# Patient Record
Sex: MALE | Race: BLACK OR AFRICAN AMERICAN | NOT HISPANIC OR LATINO | Employment: OTHER | URBAN - METROPOLITAN AREA
[De-identification: names, ages, dates, MRNs, and addresses within clinical notes are randomized per-mention and may not be internally consistent; named-entity substitution may affect disease eponyms.]

---

## 2017-06-07 ENCOUNTER — HOSPITAL ENCOUNTER (EMERGENCY)
Facility: HOSPITAL | Age: 77
Discharge: HOME | End: 2017-06-07
Attending: EMERGENCY MEDICINE
Payer: MEDICARE

## 2017-06-07 VITALS
HEART RATE: 41 BPM | HEIGHT: 72 IN | SYSTOLIC BLOOD PRESSURE: 192 MMHG | OXYGEN SATURATION: 100 % | DIASTOLIC BLOOD PRESSURE: 95 MMHG | WEIGHT: 210 LBS | RESPIRATION RATE: 18 BRPM | BODY MASS INDEX: 28.44 KG/M2 | TEMPERATURE: 98 F

## 2017-06-07 DIAGNOSIS — I95.9 HYPOTENSION: ICD-10-CM

## 2017-06-07 DIAGNOSIS — I10 UNSPECIFIED ESSENTIAL HYPERTENSION: ICD-10-CM

## 2017-06-07 DIAGNOSIS — R00.1 BRADYCARDIA: Primary | ICD-10-CM

## 2017-06-07 LAB
ALBUMIN SERPL BCP-MCNC: 3.4 G/DL
ALP SERPL-CCNC: 67 U/L
ALT SERPL W/O P-5'-P-CCNC: 15 U/L
ANION GAP SERPL CALC-SCNC: 7 MMOL/L
AST SERPL-CCNC: 21 U/L
BASOPHILS # BLD AUTO: 0.02 K/UL
BASOPHILS NFR BLD: 0.5 %
BILIRUB SERPL-MCNC: 0.9 MG/DL
BILIRUB UR QL STRIP: NEGATIVE
BUN SERPL-MCNC: 31 MG/DL
CALCIUM SERPL-MCNC: 10.1 MG/DL
CHLORIDE SERPL-SCNC: 106 MMOL/L
CLARITY UR: CLEAR
CO2 SERPL-SCNC: 26 MMOL/L
COLOR UR: YELLOW
CREAT SERPL-MCNC: 1.7 MG/DL
DIFFERENTIAL METHOD: ABNORMAL
EOSINOPHIL # BLD AUTO: 0.3 K/UL
EOSINOPHIL NFR BLD: 5.9 %
ERYTHROCYTE [DISTWIDTH] IN BLOOD BY AUTOMATED COUNT: 14.7 %
EST. GFR  (AFRICAN AMERICAN): 44 ML/MIN/1.73 M^2
EST. GFR  (NON AFRICAN AMERICAN): 38 ML/MIN/1.73 M^2
GLUCOSE SERPL-MCNC: 135 MG/DL
GLUCOSE UR QL STRIP: NEGATIVE
HCT VFR BLD AUTO: 42.8 %
HGB BLD-MCNC: 14.6 G/DL
HGB UR QL STRIP: NEGATIVE
INR PPP: 1
KETONES UR QL STRIP: NEGATIVE
LACTATE SERPL-SCNC: 1 MMOL/L
LEUKOCYTE ESTERASE UR QL STRIP: NEGATIVE
LYMPHOCYTES # BLD AUTO: 1.2 K/UL
LYMPHOCYTES NFR BLD: 27.1 %
MAGNESIUM SERPL-MCNC: 1.9 MG/DL
MCH RBC QN AUTO: 29.9 PG
MCHC RBC AUTO-ENTMCNC: 34.1 %
MCV RBC AUTO: 88 FL
MONOCYTES # BLD AUTO: 0.4 K/UL
MONOCYTES NFR BLD: 8.7 %
NEUTROPHILS # BLD AUTO: 2.5 K/UL
NEUTROPHILS NFR BLD: 57.8 %
NITRITE UR QL STRIP: NEGATIVE
PH UR STRIP: 6 [PH] (ref 5–8)
PLATELET # BLD AUTO: 211 K/UL
PMV BLD AUTO: 11.1 FL
POCT GLUCOSE: 150 MG/DL (ref 70–110)
POTASSIUM SERPL-SCNC: 4.1 MMOL/L
PROT SERPL-MCNC: 6.9 G/DL
PROT UR QL STRIP: NEGATIVE
PROTHROMBIN TIME: 11 SEC
RBC # BLD AUTO: 4.88 M/UL
SODIUM SERPL-SCNC: 139 MMOL/L
SP GR UR STRIP: 1.01 (ref 1–1.03)
TROPONIN I SERPL DL<=0.01 NG/ML-MCNC: <0.006 NG/ML
URN SPEC COLLECT METH UR: ABNORMAL
UROBILINOGEN UR STRIP-ACNC: ABNORMAL EU/DL
WBC # BLD AUTO: 4.24 K/UL

## 2017-06-07 PROCEDURE — 85025 COMPLETE CBC W/AUTO DIFF WBC: CPT

## 2017-06-07 PROCEDURE — 83735 ASSAY OF MAGNESIUM: CPT

## 2017-06-07 PROCEDURE — 85610 PROTHROMBIN TIME: CPT

## 2017-06-07 PROCEDURE — 93005 ELECTROCARDIOGRAM TRACING: CPT

## 2017-06-07 PROCEDURE — 99284 EMERGENCY DEPT VISIT MOD MDM: CPT | Mod: 25

## 2017-06-07 PROCEDURE — 81003 URINALYSIS AUTO W/O SCOPE: CPT

## 2017-06-07 PROCEDURE — 84484 ASSAY OF TROPONIN QUANT: CPT

## 2017-06-07 PROCEDURE — 99283 EMERGENCY DEPT VISIT LOW MDM: CPT | Mod: ,,, | Performed by: NURSE PRACTITIONER

## 2017-06-07 PROCEDURE — 93010 ELECTROCARDIOGRAM REPORT: CPT | Mod: 76,,, | Performed by: INTERNAL MEDICINE

## 2017-06-07 PROCEDURE — 80053 COMPREHEN METABOLIC PANEL: CPT

## 2017-06-07 PROCEDURE — 96361 HYDRATE IV INFUSION ADD-ON: CPT

## 2017-06-07 PROCEDURE — 93010 ELECTROCARDIOGRAM REPORT: CPT | Mod: ,,, | Performed by: INTERNAL MEDICINE

## 2017-06-07 PROCEDURE — 83605 ASSAY OF LACTIC ACID: CPT

## 2017-06-07 PROCEDURE — 96360 HYDRATION IV INFUSION INIT: CPT

## 2017-06-07 PROCEDURE — 82962 GLUCOSE BLOOD TEST: CPT

## 2017-06-07 PROCEDURE — 25000003 PHARM REV CODE 250: Performed by: EMERGENCY MEDICINE

## 2017-06-07 RX ORDER — METFORMIN HYDROCHLORIDE 500 MG/1
500 TABLET ORAL 2 TIMES DAILY WITH MEALS
COMMUNITY
End: 2018-09-19

## 2017-06-07 RX ORDER — TAMSULOSIN HYDROCHLORIDE 0.4 MG/1
0.4 CAPSULE ORAL DAILY
COMMUNITY
End: 2020-09-10

## 2017-06-07 RX ORDER — CHOLECALCIFEROL (VITAMIN D3) 25 MCG
1000 TABLET ORAL DAILY
COMMUNITY

## 2017-06-07 RX ORDER — LOSARTAN POTASSIUM AND HYDROCHLOROTHIAZIDE 25; 100 MG/1; MG/1
1 TABLET ORAL DAILY
COMMUNITY
End: 2017-07-14

## 2017-06-07 RX ORDER — FLUTICASONE PROPIONATE 50 UG/1
POWDER, METERED RESPIRATORY (INHALATION) 2 TIMES DAILY
COMMUNITY
End: 2017-07-14

## 2017-06-07 RX ORDER — ASPIRIN 81 MG/1
81 TABLET ORAL DAILY
Status: ON HOLD | COMMUNITY
End: 2017-07-19 | Stop reason: HOSPADM

## 2017-06-07 RX ORDER — ATORVASTATIN CALCIUM 80 MG/1
80 TABLET, FILM COATED ORAL DAILY
Status: ON HOLD | COMMUNITY
End: 2017-07-19 | Stop reason: HOSPADM

## 2017-06-07 RX ADMIN — SODIUM CHLORIDE 1000 ML: 0.9 INJECTION, SOLUTION INTRAVENOUS at 11:06

## 2017-06-07 NOTE — ED NOTES
Pt presents to ED from Dr Aldana's office for c/o hypotension and was sent for further evaluation. Pt bp stable at present. Pt sinus bradycardic on monitor. Pt states he took his metoprolol this AM.  Pt AAOx4. Pt has no complaints at this time. Will continue to monitor.

## 2017-06-07 NOTE — ASSESSMENT & PLAN NOTE
SBP 110s-170s in ER; on Toprol XL, Losartan,  HCTZ and Lotrel at home; will discontinue BB due to bradycardia; advised against use of ACEI and ARB

## 2017-06-07 NOTE — HPI
76yo male with history of HTN, CKD and ?HOCM who presented to the ER upon advice from his PCP. He was seen earlier today for routine follow up and physical. His HR was noted to be low in the 40s therefore he was sent to the ER for further evaluation. He denies any chest pain, SOB, dizziness or syncope. All labs in the ER were WNL to include lytes and TSH    EKG per my interpretation: SB with HR 40s with normal axis; no STTWC

## 2017-06-07 NOTE — ASSESSMENT & PLAN NOTE
Asymptomatic; detected on routine physical exam; no AVB or pauses on telemetry in the ER; will discontinue Toprol XL; will follow up in Cardiology clinic for reassessment of HR; if bradycardia resolved will manage BP without BB; if BB persists will consider Holter monitor if symptomatic

## 2017-06-07 NOTE — DISCHARGE INSTRUCTIONS
Call your primary care doctor to make the first available appointment.     Keep all your medical appointments.     Take your regular medication as prescribed. Contact your primary care provider before running out of medication, or for any problems obtaining them.    Do not drive or operate heavy machinery while taking norco, percocet, valium, flexeril or other opioid and sedating medications.     Prolonged or overuse of pain and sedating medication may lead to addiction, dependence, organ failure, family and work problems, legal problems, accidental overdose and death.    If you do not have health insurance, you probably qualify for heavily discounted rates:  Call 1-521.536.1450 ([facility] hotline) or go to www.[employer].[place].gov    Your evaluation in the ED does not suggest any emergent or life threatening medical condition requiring admission or immediate intervention beyond that provided in the ED.   However, the signs of a serious problem sometimes take more time to appear.   RETURN TO THE ER if any of the following occur:    New or worse pain: if it feels different, becomes more severe, lasts longer, or begins to spread into your shoulder, arm, neck, jaw or back   Shortness of breath or increased pain with breathing   Cough with dark colored sputum (phlegm) or blood   Weakness, dizziness, fainting, or loss of consciousness   Fever of 100.4ºF (38ºC) or higher  Any new or concerning symptoms

## 2017-06-07 NOTE — SUBJECTIVE & OBJECTIVE
Past Medical History:   Diagnosis Date    Cancer     prostate    Diabetes mellitus     Hypertension     Hypertrophic cardiomyopathy     Renal disorder        Past Surgical History:   Procedure Laterality Date    BACK SURGERY      THYROIDECTOMY         Review of patient's allergies indicates:  No Known Allergies    No current facility-administered medications on file prior to encounter.      No current outpatient prescriptions on file prior to encounter.     Family History     None        Social History Main Topics    Smoking status: Former Smoker     Types: Cigarettes    Smokeless tobacco: Not on file    Alcohol use Yes    Drug use: Unknown    Sexual activity: Not on file     Review of Systems   Constitution: Negative for chills, decreased appetite and diaphoresis.   Cardiovascular: Negative for chest pain, claudication, cyanosis, dyspnea on exertion, irregular heartbeat, leg swelling, near-syncope, orthopnea, palpitations, paroxysmal nocturnal dyspnea and syncope.   Respiratory: Negative for cough, shortness of breath and wheezing.    Gastrointestinal: Negative for bloating, abdominal pain, constipation, diarrhea, nausea and vomiting.   Neurological: Negative for dizziness.     Objective:     Vital Signs (Most Recent):  Temp: 97.9 °F (36.6 °C) (06/07/17 1035)  Pulse: 85 (06/07/17 1605)  Resp: 18 (06/07/17 1120)  BP: (!) 170/84 (06/07/17 1605)  SpO2: 100 % (06/07/17 1605) Vital Signs (24h Range):  Temp:  [97.9 °F (36.6 °C)] 97.9 °F (36.6 °C)  Pulse:  [38-85] 85  Resp:  [16-18] 18  SpO2:  [99 %-100 %] 100 %  BP: (102-170)/(54-84) 170/84     Weight: 95.3 kg (210 lb)  Body mass index is 28.88 kg/m².    SpO2: 100 %  O2 Device (Oxygen Therapy): nasal cannula    No intake or output data in the 24 hours ending 06/07/17 1712    Lines/Drains/Airways     Peripheral Intravenous Line                 Peripheral IV - Single Lumen 06/07/17 1107 Left Antecubital less than 1 day                Physical Exam    Constitutional: He is oriented to person, place, and time. He appears well-developed and well-nourished. No distress.   Cardiovascular: Regular rhythm.  Bradycardia present.  Exam reveals no gallop.    No murmur heard.  Pulmonary/Chest: Effort normal and breath sounds normal. No respiratory distress. He has no wheezes.   Abdominal: Soft. Bowel sounds are normal. He exhibits no distension. There is no tenderness.   Neurological: He is alert and oriented to person, place, and time.   Skin: Skin is warm and dry.       Significant Labs:       Recent Labs  Lab 06/07/17  1110   CALCIUM 10.1   PROT 6.9      K 4.1   CO2 26      BUN 31*   CREATININE 1.7*   ALKPHOS 67   ALT 15   AST 21   BILITOT 0.9       Recent Labs  Lab 06/07/17  1110   WBC 4.24   RBC 4.88   HGB 14.6   HCT 42.8      MCV 88   MCH 29.9   MCHC 34.1       Recent Labs  Lab 06/07/17  1110   TROPONINI <0.006

## 2017-06-07 NOTE — CONSULTS
[facility]  Cardiology  Consult Note    Patient Name: Shant Magana  MRN: [id number]  Admission Date: 6/7/2017  Hospital Length of Stay: 0 days  Code Status: No Order   Attending Provider: Sascha Crespo MD   Consulting Provider: SELVIN Madrid, ANP  Primary Care Physician: No primary care provider on file.  Principal Problem:<principal problem not specified>    Patient information was obtained from patient and past medical records.     Consults  Subjective:     Chief Complaint:  Low HR-sent to ER per PCP for evaluation; asymptomatic      HPI:   76yo male with history of HTN, CKD and ?HOCM who presented to the ER upon advice from his PCP. He was seen earlier today for routine follow up and physical. His HR was noted to be low in the 40s therefore he was sent to the ER for further evaluation. He denies any chest pain, SOB, dizziness or syncope. All labs in the ER were WNL to include lytes and TSH    EKG per my interpretation: SB with HR 40s with normal axis; no STTWC     Past Medical History:   Diagnosis Date    Cancer     prostate    Diabetes mellitus     Hypertension     Hypertrophic cardiomyopathy     Renal disorder        Past Surgical History:   Procedure Laterality Date    BACK SURGERY      THYROIDECTOMY         Review of patient's allergies indicates:  No Known Allergies    No current facility-administered medications on file prior to encounter.      No current outpatient prescriptions on file prior to encounter.     Family History     None        Social History Main Topics    Smoking status: Former Smoker     Types: Cigarettes    Smokeless tobacco: Not on file    Alcohol use Yes    Drug use: Unknown    Sexual activity: Not on file     Review of Systems   Constitution: Negative for chills, decreased appetite and diaphoresis.   Cardiovascular: Negative for chest pain, claudication, cyanosis, dyspnea on exertion, irregular heartbeat, leg swelling, near-syncope, orthopnea,  palpitations, paroxysmal nocturnal dyspnea and syncope.   Respiratory: Negative for cough, shortness of breath and wheezing.    Gastrointestinal: Negative for bloating, abdominal pain, constipation, diarrhea, nausea and vomiting.   Neurological: Negative for dizziness.     Objective:     Vital Signs (Most Recent):  Temp: 97.9 °F (36.6 °C) (06/07/17 1035)  Pulse: 85 (06/07/17 1605)  Resp: 18 (06/07/17 1120)  BP: (!) 170/84 (06/07/17 1605)  SpO2: 100 % (06/07/17 1605) Vital Signs (24h Range):  Temp:  [97.9 °F (36.6 °C)] 97.9 °F (36.6 °C)  Pulse:  [38-85] 85  Resp:  [16-18] 18  SpO2:  [99 %-100 %] 100 %  BP: (102-170)/(54-84) 170/84     Weight: 95.3 kg (210 lb)  Body mass index is 28.88 kg/m².    SpO2: 100 %  O2 Device (Oxygen Therapy): nasal cannula    No intake or output data in the 24 hours ending 06/07/17 1712    Lines/Drains/Airways     Peripheral Intravenous Line                 Peripheral IV - Single Lumen 06/07/17 1107 Left Antecubital less than 1 day                Physical Exam   Constitutional: He is oriented to person, place, and time. He appears well-developed and well-nourished. No distress.   Cardiovascular: Regular rhythm.  Bradycardia present.  Exam reveals no gallop.    No murmur heard.  Pulmonary/Chest: Effort normal and breath sounds normal. No respiratory distress. He has no wheezes.   Abdominal: Soft. Bowel sounds are normal. He exhibits no distension. There is no tenderness.   Neurological: He is alert and oriented to person, place, and time.   Skin: Skin is warm and dry.       Significant Labs:       Recent Labs  Lab 06/07/17  1110   CALCIUM 10.1   PROT 6.9      K 4.1   CO2 26      BUN 31*   CREATININE 1.7*   ALKPHOS 67   ALT 15   AST 21   BILITOT 0.9       Recent Labs  Lab 06/07/17  1110   WBC 4.24   RBC 4.88   HGB 14.6   HCT 42.8      MCV 88   MCH 29.9   MCHC 34.1       Recent Labs  Lab 06/07/17  1110   TROPONINI <0.006           Assessment and Plan:     Bradycardia     Asymptomatic; detected on routine physical exam; no AVB or pauses on telemetry in the ER; will discontinue Toprol XL; will follow up in Cardiology clinic for reassessment of HR; if bradycardia resolved will manage BP without BB; if BB persists will consider Holter monitor if symptomatic         Unspecified essential hypertension    SBP 110s-170s in ER; on Toprol XL, Losartan,  HCTZ and Lotrel at home; will discontinue BB due to bradycardia; advised against use of ACEI and ARB; verbal report of hypotension in PCP's office with SBP 80s; no hypotension noted while in ER with SBP >100 consistently             VTE Risk Mitigation     None            SELVIN Madrid, ANP  Cardiology   [facility]

## 2017-06-08 ENCOUNTER — TELEPHONE (OUTPATIENT)
Dept: CARDIOLOGY | Facility: CLINIC | Age: 77
End: 2017-06-08

## 2017-06-08 NOTE — ED PROVIDER NOTES
"Encounter Date: 6/7/2017       History     Chief Complaint   Patient presents with    Hypotension     was Dr Aldana's office this morning for a check up and blood pressure was 82/52.  Was sent to ED for further evaluation.  Pt awake, alert and oriented x 3     Review of patient's allergies indicates:  No Known Allergies  HPI   Pt sent to the emergency department for hypotension during PCP visit.  I discussed the patient with his PCP, who states that the patient was somnolent, confused just prior to arrival here.  Heart rate was in the high 50s at that time.  He is on a beta blocker (50 mg metoprolol).  He and PCP deny Hx bradycardia.  Here the patient denies any symptoms, states he has a negative review of systems.     Past Medical History:   Diagnosis Date    Cancer     prostate    Diabetes mellitus     Hypertension     Hypertrophic cardiomyopathy     Renal disorder      Past Surgical History:   Procedure Laterality Date    BACK SURGERY      THYROIDECTOMY       History reviewed. No pertinent family history.  Social History   Substance Use Topics    Smoking status: Former Smoker     Types: Cigarettes    Smokeless tobacco: Not on file    Alcohol use Yes     Review of Systems  ROS: As per HPI and below:   General: No fever.   HENT: No facial pain.   Eyes: No eye pain.   Cardiovascular: No chest pain. Hypotension. Bradycardia.   Respiratory: No dyspnea.   GI: No abdominal pain.   Skin: No rashes.  Neuro: No syncope. +confusion  Musculoskeletal: No extremity pain. No swelling.    All other systems negative.     Physical Exam     Initial Vitals [06/07/17 1035]   BP Pulse Resp Temp SpO2   110/65 (!) 47 16 97.9 °F (36.6 °C) 100 %     Physical Exam  Nursing note and vitals reviewed.  BP (!) 192/95   Pulse (!) 41   Temp 97.9 °F (36.6 °C) (Oral)   Resp 18   Ht 5' 11.5" (1.816 m)   Wt 95.3 kg (210 lb)   SpO2 100%   BMI 28.88 kg/m²   Constitutional: AAOx3. Well-developed and well-nourished. No distress.  HENT: "   Mouth/Throat: Oropharynx is clear and slightly dry  Eyes: EOMI. No discharge. Anicteric.  Neck: Normal range of motion. Neck supple.  Cardiovascular: bradycardic rate. No murmur, no gallop and no friction rub heard.   Pulmonary/Chest: No respiratory distress. Effort normal. No wheezes, no rales, no rhonchi  Abdominal: Bowel sounds normal. Soft. No distension and no mass. There is no tenderness. There is no rebound, no guarding, no tenderness at McBurney's point and negative Anthony's sign.   Musculoskeletal: Normal range of motion.   Neurological: Alert and oriented to person, place, and time. No gross cranial nerve or strength deficit. Coordination normal.  Skin: Skin is warm and dry.   Psychiatric: Behavior is normal. Judgment normal.    ED Course   Procedures  Labs Reviewed   CBC W/ AUTO DIFFERENTIAL - Abnormal; Notable for the following:        Result Value    RDW 14.7 (*)     All other components within normal limits   COMPREHENSIVE METABOLIC PANEL - Abnormal; Notable for the following:     Glucose 135 (*)     BUN, Bld 31 (*)     Creatinine 1.7 (*)     Albumin 3.4 (*)     Anion Gap 7 (*)     eGFR if  44 (*)     eGFR if non  38 (*)     All other components within normal limits   URINALYSIS - Abnormal; Notable for the following:     Urobilinogen, UA 4.0-6.0 (*)     All other components within normal limits   POCT GLUCOSE - Abnormal; Notable for the following:     POCT Glucose 150 (*)     All other components within normal limits   PROTIME-INR   MAGNESIUM   TROPONIN I   LACTIC ACID, PLASMA        ECG Results          EKG 12-lead (Final result)  Result time 06/07/17 14:49:19    Final result by Interface, Lab In [facility] (06/07/17 14:49:19)                 Narrative:    Test Reason : i95.9  Blood Pressure : / mmHG  Vent. Rate : 045 BPM     Atrial Rate : 045 BPM     P-R Int : 220 ms          QRS Dur : 094 ms      QT Int : 446 ms       P-R-T Axes : 025 064 006 degrees     QTc Int : 385  ms    Marked sinus bradycardia with 1st degree A-V block  Septal infarct (cited on or before 07-JUN-2017)  Abnormal ECG  When compared with ECG of 07-JUN-2017 10:47,  No significant change was found  Confirmed by Guanaco Ty MD ([id number]) on 6/7/2017 2:49:07 PM    Referred By: VEGA   SELF           Confirmed By:Guanaco Ty MD                             EKG 12-lead (Final result)  Result time 06/07/17 14:49:04    Final result by Interface, Lab In [facility] (06/07/17 14:49:04)                 Narrative:    Test Reason : I95.9  Blood Pressure : / mmHG  Vent. Rate : 045 BPM     Atrial Rate : 045 BPM     P-R Int : 216 ms          QRS Dur : 092 ms      QT Int : 444 ms       P-R-T Axes : -21 054 001 degrees     QTc Int : 384 ms    Marked sinus bradycardia with 1st degree A-V block  Septal infarct ,age undetermined  Abnormal ECG  No previous ECGs available  Confirmed by Guanaco Ty MD ([id number]) on 6/7/2017 2:48:58 PM    Referred By: VEGA   SELF           Confirmed By:Guanaco Ty MD                                                   ED Course   Comment By Time   I independently reviewed and interpreted EKG which shows sinus bradycardia, no STEMI, no ischemic changes, normal intervals   Sascha Crespo MD 06/07 5061   Patient is a 77-year-old male with hypertension sent to the emergency department for hypotension during PCP visit.  I discussed the patient with his PCP, who states that the patient was somnolent, confused.  Heart rate was in the high 50s at that time.  He is on a beta blocker (50 mg metoprolol).  He and PCP deny Hx bradycardia.  Here the patient denies any symptoms, states he has a negative review of systems, is persistently and consistently bradycardic from high 30s to mid 40s.  I note that the patient had minimal increase in his heart rate upon standing.  The initial differential initially included ACS/MI, dehydration, sepsis, gross metabolic abnormality.  I independently  interpreted and reviewed the patient's labs notable for normal lactic, normal troponin, stable CKD.  Concern for chronotropic insufficiency versus excessive beta blockade.  I discussed these concerns with the patient's PCP Dr. Aldana who states that if patient cleared by cardiology, he would discontinue beta blocker.  Contacted cardiology.  Dr. Dumont is in a case, pt will be evaluated by cards midlevel provider.  I note that there was significant delay in direct MD evaluation and disposition. Contributing factors included my not having electronic status as an attending physician in the EMR delaying my ability to assign myself to pt, pt apparently inadvertently electronically moved to low acuity area of the ER and subsequently assigned to low acuity area MD just prior to my being given attending status electronically, ED saturation with multiple critically and gravely ill pts in ED and multiple rapid responses.  Sascha Crespo MD 06/07 1637   Pt seen by cardiology.  Dr. Dumont does not feel that the patient requires admission or observation.  They do not feel that the patient's bradycardia is due to an intrinsic dysrhythmia.  They recommend discontinuing beta blocker.  They will see the patient in clinic.  I discussed with patient and/or guardian/caretaker that this evaluation in the ED does not suggest any emergent or life threatening medical condition requiring admission or immediate intervention beyond what was provided in the ED. Regardless, an unremarkable evaluation in the ED does not preclude the development or presence of a serious or life threatening condition. As such, patient was instructed to return immediately for any worsening or change in current symptoms.     I had a detailed discussion with patient  and/or guardian/caretaker regarding findings, plan, return precautions, importance of medication adherence, need to follow-up with a PCP and specialist. All questions answered.   Sascha Crespo MD  06/07 0923     Clinical Impression:   The primary encounter diagnosis was Bradycardia. Diagnoses of Hypotension and Unspecified essential hypertension were also pertinent to this visit.          Sascha Crespo MD  06/12/17 1949

## 2017-06-08 NOTE — TELEPHONE ENCOUNTER
Left message, advising patient with his hospital follow up info.      6/12 @ 2:40 with Dr. Dumont.

## 2017-06-08 NOTE — TELEPHONE ENCOUNTER
----- Message from SELVIN Ge ANP sent at 6/7/2017  5:08 PM CDT -----  Contact: Josefina Kinney NP  Patient seen in the ER with asymptomatic bradycardia. Metoprolol stopped. Needs cardiology follow. Ideally would be more suitable for MD clinic

## 2017-07-14 ENCOUNTER — HOSPITAL ENCOUNTER (INPATIENT)
Facility: HOSPITAL | Age: 77
LOS: 7 days | Discharge: NURSING, REHAB OR LONG-TERM CARE | DRG: 065 | End: 2017-07-21
Attending: EMERGENCY MEDICINE | Admitting: INTERNAL MEDICINE
Payer: MEDICARE

## 2017-07-14 DIAGNOSIS — I16.1 HYPERTENSIVE EMERGENCY: ICD-10-CM

## 2017-07-14 DIAGNOSIS — I67.4 HYPERTENSIVE ENCEPHALOPATHY: Primary | ICD-10-CM

## 2017-07-14 DIAGNOSIS — E11.8 TYPE 2 DIABETES MELLITUS WITH COMPLICATION, WITHOUT LONG-TERM CURRENT USE OF INSULIN: ICD-10-CM

## 2017-07-14 DIAGNOSIS — I16.9 HYPERTENSIVE CRISIS, UNSPECIFIED: ICD-10-CM

## 2017-07-14 DIAGNOSIS — I63.9 CVA (CEREBRAL VASCULAR ACCIDENT): ICD-10-CM

## 2017-07-14 DIAGNOSIS — I10 UNSPECIFIED ESSENTIAL HYPERTENSION: ICD-10-CM

## 2017-07-14 DIAGNOSIS — E83.52 HYPERCALCEMIA: ICD-10-CM

## 2017-07-14 LAB
ALBUMIN SERPL BCP-MCNC: 4 G/DL
ALP SERPL-CCNC: 78 U/L
ALT SERPL W/O P-5'-P-CCNC: 19 U/L
ANION GAP SERPL CALC-SCNC: 13 MMOL/L
APTT BLDCRRT: 29.2 SEC
AST SERPL-CCNC: 33 U/L
BACTERIA #/AREA URNS HPF: NORMAL /HPF
BASOPHILS # BLD AUTO: 0.02 K/UL
BASOPHILS NFR BLD: 0.5 %
BILIRUB SERPL-MCNC: 0.5 MG/DL
BILIRUB UR QL STRIP: NEGATIVE
BUN SERPL-MCNC: 32 MG/DL
CALCIUM SERPL-MCNC: 10.8 MG/DL
CHLORIDE SERPL-SCNC: 109 MMOL/L
CHOLEST/HDLC SERPL: 7.3 {RATIO}
CLARITY UR: CLEAR
CO2 SERPL-SCNC: 18 MMOL/L
COLOR UR: YELLOW
CREAT SERPL-MCNC: 1.8 MG/DL
DIFFERENTIAL METHOD: NORMAL
EOSINOPHIL # BLD AUTO: 0.2 K/UL
EOSINOPHIL NFR BLD: 4.2 %
ERYTHROCYTE [DISTWIDTH] IN BLOOD BY AUTOMATED COUNT: 13.9 %
EST. GFR  (AFRICAN AMERICAN): 41 ML/MIN/1.73 M^2
EST. GFR  (NON AFRICAN AMERICAN): 36 ML/MIN/1.73 M^2
GLUCOSE SERPL-MCNC: 90 MG/DL
GLUCOSE UR QL STRIP: NEGATIVE
HCT VFR BLD AUTO: 49.7 %
HDL/CHOLESTEROL RATIO: 13.7 %
HDLC SERPL-MCNC: 285 MG/DL
HDLC SERPL-MCNC: 39 MG/DL
HGB BLD-MCNC: 16.5 G/DL
HGB UR QL STRIP: NEGATIVE
HYALINE CASTS #/AREA URNS LPF: 0 /LPF
INR PPP: 1
KETONES UR QL STRIP: NEGATIVE
LDLC SERPL CALC-MCNC: 191.6 MG/DL
LEUKOCYTE ESTERASE UR QL STRIP: NEGATIVE
LYMPHOCYTES # BLD AUTO: 1.6 K/UL
LYMPHOCYTES NFR BLD: 39.1 %
MCH RBC QN AUTO: 30.3 PG
MCHC RBC AUTO-ENTMCNC: 33.2 %
MCV RBC AUTO: 91 FL
MICROSCOPIC COMMENT: NORMAL
MONOCYTES # BLD AUTO: 0.3 K/UL
MONOCYTES NFR BLD: 6.9 %
NEUTROPHILS # BLD AUTO: 2 K/UL
NEUTROPHILS NFR BLD: 49.1 %
NITRITE UR QL STRIP: NEGATIVE
NONHDLC SERPL-MCNC: 246 MG/DL
PH UR STRIP: 6 [PH] (ref 5–8)
PLATELET # BLD AUTO: 194 K/UL
PMV BLD AUTO: 10.9 FL
POTASSIUM SERPL-SCNC: 4.7 MMOL/L
PROT SERPL-MCNC: 8.2 G/DL
PROT UR QL STRIP: ABNORMAL
PROTHROMBIN TIME: 10.8 SEC
RBC # BLD AUTO: 5.45 M/UL
RBC #/AREA URNS HPF: 3 /HPF (ref 0–4)
SODIUM SERPL-SCNC: 140 MMOL/L
SP GR UR STRIP: 1.02 (ref 1–1.03)
TRIGL SERPL-MCNC: 272 MG/DL
TROPONIN I SERPL DL<=0.01 NG/ML-MCNC: 0.01 NG/ML
TSH SERPL DL<=0.005 MIU/L-ACNC: 3.72 UIU/ML
URN SPEC COLLECT METH UR: ABNORMAL
UROBILINOGEN UR STRIP-ACNC: 1 EU/DL
WBC # BLD AUTO: 4.04 K/UL
WBC #/AREA URNS HPF: 1 /HPF (ref 0–5)

## 2017-07-14 PROCEDURE — 84443 ASSAY THYROID STIM HORMONE: CPT

## 2017-07-14 PROCEDURE — 99285 EMERGENCY DEPT VISIT HI MDM: CPT | Mod: 25

## 2017-07-14 PROCEDURE — 25000003 PHARM REV CODE 250: Performed by: EMERGENCY MEDICINE

## 2017-07-14 PROCEDURE — 96376 TX/PRO/DX INJ SAME DRUG ADON: CPT

## 2017-07-14 PROCEDURE — 12000002 HC ACUTE/MED SURGE SEMI-PRIVATE ROOM

## 2017-07-14 PROCEDURE — 84484 ASSAY OF TROPONIN QUANT: CPT

## 2017-07-14 PROCEDURE — G0425 INPT/ED TELECONSULT30: HCPCS | Mod: GT,,, | Performed by: PSYCHIATRY & NEUROLOGY

## 2017-07-14 PROCEDURE — 63600175 PHARM REV CODE 636 W HCPCS: Performed by: EMERGENCY MEDICINE

## 2017-07-14 PROCEDURE — 85025 COMPLETE CBC W/AUTO DIFF WBC: CPT

## 2017-07-14 PROCEDURE — 80053 COMPREHEN METABOLIC PANEL: CPT

## 2017-07-14 PROCEDURE — 93010 ELECTROCARDIOGRAM REPORT: CPT | Mod: ,,, | Performed by: INTERNAL MEDICINE

## 2017-07-14 PROCEDURE — 80061 LIPID PANEL: CPT

## 2017-07-14 PROCEDURE — 81000 URINALYSIS NONAUTO W/SCOPE: CPT

## 2017-07-14 PROCEDURE — 85610 PROTHROMBIN TIME: CPT

## 2017-07-14 PROCEDURE — 93005 ELECTROCARDIOGRAM TRACING: CPT

## 2017-07-14 PROCEDURE — 85730 THROMBOPLASTIN TIME PARTIAL: CPT

## 2017-07-14 RX ORDER — ASPIRIN 81 MG/1
81 TABLET ORAL DAILY
Status: DISCONTINUED | OUTPATIENT
Start: 2017-07-15 | End: 2017-07-21 | Stop reason: HOSPADM

## 2017-07-14 RX ORDER — NICARDIPINE HYDROCHLORIDE 0.2 MG/ML
2.5 INJECTION INTRAVENOUS CONTINUOUS
Status: DISCONTINUED | OUTPATIENT
Start: 2017-07-14 | End: 2017-07-16

## 2017-07-14 RX ORDER — HYDRALAZINE HYDROCHLORIDE 20 MG/ML
10 INJECTION INTRAMUSCULAR; INTRAVENOUS
Status: DISCONTINUED | OUTPATIENT
Start: 2017-07-14 | End: 2017-07-14

## 2017-07-14 RX ORDER — NICARDIPINE HYDROCHLORIDE 0.2 MG/ML
2.5 INJECTION INTRAVENOUS CONTINUOUS
Status: DISCONTINUED | OUTPATIENT
Start: 2017-07-14 | End: 2017-07-14

## 2017-07-14 RX ORDER — HYDRALAZINE HYDROCHLORIDE 20 MG/ML
10 INJECTION INTRAMUSCULAR; INTRAVENOUS
Status: COMPLETED | OUTPATIENT
Start: 2017-07-14 | End: 2017-07-14

## 2017-07-14 RX ADMIN — HYDRALAZINE HYDROCHLORIDE 10 MG: 20 INJECTION INTRAMUSCULAR; INTRAVENOUS at 08:07

## 2017-07-14 RX ADMIN — SODIUM CHLORIDE 1000 ML: 0.9 INJECTION, SOLUTION INTRAVENOUS at 09:07

## 2017-07-15 PROBLEM — I16.9 HYPERTENSIVE CRISIS, UNSPECIFIED: Status: ACTIVE | Noted: 2017-07-15

## 2017-07-15 LAB
ALBUMIN SERPL BCP-MCNC: 3.4 G/DL
ALP SERPL-CCNC: 66 U/L
ALT SERPL W/O P-5'-P-CCNC: 15 U/L
ANION GAP SERPL CALC-SCNC: 9 MMOL/L
AST SERPL-CCNC: 24 U/L
BASOPHILS # BLD AUTO: 0.05 K/UL
BASOPHILS NFR BLD: 0.6 %
BILIRUB SERPL-MCNC: 0.5 MG/DL
BUN SERPL-MCNC: 24 MG/DL
CALCIUM SERPL-MCNC: 10.3 MG/DL
CHLORIDE SERPL-SCNC: 107 MMOL/L
CO2 SERPL-SCNC: 22 MMOL/L
CREAT SERPL-MCNC: 1.3 MG/DL
DIFFERENTIAL METHOD: NORMAL
EOSINOPHIL # BLD AUTO: 0.1 K/UL
EOSINOPHIL NFR BLD: 0.6 %
ERYTHROCYTE [DISTWIDTH] IN BLOOD BY AUTOMATED COUNT: 13.9 %
EST. GFR  (AFRICAN AMERICAN): >60 ML/MIN/1.73 M^2
EST. GFR  (NON AFRICAN AMERICAN): 53 ML/MIN/1.73 M^2
ESTIMATED AVG GLUCOSE: 114 MG/DL
ESTIMATED AVG GLUCOSE: 114 MG/DL
ESTIMATED PA SYSTOLIC PRESSURE: 24.72
GLUCOSE SERPL-MCNC: 108 MG/DL
HBA1C MFR BLD HPLC: 5.6 %
HBA1C MFR BLD HPLC: 5.6 %
HCT VFR BLD AUTO: 46.7 %
HGB BLD-MCNC: 16 G/DL
LYMPHOCYTES # BLD AUTO: 1.5 K/UL
LYMPHOCYTES NFR BLD: 19.3 %
MCH RBC QN AUTO: 30.6 PG
MCHC RBC AUTO-ENTMCNC: 34.3 %
MCV RBC AUTO: 89 FL
MONOCYTES # BLD AUTO: 0.7 K/UL
MONOCYTES NFR BLD: 8.5 %
NEUTROPHILS # BLD AUTO: 5.6 K/UL
NEUTROPHILS NFR BLD: 71 %
PLATELET # BLD AUTO: 225 K/UL
PMV BLD AUTO: 11.3 FL
POCT GLUCOSE: 105 MG/DL (ref 70–110)
POCT GLUCOSE: 115 MG/DL (ref 70–110)
POCT GLUCOSE: 153 MG/DL (ref 70–110)
POTASSIUM SERPL-SCNC: 3.9 MMOL/L
PROT SERPL-MCNC: 7 G/DL
RBC # BLD AUTO: 5.23 M/UL
RETIRED EF AND QEF - SEE NOTES: 65 (ref 55–65)
SODIUM SERPL-SCNC: 138 MMOL/L
WBC # BLD AUTO: 7.98 K/UL

## 2017-07-15 PROCEDURE — 25000003 PHARM REV CODE 250: Performed by: STUDENT IN AN ORGANIZED HEALTH CARE EDUCATION/TRAINING PROGRAM

## 2017-07-15 PROCEDURE — G8998 SWALLOW D/C STATUS: HCPCS | Mod: CI

## 2017-07-15 PROCEDURE — 94761 N-INVAS EAR/PLS OXIMETRY MLT: CPT

## 2017-07-15 PROCEDURE — 80053 COMPREHEN METABOLIC PANEL: CPT

## 2017-07-15 PROCEDURE — G8997 SWALLOW GOAL STATUS: HCPCS | Mod: CI

## 2017-07-15 PROCEDURE — 27000221 HC OXYGEN, UP TO 24 HOURS

## 2017-07-15 PROCEDURE — 25000003 PHARM REV CODE 250: Performed by: INTERNAL MEDICINE

## 2017-07-15 PROCEDURE — 85025 COMPLETE CBC W/AUTO DIFF WBC: CPT

## 2017-07-15 PROCEDURE — 82088 ASSAY OF ALDOSTERONE: CPT

## 2017-07-15 PROCEDURE — 83036 HEMOGLOBIN GLYCOSYLATED A1C: CPT | Mod: 91

## 2017-07-15 PROCEDURE — 83036 HEMOGLOBIN GLYCOSYLATED A1C: CPT

## 2017-07-15 PROCEDURE — 99223 1ST HOSP IP/OBS HIGH 75: CPT | Mod: ,,, | Performed by: INTERNAL MEDICINE

## 2017-07-15 PROCEDURE — G8996 SWALLOW CURRENT STATUS: HCPCS | Mod: CI

## 2017-07-15 PROCEDURE — 93306 TTE W/DOPPLER COMPLETE: CPT

## 2017-07-15 PROCEDURE — 20000000 HC ICU ROOM

## 2017-07-15 PROCEDURE — 92610 EVALUATE SWALLOWING FUNCTION: CPT

## 2017-07-15 PROCEDURE — 36415 COLL VENOUS BLD VENIPUNCTURE: CPT

## 2017-07-15 RX ORDER — GLUCAGON 1 MG
1 KIT INJECTION
Status: DISCONTINUED | OUTPATIENT
Start: 2017-07-15 | End: 2017-07-21 | Stop reason: HOSPADM

## 2017-07-15 RX ORDER — HYDROCHLOROTHIAZIDE 12.5 MG/1
12.5 TABLET ORAL DAILY
Status: DISCONTINUED | OUTPATIENT
Start: 2017-07-15 | End: 2017-07-17

## 2017-07-15 RX ORDER — LOSARTAN POTASSIUM 50 MG/1
100 TABLET ORAL DAILY
Status: DISCONTINUED | OUTPATIENT
Start: 2017-07-16 | End: 2017-07-21 | Stop reason: HOSPADM

## 2017-07-15 RX ORDER — INSULIN ASPART 100 [IU]/ML
0-5 INJECTION, SOLUTION INTRAVENOUS; SUBCUTANEOUS EVERY 6 HOURS PRN
Status: DISCONTINUED | OUTPATIENT
Start: 2017-07-15 | End: 2017-07-15

## 2017-07-15 RX ORDER — ATORVASTATIN CALCIUM 40 MG/1
80 TABLET, FILM COATED ORAL DAILY
Status: DISCONTINUED | OUTPATIENT
Start: 2017-07-15 | End: 2017-07-21 | Stop reason: HOSPADM

## 2017-07-15 RX ORDER — AMLODIPINE BESYLATE 5 MG/1
10 TABLET ORAL DAILY
Status: DISCONTINUED | OUTPATIENT
Start: 2017-07-15 | End: 2017-07-21 | Stop reason: HOSPADM

## 2017-07-15 RX ORDER — TAMSULOSIN HYDROCHLORIDE 0.4 MG/1
0.4 CAPSULE ORAL DAILY
Status: DISCONTINUED | OUTPATIENT
Start: 2017-07-15 | End: 2017-07-21 | Stop reason: HOSPADM

## 2017-07-15 RX ORDER — LOSARTAN POTASSIUM 50 MG/1
50 TABLET ORAL DAILY
Status: DISCONTINUED | OUTPATIENT
Start: 2017-07-15 | End: 2017-07-15

## 2017-07-15 RX ADMIN — NICARDIPINE HYDROCHLORIDE 2.5 MG/HR: 0.2 INJECTION, SOLUTION INTRAVENOUS at 01:07

## 2017-07-15 RX ADMIN — ATORVASTATIN CALCIUM 80 MG: 40 TABLET, FILM COATED ORAL at 08:07

## 2017-07-15 RX ADMIN — HYDROCHLOROTHIAZIDE 12.5 MG: 12.5 TABLET ORAL at 02:07

## 2017-07-15 RX ADMIN — NICARDIPINE HYDROCHLORIDE 15 MG/HR: 0.2 INJECTION, SOLUTION INTRAVENOUS at 11:07

## 2017-07-15 RX ADMIN — AMLODIPINE BESYLATE 10 MG: 5 TABLET ORAL at 11:07

## 2017-07-15 RX ADMIN — NICARDIPINE HYDROCHLORIDE 15 MG/HR: 0.2 INJECTION, SOLUTION INTRAVENOUS at 08:07

## 2017-07-15 RX ADMIN — ASPIRIN 81 MG: 81 TABLET, COATED ORAL at 08:07

## 2017-07-15 RX ADMIN — LOSARTAN POTASSIUM 50 MG: 50 TABLET, FILM COATED ORAL at 11:07

## 2017-07-15 RX ADMIN — TAMSULOSIN HYDROCHLORIDE 0.4 MG: 0.4 CAPSULE ORAL at 08:07

## 2017-07-15 RX ADMIN — NICARDIPINE HYDROCHLORIDE 8 MG/HR: 0.2 INJECTION, SOLUTION INTRAVENOUS at 08:07

## 2017-07-15 NOTE — H&P
[facility] History and Physical - Resident Note    Admitting Team: Medicine Team B  Attending Physician:   Resident: Shanna  Interns: Jennifer    Date of Admit: 7/14/2017    Chief Complaint   Dizziness, and weakness x 1 day    Subjective:      History of Present Illness:  Pt is a 76 yo male w/ PMH of uncontrolled HTN presenting for dizziness and weakness x 1 day. Pt was in USOH which includes all adl's/iadl's no limittations up until this evening when he walked in to his room from being outside. He reported he instantly became dizzy and collapsed to the ground. Pt said his friend witnessed the event. He also reported his legs gave way because in addition to dizzy, they felt weak. He couldn't get himself up and EMS was called at that time. Per EMS report, they noticed pt had face droop in the ambulence. Stroke activation called here in ED however face droop not appreciated by staff here. Pt BP on arrival was 260/124. CT head showing subacute/chronic ischemic changes. [facility] neurology consulted but felt pt was having hypertensive encephalopathy and not actual stroke. Hydralazine given with brisk drop in BP. Denies chest pain, SOB, palpitations, headache, numbness, weakness on my ROS.    Past Medical History:  Past Medical History:   Diagnosis Date    Cancer     prostate    Diabetes mellitus     Hypertension     Hypertrophic cardiomyopathy     Renal disorder        Past Surgical History:  Past Surgical History:   Procedure Laterality Date    BACK SURGERY      THYROIDECTOMY         Allergies:  Review of patient's allergies indicates:  No Known Allergies    Home Medications:  Prior to Admission medications    Medication Sig Start Date End Date Taking? Authorizing Provider   AMLODIPINE BESYLATE/BENAZEPRIL (AMLODIPINE-BENAZEPRIL ORAL) Take 10 mg by mouth once daily at 6am.   Yes Historical Provider, MD   aspirin (ECOTRIN) 81 MG EC tablet Take 81 mg by mouth once daily.   Yes Historical Provider, MD  "  atorvastatin (LIPITOR) 80 MG tablet Take 80 mg by mouth once daily.   Yes Historical Provider, MD   metformin (GLUCOPHAGE) 500 MG tablet Take 500 mg by mouth 2 (two) times daily with meals.   Yes Historical Provider, MD   tamsulosin (FLOMAX) 0.4 mg Cp24 Take 0.4 mg by mouth once daily.    Historical Provider, MD   vitamin D 1000 units Tab Take 1,000 Units by mouth once daily.    Historical Provider, MD       Family History:  No family history on file.    Social History:  Social History   Substance Use Topics    Smoking status: Former Smoker     Types: Cigarettes    Smokeless tobacco: Not on file    Alcohol use Yes       Review of Systems:  Pertinent positives and negatives are listed in HPI.  All other systems are reviewed and are negative.    Health Maintaince :   Primary Care Physician: None  Immunizations:   Needs vaccines  Cancer Screening:  [facility] UTD     Objective:   Last 24 Hour Vital Signs:  BP  Min: 184/81  Max: 260/124  Temp  Av.5 °F (36.9 °C)  Min: 98.5 °F (36.9 °C)  Max: 98.5 °F (36.9 °C)  Pulse  Av.2  Min: 60  Max: 68  Resp  Avg: 15.3  Min: 14  Max: 18  SpO2  Av.2 %  Min: 96 %  Max: 100 %  Height  Av' 11" (180.3 cm)  Min: 5' 11" (180.3 cm)  Max: 5' 11" (180.3 cm)  Weight  Av.3 kg (210 lb)  Min: 95.3 kg (210 lb)  Max: 95.3 kg (210 lb)  Body mass index is 29.29 kg/m².  No intake/output data recorded.    Physical Examination:  General: Alert and awake in NAD  Head:  Normocephalic and atraumatic  Eyes:  PERRL; EOMi with anicteric sclera and clear conjunctivae  Mouth:  Oropharynx clear and without exudate; moist mucous membranes  Cardio:  Regular rate and rhythm with normal S1 and S2; no murmurs or rubs  Resp:  CTAB and unlabored; no wheezes, crackles or rhonchi  Abdom: Soft, NTND with normoactive bowel sounds  Extrem: WWP with no clubbing, cyanosis or edema  Skin:  No rashes, lesions, or color changes  Pulses: 2+ and symmetric distally  Neuro:  AAOx3; cooperative and pleasant with " no focal deficits, motor 5/5 UE/LE, sensation intact, CN tested, intact    Laboratory:  Most Recent Data:  CBC: Lab Results   Component Value Date    WBC 4.04 07/14/2017    HGB 16.5 07/14/2017    HCT 49.7 07/14/2017     07/14/2017    MCV 91 07/14/2017    RDW 13.9 07/14/2017     BMP: Lab Results   Component Value Date     07/14/2017    K 4.7 07/14/2017     07/14/2017    CO2 18 (L) 07/14/2017    BUN 32 (H) 07/14/2017    CREATININE 1.8 (H) 07/14/2017    GLU 90 07/14/2017    CALCIUM 10.8 (H) 07/14/2017    MG 1.9 06/07/2017     LFTs: Lab Results   Component Value Date    PROT 8.2 07/14/2017    ALBUMIN 4.0 07/14/2017    BILITOT 0.5 07/14/2017    AST 33 07/14/2017    ALKPHOS 78 07/14/2017    ALT 19 07/14/2017     Coags:   Lab Results   Component Value Date    INR 1.0 07/14/2017     Urinalysis: Lab Results   Component Value Date    COLORU Yellow 07/14/2017    SPECGRAV 1.025 07/14/2017    NITRITE Negative 07/14/2017    KETONESU Negative 07/14/2017    UROBILINOGEN 1.0 07/14/2017    WBCUA 1 07/14/2017       Trended Lab Data:    Recent Labs  Lab 07/14/17 2010   WBC 4.04   HGB 16.5   HCT 49.7      MCV 91   RDW 13.9      K 4.7      CO2 18*   BUN 32*   CREATININE 1.8*   GLU 90   PROT 8.2   ALBUMIN 4.0   BILITOT 0.5   AST 33   ALKPHOS 78   ALT 19       Trended Cardiac Data:    Recent Labs  Lab 07/14/17 2010   TROPONINI 0.007         Radiology:  Imaging Results          X-Ray Chest AP Portable (Final result)  Result time 07/14/17 20:19:52    Final result by Niels Horton MD (07/14/17 20:19:52)                 Impression:       No acute process.              Electronically signed by: NIELS HORTON MD  Date:     07/14/17  Time:    20:19              Narrative:    Exam: [id number]  07/14/17  20:06:00 [id number] ([facility]) : XR CHEST AP PORTABLE    Technique:    Single frontal chest x-ray    Comparison:    06/07/2017    Findings:      The trachea is unremarkable.  There is stable enlargement of the  cardiomediastinal silhouette.  There are calcifications of the aortic knob.  The hemidiaphragms are unremarkable.  There is no evidence of free air beneath the hemidiaphragms.  There are no pleural effusions.  There is no evidence of a pneumothorax.  There is no evidence of pneumomediastinum.  No airspace opacities are present.  There is a hiatal hernia present.  There are degenerative changes in the osseous structures.                             CT Cervical Spine Without Contrast (Final result)  Result time 07/14/17 20:17:28    Final result by Niels Horton MD (07/14/17 20:17:28)                 Impression:       1.  No evidence of fracture or listhesis of the cervical spine.    2.  Multilevel degenerative changes of the cervical spine were suggestion of calcification of the posterior longitudinal ligament.    3.  Extensive calcifications of the carotid vessels.  Nonemergent ultrasound of the carotids may be obtained for further evaluation.              Electronically signed by: NIELS HORTON MD  Date:     07/14/17  Time:    20:17              Narrative:    Exam: [id number]  07/14/17  20:03:03 [id number] ([facility]) : CT CERVICAL SPINE WITHOUT CONTRAST    Technique:     2.5 mm transaxial images of the cervical spine without contrast.  Sagittal and coronal reformatted images also reviewed.      Comparison:     None     Findings:      The visualized intracranial contents are within normal limits.  The craniocervical junction is within normal limits with exception of arthropathy involving the tentorial ligaments.  The prevertebral soft tissues are within normal limits.    There is straightening of the normal cervical lordosis.  The vertebral body heights are maintained.  There is hypertrophy of the posterior elements.  There is also anterior osteophytosis.  There is suggestion of calcification of the posterior longitudinal ligament.  There are calcifications of multiple discs.  There is no evidence of fracture or listhesis of the  cervical spine.    There are extensive calcifications of the neck vessels.  There is no evidence of lymphadenopathy in the neck.  There may be a calcification within the left thyroid lobe.  This is incompletely characterized.  The visualized lung apices are within normal limits.                             CT Head Without Contrast (Final result)  Result time 07/14/17 20:17:04    Final result by Kai Walker MD (07/14/17 20:17:04)                 Impression:        1. Right frontal lobe peripheral low attenuation without significant volume loss concerning for acute/subacute infarct. No intracranial hemorrhage. Further evaluation/followup as warranted.    2. Left basal ganglia and right midbrain small low attenuation foci suggestive for age-indeterminate lacunar type infarcts. Correlate clinically.    3. Supratentorial white matter low attenuation changes suggestive for sequela of advanced chronic small vessel ischemic disease.      Electronically signed by: KAI WALKER MD, MD  Date:     07/14/17  Time:    20:17              Narrative:    COMPARISON: None    FINDINGS: There is an area of peripheral low attenuation without significant volume loss at the inferior posterior right frontal lobe concerning for acute/subacute infarct. Small low attenuation foci at the left basal ganglia and right midbrain suggestive or age-indeterminate lacunar type infarcts.    No evidence of hemorrhage, mass or midline shift. There is age appropriate  generalized cerebral atrophy.  Moderate to marked degree patchy and confluent low attenuation changes throughout the subcortical and periventricular white matter, nonspecific but can be seen with advanced chronic small vessel ischemic disease.   The ventricles are midline without distortion by mass effect.  No extra-axial hemorrhage. There is a 5 mm round extra-axial calcification adjacent to the anterior right parasagittal frontal lobe without significant mass effect, suggestive of a  calcified meningioma.     The extracranial structures are within normal limits.  Calvarium is intact.  Visualized paranasal sinuses are clear.  Mastoid air cells are clear.                                 Assessment:     Shant Magana is a 77 y.o. male with:     Plan:     Hypertensive emergency/encephalopathy  -LE weakness, dizziness, BP on admit 260/124  -Hydralazine given with fast drop in /85  -Trops wnl, SHIKHA vs CKD  -CT head w/ subacute/chronic frontal infarct, lacunar infarcts, CXR wnl  -EKG w/ ant Q waves, biphasic nonspec TW abn  -Vasc neurology believes this is HTN encephalopathy  -Will add cardene with goal MAP ~130, hold BP meds until dakota. Will admit to ICU and dec BP over 24, restart home meds dakota evening, MRI/MRA, echo pending, PT/OT/ST, cont statin, ASA    DMII  -On metformin  -SSI, A1C pending    [facility] UTD, needs vaccines      Code Status:     Full    Gary Otero  [facility] HO-I  [facility] Medicine Service    [facility] Medicine Hospitalist Pager numbers:   [facility] Hospitalist Medicine Team A (Hanny/Loco): [phone]  [facility] Hospitalist Medicine Team B (Kacey/Nico):  [phone]

## 2017-07-15 NOTE — PROGRESS NOTES
"[facility] Progress Note - Resident Note    Admitting Team: Medicine Team B  Attending Physician:   Resident: Shanna  Interns: Jennifer    Date of Admit: 2017     Subjective:  Pt with no complaints this AM, no chest pain, palpitations, dizziness, headache, vision changes, n, v.  Objective:   Last 24 Hour Vital Signs:  BP  Min: 170/83  Max: 260/124  Temp  Av.6 °F (37 °C)  Min: 98.5 °F (36.9 °C)  Max: 98.7 °F (37.1 °C)  Pulse  Av  Min: 58  Max: 68  Resp  Av.1  Min: 14  Max: 29  SpO2  Av.7 %  Min: 96 %  Max: 100 %  Height  Av' 11" (180.3 cm)  Min: 5' 11" (180.3 cm)  Max: 5' 11" (180.3 cm)  Weight  Av.1 kg (209 lb 9.7 oz)  Min: 94.9 kg (209 lb 3.5 oz)  Max: 95.3 kg (210 lb)  Body mass index is 29.18 kg/m².  I/O last 3 completed shifts:  In: 95.4 [I.V.:95.4]  Out: 750 [Urine:750]    Physical Examination:  General: Alert and awake in NAD  Head:  Normocephalic and atraumatic  Eyes:  PERRL; EOMi with anicteric sclera and clear conjunctivae  Mouth:  Oropharynx clear and without exudate; moist mucous membranes  Cardio:  Regular rate and rhythm with normal S1 and S2; no murmurs or rubs  Resp:  CTAB and unlabored; no wheezes, crackles or rhonchi  Abdom: Soft, NTND with normoactive bowel sounds  Extrem: WWP with no clubbing, cyanosis or edema  Skin:  No rashes, lesions, or color changes  Pulses: 2+ and symmetric distally  Neuro:  AAOx3; cooperative and pleasant with no focal deficits, motor 5/5 UE/LE, sensation intact, CN tested, intact    Laboratory:  Most Recent Data:  CBC:   Lab Results   Component Value Date    WBC 7.98 07/15/2017    HGB 16.0 07/15/2017    HCT 46.7 07/15/2017     07/15/2017    MCV 89 07/15/2017    RDW 13.9 07/15/2017     BMP:   Lab Results   Component Value Date     07/15/2017    K 3.9 07/15/2017     07/15/2017    CO2 22 (L) 07/15/2017    BUN 24 (H) 07/15/2017    CREATININE 1.3 07/15/2017     07/15/2017    CALCIUM 10.3 07/15/2017    MG " 1.9 06/07/2017     LFTs:   Lab Results   Component Value Date    PROT 7.0 07/15/2017    ALBUMIN 3.4 (L) 07/15/2017    BILITOT 0.5 07/15/2017    AST 24 07/15/2017    ALKPHOS 66 07/15/2017    ALT 15 07/15/2017     Coags:   Lab Results   Component Value Date    INR 1.0 07/14/2017     Urinalysis:   Lab Results   Component Value Date    COLORU Yellow 07/14/2017    SPECGRAV 1.025 07/14/2017    NITRITE Negative 07/14/2017    KETONESU Negative 07/14/2017    UROBILINOGEN 1.0 07/14/2017    WBCUA 1 07/14/2017       Trended Lab Data:    Recent Labs  Lab 07/14/17  2010 07/15/17  0307 07/15/17  0554   WBC 4.04 7.98  --    HGB 16.5 16.0  --    HCT 49.7 46.7  --     225  --    MCV 91 89  --    RDW 13.9 13.9  --      --  138   K 4.7  --  3.9     --  107   CO2 18*  --  22*   BUN 32*  --  24*   CREATININE 1.8*  --  1.3   GLU 90  --  108   PROT 8.2  --  7.0   ALBUMIN 4.0  --  3.4*   BILITOT 0.5  --  0.5   AST 33  --  24   ALKPHOS 78  --  66   ALT 19  --  15       Trended Cardiac Data:    Recent Labs  Lab 07/14/17 2010   TROPONINI 0.007         Radiology:  Imaging Results          X-Ray Chest AP Portable (Final result)  Result time 07/14/17 20:19:52    Final result by Niels Horton MD (07/14/17 20:19:52)                 Impression:       No acute process.              Electronically signed by: NIELS HORTON MD  Date:     07/14/17  Time:    20:19              Narrative:    Exam: [id number]  07/14/17  20:06:00 [id number] ([facility]) : XR CHEST AP PORTABLE    Technique:    Single frontal chest x-ray    Comparison:    06/07/2017    Findings:      The trachea is unremarkable.  There is stable enlargement of the cardiomediastinal silhouette.  There are calcifications of the aortic knob.  The hemidiaphragms are unremarkable.  There is no evidence of free air beneath the hemidiaphragms.  There are no pleural effusions.  There is no evidence of a pneumothorax.  There is no evidence of pneumomediastinum.  No airspace opacities are present.   There is a hiatal hernia present.  There are degenerative changes in the osseous structures.                             CT Cervical Spine Without Contrast (Final result)  Result time 07/14/17 20:17:28    Final result by Niels Horton MD (07/14/17 20:17:28)                 Impression:       1.  No evidence of fracture or listhesis of the cervical spine.    2.  Multilevel degenerative changes of the cervical spine were suggestion of calcification of the posterior longitudinal ligament.    3.  Extensive calcifications of the carotid vessels.  Nonemergent ultrasound of the carotids may be obtained for further evaluation.              Electronically signed by: NIELS HORTON MD  Date:     07/14/17  Time:    20:17              Narrative:    Exam: [id number]  07/14/17  20:03:03 [id number] ([facility]) : CT CERVICAL SPINE WITHOUT CONTRAST    Technique:     2.5 mm transaxial images of the cervical spine without contrast.  Sagittal and coronal reformatted images also reviewed.      Comparison:     None     Findings:      The visualized intracranial contents are within normal limits.  The craniocervical junction is within normal limits with exception of arthropathy involving the tentorial ligaments.  The prevertebral soft tissues are within normal limits.    There is straightening of the normal cervical lordosis.  The vertebral body heights are maintained.  There is hypertrophy of the posterior elements.  There is also anterior osteophytosis.  There is suggestion of calcification of the posterior longitudinal ligament.  There are calcifications of multiple discs.  There is no evidence of fracture or listhesis of the cervical spine.    There are extensive calcifications of the neck vessels.  There is no evidence of lymphadenopathy in the neck.  There may be a calcification within the left thyroid lobe.  This is incompletely characterized.  The visualized lung apices are within normal limits.                             CT Head Without Contrast  (Final result)  Result time 07/14/17 20:17:04    Final result by Kai Walker MD (07/14/17 20:17:04)                 Impression:        1. Right frontal lobe peripheral low attenuation without significant volume loss concerning for acute/subacute infarct. No intracranial hemorrhage. Further evaluation/followup as warranted.    2. Left basal ganglia and right midbrain small low attenuation foci suggestive for age-indeterminate lacunar type infarcts. Correlate clinically.    3. Supratentorial white matter low attenuation changes suggestive for sequela of advanced chronic small vessel ischemic disease.      Electronically signed by: KAI WALKER MD, MD  Date:     07/14/17  Time:    20:17              Narrative:    COMPARISON: None    FINDINGS: There is an area of peripheral low attenuation without significant volume loss at the inferior posterior right frontal lobe concerning for acute/subacute infarct. Small low attenuation foci at the left basal ganglia and right midbrain suggestive or age-indeterminate lacunar type infarcts.    No evidence of hemorrhage, mass or midline shift. There is age appropriate  generalized cerebral atrophy.  Moderate to marked degree patchy and confluent low attenuation changes throughout the subcortical and periventricular white matter, nonspecific but can be seen with advanced chronic small vessel ischemic disease.   The ventricles are midline without distortion by mass effect.  No extra-axial hemorrhage. There is a 5 mm round extra-axial calcification adjacent to the anterior right parasagittal frontal lobe without significant mass effect, suggestive of a calcified meningioma.     The extracranial structures are within normal limits.  Calvarium is intact.  Visualized paranasal sinuses are clear.  Mastoid air cells are clear.                                 Assessment:     Shant Magana is a 77 y.o. male with hypertensive encephalopathy/emergency     Plan:     Hypertensive  emergency/encephalopathy  -LE weakness, dizziness, BP on admit 260/124  -Hydralazine given with fast drop in /85  -Trops wnl, SHIKHA vs CKD  -CT head w/ subacute/chronic frontal infarct, lacunar infarcts, CXR wnl  -EKG w/ ant Q waves, biphasic nonspec TW abn  -Vasc neurology believes this was HTN encephalopathy  -Cont cardene gtt for MAP goal ~130, restart home meds tonight, MRI/MRA, echo pending, PT/OT/ST, cont statin, ASA    DMII  -On metformin  -SSI, A1C pending    HLD  -On lipid panel 7/14, , cont statin    SHIKHA (resolved)  -On admit Cr 1.8, trended down to 1.3      -[facility] UTD, needs vaccines      Code Status:     Full    Gary Otero  [facility] HO-I  [facility] Medicine Service    [facility] Medicine Hospitalist Pager numbers:   [facility] Hospitalist Medicine Team A (Hanny/Loco): [phone]  [facility] Hospitalist Medicine Team B (Kacey/Nico):  [phone]

## 2017-07-15 NOTE — ED TRIAGE NOTES
Dr. Diaz at bedside with [facility]. Informing patient and family with plan of care. Will be admitted to ICU for observation. Explaining to family patient does have some Old infarcts noted on CT scan.

## 2017-07-15 NOTE — PLAN OF CARE
0930 LAC PIV appears to be slightly swollen more than this am, pt states that area always appears like that, denies any pain, no redness noted to area, pt complains of slight pain upon IV saline flush, PIV removed cardene gtt continued at [facility] PIV site, pt reeducated again regarding S/S of PIV infiltration, pt denies any pain to area, or numbness or tingling, LAC PIV removed, tip intact, dsg placed to site, Extremity elevated on pillow, ice placed over area, charge nurse Gerda BOO notified, LAC site closely monitored pt continues to be asymptomatic. Care is ongoing will continue to monitor pt status.

## 2017-07-15 NOTE — CONSULTS
[facility]  Tele-Consult    Consultation started: 7/14/2017 at 8:05 PM   The chief complaint leading to stroke consultation is:  stroke  The referring MD that requested this consult is: aliyah  The patient location is [facility]  The patient arrived at the ED at:  1950  Spoke hospital nurse at bedside with patient assisting consultant.     Subjective:     History of Present Illness:  Shant Magana is a 77 y.o. male who presents with dizziness and fall at 1900. Maybe some L sided weakness at the time now resolved  Known HTN  Severe HTN at 236/124.  CT head shows sub-acute to chronic R frontal stroke      Last known normal:  1900    Patient information was obtained from patient and spouse/SO.    Past Medical History: hypertension, diabetes, hyperlipidemia and kidney problems/dialysis    Past Surgical History: no surgeries within the last 3 months    Family History: none    Social History: no smoking, no drinking, no drugs and former smoker    Medications: No current facility-administered medications for this encounter.     Current Outpatient Prescriptions:     AMLODIPINE BESYLATE/BENAZEPRIL (AMLODIPINE-BENAZEPRIL ORAL), Take 10 mg by mouth once daily at 6am., Disp: , Rfl:     aspirin (ECOTRIN) 81 MG EC tablet, Take 81 mg by mouth once daily., Disp: , Rfl:     atorvastatin (LIPITOR) 80 MG tablet, Take 80 mg by mouth once daily., Disp: , Rfl:     metformin (GLUCOPHAGE) 500 MG tablet, Take 500 mg by mouth 2 (two) times daily with meals., Disp: , Rfl:     tamsulosin (FLOMAX) 0.4 mg Cp24, Take 0.4 mg by mouth once daily., Disp: , Rfl:     vitamin D 1000 units Tab, Take 1,000 Units by mouth once daily., Disp: , Rfl:     Allergies: Review of patient's allergies indicates:  No Known Allergies    Review Of Systems:     Consitutional: no fever or chills  Eyes:no visual changes  ENT:no nasal congestion or sore throat  Respiratory:no cough or shortness of breath  Cardiovascular:no  chest pain or palpitations  Gastrointestinal:no nausea or vomiting, no abdominal pain or change in bowel habits  Genitourinary:no hematuria or dysuria  Integument/Breast: no rash or pruritis  Hematologic/Lymphatic:no easy bruising or lymphadenopathy  Musculoskeletal:no arthralgias or myalgias  Neurological:no seizures or tremors  Behavioral/Psych:no auditory or visual hallucinations  Endocrine:no heat or cold intolerance      Objective:     Vitals:   Vitals:    07/14/17 1957   BP: (!) 260/124   Pulse: 60        Physical Exam:  General: well developed, well nourished, appears older than stated age   HENT: Head:normocephalic and atraumatic   HENT: Ears: right ear normal and left ear normal  Nose: normal nares and no discharge  Eyes:conjunctivae/corneas clear. PERRL.  Neck: normal, no obvious masses and trachea to midline  Lungs: normal respiratory effort  Cardiovascular: Heart: regular rate and rhythm   Cardiovascular: Extremities: no cyanosis, no edema and no clubbing  Abdomen: appears normal and not distended  Skin:  skin color and texture normal, no rash and no bruises  Musculoskeletal: normal range of motion in all extremities  Psych/Behavioral: appropriate affect      Stroke Scales  Current NIH Stroke Score Values    Flowsheet Row Most Recent Value   Interval  baseline (upon arrival/admit)   1a. Level Of Consciousness  0-->Alert: keenly responsive   1b. LOC Questions  0-->Answers both questions correctly   1c. LOC Commands  0-->Performs both tasks correctly   2. Best Gaze  0-->Normal   3. Visual  0-->No visual loss   4. Facial Palsy  0-->Normal symmetrical movements   5a. Motor Arm, Left  0-->No drift: limb holds 90 (or 45) degrees for full 10 secs   5b. Motor Arm, Right  0-->No drift: limb holds 90 (or 45) degrees for full 10 secs   6a. Motor Leg, Left  0-->No drift: leg holds 30 degree position for full 5 secs   6b. Motor Leg, Right  0-->No drift: leg holds 30 degree position for full 5 secs   7. Limb Ataxia   0-->Absent   8. Sensory  0-->Normal: no sensory loss   9. Best Language  0-->No aphasia: normal   10. Dysarthria  0-->Normal   11. Extinction and Inattention (formerly Neglect)  0-->No abnormality   Total (NIH Stroke Scale)  0            Laboratory Data:   No results found for: EXTCAPBLOODG  No results found for: GLUCOSE   No results found for: EXTPT  Prothrombin Time   Date Value Ref Range Status   06/07/2017 11.0 9.0 - 12.5 sec Final     No results found for: EXTPTT   No results found for: APTT  No results found for: EXTINR  INR   Date Value Ref Range Status   06/07/2017 1.0 0.8 - 1.2 Final     Comment:     Coumadin Therapy:  2.0 - 3.0 for INR for all indicators except mechanical heart valves  and antiphospholipid syndromes which should use 2.5 - 3.5.       No results found for: EXTCREATININ  Creatinine   Date Value Ref Range Status   06/07/2017 1.7 (H) 0.5 - 1.4 mg/dL Final     No results found for: EXTPLATELETS   Platelets   Date Value Ref Range Status   06/07/2017 211 150 - 350 K/uL Final        Assessment - Diagnosis - Goals:     Impression: Shant Magana 77 y.o. male with Dizziness, fall, left hemiparesis now resolved, severw HTN, subacute to remote R frontal stroke on CT head    TIA   Probable hypertensive crisis  Subacute stroke    Imaging Notes: Abnormal CT R frontal subacute to remote stroke. Impression performed at: 2015     Diagnosis: Carotid artery TIA (G45.1)  Carotid stenosis or occlusion with stroke (I63.9)  hypertensive crisis  Other Diagnosis: I10 Hypertension  E11.9 Diabetes  E78.5 Hyperlipidemia  N18.9 CKD  M62.89 Acute left side weakness    Altaplase Recommendation: Altaplase not recommended due to CT findings (ICH, SAH, major infact sign), Recent moderate to severe stroke (<3 months), SBP > 185 or DBP >110 mmHg despite treatment and resolution of symptoms    Possible Interventional Revascularization Candidate? No; No significant neurological deficit    Recommendation: NPO until after pass  dysphagia screen Diagnostic studies: Carotid ultrasound to assess vasculature, HgbA1C to assess blood glucose levels, Lipid Profile to assess cholesterol levels, MRA neck/arch to assess vasculature, MRI head without contrast to assess brain parenchyma, Trans-thoracic cardiac echo to assess cardiac function/status    Disposition Recommendation:  admit to inpatient    Risk/Benefit Discussion: Risks and benefit of treatment (if indicated) were discussed with staff physician.     Additional Work up Recommended in the ED: IVF hydration, BP control to reduce MAP by 15-25% with nicardipine gtt or similiar agent, ICU admission for hypertensive crisis with end organ damage    Consulting clinician was informed of the encounter and consult note.    Consultation ended: 7/14/2017 at 2035.

## 2017-07-15 NOTE — ASSESSMENT & PLAN NOTE
HR of 48 on 6/7/17 provoking discontinuation of metoprolol.  For now would avoid beta-blockers or nondihydropyridine CCB's.

## 2017-07-15 NOTE — PROGRESS NOTES
Physical Therapy   Missed Eval Attempt    Shant Magana   MRN: [id number]     Pt on hold from PT evaluation at this time for BP regulation. Pt resting in bed with /98. Will follow up as able for initial PT evaluation pending improvement in BP.     Pamela Hays, RADHAMEST  7/15/2017

## 2017-07-15 NOTE — CONSULTS
[facility]  Cardiology  Consult Note    Patient Name: Shant Magana  MRN: [id number]  Admission Date: 7/14/2017  Hospital Length of Stay: 1 days  Code Status: Full Code   Attending Provider: Todd Erazo MD   Consulting Provider: Guanaco Ty MD  Primary Care Physician: Trent Aldana MD  Principal Problem:Hypertensive crisis, unspecified    Patient information was obtained from patient and ER records.     Consults  Subjective:     Chief Complaint:  Severe hypertension     HPI:   77 year-old male with no known CVD admitted to \[facility]\"" hospital service for hypertensive emergency.  Patient reported to ED yesterday with dizziness and weakness, /124.  Managed with hydralazine.  BP's now 160's/80's today.  Initially stroke activation for possible facial droop, but resolved.  Neurology consulted, diagnosed with hypertensive encephalopathy.  Denies chest pain or dyspnea.  Recently seen in ED on 6/7/17 for HYPOtension, also bradycardic with pulse of 48 bpm, metoprolol discontinued at that time.    Past Medical History:   Diagnosis Date    Cancer     prostate    Diabetes mellitus     Hypertension     Hypertrophic cardiomyopathy     Renal disorder        Past Surgical History:   Procedure Laterality Date    BACK SURGERY      THYROIDECTOMY         Review of patient's allergies indicates:  No Known Allergies    No current facility-administered medications on file prior to encounter.      Current Outpatient Prescriptions on File Prior to Encounter   Medication Sig    AMLODIPINE BESYLATE/BENAZEPRIL (AMLODIPINE-BENAZEPRIL ORAL) Take 10 mg by mouth once daily at 6am.    aspirin (ECOTRIN) 81 MG EC tablet Take 81 mg by mouth once daily.    atorvastatin (LIPITOR) 80 MG tablet Take 80 mg by mouth once daily.    metformin (GLUCOPHAGE) 500 MG tablet Take 500 mg by mouth 2 (two) times daily with meals.    tamsulosin (FLOMAX) 0.4 mg Cp24 Take 0.4 mg by mouth once daily.    vitamin D 1000 units Tab  Take 1,000 Units by mouth once daily.     Family History     None        Social History Main Topics    Smoking status: Former Smoker     Types: Cigarettes    Smokeless tobacco: Not on file    Alcohol use Yes    Drug use: Unknown    Sexual activity: Not on file     Review of Systems   Constitution: Negative for diaphoresis, weakness, malaise/fatigue, weight gain and weight loss.   Cardiovascular: Negative for chest pain, claudication, cyanosis, dyspnea on exertion, irregular heartbeat, leg swelling, near-syncope, orthopnea, palpitations, paroxysmal nocturnal dyspnea and syncope.   Respiratory: Negative for cough, hemoptysis, shortness of breath, sleep disturbances due to breathing, snoring, sputum production and wheezing.    Hematologic/Lymphatic: Negative for bleeding problem. Does not bruise/bleed easily.   Skin: Negative for poor wound healing and rash.   Musculoskeletal: Positive for muscle weakness. Negative for myalgias.   Gastrointestinal: Negative for heartburn, hematemesis, hematochezia and melena.   Neurological: Positive for dizziness. Negative for focal weakness, light-headedness and loss of balance.   Psychiatric/Behavioral: Negative for altered mental status, depression and memory loss.     Objective:     Vital Signs (Most Recent):  Temp: 98.9 °F (37.2 °C) (07/15/17 1129)  Pulse: 62 (07/15/17 1330)  Resp: 20 (07/15/17 1330)  BP: (!) 162/86 (07/15/17 1330)  SpO2: 96 % (07/15/17 1330) Vital Signs (24h Range):  Temp:  [98.4 °F (36.9 °C)-98.9 °F (37.2 °C)] 98.9 °F (37.2 °C)  Pulse:  [58-70] 62  Resp:  [14-29] 20  SpO2:  [94 %-100 %] 96 %  BP: (159-260)/() 162/86     Weight: 94.9 kg (209 lb 3.5 oz)  Body mass index is 29.18 kg/m².    SpO2: 96 %  O2 Device (Oxygen Therapy): room air      Intake/Output Summary (Last 24 hours) at 07/15/17 1433  Last data filed at 07/15/17 1352   Gross per 24 hour   Intake           941.31 ml   Output             1050 ml   Net          -108.69 ml        Lines/Drains/Airways     Peripheral Intravenous Line                 Peripheral IV - Single Lumen 07/15/17 0745 Right Antecubital less than 1 day                Physical Exam   Constitutional: He is oriented to person, place, and time. He appears well-developed and well-nourished. No distress. He is not intubated.   HENT:   Head: Atraumatic.   Neck: No JVD present.   Cardiovascular: Normal rate, regular rhythm, S1 normal, S2 normal, normal heart sounds and intact distal pulses.  PMI is not displaced.  Exam reveals no gallop, no S3, no S4, no distant heart sounds, no friction rub, no midsystolic click and no opening snap.    No murmur heard.  Pulses:       Carotid pulses are 2+ on the right side, and 2+ on the left side.       Radial pulses are 2+ on the right side, and 2+ on the left side.        Dorsalis pedis pulses are 2+ on the right side, and 2+ on the left side.        Posterior tibial pulses are 2+ on the right side, and 2+ on the left side.   Pulmonary/Chest: Effort normal and breath sounds normal. No accessory muscle usage. No apnea, no tachypnea and no bradypnea. He is not intubated. No respiratory distress. He has no decreased breath sounds. He has no wheezes. He has no rhonchi. He has no rales. He exhibits no tenderness.   Abdominal: Soft. Normal aorta and bowel sounds are normal. He exhibits no distension, no abdominal bruit, no pulsatile midline mass and no mass. There is no tenderness.   Musculoskeletal: He exhibits no edema.   Neurological: He is alert and oriented to person, place, and time.   Skin: Skin is warm. No rash noted. No erythema. No pallor.   Psychiatric: He has a normal mood and affect. His behavior is normal. Judgment and thought content normal.   Vitals reviewed.      Significant Labs:   BMP:   Recent Labs  Lab 07/14/17  2010 07/15/17  0554   GLU 90 108    138   K 4.7 3.9    107   CO2 18* 22*   BUN 32* 24*   CREATININE 1.8* 1.3   CALCIUM 10.8* 10.3   , CMP   Recent  Labs  Lab 07/14/17  2010 07/15/17  0554    138   K 4.7 3.9    107   CO2 18* 22*   GLU 90 108   BUN 32* 24*   CREATININE 1.8* 1.3   CALCIUM 10.8* 10.3   PROT 8.2 7.0   ALBUMIN 4.0 3.4*   BILITOT 0.5 0.5   ALKPHOS 78 66   AST 33 24   ALT 19 15   ANIONGAP 13 9   ESTGFRAFRICA 41* >60   EGFRNONAA 36* 53*   , CBC   Recent Labs  Lab 07/14/17  2010 07/15/17  0307   WBC 4.04 7.98   HGB 16.5 16.0   HCT 49.7 46.7    225   , Lipid Panel   Recent Labs  Lab 07/14/17 2010   CHOL 285*   HDL 39*   LDLCALC 191.6*   TRIG 272*   CHOLHDL 13.7*    and Troponin   Recent Labs  Lab 07/14/17 2010   TROPONINI 0.007       Significant Imaging: Echocardiogram:   2D echo with color flow doppler:   Results for orders placed or performed during the hospital encounter of 07/14/17   2D echo with color flow doppler   Result Value Ref Range    EF 65 55 - 65    Est. PA Systolic Pressure 24.72     and X-Ray: CXR: X-Ray Chest 1 View (CXR): No results found for this visit on 07/14/17.    Assessment and Plan:     Hypertensive encephalopathy    Resolved.        Bradycardia    HR of 48 on 6/7/17 provoking discontinuation of metoprolol.  For now would avoid beta-blockers or nondihydropyridine CCB's.        Unspecified essential hypertension    BP improving, still above target of 140/80.        * Hypertensive crisis, unspecified    /124 on admit, now improved with medical therapy.  Patient appears to be compliant with therapy, no dietary noncompliance detected.  On clonidine at home - if missing doses could have a rebound effect of hypertension.  Also will need screening for hyperaldo and renovascular hypertension.  Target organ damage of kidneys with SHIKHA - Cr of 1.8, now improved to 1.3, and hypertensive encephalopathy, now resolved.    Will increase losartan to 100mg and initiate HCTZ 12.5mg (takes Avelide at home, dosage not documented on bottle).  Hold clonidine for now, with goal of weaning off as outpatient - if BP escalates -  would restart as this could be a rebound hypertension effect.  Recommend renal artery U/S to screen for renal artery stenosis and a random aldosterone level.    Agree with TTE to screen for hypertensive heart disease.  Transfer to telemetry.  If BP improved tomorrow reasonable for discharge.            VTE Risk Mitigation         Ordered     High Risk of VTE  Once      07/15/17 0053     Place sequential compression device  Until discontinued      07/15/17 0053          Thank you for your consult. I will follow-up with patient. Please contact us if you have any additional questions.    Guanaco Ty MD  Cardiology   [facility]

## 2017-07-15 NOTE — ED NOTES
Grandsons number (William )    Daughters (Mike [phone])                    (Dorys [phone])

## 2017-07-15 NOTE — ED NOTES
Let admitting residence know patient states right face pain infront of ear. Started throbbing. MD stated they will observe

## 2017-07-15 NOTE — ASSESSMENT & PLAN NOTE
/124 on admit, now improved with medical therapy.  Patient appears to be compliant with therapy, no dietary noncompliance detected.  On clonidine at home - if missing doses could have a rebound effect of hypertension.  Also will need screening for hyperaldo and renovascular hypertension.  Target organ damage of kidneys with SHIKHA - Cr of 1.8, now improved to 1.3, and hypertensive encephalopathy, now resolved.    Will increase losartan to 100mg and initiate HCTZ 12.5mg (takes Avelide at home, dosage not documented on bottle).  Hold clonidine for now, with goal of weaning off as outpatient - if BP escalates - would restart as this could be a rebound hypertension effect.  Recommend renal artery U/S to screen for renal artery stenosis and a random aldosterone level.    Agree with TTE to screen for hypertensive heart disease.  Transfer to telemetry.  If BP improved tomorrow reasonable for discharge.

## 2017-07-15 NOTE — HPI
77 year-old male with no known CVD admitted to \[facility]\"" hospital service for hypertensive emergency.  Patient reported to ED yesterday with dizziness and weakness, /124.  Managed with hydralazine.  BP's now 160's/80's today.  Initially stroke activation for possible facial droop, but resolved.  Neurology consulted, diagnosed with hypertensive encephalopathy.  Denies chest pain or dyspnea.  Recently seen in ED on 6/7/17 for HYPOtension, also bradycardic with pulse of 48 bpm, metoprolol discontinued at that time.

## 2017-07-15 NOTE — HOSPITAL COURSE
BP responded to hydralazine in ED.  7/15/2017:  BP now 160's/80's on HD #2.  Neurologic symptoms resolved.  CT of head showed no hemorrhage, evidence of chronic lacunar infacts.  7/16/2017:  BP improved to 's.  Max dose losartan, max dose Norvasc, HCTZ 12.5 mg and hydralazine 25 mg q 8hours. BP continues to show improvement nearing target of 140/80. Over past 24 hours SBP 130s-170s.

## 2017-07-15 NOTE — PLAN OF CARE
Pt oob to bsc denied any symptoms, none noted, stand by assist only. Pt followed all safety precautions. Pt assisted back to bed nad noted. Bed alarm set, pt denies any needs at this time. All personal items within reach. SR up x2. Care is ongoing will continue to monitor pt status.

## 2017-07-15 NOTE — PLAN OF CARE
Called and spoke with Dr. Christian notified MRI/MRA can be completed today, however, cardene gtt is unable to go into MRI room, therefore pt will be off cardene gtt for approx 1 hour, MD verbalizes understanding, pt bp on cardene gtt @ 6 mg/hr pt resting comfortably in bed sleeping bp 217/102 titrated up per order and notified physicians, MD states to hold off on MRI/MRA until Monday. Care is ongoing will continue to monitor pt status.

## 2017-07-15 NOTE — PT/OT/SLP EVAL
Speech Language Pathology  Evaluation    Shant Magana   MRN: [id number]   Admitting Diagnosis: <principal problem not specified>    Diet recommendations: Solid Diet Level: Regular  Liquid Diet Level: Thin HOB to 90 degrees, Feed when awake and alert    SLP Treatment Date: 07/15/17  Speech Start Time: 1100     Speech Stop Time: 1115     Speech Total (min): 15 min       TREATMENT BILLABLE MINUTES:  Eval Swallow and Oral Function 15    Diagnosis: <principal problem not specified>  Oropharyngeal skills WFL this eval.      Past Medical History:   Diagnosis Date    Cancer     prostate    Diabetes mellitus     Hypertension     Hypertrophic cardiomyopathy     Renal disorder      Past Surgical History:   Procedure Laterality Date    BACK SURGERY      THYROIDECTOMY         Has the patient been evaluated by SLP for swallowing? : Yes  Keep patient NPO?: No   General Precautions: Standard,            Social Hx: Patient lives alone.     Prior diet: regular/thin.    Occupational/hobbies/homemaking: none stated.    Subjective:  Pt is a 76 yo male admitted to [facility] on 7/14/17 with primary complaint of dizziness/weakness x1 day. Pt collapsed at home after feeling dizzy and family member called EMS. EMS noticed facila droop in ambulance. [facility] ED staff did not report facial droop. BP was 260/124 at admit. Vascular neurologist consulted felt that pt was having hypertensive encephalopathy and not actual stroke. Chest xray on 7/14/17 showed no evidence of pneumothorax or pleural effusions.   Patient goals: to eat.    Pain/Comfort  Pain Rating 1: 0/10    Objective:   Patient found with: blood pressure cuff    Oral Musculature Evaluation  Oral Musculature: WFL  Dentition: present and adequate  Mucosal Quality: good  Mandibular Strength and Mobility: WFL  Oral Labial Strength and Mobility: WFL  Lingual Strength and Mobility: WFL  Buccal Strength and Mobility: WFL  Volitional Cough: strong  Volitional Swallow: WFL  Voice Prior to PO Intake:  clear vocal quality     Bedside Swallow Eval:  Consistencies Assessed: Thin liquids 6 oz via self regulated straw sips, Puree 4 oz via self regulated tsp bites, Soft solids 4 oz via self regulated tsp bites and Solids 1 juan miguel cracker  Oral Phase: WFL  Pharyngeal Phase: no overt clinical  signs/symptoms of aspiration    Additional Treatment:      FIM:              Comprehension: 5 Standby Prompting--The patient understands directions and conversation about basic daily needs more than 90% of the time.  The patient requires prompting (slowed speech rate, use of repetition, stressing particular words or phrases, pauses, visual or   Expression: 5 Standby Prompting--The patient expresses basic daily needs and ideas more than 90% of the time.  Requires prompting (e.g., frequent repetition) less than 10% of the time to be understood.          Assessment:  Shant Magana is a 77 y.o. male with a medical diagnosis of <principal problem not specified> and presents with oropharyngeal skills WFL this eval.  Pt seen for bedside swallow evaluation this AM. Pt awake and alert, oriented x4. Pt fed self the following PO consistencies: thin liquid via straw sips, puree and dental soft via tsp bites, juan miguel cracker. Oral phase WFL. No overt s/s of aspiration were noted during or following PO intake. Pt denied odynophagia, denied globus sensation. RN stated pt swallowed whole pills this AM without overt s/s of aspiration. Pt was educated on s/s of dysphgai/aspiration and advised to report them to RN if he experiences these symptoms. Pt verbalized understanding. Further ST services not warranted 2/2 oropharyngeal skills WFL this eval.  Aissatou Tyson CCC-SLP  7/15/2017        Do you have any cultural, spiritual, Mandaen conflicts, given your current situation?: None     Discharge recommendations:       Goals:    SLP Goals     Not on file                 Plan:   Patient to be seen     Plan of Care expires:    Plan of Care reviewed with:  patient  SLP Follow-up?: No              Aissatou Tyson, CCC-SLP  07/15/2017

## 2017-07-15 NOTE — PT/OT/SLP PROGRESS
Occupational Therapy      Shant Magana  MRN: [id number]    Patient not seen today secondary to  medical hold for BP regulation. Will follow-up .    Rodo Aiken OT  7/15/2017

## 2017-07-15 NOTE — ED TRIAGE NOTES
Family reports that pt is at baseline and look normal to them. Slight facial asymmetry note on left side. MD Garner at  for assessment.

## 2017-07-15 NOTE — PLAN OF CARE
(0013) Telephone report received from ER Nurse Mary, questions/concerns addressed.  (0036) Physical assessment completed, patient denies any form of pain or discomfort; (L) great toe described as ingrown toenail by patient, site evaluated, tender to touch, possible old drainage from site; patient education provided to seek care from podiatrist for ingrown toe nails due to history of Diabetes; no neuro deficits identified during assessment, equal strength to extremities times 4; (L) AC 18 G HL flushes easily, no S/S of infection or infiltration noted.  (0050) , no action taken per Insulin sliding scale.  (0120) Dr. Mccoy notified via telephone of increasing BP and  to 160, Cardene infusion presently initiated @ 2.5mg/hour, no new orders received.  (0128) Dr. Diaz updated via telephone of patient status, -205,  - 180s, Cardene infusion presently @ 5mg/hour no new orders received, will continue to monitor.  (0145) NIBP 170/83, , Cardene infusion decreased to 2.5mg/hour.  (0200) NIBP 190/93, , Cardene infusion increased to 3.75mg/hour.  (0245) NIBP 186/87, , Cardene infusion decreased to 3.5mg/hour. (0300) Laboratory tech @ the bedside, obtaining blood samples for AM labs.  (0345) Dr. Diaz @ the bedside assessing the patient, verbal update provided on current Cardene administration rate and patient response, no new orders received.  (0545) Resting quietly, eyes closed easily arouses with verbal stimuli; laboratory tech at the bedside preparing obtain AM blood speciments.  (0615) AM supplies provided for morning ADLs completed by the patient without assistance, no disparity in strength bilaterally, remains with clear speech, appropriate conversation and calm mood; dialed family from private cell phone post AM care.  (0627) , no action taken per Insulin sliding scale.  (0640) No acutely adverse status changes noted during tour of duty, patient remains  alert, focused with bilateral equal strength to extremities times 4; Cardene infusing currently at a rate of 3.5mg/hour; SR, HR lower 50s; no signs of distress noted, continues to deny pain or headache.  (1730) Report given to oncoming nurse, time allowed for questions.

## 2017-07-15 NOTE — ED NOTES
Dr. Roach asked RN to ambulate patient. Walk was unsteady even thought patient denied weakness in legs. Patient denied SOB with ambulation. Family stated that was not patients normal gait. Doctor notified

## 2017-07-15 NOTE — SUBJECTIVE & OBJECTIVE
Past Medical History:   Diagnosis Date    Cancer     prostate    Diabetes mellitus     Hypertension     Hypertrophic cardiomyopathy     Renal disorder        Past Surgical History:   Procedure Laterality Date    BACK SURGERY      THYROIDECTOMY         Review of patient's allergies indicates:  No Known Allergies    No current facility-administered medications on file prior to encounter.      Current Outpatient Prescriptions on File Prior to Encounter   Medication Sig    AMLODIPINE BESYLATE/BENAZEPRIL (AMLODIPINE-BENAZEPRIL ORAL) Take 10 mg by mouth once daily at 6am.    aspirin (ECOTRIN) 81 MG EC tablet Take 81 mg by mouth once daily.    atorvastatin (LIPITOR) 80 MG tablet Take 80 mg by mouth once daily.    metformin (GLUCOPHAGE) 500 MG tablet Take 500 mg by mouth 2 (two) times daily with meals.    tamsulosin (FLOMAX) 0.4 mg Cp24 Take 0.4 mg by mouth once daily.    vitamin D 1000 units Tab Take 1,000 Units by mouth once daily.     Family History     None        Social History Main Topics    Smoking status: Former Smoker     Types: Cigarettes    Smokeless tobacco: Not on file    Alcohol use Yes    Drug use: Unknown    Sexual activity: Not on file     Review of Systems   Constitution: Negative for diaphoresis, weakness, malaise/fatigue, weight gain and weight loss.   Cardiovascular: Negative for chest pain, claudication, cyanosis, dyspnea on exertion, irregular heartbeat, leg swelling, near-syncope, orthopnea, palpitations, paroxysmal nocturnal dyspnea and syncope.   Respiratory: Negative for cough, hemoptysis, shortness of breath, sleep disturbances due to breathing, snoring, sputum production and wheezing.    Hematologic/Lymphatic: Negative for bleeding problem. Does not bruise/bleed easily.   Skin: Negative for poor wound healing and rash.   Musculoskeletal: Positive for muscle weakness. Negative for myalgias.   Gastrointestinal: Negative for heartburn, hematemesis, hematochezia and melena.    Neurological: Positive for dizziness. Negative for focal weakness, light-headedness and loss of balance.   Psychiatric/Behavioral: Negative for altered mental status, depression and memory loss.     Objective:     Vital Signs (Most Recent):  Temp: 98.9 °F (37.2 °C) (07/15/17 1129)  Pulse: 62 (07/15/17 1330)  Resp: 20 (07/15/17 1330)  BP: (!) 162/86 (07/15/17 1330)  SpO2: 96 % (07/15/17 1330) Vital Signs (24h Range):  Temp:  [98.4 °F (36.9 °C)-98.9 °F (37.2 °C)] 98.9 °F (37.2 °C)  Pulse:  [58-70] 62  Resp:  [14-29] 20  SpO2:  [94 %-100 %] 96 %  BP: (159-260)/() 162/86     Weight: 94.9 kg (209 lb 3.5 oz)  Body mass index is 29.18 kg/m².    SpO2: 96 %  O2 Device (Oxygen Therapy): room air      Intake/Output Summary (Last 24 hours) at 07/15/17 1433  Last data filed at 07/15/17 1352   Gross per 24 hour   Intake           941.31 ml   Output             1050 ml   Net          -108.69 ml       Lines/Drains/Airways     Peripheral Intravenous Line                 Peripheral IV - Single Lumen 07/15/17 0745 Right Antecubital less than 1 day                Physical Exam   Constitutional: He is oriented to person, place, and time. He appears well-developed and well-nourished. No distress. He is not intubated.   HENT:   Head: Atraumatic.   Neck: No JVD present.   Cardiovascular: Normal rate, regular rhythm, S1 normal, S2 normal, normal heart sounds and intact distal pulses.  PMI is not displaced.  Exam reveals no gallop, no S3, no S4, no distant heart sounds, no friction rub, no midsystolic click and no opening snap.    No murmur heard.  Pulses:       Carotid pulses are 2+ on the right side, and 2+ on the left side.       Radial pulses are 2+ on the right side, and 2+ on the left side.        Dorsalis pedis pulses are 2+ on the right side, and 2+ on the left side.        Posterior tibial pulses are 2+ on the right side, and 2+ on the left side.   Pulmonary/Chest: Effort normal and breath sounds normal. No accessory muscle  usage. No apnea, no tachypnea and no bradypnea. He is not intubated. No respiratory distress. He has no decreased breath sounds. He has no wheezes. He has no rhonchi. He has no rales. He exhibits no tenderness.   Abdominal: Soft. Normal aorta and bowel sounds are normal. He exhibits no distension, no abdominal bruit, no pulsatile midline mass and no mass. There is no tenderness.   Musculoskeletal: He exhibits no edema.   Neurological: He is alert and oriented to person, place, and time.   Skin: Skin is warm. No rash noted. No erythema. No pallor.   Psychiatric: He has a normal mood and affect. His behavior is normal. Judgment and thought content normal.   Vitals reviewed.      Significant Labs:   BMP:   Recent Labs  Lab 07/14/17  2010 07/15/17  0554   GLU 90 108    138   K 4.7 3.9    107   CO2 18* 22*   BUN 32* 24*   CREATININE 1.8* 1.3   CALCIUM 10.8* 10.3   , CMP   Recent Labs  Lab 07/14/17  2010 07/15/17  0554    138   K 4.7 3.9    107   CO2 18* 22*   GLU 90 108   BUN 32* 24*   CREATININE 1.8* 1.3   CALCIUM 10.8* 10.3   PROT 8.2 7.0   ALBUMIN 4.0 3.4*   BILITOT 0.5 0.5   ALKPHOS 78 66   AST 33 24   ALT 19 15   ANIONGAP 13 9   ESTGFRAFRICA 41* >60   EGFRNONAA 36* 53*   , CBC   Recent Labs  Lab 07/14/17  2010 07/15/17  0307   WBC 4.04 7.98   HGB 16.5 16.0   HCT 49.7 46.7    225   , Lipid Panel   Recent Labs  Lab 07/14/17 2010   CHOL 285*   HDL 39*   LDLCALC 191.6*   TRIG 272*   CHOLHDL 13.7*    and Troponin   Recent Labs  Lab 07/14/17 2010   TROPONINI 0.007       Significant Imaging: Echocardiogram:   2D echo with color flow doppler:   Results for orders placed or performed during the hospital encounter of 07/14/17   2D echo with color flow doppler   Result Value Ref Range    EF 65 55 - 65    Est. PA Systolic Pressure 24.72     and X-Ray: CXR: X-Ray Chest 1 View (CXR): No results found for this visit on 07/14/17.

## 2017-07-15 NOTE — ED PROVIDER NOTES
Encounter Date: 7/14/2017       History     Chief Complaint   Patient presents with    Dizziness     n/v and dizziness x 1 hour. no neuro deficits but pt is hypertensive.     Patient is a 77-year-old man with a history of hypertension, hyperlipidemia and DM. He is brought to the ED for generalized weakness since 7pm today. On arrival pt is without complaints but was noted to have a left facial droop and a code stroke was initiated.           Review of patient's allergies indicates:  No Known Allergies  Past Medical History:   Diagnosis Date    Cancer     prostate    Diabetes mellitus     Hypertension     Hypertrophic cardiomyopathy     Renal disorder      Past Surgical History:   Procedure Laterality Date    BACK SURGERY      THYROIDECTOMY       No family history on file.  Social History   Substance Use Topics    Smoking status: Former Smoker     Types: Cigarettes    Smokeless tobacco: Not on file    Alcohol use Yes     Review of Systems   Constitutional: Negative for fatigue and fever.   HENT: Negative for sore throat.    Respiratory: Negative for chest tightness and shortness of breath.    Cardiovascular: Negative for chest pain and palpitations.   Gastrointestinal: Positive for nausea. Negative for abdominal distention, abdominal pain, diarrhea and vomiting.   Genitourinary: Negative for difficulty urinating and dysuria.   Musculoskeletal: Negative for back pain.   Skin: Negative for rash.   Neurological: Positive for dizziness, facial asymmetry and light-headedness. Negative for weakness, numbness and headaches.   Hematological: Does not bruise/bleed easily.   All other systems reviewed and are negative.      Physical Exam     Initial Vitals [07/14/17 1957]   BP Pulse Resp Temp SpO2   (!) 260/124 60 -- -- 96 %      MAP       169.33         Physical Exam    Nursing note and vitals reviewed.  Constitutional: Vital signs are normal. He appears well-developed and well-nourished. No distress.   HENT:    Head: Normocephalic and atraumatic.   Eyes: EOM are normal. Pupils are equal, round, and reactive to light.   Neck: Normal range of motion. Neck supple.   Cardiovascular: Normal rate and regular rhythm.   Pulmonary/Chest: Breath sounds normal. No respiratory distress. He has no wheezes. He has no rhonchi. He has no rales. He exhibits no tenderness.   Abdominal: Soft. He exhibits no distension. There is no tenderness. There is no rebound and no guarding.   Musculoskeletal: Normal range of motion. He exhibits no tenderness.   Neurological: He is alert and oriented to person, place, and time. No cranial nerve deficit.   Left facial droop   Skin: Skin is warm and dry.   Psychiatric: He has a normal mood and affect.         ED Course   Critical Care  Date/Time: 7/14/2017 9:31 PM  Performed by: YO ABDUL  Authorized by: YO ABDUL.   Direct patient critical care time: 15 minutes  Additional history critical care time: 10 minutes  Ordering / reviewing critical care time: 5 minutes  Documentation critical care time: 10 minutes  Consulting other physicians critical care time: 5 minutes  Consult with family critical care time: 5 minutes  Total critical care time (exclusive of procedural time) : 50 minutes  Critical care time was exclusive of separately billable procedures and treating other patients and teaching time.  Critical care was necessary to treat or prevent imminent or life-threatening deterioration of the following conditions: CNS failure or compromise.  Critical care was time spent personally by me on the following activities: evaluation of patient's response to treatment, ordering and review of laboratory studies, pulse oximetry, review of old charts, examination of patient, development of treatment plan with patient or surrogate, ordering and review of radiographic studies, ordering and performing treatments and interventions and re-evaluation of patient's condition.        Labs Reviewed   CBC W/  AUTO DIFFERENTIAL   COMPREHENSIVE METABOLIC PANEL   TSH   LIPID PANEL   TROPONIN I   PROTIME-INR   APTT   URINALYSIS   POCT GLUCOSE                               ED Course     Clinical Impression:   The encounter diagnosis was Hypertensive encephalopathy.    Disposition:   Disposition: Placed in Observation  Condition: Stable                        Roge Garner MD  07/25/17 9581

## 2017-07-15 NOTE — ED NOTES
Patients current BP is 205/100 with a MAP of 143. Will continue to hold the nicardipine. Patient placed on cardiac monitor. Will continue to monitor

## 2017-07-15 NOTE — ED TRIAGE NOTES
Patient states he fell after coming from inside after working in garden. Patient stated he felt weak in his legs. Reports having fallen multiple times. Last time he was sent to ER with high BP. Patient denies having previous stroke or MIs. Denies LOC during fall. Had to call for help to get up off floor.

## 2017-07-15 NOTE — NURSING TRANSFER
Nursing Transfer Note      7/15/2017     Transfer ER to     Transfer via stretcher    Transfer with continuous cardiac monitoring and 1L NC via portable O2 tank    Transported by ER Nurse Mary    Medicines sent: None    Chart send with patient: Yes    Notified: Dr. Mccoy    Patient reassessed at: 7/15/2017 @ 0036    Upon arrival to floor: Immediately transferred to unit bed and placed on continuous cardiac monitoring, SR, HR 60s; 1L NC via wall O2, O2 Sat 100%; alert and awake with clear speech, orientated to person, place and situation, reoriented to time by this nurse; see initial shift physical assessment.

## 2017-07-15 NOTE — PLAN OF CARE
07/15/17 1430   Discharge Assessment   Assessment Type Discharge Planning Assessment   Confirmed/corrected address and phone number on facesheet? Yes   Assessment information obtained from? Patient   Prior to hospitilization cognitive status: Alert/Oriented   Prior to hospitalization functional status: Independent   Current cognitive status: Alert/Oriented   Current Functional Status: Independent   Arrived From home or self-care   Lives With alone   Able to Return to Prior Arrangements yes   Is patient able to care for self after discharge? Yes   How many people do you have in your home that can help with your care after discharge? 0   Who are your caregiver(s) and their phone number(s)? Hoda Magana(son)[phone]/Candace Hobson(dtr)[phone]   Patient's perception of discharge disposition home or selfcare   Readmission Within The Last 30 Days no previous admission in last 30 days   Patient currently being followed by outpatient case management? No   Patient currently receives home health services? No   Does the patient currently use HME? No   Patient currently receives private duty nursing? No   Patient currently receives any other outside agency services? No   Equipment Currently Used at Home none   Do you have any problems affording any of your prescribed medications? No   Is the patient taking medications as prescribed? yes   Do you have any financial concerns preventing you from receiving the healthcare you need? No   Does the patient have transportation to healthcare appointments? Yes   Transportation Available family or friend will provide;car   On Dialysis? No   Does the patient receive services at the Coumadin Clinic? No   Are there any open cases? No   Discharge Plan A Home with family   Discharge Plan B Home Health   Patient/Family In Agreement With Plan yes

## 2017-07-15 NOTE — PLAN OF CARE
Problem: Patient Care Overview  Goal: Plan of Care Review  Outcome: Ongoing (interventions implemented as appropriate)  Pt seen for bedside swallow evaluation this AM. Pt awake and alert, oriented x4. Pt fed self the following PO consistencies: thin liquid via straw sips, puree and dental soft via tsp bites, juan miguel cracker. Oral phase WFL. No overt s/s of aspiration were noted during or following PO intake. Pt denied odynophagia, denied globus sensation. RN stated pt swallowed whole pills this AM without overt s/s of aspiration. Pt was educated on s/s of dysphgai/aspiration and advised to report them to RN if he experiences these symptoms. Pt verbalized understanding. Further ST services not warranted 2/2 oropharyngeal skills WFL this eval.  Aissatou Tyson CCC-SLP  7/15/2017

## 2017-07-16 LAB
25(OH)D3+25(OH)D2 SERPL-MCNC: 35 NG/ML
ALBUMIN SERPL BCP-MCNC: 3.5 G/DL
ALP SERPL-CCNC: 74 U/L
ALT SERPL W/O P-5'-P-CCNC: 17 U/L
ANION GAP SERPL CALC-SCNC: 10 MMOL/L
AST SERPL-CCNC: 23 U/L
BASOPHILS # BLD AUTO: 0.02 K/UL
BASOPHILS NFR BLD: 0.4 %
BILIRUB SERPL-MCNC: 0.7 MG/DL
BUN SERPL-MCNC: 21 MG/DL
CALCIUM SERPL-MCNC: 10.7 MG/DL
CHLORIDE SERPL-SCNC: 104 MMOL/L
CO2 SERPL-SCNC: 22 MMOL/L
CREAT SERPL-MCNC: 1.4 MG/DL
DIFFERENTIAL METHOD: NORMAL
EOSINOPHIL # BLD AUTO: 0.1 K/UL
EOSINOPHIL NFR BLD: 1.8 %
ERYTHROCYTE [DISTWIDTH] IN BLOOD BY AUTOMATED COUNT: 13.8 %
EST. GFR  (AFRICAN AMERICAN): 56 ML/MIN/1.73 M^2
EST. GFR  (NON AFRICAN AMERICAN): 48 ML/MIN/1.73 M^2
ESTIMATED AVG GLUCOSE: 114 MG/DL
GLUCOSE SERPL-MCNC: 142 MG/DL
HBA1C MFR BLD HPLC: 5.6 %
HCT VFR BLD AUTO: 47.8 %
HGB BLD-MCNC: 16 G/DL
LYMPHOCYTES # BLD AUTO: 1.3 K/UL
LYMPHOCYTES NFR BLD: 23.1 %
MCH RBC QN AUTO: 30.2 PG
MCHC RBC AUTO-ENTMCNC: 33.5 %
MCV RBC AUTO: 90 FL
MONOCYTES # BLD AUTO: 0.4 K/UL
MONOCYTES NFR BLD: 6.9 %
NEUTROPHILS # BLD AUTO: 3.7 K/UL
NEUTROPHILS NFR BLD: 67.6 %
PHOSPHATE SERPL-MCNC: 3.3 MG/DL
PLATELET # BLD AUTO: 221 K/UL
PMV BLD AUTO: 11.7 FL
POTASSIUM SERPL-SCNC: 3.7 MMOL/L
PROT SERPL-MCNC: 7.2 G/DL
PTH-INTACT SERPL-MCNC: 93 PG/ML
RBC # BLD AUTO: 5.3 M/UL
SODIUM SERPL-SCNC: 136 MMOL/L
WBC # BLD AUTO: 5.5 K/UL

## 2017-07-16 PROCEDURE — 94761 N-INVAS EAR/PLS OXIMETRY MLT: CPT

## 2017-07-16 PROCEDURE — 25000003 PHARM REV CODE 250: Performed by: INTERNAL MEDICINE

## 2017-07-16 PROCEDURE — G8979 MOBILITY GOAL STATUS: HCPCS | Mod: CI

## 2017-07-16 PROCEDURE — 99233 SBSQ HOSP IP/OBS HIGH 50: CPT | Mod: ,,, | Performed by: INTERNAL MEDICINE

## 2017-07-16 PROCEDURE — 80053 COMPREHEN METABOLIC PANEL: CPT

## 2017-07-16 PROCEDURE — G8978 MOBILITY CURRENT STATUS: HCPCS | Mod: CJ

## 2017-07-16 PROCEDURE — 83036 HEMOGLOBIN GLYCOSYLATED A1C: CPT

## 2017-07-16 PROCEDURE — 36415 COLL VENOUS BLD VENIPUNCTURE: CPT

## 2017-07-16 PROCEDURE — 85025 COMPLETE CBC W/AUTO DIFF WBC: CPT

## 2017-07-16 PROCEDURE — 97161 PT EVAL LOW COMPLEX 20 MIN: CPT

## 2017-07-16 PROCEDURE — 11000001 HC ACUTE MED/SURG PRIVATE ROOM

## 2017-07-16 PROCEDURE — 83970 ASSAY OF PARATHORMONE: CPT

## 2017-07-16 PROCEDURE — 25000003 PHARM REV CODE 250: Performed by: STUDENT IN AN ORGANIZED HEALTH CARE EDUCATION/TRAINING PROGRAM

## 2017-07-16 PROCEDURE — 97530 THERAPEUTIC ACTIVITIES: CPT

## 2017-07-16 PROCEDURE — 84100 ASSAY OF PHOSPHORUS: CPT

## 2017-07-16 PROCEDURE — 82306 VITAMIN D 25 HYDROXY: CPT

## 2017-07-16 RX ORDER — HYDRALAZINE HYDROCHLORIDE 25 MG/1
25 TABLET, FILM COATED ORAL EVERY 8 HOURS
Status: DISCONTINUED | OUTPATIENT
Start: 2017-07-16 | End: 2017-07-17

## 2017-07-16 RX ADMIN — HYDRALAZINE HYDROCHLORIDE 25 MG: 25 TABLET, FILM COATED ORAL at 02:07

## 2017-07-16 RX ADMIN — HYDRALAZINE HYDROCHLORIDE 25 MG: 25 TABLET, FILM COATED ORAL at 09:07

## 2017-07-16 RX ADMIN — AMLODIPINE BESYLATE 10 MG: 5 TABLET ORAL at 08:07

## 2017-07-16 RX ADMIN — ATORVASTATIN CALCIUM 80 MG: 40 TABLET, FILM COATED ORAL at 08:07

## 2017-07-16 RX ADMIN — ASPIRIN 81 MG: 81 TABLET, COATED ORAL at 08:07

## 2017-07-16 RX ADMIN — HYDROCHLOROTHIAZIDE 12.5 MG: 12.5 TABLET ORAL at 08:07

## 2017-07-16 RX ADMIN — NICARDIPINE HYDROCHLORIDE 15 MG/HR: 0.2 INJECTION, SOLUTION INTRAVENOUS at 02:07

## 2017-07-16 RX ADMIN — LOSARTAN POTASSIUM 100 MG: 50 TABLET, FILM COATED ORAL at 08:07

## 2017-07-16 RX ADMIN — TAMSULOSIN HYDROCHLORIDE 0.4 MG: 0.4 CAPSULE ORAL at 08:07

## 2017-07-16 RX ADMIN — NICARDIPINE HYDROCHLORIDE 15 MG/HR: 0.2 INJECTION, SOLUTION INTRAVENOUS at 05:07

## 2017-07-16 NOTE — PROGRESS NOTES
Notified [facility] team pt b/p is 165/76 HR 71, pt asymptomatic. No new orders given will continue to monitor pt

## 2017-07-16 NOTE — PLAN OF CARE
Problem: Patient Care Overview  Goal: Plan of Care Review  Patient transferred to  via wheelchair by RN. Patient verbalized understanding to call for any needs and assistance. Bed low, locked and call bell with in reach. Patient in no apparent distress. Will continue to monitor.

## 2017-07-16 NOTE — SUBJECTIVE & OBJECTIVE
Interval History: no new events.  BP improving.  Remains in ICU.    Review of Systems   Constitution: Negative for diaphoresis, weakness, malaise/fatigue, weight gain and weight loss.   HENT: Negative for nosebleeds.    Cardiovascular: Negative for chest pain, claudication, cyanosis, dyspnea on exertion, irregular heartbeat, leg swelling, near-syncope, orthopnea, palpitations, paroxysmal nocturnal dyspnea and syncope.   Respiratory: Negative for cough, hemoptysis, shortness of breath, sleep disturbances due to breathing, snoring, sputum production and wheezing.    Hematologic/Lymphatic: Negative for bleeding problem. Does not bruise/bleed easily.   Skin: Negative for poor wound healing and rash.   Musculoskeletal: Negative for myalgias.   Gastrointestinal: Negative for heartburn, hematemesis, hematochezia and melena.   Neurological: Negative for dizziness, focal weakness, light-headedness and loss of balance.   Psychiatric/Behavioral: Negative for altered mental status, depression and memory loss.     Objective:     Vital Signs (Most Recent):  Temp: 98.3 °F (36.8 °C) (07/16/17 1105)  Pulse: 70 (07/16/17 1300)  Resp: (!) 23 (07/16/17 1300)  BP: (!) 148/67 (07/16/17 1200)  SpO2: 95 % (07/16/17 1300) Vital Signs (24h Range):  Temp:  [98.3 °F (36.8 °C)-98.8 °F (37.1 °C)] 98.3 °F (36.8 °C)  Pulse:  [60-76] 70  Resp:  [9-35] 23  SpO2:  [94 %-99 %] 95 %  BP: (117-202)/() 148/67     Weight: 97.9 kg (215 lb 13.3 oz)  Body mass index is 30.1 kg/m².     SpO2: 95 %  O2 Device (Oxygen Therapy): room air      Intake/Output Summary (Last 24 hours) at 07/16/17 1408  Last data filed at 07/16/17 0600   Gross per 24 hour   Intake          1137.51 ml   Output              625 ml   Net           512.51 ml       Lines/Drains/Airways     Peripheral Intravenous Line                 Peripheral IV - Single Lumen 07/16/17 0145 Left Upper Arm less than 1 day                Physical Exam   Constitutional: He is oriented to person, place,  and time. He appears well-developed and well-nourished. No distress. He is not intubated.   HENT:   Head: Atraumatic.   Neck: No JVD present.   Cardiovascular: Normal rate, regular rhythm, S1 normal, S2 normal and intact distal pulses.  PMI is not displaced.  Exam reveals gallop and S4. Exam reveals no S3, no distant heart sounds, no friction rub, no midsystolic click and no opening snap.    No murmur heard.  Pulses:       Carotid pulses are 2+ on the right side, and 2+ on the left side.       Radial pulses are 2+ on the right side, and 2+ on the left side.        Femoral pulses are 2+ on the right side, and 2+ on the left side.       Popliteal pulses are 2+ on the right side, and 2+ on the left side.        Dorsalis pedis pulses are 2+ on the right side, and 2+ on the left side.        Posterior tibial pulses are 2+ on the right side, and 2+ on the left side.   Pulmonary/Chest: Effort normal and breath sounds normal. No accessory muscle usage. No apnea, no tachypnea and no bradypnea. He is not intubated. No respiratory distress. He has no decreased breath sounds. He has no wheezes. He has no rhonchi. He has no rales. He exhibits no tenderness.   Abdominal: Soft. Normal aorta and bowel sounds are normal. He exhibits no distension, no abdominal bruit, no pulsatile midline mass and no mass. There is no tenderness.   Musculoskeletal: He exhibits no edema.   Neurological: He is alert and oriented to person, place, and time.   Skin: Skin is warm. No rash noted. No erythema. No pallor.   Psychiatric: He has a normal mood and affect. His behavior is normal. Judgment and thought content normal.   Vitals reviewed.      Significant Labs:   BMP:   Recent Labs  Lab 07/14/17  2010 07/15/17  0554 07/16/17  0301   GLU 90 108 142*    138 136   K 4.7 3.9 3.7    107 104   CO2 18* 22* 22*   BUN 32* 24* 21   CREATININE 1.8* 1.3 1.4   CALCIUM 10.8* 10.3 10.7*   , CMP   Recent Labs  Lab 07/14/17  2010 07/15/17  0554  07/16/17  0301    138 136   K 4.7 3.9 3.7    107 104   CO2 18* 22* 22*   GLU 90 108 142*   BUN 32* 24* 21   CREATININE 1.8* 1.3 1.4   CALCIUM 10.8* 10.3 10.7*   PROT 8.2 7.0 7.2   ALBUMIN 4.0 3.4* 3.5   BILITOT 0.5 0.5 0.7   ALKPHOS 78 66 74   AST 33 24 23   ALT 19 15 17   ANIONGAP 13 9 10   ESTGFRAFRICA 41* >60 56*   EGFRNONAA 36* 53* 48*   , CBC   Recent Labs  Lab 07/14/17  2010 07/15/17  0307 07/16/17  0301   WBC 4.04 7.98 5.50   HGB 16.5 16.0 16.0   HCT 49.7 46.7 47.8    225 221    and Troponin   Recent Labs  Lab 07/14/17 2010   TROPONINI 0.007       Significant Imaging: Echocardiogram:   2D echo with color flow doppler:   Results for orders placed or performed during the hospital encounter of 07/14/17   2D echo with color flow doppler   Result Value Ref Range    EF 65 55 - 65    Est. PA Systolic Pressure 24.72

## 2017-07-16 NOTE — PROGRESS NOTES
Notified MD Sahu  pt b/p 133/89, HR 74,  MD sahu Stated to titrate cardene down to lowest dose and stop

## 2017-07-16 NOTE — PROGRESS NOTES
U Internal Medicine Resident HO-III Progress Note    Subjective:      Denies chest pain, headache, nausea, vomiting, abdominal pain or shortness of breath this morning.     Objective:   Last 24 Hour Vital Signs:  BP  Min: 117/90  Max: 202/98  Temp  Av.7 °F (37.1 °C)  Min: 98.5 °F (36.9 °C)  Max: 98.9 °F (37.2 °C)  Pulse  Av.5  Min: 59  Max: 76  Resp  Av  Min: 9  Max: 35  SpO2  Av.4 %  Min: 94 %  Max: 99 %  Weight  Av.9 kg (215 lb 13.3 oz)  Min: 97.9 kg (215 lb 13.3 oz)  Max: 97.9 kg (215 lb 13.3 oz)  I/O last 3 completed shifts:  In: 2078.8 [P.O.:1000; I.V.:1078.8]  Out: 1675 [Urine:1675]    Physical Examination:  General: awake, alert, disheveled male, resting comfortably in NAD  HENT: moist mucous membranes, EOMI  CV: RRR  Resp: CTAB, no wheezes, rhonchi or crackles  Abd: soft, NTND, normoactive bowel sounds  Ext: no peripheral edema, 2+ DP pulses bilaterally  Neuro: AA&O to person, place, time and situation    Laboratory:  Laboratory Data Reviewed: yes    Microbiology Data Reviewed: yes    Other Results:  EKG (my interpretation): None new    Radiology Data Reviewed: yes  Pertinent Findings:  None new    Current Medications:     Infusions:   niCARdipine Stopped (17 0858)        Scheduled:   amlodipine  10 mg Oral Daily    aspirin  81 mg Oral Daily    atorvastatin  80 mg Oral Daily    hydrochlorothiazide  12.5 mg Oral Daily    losartan  100 mg Oral Daily    tamsulosin  0.4 mg Oral Daily        PRN:  dextrose 50%, glucagon (human recombinant), pneumoc 13-uyen conj-dip cr(PF)    Antibiotics and Day Number of Therapy:  none    Assessment/Plan:     Shant Magana is a 77 y.o.male with    Hypertensive emergency/encephalopathy  -LE weakness, dizziness, BP on admit 260/124  -Hydralazine given with fast drop in /85  -Trops wnl, SHIKHA vs CKD  -CT head w/ subacute/chronic frontal infarct, lacunar infarcts, CXR wnl  -EKG w/ ant Q waves, biphasic nonspec TW abn  -Vasc neurology believes  this was HTN encephalopathy  -Restarted home meds, losartan, amlodipine, HCTZ per cardiology recs, cardene gtt held this morning for goal /80  -TTE with concentric hypertrophy, mild LA enlargement, normal EF 60-65%, indeterminate diastolic function  -MRI/MRA on Monday, PT/OT/ST, continue statin & ASA     Controlled, DM Type II  -On metformin, holding while inpatient  -Cover with SSI/accuchecks  -A1C 5.6     HLD  -On lipid panel 7/14, , continue statin     SHIKHA (resolved)  -On admit Cr 1.8, trended down, now 1.4       -[facility] UTD, needs vaccines    Libby Stevens  hospitals Internal Medicine HO-III  U Internal Medicine Service Team B    hospitals Medicine Hospitalist Pager numbers:   U Hospitalist Medicine Team A (Hanny/Loco): [phone]  hospitals Hospitalist Medicine Team B (Kacey/Nico):  [phone]

## 2017-07-16 NOTE — PLAN OF CARE
[facility] hospital medicine was called and asked about adding more medication for pt's blood pressure as they are back on the cardene and almost maxed. Physician states to keep SBP between 140-170 and if it stays above 170, to call back. Will continue to monitor.

## 2017-07-16 NOTE — PT/OT/SLP EVAL
Physical Therapy  Evaluation    Shant Magana   MRN: [id number]   Admitting Diagnosis: Hypertensive crisis, unspecified    PT Received On: 07/16/17  PT Start Time: 1351     PT Stop Time: 1415    PT Total Time (min): 24 min       Billable Minutes:  Evaluation 10 and Therapeutic Activity 14    Diagnosis: Hypertensive crisis, unspecified      Past Medical History:   Diagnosis Date    Cancer     prostate    Diabetes mellitus     Hypertension     Hypertrophic cardiomyopathy     Renal disorder       Past Surgical History:   Procedure Laterality Date    BACK SURGERY      THYROIDECTOMY           General Precautions: Standard, fall  Orthopedic Precautions: N/A   Braces: N/A       Do you have any cultural, spiritual, Mosque conflicts, given your current situation?: none    Patient History:  Lives With: alone  Living Arrangements: house  Home Accessibility: stairs to enter home  Home Layout: Able to live on 1st floor  Number of Stairs to Enter Home: 6  Stair Railings at Home: outside, present at both sides  Transportation Available: car  Living Environment Comment: pt lives alone in [facility] with 6 RALF with B rails present, pt + , ind ADL and ind amb,   Equipment Currently Used at Home: none  DME owned (not currently used): single point cane and transfer tub bench    Previous Level of Function:  Ambulation Skills: independent  Transfer Skills: independent  ADL Skills: independent  Work/Leisure Activity: independent (+ , reports active lifestyle mowing yard and fixing things around the house)    Subjective:  Communicated with nsg prior to session.    Chief Complaint: no c/o pain or dizziness;  fatigue  Patient goals: PLOF    Pain/Comfort  Pain Rating 1: 0/10  Pain Rating 2: 0/10      Objective:   Patient found with: blood pressure cuff, pulse ox (continuous), telemetry     Cognitive Exam:  Oriented to: Person, Time and pt unable to name hospital-guessing incorrectly but did know he was in a hospital    Follows  Commands/attention: Follows one-step commands  Communication: clear/fluent  Safety awareness/insight to disability: intact    Physical Exam:  Postural examination/scapula alignment: Rounded shoulder and Head forward    Skin integrity: Visible skin intact    Upper Extremity Range of Motion:  Right Upper Extremity: WFL  Left Upper Extremity: WFL    Upper Extremity Strength:  Right Upper Extremity: WFL  Left Upper Extremity: WFL    Lower Extremity Range of Motion:  Right Lower Extremity: WFL  Left Lower Extremity: WFL    Lower Extremity Strength:  Right Lower Extremity: WFL  Left Lower Extremity: WFL       Functional Mobility:  Bed Mobility:  Rolling/Turning to Left: Supervision  Rolling/Turning Right: Supervision  Supine to Sit: Supervision  Sit to Supine: Supervision    Transfers:  Sit <> Stand Assistance: Contact Guard Assistance  Sit <> Stand Assistive Device: No Assistive Device  Bed <> Chair Technique: Stand Pivot  Bed <> Chair Assistance: Contact Guard Assistance, Minimum Assistance  Bed <> Chair Assistive Device: No Assistive Device    Gait:   Gait Distance: pt amb HHA with CGA/min assist ~ 6-7 steps and perf static stance without Assist ~20 seconds without LOB  Assistance 1: Contact Guard Assistance, Minimum assistance  Gait Assistive Device: No device, Hand held assist  Gait Pattern: reciprocal  Gait Deviation(s): decreased robert, increased time in double stance, decreased toe-to-floor clearance    Stairs:NT    Balance:   Static Sit: GOOD: Takes MODERATE challenges from all directions  Dynamic Sit: GOOD: Maintains balance through MODERATE excursions of active trunk movement  Static Stand: FAIR+: Takes MINIMAL challenges from all directions  Dynamic stand: FAIR: Needs CONTACT GUARD during gait    Therapeutic Activities and Exercises:  Safety ed with mobility and initiated therex, bed mobility training, txf training, gait training, balance challenges activities, seated OOB    AM-PAC 6 CLICK MOBILITY  How much  help from another person does this patient currently need?   1 = Unable, Total/Dependent Assistance  2 = A lot, Maximum/Moderate Assistance  3 = A little, Minimum/Contact Guard/Supervision  4 = None, Modified [place]/Independent    Turning over in bed (including adjusting bedclothes, sheets and blankets)?: 4  Sitting down on and standing up from a chair with arms (e.g., wheelchair, bedside commode, etc.): 3  Moving from lying on back to sitting on the side of the bed?: 4  Moving to and from a bed to a chair (including a wheelchair)?: 3  Need to walk in hospital room?: 3  Climbing 3-5 steps with a railing?: 3  Total Score: 20     AM-PAC Raw Score CMS G-Code Modifier Level of Impairment Assistance   6 % Total / Unable   7 - 9 CM 80 - 100% Maximal Assist   10 - 14 CL 60 - 80% Moderate Assist   15 - 19 CK 40 - 60% Moderate Assist   20 - 22 CJ 20 - 40% Minimal Assist   23 CI 1-20% SBA / CGA   24 CH 0% Independent/ Mod I     Patient left up in chair with all lines intact, call button in reach, nsg notified and daughters present.    Assessment:   Shant Magana is a 77 y.o. male with a medical diagnosis of Hypertensive crisis, unspecified and presents with decreased independence levels with functional mobility.   Pt /80 during session.    Rehab identified problem list/impairments: Rehab identified problem list/impairments: weakness, impaired endurance, impaired balance, gait instability, impaired functional mobilty, impaired cardiopulmonary response to activity    Rehab potential is excellent.    Activity tolerance: Good    Discharge recommendations: Discharge Facility/Level Of Care Needs: home (home w family support PRN --   ongoing assessment of needs)     Barriers to discharge: Barriers to Discharge: None, Decreased caregiver support (pt family reports daughter will move in w pt for first several days when pt discharges to home)    Equipment recommendations: Equipment Needed After Discharge: none at this  time ---  Ongoing assessment of needs for discharge    GOALS:    Physical Therapy Goals        Problem: Physical Therapy Goal    Goal Priority Disciplines Outcome Goal Variances Interventions   Physical Therapy Goal     PT/OT, PT Ongoing (interventions implemented as appropriate)     Description:  Goals to be met by: 17    Patient will increase functional independence with mobility by performin. Supine to sit with [place]  2. Sit to stand transfer with [place]  3. Bed to chair transfer with Modified [place] with or without AD  4. Gait  x 150 feet with Stand-by Assistance with or without using AD  5. Lower extremity exercise program x15 reps                       PLAN:    Patient to be seen 5 x/week to address the above listed problems via gait training, therapeutic activities, therapeutic exercises  Plan of Care expires: 17  Plan of Care reviewed with: patient, daughter, caregiver    Functional Assessment Tool Used: AMPAC  Score: 20  Functional Limitation: Mobility: Walking and moving around  Mobility: Walking and Moving Around Current Status (): CJ  Mobility: Walking and Moving Around Goal Status (): DAVION Montano, PT  2017

## 2017-07-16 NOTE — PLAN OF CARE
Problem: Physical Therapy Goal  Goal: Physical Therapy Goal  Goals to be met by: 17    Patient will increase functional independence with mobility by performin. Supine to sit with [place]  2. Sit to stand transfer with [place]  3. Bed to chair transfer with Modified [place] with or without AD  4. Gait  x 150 feet with Stand-by Assistance with or without using AD  5. Lower extremity exercise program x15 reps     Outcome: Ongoing (interventions implemented as appropriate)  Recommend continued therapy services to improve pt's independence levels with functional mobility and endurance.  Ongoing assessment of pt's discharge needs and recommendations.

## 2017-07-16 NOTE — ASSESSMENT & PLAN NOTE
/124 on admit, now improved with medical therapy.  Patient appears to be compliant with therapy, no dietary noncompliance detected.  On clonidine at home - if missing doses could have a rebound effect of hypertension.  Also will need screening for hyperaldo and renovascular hypertension.  Target organ damage of kidneys with SHIKHA - Cr of 1.8, now improved to 1.3, and hypertensive encephalopathy, now resolved.    Will increase losartan to 100mg and initiate HCTZ 12.5mg (takes Avelide at home, dosage not documented on bottle).    Start Hydralazine 25mg tid.    Renal artery U/S to screen for renal artery stenosis pending and a random aldosterone level.    TTE shows severe concentric LVH which demonstrates the effect of chronic uncontrolled hypertension.    Transfer to telemetry.  If BP improved tomorrow reasonable for discharge.

## 2017-07-16 NOTE — PROGRESS NOTES
[facility]  Cardiology  Progress Note    Patient Name: Shant Magana  MRN: [id number]  Admission Date: 7/14/2017  Hospital Length of Stay: 2 days  Code Status: Full Code   Attending Physician: Todd Erazo MD   Primary Care Physician: Trent Aldana MD  Expected Discharge Date:   Principal Problem:Hypertensive crisis, unspecified    Subjective:     Hospital Course:   BP responded to hydralazine in ED.  7/15/2017:  BP now 160's/80's on HD #2.  Neurologic symptoms resolved.  CT of head showed no hemorrhage, evidence of chronic lacunar infacts.  7/16/2017:  BP improved to 's.  Max dose losartan, HCTZ initiated.  Adding hydralazine.  On amlodopine.      Interval History: no new events.  BP improving.  Remains in ICU.    Review of Systems   Constitution: Negative for diaphoresis, weakness, malaise/fatigue, weight gain and weight loss.   HENT: Negative for nosebleeds.    Cardiovascular: Negative for chest pain, claudication, cyanosis, dyspnea on exertion, irregular heartbeat, leg swelling, near-syncope, orthopnea, palpitations, paroxysmal nocturnal dyspnea and syncope.   Respiratory: Negative for cough, hemoptysis, shortness of breath, sleep disturbances due to breathing, snoring, sputum production and wheezing.    Hematologic/Lymphatic: Negative for bleeding problem. Does not bruise/bleed easily.   Skin: Negative for poor wound healing and rash.   Musculoskeletal: Negative for myalgias.   Gastrointestinal: Negative for heartburn, hematemesis, hematochezia and melena.   Neurological: Negative for dizziness, focal weakness, light-headedness and loss of balance.   Psychiatric/Behavioral: Negative for altered mental status, depression and memory loss.     Objective:     Vital Signs (Most Recent):  Temp: 98.3 °F (36.8 °C) (07/16/17 1105)  Pulse: 70 (07/16/17 1300)  Resp: (!) 23 (07/16/17 1300)  BP: (!) 148/67 (07/16/17 1200)  SpO2: 95 % (07/16/17 1300) Vital Signs (24h Range):  Temp:  [98.3 °F  (36.8 °C)-98.8 °F (37.1 °C)] 98.3 °F (36.8 °C)  Pulse:  [60-76] 70  Resp:  [9-35] 23  SpO2:  [94 %-99 %] 95 %  BP: (117-202)/() 148/67     Weight: 97.9 kg (215 lb 13.3 oz)  Body mass index is 30.1 kg/m².     SpO2: 95 %  O2 Device (Oxygen Therapy): room air      Intake/Output Summary (Last 24 hours) at 07/16/17 1408  Last data filed at 07/16/17 0600   Gross per 24 hour   Intake          1137.51 ml   Output              625 ml   Net           512.51 ml       Lines/Drains/Airways     Peripheral Intravenous Line                 Peripheral IV - Single Lumen 07/16/17 0145 Left Upper Arm less than 1 day                Physical Exam   Constitutional: He is oriented to person, place, and time. He appears well-developed and well-nourished. No distress. He is not intubated.   HENT:   Head: Atraumatic.   Neck: No JVD present.   Cardiovascular: Normal rate, regular rhythm, S1 normal, S2 normal and intact distal pulses.  PMI is not displaced.  Exam reveals gallop and S4. Exam reveals no S3, no distant heart sounds, no friction rub, no midsystolic click and no opening snap.    No murmur heard.  Pulses:       Carotid pulses are 2+ on the right side, and 2+ on the left side.       Radial pulses are 2+ on the right side, and 2+ on the left side.        Femoral pulses are 2+ on the right side, and 2+ on the left side.       Popliteal pulses are 2+ on the right side, and 2+ on the left side.        Dorsalis pedis pulses are 2+ on the right side, and 2+ on the left side.        Posterior tibial pulses are 2+ on the right side, and 2+ on the left side.   Pulmonary/Chest: Effort normal and breath sounds normal. No accessory muscle usage. No apnea, no tachypnea and no bradypnea. He is not intubated. No respiratory distress. He has no decreased breath sounds. He has no wheezes. He has no rhonchi. He has no rales. He exhibits no tenderness.   Abdominal: Soft. Normal aorta and bowel sounds are normal. He exhibits no distension, no  abdominal bruit, no pulsatile midline mass and no mass. There is no tenderness.   Musculoskeletal: He exhibits no edema.   Neurological: He is alert and oriented to person, place, and time.   Skin: Skin is warm. No rash noted. No erythema. No pallor.   Psychiatric: He has a normal mood and affect. His behavior is normal. Judgment and thought content normal.   Vitals reviewed.      Significant Labs:   BMP:   Recent Labs  Lab 07/14/17  2010 07/15/17  0554 07/16/17  0301   GLU 90 108 142*    138 136   K 4.7 3.9 3.7    107 104   CO2 18* 22* 22*   BUN 32* 24* 21   CREATININE 1.8* 1.3 1.4   CALCIUM 10.8* 10.3 10.7*   , CMP   Recent Labs  Lab 07/14/17  2010 07/15/17  0554 07/16/17  0301    138 136   K 4.7 3.9 3.7    107 104   CO2 18* 22* 22*   GLU 90 108 142*   BUN 32* 24* 21   CREATININE 1.8* 1.3 1.4   CALCIUM 10.8* 10.3 10.7*   PROT 8.2 7.0 7.2   ALBUMIN 4.0 3.4* 3.5   BILITOT 0.5 0.5 0.7   ALKPHOS 78 66 74   AST 33 24 23   ALT 19 15 17   ANIONGAP 13 9 10   ESTGFRAFRICA 41* >60 56*   EGFRNONAA 36* 53* 48*   , CBC   Recent Labs  Lab 07/14/17  2010 07/15/17  0307 07/16/17  0301   WBC 4.04 7.98 5.50   HGB 16.5 16.0 16.0   HCT 49.7 46.7 47.8    225 221    and Troponin   Recent Labs  Lab 07/14/17 2010   TROPONINI 0.007       Significant Imaging: Echocardiogram:   2D echo with color flow doppler:   Results for orders placed or performed during the hospital encounter of 07/14/17   2D echo with color flow doppler   Result Value Ref Range    EF 65 55 - 65    Est. PA Systolic Pressure 24.72      Assessment and Plan:     Brief HPI:    Hypertensive encephalopathy    Resolved.        Bradycardia    HR of 48 on 6/7/17 provoking discontinuation of metoprolol.  For now would avoid beta-blockers or nondihydropyridine CCB's.        Unspecified essential hypertension    BP improving, still above target of 140/80.        * Hypertensive crisis, unspecified    /124 on admit, now improved with medical  therapy.  Patient appears to be compliant with therapy, no dietary noncompliance detected.  On clonidine at home - if missing doses could have a rebound effect of hypertension.  Also will need screening for hyperaldo and renovascular hypertension.  Target organ damage of kidneys with SHIKHA - Cr of 1.8, now improved to 1.3, and hypertensive encephalopathy, now resolved.    Will increase losartan to 100mg and initiate HCTZ 12.5mg (takes Avelide at home, dosage not documented on bottle).    Start Hydralazine 25mg tid.    Renal artery U/S to screen for renal artery stenosis pending and a random aldosterone level.    TTE shows severe concentric LVH which demonstrates the effect of chronic uncontrolled hypertension.    Transfer to telemetry.  If BP improved tomorrow reasonable for discharge.            VTE Risk Mitigation         Ordered     High Risk of VTE  Once      07/15/17 0053     Place sequential compression device  Until discontinued      07/15/17 0053          Guanaco Ty MD  Cardiology  [facility]

## 2017-07-17 LAB
ALBUMIN SERPL BCP-MCNC: 3.2 G/DL
ALP SERPL-CCNC: 68 U/L
ALT SERPL W/O P-5'-P-CCNC: 12 U/L
ANION GAP SERPL CALC-SCNC: 7 MMOL/L
AST SERPL-CCNC: 19 U/L
BASOPHILS # BLD AUTO: 0.02 K/UL
BASOPHILS NFR BLD: 0.3 %
BILIRUB SERPL-MCNC: 0.8 MG/DL
BUN SERPL-MCNC: 23 MG/DL
CALCIUM SERPL-MCNC: 10.7 MG/DL
CHLORIDE SERPL-SCNC: 105 MMOL/L
CO2 SERPL-SCNC: 24 MMOL/L
CREAT SERPL-MCNC: 1.4 MG/DL
DIFFERENTIAL METHOD: NORMAL
EOSINOPHIL # BLD AUTO: 0.1 K/UL
EOSINOPHIL NFR BLD: 1.3 %
ERYTHROCYTE [DISTWIDTH] IN BLOOD BY AUTOMATED COUNT: 13.8 %
EST. GFR  (AFRICAN AMERICAN): 56 ML/MIN/1.73 M^2
EST. GFR  (NON AFRICAN AMERICAN): 48 ML/MIN/1.73 M^2
ESTIMATED AVG GLUCOSE: 117 MG/DL
GLUCOSE SERPL-MCNC: 111 MG/DL
HBA1C MFR BLD HPLC: 5.7 %
HCT VFR BLD AUTO: 44.8 %
HGB BLD-MCNC: 15.1 G/DL
LYMPHOCYTES # BLD AUTO: 1.2 K/UL
LYMPHOCYTES NFR BLD: 18.2 %
MCH RBC QN AUTO: 29.9 PG
MCHC RBC AUTO-ENTMCNC: 33.7 %
MCV RBC AUTO: 89 FL
MONOCYTES # BLD AUTO: 0.6 K/UL
MONOCYTES NFR BLD: 9.7 %
NEUTROPHILS # BLD AUTO: 4.5 K/UL
NEUTROPHILS NFR BLD: 70.5 %
PLATELET # BLD AUTO: 231 K/UL
PMV BLD AUTO: 11.4 FL
POCT GLUCOSE: 115 MG/DL (ref 70–110)
POCT GLUCOSE: 117 MG/DL (ref 70–110)
POCT GLUCOSE: 131 MG/DL (ref 70–110)
POCT GLUCOSE: 131 MG/DL (ref 70–110)
POTASSIUM SERPL-SCNC: 3.9 MMOL/L
PROT SERPL-MCNC: 6.8 G/DL
RBC # BLD AUTO: 5.05 M/UL
SODIUM SERPL-SCNC: 136 MMOL/L
WBC # BLD AUTO: 6.31 K/UL

## 2017-07-17 PROCEDURE — 99233 SBSQ HOSP IP/OBS HIGH 50: CPT | Mod: ,,, | Performed by: INTERNAL MEDICINE

## 2017-07-17 PROCEDURE — 25000003 PHARM REV CODE 250: Performed by: INTERNAL MEDICINE

## 2017-07-17 PROCEDURE — 85025 COMPLETE CBC W/AUTO DIFF WBC: CPT

## 2017-07-17 PROCEDURE — 25000003 PHARM REV CODE 250: Performed by: STUDENT IN AN ORGANIZED HEALTH CARE EDUCATION/TRAINING PROGRAM

## 2017-07-17 PROCEDURE — 97116 GAIT TRAINING THERAPY: CPT

## 2017-07-17 PROCEDURE — 83036 HEMOGLOBIN GLYCOSYLATED A1C: CPT

## 2017-07-17 PROCEDURE — 97165 OT EVAL LOW COMPLEX 30 MIN: CPT

## 2017-07-17 PROCEDURE — 94761 N-INVAS EAR/PLS OXIMETRY MLT: CPT

## 2017-07-17 PROCEDURE — 11000001 HC ACUTE MED/SURG PRIVATE ROOM

## 2017-07-17 PROCEDURE — 36415 COLL VENOUS BLD VENIPUNCTURE: CPT

## 2017-07-17 PROCEDURE — 80053 COMPREHEN METABOLIC PANEL: CPT

## 2017-07-17 PROCEDURE — 97530 THERAPEUTIC ACTIVITIES: CPT

## 2017-07-17 PROCEDURE — 97535 SELF CARE MNGMENT TRAINING: CPT

## 2017-07-17 RX ORDER — HYDROCHLOROTHIAZIDE 25 MG/1
25 TABLET ORAL DAILY
Status: DISCONTINUED | OUTPATIENT
Start: 2017-07-18 | End: 2017-07-21 | Stop reason: HOSPADM

## 2017-07-17 RX ORDER — HYDRALAZINE HYDROCHLORIDE 25 MG/1
50 TABLET, FILM COATED ORAL EVERY 8 HOURS
Status: DISCONTINUED | OUTPATIENT
Start: 2017-07-17 | End: 2017-07-21 | Stop reason: HOSPADM

## 2017-07-17 RX ADMIN — HYDRALAZINE HYDROCHLORIDE 25 MG: 25 TABLET, FILM COATED ORAL at 05:07

## 2017-07-17 RX ADMIN — ASPIRIN 81 MG: 81 TABLET, COATED ORAL at 10:07

## 2017-07-17 RX ADMIN — TAMSULOSIN HYDROCHLORIDE 0.4 MG: 0.4 CAPSULE ORAL at 10:07

## 2017-07-17 RX ADMIN — AMLODIPINE BESYLATE 10 MG: 5 TABLET ORAL at 10:07

## 2017-07-17 RX ADMIN — HYDRALAZINE HYDROCHLORIDE 50 MG: 25 TABLET, FILM COATED ORAL at 03:07

## 2017-07-17 RX ADMIN — LOSARTAN POTASSIUM 100 MG: 50 TABLET, FILM COATED ORAL at 10:07

## 2017-07-17 RX ADMIN — ATORVASTATIN CALCIUM 80 MG: 40 TABLET, FILM COATED ORAL at 10:07

## 2017-07-17 RX ADMIN — HYDROCHLOROTHIAZIDE 12.5 MG: 12.5 TABLET ORAL at 10:07

## 2017-07-17 RX ADMIN — HYDRALAZINE HYDROCHLORIDE 50 MG: 25 TABLET, FILM COATED ORAL at 09:07

## 2017-07-17 NOTE — PT/OT/SLP EVAL
Occupational Therapy  Evaluation/Treatment     Shant Magana   MRN: [id number]   Admitting Diagnosis: Hypertensive crisis, unspecified    OT Date of Treatment: 07/17/17   OT Start Time: 1042  OT Stop Time: 1110  OT Total Time (min): 28 min    Billable Minutes:  Evaluation 10  Self Care/Home Management 18    Diagnosis: Hypertensive crisis, unspecified   The primary encounter diagnosis was Hypertensive encephalopathy. Diagnoses of Type 2 diabetes mellitus with complication, without long-term current use of insulin, Hypertensive emergency, Hypertensive crisis, unspecified, and Hypercalcemia were also pertinent to this visit.      Past Medical History:   Diagnosis Date    Cancer     prostate    Diabetes mellitus     Hypertension     Hypertrophic cardiomyopathy     Renal disorder       Past Surgical History:   Procedure Laterality Date    BACK SURGERY      THYROIDECTOMY             General Precautions: Standard, fall  Orthopedic Precautions: N/A  Braces:            Patient History:  Living Environment  Lives With: alone  Living Arrangements: house  Home Accessibility: stairs to enter home  Number of Stairs to Enter Home:  (5-6 steps )  Stair Railings at Home: outside, present at both sides  Living Environment Comment: pt reports he lives alone in [facility], 5-6 steps to enter, B rails present. Reports I- mod I as PLOF; uses TTB. Drives   Equipment Currently Used at Home: bath bench    Prior level of function:   Bed Mobility/Transfers: independent  Grooming: independent  Bathing: independent  Upper Body Dressing: independent  Lower Body Dressing: independent  Toileting: independent  Home Management Skills: independent  Driving License: Yes  Mode of Transportation: Car     Dominant hand: right    Subjective:  Communicated with nsg prior to session.  Pt disoriented to place and time; states he is at [facility] and it is January- guessed month another 3 times before getting correct answer; pt also reports he feels he is performing  normally during session   Chief Complaint: none stated   Patient/Family stated goals: none stated     Pain/Comfort  Pain Rating 1: 0/10    Objective:       Cognitive Exam:  Oriented to: Person, Situation and that he was at a hospital (stated incorrect hospital)   Follows Commands/attention: Follows multistep  commands  Communication: clear/fluent  Memory:  Poor immediate recall  Safety awareness/insight to disability: impaired  Coping skills/emotional control: Appropriate to situation    Visual/perceptual:  Intact; wears glasses      Physical Exam:  Postural examination/scapula alignment: Rounded shoulder and Head forward  Skin integrity: Visible skin intact  Edema: None noted     Sensation:   Intact    Upper Extremity Range of Motion:  Right Upper Extremity: WFL  Left Upper Extremity: WFL except limited shoulder ROM compared to R     Upper Extremity Strength:  Right Upper Extremity: WFL  Left Upper Extremity: decreased from R; however WFL    Strength: good      Fine motor coordination:   Impaired; difficulty performing fluid finger opposition and diadochokinesis; however, pt able to open containers for G&H axs     Gross motor coordination: NT    Functional Mobility:  Bed Mobility:  Scooting/Bridging: Stand by Assistance  Supine to Sit: Stand by Assistance  Sit to Supine: Stand by Assistance    Transfers:  Sit <> Stand Assistance: Contact Guard Assistance  Sit <> Stand Assistive Device: Rolling Walker  Bed <> Chair Technique: Stand Pivot  Bed <> Chair Transfer Assistance: Minimum Assistance, Contact Guard Assistance  Bed <> Chair Assistive Device: Rolling Walker  Toilet Transfer Technique: Stand Pivot  Toilet Transfer Assistance: Contact Guard Assistance, Minimum Assistance  Toilet Transfer Assistive Device: No Assistive Device    Functional Ambulation: CGA- Min A; with and without RW; Pt with poor safety awareness/use of RW; pt attempting to ambulate around RW rather than using item- holding it up in the air-  pushing it away; R lateral leaning during mobility in room; 3 LOB during session with Min A to correct     Activities of Daily Living:    UE Dressing Level of Assistance: Minimum assistance  LE Dressing Level of Assistance: Minimum assistance  Grooming Position: Standing  Grooming Level of Assistance: Contact guard assistance  Toileting Where Assessed:  (urinary incontinence while standing at sink )  Toileting Level of Assistance: Maximum assistance      Balance:   Static Sit: GOOD+: Takes MAXIMAL challenges from all directions.    Dynamic Sit: GOOD: Maintains balance through MODERATE excursions of active trunk movement  Static Stand: FAIR+: Takes MINIMAL challenges from all directions  Dynamic stand: FAIR: Needs CONTACT GUARD during gait/POOR     Therapeutic Activities and Exercises:  Pt performing bed mobility as listed above; G&H axs performed at sink with noted pt R lateral leaning on wall for support; difficulty maintaining balance while holding pedal down at sink with LLE; While standing at sink during brushing teeth, pt asked to use the restroom, however, had already begun to urinate on self/floor; impulsive/hasty movement to bathroom reaching for items in room to walk after pushing RW out of the way; once standing at toilet, pt demonstrating decreased awareness to move gown out of the way and stand close to toilet. 3 LOB noted during session 2/2 impulsivity with ambulation and poor safety awareness- noted when changing directions     AM-PAC 6 CLICK ADL  How much help from another person does this patient currently need?  1 = Unable, Total/Dependent Assistance  2 = A lot, Maximum/Moderate Assistance  3 = A little, Minimum/Contact Guard/Supervision  4 = None, Modified [place]/Independent    Putting on and taking off regular lower body clothing? : 3  Bathing (including washing, rinsing, drying)?: 3  Toileting, which includes using toilet, bedpan, or urinal? : 3  Putting on and taking off regular upper body  "clothing?: 3  Taking care of personal grooming such as brushing teeth?: 3  Eating meals?: 4  Total Score: 19    AM-PAC Raw Score CMS "G-Code Modifier Level of Impairment Assistance   6 % Total / Unable   7 - 9 CM 80 - 100% Maximal Assist   10-14 CL 60 - 80% Moderate Assist   15 - 19 CK 40 - 60% Moderate Assist   20 - 22 CJ 20 - 40% Minimal Assist   23 CI 1-20% SBA / CGA   24 CH 0% Independent/ Mod I       Patient left supine with all lines intact, call button in reach, bed alarm on, nsg and MDs notified and dghtr and granddghr  present    Assessment:  Shant Magana is a 77 y.o. male with a medical diagnosis of Hypertensive crisis, unspecified and presents with poor insight, decreased safety awareness, R lateral leaning. Pt participating in initial OT evaluation this AM. Pt disoriented to place and time. Appears to lack insight to deficits as well as poor safety awareness. Pt with 3 LOB while participating in ADLs/functional mobility during session. Pt also noted to lean to the R during standing axs at the sink. Pt with decreased caregiver support. 2/2 performance during session, will recommend IP rehab     Rehab identified problem list/impairments: Rehab identified problem list/impairments: weakness, gait instability, decreased upper extremity function, decreased ROM, impaired balance, impaired endurance, impaired coordination, decreased safety awareness, impaired self care skills, impaired cognition, impaired functional mobilty, decreased coordination    Rehab potential is good.    Activity tolerance: Good    Discharge recommendations: Discharge Facility/Level Of Care Needs: rehabilitation facility     Barriers to discharge: Barriers to Discharge: Decreased caregiver support, Inaccessible home environment    Equipment recommendations: walker, rolling     GOALS:    Occupational Therapy Goals        Problem: Occupational Therapy Goal    Goal Priority Disciplines Outcome Interventions   Occupational Therapy Goal "     OT, PT/OT Ongoing (interventions implemented as appropriate)    Description:  Goals to be met by: 8/17/17     Patient will increase functional independence with ADLs by performing:    LE Dressing with Modified [place].  Grooming while standing with Modified [place].  Toileting from toilet with Modified [place] for hygiene and clothing management.   Stand pivot transfers with Modified [place].  Toilet transfer to toilet with Modified [place].  Increased functional strength to WFL for self care skills and functional mobility.  Upper extremity exercise program x10 reps per handout, with independence.                      PLAN:  Patient to be seen 5 x/week to address the above listed problems via self-care/home management, therapeutic activities, therapeutic exercises  Plan of Care expires: 08/17/17  Plan of Care reviewed with: patient         Jaclyn Mcallister, OT  07/17/2017

## 2017-07-17 NOTE — PROGRESS NOTES
TN spoke with pt and friend at bedside. Patient will need inpatient rehab.  Patient would like TN to  Speak with daughter regarding preferences. Daughter would like [facility]  inpatient rehab as first choice , [facility] as second choice , and  [facility] re[facility] as 3rd choice. Referral sent via [facility]. TN to follow up.

## 2017-07-17 NOTE — PROGRESS NOTES
Pharmacy New Medication Education     Patient accepted medication education.     Pharmacy educated patient on the following medications, using the teach-back method.   Norvasc  Asa  Lipitor  D50%  Glucagon  Hydralazine  Hctz  Losartan  flomax      Learners of pharmacy medication education included:  patient     Patient +/- learner response:  verbalize understanding

## 2017-07-17 NOTE — PROGRESS NOTES
[facility]  Cardiology  Progress Note    Patient Name: Shant Magana  MRN: [id number]  Admission Date: 7/14/2017  Hospital Length of Stay: 3 days  Code Status: Full Code   Attending Physician: Todd Erazo MD   Primary Care Physician: Trent Aldana MD  Expected Discharge Date:   Principal Problem:Hypertensive crisis, unspecified    Subjective:     Hospital Course:   BP responded to hydralazine in ED.  7/15/2017:  BP now 160's/80's on HD #2.  Neurologic symptoms resolved.  CT of head showed no hemorrhage, evidence of chronic lacunar infacts.  7/16/2017:  BP improved to 's.  Max dose losartan, max dose Norvasc, HCTZ 12.5 mg and hydralazine 25 mg q 8hours. BP continues to show improvement nearing target of 140/80. Over past 24 hours SBP 130s-170s.         Review of Systems   Cardiovascular: Negative for chest pain, dyspnea on exertion, irregular heartbeat, leg swelling, near-syncope, orthopnea, palpitations, paroxysmal nocturnal dyspnea and syncope.   Respiratory: Negative.  Negative for cough, shortness of breath and sputum production.    Neurological: Negative for dizziness.     Objective:     Vital Signs (Most Recent):  Temp: 98 °F (36.7 °C) (07/17/17 0800)  Pulse: 68 (07/17/17 0800)  Resp: 18 (07/17/17 0800)  BP: (!) 152/83 (07/17/17 0800)  SpO2: 97 % (07/17/17 0419) Vital Signs (24h Range):  Temp:  [97.8 °F (36.6 °C)-99.4 °F (37.4 °C)] 98 °F (36.7 °C)  Pulse:  [66-80] 68  Resp:  [11-36] 18  SpO2:  [95 %-97 %] 97 %  BP: (138-176)/(67-93) 152/83     Weight: 97.9 kg (215 lb 13.3 oz)  Body mass index is 30.1 kg/m².     SpO2: 97 %  O2 Device (Oxygen Therapy): room air      Intake/Output Summary (Last 24 hours) at 07/17/17 1025  Last data filed at 07/16/17 1800   Gross per 24 hour   Intake              300 ml   Output              100 ml   Net              200 ml       Lines/Drains/Airways     Peripheral Intravenous Line                 Peripheral IV - Single Lumen 07/16/17 0145 Left Upper  Arm 1 day                Physical Exam   Constitutional: He is oriented to person, place, and time. He appears well-developed and well-nourished. No distress.   HENT:   Head: Normocephalic and atraumatic.   Neck: No JVD present.   Cardiovascular: Normal rate and regular rhythm.    Pulmonary/Chest: Effort normal and breath sounds normal.   Abdominal: Soft. Bowel sounds are normal.   Musculoskeletal: He exhibits no edema.   Neurological: He is alert and oriented to person, place, and time.   Skin: Skin is warm and dry. He is not diaphoretic.   Psychiatric: He has a normal mood and affect. His behavior is normal. Judgment and thought content normal.       Significant Labs:   BMP:   Recent Labs  Lab 07/16/17  0301 07/17/17  0513   * 111*    136   K 3.7 3.9    105   CO2 22* 24   BUN 21 23   CREATININE 1.4 1.4   CALCIUM 10.7* 10.7*   , CMP   Recent Labs  Lab 07/16/17  0301 07/17/17  0513    136   K 3.7 3.9    105   CO2 22* 24   * 111*   BUN 21 23   CREATININE 1.4 1.4   CALCIUM 10.7* 10.7*   PROT 7.2 6.8   ALBUMIN 3.5 3.2*   BILITOT 0.7 0.8   ALKPHOS 74 68   AST 23 19   ALT 17 12   ANIONGAP 10 7*   ESTGFRAFRICA 56* 56*   EGFRNONAA 48* 48*   , CBC   Recent Labs  Lab 07/16/17  0301 07/17/17  0513   WBC 5.50 6.31   HGB 16.0 15.1   HCT 47.8 44.8    231    All pertinent lab results from the last 24 hours have been reviewed.    Significant Imaging: Echocardiogram:   2D echo with color flow doppler:   Results for orders placed or performed during the hospital encounter of 07/14/17   2D echo with color flow doppler   Result Value Ref Range    EF 65 55 - 65    Est. PA Systolic Pressure 24.72      Assessment and Plan:     Brief HPI: Patient seen this morning on rounds without chest pain, SOB, or other complaints. Feels at his baseline.     Hypertensive encephalopathy    Resolved.        Bradycardia    HR of 48 on 6/7/17 provoking discontinuation of metoprolol.  For now would avoid  beta-blockers or nondihydropyridine CCB's.        Unspecified essential hypertension    BP improving 130s-170s over past 24 hours, still above target of 140/80.        * Hypertensive crisis, unspecified    /124 on admit, now improved with medical therapy.  Patient appears to be compliant with therapy, no dietary noncompliance detected.  On clonidine at home - if missing doses could have a rebound effect of hypertension.  Also will need screening for hyperaldo and renovascular hypertension.  Target organ damage of kidneys with SHIKHA - Cr of 1.8, now improved to 1.4, and hypertensive encephalopathy, now resolved.    Renal artery US w/ likely renal artery stenosis, aldosterone pending    TTE shows severe concentric LVH which demonstrates the effect of chronic uncontrolled hypertension.    Current meds:  Norvasc 10 mg  Hydralazine 25 mg TID  HCTZ 12.5 mg  Losartan 100 mg              VTE Risk Mitigation         Ordered     High Risk of VTE  Once      07/15/17 0053     Place sequential compression device  Until discontinued      07/15/17 0053          Ezra Sanchez NP  Cardiology  [facility]

## 2017-07-17 NOTE — PT/OT/SLP PROGRESS
"Physical Therapy  Treatment    Shant Magana   MRN: [id number]   Admitting Diagnosis: Hypertensive crisis, unspecified    PT Received On: 07/17/17  PT Start Time: 0947     PT Stop Time: 1012    PT Total Time (min): 25 min       Billable Minutes:  Gait Training 16 and Therapeutic Activity 9    Treatment Type: Treatment  PT/PTA: PT             General Precautions: Standard, fall  Orthopedic Precautions: N/A   Braces: N/A    Do you have any cultural, spiritual, Advent conflicts, given your current situation?: none    Subjective:  Communicated with RAJEEV Rocha prior to session.  "I'm feeling much better today."    Pain/Comfort  Pain Rating 1: 0/10  Pain Rating 2: 0/10    Objective:   Patient found with: telemetry, bed alarm    Functional Mobility:  Bed Mobility:   Rolling/Turning to Left: Supervision  Scooting/Bridging: Supervision, With side rail  Supine to Sit: Supervision  Sit to Supine: Supervision    Transfers:  Sit <> Stand Assistance: Contact Guard Assistance  Sit <> Stand Assistive Device: No Assistive Device    Gait:   Gait Distance: Pt ambulated 2 bouts of ~160 ft, first bout without AD and min A and second bout with RW and CGA/close S. Pt demo right leaning during gait and reaches for objects on the right side.  Assistance 1: Contact Guard Assistance, Minimum assistance  Gait Assistive Device: No device, Rolling walker  Gait Pattern: reciprocal  Gait Deviation(s): decreased robert, increased time in double stance, foot flat    Stairs:  Not assessed    Balance:   Static Sit: NORMAL: No deviations seen in posture held statically  Dynamic Sit: GOOD-: Maintains balance through MODERATE excursions of active trunk movement,     Static Stand: FAIR: Maintains without assist but unable to take challenges  Dynamic stand: FAIR: Needs CONTACT GUARD during gait     Therapeutic Activities and Exercises:  Gait training completed as reported above. Pt demonstrated right lateral leaning during standing, primarily during gait " training and often reaches for wall on the right to maintain balance. 1st bout of gait completed without AD and pt required min A to prevent LOB 2/2 right leaning and completed 2nd bout of gait with RW and CGA/close S. Pt required cueing for use of RW and to prevent pt from picking up 1 side of the RW during turning. Pt with improved stability during gait with RW, recommend RW use for safety at this time.     Pt then instructed in static standing balance activities without UE support including 30 seconds of following conditions: normal DANIEL EO, normal DANIEL EC, narrow DANIEL EO, and tandem stance with each LE leading. Pt required min A to prevent R lateral LOB during narrow DANIEL and tandem stance conditions.     AM-PAC 6 CLICK MOBILITY  How much help from another person does this patient currently need?   1 = Unable, Total/Dependent Assistance  2 = A lot, Maximum/Moderate Assistance  3 = A little, Minimum/Contact Guard/Supervision  4 = None, Modified [place]/Independent    Turning over in bed (including adjusting bedclothes, sheets and blankets)?: 4  Sitting down on and standing up from a chair with arms (e.g., wheelchair, bedside commode, etc.): 3  Moving from lying on back to sitting on the side of the bed?: 4  Moving to and from a bed to a chair (including a wheelchair)?: 3  Need to walk in hospital room?: 3  Climbing 3-5 steps with a railing?: 3  Total Score: 20    AM-PAC Raw Score CMS G-Code Modifier Level of Impairment Assistance   6 % Total / Unable   7 - 9 CM 80 - 100% Maximal Assist   10 - 14 CL 60 - 80% Moderate Assist   15 - 19 CK 40 - 60% Moderate Assist   20 - 22 CJ 20 - 40% Minimal Assist   23 CI 1-20% SBA / CGA   24 CH 0% Independent/ Mod I     Patient left HOB elevated with call button in reach, bed alarm on and RN notified.    Assessment:  Shant Magana is a 77 y.o. male with a medical diagnosis of Hypertensive crisis, unspecified and presents with impaired static and dynamic standing balance,  improved safety with gait with RW, and lives alone. Pt reports his daughter can stay with him for a little while upon d/c. Recommend continued skilled PT during IP stay and recommend IP rehab stay upon d/c for continued strengthening, balance training, and to ensure pt's safety and independence with functional mobility upon d/c.    Rehab identified problem list/impairments: Rehab identified problem list/impairments: weakness, impaired endurance, impaired balance, impaired functional mobilty, decreased lower extremity function, decreased safety awareness    Rehab potential is good.    Activity tolerance: Good    Discharge recommendations: Discharge Facility/Level Of Care Needs: rehabilitation facility     Barriers to discharge: Barriers to Discharge: Decreased caregiver support    Equipment recommendations: Equipment Needed After Discharge: walker, rolling     GOALS:    Physical Therapy Goals        Problem: Physical Therapy Goal    Goal Priority Disciplines Outcome Goal Variances Interventions   Physical Therapy Goal     PT/OT, PT Ongoing (interventions implemented as appropriate)     Description:  Goals to be met by: 17    Patient will increase functional independence with mobility by performin. Supine to sit with [place]  2. Sit to stand transfer with [place]  3. Bed to chair transfer with Modified [place] with or without AD  4. Gait  x 150 feet with Stand-by Assistance with or without using AD  5. Lower extremity exercise program x15 reps                       PLAN:    Patient to be seen 5 x/week  to address the above listed problems via gait training, therapeutic activities, therapeutic exercises, neuromuscular re-education  Plan of Care expires: 17  Plan of Care reviewed with: patient         Ebony Srivastava, PT  2017

## 2017-07-17 NOTE — ASSESSMENT & PLAN NOTE
/124 on admit, now improved with medical therapy.  Patient appears to be compliant with therapy, no dietary noncompliance detected.  On clonidine at home - if missing doses could have a rebound effect of hypertension.  Also will need screening for hyperaldo and renovascular hypertension.  Target organ damage of kidneys with SHIKHA - Cr of 1.8, now improved to 1.4, and hypertensive encephalopathy, now resolved.    Renal artery US w/ likely renal artery stenosis, aldosterone pending    TTE shows severe concentric LVH which demonstrates the effect of chronic uncontrolled hypertension.    Current meds:  Norvasc 10 mg  Hydralazine 25 mg TID  HCTZ 12.5 mg  Losartan 100 mg

## 2017-07-17 NOTE — CONSULTS
CONSULT Note  [facility]  Admit Date: 7/14/2017   LOS: 3 days   SUBJECTIVE:   Consult For: Vascular Dementia    Shant Magana is a 78 yo male with pmhx of HTN presented to the hospital for dizziness and weakness x 1 day. He reported feeling dizzy and weak as if legs giving away. In the ED stroke tele was activated, but they did not notice any changes and said that he had hypertensive encephalopathy. Initially he was being managed for BP control. CT was done which showed some acute changes. Pt was also noticed to have some gait instability and problems with coordination. Pt denies HA/N/V/F/C/CP/SOB. Currently pt is denying any weakness and is oriented x 3. He has not had any problems with speech and memory.     ROS  As per HPI.   OBJECTIVE:   Vital Signs (Most Recent)  Temp: 98 °F (36.7 °C) (07/17/17 0800)  Pulse: 68 (07/17/17 0800)  Resp: 18 (07/17/17 0800)  BP: (!) 152/83 (07/17/17 0800)  SpO2:  (sujatha) (07/17/17 1200)    I & O (Last 24H):  Intake/Output Summary (Last 24 hours) at 07/17/17 1256  Last data filed at 07/16/17 1800   Gross per 24 hour   Intake              300 ml   Output              100 ml   Net              200 ml     Wt Readings from Last 3 Encounters:   07/16/17 97.9 kg (215 lb 13.3 oz)   06/07/17 95.3 kg (210 lb)       Current Diet Order   Procedures    Diet Diabetic 2000 Calories Cardiac (Low Na/Chol)     Order Specific Question:   Additional restrictions:     Answer:   Cardiac (Low Na/Chol)        Physical Exam   Constitutional: He is oriented to person, place, and time and well-developed, well-nourished, and in no distress. No distress.   Eyes: EOM are normal. Pupils are equal, round, and reactive to light.   Neck: Normal range of motion. Neck supple.   Cardiovascular: Normal rate and regular rhythm.    Pulmonary/Chest: Effort normal and breath sounds normal.   Abdominal: Soft. Bowel sounds are normal.   Neurological: He is alert and oriented to person, place, and time. He has intact cranial  nerves. He is not disoriented. He displays weakness. He displays no tremor, facial symmetry, normal speech and normal reflexes. No cranial nerve deficit or sensory deficit. He has an abnormal Romberg Test. He shows pronator drift (left upper extremity). Coordination and gait abnormal.   Reflex Scores:       Tricep reflexes are 2+ on the right side and 2+ on the left side.       Bicep reflexes are 2+ on the right side and 2+ on the left side.       Brachioradialis reflexes are 2+ on the right side and 2+ on the left side.       Patellar reflexes are 2+ on the right side and 2+ on the left side.       Achilles reflexes are 2+ on the right side and 2+ on the left side.  Skin: Skin is warm and dry. He is not diaphoretic.   Nursing note and vitals reviewed.        Laboratory Data:  CBC    Recent Labs  Lab 07/15/17  0307 07/16/17  0301 07/17/17  0513   WBC 7.98 5.50 6.31   RBC 5.23 5.30 5.05   HGB 16.0 16.0 15.1   HCT 46.7 47.8 44.8    221 231   MCV 89 90 89   MCH 30.6 30.2 29.9   MCHC 34.3 33.5 33.7     CMP    Recent Labs  Lab 07/15/17  0554 07/16/17  0301 07/17/17  0513   CALCIUM 10.3 10.7* 10.7*   PROT 7.0 7.2 6.8    136 136   K 3.9 3.7 3.9   CO2 22* 22* 24    104 105   BUN 24* 21 23   CREATININE 1.3 1.4 1.4   ALKPHOS 66 74 68   ALT 15 17 12   AST 24 23 19   BILITOT 0.5 0.7 0.8     POCT-Glucose  POCT Glucose   Date Value Ref Range Status   07/17/2017 131 (H) 70 - 110 mg/dL Final   07/17/2017 115 (H) 70 - 110 mg/dL Final   07/15/2017 153 (H) 70 - 110 mg/dL Final   07/15/2017 105 70 - 110 mg/dL Final   07/15/2017 115 (H) 70 - 110 mg/dL Final     COAGS    Recent Labs  Lab 07/14/17 2010   INR 1.0   APTT 29.2     UA  No results for input(s): COLORU, CLARITYU, SPECGRAV, PHUR, PROTEINUA, GLUCOSEU, BLOODU, WBCU, RBCU, BACTERIA, MUCUS in the last 24 hours.    Invalid input(s):  BILIRUBINCON  MICRO  Microbiology Results (last 7 days)     ** No results found for the last 168 hours. **        LIPID PANEL  Lab  Results   Component Value Date    CHOL 285 (H) 07/14/2017     Lab Results   Component Value Date    HDL 39 (L) 07/14/2017     Lab Results   Component Value Date    LDLCALC 191.6 (H) 07/14/2017     Lab Results   Component Value Date    TRIG 272 (H) 07/14/2017     Lab Results   Component Value Date    CHOLHDL 13.7 (L) 07/14/2017       ASSESSMENT/PLAN:   Shant Magana is a 77 y.o. male with pmhx of HTN presented with dizziness and weakness.      Dizziness and Weakness:   - LE weakness and dizziness upon admission.   - Stroke tele activated but no diagnosis of stroke and it was believed to be hypertensive encephalopathy.   - Continue ASA and statin and anticoagulations.   - BP upon admission 260/124 went down with Hydralazine. BP today 152/83, continue managing the BP to keep it under control.  - CT of the head subacute/chronic frontal infarct, lacunar infarcts   - MRI of the brain pending  - Pt was unable to get MRA of the neck because the machine was broken.  - Would recommend CTA of the neck to look for posterior vasculature.   - Recommend PT/OT.         Case to be discussed with Dr. Green.       7/17/2017 Angy Rene MD  [facility]  12:56 PM

## 2017-07-17 NOTE — PROGRESS NOTES
U Internal Medicine Resident HO-II Progress Note    Subjective:      No complaints this AM, awaiting MRI/MRA. No cp, sob, palpitations, n,v, dizziness    Objective:   Last 24 Hour Vital Signs:  BP  Min: 117/90  Max: 176/79  Temp  Av.4 °F (36.9 °C)  Min: 97.8 °F (36.6 °C)  Max: 99.4 °F (37.4 °C)  Pulse  Av.7  Min: 66  Max: 80  Resp  Av.7  Min: 11  Max: 36  SpO2  Av.3 %  Min: 94 %  Max: 98 %  I/O last 3 completed shifts:  In: 1633.6 [P.O.:600; I.V.:1033.6]  Out: 825 [Urine:825]    Physical Examination:  General: awake, alert, disheveled male, resting comfortably in NAD  HENT: moist mucous membranes, EOMI  CV: RRR  Resp: CTAB, no wheezes, rhonchi or crackles  Abd: soft, NTND, normoactive bowel sounds  Ext: no peripheral edema, 2+ DP pulses bilaterally  Neuro: AA&O to person, place, time and situation    Laboratory:  Laboratory Data Reviewed: yes    Microbiology Data Reviewed: yes    Other Results:  EKG (my interpretation): None new    Radiology Data Reviewed: yes  Pertinent Findings:  None new    Current Medications:     Infusions:        Scheduled:   amlodipine  10 mg Oral Daily    aspirin  81 mg Oral Daily    atorvastatin  80 mg Oral Daily    hydrALAZINE  25 mg Oral Q8H    hydrochlorothiazide  12.5 mg Oral Daily    losartan  100 mg Oral Daily    tamsulosin  0.4 mg Oral Daily        PRN:  dextrose 50%, glucagon (human recombinant), pneumoc 13-uyen conj-dip cr(PF)    Antibiotics and Day Number of Therapy:  none    Assessment/Plan:     Shant Magana is a 77 y.o.male with    Hypertensive emergency/encephalopathy  -LE weakness, dizziness, BP on admit 260/124  -Hydralazine given with fast drop in /85  -Trops wnl, SHIKHA vs CKD  -CT head w/ subacute/chronic frontal infarct, lacunar infarcts, CXR wnl  -EKG w/ ant Q waves, biphasic nonspec TW abn  -Vasc neurology believes this was HTN encephalopathy  -Restarted home meds, losartan, amlodipine, HCTZ per cardiology recs, cardene gtt held this morning  for goal /80  -TTE with concentric hypertrophy, mild LA enlargement, normal EF 60-65%, indeterminate diastolic function  -Renal artery US w/ likely renal artery stenosis, aldosterone pending  -MRI/MRA today, continue statin & ASA, appreciate cards recs     Controlled, DM Type II  -On metformin, holding while inpatient  -Cover with SSI/accuchecks  -A1C 5.6     HLD  -On lipid panel 7/14, , continue statin     SHIKHA (resolved)  -On admit Cr 1.8, trended down, now 1.4       -[facility] [place] [facility] UTD, needs vaccines    Dispo: likely discharge after MRI/MRA if nml      Gary Otero  [facility] HO-II  U Internal Medicine Service Team B    [facility] Medicine Hospitalist Pager numbers:   U Hospitalist Medicine Team A (Hanny/Loco): [phone]  U Hospitalist Medicine Team B (Kacey/Nico):  [phone]

## 2017-07-17 NOTE — PLAN OF CARE
Problem: Occupational Therapy Goal  Goal: Occupational Therapy Goal  Goals to be met by: 8/17/17     Patient will increase functional independence with ADLs by performing:    LE Dressing with Modified [place].  Grooming while standing with Modified [place].  Toileting from toilet with Modified [place] for hygiene and clothing management.   Stand pivot transfers with Modified [place].  Toilet transfer to toilet with Modified [place].  Increased functional strength to WFL for self care skills and functional mobility.  Upper extremity exercise program x10 reps per handout, with independence.    Outcome: Ongoing (interventions implemented as appropriate)  Pt participating in initial OT evaluation this AM. Pt disoriented to place and time. Appears to lack insight to deficits as well as poor safety awareness. Pt with 3 LOB while participating in ADLs/functional mobility during session. Pt also noted to lean to the R during standing axs at the sink. Pt with decreased caregiver support. 2/2 performance during session, will recommend IP rehab

## 2017-07-17 NOTE — PLAN OF CARE
Problem: Physical Therapy Goal  Goal: Physical Therapy Goal  Goals to be met by: 17    Patient will increase functional independence with mobility by performin. Supine to sit with [place]  2. Sit to stand transfer with [place]  3. Bed to chair transfer with Modified [place] with or without AD  4. Gait  x 150 feet with Stand-by Assistance with or without using AD  5. Lower extremity exercise program x15 reps      Outcome: Ongoing (interventions implemented as appropriate)  Pt tolerated increased gait distance of ~160 ft with and without RW, requiring min A for gait without AD and CGA/close S for gait with RW.

## 2017-07-17 NOTE — CONSULTS
[facility]  Cardiology  Consult Note    Patient Name: Shant Magana  MRN: [id number]  Admission Date: 7/14/2017  Hospital Length of Stay: 3 days  Code Status: Full Code   Attending Provider: Todd Erazo MD   Consulting Provider: SELVIN Madrid ANP  Primary Care Physician: Trent Aldana MD  Principal Problem:Hypertensive crisis, unspecified      Inpatient consult to [facility]  Consult performed by: DAVID MARTINEZ  Consult ordered by: YAMINI BAUTISTA        Subjective:     See full consult noted under consult section dated 7/15/2017 at 1455 by Dr. Guanaco Ty

## 2017-07-17 NOTE — SUBJECTIVE & OBJECTIVE
Review of Systems   Cardiovascular: Negative for chest pain, dyspnea on exertion, irregular heartbeat, leg swelling, near-syncope, orthopnea, palpitations, paroxysmal nocturnal dyspnea and syncope.   Respiratory: Negative.  Negative for cough, shortness of breath and sputum production.    Neurological: Negative for dizziness.     Objective:     Vital Signs (Most Recent):  Temp: 98 °F (36.7 °C) (07/17/17 0800)  Pulse: 68 (07/17/17 0800)  Resp: 18 (07/17/17 0800)  BP: (!) 152/83 (07/17/17 0800)  SpO2: 97 % (07/17/17 0419) Vital Signs (24h Range):  Temp:  [97.8 °F (36.6 °C)-99.4 °F (37.4 °C)] 98 °F (36.7 °C)  Pulse:  [66-80] 68  Resp:  [11-36] 18  SpO2:  [95 %-97 %] 97 %  BP: (138-176)/(67-93) 152/83     Weight: 97.9 kg (215 lb 13.3 oz)  Body mass index is 30.1 kg/m².     SpO2: 97 %  O2 Device (Oxygen Therapy): room air      Intake/Output Summary (Last 24 hours) at 07/17/17 1025  Last data filed at 07/16/17 1800   Gross per 24 hour   Intake              300 ml   Output              100 ml   Net              200 ml       Lines/Drains/Airways     Peripheral Intravenous Line                 Peripheral IV - Single Lumen 07/16/17 0145 Left Upper Arm 1 day                Physical Exam   Constitutional: He is oriented to person, place, and time. He appears well-developed and well-nourished. No distress.   HENT:   Head: Normocephalic and atraumatic.   Neck: No JVD present.   Cardiovascular: Normal rate and regular rhythm.    Pulmonary/Chest: Effort normal and breath sounds normal.   Abdominal: Soft. Bowel sounds are normal.   Musculoskeletal: He exhibits no edema.   Neurological: He is alert and oriented to person, place, and time.   Skin: Skin is warm and dry. He is not diaphoretic.   Psychiatric: He has a normal mood and affect. His behavior is normal. Judgment and thought content normal.       Significant Labs:   BMP:   Recent Labs  Lab 07/16/17  0301 07/17/17  0513   * 111*    136   K 3.7 3.9    105    CO2 22* 24   BUN 21 23   CREATININE 1.4 1.4   CALCIUM 10.7* 10.7*   , CMP   Recent Labs  Lab 07/16/17  0301 07/17/17  0513    136   K 3.7 3.9    105   CO2 22* 24   * 111*   BUN 21 23   CREATININE 1.4 1.4   CALCIUM 10.7* 10.7*   PROT 7.2 6.8   ALBUMIN 3.5 3.2*   BILITOT 0.7 0.8   ALKPHOS 74 68   AST 23 19   ALT 17 12   ANIONGAP 10 7*   ESTGFRAFRICA 56* 56*   EGFRNONAA 48* 48*   , CBC   Recent Labs  Lab 07/16/17  0301 07/17/17  0513   WBC 5.50 6.31   HGB 16.0 15.1   HCT 47.8 44.8    231    All pertinent lab results from the last 24 hours have been reviewed.    Significant Imaging: Echocardiogram:   2D echo with color flow doppler:   Results for orders placed or performed during the hospital encounter of 07/14/17   2D echo with color flow doppler   Result Value Ref Range    EF 65 55 - 65    Est. PA Systolic Pressure 24.72

## 2017-07-17 NOTE — PLAN OF CARE
Problem: Patient Care Overview  Goal: Plan of Care Review  Outcome: Ongoing (interventions implemented as appropriate)  AAOx3.  Respirations even and unlabored; breath sounds clear.  RA with saturation 96%.  Denies pain, n/v, and sob.  Ambulating with minimal assistance to bathroom and back to bed.  B/p 176/79; given hydralazine 25mg po with b/p decreasing to 138/69.  Telemetry #8623: NSR 80s.  Bed alarm on; instructed to call for assistance.  Will cont to monitor.

## 2017-07-18 ENCOUNTER — ANESTHESIA (OUTPATIENT)
Dept: SURGERY | Facility: HOSPITAL | Age: 77
DRG: 065 | End: 2017-07-18
Payer: MEDICARE

## 2017-07-18 ENCOUNTER — ANESTHESIA EVENT (OUTPATIENT)
Dept: SURGERY | Facility: HOSPITAL | Age: 77
DRG: 065 | End: 2017-07-18
Payer: MEDICARE

## 2017-07-18 ENCOUNTER — TELEPHONE (OUTPATIENT)
Dept: PODIATRY | Facility: CLINIC | Age: 77
End: 2017-07-18

## 2017-07-18 VITALS — RESPIRATION RATE: 19 BRPM

## 2017-07-18 LAB
ALBUMIN SERPL BCP-MCNC: 3.2 G/DL
ALP SERPL-CCNC: 66 U/L
ALT SERPL W/O P-5'-P-CCNC: 12 U/L
AMMONIA PLAS-SCNC: 51 UMOL/L
ANION GAP SERPL CALC-SCNC: 6 MMOL/L
AST SERPL-CCNC: 18 U/L
BASOPHILS # BLD AUTO: 0.02 K/UL
BASOPHILS NFR BLD: 0.4 %
BILIRUB SERPL-MCNC: 0.7 MG/DL
BUN SERPL-MCNC: 28 MG/DL
CALCIUM SERPL-MCNC: 10.9 MG/DL
CHLORIDE SERPL-SCNC: 103 MMOL/L
CO2 SERPL-SCNC: 25 MMOL/L
CREAT SERPL-MCNC: 1.5 MG/DL
DIFFERENTIAL METHOD: NORMAL
EOSINOPHIL # BLD AUTO: 0.1 K/UL
EOSINOPHIL NFR BLD: 2.5 %
ERYTHROCYTE [DISTWIDTH] IN BLOOD BY AUTOMATED COUNT: 13.8 %
EST. GFR  (AFRICAN AMERICAN): 51 ML/MIN/1.73 M^2
EST. GFR  (NON AFRICAN AMERICAN): 44 ML/MIN/1.73 M^2
ESTIMATED AVG GLUCOSE: 117 MG/DL
GLUCOSE SERPL-MCNC: 110 MG/DL
HBA1C MFR BLD HPLC: 5.7 %
HCT VFR BLD AUTO: 43.4 %
HGB BLD-MCNC: 14.3 G/DL
LYMPHOCYTES # BLD AUTO: 1.5 K/UL
LYMPHOCYTES NFR BLD: 28.5 %
MCH RBC QN AUTO: 29.5 PG
MCHC RBC AUTO-ENTMCNC: 32.9 %
MCV RBC AUTO: 90 FL
MONOCYTES # BLD AUTO: 0.6 K/UL
MONOCYTES NFR BLD: 10.6 %
NEUTROPHILS # BLD AUTO: 3 K/UL
NEUTROPHILS NFR BLD: 57.8 %
PLATELET # BLD AUTO: 233 K/UL
PMV BLD AUTO: 11.1 FL
POCT GLUCOSE: 112 MG/DL (ref 70–110)
POCT GLUCOSE: 113 MG/DL (ref 70–110)
POCT GLUCOSE: 159 MG/DL (ref 70–110)
POTASSIUM SERPL-SCNC: 3.9 MMOL/L
PROT SERPL-MCNC: 6.8 G/DL
RBC # BLD AUTO: 4.84 M/UL
SODIUM SERPL-SCNC: 134 MMOL/L
WBC # BLD AUTO: 5.2 K/UL

## 2017-07-18 PROCEDURE — 97530 THERAPEUTIC ACTIVITIES: CPT

## 2017-07-18 PROCEDURE — 25000003 PHARM REV CODE 250: Performed by: STUDENT IN AN ORGANIZED HEALTH CARE EDUCATION/TRAINING PROGRAM

## 2017-07-18 PROCEDURE — 63600175 PHARM REV CODE 636 W HCPCS: Performed by: NURSE ANESTHETIST, CERTIFIED REGISTERED

## 2017-07-18 PROCEDURE — 25000003 PHARM REV CODE 250: Performed by: NURSE ANESTHETIST, CERTIFIED REGISTERED

## 2017-07-18 PROCEDURE — 82140 ASSAY OF AMMONIA: CPT

## 2017-07-18 PROCEDURE — 93312 ECHO TRANSESOPHAGEAL: CPT | Mod: 26,,, | Performed by: INTERNAL MEDICINE

## 2017-07-18 PROCEDURE — 36415 COLL VENOUS BLD VENIPUNCTURE: CPT

## 2017-07-18 PROCEDURE — 93325 DOPPLER ECHO COLOR FLOW MAPG: CPT

## 2017-07-18 PROCEDURE — 71000033 HC RECOVERY, INTIAL HOUR: Performed by: INTERNAL MEDICINE

## 2017-07-18 PROCEDURE — 94761 N-INVAS EAR/PLS OXIMETRY MLT: CPT

## 2017-07-18 PROCEDURE — 85025 COMPLETE CBC W/AUTO DIFF WBC: CPT

## 2017-07-18 PROCEDURE — 25000003 PHARM REV CODE 250: Performed by: INTERNAL MEDICINE

## 2017-07-18 PROCEDURE — 25500020 PHARM REV CODE 255: Performed by: INTERNAL MEDICINE

## 2017-07-18 PROCEDURE — 37000008 HC ANESTHESIA 1ST 15 MINUTES: Performed by: INTERNAL MEDICINE

## 2017-07-18 PROCEDURE — 11000001 HC ACUTE MED/SURG PRIVATE ROOM

## 2017-07-18 PROCEDURE — 83036 HEMOGLOBIN GLYCOSYLATED A1C: CPT

## 2017-07-18 PROCEDURE — 97112 NEUROMUSCULAR REEDUCATION: CPT

## 2017-07-18 PROCEDURE — 93325 DOPPLER ECHO COLOR FLOW MAPG: CPT | Mod: 26,,, | Performed by: INTERNAL MEDICINE

## 2017-07-18 PROCEDURE — 80053 COMPREHEN METABOLIC PANEL: CPT

## 2017-07-18 PROCEDURE — 37000009 HC ANESTHESIA EA ADD 15 MINS: Performed by: INTERNAL MEDICINE

## 2017-07-18 PROCEDURE — 93312 ECHO TRANSESOPHAGEAL: CPT

## 2017-07-18 PROCEDURE — 93320 DOPPLER ECHO COMPLETE: CPT | Mod: 26,,, | Performed by: INTERNAL MEDICINE

## 2017-07-18 RX ORDER — LIDOCAINE HCL/PF 100 MG/5ML
SYRINGE (ML) INTRAVENOUS
Status: DISCONTINUED | OUTPATIENT
Start: 2017-07-18 | End: 2017-07-18

## 2017-07-18 RX ORDER — PROPOFOL 10 MG/ML
VIAL (ML) INTRAVENOUS
Status: DISCONTINUED | OUTPATIENT
Start: 2017-07-18 | End: 2017-07-18

## 2017-07-18 RX ORDER — PHENYLEPHRINE HYDROCHLORIDE 10 MG/ML
INJECTION INTRAVENOUS
Status: DISCONTINUED | OUTPATIENT
Start: 2017-07-18 | End: 2017-07-18

## 2017-07-18 RX ORDER — SODIUM CHLORIDE 9 MG/ML
INJECTION, SOLUTION INTRAVENOUS CONTINUOUS PRN
Status: DISCONTINUED | OUTPATIENT
Start: 2017-07-18 | End: 2017-07-18

## 2017-07-18 RX ADMIN — HYDRALAZINE HYDROCHLORIDE 50 MG: 25 TABLET, FILM COATED ORAL at 10:07

## 2017-07-18 RX ADMIN — LIDOCAINE HYDROCHLORIDE 50 MG: 20 INJECTION, SOLUTION INTRAVENOUS at 11:07

## 2017-07-18 RX ADMIN — TAMSULOSIN HYDROCHLORIDE 0.4 MG: 0.4 CAPSULE ORAL at 10:07

## 2017-07-18 RX ADMIN — LOSARTAN POTASSIUM 100 MG: 50 TABLET, FILM COATED ORAL at 10:07

## 2017-07-18 RX ADMIN — HYDROCHLOROTHIAZIDE 25 MG: 25 TABLET ORAL at 10:07

## 2017-07-18 RX ADMIN — ASPIRIN 81 MG: 81 TABLET, COATED ORAL at 10:07

## 2017-07-18 RX ADMIN — HYDRALAZINE HYDROCHLORIDE 50 MG: 25 TABLET, FILM COATED ORAL at 02:07

## 2017-07-18 RX ADMIN — SODIUM CHLORIDE: 0.9 INJECTION, SOLUTION INTRAVENOUS at 11:07

## 2017-07-18 RX ADMIN — AMLODIPINE BESYLATE 10 MG: 5 TABLET ORAL at 10:07

## 2017-07-18 RX ADMIN — IOHEXOL 100 ML: 350 INJECTION, SOLUTION INTRAVENOUS at 05:07

## 2017-07-18 RX ADMIN — ATORVASTATIN CALCIUM 80 MG: 40 TABLET, FILM COATED ORAL at 10:07

## 2017-07-18 RX ADMIN — PHENYLEPHRINE HYDROCHLORIDE 100 MCG: 10 INJECTION INTRAVENOUS at 11:07

## 2017-07-18 RX ADMIN — PROPOFOL 220 MG: 10 INJECTION, EMULSION INTRAVENOUS at 11:07

## 2017-07-18 RX ADMIN — HYDRALAZINE HYDROCHLORIDE 50 MG: 25 TABLET, FILM COATED ORAL at 06:07

## 2017-07-18 NOTE — PLAN OF CARE
Problem: Patient Care Overview  Goal: Plan of Care Review  Outcome: Ongoing (interventions implemented as appropriate)  Initially answered most questions today fluently without any evidence of confusion   However more directed orientation questions this shift as posed by occupational therapy found him to be confused to place and time.  Actions at times impulsive   Pt had no aware ness of need to void and was incontinent while standing at basin with assistance  Poor balance noted  Reoriented easily  Pt admits to forgetful ness.    Blood pressure essentially controlled with scheduled antihypertensives    Mri to brain completed   Orders for neck revised per md   bandaid to left great toe remains in place   No complaints of pain  Telemetry sinus rhythm  Pt self removed telemetry at shift end   Found after prolonged search at beneath foot of bed

## 2017-07-18 NOTE — PT/OT/SLP PROGRESS
Occupational Therapy  Treatment    Shant Magana   MRN: [id number]   Admitting Diagnosis: Hypertensive crisis, unspecified    OT Date of Treatment: 07/18/17   OT Start Time: 1355  OT Stop Time: 1435  OT Total Time (min): 40 min    Billable Minutes:  Therapeutic Activity 30 and Neuromuscular Re-education 10    General Precautions: Standard, fall  Orthopedic Precautions: N/A  Braces: N/A    Subjective:  Communicated with nurseJosefina prior to session.    Pain/Comfort  Pain Rating 1: 0/10  Pain Rating Post-Intervention 1: 0/10    Objective:  Patient found with: telemetry, peripheral IV     Functional Mobility:  Bed Mobility:  Scooting/Bridging: Stand by Assistance  Supine to Sit: Stand by Assistance  Sit to Supine: Stand by Assistance    Transfers:   Sit <> Stand Assistance: Stand By Assistance, Contact Guard Assistance  Sit <> Stand Assistive Device: Rolling Walker    Functional Ambulation: CGA using RW with VCs for RW mgmt and R side awareness    Activities of Daily Living:  LE Dressing Level of Assistance: Maximum assistance    Balance:   Static Sit: FAIR+: Able to take MINIMAL challenges from all directions  Dynamic Sit: FAIR+: Maintains balance through MINIMAL excursions of active trunk motion  Static Stand: FAIR+: Takes MINIMAL challenges from all directions  Dynamic stand: FAIR: Needs CONTACT GUARD during gait    Therapeutic Activities and Exercises:  Bed mob as noted above. Patient attempted to don R sock, but then handed sock to daughter to put on. With encouragement, patient able to doff L sock, but unable to doroteo clean sock. Patient engaged in various dynamic reaching ax while seated EOB - patient with continuous VCs to keep B feet on ground. Minimal R lateral lean noted occasionally. Patient completed functional transitions using RW, x 5 trials, with CGA and VCs for technique. Patient with increased difficulty side-steps both L and R -- very uncoordinated and difficulty with motor planning. Patient still impulsive  "and standing when therapist not ready; poor safety awareness and virtually no insight to deficits.    AM-PAC 6 CLICK ADL   How much help from another person does this patient currently need?   1 = Unable, Total/Dependent Assistance  2 = A lot, Maximum/Moderate Assistance  3 = A little, Minimum/Contact Guard/Supervision  4 = None, Modified [place]/Independent    Putting on and taking off regular lower body clothing? : 2  Bathing (including washing, rinsing, drying)?: 3  Toileting, which includes using toilet, bedpan, or urinal? : 3  Putting on and taking off regular upper body clothing?: 3  Taking care of personal grooming such as brushing teeth?: 3  Eating meals?: 4  Total Score: 18     AM-PAC Raw Score CMS "G-Code Modifier Level of Impairment Assistance   6 % Total / Unable   7 - 8 CM 80 - 100% Maximal Assist   9-13 CL 60 - 80% Moderate Assist   14 - 19 CK 40 - 60% Moderate Assist   20 - 22 CJ 20 - 40% Minimal Assist   23 CI 1-20% SBA / CGA   24 CH 0% Independent/ Mod I       Patient left HOB elevated with all lines intact, call button in reach, bed alarm on and RN present    ASSESSMENT: Patient improving with functional mobility and transitions, but limited by decreased strength and coordination. Patient still exhibits R lateral lean during transitions. Remains with poor insight and safety awareness. Will benefit from inpatient rehab to address functional deficits, improve independence in self-care tasks, and provide neuromuscular re-education.  Shant Magana is a 77 y.o. male with a medical diagnosis of Hypertensive crisis, unspecified     Rehab identified problem list/impairments: Rehab identified problem list/impairments: weakness, impaired endurance, impaired self care skills, impaired functional mobilty, gait instability, impaired balance, decreased upper extremity function, decreased lower extremity function, decreased ROM, decreased safety awareness, impaired cognition, impaired fine motor, " impaired coordination    Rehab potential is excellent.    Activity tolerance: Good    Discharge recommendations: Discharge Facility/Level Of Care Needs: rehabilitation facility     Barriers to discharge: Barriers to Discharge: Inaccessible home environment, Decreased caregiver support    Equipment recommendations: walker, rolling     GOALS:    Occupational Therapy Goals        Problem: Occupational Therapy Goal    Goal Priority Disciplines Outcome Interventions   Occupational Therapy Goal     OT, PT/OT Ongoing (interventions implemented as appropriate)    Description:  Goals to be met by: 8/17/17     Patient will increase functional independence with ADLs by performing:    LE Dressing with Modified [place].  Grooming while standing with Modified [place].  Toileting from toilet with Modified [place] for hygiene and clothing management.   Stand pivot transfers with Modified [place].  Toilet transfer to toilet with Modified [place].  Increased functional strength to WFL for self care skills and functional mobility.  Upper extremity exercise program x10 reps per handout, with independence.                      Plan:  Patient to be seen 5 x/week to address the above listed problems via self-care/home management, therapeutic activities, therapeutic exercises  Plan of Care expires: 08/17/17  Plan of Care reviewed with: patient, family         RATNA Mares  07/18/2017

## 2017-07-18 NOTE — PLAN OF CARE
TN discussed case GONZALEZ Meza CM. Aissatou will discuss with medical director and update TN.     TN placed call to [facility] coordinator Ara for follow up on referral Ara will review clinicals and update TN later today.        Plan for pt to have ESDRAS today and likely ready to transfer in am.

## 2017-07-18 NOTE — TRANSFER OF CARE
"Anesthesia Transfer of Care Note    Patient: Shant Magana    Procedure(s) Performed: Procedure(s) (LRB):  TRANSESOPHAGEAL ECHOCARDIOGRAM (ESDRAS) (N/A)    Patient location: PACU    Anesthesia Type: MAC    Transport from OR: Transported from OR on room air with adequate spontaneous ventilation    Post pain: adequate analgesia    Post assessment: no apparent anesthetic complications    Post vital signs: stable    Level of consciousness: awake, alert and oriented    Nausea/Vomiting: no nausea/vomiting    Complications: none    Transfer of care protocol was followed      Last vitals:   Visit Vitals  /71   Pulse 69   Temp 37.3 °C (99.2 °F) (Oral)   Resp 18   Ht 5' 11" (1.803 m)   Wt 97.9 kg (215 lb 13.3 oz)   SpO2 (!) 94%   BMI 30.10 kg/m²     "

## 2017-07-18 NOTE — PLAN OF CARE
Problem: Patient Care Overview  Goal: Plan of Care Review  Room air SpO2   94%. Pt with no apparent distess noted. Will continue to monitor.

## 2017-07-18 NOTE — CONSULTS
Consult received, patient chart was reviewed. Podiatry will see patient first thing in the morning with full consult note. Appreciate the consult.    Lauri Mijares DPM PGY-3

## 2017-07-18 NOTE — TELEPHONE ENCOUNTER
----- Message from Noreen Nevarez sent at 2017  1:24 PM CDT -----  Contact: Anette, [facility], [phone]  CONSULT    Patient: BREA WHITE  : 1940  Clinic#:  [id number]  Room /bed number: 515  Referring MD:   Diagnosis: possible ingrown toe nail, right foot, in pain

## 2017-07-18 NOTE — PLAN OF CARE
Problem: Patient Care Overview  Goal: Plan of Care Review  Outcome: Ongoing (interventions implemented as appropriate)  Pt is awake and alert, but not completely oriented to place and time. Pt partially answered questions correctly. No complaints of nausea, vomiting, or diarrhea. No complaints of pain. Diabetic 2000 low na/cholesterol diet. Left great big toe dressing intact. Tele 8529 and running sinus rhythm. ACHS and last bs was 117. Ambulated with assist. Safety precautions maintained. Tolerated all medications well.

## 2017-07-18 NOTE — PROGRESS NOTES
[facility] Neurology Consult Note    Subjective:  Shant Magana is a 76 yo male with pmhx of HTN presented to the hospital for dizziness and weakness x 1 day. He reported feeling dizzy and weak as if legs giving away. In the ED stroke tele was activated, but they did not notice any changes and said that he had hypertensive encephalopathy. Initially he was being managed for BP control. CT was done which showed some acute changes. Pt was also noticed to have some gait instability and problems with coordination. Pt denies HA/N/V/F/C/CP/SOB. Currently pt is denying any weakness and is oriented x 3. He has not had any problems with speech and memory.      Medications -   No current facility-administered medications on file prior to encounter.      Current Outpatient Prescriptions on File Prior to Encounter   Medication Sig Dispense Refill    AMLODIPINE BESYLATE/BENAZEPRIL (AMLODIPINE-BENAZEPRIL ORAL) Take 10 mg by mouth once daily at 6am.      aspirin (ECOTRIN) 81 MG EC tablet Take 81 mg by mouth once daily.      atorvastatin (LIPITOR) 80 MG tablet Take 80 mg by mouth once daily.      metformin (GLUCOPHAGE) 500 MG tablet Take 500 mg by mouth 2 (two) times daily with meals.      tamsulosin (FLOMAX) 0.4 mg Cp24 Take 0.4 mg by mouth once daily.      vitamin D 1000 units Tab Take 1,000 Units by mouth once daily.           Vitals -     Vitals:    07/18/17 1200 07/18/17 1215 07/18/17 1228 07/18/17 1517   BP: 103/61 119/64 125/83    BP Location:   Left arm    Patient Position:   Lying    BP Method:   Automatic    Pulse: 66 66 67    Resp: 19 17 20    Temp:   97.4 °F (36.3 °C)    TempSrc:       SpO2: 100% 100% 95% 97%   Weight:       Height:           Neurological Exam -     General appearance: Well nourished, well developed, no acute distress.  Facial Expression: normal       Affect: full       Orientation to time & place:  Oriented to time, place, person and situation       Attention & concentration:  Normal attention span and  concentration       Memory:  Recent and remote memory intact  Language: Spontaneous, fluent; able to repeat and name objects        Fund of knowledge:  Aware of current events        Speech:  normal (not dysarthric)     Cranial nerves: normal visual acuity, visual fields full, pupils equal round and reactive, extraocular movements intact, facial sensation intact, left facial droop, hearing intact to whisper, palate raises midline, shoulder shrug strength normal, tongue protrudes midline.    Musculoskeletal:  Muscle tone: all 4 extremities normal        Muscle Bulk: all 4 extremities normal        Muscle strength:  5/5 in bilateral upper and lower extremities in proximal and distal flexion and extension       No pronator dr  Sensation: Intact to light touch, pin prick, vibration in all extremities         Deep tendon Reflexes: 2+ bilateral triceps, biceps, and brachioradialis; 2+ in bilateral patellae. Positive ankle jerks.  Plantar flexor responses bilaterally    Cerebellar and Coordination:  Gait:  deferred        Finger-nose: no dysmetria       Rapid Alternating Movements (pronation/supination):  R normal; L normal    Labs -      Recent Labs  Lab 07/14/17  2010 07/15/17  0307 07/15/17  0554 07/16/17  0301 07/17/17  0513 07/18/17  0400   WBC 4.04 7.98  --  5.50 6.31 5.20   HGB 16.5 16.0  --  16.0 15.1 14.3   HCT 49.7 46.7  --  47.8 44.8 43.4    225  --  221 231 233     --  138 136 136 134*   K 4.7  --  3.9 3.7 3.9 3.9     --  107 104 105 103   CO2 18*  --  22* 22* 24 25   BUN 32*  --  24* 21 23 28*   GLU 90  --  108 142* 111* 110   PROT 8.2  --  7.0 7.2 6.8 6.8   ALBUMIN 4.0  --  3.4* 3.5 3.2* 3.2*   BILITOT 0.5  --  0.5 0.7 0.8 0.7   AST 33  --  24 23 19 18   ALKPHOS 78  --  66 74 68 66   ALT 19  --  15 17 12 12   INR 1.0  --   --   --   --   --    TROPONINI 0.007  --   --   --   --   --    TSH 3.718  --   --   --   --   --        Imaging -     MRI Brain  Time of Procedure: 07/17/17  12:06:48  Accession # [id number]    Technique: Multiplanar, multisequence images were obtained through the brain without the use of IV contrast. Additionally, noncontrast 3-D time of flight MRA head was performed.    Comparison: CT head 7/14/17.     Findings:    There are multifocal areas of diffusion restriction consistent with acute infarction with corresponding FLAIR hyperintensity and low ADC signal.  Largest region seen within the medial aspect of the cerebellar hemisphere.  Smaller area seen within the right corona radiata.  Additionally two punctate foci seen within the left frontal white matter.  Involvement of multiple vascular distributions is suggestive for embolic etiology.      There is extensive chronic microvascular ischemic disease throughout the periventricular/supratentorial white matter.  There is small area of remote infarction involving the posterior right frontal lobe.  Ventricles are normal in size and configuration.  No intracranial hemorrhage or extraaxial fluid collection.  No mass or mass effect.      Visualized paranasal sinuses and mastoid air cells are clear. Orbits appear normal.    The bilateral distal ICA's are patent without evidence for significant focal stenosis or aneurysm.  There are multifocal areas of stenosis visualized involving the intracerebral vasculature.  This involves the right anterior communicating artery A2 segment, left anterior communicating artery A1 segment, right posterior artery P2 segment.  Left P1 segment is small with small left-sided P-comm visualized.  Left posterior cerebral artery is small in caliber throughout.  There is additional high grade stenosis versus focal occlusion involving the distal right vertebral artery V4 segment.  There is filling seen distally, possibly retrograde from the basilar artery.  Distal left vertebral artery and the basilar artery are patent.    Assessment and Plan -     Shant Magana is a 76 yo male with pmhx of HTN presented to  the hospital for dizziness and weakness x 1 day. He reported feeling dizzy and weak as if legs giving away. In the ED stroke tele was activated, but they did not notice any changes and said that he had hypertensive encephalopathy. Initially he was being managed for BP control. CT was done which showed some acute changes. Pt was also noticed to have some gait instability and problems with coordination. Pt denies HA/N/V/F/C/CP/SOB. Currently pt is denying any weakness and is oriented x 3. He has not had any problems with speech and memory.   - LE weakness and dizziness upon admission.   - Stroke tele activated but no diagnosis of stroke and it was believed to be hypertensive encephalopathy.   - Continue ASA and statin and anticoagulations.   - BP upon admission 260/124 went down with Hydralazine. BP today 152/83, continue managing the BP to keep it under control.  - CT of the head subacute/chronic frontal infarct, lacunar infarcts   - CTA Neck pending  - ESDRAS done but pending read    Discussed with Dr. Peter Hobson MD  \[facility]\"" Neurology

## 2017-07-18 NOTE — PLAN OF CARE
TN spoke with [facility] CM. Physical therapy notes are needed to decided if patient meets Inpatient rehab criteria.  PT to follow up with patient.  TN will encourage patient to participate in therapy, Josefina bedside RN updated of above. Pt currently off floor to CT.  ESDRAS completed today aswell.

## 2017-07-18 NOTE — PLAN OF CARE
Patient present in PACU for ESDRAS. Patient aaox3. Vss. Denies any pain at this time. @1127 TIMEOUT complete, all agree. Hurricane spray administered @ 1127. Scope in @ 1128. Scope out @ 1138. Patient stable. No distress noted.

## 2017-07-18 NOTE — ANESTHESIA RELEASE NOTE
"Anesthesia Release from PACU Note    Patient: Shant Magana    Procedure(s) Performed: Procedure(s) (LRB):  TRANSESOPHAGEAL ECHOCARDIOGRAM (ESDRAS) (N/A)    Anesthesia type: MAC    Post pain: Adequate analgesia    Post assessment: no apparent anesthetic complications    Last Vitals:   Visit Vitals  /83 (BP Location: Left arm, Patient Position: Lying, BP Method: Automatic)   Pulse 67   Temp 36.3 °C (97.4 °F)   Resp 20   Ht 5' 11" (1.803 m)   Wt 97.9 kg (215 lb 13.3 oz)   SpO2 95%   BMI 30.10 kg/m²       Post vital signs: stable    Level of consciousness: awake    Nausea/Vomiting: no nausea/no vomiting    Complications: none    Airway Patency: patent    Respiratory: unassisted    Cardiovascular: stable    Hydration: euvolemic  "

## 2017-07-18 NOTE — PROGRESS NOTES
[facility] Internal Medicine Resident HO-II Progress Note    Subjective:      No complaints this Am. No complaints overnight. Remains NPO for ESDRAS today.  No cp, sob, palpitations, n,v, dizziness.    Objective:   Last 24 Hour Vital Signs:  BP  Min: 149/91  Max: 179/97  Temp  Av.8 °F (37.1 °C)  Min: 98.3 °F (36.8 °C)  Max: 99.3 °F (37.4 °C)  Pulse  Av.1  Min: 65  Max: 99  Resp  Av.8  Min: 16  Max: 18  SpO2  Av.2 %  Min: 94 %  Max: 97 %  I/O last 3 completed shifts:  In: 200 [P.O.:200]  Out: 100 [Urine:100]    Physical Examination:  General: awake, alert, disheveled male, resting comfortably in NAD  HENT: moist mucous membranes, EOMI  CV: RRR  Resp: CTAB, no wheezes, rhonchi or crackles  Abd: soft, NTND, normoactive bowel sounds  Ext: no peripheral edema, 2+ DP pulses bilaterally  Neuro: AA&O to person, place, time and situation    Laboratory:  Laboratory Data Reviewed: yes    Microbiology Data Reviewed: yes    Other Results:  EKG (my interpretation): None new    Radiology Data Reviewed: yes  Pertinent Findings:  MRA neck ():  Pending    Head MRI w/out contrast ():  Impression        1.  Multifocal areas of acute infarction involving the right cerebellar hemisphere, right corona radiata, and two small punctate foci within the left frontal lobe.  Involvement of multiple vascular distributions is suggested for embolic etiology.     2.  Right frontal lobe remote infarction with additional extensive chronic microvascular ischemic disease.    3.  Multifocal areas of intracranial stenosis as above including area of high-grade stenosis versus occlusion involving the right distal vertebral artery V4 segment.     Head MRA w/out contrast ():  Impression        1.  Multifocal areas of acute infarction involving the right cerebellar hemisphere, right corona radiata, and two small punctate foci within the left frontal lobe.  Involvement of multiple vascular distributions is suggested for embolic etiology.      2.  Right frontal lobe remote infarction with additional extensive chronic microvascular ischemic disease.    3.  Multifocal areas of intracranial stenosis as above including area of high-grade stenosis versus occlusion involving the right distal vertebral artery V4 segment.           Current Medications:     Infusions:        Scheduled:   amlodipine  10 mg Oral Daily    aspirin  81 mg Oral Daily    atorvastatin  80 mg Oral Daily    hydrALAZINE  50 mg Oral Q8H    hydrochlorothiazide  25 mg Oral Daily    losartan  100 mg Oral Daily    tamsulosin  0.4 mg Oral Daily        PRN:  dextrose 50%, glucagon (human recombinant), pneumoc 13-uyen conj-dip cr(PF)    Antibiotics and Day Number of Therapy:  none    Assessment/Plan:     Shant Magana is a 77 y.o.male with    Hypertensive emergency/encephalopathy  -LE weakness, dizziness, BP on admit 260/124  -Hydralazine given with fast drop in /85  -Trops wnl, SHIKHA vs CKD  -CT head w/ subacute/chronic frontal infarct, lacunar infarcts, CXR wnl  -EKG w/ ant Q waves, biphasic nonspec TW abn  -Vasc neurology believes this was HTN encephalopathy  -Restarted home meds, losartan, amlodipine, HCTZ per cardiology recs, cardene gtt held this morning for goal /80  -TTE with concentric hypertrophy, mild LA enlargement, normal EF 60-65%, indeterminate diastolic function  -Renal artery US w/ likely renal artery stenosis, aldosterone pending  -MRI/MRA (7-17) showed 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, vertebral art stenosis  - continue statin & ASA, appreciate cards recs  - ESDRAS scheduled for today     Controlled, DM Type II  -On metformin, holding while inpatient  -Cover with SSI/accuchecks  -A1C 5.6     HLD  -On lipid panel 7/14, , continue statin     SHIKHA (resolved)  -On admit Cr 1.8, trended down, now 1.5     HM  -[facility] UTD, needs vaccines    Dispo: MRI/MRA showed hx of cva, will await PT/OT rehab recs, placement recs, awaiting ESDRAS results      Slava Rodriguez  [facility]  Internal Medicine HO-I  [facility] Internal Medicine Service Team B    [facility] Medicine Hospitalist Pager numbers:   [facility] Hospitalist Medicine Team A (Hanny/Loco): [phone]  [facility] Hospitalist Medicine Team B (Kacey/Nico):  [phone]

## 2017-07-18 NOTE — NURSING
Return to unit  Care resumed   Vitals as documented .  In no distress  On room air  No complaints of sore throat  Telemetry continues  md contacted for diet orders

## 2017-07-18 NOTE — PLAN OF CARE
Report given to Cyndi BOO with time for questioning, verbalized understanding. Patient aaox3. Vss. Denies any pain. Dr. Zion cuevas to release patient from PACU.

## 2017-07-18 NOTE — PT/OT/SLP PROGRESS
Occupational Therapy  Visit Attempt    Shant Magana  MRN: [id number]    Patient not seen today secondary to patient leaving the floor for ESDRAS. Will follow-up later, as time permits.    RATNA Mares  7/18/2017

## 2017-07-18 NOTE — PT/OT/SLP PROGRESS
Physical Therapy      Shant Magana  MRN: [id number]    Patient not seen today secondary to  (pt refusal,testing most of day and just had lunch). Will follow-up tomorrow.    David Walker, PTA

## 2017-07-18 NOTE — ANESTHESIA POSTPROCEDURE EVALUATION
"Anesthesia Post Evaluation    Patient: Shant Magana    Procedure(s) Performed: Procedure(s) (LRB):  TRANSESOPHAGEAL ECHOCARDIOGRAM (ESDRAS) (N/A)    Final Anesthesia Type: MAC  Patient location during evaluation: PACU  Patient participation: Yes- Able to Participate  Level of consciousness: awake  Post-procedure vital signs: reviewed and stable  Pain management: adequate  Airway patency: patent  PONV status at discharge: No PONV  Anesthetic complications: no      Cardiovascular status: stable  Respiratory status: room air  Hydration status: euvolemic  Follow-up not needed.        Visit Vitals  /83 (BP Location: Left arm, Patient Position: Lying, BP Method: Automatic)   Pulse 67   Temp 36.3 °C (97.4 °F)   Resp 20   Ht 5' 11" (1.803 m)   Wt 97.9 kg (215 lb 13.3 oz)   SpO2 95%   BMI 30.10 kg/m²       Pain/Rosa Score: Pain Assessment Performed: Yes (7/18/2017 12:28 PM)  Presence of Pain: denies (7/18/2017 12:28 PM)  Pain Rating Prior to Med Admin: 0 (7/17/2017  5:06 AM)  Rosa Score: 10 (7/18/2017 12:28 PM)      "

## 2017-07-18 NOTE — ANESTHESIA PREPROCEDURE EVALUATION
07/18/2017  Shant Magana is a 77 y.o., male with DMII, HTN,  bilateral strokes on MRI, and old right frontal stroke. Concerning for embolic activity. Scheduled for ESDRAS.    Past Medical History:   Diagnosis Date    Cancer     prostate    Diabetes mellitus     Hypertension     Hypertrophic cardiomyopathy     Renal disorder      Past Surgical History:   Procedure Laterality Date    BACK SURGERY      THYROIDECTOMY       Review of patient's allergies indicates:  No Known Allergies    Anesthesia Evaluation      I have reviewed the Medications.     Review of Systems  Anesthesia Hx:  No problems with previous Anesthesia Denies Hx of Anesthetic complications History of prior surgery of interest to airway management or planning: Previous anesthesia: General  Denies Personal Hx of Anesthesia complications.   Social:  Non-Smoker, No Alcohol Use    Hematology/Oncology:        Oncology Comments: Prostate cancer   Cardiovascular:   Exercise tolerance: good Hypertension hyperlipidemia ECG has been reviewed.    Pulmonary:   Denies Asthma.    Renal/:   Chronic Renal Disease, ARF    Hepatic/GI:   Denies GERD.    Neurological:   CVA multiple CVAs per MRI (patient denies any strokes)   Endocrine:   Diabetes, well controlled, type 2    Temp:  [36.8 °C (98.3 °F)-37.4 °C (99.3 °F)] 37.3 °C (99.2 °F)  Pulse:  [65-99] 69  Resp:  [16-18] 18  SpO2:  [94 %-97 %] 94 %  BP: (123-179)/(71-97) 123/71      Physical Exam  General:  Well nourished    Airway/Jaw/Neck:  Airway Findings: Mouth Opening: Normal Tongue: Normal  General Airway Assessment: Adult  Mallampati: III  Improves to II with phonation.  TM Distance: 4 - 6 cm  Jaw/Neck Findings:  Neck ROM: Normal ROM      Dental:  Dental Findings:    Chest/Lungs:  Chest/Lungs Findings: Clear to auscultation, Normal Respiratory Rate     Heart/Vascular:  Heart Findings: Rate: Normal   Rhythm: Regular Rhythm        Mental Status:  Mental Status Findings:  Cooperative, Alert and Oriented       Lab Results   Component Value Date    WBC 5.20 07/18/2017    HGB 14.3 07/18/2017    HCT 43.4 07/18/2017     07/18/2017    CHOL 285 (H) 07/14/2017    TRIG 272 (H) 07/14/2017    HDL 39 (L) 07/14/2017    ALT 12 07/18/2017    AST 18 07/18/2017     (L) 07/18/2017    K 3.9 07/18/2017     07/18/2017    CREATININE 1.5 (H) 07/18/2017    BUN 28 (H) 07/18/2017    CO2 25 07/18/2017    TSH 3.718 07/14/2017    INR 1.0 07/14/2017    HGBA1C 5.7 (H) 07/18/2017     2D echo 7/15/17:  CONCLUSIONS     1 - Mild left atrial enlargement.     2 - Concentric hypertrophy.     3 - No wall motion abnormalities.     4 - Normal left ventricular systolic function (EF 60-65%).     5 - Indeterminate LV diastolic function.     6 - Normal right ventricular systolic function .     7 - The estimated PA systolic pressure is 25 mmHg.    EKG 7/14/17:  Normal sinus rhythm with sinus arrhythmia  Left axis deviation  Anteroseptal infarct (cited on or before 07-JUN-2017)  Abnormal ECG  When compared with ECG of 07-JUN-2017 12:48,  GA interval has decreased  Vent. rate has increased BY  28 BPM  QRS duration has decreased  Nonspecific T wave abnormality no longer evident in Inferior leads  Nonspecific T wave abnormality has replaced inverted T waves in Lateral  leads  Confirmed by Maicol LEE, [facility] ([id number]) on 7/17/2017 1:12:21 PM    MRI/MRA brain 7/15/17:  1.  Multifocal areas of acute infarction involving the right cerebellar hemisphere, right corona radiata, and two small punctate foci within the left frontal lobe.  Involvement of multiple vascular distributions is suggested for embolic etiology.     2.  Right frontal lobe remote infarction with additional extensive chronic microvascular ischemic disease.    3.  Multifocal areas of intracranial stenosis as above including area of high-grade stenosis versus occlusion involving the right  distal vertebral artery V4 segment.    Anesthesia Plan  Type of Anesthesia, risks & benefits discussed:  Anesthesia Type:  MAC, general  Patient's Preference:   Intra-op Monitoring Plan: standard ASA monitors  Intra-op Monitoring Plan Comments:   Post Op Pain Control Plan:   Post Op Pain Control Plan Comments:   Induction:   IV  Beta Blocker:         Informed Consent: Patient understands risks and agrees with Anesthesia plan.  Questions answered. Anesthesia consent signed with patient.  ASA Score: 4     Day of Surgery Review of History & Physical:            Ready For Surgery From Anesthesia Perspective.

## 2017-07-18 NOTE — PLAN OF CARE
Problem: Occupational Therapy Goal  Goal: Occupational Therapy Goal  Goals to be met by: 8/17/17     Patient will increase functional independence with ADLs by performing:    LE Dressing with Modified [place].  Grooming while standing with Modified [place].  Toileting from toilet with Modified [place] for hygiene and clothing management.   Stand pivot transfers with Modified [place].  Toilet transfer to toilet with Modified [place].  Increased functional strength to WFL for self care skills and functional mobility.  Upper extremity exercise program x10 reps per handout, with independence.     Outcome: Ongoing (interventions implemented as appropriate)  Patient improving with functional mobility and transitions, but still limited by decreased strength and coordination. Patient still exhibits R lateral lean during transitions. Remains with poor insight and safety awareness. Will benefit from inpatient rehab to address functional deficits, improve independence in self-care tasks, and provide neuromuscular re-education.

## 2017-07-19 LAB
ALBUMIN SERPL BCP-MCNC: 3.3 G/DL
ALDOST SERPL-MCNC: 7.4 NG/DL
ALP SERPL-CCNC: 72 U/L
ALT SERPL W/O P-5'-P-CCNC: 16 U/L
ANION GAP SERPL CALC-SCNC: 7 MMOL/L
AST SERPL-CCNC: 21 U/L
BASOPHILS # BLD AUTO: 0.02 K/UL
BASOPHILS NFR BLD: 0.4 %
BILIRUB SERPL-MCNC: 0.7 MG/DL
BUN SERPL-MCNC: 28 MG/DL
CALCIUM SERPL-MCNC: 11.1 MG/DL
CHLORIDE SERPL-SCNC: 102 MMOL/L
CO2 SERPL-SCNC: 28 MMOL/L
CREAT SERPL-MCNC: 1.7 MG/DL
DIFFERENTIAL METHOD: NORMAL
EOSINOPHIL # BLD AUTO: 0.1 K/UL
EOSINOPHIL NFR BLD: 1.9 %
ERYTHROCYTE [DISTWIDTH] IN BLOOD BY AUTOMATED COUNT: 13.8 %
EST. GFR  (AFRICAN AMERICAN): 44 ML/MIN/1.73 M^2
EST. GFR  (NON AFRICAN AMERICAN): 38 ML/MIN/1.73 M^2
GLUCOSE SERPL-MCNC: 113 MG/DL
HCT VFR BLD AUTO: 45.3 %
HGB BLD-MCNC: 15.4 G/DL
LYMPHOCYTES # BLD AUTO: 1.2 K/UL
LYMPHOCYTES NFR BLD: 23 %
MCH RBC QN AUTO: 30.6 PG
MCHC RBC AUTO-ENTMCNC: 34 %
MCV RBC AUTO: 90 FL
MONOCYTES # BLD AUTO: 0.5 K/UL
MONOCYTES NFR BLD: 9.1 %
NEUTROPHILS # BLD AUTO: 3.5 K/UL
NEUTROPHILS NFR BLD: 65.4 %
PLATELET # BLD AUTO: 241 K/UL
PMV BLD AUTO: 11.5 FL
POCT GLUCOSE: 138 MG/DL (ref 70–110)
POCT GLUCOSE: 144 MG/DL (ref 70–110)
POCT GLUCOSE: 149 MG/DL (ref 70–110)
POTASSIUM SERPL-SCNC: 4.4 MMOL/L
PROT SERPL-MCNC: 7.2 G/DL
RBC # BLD AUTO: 5.04 M/UL
SODIUM SERPL-SCNC: 137 MMOL/L
WBC # BLD AUTO: 5.27 K/UL

## 2017-07-19 PROCEDURE — 97110 THERAPEUTIC EXERCISES: CPT

## 2017-07-19 PROCEDURE — 25000003 PHARM REV CODE 250: Performed by: STUDENT IN AN ORGANIZED HEALTH CARE EDUCATION/TRAINING PROGRAM

## 2017-07-19 PROCEDURE — 11000001 HC ACUTE MED/SURG PRIVATE ROOM

## 2017-07-19 PROCEDURE — 99221 1ST HOSP IP/OBS SF/LOW 40: CPT | Mod: ,,, | Performed by: PODIATRIST

## 2017-07-19 PROCEDURE — 94761 N-INVAS EAR/PLS OXIMETRY MLT: CPT

## 2017-07-19 PROCEDURE — 85025 COMPLETE CBC W/AUTO DIFF WBC: CPT

## 2017-07-19 PROCEDURE — 97116 GAIT TRAINING THERAPY: CPT

## 2017-07-19 PROCEDURE — 36415 COLL VENOUS BLD VENIPUNCTURE: CPT

## 2017-07-19 PROCEDURE — 97535 SELF CARE MNGMENT TRAINING: CPT

## 2017-07-19 PROCEDURE — 25000003 PHARM REV CODE 250: Performed by: INTERNAL MEDICINE

## 2017-07-19 PROCEDURE — 97530 THERAPEUTIC ACTIVITIES: CPT

## 2017-07-19 PROCEDURE — 80053 COMPREHEN METABOLIC PANEL: CPT

## 2017-07-19 RX ORDER — HYDRALAZINE HYDROCHLORIDE 50 MG/1
50 TABLET, FILM COATED ORAL EVERY 8 HOURS
Qty: 90 TABLET | Refills: 3 | Status: SHIPPED | OUTPATIENT
Start: 2017-07-19 | End: 2017-08-14 | Stop reason: SINTOL

## 2017-07-19 RX ORDER — LOSARTAN POTASSIUM 100 MG/1
100 TABLET ORAL DAILY
Qty: 30 TABLET | Refills: 3 | Status: SHIPPED | OUTPATIENT
Start: 2017-07-19 | End: 2017-08-28 | Stop reason: SDUPTHER

## 2017-07-19 RX ORDER — AMLODIPINE BESYLATE 10 MG/1
10 TABLET ORAL DAILY
Qty: 30 TABLET | Refills: 3 | Status: SHIPPED | OUTPATIENT
Start: 2017-07-19 | End: 2017-08-28 | Stop reason: SDUPTHER

## 2017-07-19 RX ORDER — HYDROCHLOROTHIAZIDE 25 MG/1
25 TABLET ORAL DAILY
Qty: 30 TABLET | Refills: 3 | Status: SHIPPED | OUTPATIENT
Start: 2017-07-19 | End: 2017-08-28 | Stop reason: SDUPTHER

## 2017-07-19 RX ORDER — ASPIRIN 81 MG/1
81 TABLET ORAL DAILY
Qty: 30 TABLET | Refills: 3 | Status: ON HOLD | OUTPATIENT
Start: 2017-07-19 | End: 2018-09-05 | Stop reason: HOSPADM

## 2017-07-19 RX ORDER — CLOPIDOGREL BISULFATE 75 MG/1
75 TABLET ORAL DAILY
Qty: 30 TABLET | Refills: 3 | Status: SHIPPED | OUTPATIENT
Start: 2017-07-19 | End: 2017-08-28 | Stop reason: SDUPTHER

## 2017-07-19 RX ORDER — ATORVASTATIN CALCIUM 80 MG/1
80 TABLET, FILM COATED ORAL DAILY
Qty: 30 TABLET | Refills: 3 | Status: SHIPPED | OUTPATIENT
Start: 2017-07-19 | End: 2017-08-28 | Stop reason: SDUPTHER

## 2017-07-19 RX ADMIN — HYDROCHLOROTHIAZIDE 25 MG: 25 TABLET ORAL at 09:07

## 2017-07-19 RX ADMIN — AMLODIPINE BESYLATE 10 MG: 5 TABLET ORAL at 09:07

## 2017-07-19 RX ADMIN — TAMSULOSIN HYDROCHLORIDE 0.4 MG: 0.4 CAPSULE ORAL at 09:07

## 2017-07-19 RX ADMIN — HYDRALAZINE HYDROCHLORIDE 50 MG: 25 TABLET, FILM COATED ORAL at 02:07

## 2017-07-19 RX ADMIN — HYDRALAZINE HYDROCHLORIDE 50 MG: 25 TABLET, FILM COATED ORAL at 05:07

## 2017-07-19 RX ADMIN — ATORVASTATIN CALCIUM 80 MG: 40 TABLET, FILM COATED ORAL at 09:07

## 2017-07-19 RX ADMIN — HYDRALAZINE HYDROCHLORIDE 50 MG: 25 TABLET, FILM COATED ORAL at 09:07

## 2017-07-19 RX ADMIN — LOSARTAN POTASSIUM 100 MG: 50 TABLET, FILM COATED ORAL at 09:07

## 2017-07-19 RX ADMIN — ASPIRIN 81 MG: 81 TABLET, COATED ORAL at 09:07

## 2017-07-19 NOTE — MEDICAL/APP STUDENT
S/   - 6 beat run of Vtach at 0511 - remitted spontaneously. None since.  - No chest pain or shortness of breath. Voiding and stooling well.  - PO this AM.  - ESDRAS performed yesterday without complication.   - No other complaints.    O/  Tmax  99.2  HR  66-74  BP  103-180/61-89  RR  13-23  PO2      GEN  relaxing in bed, conversant  HEENT  Mucous membranes moist, no LAD  CV  +s1, +s2, no murmurs, regular rate  RESP  CTAB  ABD  +BS, no TTP, no organomegaly  EXT  No edema, +DP pulses b/l  SKIN  Warm, dry, without rash throughout  NEURO  AAO x 3    Labs:   CBC wnl  CMP: BUN/Cr 28/1.7,  Glucose 113,  Calcium 11.1    Imaging:  ESDRAS: read pending  CTA neck:   US Cartoid:  <50% stenosis in R,  50-69% stenosis of L    A/P   77M with     Hypertensive emergency/encephalopathy  - LE weakness, dizziness, BP on admit 260/124.  Hydralazine given with fast drop in /85  - Trops wnl, SHIKHA vs CKD  - CT head w/ subacute/chronic frontal infarct, lacunar infarcts, CXR wnl  - EKG w/ ant Q waves, biphasic nonspec TW abn  - Restarted home meds, losartan, amlodipine, HCTZ per cardiology recs for goal /80  - TTE with concentric hypertrophy, mild LA enlargement, normal EF 60-65%, indeterminate diastolic function.  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 UTD, needs vaccines     Dispo: Awaiting ESDRAS results, PT/OT rehab recs, placement recs.        Gary Langley, L4  [facility] Internal medicine team B

## 2017-07-19 NOTE — PLAN OF CARE
Problem: Patient Care Overview  Goal: Plan of Care Review  Outcome: Ongoing (interventions implemented as appropriate)  Pt on RA sats  98%.

## 2017-07-19 NOTE — PLAN OF CARE
Problem: Occupational Therapy Goal  Goal: Occupational Therapy Goal  Goals to be met by: 8/17/17     Patient will increase functional independence with ADLs by performing:    LE Dressing with Modified [place].  Grooming while standing with Modified [place].  Toileting from toilet with Modified [place] for hygiene and clothing management.   Stand pivot transfers with Modified [place].  Toilet transfer to toilet with Modified [place].  Increased functional strength to WFL for self care skills and functional mobility.  Upper extremity exercise program x10 reps per handout, with independence.     Outcome: Ongoing (interventions implemented as appropriate)  Patient seemed slightly more confused this AM, unable to tell me how his room was in such a disarray or how he had become disrobed. Patient improving with mobility and functional tasks. Will benefit from inpatient rehab to address functional deficits and improve independence in self-care tasks.

## 2017-07-19 NOTE — PROGRESS NOTES
Call received from [facility] rehab coordinator. Pt is accepted pending insurance approval. TN placed Call and left message with [facility] Case Management.       patient updated and agree with plan, TN placed call to Candace pt;s daughter, No answer will reattempt.      update: 4:30pm   Call received from Aissatou with [facility].  Inpatient rehab authorized. Aissatou will contact Ara with [facility]. Plan to admit tomorrow.       Dr Rodriguez updated.

## 2017-07-19 NOTE — PLAN OF CARE
[facility]    FACILITY TRANSFER ORDERS      Patient Name: Shant Magana  YOB: 1940    PCP: Trent Aldana MD   PCP Address: [address] / [place]  PCP Phone Number: [phone]  PCP Fax: [phone]    Encounter Date: 07/20/2017    Admit to: [facility]    Vital Signs:  Routine    Diagnoses:   Active Hospital Problems    Diagnosis  POA    *Hypertensive crisis, unspecified [I16.9]  Yes    Hypertensive emergency [I16.1]  Yes    Type 2 diabetes mellitus with complication, without long-term current use of insulin [E11.8]  Yes    Hypertensive encephalopathy [I67.4]  Yes    Bradycardia [R00.1]  Yes    Unspecified essential hypertension [I10]  Yes      Resolved Hospital Problems    Diagnosis Date Resolved POA   No resolved problems to display.       Allergies:Review of patient's allergies indicates:  No Known Allergies    Diet: diabetic diet: 2000 calorie    Activities: Activity as tolerated per PT, OT recs    Nursing:    CONSULTS:    Physical Therapy to evaluate and treat and Occupational Therapy to evaluate and treat 5 times per week.     MISCELLANEOUS CARE:  Diabetes Care:   Fingerstick blood sugar every 6 hours if patient is unable to eat    WOUND CARE ORDERS  None     Medications: Review discharge medications with patient and family and provide education.      Current Discharge Medication List      START taking these medications    Details   amlodipine (NORVASC) 10 MG tablet Take 1 tablet (10 mg total) by mouth once daily.  Qty: 30 tablet, Refills: 3      clopidogrel (PLAVIX) 75 mg tablet Take 1 tablet (75 mg total) by mouth once daily.  Qty: 30 tablet, Refills: 3      hydrALAZINE (APRESOLINE) 50 MG tablet Take 1 tablet (50 mg total) by mouth every 8 (eight) hours.  Qty: 90 tablet, Refills: 3      hydrochlorothiazide (HYDRODIURIL) 25 MG tablet Take 1 tablet (25 mg total) by mouth once daily.  Qty: 30 tablet, Refills: 3      losartan  (COZAAR) 100 MG tablet Take 1 tablet (100 mg total) by mouth once daily.  Qty: 30 tablet, Refills: 3         CONTINUE these medications which have CHANGED    Details   aspirin (ECOTRIN) 81 MG EC tablet Take 1 tablet (81 mg total) by mouth once daily.  Qty: 30 tablet, Refills: 3      atorvastatin (LIPITOR) 80 MG tablet Take 1 tablet (80 mg total) by mouth once daily.  Qty: 30 tablet, Refills: 3         CONTINUE these medications which have NOT CHANGED    Details   metformin (GLUCOPHAGE) 500 MG tablet Take 500 mg by mouth 2 (two) times daily with meals.      tamsulosin (FLOMAX) 0.4 mg Cp24 Take 0.4 mg by mouth once daily.      vitamin D 1000 units Tab Take 1,000 Units by mouth once daily.         STOP taking these medications       AMLODIPINE BESYLATE/BENAZEPRIL (AMLODIPINE-BENAZEPRIL ORAL) Comments:   Reason for Stopping:         losartan-hydrochlorothiazide 100-25 mg (HYZAAR) 100-25 mg per tablet Comments:   Reason for Stopping:         fluticasone 50 mcg/actuation DsDv Comments:   Reason for Stopping:         ranitidine (ZANTAC) 150 MG tablet Comments:   Reason for Stopping:                    _________________________________  Slava Rodriguez MD  07/20/2017

## 2017-07-19 NOTE — PLAN OF CARE
Problem: Patient Care Overview  Goal: Plan of Care Review  Outcome: Ongoing (interventions implemented as appropriate)  Npo this morning for test   Mild confusion to exact date only this morning  Following directions well   Blood pressure within normal values  Telemetry maintained except  Sinus rhythm  bandaid removed from left great toe  No redness or oozing noted to toe  Primary team has consulted podiatry to evaluate.   Safety maintained

## 2017-07-19 NOTE — PT/OT/SLP PROGRESS
Physical Therapy  Treatment    Shant Magana   MRN: [id number]   Admitting Diagnosis: Hypertensive crisis, unspecified    PT Received On: 07/19/17  PT Start Time: 1344     PT Stop Time: 1508    PT Total Time (min): 84 min       Billable Minutes:  Gait [id number] and Therapeutic Exercise 10    Treatment Type: Treatment  PT/PTA: PTA             General Precautions: Standard, fall  Orthopedic Precautions: N/A   Braces: N/A    Do you have any cultural, spiritual, Religion conflicts, given your current situation?: none    Subjective:  Communicated with nsg prior to session.      Pain/Comfort  Pain Rating 1: 0/10    Objective:   Patient found with: telemetry    Functional Mobility:  Bed Mobility:   Supine to Sit: Supervision    Transfers:  Sit <> Stand Assistance: Stand By Assistance  Sit <> Stand Assistive Device: Rolling Walker  Bed <> Chair Technique:  (ambulation)  Bed <> Chair Assistance: Stand By Assistance  Bed <> Chair Assistive Device: Rolling Walker    Gait:   Gait Distance: ~125' X 2  Assistance 1: Stand by Assistance, Contact Guard Assistance  Gait Assistive Device: Rolling walker  Gait Pattern: reciprocal  Gait Deviation(s): decreased robert, decreased velocity of limb motion, decreased step length, decreased stride length    Stairs:      Balance:   Static Sit: GOOD: Takes MODERATE challenges from all directions  Dynamic Sit: GOOD: Maintains balance through MODERATE excursions of active trunk movement  Static Stand: FAIR+: Takes MINIMAL challenges from all directions  Dynamic stand: FAIR: Needs CONTACT GUARD during gait     Therapeutic Activities and Exercises: le seated ex's X 10-12 reps inc: ap,laq,hip flex,abd/add       AM-PAC 6 CLICK MOBILITY  How much help from another person does this patient currently need?   1 = Unable, Total/Dependent Assistance  2 = A lot, Maximum/Moderate Assistance  3 = A little, Minimum/Contact Guard/Supervision  4 = None, Modified [place]/Independent    Turning over in bed  (including adjusting bedclothes, sheets and blankets)?: 4  Sitting down on and standing up from a chair with arms (e.g., wheelchair, bedside commode, etc.): 3  Moving from lying on back to sitting on the side of the bed?: 3  Moving to and from a bed to a chair (including a wheelchair)?: 3  Need to walk in hospital room?: 3  Climbing 3-5 steps with a railing?: 2  Total Score: 18    AM-PAC Raw Score CMS G-Code Modifier Level of Impairment Assistance   6 % Total / Unable   7 - 9 CM 80 - 100% Maximal Assist   10 - 14 CL 60 - 80% Moderate Assist   15 - 19 CK 40 - 60% Moderate Assist   20 - 22 CJ 20 - 40% Minimal Assist   23 CI 1-20% SBA / CGA   24 CH 0% Independent/ Mod I     Patient left up in chair with all lines intact, call button in reach and nsg notified.    Assessment: good participation and improving mobility,pt will benefit from in-pt rehab to progress functional independence,strength,endurance and balance,pt is capable of tolerating 3 hrs of therapy daily.  Shant Magana is a 77 y.o. male with a medical diagnosis of Hypertensive crisis, unspecified and presents with .    Rehab identified problem list/impairments: Rehab identified problem list/impairments: weakness, impaired endurance, impaired functional mobilty, gait instability, impaired balance, impaired cognition, decreased safety awareness, impaired coordination    Rehab potential is excellent.    Activity tolerance: Good    Discharge recommendations: Discharge Facility/Level Of Care Needs: rehabilitation facility     Barriers to discharge: Barriers to Discharge: Inaccessible home environment, Decreased caregiver support    Equipment recommendations: Equipment Needed After Discharge:  (defer to in-pt rehab)     GOALS: see general POC   Physical Therapy Goals        Problem: Physical Therapy Goal    Goal Priority Disciplines Outcome Goal Variances Interventions   Physical Therapy Goal     PT/OT, PT Ongoing (interventions implemented as appropriate)      Description:  Goals to be met by: 17    Patient will increase functional independence with mobility by performin. Supine to sit with [place]  2. Sit to stand transfer with [place]  3. Bed to chair transfer with Modified [place] with or without AD  4. Gait  x 150 feet with Stand-by Assistance with or without using AD  5. Lower extremity exercise program x15 reps                       PLAN:    Patient to be seen 5 x/week  to address the above listed problems via gait training, therapeutic activities, therapeutic exercises, neuromuscular re-education  Plan of Care expires: 17  Plan of Care reviewed with: patient         David G Aaron, PTA  2017

## 2017-07-19 NOTE — NURSING
Wound Care rounds performed.  Patient noted to have no issues with heels, toes, sacrum or buttocks at this time.  Explained importance of turning and shifting positions.  Patient verbalized understanding with teachback method.  Will continue to monitor.

## 2017-07-19 NOTE — PT/OT/SLP PROGRESS
Occupational Therapy  Treatment    Shant Magana   MRN: [id number]   Admitting Diagnosis: Hypertensive crisis, unspecified    OT Date of Treatment: 07/19/17   OT Start Time: 1021  OT Stop Time: 1100  OT Total Time (min): 39 min    Billable Minutes:  Self Care/Home Management 10 and Therapeutic Activity 29    General Precautions: Standard, fall  Orthopedic Precautions: N/A  Braces: N/A    Subjective:  Communicated with nurseCoty prior to session.    Pain/Comfort  Pain Rating 1: 0/10  Pain Rating Post-Intervention 1: 0/10    Objective:  Patient found with: telemetry, peripheral IV     Functional Mobility:  Bed Mobility:  Scooting/Bridging: Supervision  Supine to Sit: Stand by Assistance, WIth side rail  Sit to Supine: Stand by Assistance    Transfers:   Sit <> Stand Assistance: Stand By Assistance  Sit <> Stand Assistive Device: Rolling Walker    Functional Ambulation: Around room using RW with CGA    Activities of Daily Living:  UE Dressing Level of Assistance: Minimum assistance  LE Dressing Level of Assistance: Stand by assistance  Grooming Position: Standing at sink  Grooming Level of Assistance: Contact guard assistance (CGA for balance)    Balance:   Static Sit: GOOD-: Takes MODERATE challenges from all directions but inconsistently  Dynamic Sit: GOOD-: Maintains balance through MODERATE excursions of active trunk movement,     Static Stand: FAIR+: Takes MINIMAL challenges from all directions  Dynamic stand: FAIR: Needs CONTACT GUARD during gait    Therapeutic Activities and Exercises:  Patient with bed mob and ADL tasks as noted above -- patient able to don B socks with SBA and Fig 4 position  Sat EOB and completed dynamic reaching and weightshifting ax  BUE theraband ax, 1 x 10 reps, all avail joints and planes -- decreased coordination and motor planning noted when following exercises    AM-PAC 6 CLICK ADL   How much help from another person does this patient currently need?   1 = Unable, Total/Dependent  "Assistance  2 = A lot, Maximum/Moderate Assistance  3 = A little, Minimum/Contact Guard/Supervision  4 = None, Modified [place]/Independent    Putting on and taking off regular lower body clothing? : 3  Bathing (including washing, rinsing, drying)?: 3  Toileting, which includes using toilet, bedpan, or urinal? : 3  Putting on and taking off regular upper body clothing?: 4  Taking care of personal grooming such as brushing teeth?: 4  Eating meals?: 4  Total Score: 21     AM-PAC Raw Score CMS "G-Code Modifier Level of Impairment Assistance   6 % Total / Unable   7 - 8 CM 80 - 100% Maximal Assist   9-13 CL 60 - 80% Moderate Assist   14 - 19 CK 40 - 60% Moderate Assist   20 - 22 CJ 20 - 40% Minimal Assist   23 CI 1-20% SBA / CGA   24 CH 0% Independent/ Mod I       Patient left HOB elevated with all lines intact, call button in reach, bed alarm on and RN notified    ASSESSMENT: Patient seemed slightly more confused this AM, unable to tell me how his room was in such disarray or how he had become disrobed. Patient improving with mobility and functional tasks. Will benefit from inpatient rehab to address functional deficits and improve independence in self-care tasks.  Shant Magana is a 77 y.o. male with a medical diagnosis of Hypertensive crisis, unspecified     Rehab identified problem list/impairments: Rehab identified problem list/impairments: weakness, impaired endurance, impaired self care skills, impaired functional mobilty, gait instability, impaired balance, decreased upper extremity function, decreased lower extremity function, impaired coordination, decreased safety awareness, impaired cognition, decreased coordination    Rehab potential is excellent.    Activity tolerance: Good    Discharge recommendations: Discharge Facility/Level Of Care Needs: rehabilitation facility     Barriers to discharge: Barriers to Discharge: Inaccessible home environment, Decreased caregiver support    Equipment " recommendations: walker, rolling     GOALS:    Occupational Therapy Goals        Problem: Occupational Therapy Goal    Goal Priority Disciplines Outcome Interventions   Occupational Therapy Goal     OT, PT/OT Ongoing (interventions implemented as appropriate)    Description:  Goals to be met by: 8/17/17     Patient will increase functional independence with ADLs by performing:    LE Dressing with Modified [place].  Grooming while standing with Modified [place].  Toileting from toilet with Modified [place] for hygiene and clothing management.   Stand pivot transfers with Modified [place].  Toilet transfer to toilet with Modified [place].  Increased functional strength to [facility] for self care skills and functional mobility.  Upper extremity exercise program x10 reps per handout, with independence.                      Plan:  Patient to be seen 5 x/week to address the above listed problems via self-care/home management, therapeutic activities, therapeutic exercises  Plan of Care expires: 08/17/17  Plan of Care reviewed with: patient         RATNA Mares  07/19/2017

## 2017-07-19 NOTE — CONSULTS
[facility]  Podiatry  Consult Note    Patient Name: Shant Magana  MRN: [id number]  Admission Date: 7/14/2017  Hospital Length of Stay: 5 days  Attending Physician: Todd Erazo MD  Primary Care Provider: Trent Aldana MD     Consults  Subjective:     History of Present Illness: Shant Magana is a 76 yo male with pmhx of HTN presented to the hospital for dizziness and weakness x 1 day. He reported feeling dizzy and weak as if legs giving away. In the ED stroke tele was activated, but they did not notice any changes and said that he had hypertensive encephalopathy. Initially he was being managed for BP control.  Pt was also noticed to have some gait instability and problems with coordination. Complains of ingrown toenail, left great toe for the last month. Denies injury or infection. No pain at rest, only with closed to shoes. Denies history of diabetic foot ulcer or infection. No other foot concerns.      Scheduled Meds:   amlodipine  10 mg Oral Daily    aspirin  81 mg Oral Daily    atorvastatin  80 mg Oral Daily    hydrALAZINE  50 mg Oral Q8H    hydrochlorothiazide  25 mg Oral Daily    losartan  100 mg Oral Daily    tamsulosin  0.4 mg Oral Daily     Continuous Infusions:   PRN Meds:dextrose 50%, glucagon (human recombinant), pneumoc 13-uyen conj-dip cr(PF)    Review of patient's allergies indicates:  No Known Allergies     Past Medical History:   Diagnosis Date    Cancer     prostate    Diabetes mellitus     Hypertension     Hypertrophic cardiomyopathy     Renal disorder      Past Surgical History:   Procedure Laterality Date    BACK SURGERY      THYROIDECTOMY         Family History     None        Social History Main Topics    Smoking status: Former Smoker     Types: Cigarettes    Smokeless tobacco: Not on file    Alcohol use Yes    Drug use: Unknown    Sexual activity: Not on file     Review of Systems   Constitutional: Negative for chills and fever.   Respiratory: Negative  for shortness of breath.    Musculoskeletal: Positive for gait problem. Negative for joint swelling and myalgias.   Skin: Negative for rash and wound.   Neurological: Negative for speech difficulty and numbness.     Objective:     Vital Signs (Most Recent):  Temp: 98.2 °F (36.8 °C) (07/19/17 0503)  Pulse: 72 (07/19/17 0503)  Resp: 16 (07/19/17 0503)  BP: (!) 169/81 (07/19/17 0503)  SpO2: 98 % (07/19/17 0509) Vital Signs (24h Range):  Temp:  [97.4 °F (36.3 °C)-99.2 °F (37.3 °C)] 98.2 °F (36.8 °C)  Pulse:  [66-74] 72  Resp:  [13-23] 16  SpO2:  [95 %-100 %] 98 %  BP: (103-180)/(61-89) 169/81     Weight: 97.9 kg (215 lb 13.3 oz)  Body mass index is 30.1 kg/m².    Foot Exam    General  Orientation: alert and oriented to person, place, and time       Left Foot/Ankle      Inspection and Palpation  Ecchymosis: none  Tenderness: (Lateral nail border, left hallux)  Swelling: none   Skin Exam: no blister, no drainage, skin not intact, no cellulitis and no erythema (mycotic nails with incurvation at great toe. Mild pain with compression at lateral border. No paronychia or signs of infection)    Neurovascular  Dorsalis pedis: 1+  Posterior tibial: 1+  Saphenous nerve sensation: diminished  Tibial nerve sensation: diminished  Superficial peroneal nerve sensation: normal  Sural nerve sensation: diminished    Muscle Strength  Ankle dorsiflexion: 5  Ankle plantar flexion: 5  Ankle inversion: 5  Ankle eversion: 5  Great toe extension: 5  Great toe flexion: 5            Laboratory:  A1C:   Recent Labs  Lab 07/16/17  0301 07/17/17  0513 07/18/17  0400   HGBA1C 5.6 5.7* 5.7*     CBC:   Recent Labs  Lab 07/19/17  0625   WBC 5.27   RBC 5.04   HGB 15.4   HCT 45.3      MCV 90   MCH 30.6   MCHC 34.0       Diagnostic Results:  None        Assessment/Plan:     Onychomycosis with ingrown toenail, left    Active Diagnoses:    Diagnosis Date Noted POA    PRINCIPAL PROBLEM:  Hypertensive crisis, unspecified [I16.9] 07/15/2017 Yes     Hypertensive emergency [I16.1]  Yes    Type 2 diabetes mellitus with complication, without long-term current use of insulin [E11.8]  Yes    Hypertensive encephalopathy [I67.4] 07/14/2017 Yes    Bradycardia [R00.1] 06/07/2017 Yes    Unspecified essential hypertension [I10] 06/07/2017 Yes      Problems Resolved During this Admission:    Diagnosis Date Noted Date Resolved POA     Shoe inspection. Diabetic Foot Education. Patient reminded of the importance of good nutrition and blood sugar control to help prevent podiatric complications of diabetes. Patient instructed on proper foot hygeine. We discussed wearing proper shoe gear, daily foot inspections, never walking without protective shoe gear    Discussed conservative vs surgical treatment of recurrent painful ingrown toenails with associated risks and benefits.  He would like to f/u in office next week for ingrown nail removal.    Please call if patient complains of increased toe pain or for signs of infection at toe.    - Return to clinic in 1 week or sooner if problems arise       Thank you for your consult. I will sign off. Please contact us if you have any additional questions.    Sunny Heart DPM  Podiatry  [facility]

## 2017-07-19 NOTE — PROGRESS NOTES
[facility] Consult Note    Subjective:  Shant Magana is a 76 yo male with pmhx of HTN presented to the hospital for dizziness and weakness x 1 day. He reported feeling dizzy and weak as if legs giving away. In the ED stroke tele was activated, but they did not notice any changes and said that he had hypertensive encephalopathy. Initially he was being managed for BP control. CT was done which showed some acute changes. Pt was also noticed to have some gait instability and problems with coordination. Pt denies HA/N/V/F/C/CP/SOB. Currently pt is denying any weakness and is oriented x 3. He has not had any problems with speech and memory.      Medications -   No current facility-administered medications on file prior to encounter.      Current Outpatient Prescriptions on File Prior to Encounter   Medication Sig Dispense Refill    metformin (GLUCOPHAGE) 500 MG tablet Take 500 mg by mouth 2 (two) times daily with meals.      tamsulosin (FLOMAX) 0.4 mg Cp24 Take 0.4 mg by mouth once daily.      vitamin D 1000 units Tab Take 1,000 Units by mouth once daily.      [DISCONTINUED] AMLODIPINE BESYLATE/BENAZEPRIL (AMLODIPINE-BENAZEPRIL ORAL) Take 10 mg by mouth once daily at 6am.      [DISCONTINUED] aspirin (ECOTRIN) 81 MG EC tablet Take 81 mg by mouth once daily.      [DISCONTINUED] atorvastatin (LIPITOR) 80 MG tablet Take 80 mg by mouth once daily.           Vitals -     Vitals:    07/19/17 0800 07/19/17 0906 07/19/17 1200 07/19/17 1223   BP: (!) 155/88  (!) 146/81    BP Location: Left arm  Left arm    Patient Position: Lying  Lying    BP Method: Automatic  Automatic    Pulse: 70  71    Resp: 16  18    Temp: 98 °F (36.7 °C)  98.1 °F (36.7 °C)    TempSrc: Oral  Oral    SpO2:  98%  95%   Weight:       Height:           Neurological Exam -     General appearance: Well nourished, well developed, no acute distress.  Facial Expression: normal       Affect: full       Orientation to time & place:  Oriented to time, place,  person and situation       Attention & concentration:  Normal attention span and concentration       Memory:  Recent and remote memory intact  Language: Spontaneous, fluent; able to repeat and name objects        Fund of knowledge:  Aware of current events        Speech:  normal (not dysarthric)     Cranial nerves: normal visual acuity, visual fields full, pupils equal round and reactive, extraocular movements intact, facial sensation intact, left facial droop, hearing intact to whisper, palate raises midline, shoulder shrug strength normal, tongue protrudes midline.    Musculoskeletal:  Muscle tone: all 4 extremities normal        Muscle Bulk: all 4 extremities normal        Muscle strength:  5/5 in bilateral upper and lower extremities in proximal and distal flexion and extension       No pronator dr  Sensation: Intact to light touch, pin prick, vibration in all extremities         Deep tendon Reflexes: 2+ bilateral triceps, biceps, and brachioradialis; 2+ in bilateral patellae. Positive ankle jerks.  Plantar flexor responses bilaterally    Cerebellar and Coordination:  Gait:  deferred        Finger-nose: no dysmetria       Rapid Alternating Movements (pronation/supination):  R normal; L normal    Labs -      Recent Labs  Lab 07/14/17 2010 07/16/17  0301 07/17/17  0513 07/18/17  0400 07/19/17  0625   WBC 4.04  < > 5.50 6.31 5.20 5.27   HGB 16.5  < > 16.0 15.1 14.3 15.4   HCT 49.7  < > 47.8 44.8 43.4 45.3     < > 221 231 233 241     < > 136 136 134* 137   K 4.7  < > 3.7 3.9 3.9 4.4     < > 104 105 103 102   CO2 18*  < > 22* 24 25 28   BUN 32*  < > 21 23 28* 28*   GLU 90  < > 142* 111* 110 113*   PROT 8.2  < > 7.2 6.8 6.8 7.2   ALBUMIN 4.0  < > 3.5 3.2* 3.2* 3.3*   BILITOT 0.5  < > 0.7 0.8 0.7 0.7   AST 33  < > 23 19 18 21   ALKPHOS 78  < > 74 68 66 72   ALT 19  < > 17 12 12 16   INR 1.0  --   --   --   --   --    TROPONINI 0.007  --   --   --   --   --    TSH 3.718  --   --   --   --   --    <  > = values in this interval not displayed.    Imaging -     MRI Brain  Time of Procedure: 07/17/17 12:06:48  Accession # [id number]    Technique: Multiplanar, multisequence images were obtained through the brain without the use of IV contrast. Additionally, noncontrast 3-D time of flight MRA head was performed.    Comparison: CT head 7/14/17.     Findings:    There are multifocal areas of diffusion restriction consistent with acute infarction with corresponding FLAIR hyperintensity and low ADC signal.  Largest region seen within the medial aspect of the cerebellar hemisphere.  Smaller area seen within the right corona radiata.  Additionally two punctate foci seen within the left frontal white matter.  Involvement of multiple vascular distributions is suggestive for embolic etiology.      There is extensive chronic microvascular ischemic disease throughout the periventricular/supratentorial white matter.  There is small area of remote infarction involving the posterior right frontal lobe.  Ventricles are normal in size and configuration.  No intracranial hemorrhage or extraaxial fluid collection.  No mass or mass effect.      Visualized paranasal sinuses and mastoid air cells are clear. Orbits appear normal.    The bilateral distal ICA's are patent without evidence for significant focal stenosis or aneurysm.  There are multifocal areas of stenosis visualized involving the intracerebral vasculature.  This involves the right anterior communicating artery A2 segment, left anterior communicating artery A1 segment, right posterior artery P2 segment.  Left P1 segment is small with small left-sided P-comm visualized.  Left posterior cerebral artery is small in caliber throughout.  There is additional high grade stenosis versus focal occlusion involving the distal right vertebral artery V4 segment.  There is filling seen distally, possibly retrograde from the basilar artery.  Distal left vertebral artery and the basilar artery  are patent.    Assessment and Plan -     Shant Magana is a 78 yo male with pmhx of HTN presented to the hospital for dizziness and weakness x 1 day. He reported feeling dizzy and weak as if legs giving away. In the ED stroke tele was activated, but they did not notice any changes and said that he had hypertensive encephalopathy. Initially he was being managed for BP control. CT was done which showed some acute changes. Pt was also noticed to have some gait instability and problems with coordination. Pt denies HA/N/V/F/C/CP/SOB. Currently pt is denying any weakness and is oriented x 3. He has not had any problems with speech and memory.   - LE weakness and dizziness upon admission.   - Stroke tele activated but no diagnosis of stroke and it was believed to be hypertensive encephalopathy.   - Continue ASA and statin and anticoagulations.   - BP upon admission 260/124 went down with Hydralazine. BP today 152/83, continue managing the BP to keep it under control.  - CT of the head subacute/chronic frontal infarct, lacunar infarcts   - CTA Neck pending  - ESDRAS done but pending read; awaiting results of ESDRAS  - Recommend patient have a holter monitor or loop recorder upon discharge to look for paroxismal AFIB  -As stroke appears to be embolic, consider starting anti-coagulation; if anti-coagulation is not started, recommend aggressive anti-platelet with ASA and Plavix.    Discussed with Dr. Peter Hobson MD  [facility]

## 2017-07-19 NOTE — PLAN OF CARE
Problem: Patient Care Overview  Goal: Plan of Care Review  Outcome: Ongoing (interventions implemented as appropriate)  Pt is awake an alert, but not oriented to situation and place. No complaints of nausea, vomiting, or diarrhea. No complaints of pain. Cardiac diet. ACHS and last bs was 144. Tele 8529 and running sinus rhythm. Ambulated to the restroom and in the room. Safety precautions maintained. Tolerated all medications well.

## 2017-07-19 NOTE — PROGRESS NOTES
[facility] Resident HO-II Progress Note    Subjective:      No compliants this AM, denies chest pain, palpitations, headaches, n, v,abd pain.    Objective:   Last 24 Hour Vital Signs:  BP  Min: 103/61  Max: 180/84  Temp  Av.6 °F (37 °C)  Min: 97.4 °F (36.3 °C)  Max: 99.2 °F (37.3 °C)  Pulse  Av.7  Min: 66  Max: 74  Resp  Av.8  Min: 13  Max: 23  SpO2  Av.1 %  Min: 94 %  Max: 100 %  I/O last 3 completed shifts:  In: 700 [P.O.:200; I.V.:500]  Out: 830 [Urine:830]    Physical Examination:  General: AAOx3, NAD  HENT: moist mucous membranes, EOMI  CV: RRR  Resp: CTAB, no wheezes, rhonchi or crackles  Abd: soft, NTND, normoactive bowel sounds  Ext: no peripheral edema, 2+ DP pulses bilaterally  Neuro: AA&O to person, place, time and situation    Laboratory:  Laboratory Data Reviewed: yes    Microbiology Data Reviewed: yes    Other Results:  EKG (my interpretation): None new    Radiology Data Reviewed: yes      Head MRI w/out contrast ():  Impression        1.  Multifocal areas of acute infarction involving the right cerebellar hemisphere, right corona radiata, and two small punctate foci within the left frontal lobe.  Involvement of multiple vascular distributions is suggested for embolic etiology.     2.  Right frontal lobe remote infarction with additional extensive chronic microvascular ischemic disease.    3.  Multifocal areas of intracranial stenosis as above including area of high-grade stenosis versus occlusion involving the right distal vertebral artery V4 segment.     Head MRA w/out contrast ():  Impression        1.  Multifocal areas of acute infarction involving the right cerebellar hemisphere, right corona radiata, and two small punctate foci within the left frontal lobe.  Involvement of multiple vascular distributions is suggested for embolic etiology.     2.  Right frontal lobe remote infarction with additional extensive chronic microvascular ischemic disease.    3.   Multifocal areas of intracranial stenosis as above including area of high-grade stenosis versus occlusion involving the right distal vertebral artery V4 segment.           Current Medications:     Infusions:        Scheduled:   amlodipine  10 mg Oral Daily    aspirin  81 mg Oral Daily    atorvastatin  80 mg Oral Daily    hydrALAZINE  50 mg Oral Q8H    hydrochlorothiazide  25 mg Oral Daily    losartan  100 mg Oral Daily    tamsulosin  0.4 mg Oral Daily        PRN:  dextrose 50%, glucagon (human recombinant), pneumoc 13-uyen conj-dip cr(PF)    Antibiotics and Day Number of Therapy:  none    Assessment/Plan:     Shant Magana is a 77 y.o.male with    Hypertensive emergency/encephalopathy  -LE weakness, dizziness, BP on admit 260/124  -Hydralazine given with fast drop in /85  -Trops wnl, SHIKHA vs CKD  -CT head w/ subacute/chronic frontal infarct, lacunar infarcts, CXR wnl  -EKG w/ ant Q waves, biphasic nonspec TW abn  -Vasc neurology believes this was HTN encephalopathy  -Restarted home meds, losartan, amlodipine, HCTZ per cardiology recs, cardene gtt held this morning for goal /80  -TTE with concentric hypertrophy, mild LA enlargement, normal EF 60-65%, indeterminate diastolic function  -Renal artery US w/ likely renal artery stenosis, aldosterone pending  -MRI/MRA (7-17) showed 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, vertebral art stenosis   -CTA neck, ESDRAS 7/18, pending   - continue statin & ASA, will discuss plavix as pt has more risks than benefits for antiocoag    Controlled, DM Type II  -On metformin, holding while inpatient  -Cover with SSI/accuchecks  -A1C 5.6     HLD  -On lipid panel 7/14, , continue statin     CKD III  -Cr appears near baseline ~1.4-1.7     [facility] UTD, needs vaccines    Dispo:pending ESDRAS, further neuro/cards recs, rehab placement      Gary Otero  [facility] HO-II  [facility] Service Team B    [facility] Hospitalist Pager numbers:   [facility]  Medicine Team A (Hanny/Loco): [phone]  [facility] Hospitalist Medicine Team B (Kacey/Nico):  [phone]

## 2017-07-19 NOTE — PLAN OF CARE
Problem: Physical Therapy Goal  Goal: Physical Therapy Goal  Goals to be met by: 17    Patient will increase functional independence with mobility by performin. Supine to sit with [place]  2. Sit to stand transfer with [place]  3. Bed to chair transfer with Modified [place] with or without AD  4. Gait  x 150 feet with Stand-by Assistance with or without using AD  5. Lower extremity exercise program x15 reps      Outcome: Ongoing (interventions implemented as appropriate)  Goals ongoing

## 2017-07-20 LAB
ALBUMIN SERPL BCP-MCNC: 3.3 G/DL
ALP SERPL-CCNC: 68 U/L
ALT SERPL W/O P-5'-P-CCNC: 17 U/L
ANION GAP SERPL CALC-SCNC: 9 MMOL/L
AST SERPL-CCNC: 25 U/L
BASOPHILS # BLD AUTO: 0.03 K/UL
BASOPHILS NFR BLD: 0.6 %
BILIRUB SERPL-MCNC: 0.6 MG/DL
BUN SERPL-MCNC: 30 MG/DL
CALCIUM SERPL-MCNC: 10.5 MG/DL
CHLORIDE SERPL-SCNC: 103 MMOL/L
CO2 SERPL-SCNC: 23 MMOL/L
CREAT SERPL-MCNC: 1.5 MG/DL
DIFFERENTIAL METHOD: NORMAL
EOSINOPHIL # BLD AUTO: 0.1 K/UL
EOSINOPHIL NFR BLD: 2 %
ERYTHROCYTE [DISTWIDTH] IN BLOOD BY AUTOMATED COUNT: 13.6 %
EST. GFR  (AFRICAN AMERICAN): 51 ML/MIN/1.73 M^2
EST. GFR  (NON AFRICAN AMERICAN): 44 ML/MIN/1.73 M^2
GLUCOSE SERPL-MCNC: 114 MG/DL
HCT VFR BLD AUTO: 43.9 %
HGB BLD-MCNC: 14.7 G/DL
LYMPHOCYTES # BLD AUTO: 1.1 K/UL
LYMPHOCYTES NFR BLD: 21.7 %
MCH RBC QN AUTO: 29.9 PG
MCHC RBC AUTO-ENTMCNC: 33.5 G/DL
MCV RBC AUTO: 89 FL
MONOCYTES # BLD AUTO: 0.6 K/UL
MONOCYTES NFR BLD: 11.7 %
NEUTROPHILS # BLD AUTO: 3.3 K/UL
NEUTROPHILS NFR BLD: 64 %
PLATELET # BLD AUTO: 250 K/UL
PMV BLD AUTO: 11.3 FL
POCT GLUCOSE: 109 MG/DL (ref 70–110)
POCT GLUCOSE: 110 MG/DL (ref 70–110)
POCT GLUCOSE: 121 MG/DL (ref 70–110)
POCT GLUCOSE: 152 MG/DL (ref 70–110)
POTASSIUM SERPL-SCNC: 3.9 MMOL/L
PROT SERPL-MCNC: 6.9 G/DL
RBC # BLD AUTO: 4.92 M/UL
SODIUM SERPL-SCNC: 135 MMOL/L
WBC # BLD AUTO: 5.12 K/UL

## 2017-07-20 PROCEDURE — 97116 GAIT TRAINING THERAPY: CPT

## 2017-07-20 PROCEDURE — 85025 COMPLETE CBC W/AUTO DIFF WBC: CPT

## 2017-07-20 PROCEDURE — 25000003 PHARM REV CODE 250: Performed by: STUDENT IN AN ORGANIZED HEALTH CARE EDUCATION/TRAINING PROGRAM

## 2017-07-20 PROCEDURE — 97110 THERAPEUTIC EXERCISES: CPT

## 2017-07-20 PROCEDURE — 97535 SELF CARE MNGMENT TRAINING: CPT

## 2017-07-20 PROCEDURE — 94761 N-INVAS EAR/PLS OXIMETRY MLT: CPT

## 2017-07-20 PROCEDURE — 80053 COMPREHEN METABOLIC PANEL: CPT

## 2017-07-20 PROCEDURE — 11000001 HC ACUTE MED/SURG PRIVATE ROOM

## 2017-07-20 PROCEDURE — 97530 THERAPEUTIC ACTIVITIES: CPT

## 2017-07-20 PROCEDURE — 25000003 PHARM REV CODE 250: Performed by: INTERNAL MEDICINE

## 2017-07-20 PROCEDURE — 36415 COLL VENOUS BLD VENIPUNCTURE: CPT

## 2017-07-20 RX ADMIN — ATORVASTATIN CALCIUM 80 MG: 40 TABLET, FILM COATED ORAL at 09:07

## 2017-07-20 RX ADMIN — HYDRALAZINE HYDROCHLORIDE 50 MG: 25 TABLET, FILM COATED ORAL at 05:07

## 2017-07-20 RX ADMIN — ASPIRIN 81 MG: 81 TABLET, COATED ORAL at 09:07

## 2017-07-20 RX ADMIN — HYDRALAZINE HYDROCHLORIDE 50 MG: 25 TABLET, FILM COATED ORAL at 01:07

## 2017-07-20 RX ADMIN — TAMSULOSIN HYDROCHLORIDE 0.4 MG: 0.4 CAPSULE ORAL at 09:07

## 2017-07-20 RX ADMIN — HYDROCHLOROTHIAZIDE 25 MG: 25 TABLET ORAL at 09:07

## 2017-07-20 RX ADMIN — HYDRALAZINE HYDROCHLORIDE 50 MG: 25 TABLET, FILM COATED ORAL at 10:07

## 2017-07-20 RX ADMIN — AMLODIPINE BESYLATE 10 MG: 5 TABLET ORAL at 09:07

## 2017-07-20 RX ADMIN — LOSARTAN POTASSIUM 100 MG: 50 TABLET, FILM COATED ORAL at 09:07

## 2017-07-20 NOTE — PLAN OF CARE
Bed side RN to call report to  inpatient rehab [phone] @.  9am on 7/21/17. Patient will be going to room 928.    Admissions at  , [facility], states okay to set up transport for 9am .  Transfer packet at nurses station.     Geni, Charge RN and Katya Bedside RN updated and will update night bedside RN.       van transport arranged with [facility] [phone]      Follow-up With  Details  Why  Contact Info   Claudia Green MD  On 8/3/2017  message sent to Nurse, she will call family to [facility] follow up appt  [address]  [address]  [place] [address]  [phone]   [facility]  On 7/21/2017  Rehab     Guanaco Ty MD  On 8/9/2017  @3:40pm  [address]  [place] [address]  [phone]             Future Appointments  Date Time Provider Department Center   8/9/2017 3:40 PM Guanaco Ty MD [facility] CARDIO [place] [facility]         07/20/17 1629   Final Note   Assessment Type Final Discharge Note  (plan for pt to leave tommorrow @ 9am.)   Discharge Disposition Rehab   Discharge planning education complete? Yes   What phone number can be called within the next 1-3 days to see how you are doing after discharge? [phone]   Hospital Follow Up  Appt(s) scheduled? Yes   Discharge plans and expectations educations in teach back method with documentation complete? Yes   Offered [facility] -- Bedside Delivery? n/a   Discharge/Hospital Encounter Summary to (non-[facility]) PCP Yes   Referral to Outpatient Case Management complete? n/a   Referral to / orders for Home Health Complete? n/a   30 day supply of medicines given at discharge, if documented non-compliance / non-adherence? n/a   Any social issues identified prior to discharge? n/a   Did you assess the readiness or willingness of the family or caregiver to support self management of care? Yes   Right Care Referral Info   Post Acute Recommendation IRF   Referral Type inpatient rehab   Facility Name [facility]  [place] inpatient rehab

## 2017-07-20 NOTE — PLAN OF CARE
Problem: Patient Care Overview  Goal: Plan of Care Review  Outcome: Ongoing (interventions implemented as appropriate)  Pt is awake and alert, but not oriented to place or time. No complaints of nausea, vomiting, or diarrhea. No complaints of pain. Cardiac diet. Pt has ambulated in the room and to the restroom. Tele 8529 and running sinus rhythm. ACHS and last bs was 121. Neuro checks wnl. Safety precautions maintained. Tolerated all medications well.

## 2017-07-20 NOTE — PHYSICIAN QUERY
PT Name: Shant Magana  MR #: [id number]     Physician Query Form - Documentation Clarification      CDS/: Mel Alcocer               Contact information:[email or web address]    This form is a permanent document in the medical record.     Query Date: July 20, 2017    By submitting this query, we are merely seeking further clarification of documentation. Please utilize your independent clinical judgment when addressing the question(s) below.    The Medical record reflects the following:    Supporting Clinical Findings Location in Medical Record     As stroke appears to be embolic, consider starting anti-coagulation; if anti-coagulation is not started, recommend aggressive anti-platelet with ASA and Plavix.     Stroke tele activated but no diagnosis of stroke and it was believed to be hypertensive encephalopathy.     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    Provider Use Only      [ x  ]  Acute CVA    [   ]  Personal history of CVA    [   ]  Other explanations with details___________________________________        If Acute CVA is chosen, the reason for CVA is:    [   ]  Embolism    [   ]  Stenosis    [   ]  Other explanations with details____________________________________                                                                                                             [x  ] Clinically undetermined

## 2017-07-20 NOTE — PT/OT/SLP PROGRESS
Physical Therapy  Treatment    Shant Magana   MRN: [id number]   Admitting Diagnosis: Hypertensive crisis, unspecified    PT Received On: 07/20/17  PT Start Time: 1150     PT Stop Time: 1214    PT Total Time (min): 24 min       Billable Minutes:  Gait [id number] and Therapeutic Exercise 10    Treatment Type: Treatment  PT/PTA: PTA     PTA Visit Number: 2       General Precautions: Standard, fall  Orthopedic Precautions: N/A   Braces: N/A    Do you have any cultural, spiritual, Advent conflicts, given your current situation?: none    Subjective:  Communicated with nsg prior to session.      Pain/Comfort  Pain Rating 1: 0/10    Objective:   Patient found with: telemetry    Functional Mobility:  Bed Mobility:   Supine to Sit: Supervision    Transfers:  Sit <> Stand Assistance: Contact Guard Assistance, Minimum Assistance (lateral lean to R)  Sit <> Stand Assistive Device: Rolling Walker    Gait:   Gait Distance: ~100' X 2 with lateral lean to R improving with distance  Assistance 1: Contact Guard Assistance  Gait Assistive Device: Rolling walker  Gait Pattern: reciprocal  Gait Deviation(s): decreased robert, decreased velocity of limb motion, decreased step length, decreased stride length    Stairs:      Balance:   Static Sit: FAIR+: Able to take MINIMAL challenges from all directions  Dynamic Sit: FAIR+: Maintains balance through MINIMAL excursions of active trunk motion  Static Stand: FAIR+: Takes MINIMAL challenges from all directions  Dynamic stand: FAIR: Needs CONTACT GUARD during gait     Therapeutic Activities and Exercises: le supine ex's X 15 reps with CGA inc: ap,qs.hs,abd/add,slr       AM-PAC 6 CLICK MOBILITY  How much help from another person does this patient currently need?   1 = Unable, Total/Dependent Assistance  2 = A lot, Maximum/Moderate Assistance  3 = A little, Minimum/Contact Guard/Supervision  4 = None, Modified [place]/Independent    Turning over in bed (including adjusting bedclothes, sheets  and blankets)?: 4  Sitting down on and standing up from a chair with arms (e.g., wheelchair, bedside commode, etc.): 3  Moving from lying on back to sitting on the side of the bed?: 3  Moving to and from a bed to a chair (including a wheelchair)?: 3  Need to walk in hospital room?: 3  Climbing 3-5 steps with a railing?: 2  Total Score: 18    AM-PAC Raw Score CMS G-Code Modifier Level of Impairment Assistance   6 % Total / Unable   7 - 9 CM 80 - 100% Maximal Assist   10 - 14 CL 60 - 80% Moderate Assist   15 - 19 CK 40 - 60% Moderate Assist   20 - 22 CJ 20 - 40% Minimal Assist   23 CI 1-20% SBA / CGA   24 CH 0% Independent/ Mod I     Patient left supine with all lines intact, call button in reach and bed alarm on.    Assessment: pt with noticeable lateral lean to R side upon sit-stand t/f and beginning of ambulation,pt to t/f to rehab today per [place]  Shant Magana is a 77 y.o. male with a medical diagnosis of Hypertensive crisis, unspecified and presents with .    Rehab identified problem list/impairments: Rehab identified problem list/impairments: weakness, impaired endurance, impaired functional mobilty, gait instability, impaired balance, impaired cognition, decreased safety awareness    Rehab potential is good.    Activity tolerance: Fair    Discharge recommendations: Discharge Facility/Level Of Care Needs: rehabilitation facility     Barriers to discharge: Barriers to Discharge: Inaccessible home environment, Decreased caregiver support    Equipment recommendations: Equipment Needed After Discharge:  (defer to in-pt rehab)     GOALS: see general POC   Physical Therapy Goals        Problem: Physical Therapy Goal    Goal Priority Disciplines Outcome Goal Variances Interventions   Physical Therapy Goal     PT/OT, PT Ongoing (interventions implemented as appropriate)     Description:  Goals to be met by: 17    Patient will increase functional independence with mobility by performin. Supine to sit with  [place]  2. Sit to stand transfer with [place]  3. Bed to chair transfer with Modified [place] with or without AD  4. Gait  x 150 feet with Stand-by Assistance with or without using AD  5. Lower extremity exercise program x15 reps                       PLAN:    Patient to be seen 5 x/week  to address the above listed problems via gait training, therapeutic activities, therapeutic exercises, neuromuscular re-education  Plan of Care expires: 08/16/17  Plan of Care reviewed with: patient         David MAE Aaron, PTA  07/20/2017

## 2017-07-20 NOTE — PROGRESS NOTES
[facility] Consult Note    Subjective:  Shant Magana is a 78 yo male with pmhx of HTN presented to the hospital for dizziness and weakness x 1 day. He reported feeling dizzy and weak as if legs giving away. In the ED stroke tele was activated, but they did not notice any changes and said that he had hypertensive encephalopathy. Initially he was being managed for BP control. CT was done which showed some acute changes. Pt was also noticed to have some gait instability and problems with coordination. Pt denies HA/N/V/F/C/CP/SOB. Currently pt is denying any weakness and is oriented x 3. He has not had any problems with speech and memory.      Medications -   No current facility-administered medications on file prior to encounter.      Current Outpatient Prescriptions on File Prior to Encounter   Medication Sig Dispense Refill    metformin (GLUCOPHAGE) 500 MG tablet Take 500 mg by mouth 2 (two) times daily with meals.      tamsulosin (FLOMAX) 0.4 mg Cp24 Take 0.4 mg by mouth once daily.      vitamin D 1000 units Tab Take 1,000 Units by mouth once daily.           Vitals -     Vitals:    07/20/17 0712 07/20/17 0800 07/20/17 0836 07/20/17 0924   BP:  136/81     BP Location:       Patient Position:       BP Method:       Pulse: 71 70  74   Resp:  17     Temp:  98.8 °F (37.1 °C)     TempSrc:  Oral     SpO2:   97%    Weight:       Height:           Neurological Exam -     General appearance: Well nourished, well developed, no acute distress.  Facial Expression: normal       Affect: full       Orientation to time & place:  Oriented to time, place, person and situation       Attention & concentration:  Normal attention span and concentration       Memory:  Recent and remote memory intact  Language: Spontaneous, fluent; able to repeat and name objects        Fund of knowledge:  Aware of current events        Speech:  normal (not dysarthric)     Cranial nerves: normal visual acuity, visual fields full, pupils equal round and  reactive, extraocular movements intact, facial sensation intact, left facial droop, hearing intact to whisper, palate raises midline, shoulder shrug strength normal, tongue protrudes midline.    Musculoskeletal:  Muscle tone: all 4 extremities normal        Muscle Bulk: all 4 extremities normal        Muscle strength:  5/5 in bilateral upper and lower extremities in proximal and distal flexion and extension       No pronator dr  Sensation: Intact to light touch, pin prick, vibration in all extremities         Deep tendon Reflexes: 2+ bilateral triceps, biceps, and brachioradialis; 2+ in bilateral patellae. Positive ankle jerks.  Plantar flexor responses bilaterally    Cerebellar and Coordination:  Gait:  deferred        Finger-nose: no dysmetria       Rapid Alternating Movements (pronation/supination):  R normal; L normal    Labs -      Recent Labs  Lab 07/14/17 2010 07/17/17  0513 07/18/17  0400 07/19/17  0625 07/20/17  0500   WBC 4.04  < > 6.31 5.20 5.27 5.12   HGB 16.5  < > 15.1 14.3 15.4 14.7   HCT 49.7  < > 44.8 43.4 45.3 43.9     < > 231 233 241 250     < > 136 134* 137 135*   K 4.7  < > 3.9 3.9 4.4 3.9     < > 105 103 102 103   CO2 18*  < > 24 25 28 23   BUN 32*  < > 23 28* 28* 30*   GLU 90  < > 111* 110 113* 114*   PROT 8.2  < > 6.8 6.8 7.2 6.9   ALBUMIN 4.0  < > 3.2* 3.2* 3.3* 3.3*   BILITOT 0.5  < > 0.8 0.7 0.7 0.6   AST 33  < > 19 18 21 25   ALKPHOS 78  < > 68 66 72 68   ALT 19  < > 12 12 16 17   INR 1.0  --   --   --   --   --    TROPONINI 0.007  --   --   --   --   --    TSH 3.718  --   --   --   --   --    < > = values in this interval not displayed.    Imaging -     MRI Brain  Time of Procedure: 07/17/17 12:06:48  Accession # [id number]    Technique: Multiplanar, multisequence images were obtained through the brain without the use of IV contrast. Additionally, noncontrast 3-D time of flight MRA head was performed.    Comparison: CT head 7/14/17.     Findings:    There are multifocal  areas of diffusion restriction consistent with acute infarction with corresponding FLAIR hyperintensity and low ADC signal.  Largest region seen within the medial aspect of the cerebellar hemisphere.  Smaller area seen within the right corona radiata.  Additionally two punctate foci seen within the left frontal white matter.  Involvement of multiple vascular distributions is suggestive for embolic etiology.      There is extensive chronic microvascular ischemic disease throughout the periventricular/supratentorial white matter.  There is small area of remote infarction involving the posterior right frontal lobe.  Ventricles are normal in size and configuration.  No intracranial hemorrhage or extraaxial fluid collection.  No mass or mass effect.      Visualized paranasal sinuses and mastoid air cells are clear. Orbits appear normal.    The bilateral distal ICA's are patent without evidence for significant focal stenosis or aneurysm.  There are multifocal areas of stenosis visualized involving the intracerebral vasculature.  This involves the right anterior communicating artery A2 segment, left anterior communicating artery A1 segment, right posterior artery P2 segment.  Left P1 segment is small with small left-sided P-comm visualized.  Left posterior cerebral artery is small in caliber throughout.  There is additional high grade stenosis versus focal occlusion involving the distal right vertebral artery V4 segment.  There is filling seen distally, possibly retrograde from the basilar artery.  Distal left vertebral artery and the basilar artery are patent.    Assessment and Plan -     Shant Magana is a 76 yo male with pmhx of HTN presented to the hospital for dizziness and weakness x 1 day. He reported feeling dizzy and weak as if legs giving away. In the ED stroke tele was activated, but they did not notice any changes and said that he had hypertensive encephalopathy. Initially he was being managed for BP control. CT  was done which showed some acute changes. Pt was also noticed to have some gait instability and problems with coordination. Pt denies HA/N/V/F/C/CP/SOB. Currently pt is denying any weakness and is oriented x 3. He has not had any problems with speech and memory.   - LE weakness and dizziness upon admission.   - Stroke tele activated but no diagnosis of stroke and it was believed to be hypertensive encephalopathy.   - Continue ASA and statin and anticoagulations.   - BP upon admission 260/124 went down with Hydralazine. BP today 152/83, continue managing the BP to keep it under control.  - CT of the head subacute/chronic frontal infarct, lacunar infarcts   - CTA Neck pending  - Recommend patient have a holter monitor or loop recorder upon discharge to look for paroxismal AFIB  -As stroke appears to be embolic, consider starting anti-coagulation; if anti-coagulation is not started, recommend aggressive anti-platelet with ASA and Plavix.    Discussed with Dr. Peter Hobson MD  [facility]

## 2017-07-20 NOTE — PROGRESS NOTES
made phone contact with [employer] dispatcher, Carmen to arrange wheelchair van transport for  within the hour.  Carmen reported the earliest [employer] can transport is 3:00pm.

## 2017-07-20 NOTE — PROGRESS NOTES
Pt accepted and auth received plan for patient to admit today to room 928.  Facility transfer orders and clinicals faxed to [phone].  Transfer packet at nurses station. Will update BEdside RN when okay to call report.

## 2017-07-20 NOTE — PLAN OF CARE
Problem: Occupational Therapy Goal  Goal: Occupational Therapy Goal  Goals to be met by: 8/17/17     Patient will increase functional independence with ADLs by performing:    LE Dressing with Modified [place].  Grooming while standing with Modified [place].  Toileting from toilet with Modified [place] for hygiene and clothing management.   Stand pivot transfers with Modified [place].  Toilet transfer to toilet with Modified [place].  Increased functional strength to WFL for self care skills and functional mobility.  Upper extremity exercise program x10 reps per handout, with independence.     Outcome: Ongoing (interventions implemented as appropriate)  Shant Magana is a 77 y.o. male with a medical diagnosis of Hypertensive crisis, unspecified and presents with confusion, impulsivity, and with decreased overall strength, endurance, balance and [place] and safety with ADL's and fx mobility. Pt is progressing towards goals.  He is a fall risk secondary to impulsive and unsteady on feet requiring CGA-Min A to regain balance. Continue OT services to address functional goals  PINKY Kumari/MADELEINE

## 2017-07-20 NOTE — PROGRESS NOTES
[facility] Resident HO-II Progress Note    Subjective:      Pt has no complaints this AM. Tolerating meds and diet. Awaiting acceptance  of an inpt rehab  Objective:   Last 24 Hour Vital Signs:  BP  Min: 123/56  Max: 147/88  Temp  Av.5 °F (36.9 °C)  Min: 98 °F (36.7 °C)  Max: 99.3 °F (37.4 °C)  Pulse  Av.9  Min: 70  Max: 75  Resp  Av.8  Min: 17  Max: 18  SpO2  Av.1 %  Min: 95 %  Max: 98 %  I/O last 3 completed shifts:  In: 540 [P.O.:540]  Out: 790 [Urine:790]    Physical Examination:  General: AAOx3, NAD  HENT: moist mucous membranes, EOMI  CV: RRR  Resp: CTAB, no wheezes, rhonchi or crackles  Abd: soft, NTND, normoactive bowel sounds  Ext: no peripheral edema, 2+ DP pulses bilaterally  Neuro: AA&O to person, place, time and situation    Laboratory:  Laboratory Data Reviewed: yes    Microbiology Data Reviewed: yes    Other Results:  EKG (my interpretation): None new    Radiology Data Reviewed: yes      Head MRI w/out contrast ():  Impression        1.  Multifocal areas of acute infarction involving the right cerebellar hemisphere, right corona radiata, and two small punctate foci within the left frontal lobe.  Involvement of multiple vascular distributions is suggested for embolic etiology.     2.  Right frontal lobe remote infarction with additional extensive chronic microvascular ischemic disease.    3.  Multifocal areas of intracranial stenosis as above including area of high-grade stenosis versus occlusion involving the right distal vertebral artery V4 segment.     Head MRA w/out contrast ():  Impression        1.  Multifocal areas of acute infarction involving the right cerebellar hemisphere, right corona radiata, and two small punctate foci within the left frontal lobe.  Involvement of multiple vascular distributions is suggested for embolic etiology.     2.  Right frontal lobe remote infarction with additional extensive chronic microvascular ischemic disease.    3.   Multifocal areas of intracranial stenosis as above including area of high-grade stenosis versus occlusion involving the right distal vertebral artery V4 segment.           Current Medications:     Infusions:        Scheduled:   amlodipine  10 mg Oral Daily    aspirin  81 mg Oral Daily    atorvastatin  80 mg Oral Daily    hydrALAZINE  50 mg Oral Q8H    hydrochlorothiazide  25 mg Oral Daily    losartan  100 mg Oral Daily    tamsulosin  0.4 mg Oral Daily        PRN:  dextrose 50%, glucagon (human recombinant), pneumoc 13-uyen conj-dip cr(PF)    Antibiotics and Day Number of Therapy:  none    Assessment/Plan:     Shant Magana is a 77 y.o.male with    Hypertensive emergency/encephalopathy  -LE weakness, dizziness, BP on admit 260/124  -Hydralazine given with fast drop in /85  -Trops wnl, SHIKHA vs CKD  -CT head w/ subacute/chronic frontal infarct, lacunar infarcts, CXR wnl  -EKG w/ ant Q waves, biphasic nonspec TW abn  -Vasc neurology believes this was HTN encephalopathy  -Restarted home meds, losartan, amlodipine, HCTZ per cardiology recs, cardene gtt held this morning for goal /80  -TTE with concentric hypertrophy, mild LA enlargement, normal EF 60-65%, indeterminate diastolic function  -Renal artery US w/ likely renal artery stenosis, aldosterone pending  -MRI/MRA (7-17) showed 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, vertebral art stenosis   -CTA neck with multi post vessel disease, ESDRAS neg for thrombus   - continue statin & ASA, plavix, awaiting inpt rehab acceptance    Controlled, DM Type II  -On metformin, holding while inpatient  -Cover with SSI/accuchecks  -A1C 5.6     HLD  -On lipid panel 7/14, , continue statin     CKD III  -Cr appears near baseline ~1.4-1.7     [facility] UTD, needs vaccines     Dispo: Awaiting acceptance of inpt rehab      Gary Otero  [facility] HO-II  [facility] Service Team B    [facility] Hospitalist Pager numbers:   [facility] Hospital[facility] Medicine Team A  (Hanny/Loco): [phone]  [facility] Hospitalist Medicine Team B (Kacey/Nico):  [phone]

## 2017-07-20 NOTE — PLAN OF CARE
TN spoke with [facility] admissions cordinator, report off put on hold due to staffing issues at facility. Admissions coordinator will call back  TN when staffing arrangements are made. TN to follow up.    Katya bedside RN updated.         UPdate: 2:50PM   Rafal Aman unable to safely admit patient today due to staffing restraints. Patient able to admit tomorrow am. TN will arrange transportation today with [facility] van for tomorrow.         Bed side RN to call report to [phone] @ 9am. Patient will be going to room 928.    Admissions, Ara states okay to set up transport for 9am .     Dina Arechiga RN and Katya Bedside RN updated and will updated night bedside RN.      Future Appointments  Date Time Provider Department Center   8/9/2017 3:40 PM Guanaco Ty MD [facility] CARDIO Estephania Maldonado

## 2017-07-20 NOTE — PLAN OF CARE
Problem: Physical Therapy Goal  Goal: Physical Therapy Goal  Goals to be met by: 17    Patient will increase functional independence with mobility by performin. Supine to sit with [place]  2. Sit to stand transfer with [place]  3. Bed to chair transfer with Modified [place] with or without AD  4. Gait  x 150 feet with Stand-by Assistance with or without using AD  5. Lower extremity exercise program x15 reps      Outcome: Ongoing (interventions implemented as appropriate)  Goals ongoing

## 2017-07-20 NOTE — PROGRESS NOTES
made phone contact with [employer] and cancelled transportation for today.  Wheelchair van transportation was rescheduled for tomorrow, 07/21/17 at 9:00 am.

## 2017-07-20 NOTE — PT/OT/SLP PROGRESS
"Occupational Therapy  Treatment    Shant Magana   MRN: [id number]   Admitting Diagnosis: Hypertensive crisis, unspecified    OT Date of Treatment: 07/20/17   OT Start Time: 1115  OT Stop Time: 1140  OT Total Time (min): 25 min    Billable Minutes:  Self Care/Home Management 10 and Therapeutic Activity 15    General Precautions: Standard, fall  Orthopedic Precautions: N/A  Braces:           Subjective:  Communicated with RN prior to session.  "I know I'm off balance."    Pain/Comfort  Pain Rating 1: 0/10  Pain Rating Post-Intervention 1: 0/10    Objective:  Patient found with: telemetry     Functional Mobility:  Bed Mobility:  Rolling/Turning Right: Modified independent  Scooting/Bridging: Modified Independent  Supine to Sit: Modified Independent  Sit to Supine: Modified Independent    Transfers:   Sit <> Stand Assistance: Stand By Assistance  Sit <> Stand Assistive Device: No Assistive Device  Toilet Transfer Technique: Stand Pivot  Toilet Transfer Assistance: Contact Guard Assistance  Toilet Transfer Assistive Device: No Assistive Device    Functional Ambulation: Pt ambulated around room with CGA without AE. Pt with LOB at times and able to right self with CGA. Pt reports he is aware of his being unsteady on feet.    Activities of Daily Living:     LE Dressing Level of Assistance: Stand by assistance (min difficulty donning socks) at EOB  Grooming Position: Standing at sink  Grooming Level of Assistance: Contact guard assistance, Stand by assistance  Toileting Where Assessed: Toilet  Toileting Level of Assistance: Stand by assistance, Contact guard (standing to urinate)          Balance:   Static Sit: GOOD: Takes MODERATE challenges from all directions  Dynamic Sit: GOOD: Maintains balance through MODERATE excursions of active trunk movement  Static Stand: FAIR+: Takes MINIMAL challenges from all directions  Dynamic stand: FAIR: Needs CONTACT GUARD during gait    Therapeutic Activities and Exercises:  Pt moved sit<>stand " "2 x 5 reps with SBA.  Pt reached in various planes in standing with CGA-SBA requiring min trunk excursions.   Pt performed BUE yellow theraband exercises x 10 reps for shld flexion, hori abd/add, bicep curls and elbow extensions.     AM-PAC 6 CLICK ADL   How much help from another person does this patient currently need?   1 = Unable, Total/Dependent Assistance  2 = A lot, Maximum/Moderate Assistance  3 = A little, Minimum/Contact Guard/Supervision  4 = None, Modified [place]/Independent    Putting on and taking off regular lower body clothing? : 3  Bathing (including washing, rinsing, drying)?: 3  Toileting, which includes using toilet, bedpan, or urinal? : 3  Putting on and taking off regular upper body clothing?: 4  Taking care of personal grooming such as brushing teeth?: 4  Eating meals?: 4  Total Score: 21     AM-PAC Raw Score CMS "G-Code Modifier Level of Impairment Assistance   6 % Total / Unable   7 - 8 CM 80 - 100% Maximal Assist   9-13 CL 60 - 80% Moderate Assist   14 - 19 CK 40 - 60% Moderate Assist   20 - 22 CJ 20 - 40% Minimal Assist   23 CI 1-20% SBA / CGA   24 CH 0% Independent/ Mod I       Patient left supine with all lines intact, call button in reach and bed alarm on    ASSESSMENT:  Shant Magana is a 77 y.o. male with a medical diagnosis of Hypertensive crisis, unspecified and presents with confusion, impulsivity, and with decreased overall strength, endurance, balance and [place] and safety with ADL's and fx mobility. Pt is progressing towards goals.  He is a fall risk secondary to impulsive and unsteady on feet requiring CGA-Min A to regain balance. Continue OT services to address functional goals        Rehab identified problem list/impairments: Rehab identified problem list/impairments: weakness, impaired endurance, impaired self care skills, impaired functional mobilty, gait instability, impaired balance, impaired cognition, decreased safety awareness    Rehab potential is " good.    Activity tolerance: Good    Discharge recommendations:       Barriers to discharge:      Equipment recommendations:       GOALS:    Occupational Therapy Goals        Problem: Occupational Therapy Goal    Goal Priority Disciplines Outcome Interventions   Occupational Therapy Goal     OT, PT/OT Ongoing (interventions implemented as appropriate)    Description:  Goals to be met by: 8/17/17     Patient will increase functional independence with ADLs by performing:    LE Dressing with Modified [place].  Grooming while standing with Modified [place].  Toileting from toilet with Modified [place] for hygiene and clothing management.   Stand pivot transfers with Modified [place].  Toilet transfer to toilet with Modified [place].  Increased functional strength to [facility] for self care skills and functional mobility.  Upper extremity exercise program x10 reps per handout, with independence.                      Plan:  Patient to be seen 5 x/week to address the above listed problems via self-care/home management, therapeutic activities, therapeutic exercises  Plan of Care expires: 08/17/17  Plan of Care reviewed with: patient         RATNA Kumari  07/20/2017

## 2017-07-21 VITALS
DIASTOLIC BLOOD PRESSURE: 75 MMHG | HEIGHT: 71 IN | RESPIRATION RATE: 18 BRPM | WEIGHT: 215.81 LBS | OXYGEN SATURATION: 97 % | BODY MASS INDEX: 30.21 KG/M2 | TEMPERATURE: 99 F | SYSTOLIC BLOOD PRESSURE: 124 MMHG | HEART RATE: 72 BPM

## 2017-07-21 LAB
ALBUMIN SERPL BCP-MCNC: 3.2 G/DL
ALP SERPL-CCNC: 71 U/L
ALT SERPL W/O P-5'-P-CCNC: 18 U/L
ANION GAP SERPL CALC-SCNC: 6 MMOL/L
AST SERPL-CCNC: 22 U/L
BASOPHILS # BLD AUTO: 0.02 K/UL
BASOPHILS NFR BLD: 0.5 %
BILIRUB SERPL-MCNC: 0.7 MG/DL
BUN SERPL-MCNC: 30 MG/DL
CALCIUM SERPL-MCNC: 10.3 MG/DL
CHLORIDE SERPL-SCNC: 101 MMOL/L
CO2 SERPL-SCNC: 27 MMOL/L
CREAT SERPL-MCNC: 1.5 MG/DL
DIFFERENTIAL METHOD: NORMAL
EOSINOPHIL # BLD AUTO: 0.2 K/UL
EOSINOPHIL NFR BLD: 3.7 %
ERYTHROCYTE [DISTWIDTH] IN BLOOD BY AUTOMATED COUNT: 13.4 %
EST. GFR  (AFRICAN AMERICAN): 51 ML/MIN/1.73 M^2
EST. GFR  (NON AFRICAN AMERICAN): 44 ML/MIN/1.73 M^2
GLUCOSE SERPL-MCNC: 99 MG/DL
HCT VFR BLD AUTO: 44.6 %
HGB BLD-MCNC: 14.7 G/DL
LYMPHOCYTES # BLD AUTO: 1.3 K/UL
LYMPHOCYTES NFR BLD: 31.2 %
MCH RBC QN AUTO: 29.6 PG
MCHC RBC AUTO-ENTMCNC: 33 G/DL
MCV RBC AUTO: 90 FL
MONOCYTES # BLD AUTO: 0.5 K/UL
MONOCYTES NFR BLD: 11.7 %
NEUTROPHILS # BLD AUTO: 2.2 K/UL
NEUTROPHILS NFR BLD: 52.9 %
PLATELET # BLD AUTO: 252 K/UL
PLATELET BLD QL SMEAR: NORMAL
PMV BLD AUTO: 11.3 FL
POCT GLUCOSE: 86 MG/DL (ref 70–110)
POCT GLUCOSE: 89 MG/DL (ref 70–110)
POTASSIUM SERPL-SCNC: 3.9 MMOL/L
PROT SERPL-MCNC: 6.9 G/DL
RBC # BLD AUTO: 4.97 M/UL
SODIUM SERPL-SCNC: 134 MMOL/L
WBC # BLD AUTO: 4.1 K/UL

## 2017-07-21 PROCEDURE — 80053 COMPREHEN METABOLIC PANEL: CPT

## 2017-07-21 PROCEDURE — 25000003 PHARM REV CODE 250: Performed by: STUDENT IN AN ORGANIZED HEALTH CARE EDUCATION/TRAINING PROGRAM

## 2017-07-21 PROCEDURE — 94761 N-INVAS EAR/PLS OXIMETRY MLT: CPT

## 2017-07-21 PROCEDURE — 85025 COMPLETE CBC W/AUTO DIFF WBC: CPT

## 2017-07-21 PROCEDURE — 25000003 PHARM REV CODE 250: Performed by: INTERNAL MEDICINE

## 2017-07-21 PROCEDURE — 36415 COLL VENOUS BLD VENIPUNCTURE: CPT

## 2017-07-21 RX ADMIN — AMLODIPINE BESYLATE 10 MG: 5 TABLET ORAL at 08:07

## 2017-07-21 RX ADMIN — ASPIRIN 81 MG: 81 TABLET, COATED ORAL at 08:07

## 2017-07-21 RX ADMIN — LOSARTAN POTASSIUM 100 MG: 50 TABLET, FILM COATED ORAL at 08:07

## 2017-07-21 RX ADMIN — HYDROCHLOROTHIAZIDE 25 MG: 25 TABLET ORAL at 08:07

## 2017-07-21 RX ADMIN — ATORVASTATIN CALCIUM 80 MG: 40 TABLET, FILM COATED ORAL at 08:07

## 2017-07-21 RX ADMIN — TAMSULOSIN HYDROCHLORIDE 0.4 MG: 0.4 CAPSULE ORAL at 08:07

## 2017-07-21 RX ADMIN — HYDRALAZINE HYDROCHLORIDE 50 MG: 25 TABLET, FILM COATED ORAL at 05:07

## 2017-07-21 NOTE — PLAN OF CARE
Problem: Patient Care Overview  Goal: Plan of Care Review  Outcome: Ongoing (interventions implemented as appropriate)  Pt on RA with sats of 97%. Will continue to monitor.

## 2017-07-21 NOTE — PT/OT/SLP DISCHARGE
Occupational Therapy Discharge Summary    Shant Magana  MRN: [id number]   Hypertensive crisis, unspecified   Patient Discharged from acute Occupational Therapy on 7/21/17.  Please refer to prior OT note dated on 7/20/17 for functional status.     Assessment:   Patient appropriate for care in another setting.  GOALS:    Occupational Therapy Goals        Problem: Occupational Therapy Goal    Goal Priority Disciplines Outcome Interventions   Occupational Therapy Goal     OT, PT/OT Ongoing (interventions implemented as appropriate)    Description:  Goals to be met by: 8/17/17     Patient will increase functional independence with ADLs by performing:    LE Dressing with Modified [place].  Grooming while standing with Modified [place].  Toileting from toilet with Modified [place] for hygiene and clothing management.   Stand pivot transfers with Modified [place].  Toilet transfer to toilet with Modified [place].  Increased functional strength to WFL for self care skills and functional mobility.  Upper extremity exercise program x10 reps per handout, with independence.                    Reasons for Discontinuation of Therapy Services  Transfer to alternate level of care.      Plan:  Patient Discharged to: Inpatient Rehab.    Jaclyn Mcallister, OT  7/21/2017

## 2017-07-21 NOTE — PLAN OF CARE
Pt discharged this am to [facility] rehab via [facility] van transport.

## 2017-07-21 NOTE — PROGRESS NOTES
Report given to nurse at  rehab. Nurse with no further question and number left for questions. Pt with family at bedside with assistance getting dresses. Pt  Discharged per wheelchair van.

## 2017-07-21 NOTE — PLAN OF CARE
Problem: Patient Care Overview  Goal: Plan of Care Review  Pt is in NAD and denies any pain or nausea. VSS and glucose monitored. Pt IV access was lost, cathter intact. Pt requested not to have IV replaced. Some disorientation noted to time and place, pt reoriented. Safety measures observed and maintained. Bed alarm on and bed rails up x3. Pt denies any needs or desires. Will continue to monitor.

## 2017-07-26 DIAGNOSIS — I63.9 CVA (CEREBROVASCULAR ACCIDENT): Primary | ICD-10-CM

## 2017-07-26 NOTE — DISCHARGE SUMMARY
[facility] Med Team B Discharge Summary    Primary Team: [facility] Med Team B  Attending Physician: Kacey  Resident: Shanna  Intern: Jennifer    Date of Admit: 7/14/2017  Date of Discharge: 7/21/2017    Discharge to: [facility]  Condition: Stable    Discharge Diagnoses     Patient Active Problem List   Diagnosis    Unspecified essential hypertension    Bradycardia    Hypertensive encephalopathy    Hypertensive crisis, unspecified    Hypertensive emergency    Type 2 diabetes mellitus with complication, without long-term current use of insulin       Consultants and Procedures     Consultants:  Neuro, cardiology    Procedures:   none    Brief History of Present Illness    Pt is a 78 yo male w/ PMH of uncontrolled HTN presenting for dizziness and weakness x 1 day. Pt was in USOH which includes all adl's/iadl's no limittations up until this evening when he walked in to his room from being outside. He reported he instantly became dizzy and collapsed to the ground. Pt said his friend witnessed the event. He also reported his legs gave way because in addition to dizzy, they felt weak. He couldn't get himself up and EMS was called at that time. Per EMS report, they noticed pt had face droop in the ambulence. Stroke activation called here in ED however face droop not appreciated by staff here. Pt BP on arrival was 260/124. CT head showing subacute/chronic ischemic changes. [facility] neurology consulted but felt pt was having hypertensive encephalopathy and not actual stroke. Hydralazine given with brisk drop in BP. Denies chest pain, SOB, palpitations, headache, numbness, weakness on my ROS    Pt admitted to med service. Initially placed in ICU where cardene gtt was titrated for BP. Home meds restarted. Pt BP trended down and pt was stepped down to floor. He underwent an extensive stroke workup, MRI/MRA revealing acute strokes. Echo was unrevealing for embolic source. Pt had PT/OT/ST following. Cards consulted for additional  recs. He has extensive vascular plaque burdon and will need close follow up outpt. Pt will transition to  Inpt rehab. Dcd on additional antihypertensives and ASA/plavix. It was decided not to start anticoagulation if there is an thrombus because with his extremely elevated initial BP, he is at future risks for bleeds. Stable for dc. Appropriate follow ups in place.    For the full HPI please refer to the History & Physical from this admission.    Hospital Course By Problem with Pertinent Findings   Hypertensive emergency/acute stroke  -LE weakness, dizziness, BP on admit 260/124  -Hydralazine given with fast drop in /85  -Trops wnl, SHIKHA vs CKD  -CT head w/ subacute/chronic frontal infarct, lacunar infarcts, CXR wnl  -EKG w/ ant Q waves, biphasic nonspec TW abn  -Vasc neurology believes this was HTN encephalopathy  -Restarted home meds, losartan, amlodipine, HCTZ per cardiology recs  -TTE with concentric hypertrophy, mild LA enlargement, normal EF 60-65%, indeterminate diastolic function  -Renal artery US w/ likely renal artery stenosis, aldosterone wnl  -MRI/MRA (7-17) showed 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, vertebral art stenosis   -CTA neck with multi post vessel disease, ESDRAS neg for thrombus   - continue statin & ASA, plavix on dc, no anticoag (if pt has thrombus not visualized on TTE) 2/2 multiple risk factors for future bleed, discharged to  inpt rehab     Controlled, DM Type II  -On metformin, held while inpatient  -Covered with SSI/accuchecks  -A1C 5.6  -Metformin on dc     HLD  -On lipid panel 7/14, , continue statin     CKD III  -Cr near baseline on dc ~1.4-1.7    Discharge Medications        Medication List      START taking these medications    amlodipine 10 MG tablet  Commonly known as:  NORVASC  Take 1 tablet (10 mg total) by mouth once daily.     clopidogrel 75 mg tablet  Commonly known as:  PLAVIX  Take 1 tablet (75 mg total) by mouth once daily.     hydrALAZINE 50 MG tablet  Commonly  known as:  APRESOLINE  Take 1 tablet (50 mg total) by mouth every 8 (eight) hours.     hydrochlorothiazide 25 MG tablet  Commonly known as:  HYDRODIURIL  Take 1 tablet (25 mg total) by mouth once daily.     losartan 100 MG tablet  Commonly known as:  COZAAR  Take 1 tablet (100 mg total) by mouth once daily.        CONTINUE taking these medications    aspirin 81 MG EC tablet  Commonly known as:  ECOTRIN  Take 1 tablet (81 mg total) by mouth once daily.     atorvastatin 80 MG tablet  Commonly known as:  LIPITOR  Take 1 tablet (80 mg total) by mouth once daily.     GLUCOPHAGE 500 MG tablet  Generic drug:  metformin     tamsulosin 0.4 mg Cp24  Commonly known as:  FLOMAX     vitamin D 1000 units Tab        STOP taking these medications    AMLODIPINE-BENAZEPRIL ORAL     fluticasone 50 mcg/actuation Dsdv     losartan-hydrochlorothiazide 100-25 mg 100-25 mg per tablet  Commonly known as:  HYZAAR     ranitidine 150 MG tablet  Commonly known as:  ZANTAC           Where to Get Your Medications      You can get these medications from any pharmacy    Bring a paper prescription for each of these medications  · amlodipine 10 MG tablet  · aspirin 81 MG EC tablet  · atorvastatin 80 MG tablet  · clopidogrel 75 mg tablet  · hydrALAZINE 50 MG tablet  · hydrochlorothiazide 25 MG tablet  · losartan 100 MG tablet         Discharge Information:   Diet:  Cardiac/diabetic    Physical Activity:  Per PT/OT recs    Instructions:  1. Take all medications as prescribed  2. Keep all follow-up appointments  3. Return to the hospital or call your primary care physicians if any worsening symptoms such as chest pain, shortness of breath, palpitations, fevers, nausea, vomiting, diarrhea, dizziness, loss of consciousness, urinary/bowel abnormalities from baseline, weakness, numbness, vision changes, headaches, bleeding occur.      Follow-Up Appointments:  Cards, PCP, neuro    Gary Otero  [facility], -

## 2017-08-04 LAB
AORTIC VALVE REGURGITATION: NORMAL
DIASTOLIC DYSFUNCTION: NO
RETIRED EF AND QEF - SEE NOTES: 70 (ref 55–65)

## 2017-08-09 ENCOUNTER — CLINICAL SUPPORT (OUTPATIENT)
Dept: REHABILITATION | Facility: HOSPITAL | Age: 77
End: 2017-08-09
Attending: PSYCHIATRY & NEUROLOGY
Payer: MEDICARE

## 2017-08-09 DIAGNOSIS — I16.9 HYPERTENSIVE CRISIS, UNSPECIFIED: Primary | ICD-10-CM

## 2017-08-09 PROCEDURE — G8980 MOBILITY D/C STATUS: HCPCS | Mod: CI,PN

## 2017-08-09 PROCEDURE — G8978 MOBILITY CURRENT STATUS: HCPCS | Mod: CI,PN

## 2017-08-09 PROCEDURE — 97161 PT EVAL LOW COMPLEX 20 MIN: CPT | Mod: PN

## 2017-08-09 PROCEDURE — G8979 MOBILITY GOAL STATUS: HCPCS | Mod: CI,PN

## 2017-08-09 NOTE — PLAN OF CARE
TIME RECORD    Date: 2017    Start Time:  0830  Stop Time:  0900    PROCEDURES:    TIMED  Procedure Min.                         UNTIMED  Procedure Min.   1 low comp eval 30         Total Timed Minutes:  0  Total Timed Units:  0  Total Untimed Units:  1   Charges Billed/# of units:  1 low comp eval      OUTPATIENT NEUROLOGICAL REHABILITATION  PHYSICAL THERAPY EVALUATION    Onset Date: early 2016  Primary Diagnosis:   1. Hypertensive crisis, unspecified       Treatment Diagnosis: CVA  Past Medical History:   Diagnosis Date    Cancer     prostate    Diabetes mellitus     Hypertension     Hypertrophic cardiomyopathy     Renal disorder      Precautions: HR monitor in place  Prior Therapy: in acute care onlye  Medications: Shant Magana has a current medication list which includes the following prescription(s): amlodipine, aspirin, atorvastatin, clopidogrel, hydralazine, hydrochlorothiazide, losartan, metformin, tamsulosin, and vitamin d.  Nutrition:  Normal  History of Present Illness: dizziness and HTN  Prior Level of Function: Independent  Social History: lives alone  Place of Residence (Steps/Adaptations): [facility] with 5-6 steps to enter  Functional Deficits Leading to Referral/Nature of Injury: CVA and HTN  Previous functional status includes: INdep mobility and driving  Current functional status:  Supervision upon d/c for hosptial and now Indep  DME owned: SPC and tub bench  Work/Job description:  retired  Fall Incidence: fall with initial presentation of CVA  Patient Therapy Goals: to drive myself places I want to go      Subjective:      Pt stated: he only had HTN and no other problems outside of dizziness   Family present/states: he has been doing well at home with no assistance from them but he has started using his tub bench to bath since the stroke for safety  Pain: 0/10    Objective:      - Command followin%   - Speech: no deficits     Mental status: alert, oriented to person, place, and time,  normal mood, behavior, speech, dress, motor activity, and thought processes  Behavior:  calm  Attention Span and Concentration:  Normal    Dominant hand:  right     Posture Alignment :forward head    Sensation:  Light Touch: Intact           Proprioception:   Intact    Tone: 0 - No increase in muscle tone    Visual/Auditory: denies changes     Coordination:   - fine motor: intact  - UE coordination: intact    - LE coordination:  intact    ROM:   UPPER EXTREMITY--AROM/PROM  (R) UE: WNLs  (L) UE: WNLs           RANGE OF MOTION--LOWER EXTREMITIES  (R) LE Hip: normal   Knee: normal   Ankle: normal    (L) LE: Hip: normal   Knee: normal   Ankle: normal    Upper Extremity Strength   RUE LUE   Shoulder Flexion:    Shoulder Abduction:    Elbow Flexion:        Elbow Extension:    Wrist Flexion:    Wrist Extension:    :      Lower Extremity Strength   RLE LLE   Hip Flexion:    Hip Extension:     Hip Abduction:    Knee Extension:    Knee Flexion:    Ankle Dorsiflexion:    Ankle Plantarflexion:        Gait Assessment:   - AD used: none  - Assistance: none  - Distance: >200 ft  - Stairs: Indep    Gait Analysis:  Deviations noted: none significant    Impairments contributing to deviations:  none    Endurance Deficit: WNL     Balance Assessment:    Sitting balance (static,dynamic):WNL  Standing balance (static, dynamic):See garcia and DGI  Dynamic Gait Index - assessed without AD  1. Gait level surface -  3  2. Change in gait speed - 3  3. Gait with horizontal head turns - 3  4. Gait with vertical head turns - 3  5. Gait and pivot turn - 3  6. Step over obstacle - 2  7. Step around obstacle - 3  8. Steps - 2  Total  9% disabled CI current mobility 8978     Garcia Balance Test    1. Sit to stand: 4  2. Standin  3. Sittin  4. Stand to sit: 4  5. Transfers: 4  6. Stand with EC: 4  7. Stand feet together: 4  8. Reaching: 3  9.   objects: 4  10.Turning to look: 3  11. 360 Turn: 4  12. Alternating step up: 3  13. Tandem: 3  14. SLS: 2    Total Score 50/56 11% disabled  CI current mobility 8978     Functional Mobility (Bed mobility, transfers)  Bed mobility: I  Supine to sit: I  Sit to supine: I  Rolling: I  Transfers to bed: I  Sit to stand:  I  Stand pivot:  I  Stairs: I  Wheelchair mobility: I    Patient Education/Response:     Written Home Exercises Provided: see scanned doc in media  Pt demo good understanding of the education provided. Shant demonstrated good return demonstration of activities.     Education provided re:role of PT- pt verbalized good understanding.     Assessment:   Initial Assessment (Pertinent finding, problem list and factors affecting outcome):This is a 77 y.o. male referred to outpatient physical therapy and presents with a medical diagnosis of recent CVA that manifested with dizziness and severe hypertension.  All other symptoms have resolved and pt is completely indep with mobility and at low falls risk.  He has strength that is equal B LE and UE and WFL.  His coordination and sensation are intact as well.  Pt has had no memory, speech or vision problems.  His condition has improved since CVA and symptoms are resolved.  Pt has no skilled needs for rehab at this time.    History  Co-morbidities and personal factors that may impact the plan of care Examination  Body Structures and Functions, activity limitations and participation restrictions that may impact the plan of care Clinical Presentation   Decision Making/ Complexity Score   Co-morbidities:   Cancer   prostate   Diabetes mellitus   Hypertension   Hypertrophic cardiomyopathy   Renal disorder       Personal Factors:     high Body Regions: , UE, trunk,  LE    Body Systems: musculoskeletal - posture, ROM, strength; neuromuscular - sensation, balance, gait      Activity limitations and Participation Restrictions: pt cant drive    low     FOTO: 24%  limitation    low   low       Plans and Goals:   D/c pt upon initial eval with HEP due to pt having no skilled needs for rehab.    Other Recommendations: none, Pt has no current need for skilled OT or SLP      Therapist: Evy Aguila PT    I CERTIFY THE NEED FOR THESE SERVICES FURNISHED UNDER THIS PLAN OF TREATMENT AND WHILE UNDER MY CARE    Physician's comments: ____________________________________________________________________________________________________________________________________________    Physician's Name: ___________________________________

## 2017-08-14 ENCOUNTER — OFFICE VISIT (OUTPATIENT)
Dept: CARDIOLOGY | Facility: CLINIC | Age: 77
End: 2017-08-14
Payer: MEDICARE

## 2017-08-14 VITALS
BODY MASS INDEX: 28.47 KG/M2 | DIASTOLIC BLOOD PRESSURE: 81 MMHG | OXYGEN SATURATION: 98 % | SYSTOLIC BLOOD PRESSURE: 132 MMHG | HEIGHT: 71 IN | WEIGHT: 203.38 LBS

## 2017-08-14 DIAGNOSIS — I63.422 CEREBROVASCULAR ACCIDENT (CVA) DUE TO EMBOLISM OF LEFT ANTERIOR CEREBRAL ARTERY: Chronic | ICD-10-CM

## 2017-08-14 DIAGNOSIS — I16.1 HYPERTENSIVE EMERGENCY: ICD-10-CM

## 2017-08-14 DIAGNOSIS — I10 UNSPECIFIED ESSENTIAL HYPERTENSION: Primary | ICD-10-CM

## 2017-08-14 PROCEDURE — 3079F DIAST BP 80-89 MM HG: CPT | Mod: S$GLB,,, | Performed by: INTERNAL MEDICINE

## 2017-08-14 PROCEDURE — 3008F BODY MASS INDEX DOCD: CPT | Mod: S$GLB,,, | Performed by: INTERNAL MEDICINE

## 2017-08-14 PROCEDURE — 1126F AMNT PAIN NOTED NONE PRSNT: CPT | Mod: S$GLB,,, | Performed by: INTERNAL MEDICINE

## 2017-08-14 PROCEDURE — 99999 PR PBB SHADOW E&M-EST. PATIENT-LVL II: CPT | Mod: PBBFAC,,, | Performed by: INTERNAL MEDICINE

## 2017-08-14 PROCEDURE — 99214 OFFICE O/P EST MOD 30 MIN: CPT | Mod: S$GLB,,, | Performed by: INTERNAL MEDICINE

## 2017-08-14 PROCEDURE — 3075F SYST BP GE 130 - 139MM HG: CPT | Mod: S$GLB,,, | Performed by: INTERNAL MEDICINE

## 2017-08-14 PROCEDURE — 1159F MED LIST DOCD IN RCRD: CPT | Mod: S$GLB,,, | Performed by: INTERNAL MEDICINE

## 2017-08-14 NOTE — PROGRESS NOTES
Chief Complaint:  Shant Magana is a 77 y.o. male who presents for evaluation of Hospital Follow Up and Hypertension      HPI:   Mr. Magana was admitted to [facility] in 2017 for hypertensive crisis/emergency and was diagnosed with a stroke.  MRI showed multiple acute infarcts suggestive of embolic etiology - right cerebellar, right corona radiata and left frontal lobe.  CTA showed 80% LICA stenosis and bilateral vertebral disease, multiple intracranial stenoses.  Carotid U/S showed < 50% BROOK and 50-69% LICA stenosis.  Significant LVH and LAE on TTE.  No Afib diagnosed.  His stroke symptoms have resolved.      He discontinued taking hydralazine tid due to diarrhea.      He has a 4 week event monitor in place to screen for Afib.  He has not experienced any symptoms.      Patient Active Problem List    Diagnosis Date Noted    Cerebrovascular accident (CVA) due to embolism of anterior cerebral artery 08/15/2017    Hypertensive crisis, unspecified 07/15/2017    Hypertensive emergency     Type 2 diabetes mellitus with complication, without long-term current use of insulin     Hypertensive encephalopathy 2017    Unspecified essential hypertension 2017    Bradycardia 2017       Right Arm BP - Sittin/81  Left Arm BP - Sittin/86    Current Outpatient Prescriptions   Medication Sig    amlodipine (NORVASC) 10 MG tablet Take 1 tablet (10 mg total) by mouth once daily.    aspirin (ECOTRIN) 81 MG EC tablet Take 1 tablet (81 mg total) by mouth once daily.    atorvastatin (LIPITOR) 80 MG tablet Take 1 tablet (80 mg total) by mouth once daily.    clopidogrel (PLAVIX) 75 mg tablet Take 1 tablet (75 mg total) by mouth once daily.    hydrochlorothiazide (HYDRODIURIL) 25 MG tablet Take 1 tablet (25 mg total) by mouth once daily.    losartan (COZAAR) 100 MG tablet Take 1 tablet (100 mg total) by mouth once daily.    metformin (GLUCOPHAGE) 500 MG tablet Take 500 mg by mouth 2 (two) times daily with  meals.    tamsulosin (FLOMAX) 0.4 mg Cp24 Take 0.4 mg by mouth once daily.    vitamin D 1000 units Tab Take 1,000 Units by mouth once daily.     No current facility-administered medications for this visit.        Review of Systems   Constitution: Negative for diaphoresis, weakness, malaise/fatigue, weight gain and weight loss.   HENT: Negative for nosebleeds.    Cardiovascular: Negative for chest pain, claudication, cyanosis, dyspnea on exertion, irregular heartbeat, leg swelling, near-syncope, orthopnea, palpitations, paroxysmal nocturnal dyspnea and syncope.   Respiratory: Negative for cough, hemoptysis, shortness of breath, sleep disturbances due to breathing, snoring, sputum production and wheezing.    Hematologic/Lymphatic: Negative for bleeding problem. Does not bruise/bleed easily.   Skin: Negative for poor wound healing and rash.   Musculoskeletal: Negative for myalgias.   Gastrointestinal: Negative for abdominal pain, heartburn, hematemesis, hematochezia, hemorrhoids and melena.   Neurological: Negative for dizziness, focal weakness, light-headedness and loss of balance.   Psychiatric/Behavioral: Negative for altered mental status, depression and memory loss.         Objective:     Physical Exam   Constitutional: He is oriented to person, place, and time. He appears well-developed and well-nourished. No distress. He is not intubated.   HENT:   Head: Atraumatic.   Neck: No JVD present.   Cardiovascular: Normal rate, regular rhythm, S1 normal, S2 normal, normal heart sounds and intact distal pulses.  PMI is not displaced.  Exam reveals no gallop, no S3, no S4, no distant heart sounds, no friction rub, no midsystolic click and no opening snap.    No murmur heard.  Pulses:       Carotid pulses are 2+ on the right side, and 2+ on the left side.       Radial pulses are 2+ on the right side, and 2+ on the left side.   Pulmonary/Chest: Effort normal and breath sounds normal. No accessory muscle usage. No apnea,  no tachypnea and no bradypnea. He is not intubated. No respiratory distress. He has no decreased breath sounds. He has no wheezes. He has no rhonchi. He has no rales. He exhibits no tenderness.   Abdominal: Soft. Normal aorta and bowel sounds are normal. He exhibits no distension, no abdominal bruit, no pulsatile midline mass and no mass. There is no tenderness.   Musculoskeletal: He exhibits no edema.   Neurological: He is alert and oriented to person, place, and time.   Skin: Skin is warm. No rash noted. No erythema. No pallor.   Psychiatric: He has a normal mood and affect. His behavior is normal. Judgment and thought content normal.   Vitals reviewed.      LABS    CMP  Sodium   Date Value Ref Range Status   07/21/2017 134 (L) 136 - 145 mmol/L Final     Potassium   Date Value Ref Range Status   07/21/2017 3.9 3.5 - 5.1 mmol/L Final     Chloride   Date Value Ref Range Status   07/21/2017 101 95 - 110 mmol/L Final     CO2   Date Value Ref Range Status   07/21/2017 27 23 - 29 mmol/L Final     Glucose   Date Value Ref Range Status   07/21/2017 99 70 - 110 mg/dL Final     BUN, Bld   Date Value Ref Range Status   07/21/2017 30 (H) 8 - 23 mg/dL Final     Creatinine   Date Value Ref Range Status   07/21/2017 1.5 (H) 0.5 - 1.4 mg/dL Final     Calcium   Date Value Ref Range Status   07/21/2017 10.3 8.7 - 10.5 mg/dL Final     Total Protein   Date Value Ref Range Status   07/21/2017 6.9 6.0 - 8.4 g/dL Final     Albumin   Date Value Ref Range Status   07/21/2017 3.2 (L) 3.5 - 5.2 g/dL Final     Total Bilirubin   Date Value Ref Range Status   07/21/2017 0.7 0.1 - 1.0 mg/dL Final     Comment:     For infants and newborns, interpretation of results should be based  on gestational age, weight and in agreement with clinical  observations.  Premature Infant recommended reference ranges:  Up to 24 hours.............<8.0 mg/dL  Up to 48 hours............<12.0 mg/dL  3-5 days..................<15.0 mg/dL  6-29  days.................<15.0 mg/dL       Alkaline Phosphatase   Date Value Ref Range Status   07/21/2017 71 55 - 135 U/L Final     AST   Date Value Ref Range Status   07/21/2017 22 10 - 40 U/L Final     ALT   Date Value Ref Range Status   07/21/2017 18 10 - 44 U/L Final     Anion Gap   Date Value Ref Range Status   07/21/2017 6 (L) 8 - 16 mmol/L Final     eGFR if    Date Value Ref Range Status   07/21/2017 51 (A) >60 mL/min/1.73 m^2 Final     eGFR if non    Date Value Ref Range Status   07/21/2017 44 (A) >60 mL/min/1.73 m^2 Final     Comment:     Calculation used to obtain the estimated glomerular filtration  rate (eGFR) is the CKD-EPI equation. Since race is unknown   in our information system, the eGFR values for   -American and Non--American patients are given   for each creatinine result.         Lab Results   Component Value Date    WBC 4.10 07/21/2017    HGB 14.7 07/21/2017    HCT 44.6 07/21/2017    MCV 90 07/21/2017     07/21/2017         ECHOCARDIOGRAM: July 2017  EF 60-65%  Severe LVH  LAE    ECG: NSR    STRESS TESTING:    CARDIAC CATHETERIZATION:    Assessment:     1. Unspecified essential hypertension    2. Cerebrovascular accident (CVA) due to embolism of left anterior cerebral artery    3. Hypertensive emergency      BP controlled at present.  Will continue to screen for Afib for cryptogenic stroke.  May require referral for left carotid symptomatic disease  Plan:     -Complete 4 week event monitor.  -If negative, he will need an implantable loop recorder for ongoing Afib monitoring.    Continue with current medical plan and lifestyle changes.    I have reviewed the patient's medical history in detail and updated the computerized patient record.    No orders of the defined types were placed in this encounter.      Follow up as scheduled. Return sooner for concerns or questions      He expressed verbal understanding and agreed with the  plan          Guanaco Ty MD, FACC, CCDS  Interventional Cardiology  [facility]

## 2017-08-15 PROBLEM — I63.429: Chronic | Status: ACTIVE | Noted: 2017-08-15

## 2017-08-28 ENCOUNTER — TELEPHONE (OUTPATIENT)
Dept: CARDIOLOGY | Facility: CLINIC | Age: 77
End: 2017-08-28

## 2017-08-28 RX ORDER — ATORVASTATIN CALCIUM 80 MG/1
80 TABLET, FILM COATED ORAL DAILY
Qty: 90 TABLET | Refills: 2 | Status: SHIPPED | OUTPATIENT
Start: 2017-08-28 | End: 2018-06-06 | Stop reason: SDUPTHER

## 2017-08-28 RX ORDER — AMLODIPINE BESYLATE 10 MG/1
10 TABLET ORAL DAILY
Qty: 90 TABLET | Refills: 2 | Status: ON HOLD | OUTPATIENT
Start: 2017-08-28 | End: 2018-09-05 | Stop reason: HOSPADM

## 2017-08-28 RX ORDER — LOSARTAN POTASSIUM 100 MG/1
100 TABLET ORAL DAILY
Qty: 90 TABLET | Refills: 2 | Status: ON HOLD | OUTPATIENT
Start: 2017-08-28 | End: 2017-10-21

## 2017-08-28 RX ORDER — CLOPIDOGREL BISULFATE 75 MG/1
75 TABLET ORAL DAILY
Qty: 90 TABLET | Refills: 2 | Status: ON HOLD | OUTPATIENT
Start: 2017-08-28 | End: 2017-10-21 | Stop reason: HOSPADM

## 2017-08-28 RX ORDER — HYDROCHLOROTHIAZIDE 25 MG/1
25 TABLET ORAL DAILY
Qty: 90 TABLET | Refills: 2 | Status: ON HOLD | OUTPATIENT
Start: 2017-08-28 | End: 2017-10-21

## 2017-08-28 NOTE — TELEPHONE ENCOUNTER
----- Message from Tahira Roebrto sent at 8/28/2017  9:53 AM CDT -----  Contact: [phone]/Pt's daughter Linda  Patient's daughter Linda requesting to speak with you regarding medication refill. Please advise.

## 2017-08-28 NOTE — TELEPHONE ENCOUNTER
Requesting refills on medications prescribed in the hospital during July admission.  Daughter did not have the list, but stated he was given 5 prescriptions and has used them up.  [facility] .  According to records, patient needs refills on norvasc 10 mg, lipitor 80 mg, plavix 75 mg, hctz 25 mg, and losartan 100 mg.  Daughter will call once event monitor completed (was placed by [facility]) to make follow up appointment with Dr Ty.

## 2017-10-11 ENCOUNTER — HOSPITAL ENCOUNTER (INPATIENT)
Facility: HOSPITAL | Age: 77
LOS: 5 days | Discharge: HOME HEALTH | DRG: 065 | End: 2017-10-16
Attending: EMERGENCY MEDICINE | Admitting: INTERNAL MEDICINE
Payer: MEDICARE

## 2017-10-11 DIAGNOSIS — R55 SYNCOPE, UNSPECIFIED SYNCOPE TYPE: ICD-10-CM

## 2017-10-11 DIAGNOSIS — R41.0 CONFUSION: ICD-10-CM

## 2017-10-11 DIAGNOSIS — I63.422 CEREBROVASCULAR ACCIDENT (CVA) DUE TO EMBOLISM OF LEFT ANTERIOR CEREBRAL ARTERY: Primary | Chronic | ICD-10-CM

## 2017-10-11 DIAGNOSIS — I10 ESSENTIAL HYPERTENSION: ICD-10-CM

## 2017-10-11 DIAGNOSIS — I63.9 EMBOLIC STROKE: ICD-10-CM

## 2017-10-11 DIAGNOSIS — R55 SYNCOPE AND COLLAPSE: ICD-10-CM

## 2017-10-11 DIAGNOSIS — I51.7 CARDIOMEGALY: ICD-10-CM

## 2017-10-11 DIAGNOSIS — R41.82 ALTERED MENTAL STATUS, UNSPECIFIED ALTERED MENTAL STATUS TYPE: ICD-10-CM

## 2017-10-11 DIAGNOSIS — N17.9 AKI (ACUTE KIDNEY INJURY): ICD-10-CM

## 2017-10-11 DIAGNOSIS — E11.8 TYPE 2 DIABETES MELLITUS WITH COMPLICATION, WITHOUT LONG-TERM CURRENT USE OF INSULIN: ICD-10-CM

## 2017-10-11 LAB
ALBUMIN SERPL BCP-MCNC: 3.5 G/DL
ALP SERPL-CCNC: 90 U/L
ALT SERPL W/O P-5'-P-CCNC: 16 U/L
AMMONIA PLAS-SCNC: 41 UMOL/L
AMORPH CRY URNS QL MICRO: ABNORMAL
AMPHET+METHAMPHET UR QL: NEGATIVE
ANION GAP SERPL CALC-SCNC: 13 MMOL/L
APAP SERPL-MCNC: <3 UG/ML
AST SERPL-CCNC: 23 U/L
BACTERIA #/AREA URNS HPF: ABNORMAL /HPF
BARBITURATES UR QL SCN>200 NG/ML: NEGATIVE
BASOPHILS # BLD AUTO: 0.01 K/UL
BASOPHILS NFR BLD: 0.1 %
BENZODIAZ UR QL SCN>200 NG/ML: NEGATIVE
BILIRUB SERPL-MCNC: 0.7 MG/DL
BILIRUB UR QL STRIP: NEGATIVE
BNP SERPL-MCNC: 109 PG/ML
BUN SERPL-MCNC: 37 MG/DL
BZE UR QL SCN: NEGATIVE
CALCIUM SERPL-MCNC: 10.8 MG/DL
CANNABINOIDS UR QL SCN: NEGATIVE
CHLORIDE SERPL-SCNC: 103 MMOL/L
CK SERPL-CCNC: 310 U/L
CLARITY UR: ABNORMAL
CO2 SERPL-SCNC: 24 MMOL/L
COLOR UR: YELLOW
CREAT SERPL-MCNC: 2.8 MG/DL
CREAT UR-MCNC: 252.2 MG/DL
DIFFERENTIAL METHOD: ABNORMAL
EOSINOPHIL # BLD AUTO: 0 K/UL
EOSINOPHIL NFR BLD: 0.1 %
ERYTHROCYTE [DISTWIDTH] IN BLOOD BY AUTOMATED COUNT: 14.6 %
EST. GFR  (AFRICAN AMERICAN): 24 ML/MIN/1.73 M^2
EST. GFR  (NON AFRICAN AMERICAN): 21 ML/MIN/1.73 M^2
ETHANOL SERPL-MCNC: <10 MG/DL
GLUCOSE SERPL-MCNC: 122 MG/DL
GLUCOSE SERPL-MCNC: 134 MG/DL (ref 70–110)
GLUCOSE UR QL STRIP: NEGATIVE
HCT VFR BLD AUTO: 43.8 %
HGB BLD-MCNC: 14.9 G/DL
HGB UR QL STRIP: ABNORMAL
HYALINE CASTS #/AREA URNS LPF: 12 /LPF
INR PPP: 1.1
KETONES UR QL STRIP: NEGATIVE
LACTATE SERPL-SCNC: 3.6 MMOL/L
LEUKOCYTE ESTERASE UR QL STRIP: NEGATIVE
LIPASE SERPL-CCNC: 151 U/L
LYMPHOCYTES # BLD AUTO: 0.9 K/UL
LYMPHOCYTES NFR BLD: 8.6 %
MCH RBC QN AUTO: 29.7 PG
MCHC RBC AUTO-ENTMCNC: 34 G/DL
MCV RBC AUTO: 87 FL
METHADONE UR QL SCN>300 NG/ML: NEGATIVE
MICROSCOPIC COMMENT: ABNORMAL
MONOCYTES # BLD AUTO: 0.4 K/UL
MONOCYTES NFR BLD: 4.3 %
NEUTROPHILS # BLD AUTO: 8.6 K/UL
NEUTROPHILS NFR BLD: 86.5 %
NITRITE UR QL STRIP: NEGATIVE
OPIATES UR QL SCN: NEGATIVE
PCP UR QL SCN>25 NG/ML: NEGATIVE
PH UR STRIP: 6 [PH] (ref 5–8)
PLATELET # BLD AUTO: 225 K/UL
PMV BLD AUTO: 11.1 FL
POC PTINR: 1 (ref 0.9–1.2)
POC PTWBT: 11.7 SEC (ref 9.7–14.3)
POCT GLUCOSE: 132 MG/DL (ref 70–110)
POTASSIUM SERPL-SCNC: 3.9 MMOL/L
PROT SERPL-MCNC: 7.4 G/DL
PROT UR QL STRIP: ABNORMAL
PROTHROMBIN TIME: 11.2 SEC
RBC # BLD AUTO: 5.02 M/UL
RBC #/AREA URNS HPF: 3 /HPF (ref 0–4)
SALICYLATES SERPL-MCNC: <5 MG/DL
SAMPLE: NORMAL
SODIUM SERPL-SCNC: 140 MMOL/L
SP GR UR STRIP: 1.02 (ref 1–1.03)
SQUAMOUS #/AREA URNS HPF: 0 /HPF
TOXICOLOGY INFORMATION: NORMAL
TROPONIN I SERPL DL<=0.01 NG/ML-MCNC: 0.01 NG/ML
URN SPEC COLLECT METH UR: ABNORMAL
UROBILINOGEN UR STRIP-ACNC: 1 EU/DL
WBC # BLD AUTO: 9.92 K/UL
WBC #/AREA URNS HPF: 1 /HPF (ref 0–5)

## 2017-10-11 PROCEDURE — 80307 DRUG TEST PRSMV CHEM ANLYZR: CPT

## 2017-10-11 PROCEDURE — 81000 URINALYSIS NONAUTO W/SCOPE: CPT

## 2017-10-11 PROCEDURE — 83036 HEMOGLOBIN GLYCOSYLATED A1C: CPT

## 2017-10-11 PROCEDURE — 85025 COMPLETE CBC W/AUTO DIFF WBC: CPT

## 2017-10-11 PROCEDURE — 25000003 PHARM REV CODE 250: Performed by: STUDENT IN AN ORGANIZED HEALTH CARE EDUCATION/TRAINING PROGRAM

## 2017-10-11 PROCEDURE — 83605 ASSAY OF LACTIC ACID: CPT

## 2017-10-11 PROCEDURE — 84484 ASSAY OF TROPONIN QUANT: CPT

## 2017-10-11 PROCEDURE — 11000001 HC ACUTE MED/SURG PRIVATE ROOM

## 2017-10-11 PROCEDURE — 96360 HYDRATION IV INFUSION INIT: CPT

## 2017-10-11 PROCEDURE — 85610 PROTHROMBIN TIME: CPT

## 2017-10-11 PROCEDURE — 83605 ASSAY OF LACTIC ACID: CPT | Mod: 91

## 2017-10-11 PROCEDURE — 82140 ASSAY OF AMMONIA: CPT

## 2017-10-11 PROCEDURE — 96361 HYDRATE IV INFUSION ADD-ON: CPT

## 2017-10-11 PROCEDURE — 93005 ELECTROCARDIOGRAM TRACING: CPT

## 2017-10-11 PROCEDURE — 83880 ASSAY OF NATRIURETIC PEPTIDE: CPT

## 2017-10-11 PROCEDURE — P9612 CATHETERIZE FOR URINE SPEC: HCPCS

## 2017-10-11 PROCEDURE — 80053 COMPREHEN METABOLIC PANEL: CPT

## 2017-10-11 PROCEDURE — 80329 ANALGESICS NON-OPIOID 1 OR 2: CPT

## 2017-10-11 PROCEDURE — 82962 GLUCOSE BLOOD TEST: CPT

## 2017-10-11 PROCEDURE — 25000003 PHARM REV CODE 250: Performed by: EMERGENCY MEDICINE

## 2017-10-11 PROCEDURE — G0378 HOSPITAL OBSERVATION PER HR: HCPCS

## 2017-10-11 PROCEDURE — 83690 ASSAY OF LIPASE: CPT

## 2017-10-11 PROCEDURE — 36415 COLL VENOUS BLD VENIPUNCTURE: CPT

## 2017-10-11 PROCEDURE — 80320 DRUG SCREEN QUANTALCOHOLS: CPT

## 2017-10-11 PROCEDURE — 82550 ASSAY OF CK (CPK): CPT

## 2017-10-11 PROCEDURE — 99285 EMERGENCY DEPT VISIT HI MDM: CPT | Mod: 25

## 2017-10-11 PROCEDURE — 63600175 PHARM REV CODE 636 W HCPCS: Performed by: STUDENT IN AN ORGANIZED HEALTH CARE EDUCATION/TRAINING PROGRAM

## 2017-10-11 RX ORDER — IBUPROFEN 200 MG
24 TABLET ORAL
Status: DISCONTINUED | OUTPATIENT
Start: 2017-10-11 | End: 2017-10-16 | Stop reason: HOSPADM

## 2017-10-11 RX ORDER — CLOPIDOGREL BISULFATE 75 MG/1
75 TABLET ORAL DAILY
Status: DISCONTINUED | OUTPATIENT
Start: 2017-10-12 | End: 2017-10-16 | Stop reason: HOSPADM

## 2017-10-11 RX ORDER — INSULIN ASPART 100 [IU]/ML
0-5 INJECTION, SOLUTION INTRAVENOUS; SUBCUTANEOUS
Status: DISCONTINUED | OUTPATIENT
Start: 2017-10-11 | End: 2017-10-16 | Stop reason: HOSPADM

## 2017-10-11 RX ORDER — ASPIRIN 81 MG/1
81 TABLET ORAL DAILY
Status: DISCONTINUED | OUTPATIENT
Start: 2017-10-12 | End: 2017-10-16 | Stop reason: HOSPADM

## 2017-10-11 RX ORDER — HEPARIN SODIUM 5000 [USP'U]/ML
5000 INJECTION, SOLUTION INTRAVENOUS; SUBCUTANEOUS EVERY 8 HOURS
Status: DISCONTINUED | OUTPATIENT
Start: 2017-10-11 | End: 2017-10-16 | Stop reason: HOSPADM

## 2017-10-11 RX ORDER — IBUPROFEN 200 MG
16 TABLET ORAL
Status: DISCONTINUED | OUTPATIENT
Start: 2017-10-11 | End: 2017-10-16 | Stop reason: HOSPADM

## 2017-10-11 RX ORDER — SODIUM CHLORIDE 9 MG/ML
1000 INJECTION, SOLUTION INTRAVENOUS
Status: COMPLETED | OUTPATIENT
Start: 2017-10-11 | End: 2017-10-11

## 2017-10-11 RX ORDER — SODIUM CHLORIDE 9 MG/ML
INJECTION, SOLUTION INTRAVENOUS CONTINUOUS
Status: ACTIVE | OUTPATIENT
Start: 2017-10-11 | End: 2017-10-12

## 2017-10-11 RX ORDER — CHOLECALCIFEROL (VITAMIN D3) 25 MCG
1000 TABLET ORAL DAILY
Status: DISCONTINUED | OUTPATIENT
Start: 2017-10-12 | End: 2017-10-16 | Stop reason: HOSPADM

## 2017-10-11 RX ORDER — GLUCAGON 1 MG
1 KIT INJECTION
Status: DISCONTINUED | OUTPATIENT
Start: 2017-10-11 | End: 2017-10-16 | Stop reason: HOSPADM

## 2017-10-11 RX ORDER — ATORVASTATIN CALCIUM 40 MG/1
80 TABLET, FILM COATED ORAL DAILY
Status: DISCONTINUED | OUTPATIENT
Start: 2017-10-12 | End: 2017-10-16 | Stop reason: HOSPADM

## 2017-10-11 RX ADMIN — SODIUM CHLORIDE: 0.9 INJECTION, SOLUTION INTRAVENOUS at 11:10

## 2017-10-11 RX ADMIN — HEPARIN SODIUM 5000 UNITS: 5000 INJECTION, SOLUTION INTRAVENOUS; SUBCUTANEOUS at 10:10

## 2017-10-11 RX ADMIN — SODIUM CHLORIDE 1000 ML: 0.9 INJECTION, SOLUTION INTRAVENOUS at 07:10

## 2017-10-11 NOTE — ED TRIAGE NOTES
Patient's family states patient has not been acting like self since yesterday and had multiple falls.  Patient did not hit head and had no LOC.  Patient has no facial drooping.  Has left arm weakness from previous stroke.  Patient is oriented to self and place.

## 2017-10-11 NOTE — ED PROVIDER NOTES
"Encounter Date: 10/11/2017       History     Chief Complaint   Patient presents with    Fall     pt fell twice today + LOC both falls no head injury family reports AMS pt oriented to self and time. unaware of fall. Denies pain. left side facial dropping. slight drift to left arm      Shant Magana is a 77 y.o. M who  has a past medical history of Cancer; Diabetes mellitus; Hypertension; Hypertrophic cardiomyopathy; and Renal disorder.    The patient presents to the ED due to confusion. Patient confused and disoriented, unable to provide full history. History obtained by multiple family members.    Family reports patient with history of stroke, most recently a few months ago, requiring inpatient rehab. He was ultimately discharged home and is usually independent and lives alone. Family reports they tried to call him at home and he never answered or called back. Family went to check on him at home and found him outside on the ground by his front steps. They report he was confused and stated he was "taking a nap." They brought him inside and while walking to his room he fell forward again. They then brought him to the ER for evaluation. They report he has baseline L-sided weakness from his prior stroke. The patient denies falling and has no acute complaints. He is awake, alert but disoriented to time. He does have some intermittent baseline confusion per family, but it is worse currently.          Review of patient's allergies indicates:   Allergen Reactions    Hydralazine analogues Diarrhea     Past Medical History:   Diagnosis Date    Cancer     prostate    Diabetes mellitus     Hypertension     Hypertrophic cardiomyopathy     Renal disorder      Past Surgical History:   Procedure Laterality Date    BACK SURGERY      THYROIDECTOMY       History reviewed. No pertinent family history.  Social History   Substance Use Topics    Smoking status: Former Smoker     Types: Cigarettes    Smokeless tobacco: Never Used "    Alcohol use Yes     Review of Systems   Unable to perform ROS: Mental status change       Physical Exam     Initial Vitals [10/11/17 1647]   BP Pulse Resp Temp SpO2   135/71 73 16 97.8 °F (36.6 °C) 99 %      MAP       92.33         Physical Exam    Nursing note and vitals reviewed.  Constitutional: He appears well-developed and well-nourished. He is not diaphoretic. No distress.   HENT:   Head: Normocephalic and atraumatic.   Mouth/Throat: Oropharynx is clear and moist.   Eyes: EOM are normal. Pupils are equal, round, and reactive to light. Right eye exhibits no discharge. Left eye exhibits no discharge.   Neck: No tracheal deviation present.   Cardiovascular: Normal rate, regular rhythm, normal heart sounds and intact distal pulses.   Pulmonary/Chest: Breath sounds normal. No stridor. No respiratory distress.   Abdominal: Soft. He exhibits no distension and no mass. There is no tenderness.   Musculoskeletal: Normal range of motion. He exhibits no edema.   Neurological: He is alert. He is disoriented. No cranial nerve deficit or sensory deficit. He exhibits abnormal muscle tone (Lue/LLE weakness, 4/5). GCS eye subscore is 4. GCS verbal subscore is 4. GCS motor subscore is 6.   Gait deferred due to multiple falls   Skin: Skin is warm and dry. Capillary refill takes less than 2 seconds. No rash noted.   Psychiatric: Thought content normal. His affect is blunt. His speech is delayed. He is slowed. He is inattentive.         ED Course   Procedures  Labs Reviewed   CBC W/ AUTO DIFFERENTIAL - Abnormal; Notable for the following:        Result Value    RDW 14.6 (*)     Gran # 8.6 (*)     Lymph # 0.9 (*)     Gran% 86.5 (*)     Lymph% 8.6 (*)     All other components within normal limits   COMPREHENSIVE METABOLIC PANEL - Abnormal; Notable for the following:     Glucose 122 (*)     BUN, Bld 37 (*)     Creatinine 2.8 (*)     Calcium 10.8 (*)     eGFR if  24 (*)     eGFR if non  21 (*)      All other components within normal limits   URINALYSIS - Abnormal; Notable for the following:     Appearance, UA Hazy (*)     Protein, UA 1+ (*)     Occult Blood UA 2+ (*)     All other components within normal limits   ACETAMINOPHEN LEVEL - Abnormal; Notable for the following:     Acetaminophen (Tylenol), Serum <3.0 (*)     All other components within normal limits   SALICYLATE LEVEL - Abnormal; Notable for the following:     Salicylate Lvl <5.0 (*)     All other components within normal limits   LACTIC ACID, PLASMA - Abnormal; Notable for the following:     Lactate (Lactic Acid) 3.6 (*)     All other components within normal limits    Narrative:       Lactic Acid  critical result(s) called and verbal readback obtained   from Valerie Reinoso RN. at 18:36 on 10/11/2017, 10/11/2017 18:36   B-TYPE NATRIURETIC PEPTIDE - Abnormal; Notable for the following:      (*)     All other components within normal limits   LIPASE - Abnormal; Notable for the following:     Lipase 151 (*)     All other components within normal limits   CK - Abnormal; Notable for the following:      (*)     All other components within normal limits   URINALYSIS MICROSCOPIC - Abnormal; Notable for the following:     Bacteria, UA Moderate (*)     Hyaline Casts, UA 12 (*)     All other components within normal limits   POCT GLUCOSE - Abnormal; Notable for the following:     POCT Glucose 132 (*)     All other components within normal limits   ALCOHOL,MEDICAL (ETHANOL)   DRUG SCREEN PANEL, URINE EMERGENCY   PROTIME-INR   TROPONIN I   AMMONIA   CK   ISTAT PROCEDURE     EKG Readings: (Independently Interpreted)   Initial Reading: No STEMI. Previous EKG: Compared with most recent EKG   NSR rate 70, T-wave inversion infero-lateral leads, new from prior     Imaging Results                   X-Ray Chest PA And Lateral (Final result)  Result time 10/11/17 17:47:27    Final result by Lady Manzanares MD (10/11/17 17:47:27)                  Impression:      No acute cardiopulmonary process identified.      Electronically signed by: AIMEE CARTER MD  Date:     10/11/17  Time:    17:47              Narrative:    Chest PA and lateral.  Comparison: 7/14/17.    Mediastinal structures are midline.  Cardiac silhouette is normal in size.  Prominent aortic plaque seen with stable tortuosity of the aorta.  Lungs are symmetrically expanded.  No evidence of focal consolidative process, pneumothorax, or significant effusion.  Bones appear intact.  No free air visualized beneath the diaphragm.                             CT Head Without Contrast (Final result)  Result time 10/11/17 17:09:09    Final result by Aimee Carter MD (10/11/17 17:09:09)                 Impression:       No acute intracranial abnormalities identified.  Extensive chronic microvascular ischemic disease with multifocal areas of remote infarction as detailed above.  If clinical concern for acute infarction, recommend further evaluation with MRI with diffusion weighted imaging.      Electronically signed by: AIMEE CARTER MD  Date:     10/11/17  Time:    17:09              Narrative:    Exam: CT of the head without contrast -- 77-year-old male with stroke.    Comparison: CT head 7/14/17, MRI brain 7/17/17.    Technique: 5 mm noncontrast axial images through the brain were obtained.    Findings: There is extensive chronic microvascular ischemic disease with small remote areas of infarction seen involving the posterior right frontal lobe, parasagittal right cerebellar hemisphere, right corona radiata, and bilateral basal ganglia.  No convincing evidence of acute/recent major vascular distribution cerebral infarction, intraparenchymal hemorrhage, or intra-axial space occupying lesion. The ventricular system is normal in appearance for age. No hydrocephalus. No effacement of the skull-base cisterns. No abnormal extra-axial fluid collections or blood products. The paranasal sinuses and mastoid air  cells are unremarkable. The calvarium shows no significant abnormality.                                 Medical Decision Making:   Initial Assessment:   78 yo M PMH CVA 07/2017 with residual L-sided weakness, HTN, DM presents with AMS, confusion. Last seen at baseline yesterday evening.    Code Stroke activated on arrival due to confusion and L-sided weakness with unknown baseline. Patient taken to CT scan immediately. No acute process seen on CT, chronic ischemic disease and remote infarcts present.    Due to LKWT last night, did not activate tele-stroke with Neurology in ED with plan to evaluate for metabolic causes of AMS.   Differential Diagnosis:   Differential Diagnosis includes, but is not limited to:  CVA/TIA, intracranial mass/hemorrhage, head trauma, seizure, status epilepticus, post-ictal state, meningitis/encephalitis, sepsis, MI/ACS, arrhythmia, syncope,   anaphylaxis, thyroid disease, neuroleptic malignant syndrome, serotonin syndrome, CO poisoning, hypoxia/hypercapnea, uremic/hepatic encephalopathy, medication reaction, intentional overdose, metabolic derangement, psychiatric disturbance, substance abuse, alcohol intoxication/withdrawal, hypoglycemia/hyperglycemia, complicated migraine.  Clinical Tests:   Lab Tests: Ordered and Reviewed  Radiological Study: Ordered and Reviewed  Medical Tests: Ordered and Reviewed  ED Management:  Labs with SHIKHA, Cr 2.8 from 1.5, .  IVF given in ED for likely pre-renal cause/dehydration.  Due to persistent confusion/AMS, admission requested for further evaluation/management.  Upon re-evaluation, the patient's status has remained stable.    At this time, I believe the patient should be admitted to the hospital for further evaluation and management of AMS, dehydration.    Dr. Lee ([facility] Medicine team) was contacted and the case was discussed. The consulting physician agrees with plan and will admit under their service.    The patient and family were  updated with test results, overall impression, and further plan of care. All questions were answered. The patient expressed understanding and agreed with the current plan.                       ED Course      Clinical Impression:   The primary encounter diagnosis was Syncope, unspecified syncope type. Diagnoses of Altered mental status, unspecified altered mental status type, Confusion, SHIKHA (acute kidney injury), Type 2 diabetes mellitus with complication, without long-term current use of insulin, Syncope and collapse, Essential hypertension, Cardiomegaly, and Embolic stroke were also pertinent to this visit.                           Guanakito Bang MD  10/12/17 [id number]

## 2017-10-11 NOTE — ED NOTES
Pt sitting upright on end of bed.  Pt a/a/o x person, place, and time, disoriented to situation.  Pt states he needs to urinate.  Pt already incontinent of urine.  Pt fidgeting with his belt that is unbuckled, saying that it needs to be unbuckled.  Pt has to be told the same thing multiple times to be able to follow directions.  Dr. Bang notified.  Pt respirations even, unlabored.  Will continue to monitor.

## 2017-10-12 PROBLEM — G93.40 ENCEPHALOPATHY: Status: ACTIVE | Noted: 2017-10-11

## 2017-10-12 LAB
ALBUMIN SERPL BCP-MCNC: 3 G/DL
ALBUMIN SERPL BCP-MCNC: 3.2 G/DL
ALP SERPL-CCNC: 74 U/L
ALP SERPL-CCNC: 83 U/L
ALT SERPL W/O P-5'-P-CCNC: 16 U/L
ALT SERPL W/O P-5'-P-CCNC: 19 U/L
ANION GAP SERPL CALC-SCNC: 11 MMOL/L
ANION GAP SERPL CALC-SCNC: 8 MMOL/L
AORTIC VALVE REGURGITATION: ABNORMAL
AST SERPL-CCNC: 30 U/L
AST SERPL-CCNC: 30 U/L
BASOPHILS # BLD AUTO: 0.02 K/UL
BASOPHILS NFR BLD: 0.3 %
BILIRUB SERPL-MCNC: 0.7 MG/DL
BILIRUB SERPL-MCNC: 0.8 MG/DL
BUN SERPL-MCNC: 30 MG/DL
BUN SERPL-MCNC: 34 MG/DL
CALCIUM SERPL-MCNC: 10 MG/DL
CALCIUM SERPL-MCNC: 9.8 MG/DL
CHLORIDE SERPL-SCNC: 106 MMOL/L
CHLORIDE SERPL-SCNC: 109 MMOL/L
CK SERPL-CCNC: 550 U/L
CO2 SERPL-SCNC: 22 MMOL/L
CO2 SERPL-SCNC: 24 MMOL/L
CREAT SERPL-MCNC: 1.6 MG/DL
CREAT SERPL-MCNC: 1.8 MG/DL
CREAT UR-MCNC: 125 MG/DL
DIASTOLIC DYSFUNCTION: YES
DIFFERENTIAL METHOD: ABNORMAL
EOSINOPHIL # BLD AUTO: 0.1 K/UL
EOSINOPHIL NFR BLD: 2.3 %
ERYTHROCYTE [DISTWIDTH] IN BLOOD BY AUTOMATED COUNT: 14.8 %
EST. GFR  (AFRICAN AMERICAN): 41 ML/MIN/1.73 M^2
EST. GFR  (AFRICAN AMERICAN): 47 ML/MIN/1.73 M^2
EST. GFR  (NON AFRICAN AMERICAN): 36 ML/MIN/1.73 M^2
EST. GFR  (NON AFRICAN AMERICAN): 41 ML/MIN/1.73 M^2
ESTIMATED AVG GLUCOSE: 131 MG/DL
ESTIMATED PA SYSTOLIC PRESSURE: 11.88
GLUCOSE SERPL-MCNC: 135 MG/DL
GLUCOSE SERPL-MCNC: 95 MG/DL
HBA1C MFR BLD HPLC: 6.2 %
HCT VFR BLD AUTO: 41.1 %
HGB BLD-MCNC: 13.8 G/DL
LACTATE SERPL-SCNC: 1.5 MMOL/L
LYMPHOCYTES # BLD AUTO: 1.1 K/UL
LYMPHOCYTES NFR BLD: 17.8 %
MCH RBC QN AUTO: 29.4 PG
MCHC RBC AUTO-ENTMCNC: 33.6 G/DL
MCV RBC AUTO: 87 FL
MONOCYTES # BLD AUTO: 0.5 K/UL
MONOCYTES NFR BLD: 8.2 %
NEUTROPHILS # BLD AUTO: 4.4 K/UL
NEUTROPHILS NFR BLD: 71.2 %
PLATELET # BLD AUTO: 199 K/UL
PMV BLD AUTO: 11.3 FL
POCT GLUCOSE: 103 MG/DL (ref 70–110)
POCT GLUCOSE: 159 MG/DL (ref 70–110)
POCT GLUCOSE: 94 MG/DL (ref 70–110)
POCT GLUCOSE: 98 MG/DL (ref 70–110)
POTASSIUM SERPL-SCNC: 3.5 MMOL/L
POTASSIUM SERPL-SCNC: 3.7 MMOL/L
PROT SERPL-MCNC: 6.7 G/DL
PROT SERPL-MCNC: 7.2 G/DL
RBC # BLD AUTO: 4.7 M/UL
RETIRED EF AND QEF - SEE NOTES: 60 (ref 55–65)
SODIUM SERPL-SCNC: 138 MMOL/L
SODIUM SERPL-SCNC: 142 MMOL/L
SODIUM UR-SCNC: 136 MMOL/L
TROPONIN I SERPL DL<=0.01 NG/ML-MCNC: 0.02 NG/ML
UUN UR-MCNC: 658 MG/DL
WBC # BLD AUTO: 6.13 K/UL

## 2017-10-12 PROCEDURE — 92610 EVALUATE SWALLOWING FUNCTION: CPT

## 2017-10-12 PROCEDURE — 63600175 PHARM REV CODE 636 W HCPCS: Performed by: STUDENT IN AN ORGANIZED HEALTH CARE EDUCATION/TRAINING PROGRAM

## 2017-10-12 PROCEDURE — 25000003 PHARM REV CODE 250: Performed by: STUDENT IN AN ORGANIZED HEALTH CARE EDUCATION/TRAINING PROGRAM

## 2017-10-12 PROCEDURE — G8997 SWALLOW GOAL STATUS: HCPCS | Mod: CH

## 2017-10-12 PROCEDURE — 93306 TTE W/DOPPLER COMPLETE: CPT

## 2017-10-12 PROCEDURE — 85025 COMPLETE CBC W/AUTO DIFF WBC: CPT

## 2017-10-12 PROCEDURE — 11000001 HC ACUTE MED/SURG PRIVATE ROOM

## 2017-10-12 PROCEDURE — 84484 ASSAY OF TROPONIN QUANT: CPT

## 2017-10-12 PROCEDURE — 84540 ASSAY OF URINE/UREA-N: CPT

## 2017-10-12 PROCEDURE — 25000003 PHARM REV CODE 250: Performed by: INTERNAL MEDICINE

## 2017-10-12 PROCEDURE — 36415 COLL VENOUS BLD VENIPUNCTURE: CPT

## 2017-10-12 PROCEDURE — 93312 ECHO TRANSESOPHAGEAL: CPT

## 2017-10-12 PROCEDURE — 94761 N-INVAS EAR/PLS OXIMETRY MLT: CPT

## 2017-10-12 PROCEDURE — G8996 SWALLOW CURRENT STATUS: HCPCS | Mod: CI

## 2017-10-12 PROCEDURE — 84300 ASSAY OF URINE SODIUM: CPT

## 2017-10-12 PROCEDURE — 82570 ASSAY OF URINE CREATININE: CPT

## 2017-10-12 PROCEDURE — 97161 PT EVAL LOW COMPLEX 20 MIN: CPT

## 2017-10-12 PROCEDURE — 80053 COMPREHEN METABOLIC PANEL: CPT

## 2017-10-12 PROCEDURE — 82550 ASSAY OF CK (CPK): CPT

## 2017-10-12 PROCEDURE — 93306 TTE W/DOPPLER COMPLETE: CPT | Mod: 26,,, | Performed by: INTERNAL MEDICINE

## 2017-10-12 PROCEDURE — 93325 DOPPLER ECHO COLOR FLOW MAPG: CPT

## 2017-10-12 PROCEDURE — 80053 COMPREHEN METABOLIC PANEL: CPT | Mod: 91

## 2017-10-12 RX ORDER — SODIUM CHLORIDE 9 MG/ML
INJECTION, SOLUTION INTRAVENOUS CONTINUOUS
Status: ACTIVE | OUTPATIENT
Start: 2017-10-12 | End: 2017-10-12

## 2017-10-12 RX ADMIN — SODIUM CHLORIDE: 0.9 INJECTION, SOLUTION INTRAVENOUS at 11:10

## 2017-10-12 RX ADMIN — HEPARIN SODIUM 5000 UNITS: 5000 INJECTION, SOLUTION INTRAVENOUS; SUBCUTANEOUS at 10:10

## 2017-10-12 RX ADMIN — HEPARIN SODIUM 5000 UNITS: 5000 INJECTION, SOLUTION INTRAVENOUS; SUBCUTANEOUS at 05:10

## 2017-10-12 RX ADMIN — ASPIRIN 81 MG: 81 TABLET, COATED ORAL at 11:10

## 2017-10-12 RX ADMIN — CLOPIDOGREL BISULFATE 75 MG: 75 TABLET ORAL at 11:10

## 2017-10-12 RX ADMIN — ATORVASTATIN CALCIUM 80 MG: 40 TABLET, FILM COATED ORAL at 11:10

## 2017-10-12 RX ADMIN — HEPARIN SODIUM 5000 UNITS: 5000 INJECTION, SOLUTION INTRAVENOUS; SUBCUTANEOUS at 02:10

## 2017-10-12 RX ADMIN — VITAMIN D, TAB 1000IU (100/BT) 1000 UNITS: 25 TAB at 11:10

## 2017-10-12 NOTE — ED NOTES
Pt resting in bed, with eyes closed.  NAD noted.  Respirations even, unlabored. Family remains at bedside.  Will continue to monitor.

## 2017-10-12 NOTE — PLAN OF CARE
Problem: Patient Care Overview  Goal: Plan of Care Review  Outcome: Ongoing (interventions implemented as appropriate)  Pt has had no complaints during the night. Neuro checks are good, except his pupils are sluggish and he has a slight drift in his left arm.  Pt remains AAAOx4, but gets confused about using the urinal and misses it most of the time.

## 2017-10-12 NOTE — PROGRESS NOTES
"[facility] IM Resident HO-II Progress Note    Subjective:      No acute events overnight. Patient with no lightheadedness, chest pain, SOB or focal neurological deficits this morning.      Objective:   Last 24 Hour Vital Signs:  BP  Min: 114/59  Max: 186/75  Temp  Av.9 °F (36.6 °C)  Min: 97.8 °F (36.6 °C)  Max: 98 °F (36.7 °C)  Pulse  Av.3  Min: 61  Max: 74  Resp  Av.5  Min: 16  Max: 18  SpO2  Av %  Min: 97 %  Max: 99 %  Height  Av' 11" (180.3 cm)  Min: 5' 11" (180.3 cm)  Max: 5' 11" (180.3 cm)  Weight  Av kg (200 lb 11.3 oz)  Min: 90 kg (198 lb 6.6 oz)  Max: 92.1 kg (203 lb)  I/O last 3 completed shifts:  In: -   Out: 150 [Urine:150]    Physical Examination:  General:          Alert and awake in NAD, able to follow commands  Head:               Normocephalic and atraumatic  Eyes:               PERRL; EOMi with anicteric sclera and clear conjunctivae  Mouth:             Oropharynx clear and without exudate; moist mucous membranes  Cardio:            RRR no murmurs or rubs, No JVD  Resp:               CTAB and unlabored; no wheezes, crackles or rhonchi  Abdom:            Soft, NTND with normoactive bowel sounds  Extrem:            WWP with no clubbing, cyanosis, 2+ edema of lower extremites  Skin:                No rashes, lesions, or color changes  Pulses:            2+ and symmetric distally  Neuro:             AAOx3; cooperative and pleasant with no focal deficits, CN2-12 intact, strength 5/5 bilaterally, sensation intact bilaterally    Laboratory:  Laboratory Data Reviewed: yes  Pertinent Findings:  AM labs pending    Current Medications:     Infusions:   sodium chloride 0.9% 75 mL/hr at 10/11/17 2331        Scheduled:   aspirin  81 mg Oral Daily    atorvastatin  80 mg Oral Daily    clopidogrel  75 mg Oral Daily    heparin (porcine)  5,000 Units Subcutaneous Q8H    vitamin D  1,000 Units Oral Daily        PRN:  dextrose 50%, dextrose 50%, glucagon (human recombinant), glucose, glucose, " insulin aspart    Assessment:     Shant Magana is a 77 y.o.male with  Patient Active Problem List    Diagnosis Date Noted    Altered mental status 10/11/2017    Syncope and collapse 10/11/2017    Cerebrovascular accident (CVA) due to embolism of anterior cerebral artery 08/15/2017    Hypertensive crisis, unspecified 07/15/2017    Hypertensive emergency     Type 2 diabetes mellitus with complication, without long-term current use of insulin     Hypertensive encephalopathy 07/14/2017    Essential hypertension 06/07/2017    Bradycardia 06/07/2017        Plan:     Syncope  -Unknown etiology at this time, can't rule out recurrent stroke vs carotid artery insufficiency  -Pt. Reports no recollection of fall, fell face forward per daughter report  -Exam CN2-12 intact, no focal neuro deficits  -Imaging 7/18/2017: MRI with multiple acute infarcts suggestive of embolic etiology in the R. Cerebellar, R. Sahni radiata, and L. Frontal lobe.  CTA showed 80% LICA stenosis and bilateral vertebral disease with multiple intracranial stenoses.  Carotid U/S showed <50% BROOK and 50-70% LICA stenosis.  TTE with LVH and LAE, but no afib at that time.    -CT head negative for acute process, but showing chronic microvascular ischemic disease with multifocal areas of remote infarction.  -orthostatic negative  -Troponin 0.011, repeat pending  -, lactate 3.6 with repeat 1.5  -PPx with Clopidogrel/heparin  -F/u TTE, MRI brain, MRA head/neck  -Continue IVF, holding anti-HTN meds at this time     Asymptomatic bacteruria  -UA with bacteria, no WBCs, afebrile  -no urinary symptoms present     H/o Hypertensive emergency/encephalopathy  -LE weakness, dizziness, BP on admit 260/124 July 2017  -MRI at that time showed multiple acute infarcts suggestive of embolic etiology in the R. Cerebellar, R. Sahni radiata, and L. Frontal lobe.  CTA showed 80% LICA stenosis and bilateral vertebral disease with multiple intracranial stenoses.  Carotid  U/S showed <50% BROOK and 50-70% LICA stenosis.  TTE with LVH and LAE, but no afib at that time.       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  -Cr 2.8 this admission, above baseline ~1.4-1.7  -GFR baseline 50s, now 24 this admission  -ordered FeNA and FeUrea, hydrated with IVF  -holding losartan  -f/u repeat creatining     HTN  -/74 on admission, now 139/88, stable  -Will hold Amlodipine 10, ASA 81, HCTZ 25, Losartan 100 at this time  -No longer taking hydralazine due to diarrhea     PPx:Heparin, Clopidogrel  Diet: NPO  Code: Full Code    Disposition: Pending further workup, lactate/TTE     Code Status:      Full Code      Suleiman Lee  [facility] Internal Medicine HO-II  [facility] IM Service Team A    [facility] Medicine Hospitalist Pager numbers:   [facility] Hospitalist Medicine Team A (Hanny/Loco): [phone]  [facility] Hospitalist Medicine Team B (Kacey/Nico):  [phone]

## 2017-10-12 NOTE — PROGRESS NOTES
.Pharmacy New Medication Education    Patient accepted medication education.    Pharmacy educated patient on name and purpose of medications and possible side effects, using the teach-back method.     Dextrose  Glucagon  Glucose  Heparin  Insulin aspart  Vitamin d    Learners of pharmacy medication education included:  patient    Patient +/- learner response:  teachback

## 2017-10-12 NOTE — PLAN OF CARE
"TN met with patient, no family at bedside.  Patient is oriented to person and time, states he is at "[facility]."  Patient reports being independent at home, no HH or DME at home.  Patient states he lives alone and still drives.  TN will continue to follow and assess discharge needs.    --Will call family to confirm baseline at home.    Update: TN spoke with daughter Mike at bedside. She stated patient is independent at home and usually does everything for himself. His family also does provide support. TN informed her will have PT/OT work with patient and see their recommendations. Will continue to follow.     10/12/17 0909   Discharge Assessment   Assessment Type Discharge Planning Assessment   Confirmed/corrected address and phone number on facesheet? Yes   Assessment information obtained from? Patient;Medical Record   Communicated expected length of stay with patient/caregiver yes   Prior to hospitilization cognitive status: Alert/Oriented  (states he is in [facility])   Prior to hospitalization functional status: Independent   Current cognitive status: Not Oriented to Place   Current Functional Status: Needs Assistance   Facility Arrived From: Home   Lives With alone   Able to Return to Prior Arrangements unable to determine at this time (comments)   Is patient able to care for self after discharge? Unable to determine at this time (comments)   Who are your caregiver(s) and their phone number(s)? Candace [email or web address]-[phone]   Patient's perception of discharge disposition other (comments)  (unable to determine at this time)   Readmission Within The Last 30 Days no previous admission in last 30 days   Equipment Currently Used at Home none   Do you have any problems affording any of your prescribed medications? No   Is the patient taking medications as prescribed? yes   Does the patient have transportation home? Yes   Transportation Available family or friend will provide   Dialysis Name and Scheduled days N/A "   Discharge Plan A Other  (Unable to determine at this time)   Discharge Plan B Home with family;Home Health   Patient/Family In Agreement With Plan yes     Kelly Hazel RN  Transition Navigator  [phone]

## 2017-10-12 NOTE — PLAN OF CARE
Problem: Patient Care Overview  Goal: Plan of Care Review  Outcome: Ongoing (interventions implemented as appropriate)  Plan of care reviewed with patient. Patient verbalized complete understanding.Patient had MRI/MRA of brain, SLP eval and 2d Echo today.  Patient passed his speech evaluation. Patient has an EF of 60. Patient likes to cover with his own blanket.  Fall precautions maintained. Bed in lowest position, locked, call light within reach, and bed alarm on. Side rails up x's 2 with slip resistant socks on. Nurse instructed patient to notify staff for any assistance and pt verbalized complete understanding. Patient on telemetry throughout shift with no ectopy noted. Will continue to monitor.

## 2017-10-12 NOTE — PLAN OF CARE
Problem: SLP Goal  Goal: SLP Goal  Short Term Goals:  1. Pt will participate in ongoing swallow assessment to determine least restrictive diet.   2. Patient will successfully participate in [id number] evaluation to further assess for any communication impairments s/p stroke    Outcome: Ongoing (interventions implemented as appropriate)  Swallow eval completed, pt safe for initiation of oral diet. Daughter at bedside and educated on diet recs.

## 2017-10-12 NOTE — PLAN OF CARE
Problem: Physical Therapy Goal  Goal: Physical Therapy Goal  Goals to be met by: 2017     Patient will increase functional independence with mobility by performin. Gait  x >200 feet with [place] using no AD.   2. Lower extremity exercise program x 25 with independence    Outcome: Ongoing (interventions implemented as appropriate)  Recommendation Home with 24/7 care vs possible SNF

## 2017-10-12 NOTE — PLAN OF CARE
Notified by outstanding nurse that pt. Had 8 beat run of Vtach per Telemetry.  Pt. Is asymptomatic at this time, but will continue to monitor overnight.     Shant Aggarwal MD  [facility] Internal Medicine HO-1  [phone]

## 2017-10-12 NOTE — PT/OT/SLP EVAL
Physical Therapy  Evaluation    Shant Magana   MRN: [id number]   Admitting Diagnosis: Syncope and collapse    PT Received On: 10/12/17  PT Start Time: 1115     PT Stop Time: 1130    PT Total Time (min): 15 min       Billable Minutes:  Evaluation 15    Diagnosis: Syncope and collapse  The primary encounter diagnosis was Syncope, unspecified syncope type. Diagnoses of Altered mental status, unspecified altered mental status type, Confusion, SHIKHA (acute kidney injury), Type 2 diabetes mellitus with complication, without long-term current use of insulin, Syncope and collapse, Essential hypertension, Cardiomegaly, and Embolic stroke were also pertinent to this visit.      Past Medical History:   Diagnosis Date    Cancer     prostate    Diabetes mellitus     Hypertension     Hypertrophic cardiomyopathy     Renal disorder       Past Surgical History:   Procedure Laterality Date    BACK SURGERY      THYROIDECTOMY         Referring physician: Hanny  Date referred to PT: 10/12/2017    General Precautions: Standard, fall  Orthopedic Precautions: N/A   Braces: N/A            Patient History:  Lives With: alone  Living Arrangements: house  Home Accessibility: stairs to enter home  Stair Railings at Home: outside, present at both sides  Transportation Available: family or friend will provide  DME owned (not currently used): none    Previous Level of Function:  Ambulation Skills: independent  Transfer Skills: independent  ADL Skills: needs assist    Subjective:  Communicated with primary nurse prior to session.    Chief Complaint: none voiced  Patient goals:  Go home    Pain/Comfort  Pain Rating 1: 0/10  Pain Rating Post-Intervention 1: 0/10      Objective:   Patient found with: telemetry     Cognitive Exam:  Oriented to: Person    Follows Commands/attention: Follows one-step commands and Follows two-step commands/slow processing   Communication: clear/fluent  Safety awareness/insight to disability: impaired    Physical  Exam:  Postural examination/scapula alignment: Rounded shoulder and Head forward    Skin integrity: Visible skin intact  Edema: None noted either LE    Sensation:   Intact  Grossly    Upper Extremity Range of Motion: See OT note  Right Upper Extremity:   Left Upper Extremity:     Upper Extremity Strength: See OT note  Right Upper Extremity:   Left Upper Extremity:     Lower Extremity Range of Motion:  Right Lower Extremity: WFL  Left Lower Extremity: WFL    Lower Extremity Strength:  Right Lower Extremity: WFL  Left Lower Extremity: WFL     Fine motor coordination:  Intact    Gross motor coordination: WFL    Functional Mobility:  Bed Mobility:  Supine to Sit: Supervision  Sit to Supine: Supervision    Transfers:  Sit <> Stand Assistance: Supervision  Sit <> Stand Assistive Device: No Assistive Device    Gait:   Gait Distance: 200 ft  Assistance 1: Supervision  Gait Assistive Device: No device  Gait Pattern: reciprocal  Gait Deviation(s): decreased robert    Stairs:  na    Balance:   Static Sit: GOOD: Takes MODERATE challenges from all directions  Dynamic Sit: GOOD-: Maintains balance through MODERATE excursions of active trunk movement,     Static Stand: GOOD-: Takes MODERATE challenges from all directions inconsistently  Dynamic stand: GOOD-: Needs SUPERVISION only during gait and able to self right with moderate     Therapeutic Activities and Exercises:  na    AM-PAC 6 CLICK MOBILITY  How much help from another person does this patient currently need?   1 = Unable, Total/Dependent Assistance  2 = A lot, Maximum/Moderate Assistance  3 = A little, Minimum/Contact Guard/Supervision  4 = None, Modified [place]/Independent    Turning over in bed (including adjusting bedclothes, sheets and blankets)?: 4  Sitting down on and standing up from a chair with arms (e.g., wheelchair, bedside commode, etc.): 4  Moving from lying on back to sitting on the side of the bed?: 4  Moving to and from a bed to a chair (including  a wheelchair)?: 4  Need to walk in hospital room?: 3  Climbing 3-5 steps with a railing?: 3  Total Score: 22     AM-PAC Raw Score CMS G-Code Modifier Level of Impairment Assistance   6 % Total / Unable   7 - 9 CM 80 - 100% Maximal Assist   10 - 14 CL 60 - 80% Moderate Assist   15 - 19 CK 40 - 60% Moderate Assist   20 - 22 CJ 20 - 40% Minimal Assist   23 CI 1-20% SBA / CGA   24 CH 0% Independent/ Mod I     Patient left HOB elevated with call button in reach and bed alarm on.    Assessment:   Shant Magana is a 77 y.o. male with a medical diagnosis of Syncope and collapse and presents with weakness and gait disturbance. Patient should benefit from gait and strengthening    Rehab identified problem list/impairments: Rehab identified problem list/impairments: impaired endurance, gait instability, impaired self care skills, impaired functional mobilty, impaired cognition, weakness    Rehab potential is good.    Activity tolerance: Good    Discharge recommendations: Discharge Facility/Level Of Care Needs: other (see comments) (TBD)     Barriers to discharge: Barriers to Discharge: Decreased caregiver support    Equipment recommendations: Equipment Needed After Discharge: none     GOALS:    Physical Therapy Goals        Problem: Physical Therapy Goal    Goal Priority Disciplines Outcome Goal Variances Interventions   Physical Therapy Goal     PT/OT, PT Ongoing (interventions implemented as appropriate)     Description:  Goals to be met by: 2017     Patient will increase functional independence with mobility by performin. Gait  x >200 feet with [place] using no AD.   2. Lower extremity exercise program x 25 with independence                      PLAN:    Patient to be seen 6 x/week to address the above listed problems via gait training, therapeutic activities, therapeutic exercises  Plan of Care expires: 17  Plan of Care reviewed with: patient          Alan Nogueira, PT  10/12/2017

## 2017-10-12 NOTE — PT/OT/SLP EVAL
Speech Language Pathology  Clinical Swallow Evaluation    Shant Magana   MRN: [id number]   Admitting Diagnosis: Syncope and collapse    Diet recommendations: Solid Diet Level: Regular  Liquid Diet Level: Thin   Upright for meals, small bites/sips, whole meds, straws with liquids is safe    SLP Treatment Date: 10/12/17  Speech Start Time: 1150     Speech Stop Time: 1206     Speech Total (min): 16 min       TREATMENT BILLABLE MINUTES:  Eval Swallow and Oral Function 16    Diagnosis: Syncope and collapse  hSant Magana is a 76 yo M with a PMH of Prostate cancer, T2DM, HTN, Hypertrophic cardiomyopathy, and CKD who was in his usual state of health (Able to perform ADLs with light assistance from family members, administer his own medications with assistance) until today when the daughter noticed him crawling up the house steps after working on his car.  When he was questioned about why he was crawling, the pt. Did not remember.  Later that afternoon the patient was walking to his room after getting assistance with a bath and fell face forward onto the floor hitting his mouth and nose per daughter's report.  The patient does not remember the incident, nor remembers any events occurring before his fall.  The patient is unable to give a history so the daughter is questioned about review of symptoms.  Per daughter he had no recent illness, fever, chills, nausea, vomiting, diarrhea, change in urinary/bowel habits, weakness, or change in mental status.         At baseline the patient is able to do daily activities with assistance such as bathing, dressing, administering his medications, and work around the house.  His blood pressure has been well controlled with the help of his wife and daughter.  At baseline since his stroke in July, 2017, he has had chronic hiccups, intermittent amnesia of events, asks questions repetitively, and occasionally drags his legs when walking.       Of note he was recently admitted to [facility] in July 2017  "for hypertensive crisis/emergency and was diagnosed with a stroke.  MRI at that time showed multiple acute infarcts suggestive of embolic etiology in the R. Cerebellar, R. Sahni radiata, and L. Frontal lobe.  CTA showed 80% LICA stenosis and bilateral vertebral disease with multiple intracranial stenoses.  Carotid U/S showed <50% BROOK and 50-70% LICA stenosis.  TTE with LVH and LAE, but no afib at that time.  His stroke symptoms had resolved since then up until today.      Past Medical History:   Diagnosis Date    Cancer     prostate    Diabetes mellitus     Hypertension     Hypertrophic cardiomyopathy     Renal disorder      Past Surgical History:   Procedure Laterality Date    BACK SURGERY      THYROIDECTOMY         Has the patient been evaluated by SLP for swallowing? : Yes  Keep patient NPO?: No   General Precautions: Standard, fall    Current Respiratory Status:  (room air )     Social Hx: Patient lives at home alone, son lives nearby. Daughters call and check in on patient daily. He is semi-independent with ADLs, limited driving is reported per daughter.     Prior diet: no issues with swallowing.     Subjective:  Consult received for swallow study this date, pt found in room with daughter. Pt remains NPO for now.   Patient goals: "If you give me something to eat, I will be sure to eat it."       Pain/Comfort  Pain Rating 1: 0/10    Objective:   Patient found with: telemetry  Consult for swallow study received, SLP did communicate with RN re: eval.   Pt initially found supine, covered from head to toe with heavy comforter.   Pt easily agreed to sit up in bed and participate in swallow eval.   Pt alert, oriented x4, named daughter and asking for food.     Oral Musculature Evaluation  Oral Musculature: general weakness, left weakness  Dentition: present and adequate  Mucosal Quality: adequate  Mandibular Strength and Mobility: WFL  Oral Labial Strength and Mobility:  (functional movement)  Lingual " "Strength and Mobility: functional strength  Buccal Strength and Mobility: decreased tone  Volitional Cough: elicited  Volitional Swallow: timely swallow per palpation  Voice Prior to PO Intake: clear voice      Bedside Swallow Eval:  Consistencies Assessed: Thin liquids water by cup x2 oz , Puree self fed pudding cup 4oz and Solids self fed cracker (1/2" square"  Oral Phase: WNL  Pharyngeal Phase: no overt clinical  signs/symptoms of aspiration, no overt clinical signs/symptoms of pharyngeal dysphagia and multiple spontaneous swallows    Additional Treatment:  Educated pt and daughter on ST role and diet recs.   RN notified of diet recs.   FIM:                                 Assessment:  Shant Magana is a 77 y.o. male with a medical diagnosis of Syncope and collapse and presents with timely oral and pharyngeal phase of the swallow. Daughter reports pt with occasional delay when answering questions. Will evaluate cognitive and language next session.     Do you have any cultural, spiritual, Rastafari conflicts, given your current situation?: none     Discharge recommendations: Discharge Facility/Level Of Care Needs:  (pending PT/OT recs)     Goals:    SLP Goals        Problem: SLP Goal    Goal Priority Disciplines Outcome   SLP Goal     SLP Ongoing (interventions implemented as appropriate)   Description:  Short Term Goals:  1. Pt will participate in ongoing swallow assessment to determine least restrictive diet.   2. Patient will successfully participate in [id number] evaluation to further assess for any communication impairments s/p stroke                       Plan:   Patient to be seen Therapy Frequency: 3 x/week   Plan of Care expires: 11/01/17  Plan of Care reviewed with: patient, daughter  SLP Follow-up?: Yes  SLP - Next Visit Date: 10/13/17      SLP G-Codes  Functional Assessment Tool Used: NOMS  Score: 6  Functional Limitations: Swallowing  Swallow Current Status (): CI  Swallow Goal Status " ():     NEVIN Hassan, CCC-SLP  10/12/2017

## 2017-10-12 NOTE — H&P
[facility] Internal Medicine History and Physical - Resident Note    Admitting Team: [facility] internal Medicine team A  Attending Physician: Dr. Gamino  Resident: Suleiman Lee  Interns: Lambert Miramontes and Shelly Reeder    Date of Admit: 10/11/2017    Chief Complaint     Fall (pt fell twice today + LOC both falls no head injury family reports AMS pt oriented to self and time. unaware of fall. Denies pain. left side facial dropping. slight drift to left arm )      Subjective:      History of Present Illness:    Shant Magana is a 76 yo M with a PMH of Prostate cancer, T2DM, HTN, Hypertrophic cardiomyopathy, and CKD who was in his usual state of health (Able to perform ADLs with light assistance from family members, administer his own medications with assistance) until today when the daughter noticed him crawling up the house steps after working on his car.  When he was questioned about why he was crawling, the pt. Did not remember.  Later that afternoon the patient was walking to his room after getting assistance with a bath and fell face forward onto the floor hitting his mouth and nose per daughter's report.  The patient does not remember the incident, nor remembers any events occurring before his fall.  The patient is unable to give a history so the daughter is questioned about review of symptoms.  Per daughter he had no recent illness, fever, chills, nausea, vomiting, diarrhea, change in urinary/bowel habits, weakness, or change in mental status.        At baseline the patient is able to do daily activities with assistance such as bathing, dressing, administering his medications, and work around the house.  His blood pressure has been well controlled with the help of his wife and daughter.  At baseline since his stroke in July, 2017, he has had chronic hiccups, intermittent amnesia of events, asks questions repetitively, and occasionally drags his legs when walking.      Of note he was recently admitted to [facility] in July 2017  for hypertensive crisis/emergency and was diagnosed with a stroke.  MRI at that time showed multiple acute infarcts suggestive of embolic etiology in the R. Cerebellar, R. Sahni radiata, and L. Frontal lobe.  CTA showed 80% LICA stenosis and bilateral vertebral disease with multiple intracranial stenoses.  Carotid U/S showed <50% BROOK and 50-70% LICA stenosis.  TTE with LVH and LAE, but no afib at that time.  His stroke symptoms had resolved since then up until today.      Past Medical History:  Past Medical History:   Diagnosis Date    Cancer     prostate    Diabetes mellitus     Hypertension     Hypertrophic cardiomyopathy     Renal disorder        Past Surgical History:  Past Surgical History:   Procedure Laterality Date    BACK SURGERY      THYROIDECTOMY         Allergies:  Review of patient's allergies indicates:   Allergen Reactions    Hydralazine analogues Diarrhea       Home Medications:  Prior to Admission medications    Medication Sig Start Date End Date Taking? Authorizing Provider   amlodipine (NORVASC) 10 MG tablet Take 1 tablet (10 mg total) by mouth once daily. 8/28/17 8/28/18  Guanaco Ty MD   aspirin (ECOTRIN) 81 MG EC tablet Take 1 tablet (81 mg total) by mouth once daily. 7/19/17 7/19/18  Gary Otero MD   atorvastatin (LIPITOR) 80 MG tablet Take 1 tablet (80 mg total) by mouth once daily. 8/28/17 8/28/18  [place] Ty MD   clopidogrel (PLAVIX) 75 mg tablet Take 1 tablet (75 mg total) by mouth once daily. 8/28/17 8/28/18  [place] Ty MD   hydrochlorothiazide (HYDRODIURIL) 25 MG tablet Take 1 tablet (25 mg total) by mouth once daily. 8/28/17 8/28/18  Gu[place] Ty MD   losartan (COZAAR) 100 MG tablet Take 1 tablet (100 mg total) by mouth once daily. 8/28/17 8/28/18  Gu[place] Ty MD   metformin (GLUCOPHAGE) 500 MG tablet Take 500 mg by mouth 2 (two) times daily with meals.    Historical Provider, MD   tamsulosin (FLOMAX) 0.4 mg Cp24 Take 0.4 mg  "by mouth once daily.    Historical Provider, MD   vitamin D 1000 units Tab Take 1,000 Units by mouth once daily.    Historical Provider, MD       Family History:  History reviewed. No pertinent family history.    Social History:  Social History   Substance Use Topics    Smoking status: Former Smoker     Types: Cigarettes    Smokeless tobacco: Never Used    Alcohol use Yes       Review of Systems:  All other systems are reviewed and are negative.    Health Maintaince :   Primary Care Physician: Dr. Aldana  Immunizations:   TDap is not up to date, .  Influenza is not up to date, .  Pneumovax is not up to date, .  Cancer Screening:  Colonoscopy: is up to date.      Objective:   Last 24 Hour Vital Signs:  BP  Min: 125/62  Max: 143/74  Temp  Av.8 °F (36.6 °C)  Min: 97.8 °F (36.6 °C)  Max: 97.8 °F (36.6 °C)  Pulse  Av.3  Min: 68  Max: 74  Resp  Av  Min: 16  Max: 16  SpO2  Av %  Min: 99 %  Max: 99 %  Height  Av' 11" (180.3 cm)  Min: 5' 11" (180.3 cm)  Max: 5' 11" (180.3 cm)  Weight  Av.1 kg (203 lb)  Min: 92.1 kg (203 lb)  Max: 92.1 kg (203 lb)  Body mass index is 28.31 kg/m².  No intake/output data recorded.    Physical Examination:  General:          Alert and awake in NAD, able to follow commands  Head:               Normocephalic and atraumatic  Eyes:               PERRL; EOMi with anicteric sclera and clear conjunctivae  Mouth:             Oropharynx clear and without exudate; moist mucous membranes  Cardio:            RRR no murmurs or rubs, No JVD  Resp:               CTAB and unlabored; no wheezes, crackles or rhonchi  Abdom:            Soft, NTND with normoactive bowel sounds  Extrem:            WWP with no clubbing, cyanosis, 2+ edema of lower extremites  Skin:                No rashes, lesions, or color changes  Pulses:            2+ and symmetric distally  Neuro:             AAOx3; cooperative and pleasant with no focal deficits, CN2-12 intact, strength 5/5 bilaterally, sensation " intact bilaterally      Laboratory:  Most Recent Data:  CBC: Lab Results   Component Value Date    WBC 9.92 10/11/2017    HGB 14.9 10/11/2017    HCT 43.8 10/11/2017     10/11/2017    MCV 87 10/11/2017    RDW 14.6 (H) 10/11/2017     WBC Differential:   BMP: Lab Results   Component Value Date     10/11/2017    K 3.9 10/11/2017     10/11/2017    CO2 24 10/11/2017    BUN 37 (H) 10/11/2017    CREATININE 2.8 (H) 10/11/2017     (H) 10/11/2017    CALCIUM 10.8 (H) 10/11/2017    MG 1.9 06/07/2017    PHOS 3.3 07/16/2017     LFTs: Lab Results   Component Value Date    PROT 7.4 10/11/2017    ALBUMIN 3.5 10/11/2017    BILITOT 0.7 10/11/2017    AST 23 10/11/2017    ALKPHOS 90 10/11/2017    ALT 16 10/11/2017     Coags:   Lab Results   Component Value Date    INR 1.1 10/11/2017     FLP: Lab Results   Component Value Date    CHOL 285 (H) 07/14/2017    HDL 39 (L) 07/14/2017    LDLCALC 191.6 (H) 07/14/2017    TRIG 272 (H) 07/14/2017    CHOLHDL 13.7 (L) 07/14/2017     DM: Lab Results   Component Value Date    HGBA1C 5.7 (H) 07/18/2017    HGBA1C 5.7 (H) 07/17/2017    HGBA1C 5.6 07/16/2017    LDLCALC 191.6 (H) 07/14/2017    CREATININE 2.8 (H) 10/11/2017     Thyroid: Lab Results   Component Value Date    TSH 3.718 07/14/2017     Anemia: No results found for: IRON, TIBC, FERRITIN, [id number], FOLATE  Cardiac: Lab Results   Component Value Date    TROPONINI 0.011 10/11/2017     (H) 10/11/2017     Urinalysis: Lab Results   Component Value Date    COLORU Yellow 10/11/2017    SPECGRAV 1.025 10/11/2017    NITRITE Negative 10/11/2017    KETONESU Negative 10/11/2017    UROBILINOGEN 1.0 10/11/2017    WBCUA 1 10/11/2017       Trended Lab Data:    Recent Labs  Lab 10/11/17  1727   WBC 9.92   HGB 14.9   HCT 43.8      MCV 87   RDW 14.6*      K 3.9      CO2 24   BUN 37*   CREATININE 2.8*   *   PROT 7.4   ALBUMIN 3.5   BILITOT 0.7   AST 23   ALKPHOS 90   ALT 16       Trended Cardiac  Data:    Recent Labs  Lab 10/11/17  1727   TROPONINI 0.011   *           Other Results:  EKG (my interpretation):   No new data    Radiology:  Imaging Results          X-Ray Chest PA And Lateral (Final result)  Result time 10/11/17 17:47:27    Final result by Aimee Carter MD (10/11/17 17:47:27)                 Impression:      No acute cardiopulmonary process identified.      Electronically signed by: AIMEE CARTER MD  Date:     10/11/17  Time:    17:47              Narrative:    Chest PA and lateral.  Comparison: 7/14/17.    Mediastinal structures are midline.  Cardiac silhouette is normal in size.  Prominent aortic plaque seen with stable tortuosity of the aorta.  Lungs are symmetrically expanded.  No evidence of focal consolidative process, pneumothorax, or significant effusion.  Bones appear intact.  No free air visualized beneath the diaphragm.                             CT Head Without Contrast (Final result)  Result time 10/11/17 17:09:09    Final result by Aimee Carter MD (10/11/17 17:09:09)                 Impression:       No acute intracranial abnormalities identified.  Extensive chronic microvascular ischemic disease with multifocal areas of remote infarction as detailed above.  If clinical concern for acute infarction, recommend further evaluation with MRI with diffusion weighted imaging.      Electronically signed by: AIMEE CARTER MD  Date:     10/11/17  Time:    17:09              Narrative:    Exam: CT of the head without contrast -- 77-year-old male with stroke.    Comparison: CT head 7/14/17, MRI brain 7/17/17.    Technique: 5 mm noncontrast axial images through the brain were obtained.    Findings: There is extensive chronic microvascular ischemic disease with small remote areas of infarction seen involving the posterior right frontal lobe, parasagittal right cerebellar hemisphere, right corona radiata, and bilateral basal ganglia.  No convincing evidence of acute/recent major  vascular distribution cerebral infarction, intraparenchymal hemorrhage, or intra-axial space occupying lesion. The ventricular system is normal in appearance for age. No hydrocephalus. No effacement of the skull-base cisterns. No abnormal extra-axial fluid collections or blood products. The paranasal sinuses and mastoid air cells are unremarkable. The calvarium shows no significant abnormality.                               Assessment:     Shant Magana is a 77 y.o. male with:  Patient Active Problem List    Diagnosis Date Noted    Altered mental status 10/11/2017    Cerebrovascular accident (CVA) due to embolism of anterior cerebral artery 08/15/2017    Hypertensive crisis, unspecified 07/15/2017    Hypertensive emergency     Type 2 diabetes mellitus with complication, without long-term current use of insulin     Hypertensive encephalopathy 07/14/2017    Unspecified essential hypertension 06/07/2017    Bradycardia 06/07/2017        Plan:     LOC  -Unknown etiology at this time, can't rule out recurrent stroke vs carotid artery insufficiency  -Pt. Reports no recollection of fall, fell face forward per daughter report  -Exam CN2-12 intact, no focal neuro deficits  -Imaging 7/18/2017: MRI with multiple acute infarcts suggestive of embolic etiology in the R. Cerebellar, R. Sahni radiata, and L. Frontal lobe.  CTA showed 80% LICA stenosis and bilateral vertebral disease with multiple intracranial stenoses.  Carotid U/S showed <50% BROOK and 50-70% LICA stenosis.  TTE with LVH and LAE, but no afib at that time.    -CT head negative for acute process, but showing chronic microvascular ischemic disease with multifocal areas of remote infarction.  -Troponin 0.011, repeat pending,   -UA negative  -, lactate 3.6, repeat pending, on telemetry  -PPx with Clopidogrel/heparin  -TTE pending  -Ortho statics negative in ED  -Continue IVF, holding anti-HTN meds at this time      Asymptomatic bacteruria  -UA with  bacteria, no WBCs, afebrile  -no intervention needed at this time, will continue to monitor    H/o Hypertensive emergency/encephalopathy  -LE weakness, dizziness, BP on admit 260/124 July 2017  -MRI at that time showed multiple acute infarcts suggestive of embolic etiology in the R. Cerebellar, R. Sahni radiata, and L. Frontal lobe.  CTA showed 80% LICA stenosis and bilateral vertebral disease with multiple intracranial stenoses.  Carotid U/S showed <50% BROOK and 50-70% LICA stenosis.  TTE with LVH and LAE, but no afib at that time.         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  -Cr 2.8 this admission, above baseline ~1.4-1.7  -GFR baseline 50s, now 24 this admission  -On Losartan 100      HTN  -/74 on admission, now 139/88, stable  -Will hold Amlodipine 10, ASA 81, HCTZ 25, Losartan 100 at this time  -No longer taking hydralazine due to diarrhea          PPx:Heparin, Clopidogrel  Diet: NPO  Code: Full Code    Disposition: Pending further workup, lactate/TTE    Code Status:     Full Code    Shant Aggarwal  [facility] Internal [facility] HO-1  [facility] Service    [facility] [place] Medicine Hospitalist Pager numbers:   [facility] Hospitalist Medicine Team A (Hanny/Loco): [phone]  [facility] [place] Hospitalist Medicine Team B (Kacey/Nico):  [phone]

## 2017-10-13 LAB
ALBUMIN SERPL BCP-MCNC: 3.1 G/DL
ALP SERPL-CCNC: 81 U/L
ALT SERPL W/O P-5'-P-CCNC: 20 U/L
ANION GAP SERPL CALC-SCNC: 7 MMOL/L
AST SERPL-CCNC: 30 U/L
BASOPHILS # BLD AUTO: 0.02 K/UL
BASOPHILS NFR BLD: 0.4 %
BILIRUB SERPL-MCNC: 0.7 MG/DL
BUN SERPL-MCNC: 24 MG/DL
CALCIUM SERPL-MCNC: 9.8 MG/DL
CHLORIDE SERPL-SCNC: 109 MMOL/L
CO2 SERPL-SCNC: 23 MMOL/L
CREAT SERPL-MCNC: 1.3 MG/DL
DIFFERENTIAL METHOD: NORMAL
EOSINOPHIL # BLD AUTO: 0.3 K/UL
EOSINOPHIL NFR BLD: 5.5 %
ERYTHROCYTE [DISTWIDTH] IN BLOOD BY AUTOMATED COUNT: 14.4 %
EST. GFR  (AFRICAN AMERICAN): >60 ML/MIN/1.73 M^2
EST. GFR  (NON AFRICAN AMERICAN): 53 ML/MIN/1.73 M^2
GLUCOSE SERPL-MCNC: 101 MG/DL
HCT VFR BLD AUTO: 41.5 %
HGB BLD-MCNC: 14.1 G/DL
LYMPHOCYTES # BLD AUTO: 1.1 K/UL
LYMPHOCYTES NFR BLD: 22.8 %
MCH RBC QN AUTO: 29.4 PG
MCHC RBC AUTO-ENTMCNC: 34 G/DL
MCV RBC AUTO: 87 FL
MONOCYTES # BLD AUTO: 0.4 K/UL
MONOCYTES NFR BLD: 9 %
NEUTROPHILS # BLD AUTO: 3.1 K/UL
NEUTROPHILS NFR BLD: 62.1 %
PLATELET # BLD AUTO: 210 K/UL
PMV BLD AUTO: 11.1 FL
POCT GLUCOSE: 102 MG/DL (ref 70–110)
POCT GLUCOSE: 164 MG/DL (ref 70–110)
POCT GLUCOSE: 87 MG/DL (ref 70–110)
POTASSIUM SERPL-SCNC: 3.6 MMOL/L
PROT SERPL-MCNC: 6.9 G/DL
RBC # BLD AUTO: 4.8 M/UL
SODIUM SERPL-SCNC: 139 MMOL/L
WBC # BLD AUTO: 4.91 K/UL

## 2017-10-13 PROCEDURE — B246ZZ4 ULTRASONOGRAPHY OF RIGHT AND LEFT HEART, TRANSESOPHAGEAL: ICD-10-PCS | Performed by: INTERNAL MEDICINE

## 2017-10-13 PROCEDURE — 36415 COLL VENOUS BLD VENIPUNCTURE: CPT

## 2017-10-13 PROCEDURE — 92507 TX SP LANG VOICE COMM INDIV: CPT

## 2017-10-13 PROCEDURE — G8987 SELF CARE CURRENT STATUS: HCPCS | Mod: CJ

## 2017-10-13 PROCEDURE — 92526 ORAL FUNCTION THERAPY: CPT

## 2017-10-13 PROCEDURE — 11000001 HC ACUTE MED/SURG PRIVATE ROOM

## 2017-10-13 PROCEDURE — 94761 N-INVAS EAR/PLS OXIMETRY MLT: CPT

## 2017-10-13 PROCEDURE — G8988 SELF CARE GOAL STATUS: HCPCS | Mod: CI

## 2017-10-13 PROCEDURE — 25000003 PHARM REV CODE 250: Performed by: INTERNAL MEDICINE

## 2017-10-13 PROCEDURE — 85025 COMPLETE CBC W/AUTO DIFF WBC: CPT

## 2017-10-13 PROCEDURE — 97535 SELF CARE MNGMENT TRAINING: CPT

## 2017-10-13 PROCEDURE — 97165 OT EVAL LOW COMPLEX 30 MIN: CPT

## 2017-10-13 PROCEDURE — 63600175 PHARM REV CODE 636 W HCPCS

## 2017-10-13 PROCEDURE — 63600175 PHARM REV CODE 636 W HCPCS: Performed by: INTERNAL MEDICINE

## 2017-10-13 PROCEDURE — 80053 COMPREHEN METABOLIC PANEL: CPT

## 2017-10-13 PROCEDURE — 71000033 HC RECOVERY, INTIAL HOUR: Performed by: INTERNAL MEDICINE

## 2017-10-13 PROCEDURE — 63600175 PHARM REV CODE 636 W HCPCS: Performed by: STUDENT IN AN ORGANIZED HEALTH CARE EDUCATION/TRAINING PROGRAM

## 2017-10-13 PROCEDURE — 71000039 HC RECOVERY, EACH ADD'L HOUR: Performed by: INTERNAL MEDICINE

## 2017-10-13 PROCEDURE — 25000003 PHARM REV CODE 250: Performed by: STUDENT IN AN ORGANIZED HEALTH CARE EDUCATION/TRAINING PROGRAM

## 2017-10-13 RX ORDER — MIDAZOLAM HYDROCHLORIDE 1 MG/ML
INJECTION INTRAMUSCULAR; INTRAVENOUS
Status: COMPLETED
Start: 2017-10-13 | End: 2017-10-13

## 2017-10-13 RX ORDER — MIDAZOLAM HYDROCHLORIDE 5 MG/ML
5 INJECTION INTRAMUSCULAR; INTRAVENOUS ONCE
Status: DISCONTINUED | OUTPATIENT
Start: 2017-10-13 | End: 2017-10-16 | Stop reason: HOSPADM

## 2017-10-13 RX ORDER — LOSARTAN POTASSIUM 50 MG/1
100 TABLET ORAL DAILY
Status: DISCONTINUED | OUTPATIENT
Start: 2017-10-13 | End: 2017-10-14

## 2017-10-13 RX ORDER — AMLODIPINE BESYLATE 5 MG/1
10 TABLET ORAL DAILY
Status: DISCONTINUED | OUTPATIENT
Start: 2017-10-13 | End: 2017-10-16 | Stop reason: HOSPADM

## 2017-10-13 RX ORDER — HYDROCHLOROTHIAZIDE 25 MG/1
25 TABLET ORAL DAILY
Status: DISCONTINUED | OUTPATIENT
Start: 2017-10-13 | End: 2017-10-16 | Stop reason: HOSPADM

## 2017-10-13 RX ORDER — FENTANYL CITRATE 50 UG/ML
100 INJECTION, SOLUTION INTRAMUSCULAR; INTRAVENOUS
Status: DISCONTINUED | OUTPATIENT
Start: 2017-10-13 | End: 2017-10-16 | Stop reason: HOSPADM

## 2017-10-13 RX ORDER — SODIUM CHLORIDE 9 MG/ML
INJECTION, SOLUTION INTRAVENOUS CONTINUOUS
Status: DISCONTINUED | OUTPATIENT
Start: 2017-10-13 | End: 2017-10-14

## 2017-10-13 RX ORDER — FLUMAZENIL 0.1 MG/ML
0.2 INJECTION INTRAVENOUS ONCE
Status: DISCONTINUED | OUTPATIENT
Start: 2017-10-13 | End: 2017-10-16 | Stop reason: HOSPADM

## 2017-10-13 RX ORDER — NALOXONE HCL 0.4 MG/ML
0.4 VIAL (ML) INJECTION
Status: DISCONTINUED | OUTPATIENT
Start: 2017-10-13 | End: 2017-10-16 | Stop reason: HOSPADM

## 2017-10-13 RX ADMIN — LOSARTAN POTASSIUM 100 MG: 50 TABLET, FILM COATED ORAL at 01:10

## 2017-10-13 RX ADMIN — HEPARIN SODIUM 5000 UNITS: 5000 INJECTION, SOLUTION INTRAVENOUS; SUBCUTANEOUS at 09:10

## 2017-10-13 RX ADMIN — HEPARIN SODIUM 5000 UNITS: 5000 INJECTION, SOLUTION INTRAVENOUS; SUBCUTANEOUS at 05:10

## 2017-10-13 RX ADMIN — AMLODIPINE BESYLATE 10 MG: 5 TABLET ORAL at 01:10

## 2017-10-13 RX ADMIN — SODIUM CHLORIDE: 0.9 INJECTION, SOLUTION INTRAVENOUS at 10:10

## 2017-10-13 RX ADMIN — MIDAZOLAM HYDROCHLORIDE 3 MG: 1 INJECTION, SOLUTION INTRAMUSCULAR; INTRAVENOUS at 11:10

## 2017-10-13 RX ADMIN — HEPARIN SODIUM 5000 UNITS: 5000 INJECTION, SOLUTION INTRAVENOUS; SUBCUTANEOUS at 01:10

## 2017-10-13 RX ADMIN — SODIUM CHLORIDE: 0.9 INJECTION, SOLUTION INTRAVENOUS at 11:10

## 2017-10-13 RX ADMIN — HYDROCHLOROTHIAZIDE 25 MG: 25 TABLET ORAL at 01:10

## 2017-10-13 RX ADMIN — FENTANYL CITRATE 100 MCG: 50 INJECTION INTRAMUSCULAR; INTRAVENOUS at 10:10

## 2017-10-13 RX ADMIN — SODIUM CHLORIDE: 0.9 INJECTION, SOLUTION INTRAVENOUS at 01:10

## 2017-10-13 NOTE — PLAN OF CARE
Problem: Patient Care Overview  Goal: Plan of Care Review  Outcome: Ongoing (interventions implemented as appropriate)  The pt has had no complaints this shift.  He did have one episode of loose stools.  Pt is unsteady on his feet when walking to the bathroom.  I reminded him to call for assistance when getting up.  Pt verbalized understanding.  Pt is on telemetry running normal sinus rhythm with a left BBB and occasional PVCs.  Neuro checks remain good.  Pt is AAOx3, but is confused when it comes to physical needs like using the urinal or pulling down his pants to go to the bathroom. A safe environment has been maintained.  Blood glucose levels have been checked and did not need coverage this evening.  The pt slept well between care.

## 2017-10-13 NOTE — PT/OT/SLP EVAL
"Occupational Therapy  Evaluation/Treatment     Shant Magana   MRN: [id number]   Admitting Diagnosis: Syncope and collapse    OT Date of Treatment: 10/13/17   OT Start Time: 1005  OT Stop Time: 1028  OT Total Time (min): 23 min    Billable Minutes:  Evaluation 10  Self Care/Home Management 13    Diagnosis: Syncope and collapse   The primary encounter diagnosis was Cerebrovascular accident (CVA) due to embolism of left anterior cerebral artery. Diagnoses of Altered mental status, unspecified altered mental status type, Confusion, SHIKHA (acute kidney injury), Type 2 diabetes mellitus with complication, without long-term current use of insulin, Syncope, unspecified syncope type, Syncope and collapse, Essential hypertension, Cardiomegaly, and Embolic stroke were also pertinent to this visit.      Past Medical History:   Diagnosis Date    Cancer     prostate    Diabetes mellitus     Hypertension     Hypertrophic cardiomyopathy     Renal disorder       Past Surgical History:   Procedure Laterality Date    BACK SURGERY      THYROIDECTOMY           General Precautions: Standard, fall  Orthopedic Precautions: N/A  Braces:            Patient History:  Living Environment  Lives With: alone  Living Arrangements: house  Home Accessibility: stairs to enter home  Number of Stairs to Enter Home:  (7 steps )  Living Environment Comment: Pt reports living alone, SSH.,7 steps enter, B rails, tub/shower combo. Pt reports independence as PLOF; drives, TTB  Equipment Currently Used at Home: bath bench    Prior level of function:   Bed Mobility/Transfers: independent  Grooming: independent  Bathing: needs device  Upper Body Dressing: independent  Lower Body Dressing: independent  Toileting: independent  Home Management Skills: independent  Driving License: Yes  Mode of Transportation: Car     Dominant hand: right    Subjective:  Communicated with nsg prior to session.  Pt states, " I feel fine"   Chief Complaint: none stated "   Patient/Family stated goals: return home     Pain/Comfort  Pain Rating 1: 0/10    Objective:       Cognitive Exam:  Oriented to: Person, pt states he is at [place], and 2007; with cues able to correctly state place and year   Follows Commands/attention: Follows multistep  commands  Communication: clear/fluent  Memory:  Impaired LTM  Safety awareness/insight to disability: impaired  Coping skills/emotional control: Appropriate to situation    Visual/perceptual:  Wears glasses     Physical Exam:  Postural examination/scapula alignment: Rounded shoulder  Skin integrity: Visible skin intact  Edema: None noted     Sensation:   Intact    Upper Extremity Range of Motion:  Right Upper Extremity: WFL  Left Upper Extremity: WFL    Upper Extremity Strength:  Right Upper Extremity: WFL  Left Upper Extremity: 4-/5   Strength: good; L<R    Fine motor coordination:   Intact        Functional Mobility:  Bed Mobility:  Scooting/Bridging: Supervision  Supine to Sit: Supervision  Sit to Supine: Supervision    Transfers:  Sit <> Stand Assistance: Stand By Assistance  Sit <> Stand Assistive Device: No Assistive Device  Bed <> Chair Technique: Stand Pivot  Bed <> Chair Transfer Assistance: Stand By Assistance  Bed <> Chair Assistive Device: No Assistive Device  Toilet Transfer Technique: Stand Pivot  Toilet Transfer Assistance: Stand By Assistance  Toilet Transfer Assistive Device: No Assistive Device    Functional Ambulation: SBA without AD     Activities of Daily Living:       LE Dressing Level of Assistance: Stand by assistance  Grooming Position: Standing  Grooming Level of Assistance: Stand by assistance  Toileting Where Assessed: Toilet  Toileting Level of Assistance: Stand by assistance        Balance:   Static Sit: NORMAL: No deviations seen in posture held statically  Dynamic Sit: GOOD+: Maintains balance through MAXIMAL excursions of active trunk motion  Static Stand: GOOD: Takes MODERATE challenges from all  "directions  Dynamic stand: GOOD-: Needs SUPERVISION only during gait and able to self right with moderate     Therapeutic Activities and Exercises:  Functional mobility in room and hallway without AD; maximal balance challenges reaching to varying heights     AM-PAC 6 CLICK ADL  How much help from another person does this patient currently need?  1 = Unable, Total/Dependent Assistance  2 = A lot, Maximum/Moderate Assistance  3 = A little, Minimum/Contact Guard/Supervision  4 = None, Modified [place]/Independent    Putting on and taking off regular lower body clothing? : 4  Bathing (including washing, rinsing, drying)?: 3  Toileting, which includes using toilet, bedpan, or urinal? : 3  Putting on and taking off regular upper body clothing?: 4  Taking care of personal grooming such as brushing teeth?: 3  Eating meals?: 4  Total Score: 21    AM-PAC Raw Score CMS "G-Code Modifier Level of Impairment Assistance   6 % Total / Unable   7 - 9 CM 80 - 100% Maximal Assist   10-14 CL 60 - 80% Moderate Assist   15 - 19 CK 40 - 60% Moderate Assist   20 - 22 CJ 20 - 40% Minimal Assist   23 CI 1-20% SBA / CGA   24 CH 0% Independent/ Mod I       Patient left supine with all lines intact, call button in reach, bed alarm on and nsg notified    Assessment:  Shant Magana is a 77 y.o. male with a medical diagnosis of Syncope and collapse and presents with impaired insight . Pt would benefit from cont OT services in order to maximize functional independence. Recommending 24/7 supervision at home as pt demonstrates poor safety awareness and insight to deficits.     Rehab identified problem list/impairments: Rehab identified problem list/impairments: impaired cognition, impaired functional mobilty, decreased coordination, impaired balance, decreased safety awareness    Rehab potential is good.    Activity tolerance: Good    Discharge recommendations: Discharge Facility/Level Of Care Needs:  (needs increased supervision 2/2 " cognitive deficits )     Barriers to discharge: Barriers to Discharge: Decreased caregiver support    Equipment recommendations: none     GOALS:    Occupational Therapy Goals        Problem: Occupational Therapy Goal    Goal Priority Disciplines Outcome Interventions   Occupational Therapy Goal     OT, PT/OT Ongoing (interventions implemented as appropriate)    Description:  Goals to be met by: 11/14/17     Patient will increase functional independence with ADLs by performing:    LE Dressing with Modified [place].  Grooming while standing with Modified [place].  Toileting from toilet with Modified [place] for hygiene and clothing management.   Supine to sit with Modified [place].  Stand pivot transfers with Modified [place].  Toilet transfer to toilet with Modified [place].  Increased functional strength to WFL for self care skills and functional mobility.  Upper extremity exercise program x10 reps per handout, with independence.                      PLAN:  Patient to be seen 5 x/week to address the above listed problems via self-care/home management, therapeutic activities, therapeutic exercises  Plan of Care expires: 11/13/17  Plan of Care reviewed with: patient    OT G-codes  Functional Assessment Tool Used: am pac  Score: 21  Functional Limitation: Self care  Self Care Current Status (): RITA  Self Care Goal Status (): DAVION Mcallister OT  10/13/2017

## 2017-10-13 NOTE — PT/OT/SLP PROGRESS
Speech Language Pathology  Treatment    Shant Magana   MRN: [id number]   Admitting Diagnosis: Syncope and collapse    Diet recommendations: Solid Diet Level: Regular  Liquid Diet Level: Thin   Universal swallow precautions: upright for meals, small bites/sips, can feed self, straws ok, whole meds    SLP Treatment Date: 10/13/17  Speech Start Time: 1233     Speech Stop Time: 1253     Speech Total (min): 20 min       TREATMENT BILLABLE MINUTES:  Speech Therapy Individual 10 and Treatment Swallowing Dysfunction 10    Has the patient been evaluated by SLP for swallowing? : Yes  Keep patient NPO?: No   General Precautions: Standard, fall (wears glasses)  Current Respiratory Status:  (room air )       Subjective:  Three attempts made for therapy. Pt gone in am for testing.   Daughter at bedside. SLP did communicate with Rn who reported another test later for this pm.     Pain/Comfort  Pain Rating 1: 0/10    Objective:   Patient found with: bed alarm, telemetry  Pt found in room, returned from ESDRAS.  Daughter in room inquired about results but SLP deferred her to RN.     COGNITION: Pt alert, quiet. Oriented to month and year. Pt stated he was at [facility].   Pt had difficulty with recall of 3 unrelated words, 1/3 approx. 33% acc.  Pt with delayed recall of word substrings despite rep.     COMMUNICATION: Pt with delayed responses like stating address and naming all his children.   Pt answered personal y/n questions with 70% acc, reps were needed.   Pt followed 1 step commands with 60% acc, some demo was needed. Pt with delay in execution of command.   Daughter reported he is slower to follow commands compared to his baseline.   Pt had difficulty naming items in categories, named up to 4 out of 10. Norm is 10-15.     SWALLOWING: Pt observed with regular solid tray and thin liquids. Pt able to feed self. Educated daughter and pt on eating slowly and taking small bites. Pt demonstrated no issues with mastication, fair AP transport and  timely swallow noted when neck palpated. No coughing or change in voice present.   Cont current diet level.   Session interrupted when transport arrived to take pt to additional test.   Eval is ongoing.   FIM:                                 Assessment:  Shant Magana is a 77 y.o. male with a medical diagnosis of Syncope and collapse and presents with swallowing mechanism is WFL, pt with impaired cognitive-communication deficits which impact safety in home environment.     Discharge recommendations: Discharge Facility/Level Of Care Needs:  (24 hour care, [facility] [place])     Goals:    SLP Goals        Problem: SLP Goal    Goal Priority Disciplines Outcome   SLP Goal     SLP Ongoing (interventions implemented as appropriate)   Description:  Short Term Goals:  1. Pt will participate in ongoing swallow assessment to determine least restrictive diet.   2. Patient will successfully participate in irkaby-pgrtttsl-unwpzroic evaluation to further assess for any communication impairments s/p stroke                       Plan:   Patient to be seen Therapy Frequency: 3 x/week   Plan of Care expires: 11/01/17  Plan of Care reviewed with: patient, daughter, caregiver  SLP Follow-up?: Yes  SLP - Next Visit Date: 10/14/17           NEVIN Hassan, CCC-SLP  10/13/2017

## 2017-10-13 NOTE — PLAN OF CARE
Pt awake and denies all complaints, VSS, on room air 96%/ Report called to RAJEEV Elizabeth and transport called for.

## 2017-10-13 NOTE — PLAN OF CARE
Problem: SLP Goal  Goal: SLP Goal  Short Term Goals:  1. Pt will participate in ongoing swallow assessment to determine least restrictive diet.   2. Patient will successfully participate in clmyqo-roncjktf-ojzzuffzq evaluation to further assess for any communication impairments s/p stroke     Outcome: Ongoing (interventions implemented as appropriate)  Pt seen at bedside, gone in AM to testing then RAFAEL for another test. Pt with impaired memory as evidenced by delay recall of new information. Pt is tolerating diet with no audible s/s of aspiration per subjective assessment.

## 2017-10-13 NOTE — PLAN OF CARE
Problem: Patient Care Overview  Goal: Plan of Care Review  Outcome: Ongoing (interventions implemented as appropriate)  Plan of care reviewed with patient. Patient verbalized complete understanding. Patient went for his ESDRAS today. Fall precautions maintained. Bed in lowest position, locked, call light within reach, and bed alarm on. Side rails up x's 2 with slip resistant socks on. Nurse instructed patient to notify staff for any assistance and pt verbalized complete understanding. Patient on telemetry throughout shift with no ectopy noted. Will continue to monitor.

## 2017-10-13 NOTE — PT/OT/SLP PROGRESS
Physical Therapy      Shant Magana  MRN: [id number]    Patient not seen this am RAFAEL cardio procedure. Will follow-up as able.    Alan Nogueira, PT

## 2017-10-13 NOTE — PLAN OF CARE
TN met with patient, daughter Mike at bedside.  She stated she stays with the patient. TN informed her therapy recommending home with home health (care at home) or possible SNF. TN also informed her patient looks to be doing very well with therapy, which he may not qualify for skilled. TN also spoke with Aissatou at [facility], and she stated patient was working well with therapy and would qualify more for home health. Mike agreed to home with home health. She stated last time patient went skilled, he was discharged early because he was doing too much.     Daughter refused follow-up with priority care clinic, will schedule with PCP.    Follow-up With  Details  Why  Contact Info   Trent Aldana MD  On 10/25/2017  at 11:15 am  [address]  [address]  [place] [address]  [phone]         10/13/17 1536   Discharge Reassessment   Assessment Type Discharge Planning Reassessment   Provided patient/caregiver education on the expected discharge date and the discharge plan Yes   Do you have any problems affording any of your prescribed medications? TBD   Discharge Plan A Home with family;Home Health   Discharge Plan B Skilled Nursing Facility   Patient choice form signed by patient/caregiver N/A   Can the patient answer the patient profile reliably? Yes, cognitively intact   How does the patient rate their overall health at the present time? Fair   Describe the patient's ability to walk at the present time. Walks with the help of equipment   How often would a person be available to care for the patient? Often     Kelly Hazel RN  Transition Navigator  [phone]

## 2017-10-13 NOTE — PLAN OF CARE
Problem: Patient Care Overview  Goal: Plan of Care Review  Room air SpO2   98%. Pt with no apparent distress noted. Will continue to monitor.

## 2017-10-13 NOTE — PLAN OF CARE
TN went to meet patient, daughter Sarahi in room. TN provided them with senior resource guide and number to [facility]ging. TN informed then in resource guide also has sitter services if they are interested in that as well. However, edwin Mckeon does live with patient. Agreeable to set up home health on discharge.    Kelly Hazel RN  Transition Navigator  [phone]

## 2017-10-13 NOTE — PLAN OF CARE
Problem: Occupational Therapy Goal  Goal: Occupational Therapy Goal  Goals to be met by: 11/14/17     Patient will increase functional independence with ADLs by performing:    LE Dressing with Modified Lamb.  Grooming while standing with Modified Lamb.  Toileting from toilet with Modified Lamb for hygiene and clothing management.   Supine to sit with Modified Lamb.  Stand pivot transfers with Modified Lamb.  Toilet transfer to toilet with Modified Lamb.  Increased functional strength to WFL for self care skills and functional mobility.  Upper extremity exercise program x10 reps per handout, with independence.    Outcome: Ongoing (interventions implemented as appropriate)  Pt would benefit from cont OT services in order to maximize functional independence. Recommending 24/7 supervision at home as pt demonstrates poor safety awareness and insight to deficits.

## 2017-10-13 NOTE — BRIEF OP NOTE
Procedure: ESDRAS w/ bubble study (please refer to official report for complete findings)    Surgeons:  Dr. VANESSA Craig MD        Brief findings:   1. No evidence of PFO/ASD w/ bubble study and Valsalva.  2. Normal GUERLINE without evidence of thrombi.  3. Mild plaquing of the ascending aorta but no evidence of large thrombus or large atheroma.   4. Trace MR, AI  5. LVEF >55%  6. Normal RVSF    Complications: none    Sedation: IV RN administered: Versed 3 mg IV, Fentanyl 100 mcg IV

## 2017-10-13 NOTE — PROGRESS NOTES
[facility] IM Resident HO-II Progress Note    Subjective:      No acute events overnight. Patient doing well this morning with no complaints. No chest pain, SOB, lightheadedness  or new neurological deficits. ESDRAS scheduled for this morning.     Objective:   Last 24 Hour Vital Signs:  BP  Min: 162/95  Max: 218/107  Temp  Av.1 °F (36.7 °C)  Min: 97.2 °F (36.2 °C)  Max: 98.9 °F (37.2 °C)  Pulse  Av.8  Min: 56  Max: 62  Resp  Av.3  Min: 18  Max: 20  SpO2  Av.2 %  Min: 96 %  Max: 98 %  I/O last 3 completed shifts:  In: -   Out: 1000 [Urine:1000]    Physical Examination:  General:          Alert and awake in NAD, able to follow commands  Head:               Normocephalic and atraumatic  Eyes:               PERRL; EOMi with anicteric sclera and clear conjunctivae  Mouth:             Oropharynx clear and without exudate; moist mucous membranes  Cardio:            RRR no murmurs or rubs, No JVD  Resp:               CTAB and unlabored; no wheezes, crackles or rhonchi  Abdom:            Soft, NTND with normoactive bowel sounds  Extrem:            WWP with no clubbing, cyanosis, 2+ edema of lower extremites  Skin:                No rashes, lesions, or color changes  Pulses:            2+ and symmetric distally  Neuro:             AAOx3; cooperative and pleasant with no focal deficits, CN2-12 intact, strength 5/5 bilaterally, sensation intact bilaterally    Laboratory:  Laboratory Data Reviewed: yes  Pertinent Findings:  WBC 4.9  H/H 14/ 41.5  Platelets 210  Na 139  K+ 3.6  Cl 109  CO2 23  BUN 24   Cr 1.3  Glucose 101      Radiology Data Reviewed: yes  Pertinent Findings:  MRI Brain/ MRA Head/Neck (10/12):  There are small, acute ischemic infarct and scattered vascular territory suggesting embolic phenomenon.  MRA images of the neck just moderate stenosis at the origin of the left internal carotid artery consistent with was seen on prior ultrasound.     Current Medications:     Infusions:        Scheduled:    aspirin  81 mg Oral Daily    atorvastatin  80 mg Oral Daily    clopidogrel  75 mg Oral Daily    heparin (porcine)  5,000 Units Subcutaneous Q8H    vitamin D  1,000 Units Oral Daily        PRN:  dextrose 50%, dextrose 50%, glucagon (human recombinant), glucose, glucose, insulin aspart      Assessment:     Shant Magana is a 77 y.o.male with  Patient Active Problem List    Diagnosis Date Noted    Encephalopathy 10/11/2017    Syncope and collapse 10/11/2017    Cerebrovascular accident (CVA) due to embolism of anterior cerebral artery 08/15/2017    Hypertensive crisis, unspecified 07/15/2017    Hypertensive emergency     Type 2 diabetes mellitus with complication, without long-term current use of insulin     Hypertensive encephalopathy 07/14/2017    Essential hypertension 06/07/2017    Bradycardia 06/07/2017        Plan:       Acute Embolic CVA  -Pt. Reports no recollection of fall, fell face forward per daughter report  -Exam CN2-12 intact, no focal neuro deficits  -Imaging 7/18/2017: MRI with multiple acute infarcts suggestive of embolic etiology in the R. Cerebellar, R. Sahni radiata, and L. Frontal lobe.  CTA showed 80% LICA stenosis and bilateral vertebral disease with multiple intracranial stenoses.  Carotid U/S showed <50% BROOK and 50-70% LICA stenosis.  TTE with LVH and LAE, but no afib at that time.    -CT head negative for acute process, but showing chronic microvascular ischemic disease with multifocal areas of remote infarction.  -MRI brain, MRA Head/Neck showed scattered acute infarcts, suggesting embolic phenomenon  -Troponin 0.011, repeat mildly increased to 0.02 but wnl  -, lactate 3.6 with repeat 1.5  -continue ASA and plavix  -TTE to be done today, will f/u results     Asymptomatic bacteruria  -UA with bacteria, no WBCs, afebrile  -no urinary symptoms present     H/o Hypertensive emergency/encephalopathy  -LE weakness, dizziness, BP on admit 260/124 July 2017  -MRI at that time showed  multiple acute infarcts suggestive of embolic etiology in the R. Cerebellar, R. Sahni radiata, and L. Frontal lobe.  CTA showed 80% LICA stenosis and bilateral vertebral disease with multiple intracranial stenoses.  Carotid U/S showed <50% BROOK and 50-70% LICA stenosis.  TTE with LVH and LAE, but no afib at that time.       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, resolved  -Cr 2.8 this admission, above baseline ~1.4-1.7  -GFR baseline 50s, now 24 this admission  -ordered FeNA and FeUrea, hydrated with IVF  -holding losartan  -Cr 1.3 this morning     HTN  -/74 on admission, now 139/88, stable  -Held Amlodipine 10, ASA 81, HCTZ 25, Losartan 100 in the setting of syncope and CVA  -Will restart home antihypertensives     PPx:Heparin  Diet: NPO  Code: Full Code  Dispo: ESDRAS this am    Suleiman Lee  U Internal Medicine HO-II  U IM Service Team A    [facility] Medicine Hospitalist Pager numbers:   [facility] Hospitalist Medicine Team A (Hanny/Loco): [phone]  [facility] Hospitalist Medicine Team B (Kacey/Nico):  [phone]

## 2017-10-14 PROBLEM — I63.9 CRYPTOGENIC STROKE: Status: ACTIVE | Noted: 2017-10-14

## 2017-10-14 PROBLEM — R55 SYNCOPE: Status: ACTIVE | Noted: 2017-10-14

## 2017-10-14 PROBLEM — N17.9 AKI (ACUTE KIDNEY INJURY): Status: ACTIVE | Noted: 2017-10-14

## 2017-10-14 LAB
ALBUMIN SERPL BCP-MCNC: 3.2 G/DL
ALP SERPL-CCNC: 80 U/L
ALT SERPL W/O P-5'-P-CCNC: 20 U/L
ANION GAP SERPL CALC-SCNC: 7 MMOL/L
AST SERPL-CCNC: 28 U/L
BASOPHILS # BLD AUTO: 0.04 K/UL
BASOPHILS NFR BLD: 0.9 %
BILIRUB SERPL-MCNC: 0.8 MG/DL
BUN SERPL-MCNC: 18 MG/DL
CALCIUM SERPL-MCNC: 9.4 MG/DL
CHLORIDE SERPL-SCNC: 104 MMOL/L
CO2 SERPL-SCNC: 27 MMOL/L
CREAT SERPL-MCNC: 1.2 MG/DL
D DIMER PPP IA.FEU-MCNC: 1.74 MG/L FEU
DIFFERENTIAL METHOD: ABNORMAL
EOSINOPHIL # BLD AUTO: 0.3 K/UL
EOSINOPHIL NFR BLD: 7.3 %
ERYTHROCYTE [DISTWIDTH] IN BLOOD BY AUTOMATED COUNT: 14.2 %
EST. GFR  (AFRICAN AMERICAN): >60 ML/MIN/1.73 M^2
EST. GFR  (NON AFRICAN AMERICAN): 58 ML/MIN/1.73 M^2
GLUCOSE SERPL-MCNC: 101 MG/DL
HCT VFR BLD AUTO: 41.3 %
HGB BLD-MCNC: 13.9 G/DL
LYMPHOCYTES # BLD AUTO: 1.2 K/UL
LYMPHOCYTES NFR BLD: 28.2 %
MCH RBC QN AUTO: 29.2 PG
MCHC RBC AUTO-ENTMCNC: 33.7 G/DL
MCV RBC AUTO: 87 FL
MONOCYTES # BLD AUTO: 0.4 K/UL
MONOCYTES NFR BLD: 8.7 %
NEUTROPHILS # BLD AUTO: 2.3 K/UL
NEUTROPHILS NFR BLD: 54.7 %
PLATELET # BLD AUTO: 206 K/UL
PMV BLD AUTO: 11.2 FL
POCT GLUCOSE: 117 MG/DL (ref 70–110)
POCT GLUCOSE: 123 MG/DL (ref 70–110)
POCT GLUCOSE: 134 MG/DL (ref 70–110)
POTASSIUM SERPL-SCNC: 3.3 MMOL/L
PROT SERPL-MCNC: 7 G/DL
RBC # BLD AUTO: 4.76 M/UL
SODIUM SERPL-SCNC: 138 MMOL/L
WBC # BLD AUTO: 4.25 K/UL

## 2017-10-14 PROCEDURE — G0425 INPT/ED TELECONSULT30: HCPCS | Mod: GT,,, | Performed by: PSYCHIATRY & NEUROLOGY

## 2017-10-14 PROCEDURE — 36415 COLL VENOUS BLD VENIPUNCTURE: CPT

## 2017-10-14 PROCEDURE — 25000003 PHARM REV CODE 250: Performed by: STUDENT IN AN ORGANIZED HEALTH CARE EDUCATION/TRAINING PROGRAM

## 2017-10-14 PROCEDURE — 94761 N-INVAS EAR/PLS OXIMETRY MLT: CPT

## 2017-10-14 PROCEDURE — 85379 FIBRIN DEGRADATION QUANT: CPT

## 2017-10-14 PROCEDURE — 80053 COMPREHEN METABOLIC PANEL: CPT

## 2017-10-14 PROCEDURE — 11000001 HC ACUTE MED/SURG PRIVATE ROOM

## 2017-10-14 PROCEDURE — 25000003 PHARM REV CODE 250: Performed by: INTERNAL MEDICINE

## 2017-10-14 PROCEDURE — 85025 COMPLETE CBC W/AUTO DIFF WBC: CPT

## 2017-10-14 PROCEDURE — 63600175 PHARM REV CODE 636 W HCPCS: Performed by: STUDENT IN AN ORGANIZED HEALTH CARE EDUCATION/TRAINING PROGRAM

## 2017-10-14 RX ORDER — POTASSIUM CHLORIDE 20 MEQ/15ML
20 SOLUTION ORAL DAILY
Status: DISCONTINUED | OUTPATIENT
Start: 2017-10-14 | End: 2017-10-16 | Stop reason: HOSPADM

## 2017-10-14 RX ORDER — LOSARTAN POTASSIUM 50 MG/1
100 TABLET ORAL DAILY
Status: DISCONTINUED | OUTPATIENT
Start: 2017-10-14 | End: 2017-10-16 | Stop reason: HOSPADM

## 2017-10-14 RX ORDER — CARVEDILOL 6.25 MG/1
6.25 TABLET ORAL 2 TIMES DAILY
Status: DISCONTINUED | OUTPATIENT
Start: 2017-10-14 | End: 2017-10-14

## 2017-10-14 RX ORDER — SPIRONOLACTONE 25 MG/1
25 TABLET ORAL DAILY
Status: DISCONTINUED | OUTPATIENT
Start: 2017-10-14 | End: 2017-10-16

## 2017-10-14 RX ADMIN — VITAMIN D, TAB 1000IU (100/BT) 1000 UNITS: 25 TAB at 09:10

## 2017-10-14 RX ADMIN — HEPARIN SODIUM 5000 UNITS: 5000 INJECTION, SOLUTION INTRAVENOUS; SUBCUTANEOUS at 09:10

## 2017-10-14 RX ADMIN — HYDROCHLOROTHIAZIDE 25 MG: 25 TABLET ORAL at 09:10

## 2017-10-14 RX ADMIN — HEPARIN SODIUM 5000 UNITS: 5000 INJECTION, SOLUTION INTRAVENOUS; SUBCUTANEOUS at 05:10

## 2017-10-14 RX ADMIN — ATORVASTATIN CALCIUM 80 MG: 40 TABLET, FILM COATED ORAL at 09:10

## 2017-10-14 RX ADMIN — AMLODIPINE BESYLATE 10 MG: 5 TABLET ORAL at 09:10

## 2017-10-14 RX ADMIN — CLOPIDOGREL BISULFATE 75 MG: 75 TABLET ORAL at 09:10

## 2017-10-14 RX ADMIN — POTASSIUM CHLORIDE 20 MEQ: 20 SOLUTION ORAL at 09:10

## 2017-10-14 RX ADMIN — LOSARTAN POTASSIUM 100 MG: 50 TABLET, FILM COATED ORAL at 06:10

## 2017-10-14 RX ADMIN — HEPARIN SODIUM 5000 UNITS: 5000 INJECTION, SOLUTION INTRAVENOUS; SUBCUTANEOUS at 03:10

## 2017-10-14 RX ADMIN — SPIRONOLACTONE 25 MG: 25 TABLET, FILM COATED ORAL at 09:10

## 2017-10-14 RX ADMIN — ASPIRIN 81 MG: 81 TABLET, COATED ORAL at 09:10

## 2017-10-14 NOTE — CONSULTS
[facility]  Vascular Neurology  Comprehensive Stroke Center  Tele-Consultation Note    Consult Start Time: 10/14/2017 11:20  Consult End Time: 10/14/2017 11:50  CT READ TIME: 01:12       Inpatient consult to Vascular (Stroke) Neurology  Consult performed by: TREMAINE BENÍTEZ  Consult ordered by: TORREY ESQUEDA          Consulting Provider: Spoke Physician:: DR. OLIVER    Patient Location: [facility]  Spoke hospital nurse at bedside with patient assisting consultant.         Patient information was obtained from patient, spouse/SO and relative(s).       Subjective:     History of Present Illness:  No notes on file      Woke up with symptoms?: no  Last known normal:  2 ays ago  Recent bleeding noted: no  Does the patient take any Blood Thinners? yes  CT READ: Yes  No hemmorhage. No mass effect. No early infarct signs.     Past Medical History: hypertension, stroke and kidney problems/dialysis    Past Surgical History: no major surgeries within the last 2 weeks    Family History: hypertension    Social History: no smoking, no drinking, no drugs    Medications:   Current Facility-Administered Medications:     0.9%  NaCl infusion, , Intravenous, Continuous, Donna Oliver MD, Last Rate: 125 mL/hr at 10/13/17 2340    amlodipine tablet 10 mg, 10 mg, Oral, Daily, Suleiman Lee MD, 10 mg at 10/14/17 0916    aspirin EC tablet 81 mg, 81 mg, Oral, Daily, Suleiman Lee MD, 81 mg at 10/14/17 0916    atorvastatin tablet 80 mg, 80 mg, Oral, Daily, Suleiman Lee MD, 80 mg at 10/14/17 0915    benzocaine 20 % oral spray, , Mouth/Throat, QID PRN, Sukhdev Craig MD    clopidogrel tablet 75 mg, 75 mg, Oral, Daily, Suleiman Lee MD, 75 mg at 10/14/17 0915    dextrose 50% injection 12.5 g, 12.5 g, Intravenous, PRN, Suleiman Lee MD    dextrose 50% injection 25 g, 25 g, Intravenous, PRN, Suleiman Lee MD     fentaNYL injection 100 mcg, 100 mcg, Intravenous, Q1H PRN, Sukhdev Craig MD, 100 mcg at 10/13/17 1043    flumazenil injection 0.2 mg, 0.2 mg, Intravenous, Once, Sukhdev Craig MD    glucagon (human recombinant) injection 1 mg, 1 mg, Intramuscular, PRN, Suleiman Lee MD    glucose chewable tablet 16 g, 16 g, Oral, PRN, Suleiman Lee MD    glucose chewable tablet 24 g, 24 g, Oral, PRN, Suleiman Lee MD    heparin (porcine) injection 5,000 Units, 5,000 Units, Subcutaneous, Q8H, Suleiman Lee MD, 5,000 Units at 10/14/17 0537    hydrochlorothiazide tablet 25 mg, 25 mg, Oral, Daily, Suleiman Lee MD, 25 mg at 10/14/17 0915    insulin aspart pen 0-5 Units, 0-5 Units, Subcutaneous, QID (AC + HS) PRN, Suleiman Lee MD    losartan tablet 100 mg, 100 mg, Oral, Daily, Susan Rendon MD, 100 mg at 10/14/17 0639    midazolam (VERSED) 5 mg/mL injection 5 mg, 5 mg, Intravenous, Once, Sukhdev Craig MD    naloxone 0.4 mg/mL injection 0.4 mg, 0.4 mg, Intravenous, PRN, Sukhdev Craig MD    potassium chloride 10% solution 20 mEq, 20 mEq, Oral, Daily, Shelly Reeder MD, 20 mEq at 10/14/17 0916    vitamin D 1000 units tablet 1,000 Units, 1,000 Units, Oral, Daily, Suleiman Lee MD, 1,000 Units at 10/14/17 0916      Allergies:  Review of patient's allergies indicates:   Allergen Reactions    Hydralazine analogues Diarrhea         Hydralazine Analogues    Review of Systems   HENT: Negative.    Eyes: Negative.    Respiratory: Negative.    Cardiovascular: Negative.    Endocrine: Negative.    Genitourinary: Negative.    Musculoskeletal: Negative.    Neurological: Positive for facial asymmetry, speech difficulty and weakness.   Hematological: Negative.    Psychiatric/Behavioral: Negative.      Objective:   Vitals:   Vitals:    10/14/17 0744   BP: (!) 165/91   Pulse: 60   Resp: 17   Temp: 97.8 °F (36.6 °C)         Physical Exam  "  Constitutional: He appears well-developed.   HENT:   Head: Normocephalic.   Eyes: Pupils are equal, round, and reactive to light.   Neck: Normal range of motion.   Cardiovascular: Normal rate.    Pulmonary/Chest: Effort normal.   Musculoskeletal: Normal range of motion.   Neurological: A cranial nerve deficit is present.   Skin: Skin is warm.       NIH Stroke Scale:  Interval: baseline (upon arrival/admit)  Level of Consciousness: 0 - alert  LOC Questions: 0 - answers both correctly  LOC Commands: 0 - performs both correctly  Best Gaze: 0 - normal  Visual: 0 - no visual loss  Facial Palsy: 2 - partial  Motor Left Arm: 0 - no drift  Motor Right Arm: 0 - no drift  Motor Left Le - no drift  Motor Right Le - no drift  Limb Ataxia: 0 - absent  Sensory: 0 - normal  Best Language: 0 - no aphasia  Dysarthria: 1 - mild to moderate dysarthria  Extinction and Inattention: 0 - no neglect  NIH Stroke Scale Total: 3            Assessment/Plan:     Diagnoses:   Cryptogenic stroke      77 year old.  Presents with fall and Left sided weakness  MRI shows embolic shower  Most likely cardioembolism. Thrombophilia less likely. + LAE on ESDRAS. NO PFO  REC. Implantable Cardiac Monitor and consider adding NOAC. Obtain neurology consult  Send D-dimer and thrombophilia labs                                          Blood pressure (!) 165/91, pulse 60, temperature 97.8 °F (36.6 °C), temperature source Oral, resp. rate 17, height 5' 11" (1.803 m), weight 90 kg (198 lb 6.6 oz), SpO2 98 %.  Alteplase Eligible?: No  Alteplase Recommendation: Alteplase not recommended due to Outside of treament window  Possible Interventional Revascularization Candidate? No; No ischemic penumbra    Recommendation: NPO until after pass dysphagia screen. Diagnostic studies: Other: dimer, le doppler, thrombophilia evaluation, implantable loop recorder, consider empiric NOAC, consult local neurology    Disposition Recommendation: do not transfer    Recommended " the emergency room physician to have a brief discussion with the patient and/or family if available regarding the risks and benefits of treatment, and to briefly document the occurrence of that discussion in his clinical encounter note.     The attending portion of this evaluation, treatment, and documentation was performed per Tahir Amezquita MD via audiovisual.    Billing code:  (non-intervention mild to moderate stroke, TIA, some mimics)    · This patient has a critical neurological condition/illness, with some potential for high morbidity and mortality.  · There is a moderate probability for acute neurological change leading to clinical and possibly life-threatening deterioration requiring highest level of physician preparedness for urgent intervention.  · Care was coordinated with other physicians involved in the patient's care.  · Radiologic studies and laboratory data were reviewed and interpreted, and plan of care was re-assessed based on the results.  · Diagnosis, treatment options and prognosis may have been discussed with the patient and/or family members or caregiver.        Tahir Amezquita MD  [facility]  Vascular Neurology   [facility]

## 2017-10-14 NOTE — PLAN OF CARE
Continued on telemetry and fall risk  monitoring No true red alarm ectopy. Bed alarm on. Stroke tele comference done.Bilaterl LE US completed as well as D-Dimer drawn Alert and oriented . No obvious neuro deficits .Seen by Dr Gamino. Family at bedside.

## 2017-10-14 NOTE — PROGRESS NOTES
[facility] IM Resident HO-II Progress Note    Subjective:      Pt doing well this AM. He denies in chest pain, SOB, visual changes, or headaches. He has no complaints or concerns this AM. BPs overnight were elevated mostly in the 160-170s systolic.     Objective:   Last 24 Hour Vital Signs:  BP  Min: 141/83  Max: 228/110  Temp  Av.1 °F (36.7 °C)  Min: 97.6 °F (36.4 °C)  Max: 98.7 °F (37.1 °C)  Pulse  Av.7  Min: 54  Max: 64  Resp  Avg: 15.9  Min: 9  Max: 19  SpO2  Av.9 %  Min: 96 %  Max: 98 %  I/O last 3 completed shifts:  In: 240 [P.O.:240]  Out: 1400 [Urine:1400]    Physical Examination:  General:          Alert and awake in NAD, able to follow commands  Head:               Normocephalic and atraumatic  Eyes:               PERRL; EOMi with anicteric sclera and clear conjunctivae  Mouth:             Oropharynx clear and without exudate; moist mucous membranes  Cardio:            RRR no murmurs or rubs, No JVD  Resp:               CTAB and unlabored; no wheezes, crackles or rhonchi  Abdom:            Soft, NTND with normoactive bowel sounds  Extrem:            WWP with no clubbing, cyanosis, 2+ edema of lower extremites  Skin:                No rashes, lesions, or color changes  Pulses:            2+ and symmetric distally  Neuro:             AAOx3; cooperative and pleasant with no focal deficits, CN2-12 intact, strength 5/5 bilaterally, sensation intact bilaterally    Laboratory:  Laboratory Data Reviewed: yes  Pertinent Findings:  H/H 13.9/41.3  K+ 3.3  Alb: 3.2  Cr 1.2    Radiology Data Reviewed: yes  Pertinent Findings:  MRI Brain/ MRA Head/Neck (10/12):  There are small, acute ischemic infarct and scattered vascular territory suggesting embolic phenomenon.  MRA images of the neck just moderate stenosis at the origin of the left internal carotid artery consistent with was seen on prior ultrasound.     Current Medications:     Infusions:   sodium chloride 0.9% 125 mL/hr at 10/13/17 2340         Scheduled:   amlodipine  10 mg Oral Daily    aspirin  81 mg Oral Daily    atorvastatin  80 mg Oral Daily    clopidogrel  75 mg Oral Daily    flumazenil  0.2 mg Intravenous Once    heparin (porcine)  5,000 Units Subcutaneous Q8H    hydrochlorothiazide  25 mg Oral Daily    losartan  100 mg Oral Daily    midazolam  5 mg Intravenous Once    vitamin D  1,000 Units Oral Daily        PRN:  benzocaine, dextrose 50%, dextrose 50%, fentaNYL, glucagon (human recombinant), glucose, glucose, insulin aspart, naloxone      Assessment:     Shant Magana is a 77 y.o.male with  Patient Active Problem List    Diagnosis Date Noted    Encephalopathy 10/11/2017    Syncope and collapse 10/11/2017    Cerebrovascular accident (CVA) due to embolism of anterior cerebral artery 08/15/2017    Hypertensive crisis, unspecified 07/15/2017    Hypertensive emergency     Type 2 diabetes mellitus with complication, without long-term current use of insulin     Hypertensive encephalopathy 07/14/2017    Essential hypertension 06/07/2017    Bradycardia 06/07/2017        Plan:       Acute Embolic CVA  -Pt. Reports no recollection of fall, fell face forward per daughter report  -Exam CN2-12 intact, no focal neuro deficits  -Imaging 7/18/2017: MRI with multiple acute infarcts suggestive of embolic etiology in the R. Cerebellar, R. Sahni radiata, and L. Frontal lobe.  CTA showed 80% LICA stenosis and bilateral vertebral disease with multiple intracranial stenoses.  Carotid U/S showed <50% BROOK and 50-70% LICA stenosis.  TTE with LVH and LAE, but no afib at that time.    -CT head negative for acute process, but showing chronic microvascular ischemic disease with multifocal areas of remote infarction.  -MRI brain, MRA Head/Neck showed scattered acute infarcts, suggesting embolic phenomenon  -Troponin 0.011, repeat mildly increased to 0.02 but wnl  -, lactate 3.6 with repeat 1.5  -continue ASA and plavix  -ESDRAS without evidence of  thrombus    Asymptomatic bacteruria  -UA with bacteria, no WBCs, afebrile  -no urinary symptoms present     H/o Hypertensive emergency/encephalopathy  -LE weakness, dizziness, BP on admit 260/124 July 2017  -MRI at that time showed multiple acute infarcts suggestive of embolic etiology in the R. Cerebellar, R. Sahni radiata, and L. Frontal lobe.  CTA showed 80% LICA stenosis and bilateral vertebral disease with multiple intracranial stenoses.  Carotid U/S showed <50% BROOK and 50-70% LICA stenosis.  TTE with LVH and LAE, but no afib at that time.       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, resolved  -Cr 2.8 on admission, above baseline ~1.4-1.7  -GFR baseline 50s, now 24 this admission  -ordered FeNA and FeUrea, hydrated with IVF  -holding losartan  -Cr 1.2 this morning     HTN  -/74 on admission, now 190/100 prior to AM meds  -Restarted home antihypertensives Amlodipine 10, ASA 81, HCTZ 25, Losartan 100 in the setting of syncope and CVA  -Will consider adding K+ sparing diuretic to patient     PPx:Heparin  Diet: Cardiac  Code: Full Code  Dispo: likely home today    Shelly Reeder MD  [facility] Internal Medicine -II  [facility] IM Service Team A    \[facility] [place] [facility]\"" Medicine Hospitalist Pager numbers:   [facility] Hospitalist Medicine Team A (Hanny/Loco): [phone]  [facility] Hospitalist Medicine Team B (Kacey/Nico):  [phone]

## 2017-10-14 NOTE — PLAN OF CARE
Problem: Diabetes, Type 2 (Adult)  Goal: Signs and Symptoms of Listed Potential Problems Will be Absent, Minimized or Managed (Diabetes, Type 2)  Signs and symptoms of listed potential problems will be absent, minimized or managed by discharge/transition of care (reference Diabetes, Type 2 (Adult) CPG).   Outcome: Ongoing (interventions implemented as appropriate)  Monitor blood glucose as ordered.

## 2017-10-14 NOTE — PLAN OF CARE
Problem: Fall Risk (Adult)  Goal: Absence of Falls  Patient will demonstrate the desired outcomes by discharge/transition of care.   Outcome: Ongoing (interventions implemented as appropriate)  Bed alarm on and bed in lowest position. Call bell in reach. Q 2 hour rounding maintained for 5Ps.

## 2017-10-14 NOTE — SUBJECTIVE & OBJECTIVE
Woke up with symptoms?: no  Last known normal:  2 ays ago  Recent bleeding noted: no  Does the patient take any Blood Thinners? yes  CT READ: Yes  No hemmorhage. No mass effect. No early infarct signs.     Past Medical History: hypertension, stroke and kidney problems/dialysis    Past Surgical History: no major surgeries within the last 2 weeks    Family History: hypertension    Social History: no smoking, no drinking, no drugs    Medications:   Current Facility-Administered Medications:     0.9%  NaCl infusion, , Intravenous, Continuous, Donna Oliver MD, Last Rate: 125 mL/hr at 10/13/17 2340    amlodipine tablet 10 mg, 10 mg, Oral, Daily, Suleiman Lee MD, 10 mg at 10/14/17 0916    aspirin EC tablet 81 mg, 81 mg, Oral, Daily, Suleiman Lee MD, 81 mg at 10/14/17 0916    atorvastatin tablet 80 mg, 80 mg, Oral, Daily, Suleiman Lee MD, 80 mg at 10/14/17 0915    benzocaine 20 % oral spray, , Mouth/Throat, QID PRN, Sukhdev Craig MD    clopidogrel tablet 75 mg, 75 mg, Oral, Daily, Suleiman Lee MD, 75 mg at 10/14/17 0915    dextrose 50% injection 12.5 g, 12.5 g, Intravenous, PRN, Suleiman Lee MD    dextrose 50% injection 25 g, 25 g, Intravenous, PRN, Suleiman Lee MD    fentaNYL injection 100 mcg, 100 mcg, Intravenous, Q1H PRN, Sukhdev Craig MD, 100 mcg at 10/13/17 1043    flumazenil injection 0.2 mg, 0.2 mg, Intravenous, Once, Sukhdev Craig MD    glucagon (human recombinant) injection 1 mg, 1 mg, Intramuscular, PRN, Suleiman Lee MD    glucose chewable tablet 16 g, 16 g, Oral, PRN, Suleiman Lee MD    glucose chewable tablet 24 g, 24 g, Oral, PRN, Suleiman Lee MD    heparin (porcine) injection 5,000 Units, 5,000 Units, Subcutaneous, Q8H, Suleiman Lee MD, 5,000 Units at 10/14/17 0537    hydrochlorothiazide tablet 25 mg, 25 mg, Oral, Daily, Suleiman Lee MD, 25 mg at 10/14/17 0994     insulin aspart pen 0-5 Units, 0-5 Units, Subcutaneous, QID (AC + HS) PRN, Suleiman Lee MD    losartan tablet 100 mg, 100 mg, Oral, Daily, Susan Rendon MD, 100 mg at 10/14/17 0639    midazolam (VERSED) 5 mg/mL injection 5 mg, 5 mg, Intravenous, Once, Sukhdev Craig MD    naloxone 0.4 mg/mL injection 0.4 mg, 0.4 mg, Intravenous, PRN, Sukhdev Craig MD    potassium chloride 10% solution 20 mEq, 20 mEq, Oral, Daily, Shelly Reeder MD, 20 mEq at 10/14/17 0916    vitamin D 1000 units tablet 1,000 Units, 1,000 Units, Oral, Daily, Suleiman Lee MD, 1,000 Units at 10/14/17 0916      Allergies:  Review of patient's allergies indicates:   Allergen Reactions    Hydralazine analogues Diarrhea         Hydralazine Analogues    Review of Systems   HENT: Negative.    Eyes: Negative.    Respiratory: Negative.    Cardiovascular: Negative.    Endocrine: Negative.    Genitourinary: Negative.    Musculoskeletal: Negative.    Neurological: Positive for facial asymmetry, speech difficulty and weakness.   Hematological: Negative.    Psychiatric/Behavioral: Negative.      Objective:   Vitals:   Vitals:    10/14/17 0744   BP: (!) 165/91   Pulse: 60   Resp: 17   Temp: 97.8 °F (36.6 °C)         Physical Exam   Constitutional: He appears well-developed.   HENT:   Head: Normocephalic.   Eyes: Pupils are equal, round, and reactive to light.   Neck: Normal range of motion.   Cardiovascular: Normal rate.    Pulmonary/Chest: Effort normal.   Musculoskeletal: Normal range of motion.   Neurological: A cranial nerve deficit is present.   Skin: Skin is warm.       NIH Stroke Scale:  Interval: baseline (upon arrival/admit)  Level of Consciousness: 0 - alert  LOC Questions: 0 - answers both correctly  LOC Commands: 0 - performs both correctly  Best Gaze: 0 - normal  Visual: 0 - no visual loss  Facial Palsy: 2 - partial  Motor Left Arm: 0 - no drift  Motor Right Arm: 0 - no drift  Motor Left Le -  no drift  Motor Right Le - no drift  Limb Ataxia: 0 - absent  Sensory: 0 - normal  Best Language: 0 - no aphasia  Dysarthria: 1 - mild to moderate dysarthria  Extinction and Inattention: 0 - no neglect  NIH Stroke Scale Total: 3

## 2017-10-14 NOTE — ASSESSMENT & PLAN NOTE
77 year old.  Presents with fall and Left sided weakness  MRI shows embolic shower  Most likely cardioembolism. Thrombophilia less likely. + LAE on ESDRAS. NO PFO  REC. Implantable Cardiac Monitor and consider adding NOAC. Obtain neurology consult  Send D-dimer and thrombophilia labs

## 2017-10-14 NOTE — PLAN OF CARE
Problem: Confusion, Acute (Adult)  Goal: Cognitive/Functional Impairments Minimized  Patient will demonstrate the desired outcomes by discharge/transition of care.   Outcome: Ongoing (interventions implemented as appropriate)  Will monitor for any cognitive impairments.

## 2017-10-15 LAB
ALBUMIN SERPL BCP-MCNC: 3.1 G/DL
ALP SERPL-CCNC: 84 U/L
ALT SERPL W/O P-5'-P-CCNC: 36 U/L
ANION GAP SERPL CALC-SCNC: 8 MMOL/L
AST SERPL-CCNC: 42 U/L
BASOPHILS # BLD AUTO: 0.02 K/UL
BASOPHILS NFR BLD: 0.4 %
BILIRUB SERPL-MCNC: 0.7 MG/DL
BUN SERPL-MCNC: 25 MG/DL
CALCIUM SERPL-MCNC: 10 MG/DL
CHLORIDE SERPL-SCNC: 106 MMOL/L
CO2 SERPL-SCNC: 24 MMOL/L
CREAT SERPL-MCNC: 1.8 MG/DL
DIFFERENTIAL METHOD: NORMAL
EOSINOPHIL # BLD AUTO: 0.2 K/UL
EOSINOPHIL NFR BLD: 4.2 %
ERYTHROCYTE [DISTWIDTH] IN BLOOD BY AUTOMATED COUNT: 14.4 %
EST. GFR  (AFRICAN AMERICAN): 41 ML/MIN/1.73 M^2
EST. GFR  (NON AFRICAN AMERICAN): 36 ML/MIN/1.73 M^2
GLUCOSE SERPL-MCNC: 110 MG/DL
HCT VFR BLD AUTO: 45.2 %
HGB BLD-MCNC: 15.4 G/DL
LYMPHOCYTES # BLD AUTO: 1.2 K/UL
LYMPHOCYTES NFR BLD: 24.6 %
MCH RBC QN AUTO: 29.5 PG
MCHC RBC AUTO-ENTMCNC: 34.1 G/DL
MCV RBC AUTO: 87 FL
MONOCYTES # BLD AUTO: 0.5 K/UL
MONOCYTES NFR BLD: 9.3 %
NEUTROPHILS # BLD AUTO: 3.1 K/UL
NEUTROPHILS NFR BLD: 60.9 %
PLATELET # BLD AUTO: 200 K/UL
PMV BLD AUTO: 11 FL
POCT GLUCOSE: 100 MG/DL (ref 70–110)
POCT GLUCOSE: 113 MG/DL (ref 70–110)
POCT GLUCOSE: 122 MG/DL (ref 70–110)
POCT GLUCOSE: 122 MG/DL (ref 70–110)
POTASSIUM SERPL-SCNC: 4 MMOL/L
PROT SERPL-MCNC: 7.1 G/DL
RBC # BLD AUTO: 5.22 M/UL
SODIUM SERPL-SCNC: 138 MMOL/L
WBC # BLD AUTO: 5.04 K/UL

## 2017-10-15 PROCEDURE — 11000001 HC ACUTE MED/SURG PRIVATE ROOM

## 2017-10-15 PROCEDURE — 25000003 PHARM REV CODE 250: Performed by: INTERNAL MEDICINE

## 2017-10-15 PROCEDURE — 80053 COMPREHEN METABOLIC PANEL: CPT

## 2017-10-15 PROCEDURE — 25000003 PHARM REV CODE 250: Performed by: STUDENT IN AN ORGANIZED HEALTH CARE EDUCATION/TRAINING PROGRAM

## 2017-10-15 PROCEDURE — 97116 GAIT TRAINING THERAPY: CPT

## 2017-10-15 PROCEDURE — 36415 COLL VENOUS BLD VENIPUNCTURE: CPT

## 2017-10-15 PROCEDURE — 97110 THERAPEUTIC EXERCISES: CPT

## 2017-10-15 PROCEDURE — 63600175 PHARM REV CODE 636 W HCPCS: Performed by: STUDENT IN AN ORGANIZED HEALTH CARE EDUCATION/TRAINING PROGRAM

## 2017-10-15 PROCEDURE — 85025 COMPLETE CBC W/AUTO DIFF WBC: CPT

## 2017-10-15 PROCEDURE — 94761 N-INVAS EAR/PLS OXIMETRY MLT: CPT

## 2017-10-15 RX ORDER — HYDRALAZINE HYDROCHLORIDE 25 MG/1
25 TABLET, FILM COATED ORAL EVERY 8 HOURS
Status: DISCONTINUED | OUTPATIENT
Start: 2017-10-15 | End: 2017-10-16

## 2017-10-15 RX ORDER — POTASSIUM CHLORIDE 20 MEQ/1
20 TABLET, EXTENDED RELEASE ORAL ONCE
Status: COMPLETED | OUTPATIENT
Start: 2017-10-15 | End: 2017-10-15

## 2017-10-15 RX ADMIN — LOSARTAN POTASSIUM 100 MG: 50 TABLET, FILM COATED ORAL at 09:10

## 2017-10-15 RX ADMIN — HEPARIN SODIUM 5000 UNITS: 5000 INJECTION, SOLUTION INTRAVENOUS; SUBCUTANEOUS at 02:10

## 2017-10-15 RX ADMIN — ASPIRIN 81 MG: 81 TABLET, COATED ORAL at 09:10

## 2017-10-15 RX ADMIN — AMLODIPINE BESYLATE 10 MG: 5 TABLET ORAL at 09:10

## 2017-10-15 RX ADMIN — HYDROCHLOROTHIAZIDE 25 MG: 25 TABLET ORAL at 09:10

## 2017-10-15 RX ADMIN — HYDRALAZINE HYDROCHLORIDE 25 MG: 25 TABLET, FILM COATED ORAL at 09:10

## 2017-10-15 RX ADMIN — POTASSIUM CHLORIDE 20 MEQ: 20 SOLUTION ORAL at 09:10

## 2017-10-15 RX ADMIN — HYDRALAZINE HYDROCHLORIDE 25 MG: 25 TABLET, FILM COATED ORAL at 02:10

## 2017-10-15 RX ADMIN — HYDRALAZINE HYDROCHLORIDE 25 MG: 25 TABLET, FILM COATED ORAL at 10:10

## 2017-10-15 RX ADMIN — HEPARIN SODIUM 5000 UNITS: 5000 INJECTION, SOLUTION INTRAVENOUS; SUBCUTANEOUS at 09:10

## 2017-10-15 RX ADMIN — HEPARIN SODIUM 5000 UNITS: 5000 INJECTION, SOLUTION INTRAVENOUS; SUBCUTANEOUS at 06:10

## 2017-10-15 RX ADMIN — VITAMIN D, TAB 1000IU (100/BT) 1000 UNITS: 25 TAB at 09:10

## 2017-10-15 RX ADMIN — ATORVASTATIN CALCIUM 80 MG: 40 TABLET, FILM COATED ORAL at 09:10

## 2017-10-15 RX ADMIN — CLOPIDOGREL BISULFATE 75 MG: 75 TABLET ORAL at 09:10

## 2017-10-15 RX ADMIN — SPIRONOLACTONE 25 MG: 25 TABLET, FILM COATED ORAL at 09:10

## 2017-10-15 RX ADMIN — POTASSIUM CHLORIDE 20 MEQ: 20 TABLET, EXTENDED RELEASE ORAL at 09:10

## 2017-10-15 NOTE — PLAN OF CARE
Problem: Fall Risk (Adult)  Goal: Absence of Falls  Patient will demonstrate the desired outcomes by discharge/transition of care.   Outcome: Ongoing (interventions implemented as appropriate)  Bed alarm on and bed in lowest position. Call bell in reach. Instructed to call for assistance before attempting to get out of bed. Verbalized understanding.

## 2017-10-15 NOTE — PLAN OF CARE
Problem: Patient Care Overview  Goal: Plan of Care Review  Outcome: Ongoing (interventions implemented as appropriate)  Pt on room air with SpO2 96 %. No respiratory distress noted. Will continue to monitor.

## 2017-10-15 NOTE — PLAN OF CARE
Continued on telemetry and fall risk monitoring. No true red alarm ectopy.Bed alarm on. Denies any discomfort.

## 2017-10-15 NOTE — PLAN OF CARE
Notified by nursing that patients BP elevated,  reproducible in both arms. Pt resting in bed without complaints.  Chart review shows pt has been persistently hypertensive despite being started on multiple medications this morning. Pt noted to be borderline bradycardic, will avoid beta-blocker, and has listed intolerance to hydralazine. Will start spironolactone 25mg daily.    Eliseo Solorzano MD  Internal and Emergency Medicine HO-4  10/14/2017  8:45 PM

## 2017-10-15 NOTE — PLAN OF CARE
Dr Solorzano notified of pt's elevated /100 and 190/100. Spironolactone 25 mg ordered and administered.

## 2017-10-15 NOTE — PLAN OF CARE
Remains hypertensive, bp 190/96. Refused am lab draw. Dr Gamino here and made aware of same..Ambulated  In hallway by PT with  Fairly steady gait. Family at bedside

## 2017-10-15 NOTE — PLAN OF CARE
Problem: Physical Therapy Goal  Goal: Physical Therapy Goal  Goals to be met by: 2017     Patient will increase functional independence with mobility by performin. Gait  x >200 feet with [place] using no AD.   2. Lower extremity exercise program x 25 with independence     Outcome: Ongoing (interventions implemented as appropriate)  Pt tolerated treatment session well, progressing towards goals.

## 2017-10-15 NOTE — PT/OT/SLP PROGRESS
Physical Therapy  Treatment    Shant Magana   MRN: [id number]   Admitting Diagnosis: Syncope and collapse    PT Received On: 10/15/17  PT Start Time: 1012     PT Stop Time: 1037    PT Total Time (min): 25 min       Billable Minutes:  Gait [id number] and Therapeutic Exercise 12    Treatment Type: Treatment  PT/PTA: PT     PTA Visit Number: 0       General Precautions: Standard, fall  Orthopedic Precautions: N/A   Braces: N/A         Subjective:  Communicated with RAJEEV Crain prior to session.  Pt agreeable to treatment session.     Pain/Comfort  Pain Rating 1: 0/10  Pain Rating Post-Intervention 1: 0/10    Objective:   Patient found with: bed alarm, telemetry    Functional Mobility:  Bed Mobility:   Supine to Sit: Supervision  Sit to Supine: Supervision    Transfers:  Sit <> Stand Assistance: Supervision  Sit <> Stand Assistive Device: No Assistive Device  Bed <> Chair Technique:  (ambulated)  Bed <> Chair Assistance: Stand By Assistance  Bed <> Chair Assistive Device: No Assistive Device  Toilet Transfer Assistance: Activity Did not occur  Car Transfer Assistance: Activity did not occur    Gait:   Gait Distance: ~300 feet without AD, supervision with no episodes of LOB.  Assistance 1: Supervision  Gait Assistive Device: No device  Gait Pattern: reciprocal  Gait Deviation(s): decreased robert    Stairs:  Activity did not occur.     Balance:   Static Sit: GOOD: Takes MODERATE challenges from all directions  Dynamic Sit: GOOD-: Maintains balance through MODERATE excursions of active trunk movement,     Static Stand: GOOD-: Takes MODERATE challenges from all directions inconsistently  Dynamic stand: GOOD-: Needs SUPERVISION only during gait and able to self right with moderate      Therapeutic Activities and Exercises:  Bed mobility as listed above. Pt sat EOB, LE dressing and donned shoes with Min A, no LOB while sitting EOB, sit <> stand X4 times throughout treatment session with supervision and no Ad. Pt ambulated ~300 feet  without Ad, supervision and no episodes of LOB, pt demonstrates decreased gait speed, and increased DANIEL.     AM-PAC 6 CLICK MOBILITY  How much help from another person does this patient currently need?   1 = Unable, Total/Dependent Assistance  2 = A lot, Maximum/Moderate Assistance  3 = A little, Minimum/Contact Guard/Supervision  4 = None, Modified [place]/Independent    Turning over in bed (including adjusting bedclothes, sheets and blankets)?: 4  Sitting down on and standing up from a chair with arms (e.g., wheelchair, bedside commode, etc.): 4  Moving from lying on back to sitting on the side of the bed?: 4  Moving to and from a bed to a chair (including a wheelchair)?: 4  Need to walk in hospital room?: 3  Climbing 3-5 steps with a railing?: 3  Total Score: 22    AM-PAC Raw Score CMS G-Code Modifier Level of Impairment Assistance   6 % Total / Unable   7 - 9 CM 80 - 100% Maximal Assist   10 - 14 CL 60 - 80% Moderate Assist   15 - 19 CK 40 - 60% Moderate Assist   20 - 22 CJ 20 - 40% Minimal Assist   23 CI 1-20% SBA / CGA   24 CH 0% Independent/ Mod I     Patient left up in chair with all lines intact, call button in reach, RAJEEV Crain notified and fa,o;y  present.    Assessment:  Shant Magana is a 77 y.o. male with a medical diagnosis of Syncope and collapse and presents with impairments listed below, pt would benefit from continued PT services to improve on current impairments and return to a more independent functional mobility level.    Rehab identified problem list/impairments: Rehab identified problem list/impairments: weakness, gait instability, impaired functional mobilty, decreased coordination, impaired balance, decreased safety awareness    Rehab potential is good.    Activity tolerance: Good    Discharge recommendations: Discharge Facility/Level Of Care Needs:  (home with 24 hr care vs. SNF)     Barriers to discharge: Barriers to Discharge: Decreased caregiver support    Equipment  recommendations: Equipment Needed After Discharge: none     GOALS:    Physical Therapy Goals        Problem: Physical Therapy Goal    Goal Priority Disciplines Outcome Goal Variances Interventions   Physical Therapy Goal     PT/OT, PT Ongoing (interventions implemented as appropriate)     Description:  Goals to be met by: 2017     Patient will increase functional independence with mobility by performin. Gait  x >200 feet with [place] using no AD.   2. Lower extremity exercise program x 25 with independence                      PLAN:    Patient to be seen 6 x/week  to address the above listed problems via gait training, therapeutic activities, therapeutic exercises  Plan of Care expires: 17  Plan of Care reviewed with: patient, family         Nirav Jimenez, PT  10/15/2017

## 2017-10-15 NOTE — PROGRESS NOTES
[facility] IM Resident HO-II Progress Note    Subjective:      Pt doing well this AM. He denies in chest pain, SOB, visual changes, or headaches. He has no complaints or concerns this AM. BP elevated in 170-190's. Given aldactone 25 by nightfloat. No BB because HR was ramón/borderline ramón.    Objective:   Last 24 Hour Vital Signs:  BP  Min: 167/79  Max: 200/100  Temp  Av.5 °F (36.9 °C)  Min: 97.9 °F (36.6 °C)  Max: 98.8 °F (37.1 °C)  Pulse  Av.4  Min: 56  Max: 63  Resp  Av.5  Min: 18  Max: 20  SpO2  Av.8 %  Min: 96 %  Max: 98 %  I/O last 3 completed shifts:  In: 1940 [P.O.:740; I.V.:1200]  Out: 1425 [Urine:1425]    Physical Examination:  General:          Alert and awake in NAD, able to follow commands  Head:               Normocephalic and atraumatic  Eyes:               PERRL; EOMi with anicteric sclera and clear conjunctivae  Mouth:             Oropharynx clear and without exudate; moist mucous membranes  Cardio:            RRR no murmurs or rubs, No JVD  Resp:               CTAB and unlabored; no wheezes, crackles or rhonchi  Abdom:            Soft, NTND with normoactive bowel sounds  Extrem:            WWP with no clubbing, cyanosis, 2+ edema of lower extremites  Skin:                No rashes, lesions, or color changes  Pulses:            2+ and symmetric distally  Neuro:             AAOx3; cooperative and pleasant with no focal deficits, CN2-12 intact, strength 5/5 bilaterally, sensation intact bilaterally    Laboratory:  Laboratory Data Reviewed: yes  Pertinent Findings:      Radiology Data Reviewed: yes  Pertinent Findings:  MRI Brain/ MRA Head/Neck (10/12):  There are small, acute ischemic infarct and scattered vascular territory suggesting embolic phenomenon.  MRA images of the neck just moderate stenosis at the origin of the left internal carotid artery consistent with was seen on prior ultrasound.     Current Medications:     Infusions:        Scheduled:   amlodipine  10 mg Oral Daily     aspirin  81 mg Oral Daily    atorvastatin  80 mg Oral Daily    clopidogrel  75 mg Oral Daily    flumazenil  0.2 mg Intravenous Once    heparin (porcine)  5,000 Units Subcutaneous Q8H    hydrochlorothiazide  25 mg Oral Daily    losartan  100 mg Oral Daily    midazolam  5 mg Intravenous Once    potassium chloride 10%  20 mEq Oral Daily    spironolactone  25 mg Oral Daily    vitamin D  1,000 Units Oral Daily        PRN:  benzocaine, dextrose 50%, dextrose 50%, fentaNYL, glucagon (human recombinant), glucose, glucose, insulin aspart, naloxone      Assessment:     Shant Magana is a 77 y.o.male with  Patient Active Problem List    Diagnosis Date Noted    SHIKHA (acute kidney injury) 10/14/2017    Syncope 10/14/2017    Cryptogenic stroke 10/14/2017    Encephalopathy 10/11/2017    Syncope and collapse 10/11/2017    Cerebrovascular accident (CVA) due to embolism of anterior cerebral artery 08/15/2017    Hypertensive crisis, unspecified 07/15/2017    Hypertensive emergency     Type 2 diabetes mellitus with complication, without long-term current use of insulin     Hypertensive encephalopathy 07/14/2017    Essential hypertension 06/07/2017    Bradycardia 06/07/2017        Plan:       Acute Embolic CVA  -Pt. Reports no recollection of fall, fell face forward per daughter report  -Exam CN2-12 intact, no focal neuro deficits  -Imaging 7/18/2017: MRI with multiple acute infarcts suggestive of embolic etiology in the R. Cerebellar, R. Sahni radiata, and L. Frontal lobe.  CTA showed 80% LICA stenosis and bilateral vertebral disease with multiple intracranial stenoses.  Carotid U/S showed <50% BROOK and 50-70% LICA stenosis.  TTE with LVH and LAE, but no afib at that time.    -CT head negative for acute process, but showing chronic microvascular ischemic disease with multifocal areas of remote infarction.  -MRI brain, MRA Head/Neck showed scattered acute infarcts, suggesting embolic phenomenon  -Troponin 0.011,  repeat mildly increased to 0.02 but wnl  -, lactate 3.6 with repeat 1.5  -continue ASA and plavix  -ESDRAS without evidence of thrombus, neuro recs loop recorder, consider NOAC, and d-dimer/thrombophilia w/u  -d-dimer was 1.74 and LE US was neg for dVT    Asymptomatic bacteruria  -UA with bacteria, no WBCs, afebrile  -no urinary symptoms present     H/o Hypertensive emergency/encephalopathy  -LE weakness, dizziness, BP on admit 260/124 July 2017  -MRI at that time showed multiple acute infarcts suggestive of embolic etiology in the R. Cerebellar, R. Sahni radiata, and L. Frontal lobe.  CTA showed 80% LICA stenosis and bilateral vertebral disease with multiple intracranial stenoses.  Carotid U/S showed <50% BROOK and 50-70% LICA stenosis.  TTE with LVH and LAE, but no afib at that time.       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, resolved  -Cr 2.8 on admission, above baseline ~1.4-1.7  -GFR baseline 50s, now 24 this admission  -ordered FeNA and FeUrea, hydrated with IVF  -holding losartan  -Cr 1.2 this morning     HTN  -/74 on admission, now 190/100 prior to AM meds  -Restarted home antihypertensives Amlodipine 10, ASA 81, HCTZ 25, Losartan 100 in the setting of syncope and CVA  -Will consider adding K+ sparing diuretic to patient  -giving aldactone 25 for HTN refractory to home meds, hydralazine not given due to pt intolerance, continue to monitor     PPx:Heparin  Diet: Cardiac  Code: Full Code  Dispo: likely home today with follow up for loop recorder implantation    Trent Castillo MD  [facility] HO-II  [facility] IM Service Team A    [facility] Medicine Hospitalist Pager numbers:   [facility] Hospitalist Medicine Team A (Hanny/Loco): [phone]  [facility] Hospitalist Medicine Team B (Kacey/Nico):  [phone]

## 2017-10-15 NOTE — PLAN OF CARE
"Pt still hypertensive with BP of 186/ 106. Dr Jeffrey notified. No new order. "will continue to monitor". Pt asymptomatic at present time.  "

## 2017-10-15 NOTE — PLAN OF CARE
Problem: Patient Care Overview  Goal: Plan of Care Review  No true red alarms noted on telemetry. Sinus Murray noted of shift. BP elevated this shift. Steph Jeffrey and Blanca aware. Pt asymptomatic. Denies any H/A or blurred vision.

## 2017-10-15 NOTE — MEDICAL/APP STUDENT
\[facility]\"" IM Medical Student Progress Note    Shant Magana is a 77 y.o. male who is being followed by the \[facility]\"" internal medicine service at [facility] for stroke    Subjective:      Yesterday, Mr. Magana had elevated blood pressures up to 200 systolic. Given spironolactone 25mg 2/2 intolerance to hydralazine listed in allergies. Patient's beta-blocker was avoiding 2/2 borderline bradycardia. Most recent pressure 194/107, HR 63 on amlodipine, HCTZ, losartan, and spironolactone. Vascular neurology recommends general neurology consult, loop recorder, d-dimer, and hypercoagulation panel. Refused CBC and CMP this morning per lab.    Mr. Magana states that there were not problems overnight. No chest pain, visual blurriness, headache.      Objective:   Last 24 Hour Vital Signs:  BP  Min: 165/91  Max: 200/100  Temp  Av.3 °F (36.8 °C)  Min: 97.8 °F (36.6 °C)  Max: 98.7 °F (37.1 °C)  Pulse  Av  Min: 56  Max: 63  Resp  Av.3  Min: 17  Max: 20  SpO2  Av %  Min: 96 %  Max: 98 %  I/O last 3 completed shifts:  In: 1940 [P.O.:740; I.V.:1200]  Out: 1275 [Urine:1275]    Physical Examination:  General: Awake and conversant upon entering room.   Eyes: No conjunctival injection or exudates  Ear, Nose, Throat: No erythema or exudates  Cardiac: RRR, no S3/S4  Resp: CTAB no wheezes or rales  Abdominal: BS+ NTND  Extremity: DP 2+, no edema or tenderness in calves  Neuro:              Mental Status: Awake and oriented to time, place, person.              CN: PERRLA, EOMI, Facial sensation intact in V1-3, very mild facial asymmetry on the left. Uvula difficult to visualize, shoulder shrug intact, tongue protrudes midline              Motor: 5/5 UE and  strength. 5/5 LE strength.  L ankle flexion 4/5. No pronator drift              Reflexes: 2+ biceps, brachioradialis, knee, ankle. Babinski downgoing              Sensory: Grossly intact to light touch              Coordination: No dysmetria or  dysdiadochokinesia              Gait: Not tested    Laboratory:  Pertinent Findings:  Component      Latest Ref Rng & Units 10/14/2017 10/13/2017 10/12/2017 10/11/2017   WBC      3.90 - 12.70 K/uL 4.25 4.91 6.13 9.92   RBC      4.60 - 6.20 M/uL 4.76 4.80 4.70 5.02   Hemoglobin      14.0 - 18.0 g/dL 13.9 (L) 14.1 13.8 (L) 14.9   Hematocrit      40.0 - 54.0 % 41.3 41.5 41.1 43.8   MCV      82 - 98 fL 87 87 87 87   MCH      27.0 - 31.0 pg 29.2 29.4 29.4 29.7   MCHC      32.0 - 36.0 g/dL 33.7 34.0 33.6 34.0   RDW      11.5 - 14.5 % 14.2 14.4 14.8 (H) 14.6 (H)   Platelets      150 - 350 K/uL 206 210 199 225   MPV      9.2 - 12.9 fL 11.2 11.1 11.3 11.1   Gran #      1.8 - 7.7 K/uL 2.3 3.1 4.4 8.6 (H)   Lymph #      1.0 - 4.8 K/uL 1.2 1.1 1.1 0.9 (L)   Mono #      0.3 - 1.0 K/uL 0.4 0.4 0.5 0.4   Eos #      0.0 - 0.5 K/uL 0.3 0.3 0.1 0.0   Baso #      0.00 - 0.20 K/uL 0.04 0.02 0.02 0.01   Gran%      38.0 - 73.0 % 54.7 62.1 71.2 86.5 (H)   Lymph%      18.0 - 48.0 % 28.2 22.8 17.8 (L) 8.6 (L)   Mono%      4.0 - 15.0 % 8.7 9.0 8.2 4.3   Eosinophil%      0.0 - 8.0 % 7.3 5.5 2.3 0.1   Basophil%      0.0 - 1.9 % 0.9 0.4 0.3 0.1   Differential Method       Automated Automated Automated Automated     Component      Latest Ref Rng & Units 10/14/2017 10/13/2017 10/12/2017 10/12/2017             1:11 PM  5:12 AM   Sodium      136 - 145 mmol/L 138 139 138 142   Potassium      3.5 - 5.1 mmol/L 3.3 (L) 3.6 3.7 3.5   Chloride      95 - 110 mmol/L 104 109 106 109   CO2      23 - 29 mmol/L 27 23 24 22 (L)   Glucose      70 - 110 mg/dL 101 101 135 (H) 95   BUN, Bld      8 - 23 mg/dL 18 24 (H) 30 (H) 34 (H)   Creatinine      0.5 - 1.4 mg/dL 1.2 1.3 1.6 (H) 1.8 (H)   Calcium      8.7 - 10.5 mg/dL 9.4 9.8 10.0 9.8   Total Protein      6.0 - 8.4 g/dL 7.0 6.9 7.2 6.7   Albumin      3.5 - 5.2 g/dL 3.2 (L) 3.1 (L) 3.2 (L) 3.0 (L)   Total Bilirubin      0.1 - 1.0 mg/dL 0.8 0.7 0.8 0.7   Alkaline Phosphatase      55 - 135 U/L 80 81 83 74   AST       10 - 40 U/L 28 30 30 30   ALT      10 - 44 U/L 20 20 19 16   Anion Gap      8 - 16 mmol/L 7 (L) 7 (L) 8 11   eGFR if African American      >60 mL/min/1.73 m:2 >60 >60 47 (A) 41 (A)   eGFR if non African American      >60 mL/min/1.73 m:2 58 (A) 53 (A) 41 (A) 36 (A)     Component      Latest Ref Rng & Units 10/11/2017             Sodium      136 - 145 mmol/L 140   Potassium      3.5 - 5.1 mmol/L 3.9   Chloride      95 - 110 mmol/L 103   CO2      23 - 29 mmol/L 24   Glucose      70 - 110 mg/dL 122 (H)   BUN, Bld      8 - 23 mg/dL 37 (H)   Creatinine      0.5 - 1.4 mg/dL 2.8 (H)   Calcium      8.7 - 10.5 mg/dL 10.8 (H)   Total Protein      6.0 - 8.4 g/dL 7.4   Albumin      3.5 - 5.2 g/dL 3.5   Total Bilirubin      0.1 - 1.0 mg/dL 0.7   Alkaline Phosphatase      55 - 135 U/L 90   AST      10 - 40 U/L 23   ALT      10 - 44 U/L 16   Anion Gap      8 - 16 mmol/L 13   eGFR if African American      >60 mL/min/1.73 m:2 24 (A)   eGFR if non African American      >60 mL/min/1.73 m:2 21 (A)     Component      Latest Ref Rng & Units 10/15/2017 10/14/2017 10/14/2017 10/14/2017            8:52 PM  4:20 PM  6:47 AM   POCT Glucose      70 - 110 mg/dL 100 134 (H) 117 (H) 123 (H)     Component      Latest Ref Rng & Units 10/13/2017 10/13/2017 10/13/2017 10/12/2017           9:28 PM  3:35 PM  5:32 AM  9:22 PM   POCT Glucose      70 - 110 mg/dL 87 164 (H) 102 103     Component      Latest Ref Rng & Units 10/12/2017 10/12/2017 10/12/2017 10/11/2017           3:30 PM 11:03 AM  7:08 AM    POCT Glucose      70 - 110 mg/dL 159 (H) 94 98 132 (H)     D-dimer 1.74 (increased)    Microbiology  Pertinent Findings:  None    Other Results:  EKG (my interpretation): No tele events overnight    Radiology Data Reviewed  Pertinent Findings:  MRI (10/12) - small acute ischemic infarct and scattered vascular territory suggesting embolic phenomenon  MRA (10/12) images of the neck just moderate stenosis at the origin of the left internal carotid artery  consistent with what was seen on prior ultrasound   XR PA/Lateral - no acute intrathoracic process  U/S legs - no evidence of DVT    Current Medications:     Infusions:        Scheduled:   amlodipine  10 mg Oral Daily    aspirin  81 mg Oral Daily    atorvastatin  80 mg Oral Daily    clopidogrel  75 mg Oral Daily    flumazenil  0.2 mg Intravenous Once    heparin (porcine)  5,000 Units Subcutaneous Q8H    hydrochlorothiazide  25 mg Oral Daily    losartan  100 mg Oral Daily    midazolam  5 mg Intravenous Once    potassium chloride 10%  20 mEq Oral Daily    spironolactone  25 mg Oral Daily    vitamin D  1,000 Units Oral Daily        PRN:  benzocaine, dextrose 50%, dextrose 50%, fentaNYL, glucagon (human recombinant), glucose, glucose, insulin aspart, naloxone    Antibiotics and Day Number of Therapy:  None    Lines and Day Number of Therapy:  PIV x2d    Assessment:     Shant Magana is a 77 y.o.male with    1) Syncope   2) H/O CVA   3) T2DM   4) HTN   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     Plan:     1) Syncope   -Patient presented to the ED after having a syncopal episode  Fell forward, witnessed by daughter, no memory of event  No focal findings on neurological exam in ED   -CT head negative for acute intracranial process  Microvascular changes and evidence of remote infarction present   -Orthostasis negative   -Cardiac: Troponin 0.011 > 0.024    EKG shows normal sinus rhythm with questionable T wave changes and an incomplete right bundle branch block  Transthoracic Echo 10/12 shows EF 60% with diastolic dysfunction  ESDRAS negative for thrombus  -PPx: Clopidogrel and heparin   -MRI (10/12) - small acute ischemic infarct and scattered vascular territory suggesting embolic phenomenon. MRA (10/12) images of the neck just moderate stenosis at the origin of the left internal carotid artery consistent with what was seen on prior ultrasound   -Vascular neuro: loop recorder, d-dimer, hypercoag. panel, consider DOAC,  general neuro consult      2) H/O CVA   -Patient presented in 7/17 complaining of LE weakness, dizziness   -MRI in 7/18/17 showed multiple acute infarcts suggestive of embolic etiology in the right cerebellum, right corona radiata, and left frontal lobes. CTA 80% stenosis in left internal carotid artery stenosis and bilateral vertebral artery disease as well as multiple stenotic intracranial vessels. Carotid U/W showed <50% R ICA and 50-70% L ICA stenosis. TTE showed LVH and L atrial enlargement, but no evidence of afib.      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   -Resolved SHIKHA  -Cr 2.8 > 1.8 > 1.6 > 1.3 > 1.2, baseline 1.4-1.7   -GFR 24 > 41 > 47 > 60, baseline 50s   -FeNa: 1.4%  FeUrea: 27.9%  -Restarted losartan    F: None  E: K @ 3.3 on 10/14; KCl 20 PO QD ordered; received at 9 am yesterday  N: Cardiac    DVT Ppx:    Dispo: Pending stroke workup    Code: Full    Trent Moralez  [facility] Internal Medicine Medical Student  [facility] IM Service Team A    [facility] Medicine Hospitalist Pager numbers:   [facility] Hospitalist Medicine Team A (Hanny/Loco): [phone]  [facility] Hospitalist Medicine Team B (Kacey/Nico):  [phone]

## 2017-10-16 VITALS
OXYGEN SATURATION: 99 % | HEART RATE: 66 BPM | BODY MASS INDEX: 27.78 KG/M2 | HEIGHT: 71 IN | WEIGHT: 198.44 LBS | SYSTOLIC BLOOD PRESSURE: 153 MMHG | DIASTOLIC BLOOD PRESSURE: 78 MMHG | TEMPERATURE: 97 F | RESPIRATION RATE: 16 BRPM

## 2017-10-16 PROBLEM — I63.9 CRYPTOGENIC STROKE: Status: ACTIVE | Noted: 2017-10-16

## 2017-10-16 LAB
ALBUMIN SERPL BCP-MCNC: 3.2 G/DL
ALP SERPL-CCNC: 82 U/L
ALT SERPL W/O P-5'-P-CCNC: 51 U/L
ANION GAP SERPL CALC-SCNC: 10 MMOL/L
ANION GAP SERPL CALC-SCNC: 4 MMOL/L
AORTIC ATHEROMA: YES
AST SERPL-CCNC: 49 U/L
BASOPHILS # BLD AUTO: 0.03 K/UL
BASOPHILS NFR BLD: 0.7 %
BILIRUB SERPL-MCNC: 0.8 MG/DL
BUN SERPL-MCNC: 20 MG/DL
BUN SERPL-MCNC: 26 MG/DL
CALCIUM SERPL-MCNC: 10 MG/DL
CALCIUM SERPL-MCNC: 10.1 MG/DL
CHLORIDE SERPL-SCNC: 103 MMOL/L
CHLORIDE SERPL-SCNC: 107 MMOL/L
CO2 SERPL-SCNC: 19 MMOL/L
CO2 SERPL-SCNC: 31 MMOL/L
CREAT SERPL-MCNC: 1.8 MG/DL
CREAT SERPL-MCNC: 1.8 MG/DL
DIFFERENTIAL METHOD: NORMAL
EOSINOPHIL # BLD AUTO: 0.3 K/UL
EOSINOPHIL NFR BLD: 6.1 %
ERYTHROCYTE [DISTWIDTH] IN BLOOD BY AUTOMATED COUNT: 14.5 %
EST. GFR  (AFRICAN AMERICAN): 41 ML/MIN/1.73 M^2
EST. GFR  (AFRICAN AMERICAN): 41 ML/MIN/1.73 M^2
EST. GFR  (NON AFRICAN AMERICAN): 36 ML/MIN/1.73 M^2
EST. GFR  (NON AFRICAN AMERICAN): 36 ML/MIN/1.73 M^2
GLUCOSE SERPL-MCNC: 100 MG/DL
GLUCOSE SERPL-MCNC: 148 MG/DL
HCT VFR BLD AUTO: 43.5 %
HGB BLD-MCNC: 14.5 G/DL
LYMPHOCYTES # BLD AUTO: 1.3 K/UL
LYMPHOCYTES NFR BLD: 29.3 %
MCH RBC QN AUTO: 29.1 PG
MCHC RBC AUTO-ENTMCNC: 33.3 G/DL
MCV RBC AUTO: 87 FL
MONOCYTES # BLD AUTO: 0.5 K/UL
MONOCYTES NFR BLD: 9.8 %
NEUTROPHILS # BLD AUTO: 2.5 K/UL
NEUTROPHILS NFR BLD: 53.9 %
PLATELET # BLD AUTO: 219 K/UL
PMV BLD AUTO: 11.1 FL
POCT GLUCOSE: 125 MG/DL (ref 70–110)
POCT GLUCOSE: 132 MG/DL (ref 70–110)
POCT GLUCOSE: 153 MG/DL (ref 70–110)
POTASSIUM SERPL-SCNC: 3.7 MMOL/L
POTASSIUM SERPL-SCNC: 4.6 MMOL/L
PROT SERPL-MCNC: 7.2 G/DL
RBC # BLD AUTO: 4.99 M/UL
SODIUM SERPL-SCNC: 136 MMOL/L
SODIUM SERPL-SCNC: 138 MMOL/L
WBC # BLD AUTO: 4.58 K/UL

## 2017-10-16 PROCEDURE — G8989 SELF CARE D/C STATUS: HCPCS | Mod: CJ

## 2017-10-16 PROCEDURE — 97535 SELF CARE MNGMENT TRAINING: CPT

## 2017-10-16 PROCEDURE — 85025 COMPLETE CBC W/AUTO DIFF WBC: CPT

## 2017-10-16 PROCEDURE — 80048 BASIC METABOLIC PNL TOTAL CA: CPT

## 2017-10-16 PROCEDURE — 25000003 PHARM REV CODE 250: Performed by: STUDENT IN AN ORGANIZED HEALTH CARE EDUCATION/TRAINING PROGRAM

## 2017-10-16 PROCEDURE — G8988 SELF CARE GOAL STATUS: HCPCS | Mod: CI

## 2017-10-16 PROCEDURE — 25000003 PHARM REV CODE 250: Performed by: INTERNAL MEDICINE

## 2017-10-16 PROCEDURE — 97530 THERAPEUTIC ACTIVITIES: CPT

## 2017-10-16 PROCEDURE — 36415 COLL VENOUS BLD VENIPUNCTURE: CPT

## 2017-10-16 PROCEDURE — 63600175 PHARM REV CODE 636 W HCPCS: Performed by: STUDENT IN AN ORGANIZED HEALTH CARE EDUCATION/TRAINING PROGRAM

## 2017-10-16 PROCEDURE — 80053 COMPREHEN METABOLIC PANEL: CPT

## 2017-10-16 PROCEDURE — 94761 N-INVAS EAR/PLS OXIMETRY MLT: CPT

## 2017-10-16 RX ORDER — HYDRALAZINE HYDROCHLORIDE 50 MG/1
50 TABLET, FILM COATED ORAL EVERY 8 HOURS
Qty: 90 TABLET | Refills: 11 | Status: ON HOLD | OUTPATIENT
Start: 2017-10-16 | End: 2017-10-21

## 2017-10-16 RX ORDER — SODIUM CHLORIDE 9 MG/ML
INJECTION, SOLUTION INTRAVENOUS ONCE
Status: COMPLETED | OUTPATIENT
Start: 2017-10-16 | End: 2017-10-16

## 2017-10-16 RX ORDER — HYDRALAZINE HYDROCHLORIDE 25 MG/1
50 TABLET, FILM COATED ORAL EVERY 8 HOURS
Status: DISCONTINUED | OUTPATIENT
Start: 2017-10-16 | End: 2017-10-16 | Stop reason: HOSPADM

## 2017-10-16 RX ADMIN — CLOPIDOGREL BISULFATE 75 MG: 75 TABLET ORAL at 08:10

## 2017-10-16 RX ADMIN — HEPARIN SODIUM 5000 UNITS: 5000 INJECTION, SOLUTION INTRAVENOUS; SUBCUTANEOUS at 05:10

## 2017-10-16 RX ADMIN — AMLODIPINE BESYLATE 10 MG: 5 TABLET ORAL at 08:10

## 2017-10-16 RX ADMIN — ASPIRIN 81 MG: 81 TABLET, COATED ORAL at 08:10

## 2017-10-16 RX ADMIN — POTASSIUM CHLORIDE 20 MEQ: 20 SOLUTION ORAL at 08:10

## 2017-10-16 RX ADMIN — SPIRONOLACTONE 25 MG: 25 TABLET, FILM COATED ORAL at 08:10

## 2017-10-16 RX ADMIN — HYDROCHLOROTHIAZIDE 25 MG: 25 TABLET ORAL at 08:10

## 2017-10-16 RX ADMIN — ATORVASTATIN CALCIUM 80 MG: 40 TABLET, FILM COATED ORAL at 08:10

## 2017-10-16 RX ADMIN — VITAMIN D, TAB 1000IU (100/BT) 1000 UNITS: 25 TAB at 08:10

## 2017-10-16 RX ADMIN — HYDRALAZINE HYDROCHLORIDE 25 MG: 25 TABLET, FILM COATED ORAL at 05:10

## 2017-10-16 RX ADMIN — LOSARTAN POTASSIUM 100 MG: 50 TABLET, FILM COATED ORAL at 08:10

## 2017-10-16 RX ADMIN — SODIUM CHLORIDE: 0.9 INJECTION, SOLUTION INTRAVENOUS at 08:10

## 2017-10-16 NOTE — PROGRESS NOTES
[employer] IM Resident GABRIELA Progress Note    Subjective:      Pt doing well this AM. He denies in chest pain, SOB, visual changes, or headaches. He has no complaints or concerns this AM. BP improved but still elevated in 160-170's on average.    Objective:   Last 24 Hour Vital Signs:  BP  Min: 157/85  Max: 190/96  Temp  Av.3 °F (36.8 °C)  Min: 97.6 °F (36.4 °C)  Max: 98.8 °F (37.1 °C)  Pulse  Av  Min: 56  Max: 65  Resp  Av  Min: 14  Max: 18  SpO2  Av.8 %  Min: 96 %  Max: 98 %  I/O last 3 completed shifts:  In: 1090 [P.O.:1090]  Out: 1625 [Urine:1625]    Physical Examination:  General:          Alert and awake in NAD, able to follow commands  Head:               Normocephalic and atraumatic  Eyes:               PERRL; EOMi with anicteric sclera and clear conjunctivae  Mouth:             Oropharynx clear and without exudate; moist mucous membranes  Cardio:            RRR no murmurs or rubs, No JVD  Resp:               CTAB and unlabored; no wheezes, crackles or rhonchi  Abdom:            Soft, NTND with normoactive bowel sounds  Extrem:            WWP with no clubbing, cyanosis, 2+ edema of lower extremites  Skin:                No rashes, lesions, or color changes  Pulses:            2+ and symmetric distally  Neuro:             AAOx3; cooperative and pleasant with no focal deficits, CN2-12 intact, strength 5/5 bilaterally, sensation intact bilaterally    Laboratory:  Laboratory Data Reviewed: yes  Pertinent Findings:  Cr 1.8 (close to baseline)  GFR 41 (known CKD3)  Radiology Data Reviewed: yes  Pertinent Findings:  MRI Brain/ MRA Head/Neck (10/12):  There are small, acute ischemic infarct and scattered vascular territory suggesting embolic phenomenon.  MRA images of the neck just moderate stenosis at the origin of the left internal carotid artery consistent with was seen on prior ultrasound.     Current Medications:     Infusions:        Scheduled:   amlodipine  10 mg Oral Daily    aspirin  81 mg  Oral Daily    atorvastatin  80 mg Oral Daily    clopidogrel  75 mg Oral Daily    flumazenil  0.2 mg Intravenous Once    heparin (porcine)  5,000 Units Subcutaneous Q8H    hydrALAZINE  25 mg Oral Q8H    hydrochlorothiazide  25 mg Oral Daily    losartan  100 mg Oral Daily    midazolam  5 mg Intravenous Once    potassium chloride 10%  20 mEq Oral Daily    spironolactone  25 mg Oral Daily    vitamin D  1,000 Units Oral Daily        PRN:  benzocaine, dextrose 50%, dextrose 50%, fentaNYL, glucagon (human recombinant), glucose, glucose, insulin aspart, naloxone      Assessment:     Shant Magana is a 77 y.o.male with  Patient Active Problem List    Diagnosis Date Noted    SHIKHA (acute kidney injury) 10/14/2017    Syncope 10/14/2017    Cryptogenic stroke 10/14/2017    Encephalopathy 10/11/2017    Syncope and collapse 10/11/2017    Cerebrovascular accident (CVA) due to embolism of anterior cerebral artery 08/15/2017    Hypertensive crisis, unspecified 07/15/2017    Hypertensive emergency     Type 2 diabetes mellitus with complication, without long-term current use of insulin     Hypertensive encephalopathy 07/14/2017    Essential hypertension 06/07/2017    Bradycardia 06/07/2017        Plan:       Acute Embolic CVA  -Pt. Reports no recollection of fall, fell face forward per daughter report  -Exam CN2-12 intact, no focal neuro deficits  -Imaging 7/18/2017: MRI with multiple acute infarcts suggestive of embolic etiology in the R. Cerebellar, R. Sahni radiata, and L. Frontal lobe.  CTA showed 80% LICA stenosis and bilateral vertebral disease with multiple intracranial stenoses.  Carotid U/S showed <50% BROOK and 50-70% LICA stenosis.  TTE with LVH and LAE, but no afib at that time.    -CT head negative for acute process, but showing chronic microvascular ischemic disease with multifocal areas of remote infarction.  -MRI brain, MRA Head/Neck showed scattered acute infarcts, suggesting embolic  phenomenon  -Troponin 0.011, repeat mildly increased to 0.02 but wnl  -, lactate 3.6 with repeat 1.5  -continue ASA and plavix  -ESDRAS without evidence of thrombus, neuro recs loop recorder, consider NOAC, and d-dimer/thrombophilia w/u  -d-dimer was 1.74 and LE US was neg for dVT    Asymptomatic bacteruria  -UA with bacteria, no WBCs, afebrile  -no urinary symptoms present     H/o Hypertensive emergency/encephalopathy  -LE weakness, dizziness, BP on admit 260/124 July 2017  -MRI at that time showed multiple acute infarcts suggestive of embolic etiology in the R. Cerebellar, R. Sahni radiata, and L. Frontal lobe.  CTA showed 80% LICA stenosis and bilateral vertebral disease with multiple intracranial stenoses.  Carotid U/S showed <50% BROOK and 50-70% LICA stenosis.  TTE with LVH and LAE, but no afib at that time.       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, resolved  -Cr 2.8 on admission, above baseline ~1.4-1.7  -GFR baseline 50s, now 24 this admission  -ordered FeNA and FeUrea, hydrated with IVF  -holding losartan  -Cr 1.8 GFR 41 this morning, giving IVF, repeat afternoon BMP  -labs still coincide with baseline but will monitor for improvement     HTN  -/74 on admission, now 190/100 prior to AM meds  -Restarted home antihypertensives Amlodipine 10, ASA 81, HCTZ 25, Losartan 100 in the setting of syncope and CVA  -Will consider adding K+ sparing diuretic to patient  -giving aldactone 25 for HTN refractory to home meds  - held aldactone, started hydralazine 25 tid, BP came down from 190's to 160-170s. Increase hydralazine dose to 50 tid     PPx:Heparin  Diet: Cardiac  Code: Full Code  Dispo: likely home today with follow up for loop recorder implantation    Trent Castillo MD  [facility] Internal Medicine HO-1  [facility] IM Service Team A    [facility]  Hospitalist Pager numbers:   [facility] Hospitalist Medicine Team A (Hanny/Loco): [phone]  [facility] Hospitalist Medicine Team B (Kacey/Nico):  [phone]

## 2017-10-16 NOTE — NURSING
Discharge instructions and education provided. Patient voices understanding. Telemetry discontinued without adverse reaction. Patient waiting for wheelchair.

## 2017-10-16 NOTE — PLAN OF CARE
Problem: Diabetes, Type 2 (Adult)  Goal: Signs and Symptoms of Listed Potential Problems Will be Absent, Minimized or Managed (Diabetes, Type 2)  Signs and symptoms of listed potential problems will be absent, minimized or managed by discharge/transition of care (reference Diabetes, Type 2 (Adult) CPG).   Monitor blood glucose as ordered.

## 2017-10-16 NOTE — DISCHARGE INSTRUCTIONS
If you are over 60 and live in [facility] and are in need of extra services dealing with your health issues or those of your loved one, please call the [facility] [place] on Aging at [phone].  They may be able to assist you with nutrition, socialization, transportation, regular exercise, medication management and more.    Stroke, Discharge Instructions for (English) View Edit Remove   Stroke, Risk Factors for (English) View Edit Remove   Stroke, Symptoms (English) View Edit Remove   Heart Failure, Discharge Instructions for (English) View Edit Remove   Heart Failure: Warning Signs of a Flare-Up (English) View Edit Remove   Heart Failure: Tracking Your Weight (English) View Edit Remove

## 2017-10-16 NOTE — PT/OT/SLP PROGRESS
Occupational Therapy  Treatment    Shant Magana   MRN: [id number]   Admitting Diagnosis: Syncope and collapse    OT Date of Treatment: 10/16/17   OT Start Time: 1130  OT Stop Time: 1208  OT Total Time (min): 38 min    Billable Minutes:  Self Care/Home Management 25, Therapeutic Activity 13 and Total Time 38    General Precautions: Standard, fall  Orthopedic Precautions: N/A  Braces: N/A         Subjective:  Communicated with nurse prior to session.    Pain/Comfort  Pain Rating 1: 0/10  Pain Rating Post-Intervention 1: 0/10    Objective:  Patient found with: telemetry, bed alarm, peripheral IV     Functional Mobility:  Bed Mobility:  Scooting/Bridging: Supervision  Supine to Sit: Supervision    Transfers:   Sit <> Stand Assistance: Stand By Assistance  Sit <> Stand Assistive Device: No Assistive Device  Bed <> Chair Technique: Stand Pivot  Bed <> Chair Transfer Assistance: Contact Guard Assistance (gait belt)  Bed <> Chair Assistive Device: No Assistive Device  Toilet Transfer Technique: Stand Pivot  Toilet Transfer Assistance: Contact Guard Assistance  Toilet Transfer Assistive Device: No Assistive Device    Functional Ambulation: CGA with gait belt; scissoring legs; staggered at times.    Activities of Daily Living:  Feeding Level of Assistance: Set-up Assistance  LE Dressing Level of Assistance: Stand by assistance  Grooming Position: Standing at sink  Grooming Level of Assistance: Stand by assistance (min vc problem solving and attention)  Toileting Where Assessed: Toilet  Toileting Level of Assistance: Stand by assistance          Balance:   Static Sit: NORMAL: No deviations seen in posture held statically  Dynamic Sit: GOOD+: Maintains balance through MAXIMAL excursions of active trunk motion  Static Stand: GOOD-: Takes MODERATE challenges from all directions inconsistently  Dynamic stand: FAIR: Needs CONTACT GUARD during gait    Therapeutic Activities and Exercises:   LB dressing with SBA  min vc for problem solving  "and safety/judgement threading legs into pants and donning shoes seated EOB.  Grooming standing at sink SBA with min VC for same. Fx transfers to toilet and chair with SBA-CGA; fx ambultion in room and hallway with CGA 2/2 scissoring legs /staggering disposition. Daughter instructed in gait belt use and fx mobility with pt using gait belt with CGA.     AM-PAC 6 CLICK ADL   How much help from another person does this patient currently need?   1 = Unable, Total/Dependent Assistance  2 = A lot, Maximum/Moderate Assistance  3 = A little, Minimum/Contact Guard/Supervision  4 = None, Modified [place]/Independent    Putting on and taking off regular lower body clothing? : 4  Bathing (including washing, rinsing, drying)?: 3  Toileting, which includes using toilet, bedpan, or urinal? : 3  Putting on and taking off regular upper body clothing?: 4  Taking care of personal grooming such as brushing teeth?: 3  Eating meals?: 4  Total Score: 21     AM-PAC Raw Score CMS "G-Code Modifier Level of Impairment Assistance   6 % Total / Unable   7 - 8 CM 80 - 100% Maximal Assist   9-13 CL 60 - 80% Moderate Assist   14 - 19 CK 40 - 60% Moderate Assist   20 - 22 CJ 20 - 40% Minimal Assist   23 CI 1-20% SBA / CGA   24 CH 0% Independent/ Mod I       Patient left up in chair with all lines intact, call button in reach, bed alarm on, nurse notified and family present    ASSESSMENT:  Shant Magana is a 77 y.o. male with a medical diagnosis of Syncope and collapse and presents with progress toward goals but needs 24/7 supervision for safety. Fall risk.  HH recommended. Continue with OT POC.    Rehab identified problem list/impairments: Rehab identified problem list/impairments: weakness, impaired self care skills, impaired balance, decreased safety awareness, impaired cognition, impaired functional mobilty, gait instability, decreased lower extremity function    Rehab potential is good.    Activity tolerance: Good    Discharge " recommendations: Discharge Facility/Level Of Care Needs: home health PT, home health OT, home with home health     Barriers to discharge: Barriers to Discharge: None    Equipment recommendations: none (OT issued gait belt to family)     GOALS:    Occupational Therapy Goals        Problem: Occupational Therapy Goal    Goal Priority Disciplines Outcome Interventions   Occupational Therapy Goal     OT, PT/OT Ongoing (interventions implemented as appropriate)    Description:  Goals to be met by: 11/14/17     Patient will increase functional independence with ADLs by performing:    LE Dressing with Modified [place].  Grooming while standing with Modified [place].  Toileting from toilet with Modified [place] for hygiene and clothing management.   Supine to sit with Modified [place].  Stand pivot transfers with Modified [place].  Toilet transfer to toilet with Modified [place].  Increased functional strength to WFL for self care skills and functional mobility.  Upper extremity exercise program x10 reps per handout, with independence.                      Plan:  Patient to be seen 5 x/week to address the above listed problems via self-care/home management, therapeutic activities, therapeutic exercises  Plan of Care expires: 11/13/17  Plan of Care reviewed with: patient, daughter, family         Ilda Arce OT  10/16/2017

## 2017-10-16 NOTE — PLAN OF CARE
Problem: Patient Care Overview  Goal: Plan of Care Review  Outcome: Ongoing (interventions implemented as appropriate)  No true red alarms noted on telemetry. Sinus Rhythm to Sinus Murray noted. HR 54-60. Neuro checks wnl. No c/o pain this shift.

## 2017-10-16 NOTE — PLAN OF CARE
Problem: Patient Care Overview  Goal: Plan of Care Review  Outcome: Ongoing (interventions implemented as appropriate)  Pt on RA with sats of  97%, Will continue to monitor.

## 2017-10-16 NOTE — PT/OT/SLP PROGRESS
Physical Therapy      Shant Magana  MRN: [id number]    Patient not seen today secondary to  (pt just ambulated a few mins ago and refused). Will follow-up tomorrow.    David Walker, PTA

## 2017-10-16 NOTE — PLAN OF CARE
Problem: Occupational Therapy Goal  Goal: Occupational Therapy Goal  Goals to be met by: 11/14/17     Patient will increase functional independence with ADLs by performing:    LE Dressing with Modified [place].  Grooming while standing with Modified [place].  Toileting from toilet with Modified [place] for hygiene and clothing management.   Supine to sit with Modified [place].  Stand pivot transfers with Modified [place].  Toilet transfer to toilet with Modified [place].  Increased functional strength to WFL for self care skills and functional mobility.  Upper extremity exercise program x10 reps per handout, with independence.     Outcome: Ongoing (interventions implemented as appropriate)  Pt. Making progress.  LB dressing with SBA  min vc for problem solving and safety/judgement threading legs into pants and donning shoes seated EOB.  Grooming standing at sink SBA with min VC for same. Fx transfers to toilet and chair with SBA-CGA; fx ambultion in room and hallway with CGA 2/2 scissoring legs /staggering disposition. Daughter instructed in gait belt use and fx mobility with pt using gait belt with CGA. Recommend HH. Continue with OT POC.

## 2017-10-16 NOTE — PLAN OF CARE
Future Appointments  Date Time Provider Department Center   11/1/2017 2:00 PM Guanaco Ty MD [facility] CARDIO [place] [facility]   11/13/2017 9:00 AM Kaiser Stephens MD [facility] NEURO [place] [facility]     Follow-up With  Details  Why  Contact Info   Trent Aldana MD  On 10/25/2017  at 11:15 am  [address]  [address]  [place] [address]  [phone]   Kaiser Stephens MD  On 11/13/2017  at 09:00 am---Neurology Follow-Up.  [address]  [address]  [place] [address]  [phone]       [facility]   will send orders to home health orders to [facility].   Guanaco Ty MD  On 11/1/2017  at 2:00 pm  [address]  [place] [address]  [phone]

## 2017-10-16 NOTE — NURSING
Patient IV no longer working. Dr. Mcmillan notified. Will repeat cmp before deciding if patient should get another IV site to finish fluids.

## 2017-10-16 NOTE — PROGRESS NOTES
.Pharmacy New Medication Education    Patient accepted medication education.    Pharmacy educated patient on name and purpose of medications and possible side effects, using the teach-back method.     Hydralazine  Losartan  spirinolactone    Learners of pharmacy medication education included:  patient and family    Patient +/- learner response:  teachback

## 2017-10-17 ENCOUNTER — PATIENT OUTREACH (OUTPATIENT)
Dept: ADMINISTRATIVE | Facility: CLINIC | Age: 77
End: 2017-10-17

## 2017-10-17 NOTE — PT/OT/SLP DISCHARGE
Physical Therapy Discharge Summary    Shant Magana  MRN: [id number]   Syncope and collapse   Patient Discharged from acute Physical Therapy on 10/16/17.  Please refer to prior PT noted date on 10/15/17 for functional status.     Assessment:   Patient appropriate for care in another setting.  GOALS:    Physical Therapy Goals        Problem: Physical Therapy Goal    Goal Priority Disciplines Outcome Goal Variances Interventions   Physical Therapy Goal     PT/OT, PT Ongoing (interventions implemented as appropriate)     Description:  Goals to be met by: 2017     Patient will increase functional independence with mobility by performin. Gait  x >200 feet with [place] using no AD.   2. Lower extremity exercise program x 25 with independence                    Reasons for Discontinuation of Therapy Services  Transfer to alternate level of care.      Plan:  Patient Discharged to: Home with Home Health Service.

## 2017-10-17 NOTE — PATIENT INSTRUCTIONS
Fainting: Uncertain Cause  Fainting (syncope) is a temporary loss of consciousness, which is often associated with a loss of postural tone. There are other causes of fainting, too. Its also called passing out. It occurs when blood flow to the brain is less than normal. Near-fainting (near-syncope) is very similar to fainting, but you dont fully pass out.  Common minor causes of fainting include:  · Sudden fear  · Pain  · Nausea  · Emotional stress  · Overexertion  Suddenly standing up after sitting or lying for a long time can also cause fainting.  More serious causes of fainting include:  · Very slow or very fast heartbeat (arrhythmia)  · Other types of heart disease, such as heart valve disease or coronary artery disease  · Dehydration  · Loss of blood  · Seizure  · Stroke  · Ruptured blood vessel in the brain  Taking too much high blood pressure medicine can also cause low blood pressure and fainting.  Your healthcare provider does not know the exact cause of your fainting. But the tests today did not show any of the serious causes of fainting. Sometimes you may need more tests to find out if you have a serious problem. Thats why its important to follow up with your provider as advised.  Home care  Follow these guidelines when caring for yourself at home:  · Rest today. You may go back to your normal activities when you are feeling back to normal. It is best to stay with someone who can check on you for the next 24 hours to watch for another episode of fainting.  · If you become lightheaded or dizzy, lie down right away and try to prop your feet above the level of your head. Or sit with your head between your knees.  · Because the provider doesnt know the exact cause of your fainting or near-fainting spell, its possible for you to have another spell without warning. Because of this, dont drive a car or operate dangerous equipment. Dont take a bath alone. Use a shower instead. Dont swim alone until your  healthcare provider says that you are no longer in danger of having another fainting spell.  Follow-up care  Follow up with your healthcare provider, or as advised.  When to seek medical advice  Call your healthcare provider right away if any of these occur:  · Another fainting spell thats not explained by the common causes listed above  · Pain in your chest, arm, neck, jaw, back, or abdomen  · Shortness of breath  · Severe headache or seizure  · Blood in vomit or stools (black or red color)  · Unexpected vaginal bleeding  · Your heart beats very rapidly, very slowly, or irregularly (palpitations)  Also call your provider if you have signs of stroke:  · Weakness in an arm or leg or on one side of the face  · Difficulty speaking or seeing  · Extreme drowsiness, confusion, dizziness, or fainting  Date Last Reviewed: 12/1/2017  © 2758-6235 [employer]. [address] [place] [address], [place] [address]. All rights reserved. This information is not intended as a substitute for professional medical care. Always follow your healthcare professional's instructions.

## 2017-10-17 NOTE — DISCHARGE SUMMARY
[facility] Discharge Summary    Primary Team: [facility] IM team A  Attending Physician: Hanny  Resident: Jesus  Intern: Anna    Date of Admit: 10/11/2017  Date of Discharge: 10/17/2017    Discharge to: home  Condition: stable    Discharge Diagnoses     Patient Active Problem List   Diagnosis    Essential hypertension    Bradycardia    Hypertensive encephalopathy    Hypertensive crisis, unspecified    Hypertensive emergency    Type 2 diabetes mellitus with complication, without long-term current use of insulin    Cerebrovascular accident (CVA) due to embolism of anterior cerebral artery    Encephalopathy    Syncope and collapse    SHIKHA (acute kidney injury)    Syncope    Cryptogenic stroke       Consultants and Procedures     Consultants:  Cardio  Vascular neuro    Procedures:   ESDRAS-no thrombus or vegetations    Brief History of Present Illness      Shant Magana is a 77 y.o. AA male who  has a past medical history of Cancer; Diabetes mellitus; Hypertension; Hypertrophic cardiomyopathy; and Renal disorder.  The patient presented to [facility] on 10/11/2017 with a primary complaint of Fall (pt fell twice today + LOC both falls no head injury family reports AMS pt oriented to self and time. unaware of fall. Denies pain. left side facial dropping. slight drift to left arm )  .     Shant Magana is a 78 yo M with a PMH of Prostate cancer, T2DM, HTN, Hypertrophic cardiomyopathy, and CKD who was in his usual state of health (Able to perform ADLs with light assistance from family members, administer his own medications with assistance) until today when the daughter noticed him crawling up the house steps after working on his car.  When he was questioned about why he was crawling, the pt. Did not remember.  Later that afternoon the patient was walking to his room after getting assistance with a bath and fell face forward onto the floor hitting his mouth and nose per daughter's report.  The patient does  not remember the incident, nor remembers any events occurring before his fall.  The patient is unable to give a history so the daughter is questioned about review of symptoms.  Per daughter he had no recent illness, fever, chills, nausea, vomiting, diarrhea, change in urinary/bowel habits, weakness, or change in mental status.         At baseline the patient is able to do daily activities with assistance such as bathing, dressing, administering his medications, and work around the house.  His blood pressure has been well controlled with the help of his wife and daughter.  At baseline since his stroke in July, 2017, he has had chronic hiccups, intermittent amnesia of events, asks questions repetitively, and occasionally drags his legs when walking.       Of note he was recently admitted to [facility] in July 2017 for hypertensive crisis/emergency and was diagnosed with a stroke.  MRI at that time showed multiple acute infarcts suggestive of embolic etiology in the R. Cerebellar, R. Sahni radiata, and L. Frontal lobe.  CTA showed 80% LICA stenosis and bilateral vertebral disease with multiple intracranial stenoses.  Carotid U/S showed <50% BROOK and 50-70% LICA stenosis.  TTE with LVH and LAE, but no afib at that time.  His stroke symptoms had resolved since then up until today.      For the full HPI please refer to the History & Physical from this admission.    Hospital Course By Problem with Pertinent Findings     Acute Embolic CVA  -Pt. Reports no recollection of fall, fell face forward per daughter report  -Exam CN2-12 intact, no focal neuro deficits  -Imaging 7/18/2017: MRI with multiple acute infarcts suggestive of embolic etiology in the R. Cerebellar, R. Sahni radiata, and L. Frontal lobe.  CTA showed 80% LICA stenosis and bilateral vertebral disease with multiple intracranial stenoses.  Carotid U/S showed <50% BROOK and 50-70% LICA stenosis.  TTE with LVH and LAE, but no afib at that time.    -CT head negative for  acute process, but showing chronic microvascular ischemic disease with multifocal areas of remote infarction.  -MRI brain, MRA Head/Neck showed scattered acute infarcts, suggesting embolic phenomenon  -Troponin 0.011, repeat mildly increased to 0.02 but wnl  -, lactate 3.6 with repeat 1.5  -continue ASA and plavix  -ESDRAS without evidence of thrombus,   -previously had holter (unremarkable), scheduled for outpt placement of loop recorder, f/u with cardio and neuro     Asymptomatic bacteruria  -UA with bacteria, no WBCs, afebrile  -no urinary symptoms present     H/o Hypertensive emergency/encephalopathy  -LE weakness, dizziness, BP on admit 260/124 July 2017  -MRI at that time showed multiple acute infarcts suggestive of embolic etiology in the R. Cerebellar, R. Sahni radiata, and L. Frontal lobe.  CTA showed 80% LICA stenosis and bilateral vertebral disease with multiple intracranial stenoses.  Carotid U/S showed <50% BROOK and 50-70% LICA stenosis.  TTE with LVH and LAE, but no afib at that time.       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 Increase hydralazine dose to 50 tid in addition to resuming home meds      Discharge Medications        Medication List      START taking these medications    hydrALAZINE 50 MG tablet  Commonly known as:  APRESOLINE  Take 1 tablet (50 mg total) by  mouth every 8 (eight) hours.        CONTINUE taking these medications    amlodipine 10 MG tablet  Commonly known as:  NORVASC  Take 1 tablet (10 mg total) by mouth once daily.     aspirin 81 MG EC tablet  Commonly known as:  ECOTRIN  Take 1 tablet (81 mg total) by mouth once daily.     atorvastatin 80 MG tablet  Commonly known as:  LIPITOR  Take 1 tablet (80 mg total) by mouth once daily.     clopidogrel 75 mg tablet  Commonly known as:  PLAVIX  Take 1 tablet (75 mg total) by mouth once daily.     GLUCOPHAGE 500 MG tablet  Generic drug:  metformin     hydrochlorothiazide 25 MG tablet  Commonly known as:  HYDRODIURIL  Take 1 tablet (25 mg total) by mouth once daily.     losartan 100 MG tablet  Commonly known as:  COZAAR  Take 1 tablet (100 mg total) by mouth once daily.     tamsulosin 0.4 mg Cp24  Commonly known as:  FLOMAX     vitamin D 1000 units Tab           Where to Get Your Medications      You can get these medications from any pharmacy    Bring a paper prescription for each of these medications  · hydrALAZINE 50 MG tablet         Discharge Information:   Diet:  cardiac    Physical Activity:  As tolerated    Instructions:  1. Take all medications as prescribed  2. Keep all follow-up appointments  3. Return to the hospital or call your primary care physicians if any new or worsening symptoms occur    Follow-Up Appointments:  Future Appointments  Date Time Provider Department Center   11/1/2017 2:00 PM Guanaco Ty MD [facility] CARDIO [place] [facility]   11/13/2017 9:00 AM Kaiser Stephens MD [facility] NEURO [place] [facility]         Trent Castillo  [facility] Internal Medicine, HO-1

## 2017-10-17 NOTE — PLAN OF CARE
TN spoke with Lucia at [facility]. She stated patient would be set up with [facility].    Kelly Hazel RN  Transition Navigator  [phone]

## 2017-10-18 ENCOUNTER — HOSPITAL ENCOUNTER (INPATIENT)
Facility: HOSPITAL | Age: 77
LOS: 3 days | Discharge: HOME HEALTH | DRG: 064 | End: 2017-10-21
Attending: EMERGENCY MEDICINE | Admitting: PSYCHIATRY & NEUROLOGY
Payer: MEDICARE

## 2017-10-18 DIAGNOSIS — G93.6 CYTOTOXIC CEREBRAL EDEMA: ICD-10-CM

## 2017-10-18 DIAGNOSIS — N18.30 CKD (CHRONIC KIDNEY DISEASE) STAGE 3, GFR 30-59 ML/MIN: ICD-10-CM

## 2017-10-18 DIAGNOSIS — I63.89 ACUTE ISCHEMIC MULTIFOCAL MULTIPLE VASCULAR TERRITORIES STROKE: ICD-10-CM

## 2017-10-18 DIAGNOSIS — I50.32 CHRONIC DIASTOLIC CONGESTIVE HEART FAILURE: ICD-10-CM

## 2017-10-18 DIAGNOSIS — I10 ESSENTIAL HYPERTENSION: ICD-10-CM

## 2017-10-18 DIAGNOSIS — I63.9 STROKE: ICD-10-CM

## 2017-10-18 DIAGNOSIS — I65.22 INTERNAL CAROTID ARTERY STENOSIS, LEFT: ICD-10-CM

## 2017-10-18 DIAGNOSIS — E11.8 TYPE 2 DIABETES MELLITUS WITH COMPLICATION, WITHOUT LONG-TERM CURRENT USE OF INSULIN: ICD-10-CM

## 2017-10-18 DIAGNOSIS — I51.7 ENLARGED LA (LEFT ATRIUM): ICD-10-CM

## 2017-10-18 DIAGNOSIS — I63.422 CEREBROVASCULAR ACCIDENT (CVA) DUE TO EMBOLISM OF LEFT ANTERIOR CEREBRAL ARTERY: Primary | Chronic | ICD-10-CM

## 2017-10-18 PROBLEM — E78.2 MIXED HYPERLIPIDEMIA: Status: ACTIVE | Noted: 2017-10-18

## 2017-10-18 LAB
ALBUMIN SERPL BCP-MCNC: 3.2 G/DL
ALP SERPL-CCNC: 83 U/L
ALT SERPL W/O P-5'-P-CCNC: 100 U/L
ANION GAP SERPL CALC-SCNC: 12 MMOL/L
AST SERPL-CCNC: 69 U/L
BASOPHILS # BLD AUTO: 0.03 K/UL
BASOPHILS NFR BLD: 0.4 %
BILIRUB SERPL-MCNC: 1.1 MG/DL
BILIRUB UR QL STRIP: NEGATIVE
BUN SERPL-MCNC: 32 MG/DL
CALCIUM SERPL-MCNC: 10.6 MG/DL
CHLORIDE SERPL-SCNC: 107 MMOL/L
CHOLEST SERPL-MCNC: 149 MG/DL
CHOLEST/HDLC SERPL: 3.4 {RATIO}
CLARITY UR REFRACT.AUTO: CLEAR
CO2 SERPL-SCNC: 18 MMOL/L
COLOR UR AUTO: YELLOW
CREAT SERPL-MCNC: 2.1 MG/DL
CREAT SERPL-MCNC: 2.2 MG/DL (ref 0.5–1.4)
CRP SERPL-MCNC: 22.55 MG/L
DIFFERENTIAL METHOD: ABNORMAL
EOSINOPHIL # BLD AUTO: 0.1 K/UL
EOSINOPHIL NFR BLD: 0.8 %
ERYTHROCYTE [DISTWIDTH] IN BLOOD BY AUTOMATED COUNT: 14.8 %
ERYTHROCYTE [SEDIMENTATION RATE] IN BLOOD BY WESTERGREN METHOD: 19 MM/HR
EST. GFR  (AFRICAN AMERICAN): 34.1 ML/MIN/1.73 M^2
EST. GFR  (NON AFRICAN AMERICAN): 29.5 ML/MIN/1.73 M^2
GLUCOSE SERPL-MCNC: 141 MG/DL
GLUCOSE UR QL STRIP: NEGATIVE
HCT VFR BLD AUTO: 42.5 %
HDLC SERPL-MCNC: 44 MG/DL
HDLC SERPL: 29.5 %
HGB BLD-MCNC: 14.1 G/DL
HGB UR QL STRIP: NEGATIVE
IMM GRANULOCYTES # BLD AUTO: 0.02 K/UL
IMM GRANULOCYTES NFR BLD AUTO: 0.3 %
INR PPP: 1
KETONES UR QL STRIP: NEGATIVE
LDLC SERPL CALC-MCNC: 82 MG/DL
LEUKOCYTE ESTERASE UR QL STRIP: NEGATIVE
LYMPHOCYTES # BLD AUTO: 1.2 K/UL
LYMPHOCYTES NFR BLD: 16.3 %
MCH RBC QN AUTO: 29.4 PG
MCHC RBC AUTO-ENTMCNC: 33.2 G/DL
MCV RBC AUTO: 89 FL
MONOCYTES # BLD AUTO: 0.5 K/UL
MONOCYTES NFR BLD: 6.3 %
NEUTROPHILS # BLD AUTO: 5.4 K/UL
NEUTROPHILS NFR BLD: 75.9 %
NITRITE UR QL STRIP: NEGATIVE
NONHDLC SERPL-MCNC: 105 MG/DL
NRBC BLD-RTO: 0 /100 WBC
PH UR STRIP: 5 [PH] (ref 5–8)
PLATELET # BLD AUTO: 222 K/UL
PMV BLD AUTO: 10.9 FL
POC PTINR: 1 (ref 0.9–1.2)
POC PTWBT: 12.3 SEC (ref 9.7–14.3)
POTASSIUM SERPL-SCNC: 4.2 MMOL/L
PROT SERPL-MCNC: 7.4 G/DL
PROT UR QL STRIP: NEGATIVE
PROTHROMBIN TIME: 11 SEC
RBC # BLD AUTO: 4.8 M/UL
SAMPLE: ABNORMAL
SAMPLE: NORMAL
SODIUM SERPL-SCNC: 137 MMOL/L
SP GR UR STRIP: >=1.03 (ref 1–1.03)
TRIGL SERPL-MCNC: 115 MG/DL
TSH SERPL DL<=0.005 MIU/L-ACNC: 3.67 UIU/ML
URN SPEC COLLECT METH UR: ABNORMAL
UROBILINOGEN UR STRIP-ACNC: NEGATIVE EU/DL
WBC # BLD AUTO: 7.1 K/UL

## 2017-10-18 PROCEDURE — 63600175 PHARM REV CODE 636 W HCPCS: Performed by: PHYSICIAN ASSISTANT

## 2017-10-18 PROCEDURE — 93010 ELECTROCARDIOGRAM REPORT: CPT | Mod: ,,, | Performed by: INTERNAL MEDICINE

## 2017-10-18 PROCEDURE — 99285 EMERGENCY DEPT VISIT HI MDM: CPT | Mod: ,,, | Performed by: EMERGENCY MEDICINE

## 2017-10-18 PROCEDURE — G8998 SWALLOW D/C STATUS: HCPCS | Mod: CH

## 2017-10-18 PROCEDURE — 96372 THER/PROPH/DIAG INJ SC/IM: CPT

## 2017-10-18 PROCEDURE — 25500020 PHARM REV CODE 255: Performed by: EMERGENCY MEDICINE

## 2017-10-18 PROCEDURE — 81003 URINALYSIS AUTO W/O SCOPE: CPT

## 2017-10-18 PROCEDURE — 25000003 PHARM REV CODE 250: Performed by: PHYSICIAN ASSISTANT

## 2017-10-18 PROCEDURE — A4216 STERILE WATER/SALINE, 10 ML: HCPCS | Performed by: PHYSICIAN ASSISTANT

## 2017-10-18 PROCEDURE — 97165 OT EVAL LOW COMPLEX 30 MIN: CPT

## 2017-10-18 PROCEDURE — G8996 SWALLOW CURRENT STATUS: HCPCS | Mod: CH

## 2017-10-18 PROCEDURE — 96361 HYDRATE IV INFUSION ADD-ON: CPT

## 2017-10-18 PROCEDURE — 99900035 HC TECH TIME PER 15 MIN (STAT)

## 2017-10-18 PROCEDURE — 99285 EMERGENCY DEPT VISIT HI MDM: CPT | Mod: 25

## 2017-10-18 PROCEDURE — 86141 C-REACTIVE PROTEIN HS: CPT

## 2017-10-18 PROCEDURE — 92610 EVALUATE SWALLOWING FUNCTION: CPT

## 2017-10-18 PROCEDURE — G8997 SWALLOW GOAL STATUS: HCPCS | Mod: CH

## 2017-10-18 PROCEDURE — 93005 ELECTROCARDIOGRAM TRACING: CPT

## 2017-10-18 PROCEDURE — 84443 ASSAY THYROID STIM HORMONE: CPT

## 2017-10-18 PROCEDURE — 80061 LIPID PANEL: CPT

## 2017-10-18 PROCEDURE — 85610 PROTHROMBIN TIME: CPT

## 2017-10-18 PROCEDURE — 96360 HYDRATION IV INFUSION INIT: CPT

## 2017-10-18 PROCEDURE — 36415 COLL VENOUS BLD VENIPUNCTURE: CPT

## 2017-10-18 PROCEDURE — 85651 RBC SED RATE NONAUTOMATED: CPT

## 2017-10-18 PROCEDURE — 82565 ASSAY OF CREATININE: CPT

## 2017-10-18 PROCEDURE — 85025 COMPLETE CBC W/AUTO DIFF WBC: CPT

## 2017-10-18 PROCEDURE — 99223 1ST HOSP IP/OBS HIGH 75: CPT | Mod: GC,,, | Performed by: PSYCHIATRY & NEUROLOGY

## 2017-10-18 PROCEDURE — 80053 COMPREHEN METABOLIC PANEL: CPT

## 2017-10-18 PROCEDURE — 20600001 HC STEP DOWN PRIVATE ROOM

## 2017-10-18 RX ORDER — TAMSULOSIN HYDROCHLORIDE 0.4 MG/1
0.4 CAPSULE ORAL DAILY
Status: DISCONTINUED | OUTPATIENT
Start: 2017-10-18 | End: 2017-10-21 | Stop reason: HOSPADM

## 2017-10-18 RX ORDER — ASPIRIN 81 MG/1
81 TABLET ORAL DAILY
Status: DISCONTINUED | OUTPATIENT
Start: 2017-10-18 | End: 2017-10-20

## 2017-10-18 RX ORDER — LABETALOL HYDROCHLORIDE 5 MG/ML
10 INJECTION, SOLUTION INTRAVENOUS
Status: DISCONTINUED | OUTPATIENT
Start: 2017-10-18 | End: 2017-10-21 | Stop reason: HOSPADM

## 2017-10-18 RX ORDER — INSULIN ASPART 100 [IU]/ML
0-5 INJECTION, SOLUTION INTRAVENOUS; SUBCUTANEOUS
Status: DISCONTINUED | OUTPATIENT
Start: 2017-10-18 | End: 2017-10-21 | Stop reason: HOSPADM

## 2017-10-18 RX ORDER — IBUPROFEN 200 MG
24 TABLET ORAL
Status: DISCONTINUED | OUTPATIENT
Start: 2017-10-18 | End: 2017-10-21 | Stop reason: HOSPADM

## 2017-10-18 RX ORDER — ATORVASTATIN CALCIUM 20 MG/1
80 TABLET, FILM COATED ORAL DAILY
Status: DISCONTINUED | OUTPATIENT
Start: 2017-10-18 | End: 2017-10-21 | Stop reason: HOSPADM

## 2017-10-18 RX ORDER — SODIUM CHLORIDE 9 MG/ML
INJECTION, SOLUTION INTRAVENOUS CONTINUOUS
Status: DISCONTINUED | OUTPATIENT
Start: 2017-10-18 | End: 2017-10-19

## 2017-10-18 RX ORDER — SODIUM CHLORIDE 0.9 % (FLUSH) 0.9 %
3 SYRINGE (ML) INJECTION EVERY 8 HOURS
Status: DISCONTINUED | OUTPATIENT
Start: 2017-10-18 | End: 2017-10-21 | Stop reason: HOSPADM

## 2017-10-18 RX ORDER — CLOPIDOGREL BISULFATE 75 MG/1
75 TABLET ORAL DAILY
Status: DISCONTINUED | OUTPATIENT
Start: 2017-10-18 | End: 2017-10-19

## 2017-10-18 RX ORDER — IBUPROFEN 200 MG
16 TABLET ORAL
Status: DISCONTINUED | OUTPATIENT
Start: 2017-10-18 | End: 2017-10-21 | Stop reason: HOSPADM

## 2017-10-18 RX ORDER — HEPARIN SODIUM 5000 [USP'U]/ML
5000 INJECTION, SOLUTION INTRAVENOUS; SUBCUTANEOUS EVERY 8 HOURS
Status: DISCONTINUED | OUTPATIENT
Start: 2017-10-18 | End: 2017-10-19

## 2017-10-18 RX ORDER — GLUCAGON 1 MG
1 KIT INJECTION
Status: DISCONTINUED | OUTPATIENT
Start: 2017-10-18 | End: 2017-10-21 | Stop reason: HOSPADM

## 2017-10-18 RX ADMIN — ATORVASTATIN CALCIUM 80 MG: 20 TABLET, FILM COATED ORAL at 12:10

## 2017-10-18 RX ADMIN — IOHEXOL 100 ML: 350 INJECTION, SOLUTION INTRAVENOUS at 10:10

## 2017-10-18 RX ADMIN — CLOPIDOGREL 75 MG: 75 TABLET, FILM COATED ORAL at 12:10

## 2017-10-18 RX ADMIN — TAMSULOSIN HYDROCHLORIDE 0.4 MG: 0.4 CAPSULE ORAL at 12:10

## 2017-10-18 RX ADMIN — ASPIRIN 81 MG: 81 TABLET, COATED ORAL at 12:10

## 2017-10-18 RX ADMIN — HEPARIN SODIUM 5000 UNITS: 5000 INJECTION, SOLUTION INTRAVENOUS; SUBCUTANEOUS at 09:10

## 2017-10-18 RX ADMIN — Medication 3 ML: at 02:10

## 2017-10-18 RX ADMIN — HEPARIN SODIUM 5000 UNITS: 5000 INJECTION, SOLUTION INTRAVENOUS; SUBCUTANEOUS at 02:10

## 2017-10-18 RX ADMIN — SODIUM CHLORIDE: 0.9 INJECTION, SOLUTION INTRAVENOUS at 02:10

## 2017-10-18 RX ADMIN — Medication 3 ML: at 10:10

## 2017-10-18 NOTE — HPI
Mr. Magana is a 76 yo man with PMHx HTN, hypertrophic cardiomyopathy, CKD, DM, Prostate CA, prior stroke in July, with recent admission to [facility] (D/c date 10/16/17) for acute stroke. Last known normal was a 0630. Pt's daughter states this morning she came into his room to go to therapy and pt had dressed himself improperly, was not acting like himself so EMS was called. Pt was admitted 10/11/17 to [facility] after presenting with 2 non-traumatic falls with +LOC. At that time, he was found to have scattered small acute ischemic infarcts and moderate L ICA stenosis. Also previously admitted to [facility] in July 2017 for hypertensive crisis/emergency and was diagnosed with a stroke. MRI at that time showed multiple acute infarcts suggestive of embolic etiology in R Cerebellum, R Sahni radiata, and L Frontal lobe. CTA showed 80% LICA stenosis and bilateral vertebral disease with multiple intracranial stenoses. TTE with LVH and LAE, but no AFib at that time. Pt was discharged on maximal medical therapy- ASA 81, Plavix 75, and Atorvastatin 80. Pt previously had holter monitor which was unremarkable and was schedule for outpatient loop recorder, however did not recieve prior to presenting today. Family denies use of blood thinners.

## 2017-10-18 NOTE — PT/OT/SLP EVAL
Occupational Therapy  Evaluation    Shant Magana   MRN: [id number]   Admitting Diagnosis: <principal problem not specified>    OT Date of Treatment: 10/18/17   OT Start Time: 1459  OT Stop Time: 1522  OT Total Time (min): 23 min    Billable Minutes:  Evaluation 23 minutes    Diagnosis: <principal problem not specified>     Past Medical History:   Diagnosis Date    Cancer     prostate    Diabetes mellitus     Hypertension     Hypertrophic cardiomyopathy     Renal disorder       Past Surgical History:   Procedure Laterality Date    BACK SURGERY      THYROIDECTOMY         General Precautions: Standard, fall  Orthopedic Precautions: N/A  Braces: N/A    Patient History:  Living Environment  Lives With: alone  Living Arrangements: house  Home Accessibility: stairs to enter home  Number of Stairs to Enter Home: 5  Stair Railings at Home: outside, present at both sides  Transportation Available: family or friend will provide  Living Environment Comment: Pt lives alone in a ssh w/ 5 steps to enter and B/L HR. PLOF indep and bathes in a tub w/ bath bench. Pt's daughter states family checks in on pt during day and sleep there at night.   Equipment Currently Used at Home: bath bench    Prior level of function:   Bed Mobility/Transfers: independent  Grooming: independent  Bathing: independent  Upper Body Dressing: independent  Lower Body Dressing: independent  Toileting: independent  Home Management Skills: independent    Dominant hand: right    Subjective:  Communicated with nurse prior to session.  Pt agreeable to skilled OT services.  Chief Complaint: no complaints  Patient/Family stated goals: improver overall functioning    Pain/Comfort  Pain Rating 1: 0/10  Pain Rating Post-Intervention 1: 0/10    Objective:  Patient found with: telemetry, pulse ox (continuous), blood pressure cuff  Pt received w/ HOB elevated.     Cognitive Exam:  Oriented to: Person, Place and Time  Follows Commands/attention: Initially followed  commands but at end of session pt followed <50% of commands, appearing to become more confused as session went on.  Communication: clear/fluent  Memory:  No Deficits noted  Safety awareness/insight to disability: impaired  Coping skills/emotional control: Appropriate to situation    Visual/perceptual:  Impaired  visual field (Pt not able to keep eyes fixsted when testing visual field) and motor planning praxis    Physical Exam:  Postural examination/scapula alignment: No postural abnormalities identified  Skin integrity: Visible skin intact  Edema: None noted     Sensation:   Intact    Upper Extremity Range of Motion:  Right Upper Extremity: WFL  Left Upper Extremity: WFL    Upper Extremity Strength:  Right Upper Extremity: WFL  Left Upper Extremity: 3/5   Strength: WFL    Fine motor coordination:   Impaired  Left hand, manipulation of objects    Gross motor coordination: WFL    Functional Mobility:  Bed Mobility:  Supine to Sit: Minimum Assistance  Sit to Supine: Minimum Assistance    Transfers:  Sit <> Stand Assistance: Contact Guard Assistance  Sit <> Stand Assistive Device: No Assistive Device    Functional Ambulation: Pt ambulated 3 ft at min a for directions to avoid objects. P thent ambulated 3 ft at mod a for balance and safety w/o AD 2* to pt not following writing therapists commands.    Activities of Daily Living:    LE Dressing Level of Assistance: Contact guard (donned/doffed socks)  Grooming Position: Standing at sink  Grooming Level of Assistance: Contact guard assistance    Balance:   Static Sit: GOOD: Takes MODERATE challenges from all directions  Dynamic Sit: GOOD: Maintains balance through MODERATE excursions of active trunk movement  Static Stand: FAIR+: Takes MINIMAL challenges from all directions  Dynamic stand: FAIR: Needs CONTACT GUARD during gait    Therapeutic Activities and Exercises:  Pt educated on POC.    AM-PAC 6 CLICK ADL  How much help from another person does this patient  "currently need?  1 = Unable, Total/Dependent Assistance  2 = A lot, Maximum/Moderate Assistance  3 = A little, Minimum/Contact Guard/Supervision  4 = None, Modified [place]/Independent    Putting on and taking off regular lower body clothing? : 3  Bathing (including washing, rinsing, drying)?: 3  Toileting, which includes using toilet, bedpan, or urinal? : 3  Putting on and taking off regular upper body clothing?: 3  Taking care of personal grooming such as brushing teeth?: 3  Eating meals?: 3  Total Score: 18    AM-PAC Raw Score CMS "G-Code Modifier Level of Impairment Assistance   6 % Total / Unable   7 - 9 CM 80 - 100% Maximal Assist   10-14 CL 60 - 80% Moderate Assist   15 - 19 CK 40 - 60% Moderate Assist   20 - 22 CJ 20 - 40% Minimal Assist   23 CI 1-20% SBA / CGA   24 CH 0% Independent/ Mod I       Patient left HOB elevated with all lines intact, call button in reach, nurse notified and daughter present    Assessment:  Shant Magana is a 77 y.o. male with a medical diagnosis of <principal problem not specified> and presents with impairments listed below. Pt would benefit from skilled OT services to improve independence and overall occupational functioning.    Rehab identified problem list/impairments: Rehab identified problem list/impairments: weakness, impaired endurance, impaired self care skills, impaired functional mobilty, gait instability, impaired balance, impaired cognition, decreased upper extremity function, decreased lower extremity function, impaired fine motor    Rehab potential is good.    Activity tolerance: Good    Discharge recommendations: Discharge Facility/Level Of Care Needs: rehabilitation facility (may progress to )     Barriers to discharge: Barriers to Discharge: Inaccessible home environment, Decreased caregiver support    Equipment recommendations:  (tbd)     GOALS:    Occupational Therapy Goals        Problem: Occupational Therapy Goal    Goal Priority Disciplines Outcome " Interventions   Occupational Therapy Goal     OT, PT/OT     Description:  Goals to be met by: 10/23/2017     Patient will increase functional independence with ADLs by performing:    UE Dressing with [place].  LE Dressing with Supervision.  Grooming while standing with Supervision.  Toileting from toilet with Minimal Assistance for hygiene and clothing management.   Toilet transfer to toilet with Stand-by Assistance.  Pt will indep follow commands w/ 100% accuracy for 2/3 trials.  Pt will indep imitate motor patterns for 2/3 trials.                         PLAN:  Patient to be seen 4 x/week to address the above listed problems via self-care/home management, therapeutic activities, therapeutic exercises  Plan of Care expires: 11/18/17  Plan of Care reviewed with: patient, daughter    Sean GALEANO Castillo, OT  10/18/2017

## 2017-10-18 NOTE — PT/OT/SLP EVAL
Speech Language Pathology  Bedside Swallow Study  Evaluation    Shant Magana   MRN: [id number]   ED [birth year]    Admitting Diagnosis: Acute ischemic multifocal multiple vascular territories stroke    Diet recommendations: Solid Diet Level: Regular  Liquid Diet Level: Thin universal aspiration precautions    SLP Treatment Date: 10/18/17  Speech Start Time: 1540     Speech Stop Time: 1555     Speech Total (min): 15 min       TREATMENT BILLABLE MINUTES:  Eval Swallow and Oral Function 15    Diagnosis: Acute ischemic multifocal multiple vascular territories stroke    CXR: No acute process seen.    MRI: Evolving subcentimeter recent infarcts within the cerebral hemispheres with superimposed new areas of similar size infarction within the cerebral hemispheres bilaterally with predominance of foci in the periphery of the cerebral hemispheres concerning for embolic phenomenon.  There is 2 new right cerebellar foci which are small in size and an additional new subcentimeter right posterior temporal focus  Scattered areas of subcentimeter susceptibility within the cerebral hemispheres concerning for remote microhemorrhages more apparent on today's study likely related to differences in scanner sensitivity.  Superimposed generalized emboli loss with T2 flair signal abnormality concerning for chronic ischemic change.  Please see above for additional details.    Past Medical History:   Diagnosis Date    Cancer     prostate    Diabetes mellitus     Hypertension     Hypertrophic cardiomyopathy     Renal disorder      Past Surgical History:   Procedure Laterality Date    BACK SURGERY      THYROIDECTOMY         Has the patient been evaluated by SLP for swallowing? : Yes  Keep patient NPO?: No   General Precautions: Standard, fall            Subjective:  Pt awake and cooperative. Communicated with RN prior to entry. MD present in room and assisted in repositioning pt for po trials. Daughter entered room at end of ST session.   Patient  goals: to go home.     Objective:      Oral Musculature Evaluation  Oral Musculature: facial asymmetry present  Dentition: present and adequate  Mucosal Quality: adequate  Oral Labial Strength and Mobility: impaired coordination  Lingual Strength and Mobility:  (slight deviation to left side upon protrusion)  Buccal Strength and Mobility: WFL  Volitional Cough: elicited  Volitional Swallow: timely  Voice Prior to PO Intake: clear     Bedside Swallow Eval:  Consistencies Assessed:   THIN:-ice chip x1, self regulated cup sip of water x2, self regulated straw sip of water x4  PUREE:- tsp bites of pudding x4  SOLID: -bite of juan miguel cracker x3  Oral Phase: WFL  Pharyngeal Phase: no overt clinical  signs/symptoms of aspiration and no overt clinical signs/symptoms of pharyngeal dysphagia    BSS completed. Pt presented with no overt s/s of aspiration or dysphagia. Pt and family (daughter) education provided on SLP role, s/s and risks of aspiration, and universal aspiration precautions. Pt and pt's daughter verbalized understanding of all discussed.     Recommend: Regular diet, thin liquids, universal aspiration precautions. ST will continue to follow to complete a Zxqjzk-Zqzfvlwa-Fxwhhlezy Evaluation.    Assessment:  Shant Magana is a 77 y.o. male with a medical diagnosis of Acute ischemic multifocal multiple vascular territories stroke and presents with no overt s/s of aspiration per subjective bedside assessment. ST will continue to follow to complete a Ijbviz-Yyvezrej-Nwafdlnqq Evaluation.           Discharge recommendations: Discharge Facility/Level Of Care Needs: rehabilitation facility     Goals:    SLP Goals        Problem: SLP Goal    Goal Priority Disciplines Outcome   SLP Goal     SLP Ongoing (interventions implemented as appropriate)   Description:  Short Term Goals:   Goals expected to be met by 10/25  1. Pt will participate in a speech, language, and cognitive evaluation with possible updated goals to follow  pending results.                       Plan:   Patient to be seen Therapy Frequency: 5 x/week   Plan of Care expires: 11/16/17  Plan of Care reviewed with: patient, daughter  SLP Follow-up?: Yes         SLP G-Codes  Functional Assessment Tool Used: noms  Score: 7  Functional Limitations: Swallowing  Swallow Current Status ():   Swallow Goal Status ():   Swallow Discharge Status ():     NEVIN Rondon, CCC-SLP   Pager: [phone]  10/18/2017

## 2017-10-18 NOTE — ASSESSMENT & PLAN NOTE
Stroke risk factor  140/64 on arrival  Currently holding home meds to allow for adequate cerebral perfusion

## 2017-10-18 NOTE — PLAN OF CARE
Problem: Occupational Therapy Goal  Goal: Occupational Therapy Goal  Goals to be met by: 10/23/2017     Patient will increase functional independence with ADLs by performing:    UE Dressing with [place].  LE Dressing with Supervision.  Grooming while standing with Supervision.  Toileting from toilet with Minimal Assistance for hygiene and clothing management.   Toilet transfer to toilet with Stand-by Assistance.    Initiate OT POC    Comments: Sean Castillo OTR/L  10/18/2017

## 2017-10-18 NOTE — ED PROVIDER NOTES
Encounter Date: 10/18/2017    SCRIBE #1 NOTE: I, Fabienne Vasquez, am scribing for, and in the presence of,  Dr. Guan. I have scribed the entire note.       History     Chief Complaint   Patient presents with    Aphasia     last seen normal at 0630, now has aphasia and left sided weakness and left sided gaze      Time patient was seen by the provider: 10:14 AM      The patient is a 77 y.o. male with hx of: HTN, hypertrophic cardiomyopathy CKD, Dm, and prostate cancer, was recently admitted to [facility] October 11,2017 with signs of an embolic stroke. He presents here with stoke-like symptoms reported at 630AM consisting of left arm and leg weakness, left sided gaze, slurred speech, not following commands. EMS noted nml blood glucose, nml BP, and slow resolution of these symptoms. Pt denies any discomfort.         The history is provided by the patient, medical records and the EMS personnel.     Review of patient's allergies indicates:   Allergen Reactions    Hydralazine analogues Diarrhea     Past Medical History:   Diagnosis Date    Cancer     prostate    Diabetes mellitus     Hypertension     Hypertrophic cardiomyopathy     Renal disorder      Past Surgical History:   Procedure Laterality Date    BACK SURGERY      THYROIDECTOMY       No family history on file.  Social History   Substance Use Topics    Smoking status: Former Smoker     Types: Cigarettes    Smokeless tobacco: Never Used    Alcohol use Yes     Review of Systems   Constitutional: Negative for fever.   HENT: Negative for rhinorrhea.    Eyes: Negative for discharge.   Respiratory: Negative for wheezing.    Cardiovascular: Negative for chest pain.   Gastrointestinal: Negative for vomiting.   Genitourinary: Negative for hematuria.   Musculoskeletal: Negative for neck pain and neck stiffness.   Skin: Negative for rash.   Neurological:        + Left arm and leg weakness, left-sided gaze, slurred speech, not following commands.       Physical  Exam     Initial Vitals [10/18/17 0923]   BP Pulse Resp Temp SpO2   (!) 140/64 88 16 97.6 °F (36.4 °C) 97 %      MAP       89.33         Physical Exam    Vitals reviewed.  Constitutional: He appears well-developed and well-nourished. No distress.   HENT:   Head: Normocephalic and atraumatic.   Right Ear: Tympanic membrane normal.   Left Ear: Tympanic membrane normal.   Mouth/Throat: Uvula is midline and oropharynx is clear and moist.   No tonsillar abnormalities or intraoral lesions.   Eyes: EOM are normal. Pupils are equal, round, and reactive to light. No scleral icterus.   Neck: Normal range of motion. Neck supple.   No meningismus, no tenderness to palpation, no step-off or crepitus. Anterior neck is with midline trachea, nontender and without erythema or thyromegaly.   Cardiovascular:   Heart with regular rate and rhythm, no murmurs, rubs or gallops. No JVD, brisk capillary refill   Pulmonary/Chest:   Clear to auscultation bilaterally with no rales, rhonchi, wheezing or increased work of breathing   Abdominal:   Soft, nontender, nondistended, normal bowel sounds, no palpable masses   Musculoskeletal:   Extremities are warm, well perfused, no clubbing, cyanosis or edema.   Neurological:   Awake and alert, equal facial movements, No slurred speech, no motor drift, normal sensation.   Skin:   Warm and dry, no rashes or lesions noted.         ED Course   Procedures  Labs Reviewed   ISTAT CREATININE - Abnormal; Notable for the following:        Result Value    POC Creatinine 2.2 (*)     All other components within normal limits   CBC W/ AUTO DIFFERENTIAL   COMPREHENSIVE METABOLIC PANEL   PROTIME-INR   TSH   LIPID PANEL   POCT GLUCOSE   ISTAT PROCEDURE     EKG Readings: (Independently Interpreted)   Sinus Rhythm 76 bpm,  No ST changes, no T-wave inversions. normal intervals, no other significant change from previous.          Medical Decision Making:   History:   Old Medical Records: I decided to obtain old medical  records.  Initial Assessment:   Initial CT scan negative for acute changes.  Neurology stroke team at bedside on arrival, this patient's symptoms continued to resolve, will not treat with TPA currently.  CTA and MRI are ordered, which showed new foci of embolic stroke.  Blood pressure stable while in the emergency department.  Labs also significant for elevated creatinine at baseline and elevated liver enzymes increased from previous.  Will admit patient to search in for further evaluation.  Independently Interpreted Test(s):   I have ordered and independently interpreted EKG Reading(s) - see prior notes  Clinical Tests:   Lab Tests: Ordered and Reviewed  Radiological Study: Ordered and Reviewed  Medical Tests: Ordered and Reviewed            Scribe Attestation:   Scribe #1: I performed the above scribed service and the documentation accurately describes the services I performed. I attest to the accuracy of the note.            ED Course      Clinical Impression:   There were no encounter diagnoses.                           Alan Guan MD  10/18/17 [id number]

## 2017-10-18 NOTE — ASSESSMENT & PLAN NOTE
Mr. Magana is a 76 yo man with PMHx HTN, hypertrophic cardiomyopathy, CKD, DM, Prostate CA, prior stroke in July, with recent admission to [facility] (D/c date 10/16/17) for acute stroke who presented after daughter finding pt had dressed himself improperly and was not acting like himself. Last known normal around 0630. (See HPI for details of recent admission) Family denies use of blood thinners. Aphasia largely resolved upon arrival to our facility. CTA showing moderate high-grade stenosis of R M2, L ICA stenosis, and bilateral vertebral artery stenosis. MRI Brain with acute scattered bilateral infarcts. Etiology likely cardioembolic. In addition, pt has diffuse atherosclerotic disease vs vasculitis.  Though latter is less likely, will order ESR and CRP.    Antiplatelets: ASA 81  Statins: Atorvastatin 80  SBP <220, Hold BP meds to allow for adequate cerebral perfusion  VTE ppx: SQ Heparin, SCDs  PT/OT and speech to evaluate and treat  Neuro checks q4h

## 2017-10-18 NOTE — ASSESSMENT & PLAN NOTE
60-70% Left proximal ICA stenosis seen on CTA  Due to bilateral nature of acute strokes, do not believe carotid to be the cause  Continue home regimen of DAPT and high-dose statin for medical management

## 2017-10-18 NOTE — PLAN OF CARE
Problem: Patient Care Overview  Goal: Plan of Care Review  Outcome: Ongoing (interventions implemented as appropriate)  Plan of care reviewed with patient.  Verbalized understanding.  Patient is alert and oriented time 4. No complain of pain. VSS. Resting comfortably in bed.  Will continue to monitor.

## 2017-10-18 NOTE — CONSULTS
Inpatient consult to Physical Medicine Rehab  Consult performed by: ARIANNA OSEGUERA  Consult ordered by: STUART BRYSON  Reason for consult: assess rehab needs      Reviewed patient history and current admission.  Rehab team following.  Full consult to follow.    SELVIN Mobley, FNP-C  Physical Medicine & Rehabilitation   10/18/2017  Spectralink: [id number]

## 2017-10-18 NOTE — PT/OT/SLP DISCHARGE
Occupational Therapy Discharge Summary    Shant Magana  MRN: [id number]   Syncope and collapse   Patient Discharged from acute Occupational Therapy on 10/16/17.  Please refer to prior OT note dated on 10/16/17 for functional status.     Assessment:   Patient appropriate for care in another setting.  GOALS:    Occupational Therapy Goals        Problem: Occupational Therapy Goal    Goal Priority Disciplines Outcome Interventions   Occupational Therapy Goal     OT, PT/OT Ongoing (interventions implemented as appropriate)    Description:  Goals to be met by: 11/14/17     Patient will increase functional independence with ADLs by performing:    LE Dressing with Modified [place].  Grooming while standing with Modified [place].  Toileting from toilet with Modified [place] for hygiene and clothing management.   Supine to sit with Modified [place].  Stand pivot transfers with Modified [place].  Toilet transfer to toilet with Modified [place].  Increased functional strength to WFL for self care skills and functional mobility.  Upper extremity exercise program x10 reps per handout, with independence.                    Reasons for Discontinuation of Therapy Services  Transfer to alternate level of care.      Plan:  Patient Discharged to: Home with Home Health Service.    Jaclyn Mcallister, OT  10/18/2017

## 2017-10-18 NOTE — CONSULTS
[facility]  Vascular Neurology  Comprehensive Stroke Center  Consult Note    Inpatient consult to Vascular (Stroke) Neurology  Consult performed by: STUART BRYSON  Consult ordered by: STUART BRYSON        Assessment/Plan:     Patient is a 77 y.o. 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 Magana is a 78 yo man with PMHx HTN, hypertrophic cardiomyopathy, CKD, DM, Prostate CA, prior stroke in July, with recent admission to [facility] (D/c date 10/16/17) for acute stroke who presented after daughter finding pt had dressed himself improperly and was not acting like himself. Last known normal around 0630. (See HPI for details of recent admission) Family denies use of blood thinners. Aphasia largely resolved upon arrival to our facility. CTA showing moderate high-grade stenosis of R M2, L ICA stenosis, and bilateral vertebral artery stenosis. MRI Brain with acute scattered bilateral infarcts. Etiology likely cardioembolic. In addition, pt has diffuse atherosclerotic disease vs vasculitis.  Though latter is less likely, will order ESR and CRP.    Antiplatelets: ASA 81  Statins: Atorvastatin 80  SBP <220, Hold BP meds to allow for adequate cerebral perfusion  VTE ppx: SQ Heparin, SCDs  PT/OT and speech to evaluate and treat  Neuro checks q4h        Cytotoxic cerebral edema    2/2 stroke  Evident on imaging        CKD (chronic kidney disease) stage 3, GFR 30-59 ml/min    BUN 32, Cr 2.1 - Cr 1.8 when d/c'd from [facility]  IVFs         Internal carotid artery stenosis, left    60-70% Left proximal ICA stenosis seen on CTA  Due to bilateral nature of acute strokes, do not believe carotid to be the cause  Continue home regimen of DAPT and high-dose statin for medical management        Mixed hyperlipidemia    Stroke risk factor  LDL 82  Continue home Atorvastatin 80        Type 2 diabetes mellitus with complication, without long-term current use of insulin     Stroke risk factor  Glu 141, A1c 6.2%  On Metformin at home, Currently held  SSI        Essential hypertension    Stroke risk factor  140/64 on arrival  Currently holding home meds to allow for adequate cerebral perfusion            Thrombolysis Candidate? No  1. Contraindications: Pt back to baseline    Interventional Revascularization Candidate?  No; No large vessel occlusion      Subjective:     History of Present Illness:  Mr. Magana is a 78 yo man with PMHx HTN, hypertrophic cardiomyopathy, CKD, DM, Prostate CA, prior stroke in July, with recent admission to [facility] (D/c date 10/16/17) for acute stroke. Last known normal was a 0630. Pt's daughter states this morning she came into his room to go to therapy and pt had dressed himself improperly, was not acting like himself so EMS was called. Pt was admitted 10/11/17 to [facility] after presenting with 2 non-traumatic falls with +LOC. At that time, he was found to have scattered small acute ischemic infarcts and moderate L ICA stenosis. Also previously admitted to [facility] in July 2017 for hypertensive crisis/emergency and was diagnosed with a stroke. MRI at that time showed multiple acute infarcts suggestive of embolic etiology in R Cerebellum, R Sahni radiata, and L Frontal lobe. CTA showed 80% LICA stenosis and bilateral vertebral disease with multiple intracranial stenoses. TTE with LVH and LAE, but no AFib at that time. Pt was discharged on maximal medical therapy- ASA 81, Plavix 75, and Atorvastatin 80. Pt previously had holter monitor which was unremarkable and was schedule for outpatient loop recorder, however did not recieve prior to presenting today. Family denies use of blood thinners.         Past Medical History:   Diagnosis Date    Cancer     prostate    Diabetes mellitus     Hypertension     Hypertrophic cardiomyopathy     Renal disorder      Past Surgical History:   Procedure Laterality Date    BACK SURGERY      THYROIDECTOMY        No family history on file.  Social History   Substance Use Topics    Smoking status: Former Smoker     Types: Cigarettes    Smokeless tobacco: Never Used    Alcohol use Yes     Review of patient's allergies indicates:   Allergen Reactions    Hydralazine analogues Diarrhea     Medications: I have reviewed the current medication administration record.      (Not in a hospital admission)    Review of Systems   Constitutional: Negative for chills and fever.   HENT: Negative for facial swelling and sneezing.    Eyes: Negative for discharge and redness.   Respiratory: Negative for cough and stridor.    Cardiovascular: Negative for leg swelling.   Gastrointestinal: Negative for diarrhea, nausea and vomiting.   Genitourinary: Negative for dysuria and frequency.   Musculoskeletal: Negative for back pain and myalgias.   Skin: Negative for pallor and rash.   Neurological: Positive for facial asymmetry. Negative for dizziness, weakness and headaches.   Psychiatric/Behavioral: Negative for agitation, behavioral problems and confusion.     Objective:     Vital Signs (Most Recent):  Temp: 97.6 °F (36.4 °C) (10/18/17 0923)  Pulse: 79 (10/18/17 1100)  Resp: 16 (10/18/17 1100)  BP: (!) 143/76 (10/18/17 1100)  SpO2: 100 % (10/18/17 1100)    Vital Signs Range (Last 24H):  Temp:  [97.6 °F (36.4 °C)]   Pulse:  [74-88]   Resp:  [16]   BP: (119-143)/(57-76)   SpO2:  [97 %-100 %]     Physical Exam   Constitutional: He is oriented to person, place, and time. He appears well-developed and well-nourished. No distress.   HENT:   Head: Normocephalic and atraumatic.   Eyes: Conjunctivae and EOM are normal.   Neck: No tracheal deviation present.   Pulmonary/Chest: Effort normal. No stridor. No respiratory distress.   Musculoskeletal: Normal range of motion. He exhibits no edema or tenderness.   Neurological: He is alert and oriented to person, place, and time. No sensory deficit.   Skin: Skin is warm and dry. No rash noted.   Psychiatric:  He has a normal mood and affect. His behavior is normal. Judgment and thought content normal.       Neurological Exam:   LOC: alert and intermittently follows requests  Language: No aphasia, Poor reading at baseline  Speech: No dysarthria  Orientation: Person, Place, Time  Memory: Recent memory intact, Remote memory intact, Age correct, Month correct  Visual Fields (CN II): Full  EOM (CN III, IV, VI): Full/intact  Facial Sensation (CN V): Symmetric  Facial Movement (CN VII): L facial droop  Tongue (CN XII): to midline  Motor*: Arm Left:  Normal (5/5), Leg Left:   Normal (5/5), Arm Right:   Normal (5/5), Leg Right:   Normal (5/5)  Cerebellar*: Finger/Nose Abnormal 2/2 inattention  Sensation: intact to light touch  Tone: Arm-Left: normal; Leg-Left: normal; Arm-Right: normal; Leg-Right: normal    NIH Stroke Scale:    Level of Consciousness: 0 - alert  LOC Questions: 0 - answers both correctly  LOC Commands: 0 - performs both correctly  Best Gaze: 0 - normal  Visual: 0 - no visual loss  Facial Palsy: 2 - partial (Baseline)  Motor Left Arm: 0 - no drift  Motor Right Arm: 0 - no drift  Motor Left Le - no drift  Motor Right Le - no drift  Limb Ataxia: 0 - absent  Sensory: 0 - normal  Best Language: 0 - no aphasia (Poor reading at baseline)  Dysarthria: 0 - normal articulation  Extinction and Inattention: 0 - no neglect  NIH Stroke Scale Total: 2  Modified [place] Scale:   Timeline: Prior to symptoms onset  Modified [place] Score: 3 - moderate disability      Laboratory:  CMP:   Recent Labs  Lab 10/18/17  0956   CALCIUM 10.6*   ALBUMIN 3.2*   PROT 7.4      K 4.2   CO2 18*      BUN 32*   CREATININE 2.1*   ALKPHOS 83   *   AST 69*   BILITOT 1.1*     CBC:   Recent Labs  Lab 10/18/17  0956   WBC 7.10   RBC 4.80   HGB 14.1   HCT 42.5      MCV 89   MCH 29.4   MCHC 33.2     Lipid Panel:   Recent Labs  Lab 10/18/17  0956   CHOL 149   LDLCALC 82.0   HDL 44   TRIG 115     Coagulation:   Recent  Labs  Lab 10/18/17  0956   INR 1.0     Platelet Aggregation Study: No results for input(s): PLTAGG, PLTAGINTERP, PLTAGREGLACO, ADPPLTAGGREG in the last 168 hours.     Hgb A1C:   Recent Labs  Lab 10/11/17  2344   HGBA1C 6.2*     TSH:   Recent Labs  Lab 10/18/17  0956   TSH 3.674       Diagnostic Results:    MRI Brain 10/18/17  Evolving subcentimeter recent infarcts within the cerebral hemispheres with superimposed new areas of similar size infarction within the cerebral hemispheres bilaterally with predominance of foci in the periphery of the cerebral hemispheres concerning for embolic phenomenon.  There is 2 new right cerebellar foci which are small in size and an additional new subcentimeter right posterior temporal focus  Scattered areas of subcentimeter susceptibility within the cerebral hemispheres concerning for remote microhemorrhages more apparent on today's study likely related to differences in scanner sensitivity.  Superimposed generalized emboli loss with T2 flair signal abnormality concerning for chronic ischemic change.    CT Head 10/18/17  No significant change from prior.  No new abnormal parenchymal attenuation to correspond with small areas of recent infarction seen on the recent MRI.  Continued Age-appropriate generalized cerebral volume loss with moderate degree of patchy decreased attenuation supratentorial white matter suggestive for chronic microvascular ischemic change and prior infarcts unchanged.  No evidence for acute intracranial hemorrhage . Clinical correlation and further evaluation as warranted.    CTA MP 10/18/17  CTA head: Stable focal moderate high-grade stenosis right posterior M2 segmental branch of the MCA with good collateral flow.  CTA neck: Atherosclerotic disease carotid bifurcations and proximal ICAs with approximately 60-70% left proximal ICA stenosis by NASCET criteria. Please note there's probable small component of ulcerated plaque also unchanged.  Atherosclerotic  disease with high grade stenosis in the right vertebral artery origin with high-grade stenosis or short segment occlusion in the V1 and V2 segments on the right.  In addition there is moderate high-grade stenosis left vertebral artery origin with additional probable high-grade stenosis in the proximal V2 segment.  Please note incidentally there is opacification and distal dependent trachea which may represent aspiration debris. Clinical correlation advised      MRI Brain 10/12/17, [facility]  There are small, acute ischemic infarct and scattered vascular territory suggesting embolic phenomenon. MRA images of the neck just moderate stenosis at the origin of the left internal carotid artery consistent with was seen on prior ultrasound.    MRI Brain 7/17/17, [facility]  1.  Multifocal areas of acute infarction involving the right cerebellar hemisphere, right corona radiata, and two small punctate foci within the left frontal lobe.  Involvement of multiple vascular distributions is suggested for embolic etiology.   2.  Right frontal lobe remote infarction with additional extensive chronic microvascular ischemic disease.  3.  Multifocal areas of intracranial stenosis as above including area of high-grade stenosis versus occlusion involving the right distal vertebral artery V4 segment.      Joanna Silveira PA-C  Comprehensive Stroke Center  Department of Vascular Neurology   [facility]

## 2017-10-18 NOTE — PLAN OF CARE
Problem: SLP Goal  Goal: SLP Goal  Short Term Goals:   Goals expected to be met by 10/25  1. Pt will participate in a speech, language, and cognitive evaluation with possible updated goals to follow pending results.    Outcome: Ongoing (interventions implemented as appropriate)  BSS completed. Pt presented with no overt s/s of aspiration or dysphagia. Recommend: Regular diet, thin liquids, universal aspiration precautions. ST will continue to follow to complete a Nwjelu-Bvvgovsi-Qqhlzhwop Evaluation.  CAMMIE Richardson., CCC-SLP  Pager: [phone]  10/18/2017

## 2017-10-18 NOTE — PROGRESS NOTES
MRI completed / pt moved back to ED bed and placed to Tele monitor / AND and no acute change noted / wife in WR and returned back to ED room #5 with nurse / ED nurse informed

## 2017-10-18 NOTE — HPI
The patient is a 77 y.o. male with hx of: HTN, hypertrophic cardiomyopathy CKD, Dm, and prostate cancer, was recently admitted to [facility] October 11,2017 with signs of an embolic stroke. He presents here with stoke-like symptoms reported at 630AM consisting of left arm and leg weakness, left sided gaze, slurred speech, not following commands. EMS noted nml blood glucose, nml BP, and slow resolution of these symptoms. On my interview of the patient, he denies all of the above. Specifically denies lateralizing neurological symptoms, slurred speech, or sx consistent with amaurosis fugax.     Pt states he is compliant with meds, however according to record, he was recently seen in OSH with hypertensive emergency and stroke like symptoms.     He also specifically denies drop attacks, leg weakness, gait disturbances however accordnig to record, he was recently seen for fall. He is unable to tell me whether that episode was a mechanical fall or drop attack.     CTA was obtained, shows severe narrowing of left ICA. MRI does show multiple areas of acute Vascular surgery was consulted to evaluate and treat.

## 2017-10-18 NOTE — PLAN OF CARE
Problem: Occupational Therapy Goal  Goal: Occupational Therapy Goal  Goals to be met by: 10/23/2017     Patient will increase functional independence with ADLs by performing:    UE Dressing with [place].  LE Dressing with Supervision.  Grooming while standing with Supervision.  Toileting from toilet with Minimal Assistance for hygiene and clothing management.   Toilet transfer to toilet with Stand-by Assistance.  Pt will indep follow commands w/ 100% accuracy for 2/3 trials.  Pt will indep imitate motor patterns for 2/3 trials.       Initiate OT POC    Comments: Sean Castillo OTR/L  10/18/2017

## 2017-10-18 NOTE — ED NOTES
Pt escort here to take patient to MRI. Pt Ok to be transported with portable telemetry per Stroke service. MRI called and monitoring RN aware.

## 2017-10-18 NOTE — SUBJECTIVE & OBJECTIVE
Past Medical History:   Diagnosis Date    Cancer     prostate    Diabetes mellitus     Hypertension     Hypertrophic cardiomyopathy     Renal disorder      Past Surgical History:   Procedure Laterality Date    BACK SURGERY      THYROIDECTOMY       No family history on file.  Social History   Substance Use Topics    Smoking status: Former Smoker     Types: Cigarettes    Smokeless tobacco: Never Used    Alcohol use Yes     Review of patient's allergies indicates:   Allergen Reactions    Hydralazine analogues Diarrhea     Medications: I have reviewed the current medication administration record.      (Not in a hospital admission)    Review of Systems   Constitutional: Negative for chills and fever.   HENT: Negative for facial swelling and sneezing.    Eyes: Negative for discharge and redness.   Respiratory: Negative for cough and stridor.    Cardiovascular: Negative for leg swelling.   Gastrointestinal: Negative for diarrhea, nausea and vomiting.   Genitourinary: Negative for dysuria and frequency.   Musculoskeletal: Negative for back pain and myalgias.   Skin: Negative for pallor and rash.   Neurological: Positive for facial asymmetry. Negative for dizziness, weakness and headaches.   Psychiatric/Behavioral: Negative for agitation, behavioral problems and confusion.     Objective:     Vital Signs (Most Recent):  Temp: 97.6 °F (36.4 °C) (10/18/17 0923)  Pulse: 79 (10/18/17 1100)  Resp: 16 (10/18/17 1100)  BP: (!) 143/76 (10/18/17 1100)  SpO2: 100 % (10/18/17 1100)    Vital Signs Range (Last 24H):  Temp:  [97.6 °F (36.4 °C)]   Pulse:  [74-88]   Resp:  [16]   BP: (119-143)/(57-76)   SpO2:  [97 %-100 %]     Physical Exam   Constitutional: He is oriented to person, place, and time. He appears well-developed and well-nourished. No distress.   HENT:   Head: Normocephalic and atraumatic.   Eyes: Conjunctivae and EOM are normal.   Neck: No tracheal deviation present.   Pulmonary/Chest: Effort normal. No  stridor. No respiratory distress.   Musculoskeletal: Normal range of motion. He exhibits no edema or tenderness.   Neurological: He is alert and oriented to person, place, and time. No sensory deficit.   Skin: Skin is warm and dry. No rash noted.   Psychiatric: He has a normal mood and affect. His behavior is normal. Judgment and thought content normal.       Neurological Exam:   LOC: alert and intermittently follows requests  Language: No aphasia, Poor reading at baseline  Speech: No dysarthria  Orientation: Person, Place, Time  Memory: Recent memory intact, Remote memory intact, Age correct, Month correct  Visual Fields (CN II): Full  EOM (CN III, IV, VI): Full/intact  Facial Sensation (CN V): Symmetric  Facial Movement (CN VII): L facial droop  Tongue (CN XII): to midline  Motor*: Arm Left:  Normal (5/5), Leg Left:   Normal (5/5), Arm Right:   Normal (5/5), Leg Right:   Normal (5/5)  Cerebellar*: Finger/Nose Abnormal 2/2 inattention  Sensation: intact to light touch  Tone: Arm-Left: normal; Leg-Left: normal; Arm-Right: normal; Leg-Right: normal    NIH Stroke Scale:    Level of Consciousness: 0 - alert  LOC Questions: 0 - answers both correctly  LOC Commands: 0 - performs both correctly  Best Gaze: 0 - normal  Visual: 0 - no visual loss  Facial Palsy: 2 - partial (Baseline)  Motor Left Arm: 0 - no drift  Motor Right Arm: 0 - no drift  Motor Left Le - no drift  Motor Right Le - no drift  Limb Ataxia: 0 - absent  Sensory: 0 - normal  Best Language: 0 - no aphasia (Poor reading at baseline)  Dysarthria: 0 - normal articulation  Extinction and Inattention: 0 - no neglect  NIH Stroke Scale Total: 2  Modified [place]ch Scale:   Timeline: Prior to symptoms onset  Modified [place]ch Score: 3 - moderate disability      Laboratory:  CMP:   Recent Labs  Lab 10/18/17  0956   CALCIUM 10.6*   ALBUMIN 3.2*   PROT 7.4      K 4.2   CO2 18*      BUN 32*   CREATININE 2.1*   ALKPHOS 83   *   AST 69*   BILITOT  1.1*     CBC:   Recent Labs  Lab 10/18/17  0956   WBC 7.10   RBC 4.80   HGB 14.1   HCT 42.5      MCV 89   MCH 29.4   MCHC 33.2     Lipid Panel:   Recent Labs  Lab 10/18/17  0956   CHOL 149   LDLCALC 82.0   HDL 44   TRIG 115     Coagulation:   Recent Labs  Lab 10/18/17  0956   INR 1.0     Platelet Aggregation Study: No results for input(s): PLTAGG, PLTAGINTERP, PLTAGREGLACO, ADPPLTAGGREG in the last 168 hours.     Hgb A1C:   Recent Labs  Lab 10/11/17  2344   HGBA1C 6.2*     TSH:   Recent Labs  Lab 10/18/17  0956   TSH 3.674       Diagnostic Results:    MRI Brain 10/18/17  Evolving subcentimeter recent infarcts within the cerebral hemispheres with superimposed new areas of similar size infarction within the cerebral hemispheres bilaterally with predominance of foci in the periphery of the cerebral hemispheres concerning for embolic phenomenon.  There is 2 new right cerebellar foci which are small in size and an additional new subcentimeter right posterior temporal focus  Scattered areas of subcentimeter susceptibility within the cerebral hemispheres concerning for remote microhemorrhages more apparent on today's study likely related to differences in scanner sensitivity.  Superimposed generalized emboli loss with T2 flair signal abnormality concerning for chronic ischemic change.    CT Head 10/18/17  No significant change from prior.  No new abnormal parenchymal attenuation to correspond with small areas of recent infarction seen on the recent MRI.  Continued Age-appropriate generalized cerebral volume loss with moderate degree of patchy decreased attenuation supratentorial white matter suggestive for chronic microvascular ischemic change and prior infarcts unchanged.  No evidence for acute intracranial hemorrhage . Clinical correlation and further evaluation as warranted.    CTA MP 10/18/17  CTA head: Stable focal moderate high-grade stenosis right posterior M2 segmental branch of the MCA with good collateral  flow.  CTA neck: Atherosclerotic disease carotid bifurcations and proximal ICAs with approximately 60-70% left proximal ICA stenosis by NASCET criteria. Please note there's probable small component of ulcerated plaque also unchanged.  Atherosclerotic disease with high grade stenosis in the right vertebral artery origin with high-grade stenosis or short segment occlusion in the V1 and V2 segments on the right.  In addition there is moderate high-grade stenosis left vertebral artery origin with additional probable high-grade stenosis in the proximal V2 segment.  Please note incidentally there is opacification and distal dependent trachea which may represent aspiration debris. Clinical correlation advised      MRI Brain 10/12/17, [facility]  There are small, acute ischemic infarct and scattered vascular territory suggesting embolic phenomenon. MRA images of the neck just moderate stenosis at the origin of the left internal carotid artery consistent with was seen on prior ultrasound.    MRI Brain 7/17/17, [facility]  1.  Multifocal areas of acute infarction involving the right cerebellar hemisphere, right corona radiata, and two small punctate foci within the left frontal lobe.  Involvement of multiple vascular distributions is suggested for embolic etiology.   2.  Right frontal lobe remote infarction with additional extensive chronic microvascular ischemic disease.  3.  Multifocal areas of intracranial stenosis as above including area of high-grade stenosis versus occlusion involving the right distal vertebral artery V4 segment.

## 2017-10-19 PROBLEM — I51.7 ENLARGED LA (LEFT ATRIUM): Status: ACTIVE | Noted: 2017-10-19

## 2017-10-19 PROBLEM — I50.32 CHRONIC DIASTOLIC CONGESTIVE HEART FAILURE: Status: ACTIVE | Noted: 2017-10-19

## 2017-10-19 LAB
ALBUMIN SERPL BCP-MCNC: 2.9 G/DL
ALP SERPL-CCNC: 80 U/L
ALT SERPL W/O P-5'-P-CCNC: 91 U/L
ANION GAP SERPL CALC-SCNC: 8 MMOL/L
APTT BLDCRRT: 25.7 SEC
AST SERPL-CCNC: 57 U/L
BASOPHILS # BLD AUTO: 0.03 K/UL
BASOPHILS NFR BLD: 0.6 %
BILIRUB SERPL-MCNC: 0.8 MG/DL
BUN SERPL-MCNC: 33 MG/DL
CALCIUM SERPL-MCNC: 9.9 MG/DL
CHLORIDE SERPL-SCNC: 106 MMOL/L
CK MB SERPL-MCNC: 1 NG/ML
CK MB SERPL-RTO: 1.3 %
CK SERPL-CCNC: 76 U/L
CO2 SERPL-SCNC: 25 MMOL/L
CREAT SERPL-MCNC: 1.8 MG/DL
DIFFERENTIAL METHOD: ABNORMAL
EOSINOPHIL # BLD AUTO: 0.2 K/UL
EOSINOPHIL NFR BLD: 3.5 %
ERYTHROCYTE [DISTWIDTH] IN BLOOD BY AUTOMATED COUNT: 15 %
EST. GFR  (AFRICAN AMERICAN): 41.1 ML/MIN/1.73 M^2
EST. GFR  (NON AFRICAN AMERICAN): 35.5 ML/MIN/1.73 M^2
GLUCOSE SERPL-MCNC: 110 MG/DL
HCT VFR BLD AUTO: 38.3 %
HGB BLD-MCNC: 13 G/DL
IMM GRANULOCYTES # BLD AUTO: 0.01 K/UL
IMM GRANULOCYTES NFR BLD AUTO: 0.2 %
INR PPP: 1
LYMPHOCYTES # BLD AUTO: 1.2 K/UL
LYMPHOCYTES NFR BLD: 23.4 %
MAGNESIUM SERPL-MCNC: 1.4 MG/DL
MCH RBC QN AUTO: 30 PG
MCHC RBC AUTO-ENTMCNC: 33.9 G/DL
MCV RBC AUTO: 88 FL
MONOCYTES # BLD AUTO: 0.6 K/UL
MONOCYTES NFR BLD: 11.4 %
NEUTROPHILS # BLD AUTO: 3.1 K/UL
NEUTROPHILS NFR BLD: 60.9 %
NRBC BLD-RTO: 0 /100 WBC
PHOSPHATE SERPL-MCNC: 3 MG/DL
PLATELET # BLD AUTO: 210 K/UL
PMV BLD AUTO: 11.5 FL
POCT GLUCOSE: 121 MG/DL (ref 70–110)
POTASSIUM SERPL-SCNC: 4.5 MMOL/L
PROT SERPL-MCNC: 6.5 G/DL
PROTHROMBIN TIME: 10.7 SEC
RBC # BLD AUTO: 4.34 M/UL
SODIUM SERPL-SCNC: 139 MMOL/L
TROPONIN I SERPL DL<=0.01 NG/ML-MCNC: <0.006 NG/ML
WBC # BLD AUTO: 5.09 K/UL

## 2017-10-19 PROCEDURE — 63600175 PHARM REV CODE 636 W HCPCS: Performed by: PHYSICIAN ASSISTANT

## 2017-10-19 PROCEDURE — 97161 PT EVAL LOW COMPLEX 20 MIN: CPT

## 2017-10-19 PROCEDURE — 36415 COLL VENOUS BLD VENIPUNCTURE: CPT

## 2017-10-19 PROCEDURE — 25000003 PHARM REV CODE 250: Performed by: PSYCHIATRY & NEUROLOGY

## 2017-10-19 PROCEDURE — 97535 SELF CARE MNGMENT TRAINING: CPT

## 2017-10-19 PROCEDURE — 80053 COMPREHEN METABOLIC PANEL: CPT

## 2017-10-19 PROCEDURE — 99222 1ST HOSP IP/OBS MODERATE 55: CPT | Mod: GC,,, | Performed by: SURGERY

## 2017-10-19 PROCEDURE — 92523 SPEECH SOUND LANG COMPREHEN: CPT

## 2017-10-19 PROCEDURE — 85025 COMPLETE CBC W/AUTO DIFF WBC: CPT

## 2017-10-19 PROCEDURE — 85610 PROTHROMBIN TIME: CPT

## 2017-10-19 PROCEDURE — 97530 THERAPEUTIC ACTIVITIES: CPT

## 2017-10-19 PROCEDURE — A4216 STERILE WATER/SALINE, 10 ML: HCPCS | Performed by: PHYSICIAN ASSISTANT

## 2017-10-19 PROCEDURE — 25000003 PHARM REV CODE 250: Performed by: PHYSICIAN ASSISTANT

## 2017-10-19 PROCEDURE — 99222 1ST HOSP IP/OBS MODERATE 55: CPT | Mod: ,,, | Performed by: NURSE PRACTITIONER

## 2017-10-19 PROCEDURE — 63600175 PHARM REV CODE 636 W HCPCS: Performed by: SURGERY

## 2017-10-19 PROCEDURE — 84484 ASSAY OF TROPONIN QUANT: CPT

## 2017-10-19 PROCEDURE — 84100 ASSAY OF PHOSPHORUS: CPT

## 2017-10-19 PROCEDURE — 82553 CREATINE MB FRACTION: CPT

## 2017-10-19 PROCEDURE — 83735 ASSAY OF MAGNESIUM: CPT

## 2017-10-19 PROCEDURE — 25000003 PHARM REV CODE 250: Performed by: NURSE PRACTITIONER

## 2017-10-19 PROCEDURE — 25000242 PHARM REV CODE 250 ALT 637 W/ HCPCS: Performed by: PSYCHIATRY & NEUROLOGY

## 2017-10-19 PROCEDURE — 20600001 HC STEP DOWN PRIVATE ROOM

## 2017-10-19 PROCEDURE — 85730 THROMBOPLASTIN TIME PARTIAL: CPT

## 2017-10-19 PROCEDURE — 97532 *HC OT COG SKL DEV EA 15: CPT

## 2017-10-19 RX ORDER — ENOXAPARIN SODIUM 100 MG/ML
1 INJECTION SUBCUTANEOUS EVERY 12 HOURS
Status: DISCONTINUED | OUTPATIENT
Start: 2017-10-19 | End: 2017-10-20

## 2017-10-19 RX ORDER — AMOXICILLIN 250 MG
1 CAPSULE ORAL DAILY PRN
Status: DISCONTINUED | OUTPATIENT
Start: 2017-10-19 | End: 2017-10-21 | Stop reason: HOSPADM

## 2017-10-19 RX ORDER — POLYETHYLENE GLYCOL 3350 17 G/17G
17 POWDER, FOR SOLUTION ORAL DAILY
Status: DISCONTINUED | OUTPATIENT
Start: 2017-10-19 | End: 2017-10-21 | Stop reason: HOSPADM

## 2017-10-19 RX ORDER — METOPROLOL TARTRATE 25 MG/1
12.5 TABLET ORAL EVERY 6 HOURS
Status: DISCONTINUED | OUTPATIENT
Start: 2017-10-19 | End: 2017-10-21 | Stop reason: HOSPADM

## 2017-10-19 RX ORDER — QUETIAPINE FUMARATE 25 MG/1
25 TABLET, FILM COATED ORAL NIGHTLY
Status: DISCONTINUED | OUTPATIENT
Start: 2017-10-19 | End: 2017-10-21 | Stop reason: HOSPADM

## 2017-10-19 RX ORDER — ACETYLCYSTEINE 200 MG/ML
600 SOLUTION ORAL; RESPIRATORY (INHALATION) EVERY 12 HOURS
Status: DISPENSED | OUTPATIENT
Start: 2017-10-19 | End: 2017-10-20

## 2017-10-19 RX ORDER — ACETYLCYSTEINE 200 MG/ML
600 SOLUTION ORAL; RESPIRATORY (INHALATION) EVERY 12 HOURS
Status: DISCONTINUED | OUTPATIENT
Start: 2017-10-19 | End: 2017-10-19

## 2017-10-19 RX ADMIN — ATORVASTATIN CALCIUM 80 MG: 20 TABLET, FILM COATED ORAL at 08:10

## 2017-10-19 RX ADMIN — ASPIRIN 81 MG: 81 TABLET, COATED ORAL at 08:10

## 2017-10-19 RX ADMIN — APIXABAN 2.5 MG: 2.5 TABLET, FILM COATED ORAL at 01:10

## 2017-10-19 RX ADMIN — Medication 3 ML: at 08:10

## 2017-10-19 RX ADMIN — ACETYLCYSTEINE 600 MG: 200 INHALANT RESPIRATORY (INHALATION) at 08:10

## 2017-10-19 RX ADMIN — METOPROLOL TARTRATE 12.5 MG: 25 TABLET, FILM COATED ORAL at 05:10

## 2017-10-19 RX ADMIN — ENOXAPARIN SODIUM 100 MG: 100 INJECTION SUBCUTANEOUS at 11:10

## 2017-10-19 RX ADMIN — APIXABAN 2.5 MG: 2.5 TABLET, FILM COATED ORAL at 08:10

## 2017-10-19 RX ADMIN — QUETIAPINE FUMARATE 25 MG: 25 TABLET, FILM COATED ORAL at 10:10

## 2017-10-19 RX ADMIN — HEPARIN SODIUM 5000 UNITS: 5000 INJECTION, SOLUTION INTRAVENOUS; SUBCUTANEOUS at 06:10

## 2017-10-19 RX ADMIN — Medication 3 ML: at 01:10

## 2017-10-19 RX ADMIN — METOPROLOL TARTRATE 12.5 MG: 25 TABLET, FILM COATED ORAL at 12:10

## 2017-10-19 RX ADMIN — CLOPIDOGREL 75 MG: 75 TABLET, FILM COATED ORAL at 08:10

## 2017-10-19 RX ADMIN — TAMSULOSIN HYDROCHLORIDE 0.4 MG: 0.4 CAPSULE ORAL at 08:10

## 2017-10-19 NOTE — ASSESSMENT & PLAN NOTE
Left ICA stenosis - >90% stenosis based on CTA findings and this may be contributing to his TIA/CVA. However, MRI shows multiple small embolic infarcts on bilateral hemispheres? This warrants investigation of possible cardiac source.     - Recommend Echo with bubble study  - ASA/Plavix/Statin  - Better BP control and DM control   - Patient will need L CEA for severe L ICA stenosis. Lesion does extend upto level of C2. Will tentatively schedule him for CEA 10/25/'17.    Thank you for this consult, please call with any questions or concerns

## 2017-10-19 NOTE — PT/OT/SLP EVAL
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 Multiple strokes  2. Stroke in July   3. Stroke 10/16/2017  4. Stroke 10/18/2017   5. Hypertrophic cardiomyopathy     Clinical Presentation: stable and/or uncomplicated  Level of Complexity:   Low Complexity  · No personal factors or comorbidities that impact the plan of care  · Examination addressing 1-2 body structures and functions, activity limitations, and/or participation restrictions  · Clinical presentation with stable or uncomplicated characteristics    Past Medical History:   Diagnosis Date    Cancer     prostate    Diabetes mellitus     Hypertension     Hypertrophic cardiomyopathy     Renal disorder       Past Surgical History:   Procedure Laterality Date    BACK SURGERY      THYROIDECTOMY         Referring physician: Joanna Silveira PA-C  Date referred to PT: 10/18/2017    General Precautions: Standard, aspiration, fall    Patient History:  Living Environment Comment: Patient lives with his dtr in a 1 story home with 5 steps and bilateral rails to enter.  Dtr states family will provide 24 hr supervision at d/c.  She was no longer letting him cook since stroke in July.    Equipment Currently Used at Home:  (bath bench)    Subjective:  Communicated with RN prior to session.  "I thought we left the hospital yesterday."  Chief Complaint: "not understanding what is going on."  Patient goals: return home    Pain/Comfort  Pain Rating 1: 0/10  Pain Rating " "Post-Intervention 1: 0/10      Objective:   Patient found with: telemetry, peripheral IV   Patient found supine in bed with dtr at bedside.  He agreed to therapy.      EXAMINATION    Cognitive Function:  Oriented to: Person, Place and Time, stated age as "37" instead of 77  Level of Alertness: awake and alert  Follows Commands/attention: Follows one-step commands  Communication: delayed  Safety awareness/insight to disability: impaired    Musculoskeletal System  Lower Extremities:  Range of Motion:  Right Lower Extremity: WFL  Left Lower Extremity: WFL    Strength:  Right Lower Extremity: grossly 5/5 in all major muscle groups  Left Lower Extremity: grossly 5/5 in all major muscle groups    Muscular Tone: grossly normal      Integumentary System:  Skin integrity: Visible skin intact    Cardiopulmonary System:  Edema: None noted     Neuromuscular System:  Sensation:   Light Touch (LT): intact   Deep Pressure (DP): intact   Proprioception: NT    Coordination: grossly intact, cognitive deficits during testing     Balance:   Static Sit: GOOD: Takes MODERATE challenges from all directions; good midline orientation   Dynamic Sit: GOOD: Maintains balance through MODERATE excursions of active trunk movement;   Static Stand: GOOD: Takes MODERATE challenges from all directions; withstands moderate perturbations  Dynamic stand: GOOD: Needs SUPERVISION only during gait and able to self right with moderate ;     Posture and gross symmetry: No postural abnormalities identified     FUNCTIONAL MOBILITY ASSESSMENT:  Bed Mobility:  Rolling/Turning R: independent   Rolling/Turning L: independent   Supine to sit: independent   Sit to supine: independent    Scooting EOB: independent      Transfers:  Sit to stand transfer: independent    Bed to chair transfer: supervision     Gait:   Patient ambulated 150 feet with supervision, no LOB, no significant safety concerns, no significant balance deficits.      THERAPEUTIC ACTIVITIES AND " EXERCISES:  Therapist educated patient on the following:  · Role of PT, POC  · Stroke education  · Need for supervision 2* cognitive deficits      AM-PAC 6 CLICK MOBILITY  How much help from another person does this patient currently need?   1 = Unable, Total/Dependent Assistance  2 = A lot, Maximum/Moderate Assistance  3 = A little, Minimum/Contact Guard/Supervision  4 = None, Modified [place]/Independent    Turning over in bed (including adjusting bedclothes, sheets and blankets)?: 4  Sitting down on and standing up from a chair with arms (e.g., wheelchair, bedside commode, etc.): 4  Moving from lying on back to sitting on the side of the bed?: 4  Moving to and from a bed to a chair (including a wheelchair)?: 4  Need to walk in hospital room?: 4  Climbing 3-5 steps with a railing?: 4  Total Score: 24     AM-PAC Raw Score CMS G-Code Modifier Level of Impairment Assistance   6 % Total / Unable   7 - 9 CM 80 - 100% Maximal Assist   10 - 14 CL 60 - 80% Moderate Assist   15 - 19 CK 40 - 60% Moderate Assist   20 - 22 CJ 20 - 40% Minimal Assist   23 CI 1-20% SBA / CGA   24 CH 0% Independent/ Mod I     Patient left supine with all lines intact, call button in reach, bed alarm on and daughter present.    Assessment:   Shant Magana is a 77 y.o. male with a medical diagnosis of Acute ischemic multifocal multiple vascular territories stroke and presents with no significant functional mobility deficits at this time.  He demonstrated good balance during sitting, standing and gait.  Patient will require 24 hour superversion at d/c which dtr stated she understood and family able to provide.  Dtr verbalized s/s stroke via teachback method.  Patient is safe to d/c home with supervision and no additional PT needs.     Rehab identified problem list/impairments: Rehab identified problem list/impairments: impaired cognition    Rehab potential is excellent.    Activity tolerance: Excellent    Discharge recommendations:  Discharge Facility/Level Of Care Needs:  (home with 24 hr supervision )     Barriers to discharge: Barriers to Discharge: None    Equipment recommendations: Equipment Needed After Discharge: none     PLAN:    D/c from skilled PT services.   Plan of Care reviewed with: patient, daughter        Gina ZAYAS Amarilis, PT  10/19/2017

## 2017-10-19 NOTE — PLAN OF CARE
Problem: SLP Goal  Goal: SLP Goal  Short Term Goals:   Goals expected to be met by 10/25  1. Pt will participate in a speech, language, and cognitive evaluation with possible updated goals to follow pending results. -met     Goals expected to be met by 10/26:   1. Pt will orient to time and place given mod cues.   2. Pt will solve simple problem solving questions with 80% acc given mod cues.   3. Pt will complete simple categorization tasks with 70% acc given mod cues.   4. Pt will name pictured objects with 70% acc given mod cues.   5. Pt will describe pictured actions with 50% acc given max cues.   6. Pt will participate in assessment of reading, writing, and visual spatial skills.     Outcome: Ongoing (interventions implemented as appropriate)  Speech/language/cognitive evaluation completed. Cognitive-linguistic impairment detected. Goals added to address concerns.  ARNOL Iverson  10/19/2017

## 2017-10-19 NOTE — ASSESSMENT & PLAN NOTE
Baseline Cr 1.3-1.8  Continue IVF @100cc/hr X24 hours post contrast; mucomyst 600mg BID X2 doses also ordered for renal protection.

## 2017-10-19 NOTE — PLAN OF CARE
Trent Aldana MD     Extended Emergency Contact Information  Primary Emergency Contact: Candace Hobson   [facility]  Home Phone: [phone]  Relation: Daughter  Secondary Emergency Contact: Hoda Magana   [place]  Mobile Phone: [phone]  Relation: Son       [facility] - [place] - [address]  [address]  [place] [address]  Phone: [phone] Fax: [phone]    [id number] - [place] - [address] AT [address] OF [facility]  [address]  [place] [address]  Phone: [phone] Fax: [phone]    Payor: [employer] MEDICARE / Plan: [employer] 65 / Product Type: Medicare Advantage /         10/19/17 1111   Discharge Assessment   Assessment Type Discharge Planning Assessment   Confirmed/corrected address and phone number on facesheet? Yes   Assessment information obtained from? Patient   Expected Length of Stay (days) 3   Communicated expected length of stay with patient/caregiver yes   Prior to hospitilization cognitive status: Alert/Oriented   Prior to hospitalization functional status: Assistive Equipment   Current cognitive status: Alert/Oriented   Current Functional Status: Needs Assistance;Assistive Equipment   Lives With alone   Able to Return to Prior Arrangements yes   Is patient able to care for self after discharge? Yes   Patient's perception of discharge disposition home or selfcare   Readmission Within The Last 30 Days current reason for admission unrelated to previous admission   If yes, most recent facility name: [facility] [place]   Patient currently being followed by outpatient case management? No   Patient currently receives any other outside agency services? No   Equipment Currently Used at Home bath bench   Do you have any problems affording any of your prescribed medications? No   Is the patient taking medications as prescribed? yes   Does the  patient have transportation home? Yes   Transportation Available family or friend will provide   Does the patient receive services at the [facility]mad[facility] Clinic? No   Discharge Plan A Home with family   Discharge Plan B Home with family;Home Health   Patient/Family In Agreement With Plan yes   Readmission Questionnaire   At the time of your discharge, did someone talk to you about what your health problems were? Yes   At the time of discharge, did someone talk to you about what to watch out for regarding worsening of your health problem? Yes   At the time of discharge, did someone talk to you about what to do if you experienced worsening of your health problem? Yes   At the time of discharge, did someone talk to you about which medication to take when you left the hospital and which ones to stop taking? Yes   At the time of discharge, did someone talk to you about when and where to follow up with a doctor after you left the hospital? Yes   What do you believe caused you to be sick enough to be re-admitted? stoke   How often do you need to have someone help you when you read instructions, pamphlets, or other written material from your doctor or pharmacy? Sometimes   Do you have problems taking your medications as prescribed? No   Do you have any problems affording any of  your prescribed medications? No   Do you have problems obtaining/receiving your medications? No   Does the patient have transportation to healthcare appointments? Yes   Living Arrangements house   Does the patient have family/friends to help with healtcare needs after discharge? yes   Does your caregiver provide all the help you need? Yes   Are you currently feeling confused? No   Are you currently having problems thinking? No   Are you currently having memory problems? No   Have you felt down, depressed, or hopeless? 0   Have you felt little interest or pleasure in doing things? 0   In the last 7 days, my sleep quality was: fair

## 2017-10-19 NOTE — SUBJECTIVE & OBJECTIVE
Past Medical History:   Diagnosis Date    Cancer     prostate    Diabetes mellitus     Hypertension     Hypertrophic cardiomyopathy     Renal disorder      Past Surgical History:   Procedure Laterality Date    BACK SURGERY      THYROIDECTOMY       Review of patient's allergies indicates:   Allergen Reactions    Hydralazine analogues Diarrhea       Scheduled Medications:    aspirin  81 mg Oral Daily    atorvastatin  80 mg Oral Daily    clopidogrel  75 mg Oral Daily    heparin (porcine)  5,000 Units Subcutaneous Q8H    sodium chloride 0.9%  3 mL Intravenous Q8H    tamsulosin  0.4 mg Oral Daily       PRN Medications: dextrose 50%, dextrose 50%, glucagon (human recombinant), glucose, glucose, insulin aspart, labetalol, sodium chloride 0.9%    Family History     None        Social History Main Topics    Smoking status: Former Smoker     Types: Cigarettes    Smokeless tobacco: Never Used    Alcohol use Yes    Drug use: Unknown    Sexual activity: Not on file     Review of Systems   Constitutional: Negative for chills, fatigue and fever.   HENT: Negative for trouble swallowing and voice change.    Eyes: Negative for photophobia and visual disturbance.   Respiratory: Negative for cough, shortness of breath and wheezing.    Cardiovascular: Negative for chest pain and palpitations.   Gastrointestinal: Negative for abdominal distention and nausea.   Genitourinary: Negative for difficulty urinating and flank pain.   Musculoskeletal: Positive for gait problem. Negative for arthralgias and myalgias.   Skin: Negative for color change and rash.   Neurological: Positive for facial asymmetry. Negative for speech difficulty, weakness, numbness and headaches.   Psychiatric/Behavioral: Positive for confusion. Negative for agitation.     Objective:     Vital Signs (Most Recent):  Temp: 98.6 °F (37 °C) (10/19/17 0723)  Pulse: 69 (10/19/17 0723)  Resp: 18 (10/19/17 0723)  BP: (!) 146/86 (10/19/17 0723)  SpO2: (!) 93 %  (10/19/17 0723)    Vital Signs (24h Range):  Temp:  [97.6 °F (36.4 °C)-99.4 °F (37.4 °C)] 98.6 °F (37 °C)  Pulse:  [66-88] 69  Resp:  [16-18] 18  SpO2:  [92 %-100 %] 93 %  BP: (116-164)/(57-86) 146/86     Body mass index is 29.57 kg/m².    Physical Exam   Constitutional: He appears well-developed and well-nourished.   HENT:   Head: Normocephalic and atraumatic.   Eyes: EOM are normal. Pupils are equal, round, and reactive to light.   Neck: Neck supple.   Cardiovascular: Normal rate and regular rhythm.    Pulmonary/Chest: Effort normal. No respiratory distress.   Abdominal: Soft. There is no tenderness.   Musculoskeletal: Normal range of motion. He exhibits no deformity.   Neurological: No sensory deficit.     -  Mental Status:  AAOx2 (person and place, but incorrect city).  Follows commands.  Answers correct age and .  Recent and remote memory intact.  Recalls 3/3 objects.    -  Speech and language: + mild dysarthria.    -  Facial movement (CN VII): asymmetric   -  Coordination:  Finger to nose exam:  RUE normal, LUE mild dysmetria.   -  Motor:   RUE: 5/5, 5/5 .  LUE: 5/5, 5/5 .  RLE: 5/5, DF 5/5, PF 5/5.  LLE: 4+/5, DF 5/5, PF 5/5  -  pronator drift. negative  -  Tone:  normal  -  Sensory:  Intact to light touch and pin prick.         Skin: Skin is warm and dry.   Psychiatric: He has a normal mood and affect. His behavior is normal. Cognition and memory are impaired.     NEUROLOGICAL EXAMINATION:     CRANIAL NERVES     CN III, IV, VI   Pupils are equal, round, and reactive to light.  Extraocular motions are normal.       Diagnostic Results:   Labs: Reviewed  ECG: Reviewed  X-Ray: Reviewed  CT: Reviewed  MRI: Reviewed

## 2017-10-19 NOTE — CONSULTS
[facility]  Vascular Surgery  Consult Note    Inpatient consult to Vascular Surgery  Consult performed by: ROMEL FAULKNER  Consult ordered by: STUART BRYSON        Subjective:     Chief Complaint/Reason for Admission: aphasia/LUE weakness    History of Present Illness: The patient is a 77 y.o. male with hx of: HTN, hypertrophic cardiomyopathy CKD, Dm, and prostate cancer, was recently admitted to [facility] October 11,2017 with signs of an embolic stroke. He presents here with stoke-like symptoms reported at 630AM consisting of left arm and leg weakness, left sided gaze, slurred speech, not following commands. EMS noted nml blood glucose, nml BP, and slow resolution of these symptoms. On my interview of the patient, he denies all of the above. Specifically denies lateralizing neurological symptoms, slurred speech, or sx consistent with amaurosis fugax.     Pt states he is compliant with meds, however according to record, he was recently seen in OSH with hypertensive emergency and stroke like symptoms.     He also specifically denies drop attacks, leg weakness, gait disturbances however accordnig to record, he was recently seen for fall. He is unable to tell me whether that episode was a mechanical fall or drop attack.     CTA was obtained, shows severe narrowing of left ICA. MRI does show multiple areas of acute Vascular surgery was consulted to evaluate and treat.     Prescriptions Prior to Admission   Medication Sig Dispense Refill Last Dose    amlodipine (NORVASC) 10 MG tablet Take 1 tablet (10 mg total) by mouth once daily. 90 tablet 2 10/17/2017    aspirin (ECOTRIN) 81 MG EC tablet Take 1 tablet (81 mg total) by mouth once daily. 30 tablet 3 10/17/2017    atorvastatin (LIPITOR) 80 MG tablet Take 1 tablet (80 mg total) by mouth once daily. 90 tablet 2 10/17/2017    clopidogrel (PLAVIX) 75 mg tablet Take 1 tablet (75 mg total) by mouth once daily. 90 tablet 2 10/17/2017    hydrALAZINE  (APRESOLINE) 50 MG tablet Take 1 tablet (50 mg total) by mouth every 8 (eight) hours. 90 tablet 11 10/17/2017    hydrochlorothiazide (HYDRODIURIL) 25 MG tablet Take 1 tablet (25 mg total) by mouth once daily. 90 tablet 2 10/17/2017    losartan (COZAAR) 100 MG tablet Take 1 tablet (100 mg total) by mouth once daily. 90 tablet 2 10/17/2017    metformin (GLUCOPHAGE) 500 MG tablet Take 500 mg by mouth 2 (two) times daily with meals.   10/17/2017    tamsulosin (FLOMAX) 0.4 mg Cp24 Take 0.4 mg by mouth once daily.   10/17/2017    vitamin D 1000 units Tab Take 1,000 Units by mouth once daily.   10/17/2017       Review of patient's allergies indicates:   Allergen Reactions    Hydralazine analogues Diarrhea       Past Medical History:   Diagnosis Date    Cancer     prostate    Diabetes mellitus     Hypertension     Hypertrophic cardiomyopathy     Renal disorder      Past Surgical History:   Procedure Laterality Date    BACK SURGERY      THYROIDECTOMY       Family History     None        Social History Main Topics    Smoking status: Former Smoker     Types: Cigarettes    Smokeless tobacco: Never Used    Alcohol use Yes    Drug use: Unknown    Sexual activity: Not on file     Review of Systems   Constitutional: Negative.    HENT: Negative.    Eyes: Negative.    Respiratory: Negative.    Cardiovascular: Negative.    Gastrointestinal: Negative.    Endocrine: Negative.    Genitourinary: Negative.    Musculoskeletal: Negative.    Skin: Negative.    Allergic/Immunologic: Negative.    Neurological: Negative.      Objective:     Vital Signs (Most Recent):  Temp: 98.5 °F (36.9 °C) (10/19/17 0327)  Pulse: 69 (10/19/17 0327)  Resp: 16 (10/18/17 2022)  BP: 138/81 (10/19/17 0327)  SpO2: (!) 94 % (10/19/17 0327) Vital Signs (24h Range):  Temp:  [97.6 °F (36.4 °C)-99.4 °F (37.4 °C)] 98.5 °F (36.9 °C)  Pulse:  [66-88] 69  Resp:  [16-18] 16  SpO2:  [92 %-100 %] 94 %  BP: (116-164)/(57-81) 138/81     Weight: 96.2 kg (212  lb)  Body mass index is 29.57 kg/m².    Physical Exam   Constitutional: He is oriented to person, place, and time. He appears well-developed and well-nourished.   HENT:   Head: Normocephalic and atraumatic.   Eyes: EOM are normal. Pupils are equal, round, and reactive to light.   Neck: Normal range of motion. Neck supple.   Cardiovascular: Normal rate and regular rhythm.    Pulmonary/Chest: Effort normal and breath sounds normal.   Abdominal: Soft. Bowel sounds are normal.   Musculoskeletal: Normal range of motion.   Bilateral 5/5 strength, no motor drift   Neurological: He is alert and oriented to person, place, and time.   Able to tell me where he lives, that he lives alone, and his daughter is available as well as his son should we need to talk to them.    Skin: Skin is warm.   Psychiatric:   Flat affect       Significant Labs:  BMP:   Recent Labs  Lab 10/19/17  0407         K 4.5      CO2 25   BUN 33*   CREATININE 1.8*   CALCIUM 9.9   MG 1.4*     CBC:   Recent Labs  Lab 10/19/17  0407   WBC 5.09   RBC 4.34*   HGB 13.0*   HCT 38.3*      MCV 88   MCH 30.0   MCHC 33.9     Coagulation:   Recent Labs  Lab 10/19/17  0407   LABPROT 10.7   INR 1.0   APTT 25.7       Significant Diagnostics:  I have reviewed all pertinent imaging results/findings within the past 24 hours.     CTA neck (10/18/17) - Impression     CTA head: Stable focal moderate high-grade stenosis right posterior M2 segmental branch of the MCA with good collateral flow.    CTA neck: Atherosclerotic disease carotid bifurcations and proximal ICAs with approximately 60-70% left proximal ICA stenosis by NASCET criteria. Please note there's probable small component of ulcerated plaque also unchanged.      Atherosclerotic disease with high grade stenosis in the right vertebral artery origin with high-grade stenosis or short segment occlusion in the V1 and V2 segments on the right.    In addition there is moderate high-grade stenosis  left vertebral artery origin with additional probable high-grade stenosis in the proximal V2 segment.    Please note incidentally there is opacification and distal dependent trachea which may represent aspiration debris clinical correlation advised      Electronically signed by: MARIELENA VALENTIN DO  Date: 10/18/17  Time: 11:02      MRI brain (10/18/17)  Impression      Evolving subcentimeter recent infarcts within the cerebral hemispheres with superimposed new areas of similar size infarction within the cerebral hemispheres bilaterally with predominance of foci in the periphery of the cerebral hemispheres concerning for embolic phenomenon.    There is 2 new right cerebellar foci which are small in size and an additional new subcentimeter right posterior temporal focus    Scattered areas of subcentimeter susceptibility within the cerebral hemispheres concerning for remote microhemorrhages more apparent on today's study likely related to differences in scanner sensitivity.    Superimposed generalized emboli loss with T2 flair signal abnormality concerning for chronic ischemic change.    Electronically signed by: MARIELENA VALENTIN DO  Date: 10/18/17  Time: 12:16                Assessment/Plan:     * Acute ischemic multifocal multiple vascular territories stroke    Left ICA stenosis - >90% stenosis based on CTA findings and this may be contributing to his TIA/CVA. However, MRI shows multiple small embolic infarcts on bilateral hemispheres? This warrants investigation of possible cardiac source.     - Recommend Echo with bubble study  - ASA/Plavix/Statin  - Better BP control and DM control   - Patient will need L CEA for severe L ICA stenosis. Lesion does extend upto level of C2. Will tentatively schedule him for CEA 10/25/'17.    Thank you for this consult, please call with any questions or concerns            Thank you for your consult. I will follow-up with patient. Please contact us if you have any additional  questions.    Angelo Ibrahim MD  Vascular Surgery  [facility]

## 2017-10-19 NOTE — ASSESSMENT & PLAN NOTE
Functional status: see hospital course  Cognitive/Speech/Language status:  pending  Nutrition/Swallow Status:  Passed bedside swallow evaluation.  SLP recommended regular diet and thin liquids.    Recommendations  -  Encourage mobility, OOB in chair at least 3 hours per day, and early ambulation as appropriate   -  PT/OT evaluate and treat  -  SLP speech and cognitive evaluate and treat  -  Monitor sleep disturbances and establish consistent sleep-wake cycle  -  Monitor for bowel and bladder dysfunction  -  Monitor for shoulder pain and subluxation  -  Monitor for spasticity  -  Monitor for and prevent skin breakdown and pressure ulcers  · Early mobility, repositioning/weight shifting every 20-30 minutes when sitting, turn patient every 2 hours, proper mattress/overlay and chair cushioning, pressure relief/heel protector boots  -  DVT prophylaxis:  [facility]   -  Reviewed discharge options (IP rehab, SNF, HH therapy, and OP therapy)

## 2017-10-19 NOTE — HPI
Shant Magana is a 77-year-old male with PMHx of HTN, hypertrophic cardiomyopathy, CKD, DM, Prostate CA, prior stroke in July, with recent admission to [facility] (discharged 10/16/17) for acute CVA.  Patient presented to [facility] on 10/18 with after daughter finding patient had improperly dressed himself and aphasia.  CTH revealed no acute pathology.  He was not a tPA candidate 2/2 symptoms resolving.  CTA revealed moderate high-grade stenosis of R M2, L ICA stenosis, and bilateral vertebral artery stenosis.  MRI Brain with acute scattered bilateral infarcts. Per VN, etiology likely cardioembolic. In addition, patient has diffuse atherosclerotic disease vs vasculitis. ESR and CRP pending.      Functional History: Patient lives in [place] alone in a single story home with 5 steps to enter.  Prior to admission,  (I) with ADLs and mobility.  DME: bath bench.

## 2017-10-19 NOTE — ASSESSMENT & PLAN NOTE
Severely enlarged LA noted on ECHO  - clinical picture concerning for cardio-embolic strokes  - no known history of atrial arrhythmias  - on tele

## 2017-10-19 NOTE — PLAN OF CARE
Problem: Patient Care Overview  Goal: Plan of Care Review  Outcome: Ongoing (interventions implemented as appropriate)  Plan of care reviewed with patient and family.  Verbalized understanding.  Patient is confused at time.  VSS. Resting comfortably in bed.  Will continue to monitor.

## 2017-10-19 NOTE — PT/OT/SLP PROGRESS
"Occupational Therapy  Treatment/ Goals Revised    Shant Magana   MRN: [id number]   Admitting Diagnosis: Acute ischemic multifocal multiple vascular territories stroke    OT Date of Treatment: 10/19/17   OT Start Time: 0641  OT Stop Time: 0719  OT Total Time (min): 38 min    Billable Minutes:  Self Care/Home Management 15, Therapeutic Activity 8 and Neuromuscular Re-education 15    General Precautions: Standard, fall, aspiration (Bedrest orders in chart; cleared by NP for OOB assessment, patient returned to bed at end of session with bed alarm on)  Orthopedic Precautions: N/A  Braces: N/A    Do you have any cultural, spiritual, Pentec[employer]al conflicts, given your current situation?: Episcopal    Subjective:  Communicated with nurse prior to session.  Patient:  "I just fell out yesterday.  My dtg, dtg in law and friend were with me.  I am just about back to normal.  I have 3 children that will be able to be with me.  I have Candace, Manuel and Turdell." "I don't have any pain."   "I don't smoke."  "I like to hunt and fish."   Pain/Comfort  Pain Rating 1: 0/10  Pain Rating Post-Intervention 1: 0/10    Objective:  Patient found with: telemetry, peripheral IV, bed alarm   Family not present.    Functional Mobility:  Bed Mobility:  Rolling/Turning to Left: Modified independent  Rolling/Turning Right: Modified independent  Scooting/Bridging: Modified Independent  Supine to Sit: Modified Independent  Sit to Supine: Modified Independent    Transfers:   Sit <> Stand Assistance: Stand By Assistance  Sit <> Stand Assistive Device: No Assistive Device  Bed <> Chair Technique: Stand Pivot  Bed <> Chair Transfer Assistance: Stand By Assistance    Activities of Daily Living:  UE Dressing Level of Assistance: Stand by assistance  LE Dressing Level of Assistance: Stand by assistance  Grooming Position: Standing  Grooming Level of Assistance: Stand by assistance  Toileting Where Assessed: Toilet  Toileting Level of Assistance: Stand by assistance   " "  Additional Treatment:   Patient education provided for stroke warning signs, prevention guidelines and personal risk factors (HTN, HLD, DM).  Patient verbalizing understanding via teach back method.  Patient education provided on role of OT and need for supervision post discharge with outpt OT.   Patient verbalizing understanding via teach back method. Patient instructed on need to call for assistance for toileting needs and when getting up.  Continued education, patient/ family training recommended. Patient alert and oriented x 3; able to follow 4/4 one step commands.  Patient attentive and interactive throughout the session.  Patient able to identify 5/5 body parts.  Able to name 5/5 objects.  Able to sequence 7/7 days of the week and 12/12 months of the year.  Left UE AAROM performed one set x 10 rep in all planes of motion.  Patient's functional status and disposition recommendation discussed with stroke team in daily rounds.  White board updated in patient's room.  OT asked if there were any other questions; patient/ family had no further questions.       AM-PAC 6 CLICK ADL   How much help from another person does this patient currently need?   1 = Unable, Total/Dependent Assistance  2 = A lot, Maximum/Moderate Assistance  3 = A little, Minimum/Contact Guard/Supervision  4 = None, Modified [place]/Independent    Putting on and taking off regular lower body clothing? : 3  Bathing (including washing, rinsing, drying)?: 3  Toileting, which includes using toilet, bedpan, or urinal? : 3  Putting on and taking off regular upper body clothing?: 3  Taking care of personal grooming such as brushing teeth?: 3  Eating meals?: 3  Total Score: 18     AM-PAC Raw Score CMS "G-Code Modifier Level of Impairment Assistance   6 % Total / Unable   7 - 8 CM 80 - 100% Maximal Assist   9-13 CL 60 - 80% Moderate Assist   14 - 19 CK 40 - 60% Moderate Assist   20 - 22 CJ 20 - 40% Minimal Assist   23 CI 1-20% SBA / CGA   24 " CH 0% Independent/ Mod I       Patient left supine with all lines intact and call button in reach    ASSESSMENT:  Shant Magana is a 77 y.o. male with a medical diagnosis of Acute ischemic multifocal multiple vascular territories stroke and presents with performance deficits of physical skills including impaired  balance, mobility, strength, and endurance; demonstrating performance deficits of cognitive skills including impaired problem solving, sequencing and memory all resulting in inability organizing occupational performance in a timely and safe manner.  These performance deficits have resulted in activity limitations including but not limited to:  transfers, ascending/ descending stairs, walking short and long distances, walking around obstacles, transitional movement patterns (kneeling, bending); lower body dressing, toileting, bathing, carrying objects, driving, hunting and fishing.   Patient's role as father, grandfather and independent caretaker for self has been affected. Patient will benefit from skilled OT services to maximize level of independence with self-care skills and functional mobility.     Rehab identified problem list/impairments: Rehab identified problem list/impairments: weakness, impaired functional mobilty, gait instability    Rehab potential is good.    Activity tolerance: Good    Discharge recommendations: Discharge Facility/Level Of Care Needs: outpatient OT     Barriers to discharge: Barriers to Discharge: None (Patient reports 3 children able to be with him upon discharge; need to confirm with family)    Equipment recommendations: none     GOALS:    Occupational Therapy Goals        Problem: Occupational Therapy Goal    Goal Priority Disciplines Outcome Interventions   Occupational Therapy Goal     OT, PT/OT     Description:  Goals achieved for toileting, transfers, following commands and LB dressing.  Goals revised 10/19 to be addressed in 7 days, expiring: 10/26  Patient will  demonstrate stand pivot transfers with modified independence.   Not met  Patient will demonstrate grooming while standing with modified independence.   Not met  Patient will demonstrate upper body dressing with modified independence.   Not met  Patient will demonstrate lower body dressing with modified independence.   Not met  Patient will demonstrate toileting with modified independence.   Not met  Patient will demonstrate bathing while seated EOB with modified independence.   Not met  Patient will demonstrate ability to follow 5/5 commands.   Not met  Patient's family / caregiver will demonstrate independence and safety with assisting patient with self-care skills and functional mobility.     Not met  Patient and/or patient's family will verbalize understanding of stroke prevention guidelines, personal risk factors and stroke warning signs via teachback method.  Not met                           Plan:  Patient to be seen 4 x/week to address the above listed problems via self-care/home management, neuromuscular re-education, cognitive retraining, therapeutic activities, therapeutic exercises  Plan of Care expires: 11/16/17  Plan of Care reviewed with: patient         Wendy JASPER Muñoz  10/19/2017

## 2017-10-19 NOTE — PROGRESS NOTES
STROKE FACULTY ATTESTATION NOTE:    I assessed this  patient clinically, reviewed his/ her labs, imaging studies, consultation notes with the aid of my team composed of Neurocritical Care NPs /PAs/ and residents during my Neurocritical Care Rounds. I agree with the assessment and plan presented here by Dr. Salcedo.      24-hr Events:  L -ICA 70% proximally on CTA. OK with PO diabetic diet. Nurses report that he is more confused. MRI:  Evolving subcentimeter recent infarcts within the cerebral hemispheres with superimposed new areas of similar size infarction within the cerebral hemispheres bilaterally with predominance of foci in the periphery of the cerebral hemispheres concerning for embolic phenomenon.There is 2 new right cerebellar foci which are small in size and an additional new subcentimeter right posterior temporal focus. Scattered areas of subcentimeter susceptibility within the cerebral hemispheres concerning for remote microhemorrhages more apparent on today's study likely related to differences in scanner sensitivity. Superimposed generalized emboli loss with T2 flair signal abnormality concerning for chronic ischemic change..      Active Problem List:   1.Multiple cardio embolic strokes: Punctate recent recent infarcts within the cerebral hemispheres with superimposed new areas of similar size infarction within the cerebral hemispheres bilaterally with predominance of foci in the periphery of the cerebral hemispheres concerning for embolic phenomenon.There is 2 new right cerebellar foci which are small in size and an additional new subcentimeter right posterior temporal focus. Scattered areas of subcentimeter susceptibility within the cerebral hemispheres concerning for remote microhemorrhages. Superimposed  abnormality concerning for chronic ischemic change.  2. HLP  3. High risk for Paroxysmal Afib,       Assessment / Plan:  Most likely cardio embolic strokes.  Possible R vert  disection.  Neuro:  -ECASA/Plavix will be replaced by  full anticoagulation  -Lipitor 80 mm  -Loop monitor  -TCD embolic detection.  -Lipitor 80 mg qd  -PT/OT and speech.  Resp:  RA  IS  CV: TTE: severe LAE; LVDDF; nl LVfx. Keep SBP <=180 mmHg.  -Lipitor 80 mg qd  -ECASA/Plavix will be replaced for full anticoagulation  IVF/nutrition/Renal/GI:   -Start PO regular diet.   -Tamsulosin 0.4mg qd.  -Assess for urinary retention with bladder scanner PVR. I/O if >=200cc.  -Metroprolol 12.5 mg q 6hrs  -NS  IVD at 100 cc/hr IVD  -Mucomyst 600mg q 12hrs  PO x 24 hrs.  -Hydralazine 10mg bids.  Hem / ID:    Endo: LDL 82; A1c 6.2.  Lipitor 80 mg qd  -SSI qAC  Prophylaxis:  SCDs  Pending full anticoagulation.  Advance Directives and Disposition:    Full Code. OT outpatient rehab. Pending PT.         Uninterrupted level 3  Follow-up /Counseling Time (not including procedures):  = 35 min                      [facility]  Vascular Neurology  Comprehensive Stroke Center  Progress Note    Assessment/Plan:     * Acute ischemic multifocal multiple vascular territories stroke    Mr. Magana is a 76 yo man with PMHx HTN, hypertrophic cardiomyopathy, CKD, DM, Prostate CA, prior stroke in July, with recent admission to [facility] (D/c date 10/16/17) for acute stroke who presented after daughter finding pt had dressed himself improperly and was not acting like himself. Aphasia largely resolved upon arrival to our facility. CTA showing moderate high-grade stenosis of R M2, L ICA stenosis, and bilateral vertebral artery stenosis (R worse than left). MRI Brain with acute scattered bilateral infarcts. Etiology likely cardioembolic. In addition, pt has diffuse atherosclerotic disease. ESR, CRP mildly elevated; less concerned for vasculitis at this time. Possible that new cerebellar stroke is related to diseased right vertebral however whole clinical picture with history of strokes involving multiple vascular territories; likely  that this is cardio-embolic. ESUS. ECHO remarkable for severely enlarged LA; no evidence of PFO on ESDRAS. Given patient was on dual antiplatelets and had recurrent strokes despite this, patient to be started on full anti-coagulation with Eliquis. Will resume aspirin in the setting of large vessel atherosclerosis as also cause of stroke. Recommend 30 day event monitor to look for arrhythmia to help with diagnosis although would not .     Antiplatelets: Eliquis 2.5mg BID (adjusted for renal function), Aspirin 81mg qd  Statins: Atorvastatin 80mg qd (treated LDL 82)   SBP <180  VTE ppx: full anticoagulation, SCDs  Diagnostics: TCD for emboli detection ordered.   PT/OT and speech - PT/OT recommending home. Per SLP - on regular diet with thin liquids.   Neuro checks q4h        Enlarged LA (left atrium)    Severely enlarged LA noted on ECHO  - clinical picture concerning for cardio-embolic strokes  - no known history of atrial arrhythmias  - on tele        Chronic diastolic congestive heart failure    Noted on ECHO - grade 1; EF 60%  - lopressor 12.5mg q6h        Cytotoxic cerebral edema    2/2 stroke  Evident on imaging        CKD (chronic kidney disease) stage 3, GFR 30-59 ml/min    Baseline Cr 1.3-1.8  Continue IVF @100cc/hr X24 hours post contrast; mucomyst 600mg BID X2 doses also ordered for renal protection.           Mixed hyperlipidemia    Stroke risk factor  LDL 82  Continue home Atorvastatin 80mg qd        Internal carotid artery stenosis, left    60-70% Left proximal ICA stenosis seen on CTA  Due to bilateral nature of acute strokes, do not believe carotid to be the cause  Continue aspirin and high intensity statin        Type 2 diabetes mellitus with complication, without long-term current use of insulin    Stroke risk factor  Hba1c 6.2%  On Metformin at home  POCT glucose 120-150 in the past 24 hours.   SSI while inpatient.        Essential hypertension    Stroke risk factor  SBP <180  Lopressor  12.5mg q6h            Dispo: can be discharged home tomorrow if stable and after completion of TCD emboli study.     Neurologic Chief Complaint: altered mental status    Subjective:     Interval History: Patient is seen for follow-up neurological assessment and treatment recommendations:   No acute events overnight. Concern this AM for alteration in mental status, transient. Concern that recurrent strokes are embolic in origin and given patient had recurrent strokes on DUAP; will start full anticoagulation today.     HPI, Past Medical, Family, and Social History remains the same as documented in the initial encounter.     Review of Systems   Constitutional: Negative for fatigue.   Eyes: Negative for visual disturbance.   Respiratory: Negative for shortness of breath.    Cardiovascular: Negative for chest pain.     Scheduled Meds:   acetylcysteine 200 mg/ml (20%)  600 mg Oral Q12H    aspirin  81 mg Oral Daily    atorvastatin  80 mg Oral Daily    clopidogrel  75 mg Oral Daily    heparin (porcine)  5,000 Units Subcutaneous Q8H    metoprolol tartrate  12.5 mg Oral Q6H    polyethylene glycol  17 g Oral Daily    sodium chloride 0.9%  3 mL Intravenous Q8H    tamsulosin  0.4 mg Oral Daily     Continuous Infusions:   sodium chloride 0.9% 100 mL/hr at 10/18/17 1425    sodium chloride 0.9%       PRN Meds:dextrose 50%, dextrose 50%, glucagon (human recombinant), glucose, glucose, insulin aspart, labetalol, senna-docusate 8.6-50 mg, sodium chloride 0.9%    Objective:     Vital Signs (Most Recent):  Temp: 97.7 °F (36.5 °C) (10/19/17 1132)  Pulse: 71 (10/19/17 1132)  Resp: 17 (10/19/17 1132)  BP: (!) 155/84 (10/19/17 1132)  SpO2: 96 % (10/19/17 1132)  BP Location: Right arm    Vital Signs Range (Last 24H):  Temp:  [97.7 °F (36.5 °C)-99.4 °F (37.4 °C)]   Pulse:  [66-77]   Resp:  [16-18]   BP: (116-164)/(68-86)   SpO2:  [92 %-100 %]   BP Location: Right arm    Physical Exam   Constitutional: He appears well-developed and  well-nourished.   HENT:   Head: Normocephalic and atraumatic.   Eyes: Pupils are equal, round, and reactive to light.       Neurological Exam:   LOC: alert and follows requests  Language: No aphasia  Speech: Dysarthria  EOM (CN III, IV, VI): Full/intact  Facial Movement (CN VII): lower weakness right lower  Shoulder/Neck (CN XI): SCM-Left: Normal ; SCM-Right: Normal ; Shoulder Shrug: Normal/Symetric  Tongue (CN XII): to midline  Motor*: Arm Left:  Normal (5/5), Leg Left:   Normal (5/5), Arm Right:   Normal (5/5), Leg Right:   Normal (5/5)  Cerebellar*: Normal limb  Sensation: intact to light touch, temperature and vibration  Tone: Arm-Left: normal; Leg-Left: normal; Arm-Right: normal; Leg-Right: normal    NIH Stroke Scale:    Level of Consciousness: 0 - alert  LOC Questions: 0 - answers both correctly  LOC Commands: 0 - performs both correctly  Best Gaze: 0 - normal  Visual: 0 - no visual loss  Facial Palsy: 1 - minor  Motor Left Arm: 0 - no drift  Motor Right Arm: 0 - no drift  Motor Left Le - no drift  Motor Right Le - no drift  Limb Ataxia: 0 - absent  Sensory: 0 - normal  Best Language: 0 - no aphasia  Dysarthria: 1 - mild to moderate dysarthria  Extinction and Inattention: 0 - no neglect  NIH Stroke Scale Total: 2      Laboratory:  CMP:   Recent Labs  Lab 10/19/17  0407   CALCIUM 9.9   ALBUMIN 2.9*   PROT 6.5      K 4.5   CO2 25      BUN 33*   CREATININE 1.8*   ALKPHOS 80   ALT 91*   AST 57*   BILITOT 0.8     BMP:   Recent Labs  Lab 10/19/17  0407      K 4.5      CO2 25   BUN 33*   CREATININE 1.8*   CALCIUM 9.9     CBC:   Recent Labs  Lab 10/19/17  0407   WBC 5.09   RBC 4.34*   HGB 13.0*   HCT 38.3*      MCV 88   MCH 30.0   MCHC 33.9     Lipid Panel:   Recent Labs  Lab 10/18/17  0956   CHOL 149   LDLCALC 82.0   HDL 44   TRIG 115     Coagulation:   Recent Labs  Lab 10/19/17  0407   INR 1.0   APTT 25.7     Platelet Aggregation Study: No results for input(s): PLTAGG,  PLTAGINTERP, PLTAGREGLACO, ADPPLTAGGREG in the last 168 hours.  Hgb A1C: No results for input(s): HGBA1C in the last 168 hours.  TSH:   Recent Labs  Lab 10/18/17  0956   TSH 3.674       Diagnostic Results:  I have personally reviewed:   Findings:      MRI brain WO contrast (10/18/17)  MRI on the left from 10/18 and on the right from 10/12. Comparing -- interval development of acute infarcts involving right cerebellar and right temporal lobe.                                 MRI brain WO contrast (10/12/17)  Multifocal punctuate infarcts involving left occipital, left corona radiata, right inferior frontal and right parasagittal frontal regions.     CTA head and head (10/18/17)  Extracranial atherosclerosis involving bilateral ICA and left common carotid; moderate to severe L ICA stenosis. Extracranial atherosclerosis involving bilateral vertebral arteries and stenosis in V2 segments (R worse than L)             Tati Salcedo MD  Comprehensive Stroke Center  Department of Vascular Neurology   [facility]

## 2017-10-19 NOTE — SUBJECTIVE & OBJECTIVE
Medications:  Continuous Infusions:   sodium chloride 0.9% 100 mL/hr at 10/18/17 1425    sodium chloride 0.9%       Scheduled Meds:   aspirin  81 mg Oral Daily    atorvastatin  80 mg Oral Daily    clopidogrel  75 mg Oral Daily    heparin (porcine)  5,000 Units Subcutaneous Q8H    sodium chloride 0.9%  3 mL Intravenous Q8H    tamsulosin  0.4 mg Oral Daily     PRN Meds:dextrose 50%, dextrose 50%, glucagon (human recombinant), glucose, glucose, insulin aspart, labetalol, sodium chloride 0.9%     Objective:     Vital Signs (Most Recent):  Temp: 98.6 °F (37 °C) (10/19/17 0723)  Pulse: 69 (10/19/17 0723)  Resp: 18 (10/19/17 0723)  BP: (!) 146/86 (10/19/17 0723)  SpO2: (!) 93 % (10/19/17 0723) Vital Signs (24h Range):  Temp:  [97.6 °F (36.4 °C)-99.4 °F (37.4 °C)] 98.6 °F (37 °C)  Pulse:  [66-88] 69  Resp:  [16-18] 18  SpO2:  [92 %-100 %] 93 %  BP: (116-164)/(57-86) 146/86          Physical Exam   Constitutional: He is oriented to person, place, and time. He appears well-developed and well-nourished.   HENT:   Head: Normocephalic and atraumatic.   Eyes: EOM are normal. Pupils are equal, round, and reactive to light.   Neck: Normal range of motion. Neck supple.   Cardiovascular: Normal rate and regular rhythm.    Pulmonary/Chest: Effort normal and breath sounds normal.   Abdominal: Soft. Bowel sounds are normal.   Musculoskeletal: Normal range of motion.   Bilateral 5/5 strength, no motor drift   Neurological: He is alert and oriented to person, place, and time.   Able to state where he lives, that he lives alone, and his daughter is available as well as his son should we need to talk to them.    Skin: Skin is warm.   Psychiatric:   Flat affect   Nursing note and vitals reviewed.      Significant Labs:  CBC:   Recent Labs  Lab 10/19/17  0407   WBC 5.09   RBC 4.34*   HGB 13.0*   HCT 38.3*      MCV 88   MCH 30.0   MCHC 33.9     CMP:   Recent Labs  Lab 10/19/17  0407      CALCIUM 9.9   ALBUMIN 2.9*    PROT 6.5      K 4.5   CO2 25      BUN 33*   CREATININE 1.8*   ALKPHOS 80   ALT 91*   AST 57*   BILITOT 0.8       Significant Diagnostics:  I have reviewed all pertinent imaging results/findings within the past 24 hours.

## 2017-10-19 NOTE — PLAN OF CARE
Problem: Physical Therapy Goal  Goal: Physical Therapy Goal  Outcome: Outcome(s) achieved Date Met: 10/19/17  Initial eval completed. Results, POC and goals discussed with patient and dtr.  Pt not in need of skilled PT services at this time.     Patient safe to perform transfers and ambulate with supervision.      Gina Olmos, PT  10/19/2017  [phone] (pager)

## 2017-10-19 NOTE — PROGRESS NOTES
[facility]  Vascular Surgery  Progress Note    Patient Name: Shant Magana  MRN: [id number]  Admission Date: 10/18/2017  Primary Care Provider: Trent Aldana MD    Subjective:     Interval History:  No acute events overnight. Afebrile. VSS. No new neurological deficits. Patient continues to deny any lateralizing signs. Presented due to a syncopal episode. Does not believe his legs were weak.      Post-Op Info:  * No surgery found *           Medications:  Continuous Infusions:   sodium chloride 0.9% 100 mL/hr at 10/18/17 1425    sodium chloride 0.9%       Scheduled Meds:   aspirin  81 mg Oral Daily    atorvastatin  80 mg Oral Daily    clopidogrel  75 mg Oral Daily    heparin (porcine)  5,000 Units Subcutaneous Q8H    sodium chloride 0.9%  3 mL Intravenous Q8H    tamsulosin  0.4 mg Oral Daily     PRN Meds:dextrose 50%, dextrose 50%, glucagon (human recombinant), glucose, glucose, insulin aspart, labetalol, sodium chloride 0.9%     Objective:     Vital Signs (Most Recent):  Temp: 98.6 °F (37 °C) (10/19/17 0723)  Pulse: 69 (10/19/17 0723)  Resp: 18 (10/19/17 0723)  BP: (!) 146/86 (10/19/17 0723)  SpO2: (!) 93 % (10/19/17 0723) Vital Signs (24h Range):  Temp:  [97.6 °F (36.4 °C)-99.4 °F (37.4 °C)] 98.6 °F (37 °C)  Pulse:  [66-88] 69  Resp:  [16-18] 18  SpO2:  [92 %-100 %] 93 %  BP: (116-164)/(57-86) 146/86          Physical Exam   Constitutional: He is oriented to person, place, and time. He appears well-developed and well-nourished.   HENT:   Head: Normocephalic and atraumatic.   Eyes: EOM are normal. Pupils are equal, round, and reactive to light.   Neck: Normal range of motion. Neck supple.   Cardiovascular: Normal rate and regular rhythm.    Pulmonary/Chest: Effort normal and breath sounds normal.   Abdominal: Soft. Bowel sounds are normal.   Musculoskeletal: Normal range of motion.   Bilateral 5/5 strength, no motor drift   Neurological: He is alert and oriented to person, place, and  time.   Able to state where he lives, that he lives alone, and his daughter is available as well as his son should we need to talk to them.    Skin: Skin is warm.   Psychiatric:   Flat affect   Nursing note and vitals reviewed.      Significant Labs:  CBC:   Recent Labs  Lab 10/19/17  0407   WBC 5.09   RBC 4.34*   HGB 13.0*   HCT 38.3*      MCV 88   MCH 30.0   MCHC 33.9     CMP:   Recent Labs  Lab 10/19/17  0407      CALCIUM 9.9   ALBUMIN 2.9*   PROT 6.5      K 4.5   CO2 25      BUN 33*   CREATININE 1.8*   ALKPHOS 80   ALT 91*   AST 57*   BILITOT 0.8       Significant Diagnostics:  I have reviewed all pertinent imaging results/findings within the past 24 hours.    Assessment/Plan:     * Acute ischemic multifocal multiple vascular territories stroke    78 y/o male with L ICA stenosis (> 90% based on CTA findings) that may be contributing to his imaging evidence of recent infarct    - Recommend echo with bubble study given patient's imaging evidence of multiple small embolic infarcts bilaterally and lack of any right-sided localized deficits  - Continue ASA, Plavix, high-dose statin  - Recommend aggressive BP control and DM control   - Tentative plan for L CEA on Wednesday (10/25/17)  - Will continue to follow        Lauri Ma MD  Vascular Surgery  [facility]

## 2017-10-19 NOTE — ASSESSMENT & PLAN NOTE
60-70% Left proximal ICA stenosis seen on CTA  Due to bilateral nature of acute strokes, do not believe carotid to be the cause  Continue aspirin and high intensity statin

## 2017-10-19 NOTE — CONSULTS
[facility]  Physical Medicine & Rehab  Consult Note    Patient Name: Shant Magana  MRN: [id number]  Admission Date: 10/18/2017  Hospital Length of Stay: 1 days  Attending Physician: Kevin Moore MD     Inpatient consult to Physical Medicine & Rehabilitation  Consult performed by: Mary Rojas NP  Consult requested by:  Kevin Moore MD    Reason for Consult:  assess rehabilitation needs    Consults  Subjective:     Principal Problem: Acute ischemic multifocal multiple vascular territories stroke    HPI: Shant Magana is a 77-year-old male with PMHx of HTN, hypertrophic cardiomyopathy, CKD, DM, Prostate CA, prior stroke in July, with recent admission to [facility] (discharged 10/16/17) for acute CVA.  Patient presented to [facility] on 10/18 with after daughter finding patient had improperly dressed himself and aphasia.  CTH revealed no acute pathology.  He was not a tPA candidate 2/2 symptoms resolving.  CTA revealed moderate high-grade stenosis of R M2, L ICA stenosis, and bilateral vertebral artery stenosis (no intervention performed).  MRI Brain with acute scattered bilateral infarcts. Per VN, etiology likely cardioembolic. In addition, patient has diffuse atherosclerotic disease vs vasculitis. ESR and CRP pending.      Functional History: Patient lives in [place] alone in a single story home with 5 steps to enter.  Prior to admission,  (I) with ADLs and mobility.  DME: bath bench.      Hospital Course: 10/18/17: Evaluated by therapy.  Bed mobility Luis M .  Sit to stand CGA.  Ambulated 6 ft Min-ModA.  LBD CGA.    Past Medical History:   Diagnosis Date    Cancer     prostate    Diabetes mellitus     Hypertension     Hypertrophic cardiomyopathy     Renal disorder      Past Surgical History:   Procedure Laterality Date    BACK SURGERY      THYROIDECTOMY       Review of patient's allergies indicates:   Allergen Reactions    Hydralazine analogues Diarrhea       Scheduled Medications:    aspirin  81 mg  Oral Daily    atorvastatin  80 mg Oral Daily    clopidogrel  75 mg Oral Daily    heparin (porcine)  5,000 Units Subcutaneous Q8H    sodium chloride 0.9%  3 mL Intravenous Q8H    tamsulosin  0.4 mg Oral Daily       PRN Medications: dextrose 50%, dextrose 50%, glucagon (human recombinant), glucose, glucose, insulin aspart, labetalol, sodium chloride 0.9%    Family History     None        Social History Main Topics    Smoking status: Former Smoker     Types: Cigarettes    Smokeless tobacco: Never Used    Alcohol use Yes    Drug use: Unknown    Sexual activity: Not on file     Review of Systems   Constitutional: Negative for chills, fatigue and fever.   HENT: Negative for trouble swallowing and voice change.    Eyes: Negative for photophobia and visual disturbance.   Respiratory: Negative for cough, shortness of breath and wheezing.    Cardiovascular: Negative for chest pain and palpitations.   Gastrointestinal: Negative for abdominal distention and nausea.   Genitourinary: Negative for difficulty urinating and flank pain.   Musculoskeletal: Positive for gait problem. Negative for arthralgias and myalgias.   Skin: Negative for color change and rash.   Neurological: Positive for facial asymmetry. Negative for speech difficulty, weakness, numbness and headaches.   Psychiatric/Behavioral: Positive for confusion. Negative for agitation.     Objective:     Vital Signs (Most Recent):  Temp: 98.6 °F (37 °C) (10/19/17 0723)  Pulse: 69 (10/19/17 0723)  Resp: 18 (10/19/17 0723)  BP: (!) 146/86 (10/19/17 0723)  SpO2: (!) 93 % (10/19/17 0723)    Vital Signs (24h Range):  Temp:  [97.6 °F (36.4 °C)-99.4 °F (37.4 °C)] 98.6 °F (37 °C)  Pulse:  [66-88] 69  Resp:  [16-18] 18  SpO2:  [92 %-100 %] 93 %  BP: (116-164)/(57-86) 146/86     Body mass index is 29.57 kg/m².    Physical Exam   Constitutional: He appears well-developed and well-nourished.   HENT:   Head: Normocephalic and atraumatic.   Eyes: EOM are normal. Pupils are  equal, round, and reactive to light.   Neck: Neck supple.   Cardiovascular: Normal rate and regular rhythm.    Pulmonary/Chest: Effort normal. No respiratory distress.   Abdominal: Soft. There is no tenderness.   Musculoskeletal: Normal range of motion. He exhibits no deformity.   Neurological:  -  Mental Status:  AAOx2 (person and place, but incorrect city).  Follows commands.  Answers correct age and .  Recent and remote memory intact.  Recalls 3/3 objects.    -  Speech and language: + mild dysarthria.    -  Facial movement (CN VII): asymmetric   -  Coordination:  Finger to nose exam:  RUE normal, LUE mild dysmetria.   -  Motor:   RUE: 5/5, 5/5 .  LUE: 5/5, 5/5 .  RLE: 5/5, DF 5/5, PF 5/5.  LLE: 4+/5, DF 5/5, PF 5/5  -  pronator drift. negative  -  Tone:  normal  -  Sensory:  Intact to light touch and pin prick.  Skin: Skin is warm and dry.   Psychiatric: He has a normal mood and affect. His behavior is normal. Cognition and memory are impaired.     Diagnostic Results:   Labs: Reviewed  ECG: Reviewed  X-Ray: Reviewed  CT: Reviewed  MRI: Reviewed    Assessment/Plan:     * Acute ischemic multifocal multiple vascular territories stroke    Functional status: see hospital course  Cognitive/Speech/Language status:  pending  Nutrition/Swallow Status:  Passed bedside swallow evaluation.  SLP recommended regular diet and thin liquids.    Recommendations  -  Encourage mobility, OOB in chair at least 3 hours per day, and early ambulation as appropriate   -  PT/OT evaluate and treat  -  SLP speech and cognitive evaluate and treat  -  Monitor sleep disturbances and establish consistent sleep-wake cycle  -  Monitor for bowel and bladder dysfunction  -  Monitor for shoulder pain and subluxation  -  Monitor for spasticity  -  Monitor for and prevent skin breakdown and pressure ulcers  · Early mobility, repositioning/weight shifting every 20-30 minutes when sitting, turn patient every 2 hours, proper mattress/overlay  and chair cushioning, pressure relief/heel protector boots  -  DVT prophylaxis:  [facility]   -  Reviewed discharge options (IP rehab, SNF, HH therapy, and OP therapy)          Internal carotid artery stenosis, left    -noted on CTA  -VN following, no intervention performed           Participating with therapy. Cog eval pending. Will follow and discuss with rehab team for rehab recommendation.      Thank you for your consult.     Mary Rojas NP  Department of Physical Medicine & Rehab  [facility]

## 2017-10-19 NOTE — SUBJECTIVE & OBJECTIVE
Prescriptions Prior to Admission   Medication Sig Dispense Refill Last Dose    amlodipine (NORVASC) 10 MG tablet Take 1 tablet (10 mg total) by mouth once daily. 90 tablet 2 10/17/2017    aspirin (ECOTRIN) 81 MG EC tablet Take 1 tablet (81 mg total) by mouth once daily. 30 tablet 3 10/17/2017    atorvastatin (LIPITOR) 80 MG tablet Take 1 tablet (80 mg total) by mouth once daily. 90 tablet 2 10/17/2017    clopidogrel (PLAVIX) 75 mg tablet Take 1 tablet (75 mg total) by mouth once daily. 90 tablet 2 10/17/2017    hydrALAZINE (APRESOLINE) 50 MG tablet Take 1 tablet (50 mg total) by mouth every 8 (eight) hours. 90 tablet 11 10/17/2017    hydrochlorothiazide (HYDRODIURIL) 25 MG tablet Take 1 tablet (25 mg total) by mouth once daily. 90 tablet 2 10/17/2017    losartan (COZAAR) 100 MG tablet Take 1 tablet (100 mg total) by mouth once daily. 90 tablet 2 10/17/2017    metformin (GLUCOPHAGE) 500 MG tablet Take 500 mg by mouth 2 (two) times daily with meals.   10/17/2017    tamsulosin (FLOMAX) 0.4 mg Cp24 Take 0.4 mg by mouth once daily.   10/17/2017    vitamin D 1000 units Tab Take 1,000 Units by mouth once daily.   10/17/2017       Review of patient's allergies indicates:   Allergen Reactions    Hydralazine analogues Diarrhea       Past Medical History:   Diagnosis Date    Cancer     prostate    Diabetes mellitus     Hypertension     Hypertrophic cardiomyopathy     Renal disorder      Past Surgical History:   Procedure Laterality Date    BACK SURGERY      THYROIDECTOMY       Family History     None        Social History Main Topics    Smoking status: Former Smoker     Types: Cigarettes    Smokeless tobacco: Never Used    Alcohol use Yes    Drug use: Unknown    Sexual activity: Not on file     Review of Systems   Constitutional: Negative.    HENT: Negative.    Eyes: Negative.    Respiratory: Negative.    Cardiovascular: Negative.    Gastrointestinal: Negative.    Endocrine: Negative.    Genitourinary:  Negative.    Musculoskeletal: Negative.    Skin: Negative.    Allergic/Immunologic: Negative.    Neurological: Negative.      Objective:     Vital Signs (Most Recent):  Temp: 98.5 °F (36.9 °C) (10/19/17 0327)  Pulse: 69 (10/19/17 0327)  Resp: 16 (10/18/17 2022)  BP: 138/81 (10/19/17 0327)  SpO2: (!) 94 % (10/19/17 0327) Vital Signs (24h Range):  Temp:  [97.6 °F (36.4 °C)-99.4 °F (37.4 °C)] 98.5 °F (36.9 °C)  Pulse:  [66-88] 69  Resp:  [16-18] 16  SpO2:  [92 %-100 %] 94 %  BP: (116-164)/(57-81) 138/81     Weight: 96.2 kg (212 lb)  Body mass index is 29.57 kg/m².    Physical Exam   Constitutional: He is oriented to person, place, and time. He appears well-developed and well-nourished.   HENT:   Head: Normocephalic and atraumatic.   Eyes: EOM are normal. Pupils are equal, round, and reactive to light.   Neck: Normal range of motion. Neck supple.   Cardiovascular: Normal rate and regular rhythm.    Pulmonary/Chest: Effort normal and breath sounds normal.   Abdominal: Soft. Bowel sounds are normal.   Musculoskeletal: Normal range of motion.   Bilateral 5/5 strength, no motor drift   Neurological: He is alert and oriented to person, place, and time.   Able to tell me where he lives, that he lives alone, and his daughter is available as well as his son should we need to talk to them.    Skin: Skin is warm.   Psychiatric:   Flat affect       Significant Labs:  BMP:   Recent Labs  Lab 10/19/17  0407         K 4.5      CO2 25   BUN 33*   CREATININE 1.8*   CALCIUM 9.9   MG 1.4*     CBC:   Recent Labs  Lab 10/19/17  0407   WBC 5.09   RBC 4.34*   HGB 13.0*   HCT 38.3*      MCV 88   MCH 30.0   MCHC 33.9     Coagulation:   Recent Labs  Lab 10/19/17  0407   LABPROT 10.7   INR 1.0   APTT 25.7       Significant Diagnostics:  I have reviewed all pertinent imaging results/findings within the past 24 hours.     CTA neck (10/18/17) - Impression     CTA head: Stable focal moderate high-grade stenosis right  posterior M2 segmental branch of the MCA with good collateral flow.    CTA neck: Atherosclerotic disease carotid bifurcations and proximal ICAs with approximately 60-70% left proximal ICA stenosis by NASCET criteria. Please note there's probable small component of ulcerated plaque also unchanged.      Atherosclerotic disease with high grade stenosis in the right vertebral artery origin with high-grade stenosis or short segment occlusion in the V1 and V2 segments on the right.    In addition there is moderate high-grade stenosis left vertebral artery origin with additional probable high-grade stenosis in the proximal V2 segment.    Please note incidentally there is opacification and distal dependent trachea which may represent aspiration debris clinical correlation advised      Electronically signed by: MARIELENA VALENTIN DO  Date: 10/18/17  Time: 11:02      MRI brain (10/18/17)  Impression      Evolving subcentimeter recent infarcts within the cerebral hemispheres with superimposed new areas of similar size infarction within the cerebral hemispheres bilaterally with predominance of foci in the periphery of the cerebral hemispheres concerning for embolic phenomenon.    There is 2 new right cerebellar foci which are small in size and an additional new subcentimeter right posterior temporal focus    Scattered areas of subcentimeter susceptibility within the cerebral hemispheres concerning for remote microhemorrhages more apparent on today's study likely related to differences in scanner sensitivity.    Superimposed generalized emboli loss with T2 flair signal abnormality concerning for chronic ischemic change.    Electronically signed by: MARIELENA VALENTIN DO  Date: 10/18/17  Time: 12:16

## 2017-10-19 NOTE — ASSESSMENT & PLAN NOTE
Stroke risk factor  Hba1c 6.2%  On Metformin at home  POCT glucose 120-150 in the past 24 hours.   SSI while inpatient.

## 2017-10-19 NOTE — HOSPITAL COURSE
10/18/17: Evaluated by therapy.  Bed mobility Luis M .  Sit to stand CGA.  Ambulated 6 ft Min-ModA.  LBD CGA.  10/19/17: Participated.  Bed mobility (I).  Sit to stanD and transfers (I).  Ambulated 150 ft SV.  UBD SBA and LBD SBA. SLP noting Cognitive-linguistic impairment.

## 2017-10-19 NOTE — ASSESSMENT & PLAN NOTE
76 y/o male with L ICA stenosis (> 90% based on CTA findings) that may be contributing to his imaging evidence of recent infarct    - Recommend echo with bubble study given patient's imaging evidence of multiple small embolic infarcts bilaterally and lack of any right-sided localized deficits  - Continue ASA, Plavix, high-dose statin  - Recommend aggressive BP control and DM control   - Tentative plan for L CEA on Wednesday (10/25/17)  - Will continue to follow

## 2017-10-19 NOTE — ASSESSMENT & PLAN NOTE
Mr. Magana is a 76 yo man with PMHx HTN, hypertrophic cardiomyopathy, CKD, DM, Prostate CA, prior stroke in July, with recent admission to [facility] (D/c date 10/16/17) for acute stroke who presented after daughter finding pt had dressed himself improperly and was not acting like himself. Aphasia largely resolved upon arrival to our facility. CTA showing moderate high-grade stenosis of R M2, L ICA stenosis, and bilateral vertebral artery stenosis (R worse than left). MRI Brain with acute scattered bilateral infarcts. Etiology likely cardioembolic. In addition, pt has diffuse atherosclerotic disease. ESR, CRP mildly elevated; less concerned for vasculitis at this time. Possible that new cerebellar stroke is related to diseased right vertebral however whole clinical picture with history of strokes involving multiple vascular territories; likely that this is cardio-embolic. ESUS. ECHO remarkable for severely enlarged LA; no evidence of PFO on ESDRAS. Given patient was on dual antiplatelets and had recurrent strokes despite this, patient to be started on full anti-coagulation with Eliquis. Will resume aspirin in the setting of large vessel atherosclerosis as also cause of stroke. Recommend 30 day event monitor to look for arrhythmia to help with diagnosis although would not .     Antiplatelets: hold pending possible L CEA   Statins: Atorvastatin 80mg qd (treated LDL 82)   SBP <180  VTE ppx: full anticoagulation on pradaxa. SCDs  Diagnostics: TCD for emboli detection ordered but pt. Refused.    PT/OT and speech - PT/OT recommending home. Per SLP - on regular diet with thin liquids.   Neuro checks q4h

## 2017-10-19 NOTE — PLAN OF CARE
Problem: Fall Risk (Adult)  Intervention: Reduce Risk/Promote Restraint Free Environment  Fall precautions maintained to prevent pt. Fall and injuries

## 2017-10-19 NOTE — ASSESSMENT & PLAN NOTE
Mr. Magana is a 78 yo man with PMHx HTN, hypertrophic cardiomyopathy, CKD, DM, Prostate CA, prior stroke in July, with recent admission to [facility] (D/c date 10/16/17) for acute stroke who presented after daughter finding pt had dressed himself improperly and was not acting like himself. Aphasia largely resolved upon arrival to our facility. CTA showing moderate high-grade stenosis of R M2, L ICA stenosis, and bilateral vertebral artery stenosis (R worse than left). MRI Brain with acute scattered bilateral infarcts. Etiology likely cardioembolic. In addition, pt has diffuse atherosclerotic disease. ESR, CRP mildly elevated; less concerned for vasculitis at this time. Possible that new cerebellar stroke is related to diseased right vertebral however whole clinical picture with history of strokes involving multiple vascular territories; likely that this is cardio-embolic. ESUS. ECHO remarkable for severely enlarged LA; no evidence of PFO on ESDRAS. Given patient was on dual antiplatelets and had recurrent strokes despite this, patient to be started on full anti-coagulation with Eliquis. Will resume aspirin in the setting of large vessel atherosclerosis as also cause of stroke. Recommend 30 day event monitor to look for arrhythmia to help with diagnosis although would not .     Antiplatelets: Eliquis 2.5mg BID, Aspirin 81mg qd  Statins: Atorvastatin 80mg qd (treated LDL 82)   SBP <180  VTE ppx: full anticoagulation, SCDs  Diagnostics: TCD for emboli detection ordered.   PT/OT and speech - PT/OT recommending home. Per SLP - on regular diet with thin liquids.   Neuro checks q4h

## 2017-10-19 NOTE — PROGRESS NOTES
Food & Nutrition  Education    Diet Education: Cardiac nutrition  Time Spent: 15   Learners: Patient       Nutrition Education provided with handouts:   Cardiac-[facility] Nutrition Therapy    Comments:  Educated patient on limiting saturated fats and increasing fiber intake.   All questions and concerns answered. Dietitian's contact information provided.       Follow-Up:  10/27  Please Re-consult as needed      Thanks!  Rodolfo FUNKR

## 2017-10-19 NOTE — SUBJECTIVE & OBJECTIVE
Neurologic Chief Complaint: altered mental status    Subjective:     Interval History: Patient is seen for follow-up neurological assessment and treatment recommendations:   No acute events overnight. Concern this AM for alteration in mental status, transient. Concern that recurrent strokes are embolic in origin and given patient had recurrent strokes on DUAP; will start full anticoagulation today.     HPI, Past Medical, Family, and Social History remains the same as documented in the initial encounter.     Review of Systems   Constitutional: Negative for fatigue.   Eyes: Negative for visual disturbance.   Respiratory: Negative for shortness of breath.    Cardiovascular: Negative for chest pain.     Scheduled Meds:   acetylcysteine 200 mg/ml (20%)  600 mg Oral Q12H    aspirin  81 mg Oral Daily    atorvastatin  80 mg Oral Daily    clopidogrel  75 mg Oral Daily    heparin (porcine)  5,000 Units Subcutaneous Q8H    metoprolol tartrate  12.5 mg Oral Q6H    polyethylene glycol  17 g Oral Daily    sodium chloride 0.9%  3 mL Intravenous Q8H    tamsulosin  0.4 mg Oral Daily     Continuous Infusions:   sodium chloride 0.9% 100 mL/hr at 10/18/17 1425    sodium chloride 0.9%       PRN Meds:dextrose 50%, dextrose 50%, glucagon (human recombinant), glucose, glucose, insulin aspart, labetalol, senna-docusate 8.6-50 mg, sodium chloride 0.9%    Objective:     Vital Signs (Most Recent):  Temp: 97.7 °F (36.5 °C) (10/19/17 1132)  Pulse: 71 (10/19/17 1132)  Resp: 17 (10/19/17 1132)  BP: (!) 155/84 (10/19/17 1132)  SpO2: 96 % (10/19/17 1132)  BP Location: Right arm    Vital Signs Range (Last 24H):  Temp:  [97.7 °F (36.5 °C)-99.4 °F (37.4 °C)]   Pulse:  [66-77]   Resp:  [16-18]   BP: (116-164)/(68-86)   SpO2:  [92 %-100 %]   BP Location: Right arm    Physical Exam   Constitutional: He appears well-developed and well-nourished.   HENT:   Head: Normocephalic and atraumatic.   Eyes: Pupils are equal, round, and reactive to light.        Neurological Exam:   LOC: alert and follows requests  Language: No aphasia  Speech: Dysarthria  EOM (CN III, IV, VI): Full/intact  Facial Movement (CN VII): lower weakness right lower  Shoulder/Neck (CN XI): SCM-Left: Normal ; SCM-Right: Normal ; Shoulder Shrug: Normal/Symetric  Tongue (CN XII): to midline  Motor*: Arm Left:  Normal (5/5), Leg Left:   Normal (5/5), Arm Right:   Normal (5/5), Leg Right:   Normal (5/5)  Cerebellar*: Normal limb  Sensation: intact to light touch, temperature and vibration  Tone: Arm-Left: normal; Leg-Left: normal; Arm-Right: normal; Leg-Right: normal    NIH Stroke Scale:    Level of Consciousness: 0 - alert  LOC Questions: 0 - answers both correctly  LOC Commands: 0 - performs both correctly  Best Gaze: 0 - normal  Visual: 0 - no visual loss  Facial Palsy: 1 - minor  Motor Left Arm: 0 - no drift  Motor Right Arm: 0 - no drift  Motor Left Le - no drift  Motor Right Le - no drift  Limb Ataxia: 0 - absent  Sensory: 0 - normal  Best Language: 0 - no aphasia  Dysarthria: 1 - mild to moderate dysarthria  Extinction and Inattention: 0 - no neglect  NIH Stroke Scale Total: 2      Laboratory:  CMP:   Recent Labs  Lab 10/19/17  0407   CALCIUM 9.9   ALBUMIN 2.9*   PROT 6.5      K 4.5   CO2 25      BUN 33*   CREATININE 1.8*   ALKPHOS 80   ALT 91*   AST 57*   BILITOT 0.8     BMP:   Recent Labs  Lab 10/19/17  0407      K 4.5      CO2 25   BUN 33*   CREATININE 1.8*   CALCIUM 9.9     CBC:   Recent Labs  Lab 10/19/17  0407   WBC 5.09   RBC 4.34*   HGB 13.0*   HCT 38.3*      MCV 88   MCH 30.0   MCHC 33.9     Lipid Panel:   Recent Labs  Lab 10/18/17  0956   CHOL 149   LDLCALC 82.0   HDL 44   TRIG 115     Coagulation:   Recent Labs  Lab 10/19/17  0407   INR 1.0   APTT 25.7     Platelet Aggregation Study: No results for input(s): PLTAGG, PLTAGINTERP, PLTAGREGLACO, ADPPLTAGGREG in the last 168 hours.  Hgb A1C: No results for input(s): HGBA1C in the last 168  hours.  TSH:   Recent Labs  Lab 10/18/17  0956   TSH 3.674       Diagnostic Results:  I have personally reviewed:   Findings:      MRI brain WO contrast (10/18/17)  MRI on the left from 10/18 and on the right from 10/12. Comparing -- interval development of acute infarcts involving right cerebellar and right temporal lobe.                                 MRI brain WO contrast (10/12/17)  Multifocal punctuate infarcts involving left occipital, left corona radiata, right inferior frontal and right parasagittal frontal regions.     CTA head and head (10/18/17)  Extracranial atherosclerosis involving bilateral ICA and left common carotid; moderate to severe L ICA stenosis. Extracranial atherosclerosis involving bilateral vertebral arteries and stenosis in V2 segments (R worse than L)

## 2017-10-19 NOTE — PT/OT/SLP EVAL
Speech Language Pathology Evaluation    Shant Magana   MRN: [id number]   Admitting Diagnosis: Acute ischemic multifocal multiple vascular territories stroke    Diet recommendations: Solid Diet Level: Regular  Liquid Diet Level: Thin   [place] aspiration precautions.     SLP Treatment Date: 10/19/17  Speech Start Time: 0753     Speech Stop Time: 0810     Speech Total (min): 17 min       TREATMENT BILLABLE MINUTES:  Eval 17     Diagnosis: Acute ischemic multifocal multiple vascular territories stroke      Past Medical History:   Diagnosis Date    Cancer     prostate    Diabetes mellitus     Hypertension     Hypertrophic cardiomyopathy     Renal disorder      Past Surgical History:   Procedure Laterality Date    BACK SURGERY      THYROIDECTOMY         Has the patient been evaluated by SLP for swallowing? : Yes  Keep patient NPO?: No   General Precautions: Standard, fall, aspiration          Social Hx: Patient lives alone and performed majority of ADLs indptly with daily supervision and assistance from his children.      Subjective:  Pt found sitting at EOB finishing his breakfast.       Pain/Comfort  Pain Rating 1: 0/10  Pain Rating Post-Intervention 1: 0/10     Objective:   Patient found with: telemetry, peripheral IV    Oral Musculature Evaluation  Oral Musculature: facial asymmetry present  Dentition: present and adequate  Mucosal Quality: adequate  Oral Labial Strength and Mobility: impaired coordination  Lingual Strength and Mobility:  (slight deviation to left side upon protrusion)  Buccal Strength and Mobility: WFL  Volitional Cough: elicited  Volitional Swallow: timely  Voice Prior to PO Intake: clear     Cognitive Status:  Behavioral Observations: alert, appropriate, confused and impulsive-    Memory and Orientation: Pt oriented to person, month and situation. Pt stated that it was 1955 or 2070 when asked for the year. Identified correct year given a field of 3 choices. Pt stated that he was at [facility] or  [place] when SLP inquired about place. Pt recalled up to 4 digits with repetition x1. Immediately recalled up to 5 words indptly. Impulsivity noted during task. Pt correctly answered 2/4 general information recall questions. Pt stated that Alexi was the current president. Pt indptly recalled 1/3 unrelated words after a delay, which increased to 2/3 given phonemic cue from clinician.     Attention: WFL.    Problem Solving: Pt exhibited limited insight when answering problem solving questions. Pt provided appropriate responses to 1/3 problem solving questions which increased to 3/3 given mod cues from clinician to elaborate. During a convergent categorization task, pt achieved 50% acc given min cues from clinician. Pt unable to identify similarity and difference between 2 word set.     Pragmatics: WFL  Executive Function: impulsivity, insight/awareness and organization    Language: WFL for communicating wants and needs. Possible aphasic characteristics suspected. SLP will monitor.    Auditory Comprehension: Pt answered complex yes/no questions and followed 3 step directions with 100% acc. Possible semantic paraphasia word finding difficulty noted during task.      Verbal Expression: During a picture description activity, pt able to identify nouns in the picture, but unable to describe any actions being performed in the picture. Questionable visual deficits. During a word fluency task pt able to name 6 items in a given category (15-20 WNL). Pt correctly named 4/6 pictured items. Fluent conversational speech. Frequent abandonment of thought during cognitive tasks, mild anomia possible.     Motor Speech: WFL    Voice: WFL     Reading: TBA    Writing: TBA    Visual-Spatial: TBA. Questionable visual deficits during picture description.     Additional Treatment:    SLP provided education re: role of SLP, process of speech/language/cognitive evaluation, results of evaluation, and SLP POC. Pt verbalized understanding of all  discussed.     Assessment:  Shant Magana is a 77 y.o. male with a SLP diagnosis of Cognitive-Linguistic Impairment.        Discharge recommendations: Discharge Facility/Level Of Care Needs: outpatient speech therapy and 24 hour supervision     Goals:    SLP Goals        Problem: SLP Goal    Goal Priority Disciplines Outcome   SLP Goal     SLP Ongoing (interventions implemented as appropriate)   Description:  Short Term Goals:   Goals expected to be met by 10/25  1. Pt will participate in a speech, language, and cognitive evaluation with possible updated goals to follow pending results. -met     Goals expected to be met by 10/26:   1. Pt will orient to time and place given mod cues.   2. Pt will solve simple problem solving questions with 80% acc given mod cues.   3. Pt will complete simple categorization tasks with 70% acc given mod cues.   4. Pt will complete mod complex word finding tasks with 80% accy given min cues.  5. Pt will participate in assessment of reading, writing, and visual spatial skills.                         Plan:   Patient to be seen Therapy Frequency: 5 x/week   Plan of Care expires: 11/16/17  Plan of Care reviewed with: patient  SLP Follow-up?: Yes              ARNOL Iverson  10/19/2017

## 2017-10-20 ENCOUNTER — DOCUMENTATION ONLY (OUTPATIENT)
Dept: NEUROLOGY | Facility: CLINIC | Age: 77
End: 2017-10-20

## 2017-10-20 LAB
ALBUMIN SERPL BCP-MCNC: 2.8 G/DL
ALP SERPL-CCNC: 78 U/L
ALT SERPL W/O P-5'-P-CCNC: 69 U/L
ANION GAP SERPL CALC-SCNC: 10 MMOL/L
AST SERPL-CCNC: 39 U/L
BASOPHILS # BLD AUTO: 0.03 K/UL
BASOPHILS NFR BLD: 0.8 %
BILIRUB SERPL-MCNC: 0.8 MG/DL
BUN SERPL-MCNC: 30 MG/DL
CALCIUM SERPL-MCNC: 9.8 MG/DL
CHLORIDE SERPL-SCNC: 106 MMOL/L
CO2 SERPL-SCNC: 20 MMOL/L
CREAT SERPL-MCNC: 1.4 MG/DL
DIFFERENTIAL METHOD: ABNORMAL
EOSINOPHIL # BLD AUTO: 0.3 K/UL
EOSINOPHIL NFR BLD: 7 %
ERYTHROCYTE [DISTWIDTH] IN BLOOD BY AUTOMATED COUNT: 14.5 %
EST. GFR  (AFRICAN AMERICAN): 55.6 ML/MIN/1.73 M^2
EST. GFR  (NON AFRICAN AMERICAN): 48.1 ML/MIN/1.73 M^2
GLUCOSE SERPL-MCNC: 94 MG/DL
HCT VFR BLD AUTO: 41.3 %
HGB BLD-MCNC: 13.9 G/DL
IMM GRANULOCYTES # BLD AUTO: 0.01 K/UL
IMM GRANULOCYTES NFR BLD AUTO: 0.3 %
LYMPHOCYTES # BLD AUTO: 1 K/UL
LYMPHOCYTES NFR BLD: 28.2 %
MCH RBC QN AUTO: 29.5 PG
MCHC RBC AUTO-ENTMCNC: 33.7 G/DL
MCV RBC AUTO: 88 FL
MONOCYTES # BLD AUTO: 0.5 K/UL
MONOCYTES NFR BLD: 12.7 %
NEUTROPHILS # BLD AUTO: 1.8 K/UL
NEUTROPHILS NFR BLD: 51 %
NRBC BLD-RTO: 0 /100 WBC
PLATELET # BLD AUTO: 205 K/UL
PLATELET # BLD AUTO: ABNORMAL K/UL
PLATELET BLD QL SMEAR: ABNORMAL
PMV BLD AUTO: 10.7 FL
PMV BLD AUTO: ABNORMAL FL
POCT GLUCOSE: 170 MG/DL (ref 70–110)
POCT GLUCOSE: 84 MG/DL (ref 70–110)
POTASSIUM SERPL-SCNC: 4.3 MMOL/L
PROT SERPL-MCNC: 6.7 G/DL
RBC # BLD AUTO: 4.71 M/UL
SODIUM SERPL-SCNC: 136 MMOL/L
WBC # BLD AUTO: 3.55 K/UL

## 2017-10-20 PROCEDURE — 97535 SELF CARE MNGMENT TRAINING: CPT

## 2017-10-20 PROCEDURE — 99233 SBSQ HOSP IP/OBS HIGH 50: CPT | Mod: GC,,, | Performed by: PSYCHIATRY & NEUROLOGY

## 2017-10-20 PROCEDURE — 25000003 PHARM REV CODE 250: Performed by: PHYSICIAN ASSISTANT

## 2017-10-20 PROCEDURE — 20600001 HC STEP DOWN PRIVATE ROOM

## 2017-10-20 PROCEDURE — 25000003 PHARM REV CODE 250: Performed by: NURSE PRACTITIONER

## 2017-10-20 PROCEDURE — 85025 COMPLETE CBC W/AUTO DIFF WBC: CPT

## 2017-10-20 PROCEDURE — G8988 SELF CARE GOAL STATUS: HCPCS | Mod: CJ

## 2017-10-20 PROCEDURE — 85049 AUTOMATED PLATELET COUNT: CPT

## 2017-10-20 PROCEDURE — 25000003 PHARM REV CODE 250: Performed by: PSYCHIATRY & NEUROLOGY

## 2017-10-20 PROCEDURE — 80053 COMPREHEN METABOLIC PANEL: CPT

## 2017-10-20 PROCEDURE — A4216 STERILE WATER/SALINE, 10 ML: HCPCS | Performed by: PHYSICIAN ASSISTANT

## 2017-10-20 PROCEDURE — 92507 TX SP LANG VOICE COMM INDIV: CPT

## 2017-10-20 PROCEDURE — 36415 COLL VENOUS BLD VENIPUNCTURE: CPT

## 2017-10-20 PROCEDURE — 99233 SBSQ HOSP IP/OBS HIGH 50: CPT | Mod: GC,,, | Performed by: SURGERY

## 2017-10-20 PROCEDURE — G8989 SELF CARE D/C STATUS: HCPCS | Mod: CK

## 2017-10-20 PROCEDURE — 97110 THERAPEUTIC EXERCISES: CPT

## 2017-10-20 PROCEDURE — 99232 SBSQ HOSP IP/OBS MODERATE 35: CPT | Mod: ,,, | Performed by: NURSE PRACTITIONER

## 2017-10-20 PROCEDURE — 97532 *HC OT COG SKL DEV EA 15: CPT

## 2017-10-20 RX ORDER — DABIGATRAN ETEXILATE 150 MG/1
150 CAPSULE ORAL 2 TIMES DAILY
Status: DISCONTINUED | OUTPATIENT
Start: 2017-10-20 | End: 2017-10-21 | Stop reason: HOSPADM

## 2017-10-20 RX ADMIN — METOPROLOL TARTRATE 12.5 MG: 25 TABLET, FILM COATED ORAL at 12:10

## 2017-10-20 RX ADMIN — METOPROLOL TARTRATE 12.5 MG: 25 TABLET, FILM COATED ORAL at 05:10

## 2017-10-20 RX ADMIN — ATORVASTATIN CALCIUM 80 MG: 20 TABLET, FILM COATED ORAL at 08:10

## 2017-10-20 RX ADMIN — METOPROLOL TARTRATE 12.5 MG: 25 TABLET, FILM COATED ORAL at 11:10

## 2017-10-20 RX ADMIN — QUETIAPINE FUMARATE 25 MG: 25 TABLET, FILM COATED ORAL at 08:10

## 2017-10-20 RX ADMIN — DABIGATRAN ETEXILATE MESYLATE 150 MG: 150 CAPSULE ORAL at 08:10

## 2017-10-20 RX ADMIN — Medication 3 ML: at 11:10

## 2017-10-20 RX ADMIN — POLYETHYLENE GLYCOL 3350 17 G: 17 POWDER, FOR SOLUTION ORAL at 08:10

## 2017-10-20 RX ADMIN — TAMSULOSIN HYDROCHLORIDE 0.4 MG: 0.4 CAPSULE ORAL at 08:10

## 2017-10-20 NOTE — PROGRESS NOTES
[facility]  Vascular Surgery  Progress Note    Patient Name: Shant Magana  MRN: [id number]  Admission Date: 10/18/2017  Primary Care Provider: Trent Aldana MD    Subjective:     Interval History:No acute events overnight. Concern for fall risk overnight, no neuro changes.    Post-Op Info:  * No surgery found *           Medications:  Continuous Infusions:   sodium chloride 0.9%       Scheduled Meds:   acetylcysteine 200 mg/ml (20%)  600 mg Oral Q12H    atorvastatin  80 mg Oral Daily    dabigatran etexilate  150 mg Oral BID    metoprolol tartrate  12.5 mg Oral Q6H    polyethylene glycol  17 g Oral Daily    quetiapine  25 mg Oral QHS    sodium chloride 0.9%  3 mL Intravenous Q8H    tamsulosin  0.4 mg Oral Daily     PRN Meds:dextrose 50%, dextrose 50%, glucagon (human recombinant), glucose, glucose, insulin aspart, labetalol, senna-docusate 8.6-50 mg, sodium chloride 0.9%     Objective:     Vital Signs (Most Recent):  Temp: 98.2 °F (36.8 °C) (10/20/17 0411)  Pulse: (!) 59 (10/20/17 0700)  Resp: 16 (10/20/17 0411)  BP: 136/68 (10/20/17 0411)  SpO2: (!) 94 % (10/20/17 0411) Vital Signs (24h Range):  Temp:  [97.4 °F (36.3 °C)-98.6 °F (37 °C)] 98.2 °F (36.8 °C)  Pulse:  [54-71] 59  Resp:  [16-18] 16  SpO2:  [94 %-99 %] 94 %  BP: (136-204)/() 136/68          Physical Exam   Constitutional: He is oriented to person, place, and time. He appears well-developed and well-nourished.   HENT:   Head: Normocephalic and atraumatic.   Eyes: EOM are normal. Pupils are equal, round, and reactive to light.   Neck: Normal range of motion. Neck supple.   Cardiovascular: Normal rate and regular rhythm.    Pulmonary/Chest: Effort normal and breath sounds normal.   Abdominal: Soft. Bowel sounds are normal.   Musculoskeletal: Normal range of motion.   Bilateral 5/5 strength, no motor drift   Neurological: He is alert and oriented to person, place, and time.   Able to state where he lives, that he lives  alone, and his daughter is available as well as his son should we need to talk to them.    Skin: Skin is warm.   Psychiatric:   Flat affect   Nursing note and vitals reviewed.      Significant Labs:  CBC:   Recent Labs  Lab 10/20/17  0408   WBC 3.55*   RBC 4.71   HGB 13.9*   HCT 41.3   PLT SEE COMMENT   MCV 88   MCH 29.5   MCHC 33.7     CMP:   Recent Labs  Lab 10/20/17  0408   GLU 94   CALCIUM 9.8   ALBUMIN 2.8*   PROT 6.7      K 4.3   CO2 20*      BUN 30*   CREATININE 1.4   ALKPHOS 78   ALT 69*   AST 39   BILITOT 0.8       Significant Diagnostics:  I have reviewed all pertinent imaging results/findings within the past 24 hours.    Assessment/Plan:     * Acute ischemic multifocal multiple vascular territories stroke    78 yo M with recurrent bilateral hemispheric strokes with 90% L ICA stenosis.      -NIHSS 2; unknown cause for bilateral hemispheric strokes, d/w stroke neurology team with differential to include paraoxysmal atrial fibrillation, cardioembolic; hypercoaguable from occult malignancy  -Plan for anticoagulation, pradaxa per stroke neurology  -Workup for bilateral hemispheric strokes ongoing; L ICA stenosis asymptomatic, will need L CEA secondary to high grade stenosis but will defer plan for in hospital L CEA at this time        Monse Jackson DO  PGY-7, Vascular fellow   [phone]

## 2017-10-20 NOTE — ASSESSMENT & PLAN NOTE
Severely enlarged LA noted on ECHO  - clinical picture concerning for cardio-embolic strokes  - no known history of atrial arrhythmias  - on tele  - may need outpatient 30 days cardiac monitor

## 2017-10-20 NOTE — SUBJECTIVE & OBJECTIVE
Medications:  Continuous Infusions:   sodium chloride 0.9%       Scheduled Meds:   acetylcysteine 200 mg/ml (20%)  600 mg Oral Q12H    atorvastatin  80 mg Oral Daily    dabigatran etexilate  150 mg Oral BID    metoprolol tartrate  12.5 mg Oral Q6H    polyethylene glycol  17 g Oral Daily    quetiapine  25 mg Oral QHS    sodium chloride 0.9%  3 mL Intravenous Q8H    tamsulosin  0.4 mg Oral Daily     PRN Meds:dextrose 50%, dextrose 50%, glucagon (human recombinant), glucose, glucose, insulin aspart, labetalol, senna-docusate 8.6-50 mg, sodium chloride 0.9%     Objective:     Vital Signs (Most Recent):  Temp: 98.2 °F (36.8 °C) (10/20/17 0411)  Pulse: (!) 59 (10/20/17 0700)  Resp: 16 (10/20/17 0411)  BP: 136/68 (10/20/17 0411)  SpO2: (!) 94 % (10/20/17 0411) Vital Signs (24h Range):  Temp:  [97.4 °F (36.3 °C)-98.6 °F (37 °C)] 98.2 °F (36.8 °C)  Pulse:  [54-71] 59  Resp:  [16-18] 16  SpO2:  [94 %-99 %] 94 %  BP: (136-204)/() 136/68          Physical Exam   Constitutional: He is oriented to person, place, and time. He appears well-developed and well-nourished.   HENT:   Head: Normocephalic and atraumatic.   Eyes: EOM are normal. Pupils are equal, round, and reactive to light.   Neck: Normal range of motion. Neck supple.   Cardiovascular: Normal rate and regular rhythm.    Pulmonary/Chest: Effort normal and breath sounds normal.   Abdominal: Soft. Bowel sounds are normal.   Musculoskeletal: Normal range of motion.   Bilateral 5/5 strength, no motor drift   Neurological: He is alert and oriented to person, place, and time.   Able to state where he lives, that he lives alone, and his daughter is available as well as his son should we need to talk to them.    Skin: Skin is warm.   Psychiatric:   Flat affect   Nursing note and vitals reviewed.      Significant Labs:  CBC:   Recent Labs  Lab 10/20/17  0408   WBC 3.55*   RBC 4.71   HGB 13.9*   HCT 41.3   PLT SEE COMMENT   MCV 88   MCH 29.5   MCHC 33.7     CMP:    Recent Labs  Lab 10/20/17  0408   GLU 94   CALCIUM 9.8   ALBUMIN 2.8*   PROT 6.7      K 4.3   CO2 20*      BUN 30*   CREATININE 1.4   ALKPHOS 78   ALT 69*   AST 39   BILITOT 0.8       Significant Diagnostics:  I have reviewed all pertinent imaging results/findings within the past 24 hours.

## 2017-10-20 NOTE — PLAN OF CARE
Problem: Patient Care Overview  Goal: Plan of Care Review  Outcome: Ongoing (interventions implemented as appropriate)  Current plan of care and medication regimen reviewed with pt. And his family

## 2017-10-20 NOTE — ASSESSMENT & PLAN NOTE
78 y/o male with L ICA stenosis (> 90% based on CTA findings) that may be contributing to his imaging evidence of recent infarct    - Continue ASA, Plavix, high-dose statin  - Recommend aggressive BP control and DM control   - Given recommendations per vascular neurology, with ongoing workup for source of bilateral embolic infracts will defer CEA until source more clear, as such will have patient follow up with Dr. Perez as an outpatient in 4 weeks with bilateral carotid us  - Please call with questions or concerns

## 2017-10-20 NOTE — SUBJECTIVE & OBJECTIVE
Neurologic Chief Complaint: altered mental status    Subjective:     Interval History: Patient is seen for follow-up neurological assessment and treatment recommendations: No acute events overnight.     HPI, Past Medical, Family, and Social History remains the same as documented in the initial encounter.     Review of Systems   Constitutional: Negative for fatigue.   Eyes: Negative for visual disturbance.   Respiratory: Negative for shortness of breath.    Cardiovascular: Negative for chest pain.     Scheduled Meds:   atorvastatin  80 mg Oral Daily    dabigatran etexilate  150 mg Oral BID    metoprolol tartrate  12.5 mg Oral Q6H    polyethylene glycol  17 g Oral Daily    quetiapine  25 mg Oral QHS    sodium chloride 0.9%  3 mL Intravenous Q8H    tamsulosin  0.4 mg Oral Daily     Continuous Infusions:   sodium chloride 0.9%       PRN Meds:dextrose 50%, dextrose 50%, glucagon (human recombinant), glucose, glucose, insulin aspart, labetalol, senna-docusate 8.6-50 mg, sodium chloride 0.9%    Objective:     Vital Signs (Most Recent):  Temp: 98.5 °F (36.9 °C) (10/20/17 1524)  Pulse: 64 (10/20/17 1524)  Resp: 20 (10/20/17 1524)  BP: 123/74 (10/20/17 1524)  SpO2: 95 % (10/20/17 1524)  BP Location: Left arm    Vital Signs Range (Last 24H):  Temp:  [96.9 °F (36.1 °C)-98.6 °F (37 °C)]   Pulse:  [54-66]   Resp:  [16-20]   BP: (123-204)/()   SpO2:  [93 %-99 %]   BP Location: Left arm    Physical Exam   CV: Normal S1, S2 with no murmurs,gallops,rubs  Resp: Lungs CTA Bilaterally, Unlabored  Abdomen: NTND, BS normoactive x4 quads, soft  Neurological Exam:   LOC: alert and follows requests  Language: No aphasia  Speech: Dysarthria  EOM (CN III, IV, VI): Full/intact  Facial Movement (CN VII): lower weakness right lower  Shoulder/Neck (CN XI): SCM-Left: Normal ; SCM-Right: Normal ; Shoulder Shrug: Normal/Symetric  Tongue (CN XII): to midline  Motor*: Arm Left:  Normal (5/5), Leg Left:   Normal (5/5), Arm Right:   Normal  (5/5), Leg Right:   Normal (5/5)  Sensation: intact to light touch, temperature and vibration  Tone: Arm-Left: normal; Leg-Left: normal; Arm-Right: normal; Leg-Right: normal    NIH Stroke Scale:    Level of Consciousness: 0 - alert  LOC Questions: 1 - answers one correctly  LOC Commands: 0 - performs both correctly  Best Gaze: 0 - normal  Visual: 0 - no visual loss  Facial Palsy: 0 - normal  Motor Left Arm: 0 - no drift  Motor Right Arm: 0 - no drift  Motor Left Le - no drift  Motor Right Le - no drift  Limb Ataxia: 0 - absent  Sensory: 0 - normal  Best Language: 0 - no aphasia  Dysarthria: 1 - mild to moderate dysarthria  Extinction and Inattention: 0 - no neglect  NIH Stroke Scale Total: 2      Laboratory:  CMP:     Recent Labs  Lab 10/20/17  0408   CALCIUM 9.8   ALBUMIN 2.8*   PROT 6.7      K 4.3   CO2 20*      BUN 30*   CREATININE 1.4   ALKPHOS 78   ALT 69*   AST 39   BILITOT 0.8     BMP:     Recent Labs  Lab 10/20/17  0408      K 4.3      CO2 20*   BUN 30*   CREATININE 1.4   CALCIUM 9.8     CBC:     Recent Labs  Lab 10/20/17  0408 10/20/17  0810   WBC 3.55*  --    RBC 4.71  --    HGB 13.9*  --    HCT 41.3  --    PLT SEE COMMENT 205   MCV 88  --    MCH 29.5  --    MCHC 33.7  --      Lipid Panel:     Recent Labs  Lab 10/18/17  0956   CHOL 149   LDLCALC 82.0   HDL 44   TRIG 115     Coagulation:     Recent Labs  Lab 10/19/17  0407   INR 1.0   APTT 25.7     Platelet Aggregation Study: No results for input(s): PLTAGG, PLTAGINTERP, PLTAGREGLACO, ADPPLTAGGREG in the last 168 hours.  Hgb A1C: No results for input(s): HGBA1C in the last 168 hours.  TSH:     Recent Labs  Lab 10/18/17  0956   TSH 3.674       Diagnostic Results:  I have personally reviewed:   Findings:      MRI brain WO contrast (10/18/17)  MRI on the left from 10/18 and on the right from 10/12. Comparing -- interval development of acute infarcts involving right cerebellar and right temporal lobe.     MRI brain WO contrast  (10/12/17)  Multifocal punctuate infarcts involving left occipital, left corona radiata, right inferior frontal and right parasagittal frontal regions.     CTA head and head (10/18/17)  Extracranial atherosclerosis involving bilateral ICA and left common carotid; moderate to severe L ICA stenosis. Extracranial atherosclerosis involving bilateral vertebral arteries and stenosis in V2 segments (R worse than L

## 2017-10-20 NOTE — PT/OT/SLP PROGRESS
"Speech Language Pathology  Treatment    Shant Magana   MRN: [id number]   Admitting Diagnosis: Acute ischemic multifocal multiple vascular territories stroke    Diet recommendations: Solid Diet Level: Regular  Liquid Diet Level: Thin Feed only when awake/alert, HOB to 90 degrees, Small bites/sips, 1 bite/sip at a time, Meds whole 1 at a time, Eliminate distractions and Avoid talking while eating    SLP Treatment Date: 10/20/17  Speech Start Time: 1155     Speech Stop Time: 1210     Speech Total (min): 15 min       TREATMENT BILLABLE MINUTES:  Speech Therapy Individual 15    Has the patient been evaluated by SLP for swallowing? : Yes  Keep patient NPO?: No   General Precautions: Standard, aspiration, fall          Subjective:  "They need to be talking to me!"    Pain/Comfort  Pain Rating 1: 0/10  Pain Rating Post-Intervention 1: 0/10    Objective:   Patient found with: telemetry, peripheral IV, bed alarm    Pt alert with family member/friend at bedside when SLP entered.  Friend reported she was getting ready to leave though pt became very upset and tried to leave too.  Decreased awareness of deficit/safety awareness evident.  Pt reports frustration that he is unaware of POC.  Friend reports team spoke with pt but he does not recall.  Ongoing education re: POC and ongoing tx services provided.  Pt verbalized agreement with tx session.  He oriented to type of facility, self and month indptly.  Min A for year.  Simple convergent categorization completed with 305 accy indptly and 80% given min-mod A.  Visual deficits evident.  Pt unable to read large print word/sentences reporting, "I can;t see much of nothing.  It all runs together."  2/6 large print shapes identified at midline only despite max A.  Bed alarm turned on at end of session.  Safety considerations reviewed and call button handed to pt.  White board remains accurate.           Assessment:  Shant Magana is a 77 y.o. male with a medical diagnosis of Acute ischemic " multifocal multiple vascular territories stroke and presents with cognitive linguistic impairment, visual spatial impairment.    Discharge recommendations: Discharge Facility/Level Of Care Needs: outpatient speech therapy     Goals:    SLP Goals        Problem: SLP Goal    Goal Priority Disciplines Outcome   SLP Goal     SLP Ongoing (interventions implemented as appropriate)   Description:  Short Term Goals:   Goals expected to be met by 10/25  1. Pt will participate in a speech, language, and cognitive evaluation with possible updated goals to follow pending results. -met     Goals expected to be met by 10/26:   1. Pt will orient to time and place given mod cues.   2. Pt will solve simple problem solving questions with 80% acc given mod cues.   3. Pt will complete simple categorization tasks with 70% acc given mod cues.   4. Pt will complete mod complex word finding tasks with 80% accy given min cues.  5. Pt will participate in assessment of reading, writing, and visual spatial skills.                         Plan:   Patient to be seen Therapy Frequency: 5 x/week   Plan of Care expires: 11/16/17  Plan of Care reviewed with: patient  SLP Follow-up?: Yes              NEVIN Regalado, CCC-SLP  10/20/2017

## 2017-10-20 NOTE — PLAN OF CARE
Problem: SLP Goal  Goal: SLP Goal  Short Term Goals:   Goals expected to be met by 10/25  1. Pt will participate in a speech, language, and cognitive evaluation with possible updated goals to follow pending results. -met     Goals expected to be met by 10/26:   1. Pt will orient to time and place given mod cues.   2. Pt will solve simple problem solving questions with 80% acc given mod cues.   3. Pt will complete simple categorization tasks with 70% acc given mod cues.   4. Pt will complete mod complex word finding tasks with 80% accy given min cues.  5. Pt will participate in assessment of reading, writing, and visual spatial skills.       Outcome: Ongoing (interventions implemented as appropriate)  Goals remain appropriate.  Cont POC. NEVIN Regalado, CCC/SLP  10/20/2017

## 2017-10-20 NOTE — PROGRESS NOTES
Patient refused TCD Emboli Detection. Sonographer spoke with Dr. Gonzalez and he cancelled the test.    GEENA Paniagua  Registered Vascular Sonographer  [id number]

## 2017-10-20 NOTE — PLAN OF CARE
Pt. remains confused this am. Not medically stable for d/c.  Plan is for CEA next week. SW/CM will continue to follow.

## 2017-10-20 NOTE — PROGRESS NOTES
[facility]  Vascular Neurology  Comprehensive Stroke Center  Progress Note    Assessment/Plan:     No notes on file    * Acute ischemic multifocal multiple vascular territories stroke    Mr. Magana is a 78 yo man with PMHx HTN, hypertrophic cardiomyopathy, CKD, DM, Prostate CA, prior stroke in July, with recent admission to [facility] (D/c date 10/16/17) for acute stroke who presented after daughter finding pt had dressed himself improperly and was not acting like himself. Aphasia largely resolved upon arrival to our facility. CTA showing moderate high-grade stenosis of R M2, L ICA stenosis, and bilateral vertebral artery stenosis (R worse than left). MRI Brain with acute scattered bilateral infarcts. Etiology likely cardioembolic. In addition, pt has diffuse atherosclerotic disease. ESR, CRP mildly elevated; less concerned for vasculitis at this time. Possible that new cerebellar stroke is related to diseased right vertebral however whole clinical picture with history of strokes involving multiple vascular territories; likely that this is cardio-embolic. ESUS. ECHO remarkable for severely enlarged LA; no evidence of PFO on ESDRAS. Given patient was on dual antiplatelets and had recurrent strokes despite this, patient to be started on full anti-coagulation with Eliquis. Will resume aspirin in the setting of large vessel atherosclerosis as also cause of stroke. Recommend 30 day event monitor to look for arrhythmia to help with diagnosis although would not .     Antiplatelets: hold pending possible L CEA   Statins: Atorvastatin 80mg qd (treated LDL 82)   SBP <180  VTE ppx: full anticoagulation on pradaxa. SCDs  Diagnostics: TCD for emboli detection ordered but pt. Refused.    PT/OT and speech - PT/OT recommending home. Per SLP - on regular diet with thin liquids.   Neuro checks q4h        Enlarged LA (left atrium)    Severely enlarged LA noted on ECHO  - clinical picture concerning for  cardio-embolic strokes  - no known history of atrial arrhythmias  - on tele  - may need outpatient 30 days cardiac monitor         Chronic diastolic congestive heart failure    Noted on ECHO - grade 1; EF 60%  - lopressor 12.5mg q6h        Cytotoxic cerebral edema    2/2 stroke  Evident on imaging        CKD (chronic kidney disease) stage 3, GFR 30-59 ml/min    -- At Baseline Cr 1.3-1.8            Mixed hyperlipidemia    Stroke risk factor  LDL 82  Continue home Atorvastatin 80mg qd        Internal carotid artery stenosis, left    -- 60-70% Left proximal ICA stenosis seen on CTA  -- Due to bilateral nature of acute strokes, do not believe carotid to be the cause  -- DDx include paraoxysmal atrial fibrillation, cardioembolic; hypercoaguable from occult malignancy  -- Plan for anticoagulation, pradaxa   --per vascular note:   Workup for bilateral hemispheric strokes ongoing; L ICA stenosis asymptomatic, will need L CEA secondary to high grade stenosis but will defer plan for in hospital L CEA at this time        Type 2 diabetes mellitus with complication, without long-term current use of insulin    Stroke risk factor  Hba1c 6.2%  On Metformin at home  POCT glucose 120-150 in the past 24 hours.   SSI while inpatient.        Essential hypertension    Stroke risk factor  SBP <180  Lopressor 12.5mg q6h              Neurologic Chief Complaint: altered mental status    Subjective:     Interval History: Patient is seen for follow-up neurological assessment and treatment recommendations: No acute events overnight.     HPI, Past Medical, Family, and Social History remains the same as documented in the initial encounter.     Review of Systems   Constitutional: Negative for fatigue.   Eyes: Negative for visual disturbance.   Respiratory: Negative for shortness of breath.    Cardiovascular: Negative for chest pain.     Scheduled Meds:   atorvastatin  80 mg Oral Daily    dabigatran etexilate  150 mg Oral BID    metoprolol  tartrate  12.5 mg Oral Q6H    polyethylene glycol  17 g Oral Daily    quetiapine  25 mg Oral QHS    sodium chloride 0.9%  3 mL Intravenous Q8H    tamsulosin  0.4 mg Oral Daily     Continuous Infusions:   sodium chloride 0.9%       PRN Meds:dextrose 50%, dextrose 50%, glucagon (human recombinant), glucose, glucose, insulin aspart, labetalol, senna-docusate 8.6-50 mg, sodium chloride 0.9%    Objective:     Vital Signs (Most Recent):  Temp: 98.5 °F (36.9 °C) (10/20/17 1524)  Pulse: 64 (10/20/17 1524)  Resp: 20 (10/20/17 152)  BP: 123/74 (10/20/17 1524)  SpO2: 95 % (10/20/17 152)  BP Location: Left arm    Vital Signs Range (Last 24H):  Temp:  [96.9 °F (36.1 °C)-98.6 °F (37 °C)]   Pulse:  [54-66]   Resp:  [16-20]   BP: (123-204)/()   SpO2:  [93 %-99 %]   BP Location: Left arm    Physical Exam   CV: Normal S1, S2 with no murmurs,gallops,rubs  Resp: Lungs CTA Bilaterally, Unlabored  Abdomen: NTND, BS normoactive x4 quads, soft  Neurological Exam:   LOC: alert and follows requests  Language: No aphasia  Speech: Dysarthria  EOM (CN III, IV, VI): Full/intact  Facial Movement (CN VII): lower weakness right lower  Shoulder/Neck (CN XI): SCM-Left: Normal ; SCM-Right: Normal ; Shoulder Shrug: Normal/Symetric  Tongue (CN XII): to midline  Motor*: Arm Left:  Normal (5/5), Leg Left:   Normal (5/5), Arm Right:   Normal (5/5), Leg Right:   Normal (5/5)  Sensation: intact to light touch, temperature and vibration  Tone: Arm-Left: normal; Leg-Left: normal; Arm-Right: normal; Leg-Right: normal    NIH Stroke Scale:    Level of Consciousness: 0 - alert  LOC Questions: 1 - answers one correctly  LOC Commands: 0 - performs both correctly  Best Gaze: 0 - normal  Visual: 0 - no visual loss  Facial Palsy: 0 - normal  Motor Left Arm: 0 - no drift  Motor Right Arm: 0 - no drift  Motor Left Le - no drift  Motor Right Le - no drift  Limb Ataxia: 0 - absent  Sensory: 0 - normal  Best Language: 0 - no aphasia  Dysarthria: 1 -  mild to moderate dysarthria  Extinction and Inattention: 0 - no neglect  NIH Stroke Scale Total: 2      Laboratory:  CMP:     Recent Labs  Lab 10/20/17  0408   CALCIUM 9.8   ALBUMIN 2.8*   PROT 6.7      K 4.3   CO2 20*      BUN 30*   CREATININE 1.4   ALKPHOS 78   ALT 69*   AST 39   BILITOT 0.8     BMP:     Recent Labs  Lab 10/20/17  0408      K 4.3      CO2 20*   BUN 30*   CREATININE 1.4   CALCIUM 9.8     CBC:     Recent Labs  Lab 10/20/17  0408 10/20/17  0810   WBC 3.55*  --    RBC 4.71  --    HGB 13.9*  --    HCT 41.3  --    PLT SEE COMMENT 205   MCV 88  --    MCH 29.5  --    MCHC 33.7  --      Lipid Panel:     Recent Labs  Lab 10/18/17  0956   CHOL 149   LDLCALC 82.0   HDL 44   TRIG 115     Coagulation:     Recent Labs  Lab 10/19/17  0407   INR 1.0   APTT 25.7     Platelet Aggregation Study: No results for input(s): PLTAGG, PLTAGINTERP, PLTAGREGLACO, ADPPLTAGGREG in the last 168 hours.  Hgb A1C: No results for input(s): HGBA1C in the last 168 hours.  TSH:     Recent Labs  Lab 10/18/17  0956   TSH 3.674       Diagnostic Results:  I have personally reviewed:   Findings:      MRI brain WO contrast (10/18/17)  MRI on the left from 10/18 and on the right from 10/12. Comparing -- interval development of acute infarcts involving right cerebellar and right temporal lobe.     MRI brain WO contrast (10/12/17)  Multifocal punctuate infarcts involving left occipital, left corona radiata, right inferior frontal and right parasagittal frontal regions.     CTA head and head (10/18/17)  Extracranial atherosclerosis involving bilateral ICA and left common carotid; moderate to severe L ICA stenosis. Extracranial atherosclerosis involving bilateral vertebral arteries and stenosis in V2 segments (R worse than L      Elie Orozco MD  [facility]  Department of Vascular Neurology   [facility]

## 2017-10-20 NOTE — ASSESSMENT & PLAN NOTE
78 y/o male with L ICA stenosis (> 90% based on CTA findings) that may be contributing to his imaging evidence of recent infarct    - Recommend echo with bubble study given patient's imaging evidence of multiple small embolic infarcts bilaterally and lack of any right-sided localized deficits  - Continue ASA, Plavix, high-dose statin  - Recommend aggressive BP control and DM control   - Tentative plan for L CEA on Wednesday (10/25/17)  - Will continue to follow

## 2017-10-20 NOTE — PT/OT/SLP PROGRESS
"Occupational Therapy  Treatment    Shant Magana   MRN: [id number]   Admitting Diagnosis: Acute ischemic multifocal multiple vascular territories stroke    OT Date of Treatment: 10/20/17   OT Start Time: 0600  OT Stop Time: 0638  OT Total Time (min): 38 min    Billable Minutes:  Self Care/Home Management 10, Therapeutic Exercise 15 and Cognitive Retraining 13    General Precautions: Standard, aspiration, fall  Orthopedic Precautions: N/A  Braces: N/A    Do you have any cultural, spiritual, Hoahaoism conflicts, given your current situation?: Yarsani    Subjective:  Communicated with nurse prior to session.  Patient: "I feel good."  Pain/Comfort  Pain Rating 1: 0/10  Pain Rating Post-Intervention 1: 0/10    Objective:  Patient found with: telemetry, peripheral IV   Family not present.  Sitter present.    Functional Mobility:  Bed Mobility:  Rolling/Turning to Left: Modified independent  Rolling/Turning Right: Modified independent  Scooting/Bridging: Modified Independent  Supine to Sit: Modified Independent  Sit to Supine: Modified Independent    Transfers:   Sit <> Stand Assistance: Supervision  Sit <> Stand Assistive Device: No Assistive Device  Bed <> Chair Technique: Stand Pivot  Bed <> Chair Transfer Assistance: Supervision  Bed <> Chair Assistive Device: No Assistive Device    Activities of Daily Living:  UE Dressing Level of Assistance: Supervision  LE Dressing Level of Assistance: Supervision  Grooming Position: Standing  Grooming Level of Assistance: Supervision  Toileting Where Assessed: Toilet  Toileting Level of Assistance: Supervision       Additional Treatment:   Patient education provided on role of OT and need for supervision post discharge with outpt OT.  Patient instructed on need to call for assistance for toileting needs and when getting up.  Continued education, patient/ family training recommended. Patient alert and oriented x 3; able to follow 4/4 one step commands.  Patient attentive and interactive " "throughout the session.  Patient able to identify 5/5 body parts.  Able to name 5/5 objects.  Able to sequence 7/7 days of the week and 12/12 months of the year. Able to identify 5 colors and 4 animals each within 30 second time frame.  Left neglect noted; keeping head turned to the right primarily.  Left UE AAROM performed one set x 10 rep in all planes of motion.  Patient's functional status and disposition recommendation discussed with stroke team in daily rounds.  White board updated in patient's room.  OT asked if there were any other questions; patient/ family had no further questions.     AM-PAC 6 CLICK ADL   How much help from another person does this patient currently need?   1 = Unable, Total/Dependent Assistance  2 = A lot, Maximum/Moderate Assistance  3 = A little, Minimum/Contact Guard/Supervision  4 = None, Modified [place]/Independent    Putting on and taking off regular lower body clothing? : 3  Bathing (including washing, rinsing, drying)?: 3  Toileting, which includes using toilet, bedpan, or urinal? : 3  Putting on and taking off regular upper body clothing?: 3  Taking care of personal grooming such as brushing teeth?: 3  Eating meals?: 3  Total Score: 18     AM-PAC Raw Score CMS "G-Code Modifier Level of Impairment Assistance   6 % Total / Unable   7 - 8 CM 80 - 100% Maximal Assist   9-13 CL 60 - 80% Moderate Assist   14 - 19 CK 40 - 60% Moderate Assist   20 - 22 CJ 20 - 40% Minimal Assist   23 CI 1-20% SBA / CGA   24 CH 0% Independent/ Mod I       Patient left supine with all lines intact and call button in reach    ASSESSMENT:  Shant Magana is a 77 y.o. male with a medical diagnosis of Acute ischemic multifocal multiple vascular territories stroke and presents with performance deficits of physical skills including impaired  balance, mobility, strength, and endurance; demonstrating performance deficits of cognitive skills including impaired problem solving, sequencing and memory all " resulting in inability organizing occupational performance in a timely and safe manner.  These performance deficits have resulted in activity limitations including but not limited to:  transfers, ascending/ descending stairs, walking short and long distances, walking around obstacles, transitional movement patterns (kneeling, bending); lower body dressing, toileting, bathing, carrying objects, driving, hunting and fishing.   Patient's role as father, grandfather and independent caretaker for self has been affected. Patient will benefit from skilled OT services to maximize level of independence with self-care skills and functional mobility.     Rehab identified problem list/impairments: Rehab identified problem list/impairments: weakness, impaired functional mobilty, impaired balance, decreased safety awareness, impaired cognition    Rehab potential is good.    Activity tolerance: Good    Discharge recommendations: Discharge Facility/Level Of Care Needs: outpatient OT (needs supervision at home)     Barriers to discharge: Barriers to Discharge: None    Equipment recommendations: none     GOALS:    Occupational Therapy Goals        Problem: Occupational Therapy Goal    Goal Priority Disciplines Outcome Interventions   Occupational Therapy Goal     OT, PT/OT     Description:  Goals achieved for toileting, transfers, following commands and LB dressing.  Goals revised 10/19 to be addressed in 7 days, expiring: 10/26  Patient will demonstrate stand pivot transfers with modified independence.   Not met  Patient will demonstrate grooming while standing with modified independence.   Not met  Patient will demonstrate upper body dressing with modified independence.   Not met  Patient will demonstrate lower body dressing with modified independence.   Not met  Patient will demonstrate toileting with modified independence.   Not met  Patient will demonstrate bathing while seated EOB with modified independence.   Not met  Patient  will demonstrate ability to follow 5/5 commands.   Not met  Patient's family / caregiver will demonstrate independence and safety with assisting patient with self-care skills and functional mobility.     Not met  Patient and/or patient's family will verbalize understanding of stroke prevention guidelines, personal risk factors and stroke warning signs via teachback method.  Not met                           Plan:  Patient to be seen 4 x/week to address the above listed problems via self-care/home management, therapeutic activities, therapeutic exercises, neuromuscular re-education, cognitive retraining, sensory integration  Plan of Care expires: 11/16/17  Plan of Care reviewed with: patient         Wendy JASPER Muñoz  10/20/2017

## 2017-10-20 NOTE — PROGRESS NOTES
[facility]  Physical Medicine & Rehab  Progress Note    Patient Name: Shant Magana  MRN: [id number]  Admission Date: 10/18/2017  Length of Stay: 2 days  Attending Physician: Bhaskar Gonzalez MD  Primary Care Provider: Trent Aldana MD    Subjective:     Principal Problem:Acute ischemic multifocal multiple vascular territories stroke    Hospital Course:   10/18/17: Evaluated by therapy.  Bed mobility Luis M .  Sit to stand CGA.  Ambulated 6 ft Min-ModA.  LBD CGA.  10/19/17: Participated.  Bed mobility (I).  Sit to stanD and transfers (I).  Ambulated 150 ft SV.  UBD SBA and LBD SBA. SLP noting Cognitive-linguistic impairment.       Past Medical History:   Diagnosis Date    Cancer     prostate    Diabetes mellitus     Hypertension     Hypertrophic cardiomyopathy     Renal disorder      Past Surgical History:   Procedure Laterality Date    BACK SURGERY      THYROIDECTOMY       Review of patient's allergies indicates:   Allergen Reactions    Hydralazine analogues Diarrhea       Scheduled Medications:    atorvastatin  80 mg Oral Daily    dabigatran etexilate  150 mg Oral BID    metoprolol tartrate  12.5 mg Oral Q6H    polyethylene glycol  17 g Oral Daily    quetiapine  25 mg Oral QHS    sodium chloride 0.9%  3 mL Intravenous Q8H    tamsulosin  0.4 mg Oral Daily       PRN Medications: dextrose 50%, dextrose 50%, glucagon (human recombinant), glucose, glucose, insulin aspart, labetalol, senna-docusate 8.6-50 mg, sodium chloride 0.9%    Family History     None        Social History Main Topics    Smoking status: Former Smoker     Types: Cigarettes    Smokeless tobacco: Never Used    Alcohol use Yes    Drug use: Unknown    Sexual activity: Not on file     Review of Systems   Constitutional: Negative for chills, fatigue and fever.   HENT: Negative for trouble swallowing and voice change.    Eyes: Negative for photophobia and visual disturbance.   Respiratory: Negative for cough, shortness  of breath and wheezing.    Cardiovascular: Negative for chest pain and palpitations.   Gastrointestinal: Negative for abdominal distention and nausea.   Genitourinary: Negative for difficulty urinating and flank pain.   Musculoskeletal: Positive for gait problem. Negative for arthralgias and myalgias.   Skin: Negative for color change and rash.   Neurological: Positive for facial asymmetry. Negative for speech difficulty, weakness, numbness and headaches.   Psychiatric/Behavioral: Positive for confusion. Negative for agitation.     Objective:     Vital Signs (Most Recent):  Temp: 96.9 °F (36.1 °C) (10/20/17 0851)  Pulse: 61 (10/20/17 0851)  Resp: 18 (10/20/17 0851)  BP: (!) 179/88 (10/20/17 0851)  SpO2: (!) 93 % (10/20/17 0851)    Vital Signs (24h Range):  Temp:  [96.9 °F (36.1 °C)-98.6 °F (37 °C)] 96.9 °F (36.1 °C)  Pulse:  [54-71] 61  Resp:  [16-18] 18  SpO2:  [93 %-99 %] 93 %  BP: (136-204)/() 179/88     Body mass index is 29.57 kg/m².    Physical Exam   Constitutional: He appears well-developed and well-nourished.   HENT:   Head: Normocephalic and atraumatic.   Eyes: EOM are normal. Pupils are equal, round, and reactive to light.   Neck: Neck supple.   Cardiovascular: Normal rate and regular rhythm.    Pulmonary/Chest: Effort normal. No respiratory distress.   Abdominal: Soft. There is no tenderness.   Musculoskeletal: Normal range of motion. He exhibits no deformity.   Neurological:  -  Mental Status:  AAOx2 (person and place, but incorrect city).  Follows commands.  Answers correct age and .  Recent and remote memory intact.  Recalls 3/3 objects.    -  Speech and language: + mild dysarthria.    -  Facial movement (CN VII): asymmetric   -  Coordination:  Finger to nose exam:  RUE normal, LUE mild dysmetria.   -  Motor:   RUE: 5/5, 5/5 .  LUE: 5/5, 5/5 .  RLE: 5/5, DF 5/5, PF 5/5.  LLE: 4+/5, DF 5/5, PF 5/5  -  pronator drift. negative  -  Tone:  normal  -  Sensory:  Intact to light touch and  pin prick.  Skin: Skin is warm and dry.   Psychiatric: He has a normal mood and affect. His behavior is normal. Cognition and memory are impaired.       Diagnostic Results:   Labs: Reviewed  CT: Reviewed  MRI: Reviewed  Assessment/Plan:      * Acute ischemic multifocal multiple vascular territories stroke    Functional status: see hospital course  Cognitive/Speech/Language status:  Cognitive-linguistic impairment noted.   Nutrition/Swallow Status:  Passed bedside swallow evaluation.  SLP recommended regular diet and thin liquids.    Recommendations  -  Encourage mobility, OOB in chair at least 3 hours per day, and early ambulation as appropriate   -  PT/OT evaluate and treat  -  SLP speech and cognitive evaluate and treat  -  Monitor sleep disturbances and establish consistent sleep-wake cycle  -  Monitor for bowel and bladder dysfunction  -  Monitor for shoulder pain and subluxation  -  Monitor for spasticity  -  Monitor for and prevent skin breakdown and pressure ulcers  · Early mobility, repositioning/weight shifting every 20-30 minutes when sitting, turn patient every 2 hours, proper mattress/overlay and chair cushioning, pressure relief/heel protector boots  -  DVT prophylaxis:  [facility]   -  Reviewed discharge options (IP rehab, SNF, HH therapy, and OP therapy)          Internal carotid artery stenosis, left    -noted on CTA  -VN following, no intervention performed           Patient is high level. (I) with with bed mobility, transfers, and ambulated 150 ft SV with PT.  PT has signed off. Recommend outpatient therapy for OT & SLP therapy for cognitive-linguistic impairment.  Agree with therapy recommendations for home with 24 hour supervision.  Will sign off.  Please call with questions/concerns or re-consult if situation changes.    Mary Rojas NP  Department of Physical Medicine & Rehab   [facility]

## 2017-10-20 NOTE — PLAN OF CARE
Problem: Occupational Therapy Goal  Goal: Occupational Therapy Goal  Goals achieved for toileting, transfers, following commands and LB dressing.  Goals revised 10/19 to be addressed in 7 days, expiring: 10/26  Patient will demonstrate stand pivot transfers with modified independence.   Not met  Patient will demonstrate grooming while standing with modified independence.   Not met  Patient will demonstrate upper body dressing with modified independence.   Not met  Patient will demonstrate lower body dressing with modified independence.   Not met  Patient will demonstrate toileting with modified independence.   Not met  Patient will demonstrate bathing while seated EOB with modified independence.   Not met  Patient will demonstrate ability to follow 5/5 commands.   Not met  Patient's family / caregiver will demonstrate independence and safety with assisting patient with self-care skills and functional mobility.     Not met  Patient and/or patient's family will verbalize understanding of stroke prevention guidelines, personal risk factors and stroke warning signs via teachback method.  Not met          Goals remain appropriate.  JASPER Casas  10/20/2017

## 2017-10-20 NOTE — PROGRESS NOTES
Vascular Surgery Progress note.       Daytime changes reviewed.     Changed Eliquis to Lovenox for anticipated CEA on wed.     Thank you.     Angelo Ibrahim MD  Vascular/Endovascular Surgery Fellow

## 2017-10-20 NOTE — ASSESSMENT & PLAN NOTE
Functional status: see hospital course  Cognitive/Speech/Language status:  Cognitive-linguistic impairment noted.   Nutrition/Swallow Status:  Passed bedside swallow evaluation.  SLP recommended regular diet and thin liquids.    Recommendations  -  Encourage mobility, OOB in chair at least 3 hours per day, and early ambulation as appropriate   -  PT/OT evaluate and treat  -  SLP speech and cognitive evaluate and treat  -  Monitor sleep disturbances and establish consistent sleep-wake cycle  -  Monitor for bowel and bladder dysfunction  -  Monitor for shoulder pain and subluxation  -  Monitor for spasticity  -  Monitor for and prevent skin breakdown and pressure ulcers  · Early mobility, repositioning/weight shifting every 20-30 minutes when sitting, turn patient every 2 hours, proper mattress/overlay and chair cushioning, pressure relief/heel protector boots  -  DVT prophylaxis:  [facility]   -  Reviewed discharge options (IP rehab, SNF, HH therapy, and OP therapy)

## 2017-10-20 NOTE — SUBJECTIVE & OBJECTIVE
Past Medical History:   Diagnosis Date    Cancer     prostate    Diabetes mellitus     Hypertension     Hypertrophic cardiomyopathy     Renal disorder      Past Surgical History:   Procedure Laterality Date    BACK SURGERY      THYROIDECTOMY       Review of patient's allergies indicates:   Allergen Reactions    Hydralazine analogues Diarrhea       Scheduled Medications:    atorvastatin  80 mg Oral Daily    dabigatran etexilate  150 mg Oral BID    metoprolol tartrate  12.5 mg Oral Q6H    polyethylene glycol  17 g Oral Daily    quetiapine  25 mg Oral QHS    sodium chloride 0.9%  3 mL Intravenous Q8H    tamsulosin  0.4 mg Oral Daily       PRN Medications: dextrose 50%, dextrose 50%, glucagon (human recombinant), glucose, glucose, insulin aspart, labetalol, senna-docusate 8.6-50 mg, sodium chloride 0.9%    Family History     None        Social History Main Topics    Smoking status: Former Smoker     Types: Cigarettes    Smokeless tobacco: Never Used    Alcohol use Yes    Drug use: Unknown    Sexual activity: Not on file     Review of Systems   Constitutional: Negative for chills, fatigue and fever.   HENT: Negative for trouble swallowing and voice change.    Eyes: Negative for photophobia and visual disturbance.   Respiratory: Negative for cough, shortness of breath and wheezing.    Cardiovascular: Negative for chest pain and palpitations.   Gastrointestinal: Negative for abdominal distention and nausea.   Genitourinary: Negative for difficulty urinating and flank pain.   Musculoskeletal: Positive for gait problem. Negative for arthralgias and myalgias.   Skin: Negative for color change and rash.   Neurological: Positive for facial asymmetry. Negative for speech difficulty, weakness, numbness and headaches.   Psychiatric/Behavioral: Positive for confusion. Negative for agitation.     Objective:     Vital Signs (Most Recent):  Temp: 96.9 °F (36.1 °C) (10/20/17 0851)  Pulse: 61 (10/20/17 0851)  Resp:  18 (10/20/17 0851)  BP: (!) 179/88 (10/20/17 0851)  SpO2: (!) 93 % (10/20/17 0851)    Vital Signs (24h Range):  Temp:  [96.9 °F (36.1 °C)-98.6 °F (37 °C)] 96.9 °F (36.1 °C)  Pulse:  [54-71] 61  Resp:  [16-18] 18  SpO2:  [93 %-99 %] 93 %  BP: (136-204)/() 179/88     Body mass index is 29.57 kg/m².    Physical Exam   Constitutional: He appears well-developed and well-nourished.   HENT:   Head: Normocephalic and atraumatic.   Eyes: EOM are normal. Pupils are equal, round, and reactive to light.   Neck: Neck supple.   Cardiovascular: Normal rate and regular rhythm.    Pulmonary/Chest: Effort normal. No respiratory distress.   Abdominal: Soft. There is no tenderness.   Musculoskeletal: Normal range of motion. He exhibits no deformity.   Neurological: No sensory deficit.     -  Mental Status:  AAOx2 (person and place, but incorrect city).  Follows commands.  Answers correct age and .  Recent and remote memory intact.  Recalls 3/3 objects.    -  Speech and language: + mild dysarthria.    -  Facial movement (CN VII): asymmetric   -  Coordination:  Finger to nose exam:  RUE normal, LUE mild dysmetria.   -  Motor:   RUE: 5/5, 5/5 .  LUE: 5/5, 5/5 .  RLE: 5/5, DF 5/5, PF 5/5.  LLE: 4+/5, DF 5/5, PF 5/5  -  pronator drift. negative  -  Tone:  normal  -  Sensory:  Intact to light touch and pin prick.         Skin: Skin is warm and dry.   Psychiatric: He has a normal mood and affect. His behavior is normal. Cognition and memory are impaired.     NEUROLOGICAL EXAMINATION:     CRANIAL NERVES     CN III, IV, VI   Pupils are equal, round, and reactive to light.  Extraocular motions are normal.       Diagnostic Results:   Labs: Reviewed  CT: Reviewed  MRI: Reviewed

## 2017-10-20 NOTE — ASSESSMENT & PLAN NOTE
-- 60-70% Left proximal ICA stenosis seen on CTA  -- Due to bilateral nature of acute strokes, do not believe carotid to be the cause  -- DDx include paraoxysmal atrial fibrillation, cardioembolic; hypercoaguable from occult malignancy  -- Plan for anticoagulation, pradaxa   --per vascular note:   Workup for bilateral hemispheric strokes ongoing; L ICA stenosis asymptomatic, will need L CEA secondary to high grade stenosis but will defer plan for in hospital L CEA at this time

## 2017-10-20 NOTE — NURSING
"Pt seen OOB without assistance. Pt educated on importance of asking for assistance. Pt family and OC both informed that this pt is a fall risk and that there needs to be someone with him at all times d/t risk of injury. Family stated "i will be with him until his daughter comes." family stated understanding of pts risk of falling while unsupervised especially since the pt has had some confused speech this morning. Will continue to monitor. Call bell intact and in reach with bed alarm set.   "

## 2017-10-21 VITALS
WEIGHT: 212 LBS | HEART RATE: 61 BPM | BODY MASS INDEX: 29.68 KG/M2 | DIASTOLIC BLOOD PRESSURE: 92 MMHG | TEMPERATURE: 98 F | HEIGHT: 71 IN | OXYGEN SATURATION: 99 % | RESPIRATION RATE: 16 BRPM | SYSTOLIC BLOOD PRESSURE: 168 MMHG

## 2017-10-21 LAB — POCT GLUCOSE: 87 MG/DL (ref 70–110)

## 2017-10-21 PROCEDURE — 25000003 PHARM REV CODE 250: Performed by: PHYSICIAN ASSISTANT

## 2017-10-21 PROCEDURE — 25000003 PHARM REV CODE 250: Performed by: PSYCHIATRY & NEUROLOGY

## 2017-10-21 PROCEDURE — A4216 STERILE WATER/SALINE, 10 ML: HCPCS | Performed by: PHYSICIAN ASSISTANT

## 2017-10-21 PROCEDURE — 99233 SBSQ HOSP IP/OBS HIGH 50: CPT | Mod: ,,, | Performed by: PSYCHIATRY & NEUROLOGY

## 2017-10-21 RX ORDER — DABIGATRAN ETEXILATE 150 MG/1
150 CAPSULE ORAL 2 TIMES DAILY
Qty: 60 CAPSULE | Refills: 0 | Status: SHIPPED | OUTPATIENT
Start: 2017-10-21 | End: 2017-10-21

## 2017-10-21 RX ORDER — HYDROCHLOROTHIAZIDE 25 MG/1
25 TABLET ORAL DAILY
Qty: 90 TABLET | Refills: 2
Start: 2017-10-21 | End: 2017-10-21

## 2017-10-21 RX ORDER — LOSARTAN POTASSIUM 100 MG/1
100 TABLET ORAL DAILY
Qty: 90 TABLET | Refills: 2 | Status: SHIPPED | OUTPATIENT
Start: 2017-10-21 | End: 2017-10-21

## 2017-10-21 RX ORDER — METOPROLOL TARTRATE 25 MG/1
12.5 TABLET, FILM COATED ORAL EVERY 6 HOURS
Qty: 60 TABLET | Refills: 11 | Status: ON HOLD | OUTPATIENT
Start: 2017-10-21 | End: 2018-09-05 | Stop reason: HOSPADM

## 2017-10-21 RX ORDER — HYDRALAZINE HYDROCHLORIDE 50 MG/1
50 TABLET, FILM COATED ORAL EVERY 8 HOURS
Qty: 90 TABLET | Refills: 11
Start: 2017-10-21 | End: 2017-11-01 | Stop reason: SINTOL

## 2017-10-21 RX ORDER — HYDROCHLOROTHIAZIDE 25 MG/1
25 TABLET ORAL DAILY
Qty: 90 TABLET | Refills: 2
Start: 2017-10-21 | End: 2017-11-01 | Stop reason: ALTCHOICE

## 2017-10-21 RX ORDER — HYDRALAZINE HYDROCHLORIDE 50 MG/1
50 TABLET, FILM COATED ORAL EVERY 8 HOURS
Qty: 90 TABLET | Refills: 11
Start: 2017-10-21 | End: 2017-10-21

## 2017-10-21 RX ORDER — LOSARTAN POTASSIUM 100 MG/1
100 TABLET ORAL DAILY
Qty: 90 TABLET | Refills: 2 | Status: ON HOLD | OUTPATIENT
Start: 2017-10-21 | End: 2018-09-05 | Stop reason: HOSPADM

## 2017-10-21 RX ORDER — DABIGATRAN ETEXILATE 150 MG/1
150 CAPSULE ORAL 2 TIMES DAILY
Qty: 60 CAPSULE | Refills: 0 | Status: SHIPPED | OUTPATIENT
Start: 2017-10-21 | End: 2017-11-20

## 2017-10-21 RX ORDER — METOPROLOL TARTRATE 25 MG/1
12.5 TABLET, FILM COATED ORAL EVERY 6 HOURS
Qty: 60 TABLET | Refills: 11 | Status: SHIPPED | OUTPATIENT
Start: 2017-10-21 | End: 2017-10-21

## 2017-10-21 RX ADMIN — ATORVASTATIN CALCIUM 80 MG: 20 TABLET, FILM COATED ORAL at 09:10

## 2017-10-21 RX ADMIN — METOPROLOL TARTRATE 12.5 MG: 25 TABLET, FILM COATED ORAL at 06:10

## 2017-10-21 RX ADMIN — TAMSULOSIN HYDROCHLORIDE 0.4 MG: 0.4 CAPSULE ORAL at 09:10

## 2017-10-21 RX ADMIN — Medication 3 ML: at 06:10

## 2017-10-21 RX ADMIN — POLYETHYLENE GLYCOL 3350 17 G: 17 POWDER, FOR SOLUTION ORAL at 09:10

## 2017-10-21 RX ADMIN — DABIGATRAN ETEXILATE MESYLATE 150 MG: 150 CAPSULE ORAL at 09:10

## 2017-10-21 NOTE — SUBJECTIVE & OBJECTIVE
Medications:  Continuous Infusions:   sodium chloride 0.9%       Scheduled Meds:   atorvastatin  80 mg Oral Daily    dabigatran etexilate  150 mg Oral BID    metoprolol tartrate  12.5 mg Oral Q6H    polyethylene glycol  17 g Oral Daily    quetiapine  25 mg Oral QHS    sodium chloride 0.9%  3 mL Intravenous Q8H    tamsulosin  0.4 mg Oral Daily     PRN Meds:dextrose 50%, dextrose 50%, glucagon (human recombinant), glucose, glucose, insulin aspart, labetalol, senna-docusate 8.6-50 mg, sodium chloride 0.9%     Objective:     Vital Signs (Most Recent):  Temp: 98.3 °F (36.8 °C) (10/21/17 0800)  Pulse: 60 (10/21/17 1000)  Resp: 16 (10/21/17 0800)  BP: (!) 168/92 (10/21/17 0800)  SpO2: 99 % (10/21/17 0800) Vital Signs (24h Range):  Temp:  [96.8 °F (36 °C)-98.5 °F (36.9 °C)] 98.3 °F (36.8 °C)  Pulse:  [54-64] 60  Resp:  [16-20] 16  SpO2:  [95 %-99 %] 99 %  BP: (123-168)/(72-92) 168/92          Physical Exam   Constitutional: He is oriented to person, place, and time. He appears well-developed and well-nourished.   HENT:   Head: Normocephalic and atraumatic.   Eyes: EOM are normal. Pupils are equal, round, and reactive to light.   Neck: Normal range of motion. Neck supple.   Cardiovascular: Normal rate and regular rhythm.    Pulmonary/Chest: Effort normal and breath sounds normal.   Abdominal: Soft. Bowel sounds are normal.   Musculoskeletal: Normal range of motion.   Bilateral 5/5 strength, no motor drift   Neurological: He is alert and oriented to person, place, and time.   Able to state where he lives, that he lives alone, and his daughter is available as well as his son should we need to talk to them.    Skin: Skin is warm.   Psychiatric:   Flat affect   Nursing note and vitals reviewed.      Significant Labs:  CBC:   Recent Labs  Lab 10/20/17  0408 10/20/17  0810   WBC 3.55*  --    RBC 4.71  --    HGB 13.9*  --    HCT 41.3  --    PLT SEE COMMENT 205   MCV 88  --    MCH 29.5  --    MCHC 33.7  --      CMP:    Recent Labs  Lab 10/20/17  0408   GLU 94   CALCIUM 9.8   ALBUMIN 2.8*   PROT 6.7      K 4.3   CO2 20*      BUN 30*   CREATININE 1.4   ALKPHOS 78   ALT 69*   AST 39   BILITOT 0.8       Significant Diagnostics:  I have reviewed all pertinent imaging results/findings within the past 24 hours.

## 2017-10-21 NOTE — PLAN OF CARE
Discharge plan is home with home health.  Weekend MSW contacted [phone] for new referral.  Awaiting call back from on-call nurse at [facility].    Radha at [facility] confirmed receipt of referral.  She advised that patient will resume with [facility].    Felecia Wilson LMSW

## 2017-10-21 NOTE — ASSESSMENT & PLAN NOTE
-- 60-70% Left proximal ICA stenosis seen on CTA  -- Due to bilateral nature of acute strokes, do not believe carotid to be the cause  -- DDx include paraoxysmal atrial fibrillation, cardioembolic; hypercoaguable from occult malignancy  -- Plan for anticoagulation, pradaxa ( discharged home on that)   -- after a discussion between Dr. Castelan and vascular surgery, pt. Will be discharged today and follow up as outpatient with vascular surgery to determine when to do surgery in needed

## 2017-10-21 NOTE — SUBJECTIVE & OBJECTIVE
Neurologic Chief Complaint: altered mental status    Subjective:     Interval History: Patient is seen for follow-up neurological assessment and treatment recommendations: No acute events overnight.     HPI, Past Medical, Family, and Social History remains the same as documented in the initial encounter.     Review of Systems   Constitutional: Negative for fatigue.   Eyes: Negative for visual disturbance.   Respiratory: Negative for shortness of breath.    Cardiovascular: Negative for chest pain.     Scheduled Meds:   atorvastatin  80 mg Oral Daily    dabigatran etexilate  150 mg Oral BID    metoprolol tartrate  12.5 mg Oral Q6H    polyethylene glycol  17 g Oral Daily    quetiapine  25 mg Oral QHS    sodium chloride 0.9%  3 mL Intravenous Q8H    tamsulosin  0.4 mg Oral Daily     Continuous Infusions:   sodium chloride 0.9%       PRN Meds:dextrose 50%, dextrose 50%, glucagon (human recombinant), glucose, glucose, insulin aspart, labetalol, senna-docusate 8.6-50 mg, sodium chloride 0.9%    Objective:     Vital Signs (Most Recent):  Temp: 98.3 °F (36.8 °C) (10/21/17 0800)  Pulse: (!) 59 (10/21/17 0800)  Resp: 16 (10/21/17 0800)  BP: (!) 168/92 (10/21/17 0800)  SpO2: 99 % (10/21/17 0800)  BP Location: Left arm    Vital Signs Range (Last 24H):  Temp:  [96.8 °F (36 °C)-98.5 °F (36.9 °C)]   Pulse:  [54-64]   Resp:  [16-20]   BP: (123-168)/(72-92)   SpO2:  [95 %-99 %]   BP Location: Left arm    Physical Exam  CV: Normal S1, S2 with no murmurs,gallops,rubs  Resp: Lungs CTA Bilaterally, Unlabored  Abdomen: NTND, BS normoactive x4 quads, soft  Neurological Exam:   LOC: alert and follows requests  Language: No aphasia  Speech: Dysarthria  EOM (CN III, IV, VI): Full/intact  Facial Movement (CN VII): lower weakness right lower  Shoulder/Neck (CN XI): SCM-Left: Normal ; SCM-Right: Normal ; Shoulder Shrug: Normal/Symetric  Tongue (CN XII): to midline  Motor*: Arm Left:  Normal (5/5), Leg Left:   Normal (5/5), Arm Right:    Normal (5/5), Leg Right:   Normal (5/5)  Sensation: intact to light touch, temperature and vibration  Tone: Arm-Left: normal; Leg-Left: normal; Arm-Right: normal; Leg-Right: normal    NIH Stroke Scale:    Level of Consciousness: 0 - alert  LOC Questions: 1 - answers one correctly  LOC Commands: 0 - performs both correctly  Best Gaze: 0 - normal  Visual: 0 - no visual loss  Facial Palsy: 0 - normal  Motor Left Arm: 0 - no drift  Motor Right Arm: 0 - no drift  Motor Left Le - no drift  Motor Right Le - no drift  Limb Ataxia: 0 - absent  Sensory: 0 - normal  Best Language: 0 - no aphasia  Dysarthria: 1 - mild to moderate dysarthria  Extinction and Inattention: 0 - no neglect  NIH Stroke Scale Total: 2      Laboratory:  BMP:     Recent Labs  Lab 10/20/17  0408      K 4.3      CO2 20*   BUN 30*   CREATININE 1.4   CALCIUM 9.8     CBC:     Recent Labs  Lab 10/20/17  0408 10/20/17  0810   WBC 3.55*  --    RBC 4.71  --    HGB 13.9*  --    HCT 41.3  --    PLT SEE COMMENT 205   MCV 88  --    MCH 29.5  --    MCHC 33.7  --      Lipid Panel:     Recent Labs  Lab 10/18/17  0956   CHOL 149   LDLCALC 82.0   HDL 44   TRIG 115     Coagulation:     Recent Labs  Lab 10/19/17  0407   INR 1.0   APTT 25.7     TSH:     Recent Labs  Lab 10/18/17  0956   TSH 3.674       Diagnostic Results:  I have personally reviewed:   Findings:      MRI brain WO contrast (10/18/17)  MRI on the left from 10/18 and on the right from 10/12. Comparing -- interval development of acute infarcts involving right cerebellar and right temporal lobe.     MRI brain WO contrast (10/12/17)  Multifocal punctuate infarcts involving left occipital, left corona radiata, right inferior frontal and right parasagittal frontal regions.     CTA head and head (10/18/17)  Extracranial atherosclerosis involving bilateral ICA and left common carotid; moderate to severe L ICA stenosis. Extracranial atherosclerosis involving bilateral vertebral arteries and stenosis  in V2 segments (R worse than L

## 2017-10-21 NOTE — PROGRESS NOTES
[facility]  Vascular Neurology  Comprehensive Stroke Center  Progress Note    Assessment/Plan:     * Acute ischemic multifocal multiple vascular territories stroke    Mr. Magana is a 76 yo man with PMHx HTN, hypertrophic cardiomyopathy, CKD, DM, Prostate CA, prior stroke in July, with recent admission to [facility] (D/c date 10/16/17) for acute stroke who presented after daughter finding pt had dressed himself improperly and was not acting like himself. Aphasia largely resolved upon arrival to our facility. CTA showing moderate high-grade stenosis of R M2, L ICA stenosis, and bilateral vertebral artery stenosis (R worse than left). MRI Brain with acute scattered bilateral infarcts. Etiology likely cardioembolic. In addition, pt has diffuse atherosclerotic disease. ESR, CRP mildly elevated; less concerned for vasculitis at this time. Possible that new cerebellar stroke is related to diseased right vertebral however whole clinical picture with history of strokes involving multiple vascular territories; likely that this is cardio-embolic. ESUS. ECHO remarkable for severely enlarged LA; no evidence of PFO on ESDRAS. Given patient was on dual antiplatelets and had recurrent strokes despite this, patient to be started on full anti-coagulation with Eliquis. Will resume aspirin in the setting of large vessel atherosclerosis as also cause of stroke. Recommend 30 day event monitor to look for arrhythmia to help with diagnosis although would not .     Antiplatelets: none, pt. On full anticoagulation with pradaxa   Statins: Atorvastatin 80mg qd (treated LDL 82)   SBP <180  VTE ppx: full anticoagulation on pradaxa. SCDs  Diagnostics: TCD for emboli detection ordered but pt. Refused.    PT/OT and speech - PT/OT recommending home PT/OT.   Per SLP - on regular diet with thin liquids.   Neuro checks q4h    -- discharge home today with home PT/OT         Enlarged LA (left atrium)    Severely enlarged  LA noted on ECHO  - clinical picture concerning for cardio-embolic strokes  - no known history of atrial arrhythmias  - on tele  - may need outpatient 30 days cardiac monitor   -- outpatient f/u with cardiology and EP         Chronic diastolic congestive heart failure    Noted on ECHO - grade 1; EF 60%  - lopressor 12.5mg q6h        Cytotoxic cerebral edema    2/2 stroke  Evident on imaging        CKD (chronic kidney disease) stage 3, GFR 30-59 ml/min    -- At Baseline Cr 1.3-1.8            Mixed hyperlipidemia    Stroke risk factor  LDL 82  Continue home Atorvastatin 80mg qd        Internal carotid artery stenosis, left    -- 60-70% Left proximal ICA stenosis seen on CTA  -- Due to bilateral nature of acute strokes, do not believe carotid to be the cause  -- DDx include paraoxysmal atrial fibrillation, cardioembolic; hypercoaguable from occult malignancy  -- Plan for anticoagulation, pradaxa ( discharged home on that)   -- after a discussion between Dr. Castelan and vascular surgery, pt. Will be discharged today and follow up as outpatient with vascular surgery to determine when to do surgery in needed       Type 2 diabetes mellitus with complication, without long-term current use of insulin    Stroke risk factor  Hba1c 6.2%  On Metformin at home  POCT glucose 120-150 in the past 24 hours.   SSI while inpatient.        Essential hypertension    Stroke risk factor  SBP <180  Lopressor 12.5mg q6h              Neurologic Chief Complaint: altered mental status    Subjective:     Interval History: Patient is seen for follow-up neurological assessment and treatment recommendations: No acute events overnight.     HPI, Past Medical, Family, and Social History remains the same as documented in the initial encounter.     Review of Systems   Constitutional: Negative for fatigue.   Eyes: Negative for visual disturbance.   Respiratory: Negative for shortness of breath.    Cardiovascular: Negative for chest pain.     Scheduled  Meds:   atorvastatin  80 mg Oral Daily    dabigatran etexilate  150 mg Oral BID    metoprolol tartrate  12.5 mg Oral Q6H    polyethylene glycol  17 g Oral Daily    quetiapine  25 mg Oral QHS    sodium chloride 0.9%  3 mL Intravenous Q8H    tamsulosin  0.4 mg Oral Daily     Continuous Infusions:   sodium chloride 0.9%       PRN Meds:dextrose 50%, dextrose 50%, glucagon (human recombinant), glucose, glucose, insulin aspart, labetalol, senna-docusate 8.6-50 mg, sodium chloride 0.9%    Objective:     Vital Signs (Most Recent):  Temp: 98.3 °F (36.8 °C) (10/21/17 08)  Pulse: (!) 59 (10/21/17 0800)  Resp: 16 (10/21/17 0800)  BP: (!) 168/92 (10/21/17 0800)  SpO2: 99 % (10/21/17 0800)  BP Location: Left arm    Vital Signs Range (Last 24H):  Temp:  [96.8 °F (36 °C)-98.5 °F (36.9 °C)]   Pulse:  [54-64]   Resp:  [16-20]   BP: (123-168)/(72-92)   SpO2:  [95 %-99 %]   BP Location: Left arm    Physical Exam  CV: Normal S1, S2 with no murmurs,gallops,rubs  Resp: Lungs CTA Bilaterally, Unlabored  Abdomen: NTND, BS normoactive x4 quads, soft  Neurological Exam:   LOC: alert and follows requests  Language: No aphasia  Speech: Dysarthria  EOM (CN III, IV, VI): Full/intact  Facial Movement (CN VII): lower weakness right lower  Shoulder/Neck (CN XI): SCM-Left: Normal ; SCM-Right: Normal ; Shoulder Shrug: Normal/Symetric  Tongue (CN XII): to midline  Motor*: Arm Left:  Normal (5/5), Leg Left:   Normal (5/5), Arm Right:   Normal (5/5), Leg Right:   Normal (5/5)  Sensation: intact to light touch, temperature and vibration  Tone: Arm-Left: normal; Leg-Left: normal; Arm-Right: normal; Leg-Right: normal    NIH Stroke Scale:    Level of Consciousness: 0 - alert  LOC Questions: 1 - answers one correctly  LOC Commands: 0 - performs both correctly  Best Gaze: 0 - normal  Visual: 0 - no visual loss  Facial Palsy: 0 - normal  Motor Left Arm: 0 - no drift  Motor Right Arm: 0 - no drift  Motor Left Le - no drift  Motor Right Le - no  drift  Limb Ataxia: 0 - absent  Sensory: 0 - normal  Best Language: 0 - no aphasia  Dysarthria: 1 - mild to moderate dysarthria  Extinction and Inattention: 0 - no neglect  NIH Stroke Scale Total: 2      Laboratory:  BMP:     Recent Labs  Lab 10/20/17  0408      K 4.3      CO2 20*   BUN 30*   CREATININE 1.4   CALCIUM 9.8     CBC:     Recent Labs  Lab 10/20/17  0408 10/20/17  0810   WBC 3.55*  --    RBC 4.71  --    HGB 13.9*  --    HCT 41.3  --    PLT SEE COMMENT 205   MCV 88  --    MCH 29.5  --    MCHC 33.7  --      Lipid Panel:     Recent Labs  Lab 10/18/17  0956   CHOL 149   LDLCALC 82.0   HDL 44   TRIG 115     Coagulation:     Recent Labs  Lab 10/19/17  0407   INR 1.0   APTT 25.7     TSH:     Recent Labs  Lab 10/18/17  0956   TSH 3.674       Diagnostic Results:  I have personally reviewed:   Findings:      MRI brain WO contrast (10/18/17)  MRI on the left from 10/18 and on the right from 10/12. Comparing -- interval development of acute infarcts involving right cerebellar and right temporal lobe.     MRI brain WO contrast (10/12/17)  Multifocal punctuate infarcts involving left occipital, left corona radiata, right inferior frontal and right parasagittal frontal regions.     CTA head and head (10/18/17)  Extracranial atherosclerosis involving bilateral ICA and left common carotid; moderate to severe L ICA stenosis. Extracranial atherosclerosis involving bilateral vertebral arteries and stenosis in V2 segments (R worse than L      Elie Orozco MD  Comprehensive Stroke Center  Department of Vascular Neurology   [facility]

## 2017-10-21 NOTE — PLAN OF CARE
Recv'd a call from pt's nurse [place], she stated this pt has advised her he is already on service with HH. Advised her SW has contacted the pt's insurance co, they will re-arrange his HH and she can d/c the pt from a CM standpoint.

## 2017-10-21 NOTE — PLAN OF CARE
[facility]       HOME  HEALTH ORDERS                                    FACE TO FACE ENCOUNTER      Patient Name: Shant Magana  YOB: 1940    PCP: Trent Aldana MD   PCP Address: [address] / [place] [address]  PCP Phone Number: [phone]  PCP Fax: [phone]    Encounter Date: 10/21/2017    Admit to Home Health    Di  Active Hospital Problems    Diagnosis  POA    *Acute ischemic multifocal multiple vascular territories stroke [I63.8]  Yes    Chronic diastolic congestive heart failure [I50.32]  Unknown    Enlarged LA (left atrium) [I51.7]  Unknown    Internal carotid artery stenosis, left [I65.22]  Yes    Mixed hyperlipidemia [E78.2]  Yes    CKD (chronic kidney disease) stage 3, GFR 30-59 ml/min [N18.3]  Yes    Cytotoxic cerebral edema [G93.6]  Yes    Type 2 diabetes mellitus with complication, without long-term current use of insulin [E11.8]  Yes    Essential hypertension [I10]  Yes      Resolved Hospital Problems    Diagnosis Date Resolved POA   No resolved problems to display.   agnoses:  Active Hospital Problems    Diagnosis  POA    *Acute ischemic multifocal multiple vascular territories stroke [I63.8]  Yes    Chronic diastolic congestive heart failure [I50.32]  Unknown    Enlarged LA (left atrium) [I51.7]  Unknown    Internal carotid artery stenosis, left [I65.22]  Yes    Mixed hyperlipidemia [E78.2]  Yes    CKD (chronic kidney disease) stage 3, GFR 30-59 ml/min [N18.3]  Yes    Cytotoxic cerebral edema [G93.6]  Yes    Type 2 diabetes mellitus with complication, without long-term current use of insulin [E11.8]  Yes    Essential hypertension [I10]  Yes      Resolved Hospital Problems    Diagnosis Date Resolved POA   No resolved problems to display.       Future Appointments  Date Time Provider Department Center   11/1/2017 2:00 PM Guanaco Ty MD [facility] CARDIO [facility]   11/13/2017 9:00 AM  Kaiser Stephens MD [facility] NEURO [facility]     Follow-up Information     Justin Perez MD In 1 month.    Specialty:  Vascular Surgery  Contact information:  Kenneth MACIAS  [facility] [id number] [phone]                     I have seen and examined this patient face to face today. My clinical findings that support the need for the home health skilled services and home bound status are the following:  Weakness/numbness causing balance and gait disturbance due to Stroke making it taxing to leave home.    Allergies:  Review of patient's allergies indicates:   Allergen Reactions    Hydralazine analogues Diarrhea       Diet: diabetic diet: 2000 calorie    Activities: ambulate in house with assistance    Nursing:   SN to complete comprehensive assessment including routine vital signs. Instruct on disease process and s/s of complications to report to MD. Review/verify medication list sent home with the patient at time of discharge  and instruct patient/caregiver as needed. Frequency may be adjusted depending on start of care date.    Notify MD if SBP > 160 or < 90; DBP > 90 or < 50; HR > 120 or < 50; Temp > 101; Other:         CONSULTS:    Physical Therapy to evaluate and treat. Evaluate for home safety and equipment needs; Establish/upgrade home exercise program. Perform / instruct on therapeutic exercises, gait training, transfer training, and Range of Motion.  Occupational Therapy to evaluate and treat. Evaluate home environment for safety and equipment needs. Perform/Instruct on transfers, ADL training, ROM, and therapeutic exercises.      MISCELLANEOUS CARE:  Diabetic Care:   SN to perform and educate Diabetic management with blood glucose monitoring:, Fingerstick blood sugar AC and HS and Report CBG < 60 or > 350 to physician.    WOUND CARE ORDERS  n/a      Medications: Review discharge medications with patient and family and provide education.      Current Discharge Medication List      START taking  "these medications    Details   dabigatran etexilate (PRADAXA) 150 mg Cap Take 1 capsule (150 mg total) by mouth 2 (two) times daily. "Do NOT break, chew, or open capsules."  Qty: 60 capsule, Refills: 0      metoprolol tartrate (LOPRESSOR) 25 MG tablet Take 0.5 tablets (12.5 mg total) by mouth every 6 (six) hours.  Qty: 60 tablet, Refills: 11         CONTINUE these medications which have CHANGED    Details   hydrALAZINE (APRESOLINE) 50 MG tablet Take 1 tablet (50 mg total) by mouth every 8 (eight) hours. HOLD TILL SEE YOUR PROVIDER  Qty: 90 tablet, Refills: 11      hydrochlorothiazide (HYDRODIURIL) 25 MG tablet Take 1 tablet (25 mg total) by mouth once daily. HOLD TILL SEE YOUR PROVIDER  Qty: 90 tablet, Refills: 2      losartan (COZAAR) 100 MG tablet Take 1 tablet (100 mg total) by mouth once daily. HOLD TILL SEE YOUR PROVIDER  Qty: 90 tablet, Refills: 2         CONTINUE these medications which have NOT CHANGED    Details   amlodipine (NORVASC) 10 MG tablet Take 1 tablet (10 mg total) by mouth once daily.  Qty: 90 tablet, Refills: 2      aspirin (ECOTRIN) 81 MG EC tablet Take 1 tablet (81 mg total) by mouth once daily.  Qty: 30 tablet, Refills: 3      atorvastatin (LIPITOR) 80 MG tablet Take 1 tablet (80 mg total) by mouth once daily.  Qty: 90 tablet, Refills: 2      metformin (GLUCOPHAGE) 500 MG tablet Take 500 mg by mouth 2 (two) times daily with meals.      tamsulosin (FLOMAX) 0.4 mg Cp24 Take 0.4 mg by mouth once daily.      vitamin D 1000 units Tab Take 1,000 Units by mouth once daily.         STOP taking these medications       clopidogrel (PLAVIX) 75 mg tablet Comments:   Reason for Stopping:               I certify that this patient is confined to his home and needs intermittent skilled nursing care, physical therapy and occupational therapy.          Electronically Signed  _________________________________  Elie Orozco MD  10/21/2017      "

## 2017-10-21 NOTE — PT/OT/SLP DISCHARGE
Occupational Therapy Discharge Summary    Shant Magana  MRN: [id number]   Acute ischemic multifocal multiple vascular territories stroke   Patient Discharged from acute Occupational Therapy on 10/21.  Please refer to prior OT note dated on 10/20 for functional status.     Assessment:   Patient appropriate for care in another setting.  GOALS:    Occupational Therapy Goals        Problem: Occupational Therapy Goal    Goal Priority Disciplines Outcome Interventions   Occupational Therapy Goal     OT, PT/OT     Description:  Goals achieved for toileting, transfers, following commands and LB dressing.  Goals revised 10/19 to be addressed in 7 days, expiring: 10/26  Patient will demonstrate stand pivot transfers with modified independence.   Not met  Patient will demonstrate grooming while standing with modified independence.   Not met  Patient will demonstrate upper body dressing with modified independence.   Not met  Patient will demonstrate lower body dressing with modified independence.   Not met  Patient will demonstrate toileting with modified independence.   Not met  Patient will demonstrate bathing while seated EOB with modified independence.   Not met  Patient will demonstrate ability to follow 5/5 commands.   Not met  Patient's family / caregiver will demonstrate independence and safety with assisting patient with self-care skills and functional mobility.     Not met  Patient and/or patient's family will verbalize understanding of stroke prevention guidelines, personal risk factors and stroke warning signs via teachback method.  Not met                         Reasons for Discontinuation of Therapy Services  Transfer to alternate level of care.      Plan:  Patient Discharged to: Home with Home Health Service.  JASPER Casas  10/21/2017

## 2017-10-21 NOTE — ASSESSMENT & PLAN NOTE
Severely enlarged LA noted on ECHO  - clinical picture concerning for cardio-embolic strokes  - no known history of atrial arrhythmias  - on tele  - may need outpatient 30 days cardiac monitor   -- outpatient f/u with cardiology and EP

## 2017-10-21 NOTE — DISCHARGE SUMMARY
[facility]  Vascular Neurology  Comprehensive Stroke Center  Discharge Summary     Summary:     Admit Date: 10/18/2017  9:38 AM    Discharge Date and Time:  10/21/2017 12:39 PM    Attending Physician: John Castelan MD     Discharge Provider: Elie Orozco MD    History of Present Illness: Mr. Magana is a 76 yo man with PMHx HTN, hypertrophic cardiomyopathy, CKD, DM, Prostate CA, prior stroke in July, with recent admission to [facility] (D/c date 10/16/17) for acute stroke. Last known normal was a 0630. Pt's daughter states this morning she came into his room to go to therapy and pt had dressed himself improperly, was not acting like himself so EMS was called. Pt was admitted 10/11/17 to [facility] after presenting with 2 non-traumatic falls with +LOC. At that time, he was found to have scattered small acute ischemic infarcts and moderate L ICA stenosis. Also previously admitted to [facility] in July 2017 for hypertensive crisis/emergency and was diagnosed with a stroke. MRI at that time showed multiple acute infarcts suggestive of embolic etiology in R Cerebellum, R Sahni radiata, and L Frontal lobe. CTA showed 80% LICA stenosis and bilateral vertebral disease with multiple intracranial stenoses. TTE with LVH and LAE, but no AFib at that time. Pt was discharged on maximal medical therapy- ASA 81, Plavix 75, and Atorvastatin 80. Pt previously had holter monitor which was unremarkable and was schedule for outpatient loop recorder, however did not recieve prior to presenting today. Family denies use of blood thinners.    Hospital Course (synopsis of major diagnoses, care, treatment, and services provided during the course of the hospital stay): No notes on file    No new subjective & objective note has been filed under this hospital service since the last note was generated.      Assessment/Plan:     Interventions: None    Complications: None    Research Candidate?:  No    Neurological  deficit at discharge: Dysarthria     Disposition: Home or Self Care    Final Active Diagnoses:    Diagnosis Date Noted POA    PRINCIPAL PROBLEM:  Acute ischemic multifocal multiple vascular territories stroke [I63.8] 10/18/2017 Yes    Chronic diastolic congestive heart failure [I50.32] 10/19/2017 Unknown    Enlarged LA (left atrium) [I51.7] 10/19/2017 Unknown    Internal carotid artery stenosis, left [I65.22] 10/18/2017 Yes    Mixed hyperlipidemia [E78.2] 10/18/2017 Yes    CKD (chronic kidney disease) stage 3, GFR 30-59 ml/min [N18.3] 10/18/2017 Yes    Cytotoxic cerebral edema [G93.6] 10/18/2017 Yes    Type 2 diabetes mellitus with complication, without long-term current use of insulin [E11.8]  Yes    Essential hypertension [I10] 06/07/2017 Yes      Problems Resolved During this Admission:    Diagnosis Date Noted Date Resolved POA     * Acute ischemic multifocal multiple vascular territories stroke    Mr. Magana is a 76 yo man with PMHx HTN, hypertrophic cardiomyopathy, CKD, DM, Prostate CA, prior stroke in July, with recent admission to [facility] (D/c date 10/16/17) for acute stroke who presented after daughter finding pt had dressed himself improperly and was not acting like himself. Aphasia largely resolved upon arrival to our facility. CTA showing moderate high-grade stenosis of R M2, L ICA stenosis, and bilateral vertebral artery stenosis (R worse than left). MRI Brain with acute scattered bilateral infarcts. Etiology likely cardioembolic. In addition, pt has diffuse atherosclerotic disease. ESR, CRP mildly elevated; less concerned for vasculitis at this time. Possible that new cerebellar stroke is related to diseased right vertebral however whole clinical picture with history of strokes involving multiple vascular territories; likely that this is cardio-embolic. ESUS. ECHO remarkable for severely enlarged LA; no evidence of PFO on ESDRAS. Given patient was on dual antiplatelets and had recurrent strokes  despite this, patient to be started on full anti-coagulation with Eliquis. Will resume aspirin in the setting of large vessel atherosclerosis as also cause of stroke. Recommend 30 day event monitor to look for arrhythmia to help with diagnosis although would not .     Antiplatelets: hold pending possible L CEA   Statins: Atorvastatin 80mg qd (treated LDL 82)   SBP <180  VTE ppx: full anticoagulation on pradaxa. SCDs  Diagnostics: TCD for emboli detection ordered but pt. Refused.    PT/OT and speech - PT/OT recommending home. Per SLP - on regular diet with thin liquids.   Neuro checks q4h       NIH Stroke Scale:  At discharge   Level of Consciousness: 0 - alert  LOC Questions: 1 - answers one correctly  LOC Commands: 0 - performs both correctly  Best Gaze: 0 - normal  Visual: 0 - no visual loss  Facial Palsy: 0 - normal  Motor Left Arm: 0 - no drift  Motor Right Arm: 0 - no drift  Motor Left Le - no drift  Motor Right Le - no drift  Limb Ataxia: 0 - absent  Sensory: 0 - normal  Best Language: 0 - no aphasia  Dysarthria: 1 - mild to moderate dysarthria  Extinction and Inattention: 0 - no neglect  NIH Stroke Scale Total: 2        Enlarged LA (left atrium)    Severely enlarged LA noted on ECHO  - clinical picture concerning for cardio-embolic strokes  - no known history of atrial arrhythmias  - on tele  - may need outpatient 30 days cardiac monitor   -- outpatient f/u with cardiology and EP         Mixed hyperlipidemia    Stroke risk factor  LDL 82  Continue home Atorvastatin 80mg qd        Internal carotid artery stenosis, left    -- 60-70% Left proximal ICA stenosis seen on CTA  -- Due to bilateral nature of acute strokes, do not believe carotid to be the cause  -- DDx include paraoxysmal atrial fibrillation, cardioembolic; hypercoaguable from occult malignancy  -- Plan for anticoagulation, pradaxa ( discharged home on that)   -- after a discussion between Dr. Castelan and vascular surgery, pt.  Will be discharged today and follow up as outpatient with vascular surgery to determine when to do surgery in needed         Type 2 diabetes mellitus with complication, without long-term current use of insulin    Stroke risk factor  Hba1c 6.2%  On Metformin at home  POCT glucose 120-150 in the past 24 hours.   SSI while inpatient.        Essential hypertension    Stroke risk factor  SBP <180  Lopressor 12.5mg q6h ( will be discharge on this medication only and hold others because his BP in on lower side and we want it to be slightly elevated in setting of carotid stenosis)              Recommendations:     Post-discharge complication risks: Falls    Stroke Education given to: patient and family    Follow-up in Stroke Clinic in 28 days    Discharge Plan:  Statin: Atorvastatin 80mg  Anticoagulant: Dabigatran  Aggresive risk factor modification:  Hypertension, High Cholesterol and Carotid Artery disease    Follow Up:  Follow-up Information     Justin Perez MD In 1 month.    Specialty:  Vascular Surgery  Contact information:  [address] [facility] [address]  [phone]             Trent Aldana MD In 1 week.    Specialty:  Family Medicine  Contact information:  [address]  [address]  [facility] [address]  [phone]             [facility] VASCULAR NEUROLOGY In 1 month.    Specialty:  Vascular Neurology  Contact information:  [address] [facility] [address]  [phone]           [facility] CARDIOLOGY In 2 weeks.    Specialty:  Cardiology  Contact information:  [facility] [facility] [address]  [phone]               Patient Instructions:     VAS US Doppler Carotid Bilateral   Standing Status: Future  Standing Exp. Date: 10/21/18     Diet general     Diet Cardiac   Order Comments: See Stroke Patient Education Guide Booklet for details.     Call 911 for any of the following:   Order Comments: Call 911  right away if any of the following warning signs come on  "suddenly, even if the symptoms only last for a few minutes. With stroke, timing is very important.   - Warning Signs of Stroke:  - Weakness: You may feel a sudden weakness, tingling or loss of feeling on one side of your face or body.  - Vision Problems: You may have sudden double vision or trouble seeing in one or both eyes.  - Speech Problems: You may have sudden trouble talking, slured speech, or problems understanding others.  - Headache: You may have sudden, severe headache.  - Movement Problems: You may experience dizziness, a feeling of spinning, a loss of balance, a feeling of falling or blackouts.       Medications:  Reconciled Home Medications:   Current Discharge Medication List      START taking these medications    Details   dabigatran etexilate (PRADAXA) 150 mg Cap Take 1 capsule (150 mg total) by mouth 2 (two) times daily. "Do NOT break, chew, or open capsules."  Qty: 60 capsule, Refills: 0      metoprolol tartrate (LOPRESSOR) 25 MG tablet Take 0.5 tablets (12.5 mg total) by mouth every 6 (six) hours.  Qty: 60 tablet, Refills: 11         CONTINUE these medications which have CHANGED    Details   hydrALAZINE (APRESOLINE) 50 MG tablet Take 1 tablet (50 mg total) by mouth every 8 (eight) hours. HOLD TILL SEE YOUR PROVIDER  Qty: 90 tablet, Refills: 11      hydrochlorothiazide (HYDRODIURIL) 25 MG tablet Take 1 tablet (25 mg total) by mouth once daily. HOLD TILL SEE YOUR PROVIDER  Qty: 90 tablet, Refills: 2      losartan (COZAAR) 100 MG tablet Take 1 tablet (100 mg total) by mouth once daily. HOLD TILL SEE YOUR PROVIDER  Qty: 90 tablet, Refills: 2         CONTINUE these medications which have NOT CHANGED    Details   amlodipine (NORVASC) 10 MG tablet Take 1 tablet (10 mg total) by mouth once daily.  Qty: 90 tablet, Refills: 2      aspirin (ECOTRIN) 81 MG EC tablet Take 1 tablet (81 mg total) by mouth once daily.  Qty: 30 tablet, Refills: 3      atorvastatin (LIPITOR) 80 MG tablet Take 1 tablet (80 mg total) " by mouth once daily.  Qty: 90 tablet, Refills: 2      metformin (GLUCOPHAGE) 500 MG tablet Take 500 mg by mouth 2 (two) times daily with meals.      tamsulosin (FLOMAX) 0.4 mg Cp24 Take 0.4 mg by mouth once daily.      vitamin D 1000 units Tab Take 1,000 Units by mouth once daily.         STOP taking these medications       clopidogrel (PLAVIX) 75 mg tablet Comments:   Reason for Stopping:               Elie Orozco MD  Comprehensive Stroke Center  Department of Vascular Neurology   [facility]

## 2017-10-21 NOTE — PROGRESS NOTES
Multiple strokes with different etiologies.  Watershed pattern on the anterior circulation bilaterally and likely embolic in posterior circulation.    His dementia may be worse by these vascular issues and outpatient CEA is encouraged.  Continue anticoagulation and follow up with electrophysiology for possible cardiac arrhythmias that may be contributing to his symptoms.  Aggressive risk modification for secondary prevention.     Will discharge home today and follow up with:  - vascular neurology  - vascular surgery  - electrophysiology  - primary doctor    John Castelan MD  Vascular and Interventional Neurology Staff  Director of [facility]  [facility]  [phone]

## 2017-10-21 NOTE — ASSESSMENT & PLAN NOTE
Mr. Magana is a 76 yo man with PMHx HTN, hypertrophic cardiomyopathy, CKD, DM, Prostate CA, prior stroke in July, with recent admission to [facility] (D/c date 10/16/17) for acute stroke who presented after daughter finding pt had dressed himself improperly and was not acting like himself. Aphasia largely resolved upon arrival to our facility. CTA showing moderate high-grade stenosis of R M2, L ICA stenosis, and bilateral vertebral artery stenosis (R worse than left). MRI Brain with acute scattered bilateral infarcts. Etiology likely cardioembolic. In addition, pt has diffuse atherosclerotic disease. ESR, CRP mildly elevated; less concerned for vasculitis at this time. Possible that new cerebellar stroke is related to diseased right vertebral however whole clinical picture with history of strokes involving multiple vascular territories; likely that this is cardio-embolic. ESUS. ECHO remarkable for severely enlarged LA; no evidence of PFO on ESDRAS. Given patient was on dual antiplatelets and had recurrent strokes despite this, patient to be started on full anti-coagulation with Eliquis. Will resume aspirin in the setting of large vessel atherosclerosis as also cause of stroke. Recommend 30 day event monitor to look for arrhythmia to help with diagnosis although would not .     Antiplatelets: hold pending possible L CEA   Statins: Atorvastatin 80mg qd (treated LDL 82)   SBP <180  VTE ppx: full anticoagulation on pradaxa. SCDs  Diagnostics: TCD for emboli detection ordered but pt. Refused.    PT/OT and speech - PT/OT recommending home. Per SLP - on regular diet with thin liquids.   Neuro checks q4h

## 2017-10-21 NOTE — PLAN OF CARE
Problem: Patient Care Overview  Goal: Individualization & Mutuality  Outcome: Ongoing (interventions implemented as appropriate)  POC reviewed with patient & family tonight. Pt confused and requires close monitoring to maintain safety. Pt noted with frustration/agitation after family visitation. Pt was agitated and increased efforts from staff warranted to redirect/distract pt. He was very eager to go home. Call placed to his son Hoda with the goal to redirect/calm patient. Pt noted to be calmer about an hour after his h.s seroquel and was able to rest well. Required bedside sitter until midnight. Afebrile.  No new skin impairments noted. Safety precautions maintained. Bed alarm. Incontinent and required staff assist with perineal hygiene. Episodes of bradycardia overnight-spoke with oncall stroke team & orders given to hold tonight's midnight dose of metoprolol. No worsening neuro changes noted overnight.

## 2017-10-21 NOTE — ASSESSMENT & PLAN NOTE
Stroke risk factor  SBP <180  Lopressor 12.5mg q6h ( will be discharge on this medication only and hold others because his BP in on lower side and we want it to be slightly elevated in setting of carotid stenosis)

## 2017-10-21 NOTE — PROGRESS NOTES
[facility]  Vascular Surgery  Progress Note    Patient Name: Shant Magana  MRN: [id number]  Admission Date: 10/18/2017  Primary Care Provider: Trent Aldana MD    Subjective:     Interval History: No acute events overnight, afebrile, VSS    Post-Op Info:  * No surgery found *           Medications:  Continuous Infusions:   sodium chloride 0.9%       Scheduled Meds:   atorvastatin  80 mg Oral Daily    dabigatran etexilate  150 mg Oral BID    metoprolol tartrate  12.5 mg Oral Q6H    polyethylene glycol  17 g Oral Daily    quetiapine  25 mg Oral QHS    sodium chloride 0.9%  3 mL Intravenous Q8H    tamsulosin  0.4 mg Oral Daily     PRN Meds:dextrose 50%, dextrose 50%, glucagon (human recombinant), glucose, glucose, insulin aspart, labetalol, senna-docusate 8.6-50 mg, sodium chloride 0.9%     Objective:     Vital Signs (Most Recent):  Temp: 98.3 °F (36.8 °C) (10/21/17 0800)  Pulse: 60 (10/21/17 1000)  Resp: 16 (10/21/17 0800)  BP: (!) 168/92 (10/21/17 0800)  SpO2: 99 % (10/21/17 0800) Vital Signs (24h Range):  Temp:  [96.8 °F (36 °C)-98.5 °F (36.9 °C)] 98.3 °F (36.8 °C)  Pulse:  [54-64] 60  Resp:  [16-20] 16  SpO2:  [95 %-99 %] 99 %  BP: (123-168)/(72-92) 168/92          Physical Exam   Constitutional: He is oriented to person, place, and time. He appears well-developed and well-nourished.   HENT:   Head: Normocephalic and atraumatic.   Eyes: EOM are normal. Pupils are equal, round, and reactive to light.   Neck: Normal range of motion. Neck supple.   Cardiovascular: Normal rate and regular rhythm.    Pulmonary/Chest: Effort normal and breath sounds normal.   Abdominal: Soft. Bowel sounds are normal.   Musculoskeletal: Normal range of motion.   Bilateral 5/5 strength, no motor drift   Neurological: He is alert and oriented to person, place, and time.   Able to state where he lives, that he lives alone, and his daughter is available as well as his son should we need to talk to them.    Skin:  Skin is warm.   Psychiatric:   Flat affect   Nursing note and vitals reviewed.      Significant Labs:  CBC:   Recent Labs  Lab 10/20/17  0408 10/20/17  0810   WBC 3.55*  --    RBC 4.71  --    HGB 13.9*  --    HCT 41.3  --    PLT SEE COMMENT 205   MCV 88  --    MCH 29.5  --    MCHC 33.7  --      CMP:   Recent Labs  Lab 10/20/17  0408   GLU 94   CALCIUM 9.8   ALBUMIN 2.8*   PROT 6.7      K 4.3   CO2 20*      BUN 30*   CREATININE 1.4   ALKPHOS 78   ALT 69*   AST 39   BILITOT 0.8       Significant Diagnostics:  I have reviewed all pertinent imaging results/findings within the past 24 hours.    Assessment/Plan:     * Acute ischemic multifocal multiple vascular territories stroke    78 y/o male with L ICA stenosis (> 90% based on CTA findings) that may be contributing to his imaging evidence of recent infarct    - Continue ASA, Plavix, high-dose statin  - Recommend aggressive BP control and DM control   - Given recommendations per vascular neurology, with ongoing workup for source of bilateral embolic infracts will defer CEA until source more clear, as such will have patient follow up with Dr. Perez as an outpatient in 4 weeks with bilateral carotid us  - Please call with questions or concerns            Faith Caban MD  Vascular Surgery  [facility]

## 2017-10-21 NOTE — PROGRESS NOTES
Discharge instructions reviewed with pt and family. All verbalized understanding. Pt in possession of all belongings. IV and tele removed at discharge. Wheelchair ordered. NAD

## 2017-10-23 ENCOUNTER — PATIENT OUTREACH (OUTPATIENT)
Dept: ADMINISTRATIVE | Facility: CLINIC | Age: 77
End: 2017-10-23

## 2017-10-23 ENCOUNTER — TELEPHONE (OUTPATIENT)
Dept: NEUROSURGERY | Facility: HOSPITAL | Age: 77
End: 2017-10-23

## 2017-10-23 NOTE — PROGRESS NOTES
Please forward this important TCC information to your provider in order to maximize the post discharge care delivery of this patient.    C3 nurse spoke with Candace the daughter of  Shant Magana  for a TCC post hospital discharge follow up call. The patient has a scheduled HOSFU appointment with Trent Aldana MD on 10\25 she thinks @ unknown time. She is at work right now and doesn't have the appointment in front of her.  Respectfully,  Mary Marcos RN  [facility]    [email or web address]       Please do not reply to this message, as this inbox is not routinely monitored.

## 2017-10-23 NOTE — PATIENT INSTRUCTIONS
Discharge Instructions for Stroke  You have been diagnosed with a stroke, or with a TIA (transient ischemic attack). Or you have been identified as having a high risk for stroke. During a stroke, blood stops flowing to part of your brain. This can damage areas in the brain that control other parts of the body. Symptoms after a stroke depend on which part of the brain has been affected.  Stroke risk factors  Once youve had a stroke, youre at greater risk for another one. Listed below are some other factors that can increase your risk for a stroke:  · High blood pressure  · High cholesterol  · Cigarette or cigar smoking  · Diabetes  · Carotid or other artery disease  · Atrial fibrillation, atrial flutter, or other heart disease  · Not being physically active  · Obesity  · Certain blood disorders (such as sickle cell anemia)  · Excessive alcohol use  · Abuse of street drugs  · Race  · Gender  · Family history of stroke  · Diet high in salty, fried, or greasy foods  Changes in daily living  Doing your regular tasks may be difficult after youve had a stroke, but you can learn new ways to manage your daily activities. In fact, doing daily activities may help you to regain muscle strength and bring back function to affected limbs. Be patient, give yourself time to adjust, and appreciate the progress you make.  Daily activities  You may be at risk of falling. Make changes to your home to help you walk more easily. A therapist will decide if you need an assistive device to walk safely.  You may need to see an occupational therapist or physical therapist to learn new ways of doing things. For example, you may need to make adjustments when bathing or dressing:  Tips for showering or bathing  · Test the water temperature with a hand or foot that was not affected by the stroke.  · Use grab bars, a shower seat, a hand-held showerhead, and a long-handled brush.  Tips for getting dressed  · Dress while sitting, starting with  the affected side or limb.  · Wear shirts that pull easily over your head. Wear pants or skirts with elastic waistbands.  · Use zippers with loops attached to the pull tabs.  Lifestyle changes  · Take your medicines exactly as directed. Dont skip doses.  · Begin an exercise program. Ask your provider how to get started. Also ask how much activity you should try to get on a daily or weekly basis. You can benefit from simple activities such as walking or gardening.  · Limit alcohol intake. Men should have no more than 2 alcoholic drinks a day. Women should limit themselves to 1 alcoholic drink per day.  · Know your cholesterol level. Follow your providers recommendations about how to keep cholesterol under control.  · If you are a smoker, quit now. Join a stop-smoking program to improve your chances of success. Ask your provider about medicines or other methods to help you quit.  · Learn stress management techniques to help you deal with stress in your home and work life.  Diet  Your healthcare provider will give you information on dietary changes that you may need to make, based on your situation. Your provider may recommend that you see a registered dietitian for help with diet changes. Changes may include:  · Reducing fat and cholesterol intake  · Reducing salt (sodium) intake, especially if you have high blood pressure  · Eating more fresh vegetables and fruits  · Eating more lean proteins, such as fish, poultry, and beans and peas (legumes)  · Eating less red meat and processed meats  · Using low-fat dairy products  · Limiting vegetable oils and nut oils  · Limiting sweets and processed foods such as chips, cookies, and baked goods  Follow-up care  · Keep your medical appointments. Close follow-up is important to stroke rehabilitation and recovery.  · Some medicines require blood tests to check for progress or problems. Keep follow-up appointments for any blood tests ordered by your providers.  When to call  911  Call 911 right away if you have any of the following symptoms of stroke:  · Weakness, tingling, or loss of feeling on one side of your face or body  · Sudden double vision or trouble seeing in one or both eyes  · Sudden trouble talking or slurred speech  · Trouble understanding others  · Sudden, severe headache  · Dizziness, loss of balance, or a sense of falling  · Blackouts or seizures      F.A.S.T. is an easy way to remember the signs of stroke. When you see these signs, you know that you need to call 911 fast.  F.A.S.T. stands for:  · F is for face drooping. One side of the face is drooping or numb. When the person smiles, the smile is uneven.  · A is for arm weakness. One arm is weak or numb. When the person lifts both arms at the same time, one arm may drift downward.  · S is for speech difficulty. You may notice slurred speech or trouble speaking. The person can't repeat a simple sentence correctly when asked.  · T is for time to call 911. If someone shows any of these symptoms, even if they go away, call 911 immediately. Make note of the time the symptoms first appeared.  Date Last Reviewed: 8/26/2015 © 2000-2017 [employer]. [address] [place] [address], [place] [address]. All rights reserved. This information is not intended as a substitute for professional medical care. Always follow your healthcare professional's instructions.

## 2017-10-23 NOTE — TELEPHONE ENCOUNTER
Called number on file.  Spoke with patient's daughter Candace Hobson who is primary caregiver.  Risk factors specific to patient for stroke discussed with teach back implemented.  Patient's daughter verbalized understanding of discharge instructions and medications.  Patient's daughter was asked about discharge appointments and follow up care.  Follow appointment scheduled for 11/13/2017 at 0900. Warning signs discussed with teach back discussion method implemented.  Notified to seek immediate medical help via 911 if new or worsening stroke symptoms occur.  Patient and daughter relayed no new questions or comments at this time.  Instructed to call [facility] with any further questions.

## 2017-11-01 ENCOUNTER — OFFICE VISIT (OUTPATIENT)
Dept: CARDIOLOGY | Facility: CLINIC | Age: 77
End: 2017-11-01
Payer: MEDICARE

## 2017-11-01 VITALS
HEIGHT: 71 IN | BODY MASS INDEX: 29.78 KG/M2 | TEMPERATURE: 98 F | DIASTOLIC BLOOD PRESSURE: 80 MMHG | WEIGHT: 212.75 LBS | SYSTOLIC BLOOD PRESSURE: 119 MMHG | OXYGEN SATURATION: 98 %

## 2017-11-01 DIAGNOSIS — I63.9 CRYPTOGENIC STROKE: ICD-10-CM

## 2017-11-01 DIAGNOSIS — I50.32 CHRONIC DIASTOLIC CONGESTIVE HEART FAILURE: ICD-10-CM

## 2017-11-01 DIAGNOSIS — I10 ESSENTIAL HYPERTENSION: ICD-10-CM

## 2017-11-01 DIAGNOSIS — I63.89 ACUTE ISCHEMIC MULTIFOCAL MULTIPLE VASCULAR TERRITORIES STROKE: Primary | ICD-10-CM

## 2017-11-01 DIAGNOSIS — I63.422 CEREBROVASCULAR ACCIDENT (CVA) DUE TO EMBOLISM OF LEFT ANTERIOR CEREBRAL ARTERY: Chronic | ICD-10-CM

## 2017-11-01 PROCEDURE — 99215 OFFICE O/P EST HI 40 MIN: CPT | Mod: S$GLB,,, | Performed by: INTERNAL MEDICINE

## 2017-11-01 PROCEDURE — 99999 PR PBB SHADOW E&M-EST. PATIENT-LVL III: CPT | Mod: PBBFAC,,, | Performed by: INTERNAL MEDICINE

## 2017-11-01 RX ORDER — LISINOPRIL 10 MG/1
1 TABLET ORAL DAILY
COMMUNITY
Start: 2017-10-09 | End: 2019-04-11

## 2017-11-01 RX ORDER — HYDROCHLOROTHIAZIDE 12.5 MG/1
25 TABLET ORAL DAILY
COMMUNITY
Start: 2017-10-09 | End: 2019-04-11

## 2017-11-01 NOTE — PROGRESS NOTES
Chief Complaint:  Shant Magaan is a 77 y.o. male who presents for evaluation of Follow-up      HPI:   Mr. Magana was admitted to [facility] in 2017 for hypertensive crisis/emergency and was diagnosed with a stroke.  MRI showed multiple acute infarcts suggestive of embolic etiology - right cerebellar, right corona radiata and left frontal lobe.  CTA showed 80% LICA stenosis and bilateral vertebral disease, multiple intracranial stenoses.  Carotid U/S showed < 50% BROOK and 50-69% LICA stenosis.  Significant LVH and LAE on TTE.  No Afib diagnosed.  His stroke symptoms have resolved.      He discontinued taking hydralazine tid due to diarrhea.      He has a 4 week event monitor negative for Afib.  He has not experienced any symptoms.      He was again admitted to [facility] earlier in October, Pradaxa initiated.      Patient Active Problem List    Diagnosis Date Noted    Chronic diastolic congestive heart failure 10/19/2017    Enlarged LA (left atrium) 10/19/2017    Acute ischemic multifocal multiple vascular territories stroke 10/18/2017    Internal carotid artery stenosis, left 10/18/2017    Mixed hyperlipidemia 10/18/2017    CKD (chronic kidney disease) stage 3, GFR 30-59 ml/min 10/18/2017    Cytotoxic cerebral edema 10/18/2017    Cryptogenic stroke 10/16/2017    SHIKHA (acute kidney injury) 10/14/2017    Syncope 10/14/2017    Encephalopathy 10/11/2017    Syncope and collapse 10/11/2017    Cerebrovascular accident (CVA) due to embolism of anterior cerebral artery 08/15/2017    Hypertensive crisis, unspecified 07/15/2017    Hypertensive emergency     Type 2 diabetes mellitus with complication, without long-term current use of insulin     Hypertensive encephalopathy 2017    Essential hypertension 2017    Bradycardia 2017       Right Arm BP - Sittin/80  Left Arm BP - Sittin/75    Current Outpatient Prescriptions   Medication Sig    amlodipine (NORVASC) 10 MG tablet Take 1 tablet  "(10 mg total) by mouth once daily.    aspirin (ECOTRIN) 81 MG EC tablet Take 1 tablet (81 mg total) by mouth once daily.    atorvastatin (LIPITOR) 80 MG tablet Take 1 tablet (80 mg total) by mouth once daily.    dabigatran etexilate (PRADAXA) 150 mg Cap Take 1 capsule (150 mg total) by mouth 2 (two) times daily. "Do NOT break, chew, or open capsules."    hydroCHLOROthiazide (HYDRODIURIL) 12.5 MG Tab Take 12.5 mg by mouth once daily.     lisinopril 10 MG tablet Take 1 tablet by mouth once daily.    losartan (COZAAR) 100 MG tablet Take 1 tablet (100 mg total) by mouth once daily. HOLD TILL SEE YOUR PROVIDER    metformin (GLUCOPHAGE) 500 MG tablet Take 500 mg by mouth 2 (two) times daily with meals.    metoprolol tartrate (LOPRESSOR) 25 MG tablet Take 0.5 tablets (12.5 mg total) by mouth every 6 (six) hours.    tamsulosin (FLOMAX) 0.4 mg Cp24 Take 0.4 mg by mouth once daily.    vitamin D 1000 units Tab Take 1,000 Units by mouth once daily.     No current facility-administered medications for this visit.        Review of Systems   Constitution: Negative for diaphoresis, weakness, malaise/fatigue, weight gain and weight loss.   HENT: Negative for nosebleeds.    Cardiovascular: Negative for chest pain, claudication, cyanosis, dyspnea on exertion, irregular heartbeat, leg swelling, near-syncope, orthopnea, palpitations, paroxysmal nocturnal dyspnea and syncope.   Respiratory: Negative for cough, hemoptysis, shortness of breath, sleep disturbances due to breathing, snoring, sputum production and wheezing.    Hematologic/Lymphatic: Negative for bleeding problem. Does not bruise/bleed easily.   Skin: Negative for poor wound healing and rash.   Musculoskeletal: Negative for myalgias.   Gastrointestinal: Negative for abdominal pain, heartburn, hematemesis, hematochezia, hemorrhoids and melena.   Neurological: Negative for dizziness, focal weakness, light-headedness and loss of balance.   Psychiatric/Behavioral: " Negative for altered mental status, depression and memory loss.         Objective:     Physical Exam   Constitutional: He is oriented to person, place, and time. He appears well-developed and well-nourished. No distress. He is not intubated.   HENT:   Head: Atraumatic.   Neck: No JVD present.   Cardiovascular: Normal rate, regular rhythm, S1 normal, S2 normal, normal heart sounds and intact distal pulses.  PMI is not displaced.  Exam reveals no gallop, no S3, no S4, no distant heart sounds, no friction rub, no midsystolic click and no opening snap.    No murmur heard.  Pulses:       Carotid pulses are 2+ on the right side, and 2+ on the left side.       Radial pulses are 2+ on the right side, and 2+ on the left side.   Pulmonary/Chest: Effort normal and breath sounds normal. No accessory muscle usage. No apnea, no tachypnea and no bradypnea. He is not intubated. No respiratory distress. He has no decreased breath sounds. He has no wheezes. He has no rhonchi. He has no rales. He exhibits no tenderness.   Abdominal: Soft. Normal aorta and bowel sounds are normal. He exhibits no distension, no abdominal bruit, no pulsatile midline mass and no mass. There is no tenderness.   Musculoskeletal: He exhibits no edema.   Neurological: He is alert and oriented to person, place, and time.   Skin: Skin is warm. No rash noted. No erythema. No pallor.   Psychiatric: He has a normal mood and affect. His behavior is normal. Judgment and thought content normal.   Vitals reviewed.      LABS    CMP  Sodium   Date Value Ref Range Status   10/20/2017 136 136 - 145 mmol/L Final     Potassium   Date Value Ref Range Status   10/20/2017 4.3 3.5 - 5.1 mmol/L Final     Chloride   Date Value Ref Range Status   10/20/2017 106 95 - 110 mmol/L Final     CO2   Date Value Ref Range Status   10/20/2017 20 (L) 23 - 29 mmol/L Final     Glucose   Date Value Ref Range Status   10/20/2017 94 70 - 110 mg/dL Final     BUN, Bld   Date Value Ref Range Status    10/20/2017 30 (H) 8 - 23 mg/dL Final     Creatinine   Date Value Ref Range Status   10/20/2017 1.4 0.5 - 1.4 mg/dL Final     Calcium   Date Value Ref Range Status   10/20/2017 9.8 8.7 - 10.5 mg/dL Final     Total Protein   Date Value Ref Range Status   10/20/2017 6.7 6.0 - 8.4 g/dL Final     Albumin   Date Value Ref Range Status   10/20/2017 2.8 (L) 3.5 - 5.2 g/dL Final     Total Bilirubin   Date Value Ref Range Status   10/20/2017 0.8 0.1 - 1.0 mg/dL Final     Comment:     For infants and newborns, interpretation of results should be based  on gestational age, weight and in agreement with clinical  observations.  Premature Infant recommended reference ranges:  Up to 24 hours.............<8.0 mg/dL  Up to 48 hours............<12.0 mg/dL  3-5 days..................<15.0 mg/dL  6-29 days.................<15.0 mg/dL       Alkaline Phosphatase   Date Value Ref Range Status   10/20/2017 78 55 - 135 U/L Final     AST   Date Value Ref Range Status   10/20/2017 39 10 - 40 U/L Final     ALT   Date Value Ref Range Status   10/20/2017 69 (H) 10 - 44 U/L Final     Anion Gap   Date Value Ref Range Status   10/20/2017 10 8 - 16 mmol/L Final     eGFR if    Date Value Ref Range Status   10/20/2017 55.6 (A) >60 mL/min/1.73 m^2 Final     eGFR if non    Date Value Ref Range Status   10/20/2017 48.1 (A) >60 mL/min/1.73 m^2 Final     Comment:     Calculation used to obtain the estimated glomerular filtration  rate (eGFR) is the CKD-EPI equation. Since race is unknown   in our information system, the eGFR values for   -American and Non--American patients are given   for each creatinine result.         Lab Results   Component Value Date    WBC 3.55 (L) 10/20/2017    HGB 13.9 (L) 10/20/2017    HCT 41.3 10/20/2017    MCV 88 10/20/2017     10/20/2017         ECHOCARDIOGRAM: July 2017  EF 60-65%  Severe LVH  LAE    ECG: NSR    STRESS TESTING:    CARDIAC CATHETERIZATION:    Assessment:      1. Acute ischemic multifocal multiple vascular territories stroke    2. Cerebrovascular accident (CVA) due to embolism of left anterior cerebral artery    3. Cryptogenic stroke    4. Chronic diastolic congestive heart failure    5. Essential hypertension      BP controlled at present.      Clinical features are suggestive of Afib potentially contributing to strokes, but no Afib discovered to date.  He also has cranial vascular disease.    Plan:     -Recommend implantable loop recorder for monitoring for Afib.  He is agreeable.    -Will schedule for 6 November, 2017    Continue with current medical plan and lifestyle changes.    I have reviewed the patient's medical history in detail and updated the computerized patient record.      Follow up as scheduled. Return sooner for concerns or questions      He expressed verbal understanding and agreed with the plan          Guanaco Ty MD, FACC, CCDS  Interventional Cardiology  [facility]

## 2017-11-03 NOTE — NURSING
Called and spoke with patient's daughter, Candace.  Arrival time 11:00am.  Pt verbalizes full understanding of all pre op instructions and denies questions.

## 2017-11-06 ENCOUNTER — HOSPITAL ENCOUNTER (OUTPATIENT)
Facility: HOSPITAL | Age: 77
Discharge: HOME | End: 2017-11-06
Attending: INTERNAL MEDICINE | Admitting: INTERNAL MEDICINE
Payer: MEDICARE

## 2017-11-06 VITALS
DIASTOLIC BLOOD PRESSURE: 91 MMHG | BODY MASS INDEX: 30.8 KG/M2 | WEIGHT: 220 LBS | SYSTOLIC BLOOD PRESSURE: 190 MMHG | RESPIRATION RATE: 16 BRPM | HEIGHT: 71 IN | HEART RATE: 51 BPM | TEMPERATURE: 98 F | OXYGEN SATURATION: 96 %

## 2017-11-06 DIAGNOSIS — I63.9 CRYPTOGENIC STROKE: Primary | ICD-10-CM

## 2017-11-06 DIAGNOSIS — I10 ESSENTIAL (PRIMARY) HYPERTENSION: ICD-10-CM

## 2017-11-06 DIAGNOSIS — I63.89 ACUTE ISCHEMIC MULTIFOCAL MULTIPLE VASCULAR TERRITORIES STROKE: ICD-10-CM

## 2017-11-06 PROCEDURE — C1764 EVENT RECORDER, CARDIAC: HCPCS

## 2017-11-06 PROCEDURE — 33282 EP LAB PROCEDURE: CPT | Mod: ,,, | Performed by: INTERNAL MEDICINE

## 2017-11-06 NOTE — PROGRESS NOTES
Loop recorder completed. After dermaflex dried (40mins) gauze and tegaderm applied. Site cdi, no bleeding or hematoma. Pt denies any pain or discomfort. Pt ready for discharge as per MD Ty orders. Pt able to ambulate in room, get ready on his own w minimal assistance. Discharge instructions reviewed, paper copy provided. Pt verbalized full understanding of instructions. Pt walked to front of building where he left w family members. NAD.

## 2017-11-06 NOTE — INTERVAL H&P NOTE
The patient has been examined and the H&P has been reviewed:    I concur with the findings and no changes have occurred since H&P was written.    Anesthesia/Surgery risks, benefits and alternative options discussed and understood by patient/family.          There are no hospital problems to display for this patient.        Guanaco Ty MD, FACC, CCDS  Interventional Cardiology  [facility]

## 2017-11-06 NOTE — H&P (VIEW-ONLY)
Chief Complaint:  Shant Magana is a 77 y.o. male who presents for evaluation of Follow-up      HPI:   Mr. Magana was admitted to [facility] in 2017 for hypertensive crisis/emergency and was diagnosed with a stroke.  MRI showed multiple acute infarcts suggestive of embolic etiology - right cerebellar, right corona radiata and left frontal lobe.  CTA showed 80% LICA stenosis and bilateral vertebral disease, multiple intracranial stenoses.  Carotid U/S showed < 50% BROOK and 50-69% LICA stenosis.  Significant LVH and LAE on TTE.  No Afib diagnosed.  His stroke symptoms have resolved.      He discontinued taking hydralazine tid due to diarrhea.      He has a 4 week event monitor negative for Afib.  He has not experienced any symptoms.      He was again admitted to [facility] earlier in October, Pradaxa initiated.      Patient Active Problem List    Diagnosis Date Noted    Chronic diastolic congestive heart failure 10/19/2017    Enlarged LA (left atrium) 10/19/2017    Acute ischemic multifocal multiple vascular territories stroke 10/18/2017    Internal carotid artery stenosis, left 10/18/2017    Mixed hyperlipidemia 10/18/2017    CKD (chronic kidney disease) stage 3, GFR 30-59 ml/min 10/18/2017    Cytotoxic cerebral edema 10/18/2017    Cryptogenic stroke 10/16/2017    SHIKHA (acute kidney injury) 10/14/2017    Syncope 10/14/2017    Encephalopathy 10/11/2017    Syncope and collapse 10/11/2017    Cerebrovascular accident (CVA) due to embolism of anterior cerebral artery 08/15/2017    Hypertensive crisis, unspecified 07/15/2017    Hypertensive emergency     Type 2 diabetes mellitus with complication, without long-term current use of insulin     Hypertensive encephalopathy 2017    Essential hypertension 2017    Bradycardia 2017       Right Arm BP - Sittin/80  Left Arm BP - Sittin/75    Current Outpatient Prescriptions   Medication Sig    amlodipine (NORVASC) 10 MG tablet Take 1 tablet  "(10 mg total) by mouth once daily.    aspirin (ECOTRIN) 81 MG EC tablet Take 1 tablet (81 mg total) by mouth once daily.    atorvastatin (LIPITOR) 80 MG tablet Take 1 tablet (80 mg total) by mouth once daily.    dabigatran etexilate (PRADAXA) 150 mg Cap Take 1 capsule (150 mg total) by mouth 2 (two) times daily. "Do NOT break, chew, or open capsules."    hydroCHLOROthiazide (HYDRODIURIL) 12.5 MG Tab Take 12.5 mg by mouth once daily.     lisinopril 10 MG tablet Take 1 tablet by mouth once daily.    losartan (COZAAR) 100 MG tablet Take 1 tablet (100 mg total) by mouth once daily. HOLD TILL SEE YOUR PROVIDER    metformin (GLUCOPHAGE) 500 MG tablet Take 500 mg by mouth 2 (two) times daily with meals.    metoprolol tartrate (LOPRESSOR) 25 MG tablet Take 0.5 tablets (12.5 mg total) by mouth every 6 (six) hours.    tamsulosin (FLOMAX) 0.4 mg Cp24 Take 0.4 mg by mouth once daily.    vitamin D 1000 units Tab Take 1,000 Units by mouth once daily.     No current facility-administered medications for this visit.        Review of Systems   Constitution: Negative for diaphoresis, weakness, malaise/fatigue, weight gain and weight loss.   HENT: Negative for nosebleeds.    Cardiovascular: Negative for chest pain, claudication, cyanosis, dyspnea on exertion, irregular heartbeat, leg swelling, near-syncope, orthopnea, palpitations, paroxysmal nocturnal dyspnea and syncope.   Respiratory: Negative for cough, hemoptysis, shortness of breath, sleep disturbances due to breathing, snoring, sputum production and wheezing.    Hematologic/Lymphatic: Negative for bleeding problem. Does not bruise/bleed easily.   Skin: Negative for poor wound healing and rash.   Musculoskeletal: Negative for myalgias.   Gastrointestinal: Negative for abdominal pain, heartburn, hematemesis, hematochezia, hemorrhoids and melena.   Neurological: Negative for dizziness, focal weakness, light-headedness and loss of balance.   Psychiatric/Behavioral: " Negative for altered mental status, depression and memory loss.         Objective:     Physical Exam   Constitutional: He is oriented to person, place, and time. He appears well-developed and well-nourished. No distress. He is not intubated.   HENT:   Head: Atraumatic.   Neck: No JVD present.   Cardiovascular: Normal rate, regular rhythm, S1 normal, S2 normal, normal heart sounds and intact distal pulses.  PMI is not displaced.  Exam reveals no gallop, no S3, no S4, no distant heart sounds, no friction rub, no midsystolic click and no opening snap.    No murmur heard.  Pulses:       Carotid pulses are 2+ on the right side, and 2+ on the left side.       Radial pulses are 2+ on the right side, and 2+ on the left side.   Pulmonary/Chest: Effort normal and breath sounds normal. No accessory muscle usage. No apnea, no tachypnea and no bradypnea. He is not intubated. No respiratory distress. He has no decreased breath sounds. He has no wheezes. He has no rhonchi. He has no rales. He exhibits no tenderness.   Abdominal: Soft. Normal aorta and bowel sounds are normal. He exhibits no distension, no abdominal bruit, no pulsatile midline mass and no mass. There is no tenderness.   Musculoskeletal: He exhibits no edema.   Neurological: He is alert and oriented to person, place, and time.   Skin: Skin is warm. No rash noted. No erythema. No pallor.   Psychiatric: He has a normal mood and affect. His behavior is normal. Judgment and thought content normal.   Vitals reviewed.      LABS    CMP  Sodium   Date Value Ref Range Status   10/20/2017 136 136 - 145 mmol/L Final     Potassium   Date Value Ref Range Status   10/20/2017 4.3 3.5 - 5.1 mmol/L Final     Chloride   Date Value Ref Range Status   10/20/2017 106 95 - 110 mmol/L Final     CO2   Date Value Ref Range Status   10/20/2017 20 (L) 23 - 29 mmol/L Final     Glucose   Date Value Ref Range Status   10/20/2017 94 70 - 110 mg/dL Final     BUN, Bld   Date Value Ref Range Status    10/20/2017 30 (H) 8 - 23 mg/dL Final     Creatinine   Date Value Ref Range Status   10/20/2017 1.4 0.5 - 1.4 mg/dL Final     Calcium   Date Value Ref Range Status   10/20/2017 9.8 8.7 - 10.5 mg/dL Final     Total Protein   Date Value Ref Range Status   10/20/2017 6.7 6.0 - 8.4 g/dL Final     Albumin   Date Value Ref Range Status   10/20/2017 2.8 (L) 3.5 - 5.2 g/dL Final     Total Bilirubin   Date Value Ref Range Status   10/20/2017 0.8 0.1 - 1.0 mg/dL Final     Comment:     For infants and newborns, interpretation of results should be based  on gestational age, weight and in agreement with clinical  observations.  Premature Infant recommended reference ranges:  Up to 24 hours.............<8.0 mg/dL  Up to 48 hours............<12.0 mg/dL  3-5 days..................<15.0 mg/dL  6-29 days.................<15.0 mg/dL       Alkaline Phosphatase   Date Value Ref Range Status   10/20/2017 78 55 - 135 U/L Final     AST   Date Value Ref Range Status   10/20/2017 39 10 - 40 U/L Final     ALT   Date Value Ref Range Status   10/20/2017 69 (H) 10 - 44 U/L Final     Anion Gap   Date Value Ref Range Status   10/20/2017 10 8 - 16 mmol/L Final     eGFR if    Date Value Ref Range Status   10/20/2017 55.6 (A) >60 mL/min/1.73 m^2 Final     eGFR if non    Date Value Ref Range Status   10/20/2017 48.1 (A) >60 mL/min/1.73 m^2 Final     Comment:     Calculation used to obtain the estimated glomerular filtration  rate (eGFR) is the CKD-EPI equation. Since race is unknown   in our information system, the eGFR values for   -American and Non--American patients are given   for each creatinine result.         Lab Results   Component Value Date    WBC 3.55 (L) 10/20/2017    HGB 13.9 (L) 10/20/2017    HCT 41.3 10/20/2017    MCV 88 10/20/2017     10/20/2017         ECHOCARDIOGRAM: July 2017  EF 60-65%  Severe LVH  LAE    ECG: NSR    STRESS TESTING:    CARDIAC CATHETERIZATION:    Assessment:      1. Acute ischemic multifocal multiple vascular territories stroke    2. Cerebrovascular accident (CVA) due to embolism of left anterior cerebral artery    3. Cryptogenic stroke    4. Chronic diastolic congestive heart failure    5. Essential hypertension      BP controlled at present.      Clinical features are suggestive of Afib potentially contributing to strokes, but no Afib discovered to date.  He also has cranial vascular disease.    Plan:     -Recommend implantable loop recorder for monitoring for Afib.  He is agreeable.    -Will schedule for 6 November, 2017    Continue with current medical plan and lifestyle changes.    I have reviewed the patient's medical history in detail and updated the computerized patient record.      Follow up as scheduled. Return sooner for concerns or questions      He expressed verbal understanding and agreed with the plan          Guanaco Ty MD, FACC, CCDS  Interventional Cardiology  [facility]

## 2017-11-07 NOTE — DISCHARGE SUMMARY
[facility]  Cardiology  Discharge Summary      Patient Name: Shant Magana Jr.  MRN: [id number]  Admission Date: 11/6/2017  Hospital Length of Stay: 0 days  Discharge Date and Time: 11/6/2017  2:32 PM  Attending Physician: No att. providers found  Discharging Provider: Guanaco Ty MD  Primary Care Physician: Trent Aldana MD    HPI: Cryptogenic stroke.  Referred for loop recorder implant.      Procedure(s) (LRB):  PLACEMENT-LOOP RECORDER (N/A)     Indwelling Lines/Drains at time of discharge:  Lines/Drains/Airways          No matching active lines, drains, or airways          Hospital Course (synopsis of major diagnoses, care, treatment, and services provided during the course of the hospital stay): Biotronik loop recorder implanted with local analgesia.    Consults:     Significant Diagnostic Studies: Labs: BMP: No results for input(s): GLU, NA, K, CL, CO2, BUN, CREATININE, CALCIUM, MG in the last 48 hours., CMP No results for input(s): NA, K, CL, CO2, GLU, BUN, CREATININE, CALCIUM, PROT, ALBUMIN, BILITOT, ALKPHOS, AST, ALT, ANIONGAP, ESTGFRAFRICA, EGFRNONAA in the last 48 hours. and CBC No results for input(s): WBC, HGB, HCT, PLT in the last 48 hours.    Pending Diagnostic Studies:     None          There are no hospital problems to display for this patient.      Discharged Condition: good    Follow Up:    Patient Instructions:   No discharge procedures on file.  Medications:  Reconciled Home Medications:   Discharge Medication List as of 11/6/2017  2:32 PM      CONTINUE these medications which have NOT CHANGED    Details   amlodipine (NORVASC) 10 MG tablet Take 1 tablet (10 mg total) by mouth once daily., Starting Mon 8/28/2017, Until Tue 8/28/2018, Normal      aspirin (ECOTRIN) 81 MG EC tablet Take 1 tablet (81 mg total) by mouth once daily., Starting Wed 7/19/2017, Until Thu 7/19/2018, Print      atorvastatin (LIPITOR) 80 MG tablet Take 1 tablet (80 mg total) by mouth once daily.,  "Starting Mon 8/28/2017, Until Tue 8/28/2018, Normal      dabigatran etexilate (PRADAXA) 150 mg Cap Take 1 capsule (150 mg total) by mouth 2 (two) times daily. "Do NOT break, chew, or open capsules.", Starting Sat 10/21/2017, Until Mon 11/20/2017, Normal      hydroCHLOROthiazide (HYDRODIURIL) 12.5 MG Tab Take 12.5 mg by mouth once daily. , Starting Mon 10/9/2017, Historical Med      lisinopril 10 MG tablet Take 1 tablet by mouth once daily., Starting Mon 10/9/2017, Historical Med      losartan (COZAAR) 100 MG tablet Take 1 tablet (100 mg total) by mouth once daily. HOLD TILL SEE YOUR PROVIDER, Starting Sat 10/21/2017, Until Sun 10/21/2018, Normal      metformin (GLUCOPHAGE) 500 MG tablet Take 500 mg by mouth 2 (two) times daily with meals., Historical Med      metoprolol tartrate (LOPRESSOR) 25 MG tablet Take 0.5 tablets (12.5 mg total) by mouth every 6 (six) hours., Starting Sat 10/21/2017, Until Sun 10/21/2018, Normal      tamsulosin (FLOMAX) 0.4 mg Cp24 Take 0.4 mg by mouth once daily., Historical Med      vitamin D 1000 units Tab Take 1,000 Units by mouth once daily., Historical Med             Time spent on the discharge of patient: 10 minutes    Guanaco Ty MD  Cardiology  [facility]  "

## 2017-11-13 ENCOUNTER — OFFICE VISIT (OUTPATIENT)
Dept: NEUROLOGY | Facility: CLINIC | Age: 77
End: 2017-11-13
Payer: MEDICARE

## 2017-11-13 VITALS
HEIGHT: 71 IN | HEART RATE: 60 BPM | SYSTOLIC BLOOD PRESSURE: 144 MMHG | DIASTOLIC BLOOD PRESSURE: 87 MMHG | BODY MASS INDEX: 29.81 KG/M2 | WEIGHT: 212.94 LBS

## 2017-11-13 DIAGNOSIS — I63.422 CEREBROVASCULAR ACCIDENT (CVA) DUE TO EMBOLISM OF LEFT ANTERIOR CEREBRAL ARTERY: Chronic | ICD-10-CM

## 2017-11-13 DIAGNOSIS — E11.8 TYPE 2 DIABETES MELLITUS WITH COMPLICATION, WITHOUT LONG-TERM CURRENT USE OF INSULIN: ICD-10-CM

## 2017-11-13 DIAGNOSIS — I50.32 CHRONIC DIASTOLIC CONGESTIVE HEART FAILURE: ICD-10-CM

## 2017-11-13 DIAGNOSIS — N18.30 CKD (CHRONIC KIDNEY DISEASE) STAGE 3, GFR 30-59 ML/MIN: ICD-10-CM

## 2017-11-13 PROBLEM — G93.40 ENCEPHALOPATHY: Status: RESOLVED | Noted: 2017-10-11 | Resolved: 2017-11-13

## 2017-11-13 PROBLEM — R55 SYNCOPE: Status: RESOLVED | Noted: 2017-10-14 | Resolved: 2017-11-13

## 2017-11-13 PROBLEM — I67.4 HYPERTENSIVE ENCEPHALOPATHY: Status: RESOLVED | Noted: 2017-07-14 | Resolved: 2017-11-13

## 2017-11-13 PROBLEM — I63.9 CRYPTOGENIC STROKE: Status: RESOLVED | Noted: 2017-10-16 | Resolved: 2017-11-13

## 2017-11-13 PROBLEM — G93.6 CYTOTOXIC CEREBRAL EDEMA: Status: RESOLVED | Noted: 2017-10-18 | Resolved: 2017-11-13

## 2017-11-13 PROBLEM — I16.9 HYPERTENSIVE CRISIS, UNSPECIFIED: Status: RESOLVED | Noted: 2017-07-15 | Resolved: 2017-11-13

## 2017-11-13 PROBLEM — R55 SYNCOPE AND COLLAPSE: Status: RESOLVED | Noted: 2017-10-11 | Resolved: 2017-11-13

## 2017-11-13 PROCEDURE — 99214 OFFICE O/P EST MOD 30 MIN: CPT | Mod: S$GLB,,, | Performed by: PSYCHIATRY & NEUROLOGY

## 2017-11-13 PROCEDURE — 99999 PR PBB SHADOW E&M-EST. PATIENT-LVL III: CPT | Mod: PBBFAC,,, | Performed by: PSYCHIATRY & NEUROLOGY

## 2017-11-13 NOTE — PATIENT INSTRUCTIONS
Symptoms of a Stroke     A sudden feeling of weakness on one side of your body may be a sign that you are having a stroke.   During a stroke, blood stops flowing to part of the brain. This can damage areas in the brain that control the rest of the body. Get help right away if any of these symptoms come on suddenly, even if the symptoms dont last.  Know the symptoms of a stroke  · Weakness. You may feel a sudden weakness, tingling, or a loss of feeling on 1 side of your face or body including your arm or leg.   · Vision problems. You may have sudden double vision or trouble seeing in 1 or both eyes.  · Speech problems. You may have sudden trouble talking, slurred speech, or problems understanding others.  · Headache. You may have a sudden, severe headache.  · Movement problems. You may have sudden trouble walking, dizziness, a feeling of spinning, a loss of balance, a feeling of falling, or blackouts.  · Seizure. You may also have a seizure with a large or hemorrhagic stroke.   Remember: If you have any of these symptoms, call 911 and your doctor as soon as possible.  F.A.S.T. is an easy way to remember the signs of a stroke. When you see these signs, you will know that you need to call 911 fast.   F.A.S.T. stands for:  · F is for face drooping - One side of the face is drooping or numb. When the person smiles, the smile is uneven.  · A is for arm weakness - One arm is weak or numb. When the person lifts both arms at the same time, one arm may drift downward.  · S is for speech difficulty - You may notice slurred speech or difficulty speaking. The person can't repeat a simple sentence correctly when asked.  · T is for time to dial 911 - If someone shows any of these symptoms, even if they go away, call 911 immediately. Make note of the time the symptoms first appeared.   Date Last Reviewed: 8/26/2015 © 2000-2017 [employer]. [address] [place] [address], [place] [address]. All rights reserved. This  information is not intended as a substitute for professional medical care. Always follow your healthcare professional's instructions.

## 2017-11-13 NOTE — PROGRESS NOTES
[facility] NEUROLOGY  [facility] [place]    Date: 11/13/17  Patient Name: Shant Magana Jr.   MRN: [id number]   PCP: Trent Aldana  Referring Provider: Self, Aaareferral    Assessment:   Shant Magana Jr. is a 77 y.o. male presenting with a recent history of embolic stroke.  The patient is compliant with Pradaxa.  He denies any new focal neurologic deficits.  Risk stratification was reviewed with the patient.  Stroke education was completed at length.    Plan:     Problem List Items Addressed This Visit        Neuro    Cerebrovascular accident (CVA) due to embolism of anterior cerebral artery (Chronic)    Current Assessment & Plan     -- MRI brain personally reviewed with multifocal infarcts  -- risk stratification complete and reviewed  -- patient compliant with pradaxa  -- stroke education completed  -- patient does not smoke            Cardiac/Vascular    Chronic diastolic congestive heart failure    Overview     Managed by Dr. Salcedo            Renal/    CKD (chronic kidney disease) stage 3, GFR 30-59 ml/min    Overview     Managed by Dr. Orozco            Endocrine    Type 2 diabetes mellitus with complication, without long-term current use of insulin    Overview     Managed by Dr. Ernesto Stephens MD  [facility]   Department of Neurology    Patient note was created using Dragon Dictation.  Any errors in syntax or even information may not have been identified and edited on initial review prior to signing this note.  Subjective:        HPI:   Mr. Shant Magana Jr. is a 77 y.o. male who presents with a chief complaint of recent history of embolic stroke.  The patient denies any recent strokelike symptoms or new focal neurologic deficits.  He presents today with his children who contribute to the history.  The patient that his medications including Pradaxa.  He does exhibit subtle L facial droop attributable to a remote stroke per Dr. Gonzalez's note.  On  "exam, however, has otherwise recovered remarkably well.  He has completed physical therapy.  He has no complaints today.     MEDICAL HISTORY:  Past Medical History:   Diagnosis Date    Cancer     prostate    Diabetes mellitus     Hypertension     Hypertrophic cardiomyopathy     Renal disorder        PAST SURGICAL HISTORY:  Past Surgical History:   Procedure Laterality Date    BACK SURGERY      THYROIDECTOMY         CURRENT MEDS:  Current Outpatient Prescriptions   Medication Sig Dispense Refill    amlodipine (NORVASC) 10 MG tablet Take 1 tablet (10 mg total) by mouth once daily. 90 tablet 2    aspirin (ECOTRIN) 81 MG EC tablet Take 1 tablet (81 mg total) by mouth once daily. 30 tablet 3    atorvastatin (LIPITOR) 80 MG tablet Take 1 tablet (80 mg total) by mouth once daily. 90 tablet 2    dabigatran etexilate (PRADAXA) 150 mg Cap Take 1 capsule (150 mg total) by mouth 2 (two) times daily. "Do NOT break, chew, or open capsules." 60 capsule 0    hydroCHLOROthiazide (HYDRODIURIL) 12.5 MG Tab Take 12.5 mg by mouth once daily.       lisinopril 10 MG tablet Take 1 tablet by mouth once daily.      metformin (GLUCOPHAGE) 500 MG tablet Take 500 mg by mouth 2 (two) times daily with meals.      metoprolol tartrate (LOPRESSOR) 25 MG tablet Take 0.5 tablets (12.5 mg total) by mouth every 6 (six) hours. 60 tablet 11    tamsulosin (FLOMAX) 0.4 mg Cp24 Take 0.4 mg by mouth once daily.      vitamin D 1000 units Tab Take 1,000 Units by mouth once daily.      losartan (COZAAR) 100 MG tablet Take 1 tablet (100 mg total) by mouth once daily. HOLD TILL SEE YOUR PROVIDER 90 tablet 2     No current facility-administered medications for this visit.        ALLERGIES:  Review of patient's allergies indicates:   Allergen Reactions    Hydralazine analogues Diarrhea       FAMILY HISTORY:  History reviewed. No pertinent family history.    SOCIAL HISTORY:  Social History   Substance Use Topics    Smoking status: Former Smoker    " " Types: Cigarettes    Smokeless tobacco: Never Used    Alcohol use Yes       Review of Systems:  12 review of systems is negative except for the symptoms mentioned in HPI.      Objective:     Vitals:    11/13/17 0850   BP: (!) 144/87   Pulse: 60   Weight: 96.6 kg (212 lb 15.4 oz)   Height: 5' 11" (1.803 m)     General: NAD, well nourished   Eyes: no tearing, discharge, no erythema   ENT: moist mucous membranes of the oral cavity, nares patent    Neck: Supple, full range of motion  Cardiovascular: Warm and well perfused, pulses equal and symmetrical  Lungs: Normal work of breathing, normal chest wall excursions  Skin: No rash, lesions, or breakdown on exposed skin  Psychiatry: Mood and affect are appropriate   Abdomen: soft, non tender, non distended  Extremeties: No cyanosis, clubbing or edema.    Neurological   MENTAL STATUS: Alert and oriented to person, place, and time. Attention and concentration within normal limits. Speech without dysarthria, able to name and repeat without difficulty. Recent and remote memory within normal limits   CRANIAL NERVES: Visual fields intact. PERRL. EOMI. Facial sensation intact. Face symmetrical. Hearing grossly intact. Full shoulder shrug bilaterally. Tongue protrudes midline   SENSORY: Sensation is intact to light touch throughout.    MOTOR: Normal bulk and tone. No pronator drift.  5/5 R and 5-5/ L deltoid, biceps, triceps, interosseous, hand  bilaterally. 5/5 R and 4/5 L  iliopsoas, knee extension/flexion, foot dorsi/plantarflexion bilaterally.    REFLEXES: Symmetric and 2+ throughout.   CEREBELLAR/COORDINATION/GAIT: Gait steady with normal arm swing and stride length.  Finger to nose intact. Normal rapid alternating movements.       "

## 2018-03-09 NOTE — PLAN OF CARE
"TN went to meet with patient and daughters at bedside.  TN notified daughters (Candace and Mike) of therapy's recommendations over the weekend (home with home health 24/hr care vs. SNF). TN informed therapy patient doing well with therapy from their notes. TN asked daughters what would be their preference as discharge plan. TN educated daughters on SNF and notified "not permanent." We would also have to get approval from [facility]. Both daughters refused SNF, stated patient had gone to a place like that before, and they discharged him because he was doing so well. Daughter's preference is home with home health. Patient's daughter Mike lives with the patient, and he has a very supportive family. TN informed them when patient is discharged, can send home health orders to [facility], and they will assign home health company in network with them.    TN faxed home health orders to [facility] via [facility]. No DME ordered for discharge.    TN clarified with Dr. Love, patient will have recorder or holter monitor set up as an outpatient with cardiology.    --TN met with patient and daughter, follow-up information reviewed, verbalized understanding.    Future Appointments  Date Time Provider Department Center   11/1/2017 2:00 PM Guanaco Ty MD [facility] CARDIO [place] [facility]   11/13/2017 9:00 AM Kaiser Stephens MD [facility] NEURO [place] [facility]     Follow-up With  Details  Why  Contact Info   Trent Aldana MD  On 10/25/2017  at 11:15 am  [address]  [address]  [place] [address]  [phone]   Kaiser Stephens MD  On 11/13/2017  at 09:00 am---Vascular Neurology Follow-Up.  [address]  [address]  [place] [address]  [phone]       [facility]   will send orders to home health orders to [facility].   Guanaco Ty MD  On 11/1/2017  at 2:00 pm  [address]  [place] [address]  [phone]        10/16/17 1241   Final Note   Assessment Type Final " Has RCC with liver metastases that progressed prior to planned liver resection, progressed after gemcitabine and sunitinib, but responded to lenvatinib and everolimus based on recent scans.  With abdominal pain radiating from right to left quadrants, differential includes subcapsular hematoma.  Other ddx include peritoneal carcinomatosis or bowel obstruction (partial or complete) 2/2 mass effect (less likely as he is still having regular bowel movements).    -3/8/18 MRI abdomen with omental enhancement suggestive of peritoneal carcinomatosis.  -He has had good pain relief with oxycodone but pain relief is not lasting long enough  --start ms contin 15 mg bid  --continue with oxycodone 5mg q4h prn pain, simethicone standing TID to prevent bloating after eating that also triggers his abdominal pain  --iv zofran prn nausea  --consider starting periactin or megace for appetite stimulation   Discharge Note   Discharge Disposition Home-Health   What phone number can be called within the next 1-3 days to see how you are doing after discharge? [phone]   Hospital Follow Up  Appt(s) scheduled? Yes   Discharge plans and expectations educations in teach back method with documentation complete? Yes   Right Care Referral Info   Post Acute Recommendation Home-care   Referral Type Home Health   Facility Name Sent referral to [facility]     Kelly Hazel RN  Transition Navigator  [phone]

## 2018-03-20 ENCOUNTER — HOSPITAL ENCOUNTER (OUTPATIENT)
Dept: RADIOLOGY | Facility: HOSPITAL | Age: 78
Discharge: HOME | End: 2018-03-20
Attending: FAMILY MEDICINE
Payer: MEDICARE

## 2018-03-20 DIAGNOSIS — M25.512 LEFT SHOULDER PAIN: ICD-10-CM

## 2018-03-20 DIAGNOSIS — M25.512 LEFT SHOULDER PAIN: Primary | ICD-10-CM

## 2018-03-20 PROCEDURE — 73030 X-RAY EXAM OF SHOULDER: CPT | Mod: TC,FY,LT

## 2018-03-20 PROCEDURE — 73030 X-RAY EXAM OF SHOULDER: CPT | Mod: 26,LT,, | Performed by: RADIOLOGY

## 2018-06-08 RX ORDER — ATORVASTATIN CALCIUM 80 MG/1
80 TABLET, FILM COATED ORAL DAILY
Qty: 90 TABLET | Refills: 2 | Status: ON HOLD | OUTPATIENT
Start: 2018-06-08 | End: 2020-07-27

## 2018-06-22 ENCOUNTER — OFFICE VISIT (OUTPATIENT)
Dept: PODIATRY | Facility: CLINIC | Age: 78
End: 2018-06-22
Payer: MEDICARE

## 2018-06-22 VITALS
SYSTOLIC BLOOD PRESSURE: 105 MMHG | DIASTOLIC BLOOD PRESSURE: 70 MMHG | WEIGHT: 212 LBS | BODY MASS INDEX: 29.68 KG/M2 | HEART RATE: 51 BPM | HEIGHT: 71 IN

## 2018-06-22 DIAGNOSIS — E11.9 ENCOUNTER FOR COMPREHENSIVE DIABETIC FOOT EXAMINATION, TYPE 2 DIABETES MELLITUS: Primary | ICD-10-CM

## 2018-06-22 DIAGNOSIS — B35.1 ONYCHOMYCOSIS: ICD-10-CM

## 2018-06-22 DIAGNOSIS — E11.42 TYPE 2 DIABETES MELLITUS WITH PERIPHERAL NEUROPATHY: ICD-10-CM

## 2018-06-22 DIAGNOSIS — E11.51 TYPE 2 DIABETES MELLITUS WITH PERIPHERAL VASCULAR DISEASE: ICD-10-CM

## 2018-06-22 PROCEDURE — 99999 PR PBB SHADOW E&M-EST. PATIENT-LVL III: CPT | Mod: PBBFAC,,, | Performed by: PODIATRIST

## 2018-06-22 PROCEDURE — 3078F DIAST BP <80 MM HG: CPT | Mod: CPTII,S$GLB,, | Performed by: PODIATRIST

## 2018-06-22 PROCEDURE — 11721 DEBRIDE NAIL 6 OR MORE: CPT | Mod: Q9,S$GLB,, | Performed by: PODIATRIST

## 2018-06-22 PROCEDURE — 3074F SYST BP LT 130 MM HG: CPT | Mod: CPTII,S$GLB,, | Performed by: PODIATRIST

## 2018-06-22 PROCEDURE — 99214 OFFICE O/P EST MOD 30 MIN: CPT | Mod: 25,S$GLB,, | Performed by: PODIATRIST

## 2018-06-22 RX ORDER — DABIGATRAN ETEXILATE MESYLATE 150 MG/1
CAPSULE ORAL
Refills: 11 | Status: ON HOLD | COMMUNITY
Start: 2018-06-05 | End: 2018-09-05 | Stop reason: HOSPADM

## 2018-06-22 NOTE — PROCEDURES
"Routine Foot Care  Date/Time: 6/22/2018 9:31 AM  Performed by: JAN JOSEPH  Authorized by: JAN JOSEPH     Time out: Immediately prior to procedure a "time out" was called to verify the correct patient, procedure, equipment, support staff and site/side marked as required.    Consent Done?:  Yes (Verbal)  Hyperkeratotic Skin Lesions?: No      Nail Care Type:  Debride  Location(s): All  (Left 1st Toe, Left 3rd Toe, Left 2nd Toe, Left 4th Toe, Left 5th Toe, Right 1st Toe, Right 2nd Toe, Right 3rd Toe, Right 4th Toe and Right 5th Toe)  Patient tolerance:  Patient tolerated the procedure well with no immediate complications      "

## 2018-06-22 NOTE — PROGRESS NOTES
Subjective:      Patient ID: Shant Magana Jr. is a 78 y.o. male.    Chief Complaint: Diabetes Mellitus (Dr. Trent Aldana); Diabetic Foot Exam; and Routine Foot Care    Shant is a 78 y.o. male who presents to the clinic upon referral from Dr. Aldana for evaluation and treatment of diabetic feet. Shant has a past medical history of Cancer; Diabetes mellitus; Hypertension; Hypertrophic cardiomyopathy; and Renal disorder. Patient relates no major problem with feet. Only complaints today consist of painful thickened and elongated toenails.    PCP: Trent Aldana MD    Date Last Seen by PCP:     Current shoe gear: flip flops    Hemoglobin A1C   Date Value Ref Range Status   10/11/2017 6.2 (H) 4.0 - 5.6 % Final     Comment:     According to ADA guidelines, hemoglobin A1c <7.0% represents  optimal control in non-pregnant diabetic patients. Different  metrics may apply to specific patient populations.   Standards of Medical Care in Diabetes-2016.  For the purpose of screening for the presence of diabetes:  <5.7%     Consistent with the absence of diabetes  5.7-6.4%  Consistent with increasing risk for diabetes   (prediabetes)  >or=6.5%  Consistent with diabetes  Currently, no consensus exists for use of hemoglobin A1c  for diagnosis of diabetes for children.  This Hemoglobin A1c assay has significant interference with fetal   hemoglobin   (HbF). The results are invalid for patients with abnormal amounts of   HbF,   including those with known Hereditary Persistence   of Fetal Hemoglobin. Heterozygous hemoglobin variants (HbAS, HbAC,   HbAD, HbAE, HbA2) do not significantly interfere with this assay;   however, presence of multiple variants in a sample may impact the %   interference.     07/18/2017 5.7 (H) 4.0 - 5.6 % Final     Comment:     According to ADA guidelines, hemoglobin A1c <7.0% represents  optimal control in non-pregnant diabetic patients. Different  metrics may apply to specific patient populations.  "  Standards of Medical Care in Diabetes-2016.  For the purpose of screening for the presence of diabetes:  <5.7%     Consistent with the absence of diabetes  5.7-6.4%  Consistent with increasing risk for diabetes   (prediabetes)  >or=6.5%  Consistent with diabetes  Currently, no consensus exists for use of hemoglobin A1c  for diagnosis of diabetes for children.  This Hemoglobin A1c assay has significant interference with fetal   hemoglobin   (HbF). The results are invalid for patients with abnormal amounts of   HbF,   including those with known Hereditary Persistence   of Fetal Hemoglobin. Heterozygous hemoglobin variants (HbAS, HbAC,   HbAD, HbAE, HbA2) do not significantly interfere with this assay;   however, presence of multiple variants in a sample may impact the %   interference.     07/17/2017 5.7 (H) 4.0 - 5.6 % Final     Comment:     According to ADA guidelines, hemoglobin A1c <7.0% represents  optimal control in non-pregnant diabetic patients. Different  metrics may apply to specific patient populations.   Standards of Medical Care in Diabetes-2016.  For the purpose of screening for the presence of diabetes:  <5.7%     Consistent with the absence of diabetes  5.7-6.4%  Consistent with increasing risk for diabetes   (prediabetes)  >or=6.5%  Consistent with diabetes  Currently, no consensus exists for use of hemoglobin A1c  for diagnosis of diabetes for children.  This Hemoglobin A1c assay has significant interference with fetal   hemoglobin   (HbF). The results are invalid for patients with abnormal amounts of   HbF,   including those with known Hereditary Persistence   of Fetal Hemoglobin. Heterozygous hemoglobin variants (HbAS, HbAC,   HbAD, HbAE, HbA2) do not significantly interfere with this assay;   however, presence of multiple variants in a sample may impact the %   interference.       Vitals:    06/22/18 0843   BP: 105/70   Pulse: (!) 51   Weight: 96.2 kg (212 lb)   Height: 5' 11" (1.803 m) "   PainSc:   3      Past Medical History:   Diagnosis Date    Cancer     prostate    Diabetes mellitus     Hypertension     Hypertrophic cardiomyopathy     Renal disorder        Past Surgical History:   Procedure Laterality Date    BACK SURGERY      THYROIDECTOMY         No family history on file.    Social History     Social History    Marital status: Single     Spouse name: N/A    Number of children: N/A    Years of education: N/A     Social History Main Topics    Smoking status: Former Smoker     Types: Cigarettes    Smokeless tobacco: Never Used    Alcohol use Yes    Drug use: Unknown    Sexual activity: Not on file     Other Topics Concern    Not on file     Social History Narrative    No narrative on file       Current Outpatient Prescriptions   Medication Sig Dispense Refill    amlodipine (NORVASC) 10 MG tablet Take 1 tablet (10 mg total) by mouth once daily. 90 tablet 2    aspirin (ECOTRIN) 81 MG EC tablet Take 1 tablet (81 mg total) by mouth once daily. 30 tablet 3    atorvastatin (LIPITOR) 80 MG tablet Take 1 tablet (80 mg total) by mouth once daily. 90 tablet 2    hydroCHLOROthiazide (HYDRODIURIL) 12.5 MG Tab Take 12.5 mg by mouth once daily.       lisinopril 10 MG tablet Take 1 tablet by mouth once daily.      losartan (COZAAR) 100 MG tablet Take 1 tablet (100 mg total) by mouth once daily. HOLD TILL SEE YOUR PROVIDER 90 tablet 2    metformin (GLUCOPHAGE) 500 MG tablet Take 500 mg by mouth 2 (two) times daily with meals.      metoprolol tartrate (LOPRESSOR) 25 MG tablet Take 0.5 tablets (12.5 mg total) by mouth every 6 (six) hours. 60 tablet 11    PRADAXA 150 mg Cap 1 CAPSULE TWICE A DAY ORALLY 30 DAYS  11    tamsulosin (FLOMAX) 0.4 mg Cp24 Take 0.4 mg by mouth once daily.      vitamin D 1000 units Tab Take 1,000 Units by mouth once daily.       No current facility-administered medications for this visit.        Review of patient's allergies indicates:   Allergen Reactions     Hydralazine analogues Diarrhea           Review of Systems   Constitution: Negative for chills, fever, weakness and malaise/fatigue.   Cardiovascular: Negative for chest pain, claudication and leg swelling.   Respiratory: Negative for cough and shortness of breath.    Skin: Positive for nail changes. Negative for itching and rash.   Musculoskeletal: Positive for muscle weakness. Negative for back pain, joint pain and muscle cramps.   Gastrointestinal: Negative for nausea and vomiting.   Neurological: Negative for numbness and paresthesias.   Psychiatric/Behavioral: Negative for altered mental status.           Objective:      Physical Exam   Constitutional: He is oriented to person, place, and time. No distress.   Cardiovascular:   Pulses:       Dorsalis pedis pulses are 1+ on the right side, and 0 on the left side.        Posterior tibial pulses are 1+ on the right side, and 0 on the left side.   CFT< 3 secs all toes bilateral foot, skin temp warm to cool bilateral foot, no hair growth bilateral lower extremity, mild non-pitting lower extremity edema bilateral.     Musculoskeletal:   DF toes, eversion foot + 4/5 bilateral while remaining muscle groups +5/5.    Semi-reducible digital contractures toes 2-5 bilateral foot.    Mild depression of the medial arch with standing.    + equinus that reduces with knee bent bilateral.    No pain with ROM or MMT bilateral lower extremity.   Feet:   Right Foot:   Protective Sensation: 10 sites tested. 5 sites sensed.   Skin Integrity: Positive for dry skin. Negative for callus.   Left Foot:   Protective Sensation: 10 sites tested. 5 sites sensed.   Skin Integrity: Positive for dry skin. Negative for callus.   Neurological: He is alert and oriented to person, place, and time. A sensory deficit is present.   Moderate reduction in vibratory sensation bilateral foot.   Skin: Capillary refill takes 2 to 3 seconds. No ecchymosis, no lesion and no rash noted. He is not diaphoretic. No  cyanosis or erythema. No pallor. Nails show no clubbing.   Nails 1-5 bilateral foot are elongated 3-4 mm, thickened 3-4 mm, yellow with debris. Moderate incurvated medial and lateral nail borders right hallux and dystrophy left hallux.    No open lesions or macerations bilateral lower extremity.    Skin is thin and atrophied bilateral lower extremity.             Assessment:       Encounter Diagnoses   Name Primary?    Encounter for comprehensive diabetic foot examination, type 2 diabetes mellitus Yes    Type 2 diabetes mellitus with peripheral vascular disease     Type 2 diabetes mellitus with peripheral neuropathy     Onychomycosis          Plan:       Shant was seen today for diabetes mellitus, diabetic foot exam and routine foot care.    Diagnoses and all orders for this visit:    Encounter for comprehensive diabetic foot examination, type 2 diabetes mellitus    Type 2 diabetes mellitus with peripheral vascular disease  -      Lower Extremity Arteries Bilateral; Future  -     Foot Care    Type 2 diabetes mellitus with peripheral neuropathy  -     Foot Care    Onychomycosis  -     Foot Care      I counseled the patient on his conditions, their implications and medical management.    Shoe inspection. Diabetic Foot Education. Patient reminded of the importance of good nutrition and blood sugar control to help prevent podiatric complications of diabetes. Patient instructed on proper foot hygeine. We discussed wearing proper shoe gear, daily foot inspections, never walking without protective shoe gear, never putting sharp instruments to feet, routine podiatric nail visits every 2-3 months.      Routine foot care per attached note. Patient relates relief following the procedure. He will continue to monitor the areas daily, inspect his feet, wear protective shoe gear when ambulatory, moisturizer to maintain skin integrity and follow in this office in approximately 2-3 months, sooner p.r.n.

## 2018-06-27 ENCOUNTER — HOSPITAL ENCOUNTER (OUTPATIENT)
Dept: RADIOLOGY | Facility: HOSPITAL | Age: 78
Discharge: HOME | End: 2018-06-27
Attending: PODIATRIST
Payer: MEDICARE

## 2018-06-27 DIAGNOSIS — E11.51 TYPE 2 DIABETES MELLITUS WITH PERIPHERAL VASCULAR DISEASE: ICD-10-CM

## 2018-06-27 PROCEDURE — 93925 LOWER EXTREMITY STUDY: CPT | Mod: 26,,, | Performed by: RADIOLOGY

## 2018-06-27 PROCEDURE — 93925 LOWER EXTREMITY STUDY: CPT | Mod: TC

## 2018-06-29 ENCOUNTER — TELEPHONE (OUTPATIENT)
Dept: PODIATRY | Facility: CLINIC | Age: 78
End: 2018-06-29

## 2018-06-29 DIAGNOSIS — E11.51 TYPE 2 DIABETES MELLITUS WITH PERIPHERAL ANGIOPATHY: Primary | ICD-10-CM

## 2018-06-29 NOTE — TELEPHONE ENCOUNTER
Please assist with setting up follow up appointment with Interventional Cardiology to further assess his peripheral arterial disease. Notify the patient that his results are being mailed out, however it shows some blockages in his arteries in the lower extremity. It is not emergent that he sees Cardiology, however it should be within the next month or so.

## 2018-09-03 ENCOUNTER — ANESTHESIA EVENT (OUTPATIENT)
Dept: ENDOSCOPY | Facility: HOSPITAL | Age: 78
DRG: 377 | End: 2018-09-03
Payer: MEDICARE

## 2018-09-03 ENCOUNTER — HOSPITAL ENCOUNTER (INPATIENT)
Facility: HOSPITAL | Age: 78
LOS: 2 days | Discharge: HOME HEALTH | DRG: 377 | End: 2018-09-05
Attending: EMERGENCY MEDICINE | Admitting: INTERNAL MEDICINE
Payer: MEDICARE

## 2018-09-03 DIAGNOSIS — D64.9 SYMPTOMATIC ANEMIA: ICD-10-CM
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
## 2018-09-03 DIAGNOSIS — R53.1 WEAKNESS: ICD-10-CM

## 2018-09-03 DIAGNOSIS — I50.9 HEART FAILURE: ICD-10-CM

## 2018-09-03 DIAGNOSIS — N18.30 CKD (CHRONIC KIDNEY DISEASE) STAGE 3, GFR 30-59 ML/MIN: ICD-10-CM

## 2018-09-03 DIAGNOSIS — N17.9 AKI (ACUTE KIDNEY INJURY): ICD-10-CM

## 2018-09-03 DIAGNOSIS — I95.9 HYPOTENSION, UNSPECIFIED HYPOTENSION TYPE: ICD-10-CM

## 2018-09-03 DIAGNOSIS — E11.8 TYPE 2 DIABETES MELLITUS WITH COMPLICATION, WITHOUT LONG-TERM CURRENT USE OF INSULIN: ICD-10-CM

## 2018-09-03 DIAGNOSIS — D64.9 ANEMIA, UNSPECIFIED TYPE: ICD-10-CM

## 2018-09-03 DIAGNOSIS — I63.422 CEREBROVASCULAR ACCIDENT (CVA) DUE TO EMBOLISM OF LEFT ANTERIOR CEREBRAL ARTERY: Chronic | ICD-10-CM

## 2018-09-03 DIAGNOSIS — N17.9 ACUTE RENAL FAILURE, UNSPECIFIED ACUTE RENAL FAILURE TYPE: Primary | ICD-10-CM

## 2018-09-03 DIAGNOSIS — K92.2 ACUTE UPPER GI BLEED: ICD-10-CM

## 2018-09-03 PROBLEM — G93.41 ENCEPHALOPATHY, METABOLIC: Status: ACTIVE | Noted: 2018-09-03

## 2018-09-03 LAB
ABO + RH BLD: NORMAL
ALBUMIN SERPL BCP-MCNC: 3.3 G/DL
ALP SERPL-CCNC: 45 U/L
ALT SERPL W/O P-5'-P-CCNC: 14 U/L
ANION GAP SERPL CALC-SCNC: 10 MMOL/L
ANION GAP SERPL CALC-SCNC: 11 MMOL/L
AST SERPL-CCNC: 21 U/L
BASOPHILS # BLD AUTO: 0.01 K/UL
BASOPHILS # BLD AUTO: 0.02 K/UL
BASOPHILS NFR BLD: 0.1 %
BASOPHILS NFR BLD: 0.2 %
BILIRUB SERPL-MCNC: 0.3 MG/DL
BILIRUB UR QL STRIP: NEGATIVE
BLD GP AB SCN CELLS X3 SERPL QL: NORMAL
BLD PROD TYP BPU: NORMAL
BLD PROD TYP BPU: NORMAL
BLOOD UNIT EXPIRATION DATE: NORMAL
BLOOD UNIT EXPIRATION DATE: NORMAL
BLOOD UNIT TYPE CODE: 5100
BLOOD UNIT TYPE CODE: 5100
BLOOD UNIT TYPE: NORMAL
BLOOD UNIT TYPE: NORMAL
BUN SERPL-MCNC: 110 MG/DL
BUN SERPL-MCNC: 119 MG/DL
CALCIUM SERPL-MCNC: 11.4 MG/DL
CALCIUM SERPL-MCNC: 11.4 MG/DL
CHLORIDE SERPL-SCNC: 113 MMOL/L
CHLORIDE SERPL-SCNC: 114 MMOL/L
CLARITY UR: CLEAR
CO2 SERPL-SCNC: 15 MMOL/L
CO2 SERPL-SCNC: 15 MMOL/L
CODING SYSTEM: NORMAL
CODING SYSTEM: NORMAL
COLOR UR: YELLOW
CREAT SERPL-MCNC: 2.4 MG/DL
CREAT SERPL-MCNC: 2.6 MG/DL
CREAT UR-MCNC: 125.2 MG/DL
DIASTOLIC DYSFUNCTION: YES
DIFFERENTIAL METHOD: ABNORMAL
DIFFERENTIAL METHOD: ABNORMAL
DISPENSE STATUS: NORMAL
DISPENSE STATUS: NORMAL
EOSINOPHIL # BLD AUTO: 0.1 K/UL
EOSINOPHIL # BLD AUTO: 0.1 K/UL
EOSINOPHIL NFR BLD: 0.5 %
EOSINOPHIL NFR BLD: 0.7 %
ERYTHROCYTE [DISTWIDTH] IN BLOOD BY AUTOMATED COUNT: 15 %
ERYTHROCYTE [DISTWIDTH] IN BLOOD BY AUTOMATED COUNT: 15.9 %
EST. GFR  (AFRICAN AMERICAN): 26 ML/MIN/1.73 M^2
EST. GFR  (AFRICAN AMERICAN): 29 ML/MIN/1.73 M^2
EST. GFR  (NON AFRICAN AMERICAN): 23 ML/MIN/1.73 M^2
EST. GFR  (NON AFRICAN AMERICAN): 25 ML/MIN/1.73 M^2
ESTIMATED AVG GLUCOSE: 123 MG/DL
ESTIMATED PA SYSTOLIC PRESSURE: 35.49
GLUCOSE SERPL-MCNC: 162 MG/DL
GLUCOSE SERPL-MCNC: 166 MG/DL
GLUCOSE UR QL STRIP: NEGATIVE
HBA1C MFR BLD HPLC: 5.9 %
HCT VFR BLD AUTO: 24.4 %
HCT VFR BLD AUTO: 29.6 %
HGB BLD-MCNC: 8 G/DL
HGB BLD-MCNC: 9.5 G/DL
HGB UR QL STRIP: NEGATIVE
KETONES UR QL STRIP: NEGATIVE
LACTATE SERPL-SCNC: 2.1 MMOL/L
LEUKOCYTE ESTERASE UR QL STRIP: NEGATIVE
LYMPHOCYTES # BLD AUTO: 1 K/UL
LYMPHOCYTES # BLD AUTO: 1.1 K/UL
LYMPHOCYTES NFR BLD: 10.9 %
LYMPHOCYTES NFR BLD: 14.6 %
MCH RBC QN AUTO: 29.1 PG
MCH RBC QN AUTO: 29.4 PG
MCHC RBC AUTO-ENTMCNC: 32.1 G/DL
MCHC RBC AUTO-ENTMCNC: 32.8 G/DL
MCV RBC AUTO: 90 FL
MCV RBC AUTO: 91 FL
MITRAL VALVE MOBILITY: NORMAL
MONOCYTES # BLD AUTO: 0.3 K/UL
MONOCYTES # BLD AUTO: 0.8 K/UL
MONOCYTES NFR BLD: 4.9 %
MONOCYTES NFR BLD: 7.7 %
NEUTROPHILS # BLD AUTO: 5.5 K/UL
NEUTROPHILS # BLD AUTO: 7.8 K/UL
NEUTROPHILS NFR BLD: 79.6 %
NEUTROPHILS NFR BLD: 80.3 %
NITRITE UR QL STRIP: NEGATIVE
PH UR STRIP: 5 [PH] (ref 5–8)
PLATELET # BLD AUTO: 198 K/UL
PLATELET # BLD AUTO: 221 K/UL
PMV BLD AUTO: 11.3 FL
PMV BLD AUTO: 11.6 FL
POCT GLUCOSE: 149 MG/DL (ref 70–110)
POCT GLUCOSE: 205 MG/DL (ref 70–110)
POCT GLUCOSE: 209 MG/DL (ref 70–110)
POTASSIUM SERPL-SCNC: 4.3 MMOL/L
POTASSIUM SERPL-SCNC: 4.6 MMOL/L
PROT SERPL-MCNC: 6.1 G/DL
PROT UR QL STRIP: NEGATIVE
RBC # BLD AUTO: 2.72 M/UL
RBC # BLD AUTO: 3.27 M/UL
RETIRED EF AND QEF - SEE NOTES: 70 (ref 55–65)
SODIUM SERPL-SCNC: 139 MMOL/L
SODIUM SERPL-SCNC: 139 MMOL/L
SODIUM UR-SCNC: 39 MMOL/L
SP GR UR STRIP: 1.01 (ref 1–1.03)
TRANS ERYTHROCYTES VOL PATIENT: NORMAL ML
TRANS ERYTHROCYTES VOL PATIENT: NORMAL ML
TRICUSPID VALVE REGURGITATION: ABNORMAL
URN SPEC COLLECT METH UR: NORMAL
UROBILINOGEN UR STRIP-ACNC: NEGATIVE EU/DL
UUN UR-MCNC: 982 MG/DL
WBC # BLD AUTO: 6.92 K/UL
WBC # BLD AUTO: 9.73 K/UL

## 2018-09-03 PROCEDURE — 83605 ASSAY OF LACTIC ACID: CPT

## 2018-09-03 PROCEDURE — 83036 HEMOGLOBIN GLYCOSYLATED A1C: CPT

## 2018-09-03 PROCEDURE — P9021 RED BLOOD CELLS UNIT: HCPCS

## 2018-09-03 PROCEDURE — 11000001 HC ACUTE MED/SURG PRIVATE ROOM

## 2018-09-03 PROCEDURE — 93010 ELECTROCARDIOGRAM REPORT: CPT | Mod: ,,, | Performed by: INTERNAL MEDICINE

## 2018-09-03 PROCEDURE — 36415 COLL VENOUS BLD VENIPUNCTURE: CPT

## 2018-09-03 PROCEDURE — 80048 BASIC METABOLIC PNL TOTAL CA: CPT

## 2018-09-03 PROCEDURE — 93005 ELECTROCARDIOGRAM TRACING: CPT

## 2018-09-03 PROCEDURE — 84540 ASSAY OF URINE/UREA-N: CPT

## 2018-09-03 PROCEDURE — C9113 INJ PANTOPRAZOLE SODIUM, VIA: HCPCS | Performed by: STUDENT IN AN ORGANIZED HEALTH CARE EDUCATION/TRAINING PROGRAM

## 2018-09-03 PROCEDURE — 36430 TRANSFUSION BLD/BLD COMPNT: CPT

## 2018-09-03 PROCEDURE — 82570 ASSAY OF URINE CREATININE: CPT

## 2018-09-03 PROCEDURE — 25000003 PHARM REV CODE 250: Performed by: EMERGENCY MEDICINE

## 2018-09-03 PROCEDURE — 99285 EMERGENCY DEPT VISIT HI MDM: CPT | Mod: 25

## 2018-09-03 PROCEDURE — 93306 TTE W/DOPPLER COMPLETE: CPT

## 2018-09-03 PROCEDURE — 85025 COMPLETE CBC W/AUTO DIFF WBC: CPT | Mod: 91

## 2018-09-03 PROCEDURE — 81003 URINALYSIS AUTO W/O SCOPE: CPT

## 2018-09-03 PROCEDURE — 86920 COMPATIBILITY TEST SPIN: CPT

## 2018-09-03 PROCEDURE — 84300 ASSAY OF URINE SODIUM: CPT

## 2018-09-03 PROCEDURE — 86901 BLOOD TYPING SEROLOGIC RH(D): CPT

## 2018-09-03 PROCEDURE — 63600175 PHARM REV CODE 636 W HCPCS: Performed by: STUDENT IN AN ORGANIZED HEALTH CARE EDUCATION/TRAINING PROGRAM

## 2018-09-03 PROCEDURE — 94761 N-INVAS EAR/PLS OXIMETRY MLT: CPT

## 2018-09-03 PROCEDURE — 80053 COMPREHEN METABOLIC PANEL: CPT

## 2018-09-03 PROCEDURE — 93306 TTE W/DOPPLER COMPLETE: CPT | Mod: 26,,, | Performed by: INTERNAL MEDICINE

## 2018-09-03 PROCEDURE — 25000003 PHARM REV CODE 250: Performed by: STUDENT IN AN ORGANIZED HEALTH CARE EDUCATION/TRAINING PROGRAM

## 2018-09-03 RX ORDER — INSULIN ASPART 100 [IU]/ML
0-5 INJECTION, SOLUTION INTRAVENOUS; SUBCUTANEOUS EVERY 6 HOURS PRN
Status: DISCONTINUED | OUTPATIENT
Start: 2018-09-03 | End: 2018-09-05 | Stop reason: HOSPADM

## 2018-09-03 RX ORDER — ATORVASTATIN CALCIUM 40 MG/1
80 TABLET, FILM COATED ORAL DAILY
Status: DISCONTINUED | OUTPATIENT
Start: 2018-09-03 | End: 2018-09-05 | Stop reason: HOSPADM

## 2018-09-03 RX ORDER — SODIUM CHLORIDE 9 MG/ML
INJECTION, SOLUTION INTRAVENOUS CONTINUOUS
Status: DISCONTINUED | OUTPATIENT
Start: 2018-09-03 | End: 2018-09-05 | Stop reason: HOSPADM

## 2018-09-03 RX ORDER — CHOLECALCIFEROL (VITAMIN D3) 25 MCG
1000 TABLET ORAL DAILY
Status: DISCONTINUED | OUTPATIENT
Start: 2018-09-03 | End: 2018-09-05 | Stop reason: HOSPADM

## 2018-09-03 RX ORDER — HYDROCODONE BITARTRATE AND ACETAMINOPHEN 500; 5 MG/1; MG/1
TABLET ORAL
Status: DISCONTINUED | OUTPATIENT
Start: 2018-09-03 | End: 2018-09-05 | Stop reason: HOSPADM

## 2018-09-03 RX ORDER — GLUCAGON 1 MG
1 KIT INJECTION
Status: DISCONTINUED | OUTPATIENT
Start: 2018-09-03 | End: 2018-09-05 | Stop reason: HOSPADM

## 2018-09-03 RX ORDER — SODIUM CHLORIDE 9 MG/ML
500 INJECTION, SOLUTION INTRAVENOUS
Status: COMPLETED | OUTPATIENT
Start: 2018-09-03 | End: 2018-09-03

## 2018-09-03 RX ADMIN — SODIUM CHLORIDE 500 ML: 0.9 INJECTION, SOLUTION INTRAVENOUS at 06:09

## 2018-09-03 RX ADMIN — SODIUM CHLORIDE: 0.9 INJECTION, SOLUTION INTRAVENOUS at 09:09

## 2018-09-03 RX ADMIN — DEXTROSE 40 MG: 50 INJECTION, SOLUTION INTRAVENOUS at 02:09

## 2018-09-03 RX ADMIN — VITAMIN D, TAB 1000IU (100/BT) 1000 UNITS: 25 TAB at 09:09

## 2018-09-03 RX ADMIN — ATORVASTATIN CALCIUM 80 MG: 40 TABLET, FILM COATED ORAL at 09:09

## 2018-09-03 RX ADMIN — DEXTROSE 40 MG: 50 INJECTION, SOLUTION INTRAVENOUS at 09:09

## 2018-09-03 NOTE — PLAN OF CARE
TN met with patient, Son(Hoda- [phone]) and DIL(Leonard). Patient [id number] with daughter Mike- [phone]. Daughters and daughter in law are primary caregivers and transport patient to appointment. Patient has no HH or DME needs prior to admit.  Patient states he has a pacemaker but no Cardiologist.  TN to follow up on d/c needs. Family will be available to transport home.       Future Appointments   Date Time Provider Department Center   9/25/2018  8:15 AM Benjamin Farias DPM [facility] P[facility] [place]          09/03/18 2175   Discharge Assessment   Assessment Type Discharge Planning Assessment   Confirmed/corrected address and phone number on facesheet? Yes   Assessment information obtained from? Patient   Communicated expected length of stay with patient/caregiver yes   Prior to hospitilization cognitive status: Alert/Oriented   Prior to hospitalization functional status: Independent   Current cognitive status: Alert/Oriented   Current Functional Status: Independent   Lives With child(michael), adult   Able to Return to Prior Arrangements yes   Is patient able to care for self after discharge? Yes   Who are your caregiver(s) and their phone number(s)? Daughter- Mike- [phone]   Patient's perception of discharge disposition home or selfcare   Readmission Within The Last 30 Days no previous admission in last 30 days   Patient currently being followed by outpatient case management? Yes   Patient currently receives any other outside agency services? No   Equipment Currently Used at Home none   Do you have any problems affording any of your prescribed medications? No   Is the patient taking medications as prescribed? yes   Does the patient have transportation home? Yes   Transportation Available family or friend will provide   Does the patient receive services at the [facility]? No   Discharge Plan A Home with family   Discharge Plan B Home with family;Home Health   Patient/Family In Agreement With  Plan yes

## 2018-09-03 NOTE — ANESTHESIA PREPROCEDURE EVALUATION
09/03/2018     Shant Magana Jr. is a 78 y.o., male here for EGD for Anemia    Past Medical History:   Diagnosis Date    Cancer     prostate    Diabetes mellitus     Hypertension     Hypertrophic cardiomyopathy     Renal disorder      Past Surgical History:   Procedure Laterality Date    BACK SURGERY      THYROIDECTOMY           Anesthesia Evaluation    I have reviewed the Patient Summary Reports.    I have reviewed the Nursing Notes.   I have reviewed the Medications.     Review of Systems  Anesthesia Hx:  History of prior surgery of interest to airway management or planning:   Hematology/Oncology:         -- Anemia (H/H 8/24):   Cardiovascular:   Hypertension CHF (diastolic Dysfunction) ECG has been reviewed. TTE 9/3/18  CONCLUSIONS     1 - Hyperdynamic left ventricular systolic function (EF 65-70%).     2 - Impaired LV relaxation, increased LVEDP.     3 - Mild left atrial enlargement.     4 - The estimated PA systolic pressure is 35 mmHg.     5 - LVH   Renal/:   Chronic Renal Disease    Hepatic/GI:   GI Bleed   Neurological:   CVA    Endocrine:   Diabetes (HbA1c 5.9), well controlled        Physical Exam  General:  Well nourished    Airway/Jaw/Neck:  AIRWAY FINDINGS: Normal    Eyes/Ears/Nose:  EYES/EARS/NOSE FINDINGS: Normal    Chest/Lungs:  Chest/Lungs Clear    Heart/Vascular:  Heart Findings: Normal       Mental Status:  Mental Status Findings: Normal        Anesthesia Plan  Type of Anesthesia, risks & benefits discussed:  Anesthesia Type:  general, MAC  Patient's Preference:   Intra-op Monitoring Plan:   Intra-op Monitoring Plan Comments:   Post Op Pain Control Plan: multimodal analgesia and IV/PO Opioids PRN  Post Op Pain Control Plan Comments:   Induction:    Beta Blocker:         Informed Consent: Patient understands risks and agrees with Anesthesia plan.  Questions answered. Anesthesia consent  signed with patient.  ASA Score: 3     Day of Surgery Review of History & Physical:            Ready For Surgery From Anesthesia Perspective.

## 2018-09-03 NOTE — CONSULTS
[facility]    CC: black sticky stools    HPI 78 y.o. male with history including ischemic CVA, HTN, diastolic HF, implanted loop recorder who presents after fall this morning associated with 1-week history of lightheadedness, falls, episodic black, tarry stools and not associated with nausea, vomiting, hematemesis, abdominal pain, or rectal pain with defecation. Family is at bedside and report he is not currently at his baseline mental status. They have not noticed any hematochezia or bright red blood otherwise. He has no known history of PUD. They deny endoscopies in the past however does have a colonoscopy order from 2009 in Epic, I am unable to see if resulted and is without associated media.    Past Medical History:   Diagnosis Date    Cancer     prostate    Diabetes mellitus     Hypertension     Hypertrophic cardiomyopathy     Renal disorder      Past Surgical History:   Procedure Laterality Date    BACK SURGERY      THYROIDECTOMY       Social History  - Tobacco: denied by patient and family  - EtOH: denied by patient and family  - Illicit drugs: denied by patient and family    Family History:  - No known history of colorectal cancer    Review of Systems (limited by patient, contributions from family)  General ROS: negative for chills, fever  Psychological ROS: negative for hallucination or suicidal ideation  Ophthalmic ROS: negative for photophobia or eye pain  ENT ROS: negative for epistaxis, sore throat  Respiratory ROS: no cough, shortness of breath, or wheezing  Cardiovascular ROS: no chest pain or dyspnea on exertion  Gastrointestinal ROS: +melena. No abdominal pain. Additional GI ROS as in HPI.  Genito-Urinary ROS: + trouble voiding, no hematuria  Musculoskeletal ROS: +falls/gait disturbance or muscular weakness   Neurological ROS: possible syncope; no seizures  Dermatological ROS: negative for pruritis, rash and jaundice    Physical Examination  BP (!) 122/59 (BP Location: Left arm,  Patient Position: Lying)   Pulse 61   Temp 97.1 °F (36.2 °C) (Oral)   Resp 16   Ht 6' (1.829 m)   Wt 90.1 kg (198 lb 9.6 oz)   SpO2 100%   BMI 26.94 kg/m²   General appearance: alert, cooperative, no distress.  HENT: Normocephalic, atraumatic, neck symmetrical, no nasal discharge   Eyes: conjunctivae/corneas clear, PERRL, EOM's intact  Lungs: clear to auscultation bilaterally, no dullness to percussion bilaterally  Heart: regular rate and rhythm without rub; no displacement of the PMI   Abdomen: soft, non-tender; bowel sounds normoactive; no palpated hepatomegaly  Extremities: extremities symmetric; no cyanosis or edema  Integument: Skin color, texture, turgor normal; no rashes  Neurologic: Alert and oriented X 1-2, normal strength, normal coordination  Psychiatric: no pressured speech; blunted affect. Family report multiple incorrect answers.    Labs:  Lab Results   Component Value Date    WBC 6.92 2018    HGB 8.0 (L) 2018    HCT 24.4 (L) 2018    MCV 90 2018     2018     Lab Results   Component Value Date    INR 1.0 10/19/2017    INR 1.0 10/18/2017    INR 1.1 10/11/2017   CO2 15    Cr 2.6  Calcium 11.4, Albumin 3.3  ALKP 45  AST 21  ALT 14  TBil 0.3  Lactate 2.1    Imagin/3/18 CTH non-contrast  Impression       No CT evidence of acute intracranial abnormality. Clinical correlation and further evaluation as warranted.    Generalized cerebral volume loss, chronic microvascular ischemic change and prior infarcts as detailed above.   9/3/18 CXR  FINDINGS:  The cardiomediastinal silhouette is within normal limits.  There is atherosclerosis of the thoracic aorta.  There has been interval placement of a cardiac loop recorder.  The visualized airway is unremarkable.  The lungs appear symmetrically aerated without definite focal alveolar consolidation. No large pleural effusion or pneumothorax is appreciated.Visualized osseous structures demonstrate no acute  abnormality.  I have personally reviewed the above images.     Assessment:   Shant Magana Jr. is a 78 y.o. male patient with history including ischemic CVA, HTN, diastolic HF on multiple antihypertensives, implanted loop recorder with 1 week of reported intermittent melenic stools associated with normocytic anemia, falls with pre-syncopal symptoms, altered mentation and improving hypotension with fluid resuscitation. He is without persistent overt symptoms but melena is concerning for upper GI contribution to anemia. Noted medicine team working on SHIKHA on CKD with noted uremia possibly contributing to encephalopathy; note if GIB simulatenously ocurring, this can also contribute to BUN elevation as well.    Plan:   - continue resuscitative efforts for BP stablization  - transuse to goal hgb 8-10 if heart disease is ischemic (at least brain disease has been in the past)  - consider hypercalcemia evaluation if indicated or if felt contribution to mental status  - endoscopic planning when medically appropriate    Herman Oconnor MD PGY-4  [facility] Fellow

## 2018-09-03 NOTE — ED TRIAGE NOTES
Pt presents to ED with c/o increased generalized weakness with frequent falls over the past few days. Patient has hx of diabetes, high cholesterol, high blood pressure and kidney disease. Upon arrival patient's blood pressure was 77/53. Pt awake and alert at present asking for a cup of water. Family at bedside. Cardiac monitoring in progress.

## 2018-09-03 NOTE — ED PROVIDER NOTES
Encounter Date: 9/3/2018       History     Chief Complaint   Patient presents with    Weakness     Pt to ED via EMS with c/o increased generalized weakness for a few days and multiple falls also. EMS reports blood pressure 90s/60s. Pt has blood pressure of 77/53 on arrival.     Time seen by provider: 3:56 AM    This is a 78 y.o. male who presents to the ED via EMS with worsening generalized weakness over the last week. Three reported incidents of falling over the last few days, per family. His BP is 80/50 upon arrival.   Family states no head trauma.  At baseline for dementia.  Pt without complaints currently.            The history is provided by the EMS personnel. The history is limited by the condition of the patient.     Review of patient's allergies indicates:   Allergen Reactions    Hydralazine analogues Diarrhea     Past Medical History:   Diagnosis Date    Cancer     prostate    Diabetes mellitus     Hypertension     Hypertrophic cardiomyopathy     Renal disorder      Past Surgical History:   Procedure Laterality Date    BACK SURGERY      THYROIDECTOMY       History reviewed. No pertinent family history.  Social History     Tobacco Use    Smoking status: Former Smoker     Types: Cigarettes    Smokeless tobacco: Never Used   Substance Use Topics    Alcohol use: Yes    Drug use: Not on file     Review of Systems   Unable to perform ROS: Dementia       Physical Exam     Initial Vitals [09/03/18 0356]   BP Pulse Resp Temp SpO2   (!) 77/53 61 20 98.3 °F (36.8 °C) 100 %      MAP       --         Physical Exam    Nursing note and vitals reviewed.  Constitutional: He appears well-developed and well-nourished. He is not diaphoretic. No distress.   HENT:   Head: Normocephalic and atraumatic.   Mouth/Throat: Oropharynx is clear and moist.   Eyes: Conjunctivae and EOM are normal.   Neck: Normal range of motion. Neck supple.   Cardiovascular: Normal rate, regular rhythm and normal heart sounds.   Strong  radial pulses   Pulmonary/Chest: Breath sounds normal. He has no wheezes. He has no rhonchi. He has no rales.   Abdominal: Soft. There is no tenderness.   Musculoskeletal: Normal range of motion. He exhibits no edema or tenderness.   Lymphadenopathy:     He has no cervical adenopathy.   Neurological: He is alert.   Generalized weakness, no focal findings   Skin: Skin is warm and dry. No rash noted.   Psychiatric:   Flat affect           ED Course   Procedures  Labs Reviewed   CBC W/ AUTO DIFFERENTIAL - Abnormal; Notable for the following components:       Result Value    RBC 2.72 (*)     Hemoglobin 8.0 (*)     Hematocrit 24.4 (*)     RDW 15.0 (*)     Gran% 79.6 (*)     Lymph% 14.6 (*)     All other components within normal limits   COMPREHENSIVE METABOLIC PANEL - Abnormal; Notable for the following components:    Chloride 113 (*)     CO2 15 (*)     Glucose 162 (*)     BUN, Bld 110 (*)     Creatinine 2.6 (*)     Calcium 11.4 (*)     Albumin 3.3 (*)     Alkaline Phosphatase 45 (*)     eGFR if  26 (*)     eGFR if non  23 (*)     All other components within normal limits   LACTIC ACID, PLASMA   URINALYSIS     EKG Readings: (Independently Interpreted)   Rhythm: Sinus Bradycardia. Heart Rate: 57.   Sinus Bradycardia/Rightward axis       Imaging Results          CT Head Without Contrast (In process)                X-Ray Chest AP Portable (Final result)  Result time 09/03/18 04:46:17    Final result by Delmy Palmer MD (09/03/18 04:46:17)                 Impression:      No acute intrathoracic abnormality identified on this single radiographic view of the chest.      Electronically signed by: Delmy Palmer MD  Date:    09/03/2018  Time:    04:46             Narrative:    EXAMINATION:  XR CHEST AP PORTABLE    CLINICAL HISTORY:  weakness;    TECHNIQUE:  Single frontal view of the chest was performed.    COMPARISON:  Chest radiograph 10/18/2017    FINDINGS:  The cardiomediastinal silhouette is  within normal limits.  There is atherosclerosis of the thoracic aorta.  There has been interval placement of a cardiac loop recorder.  The visualized airway is unremarkable.  The lungs appear symmetrically aerated without definite focal alveolar consolidation. No large pleural effusion or pneumothorax is appreciated.Visualized osseous structures demonstrate no acute abnormality.                                 Medical Decision Making:   Differential Diagnosis:   Not limtied Cardiac, lytes, CVA, anemia, deconditioning, medications  Clinical Tests:   Lab Tests: Ordered and Reviewed  Radiological Study: Ordered and Reviewed  Medical Tests: Reviewed and Ordered  ED Management:  Family arrived later in visit.  Workup at this point concerning for anemia, uremia, hypotension.  Concern for GIB.  Discussed with [facility], will admit for further management.                      Clinical Impression:     1. Acute renal failure, unspecified acute renal failure type    2. Weakness    3. Anemia, unspecified type    4. Hypotension, unspecified hypotension type    5. Fall, initial encounter      Disposition:   Disposition: Placed in Observation  Condition: Stable       Scribe Attestation I, Dr. Guy Lefort, personally performed the services described in this documentation. All medical record entries made by the scribe were at my direction and in my presence. I have reviewed the chart and agree that the record reflects my personal performance and is accurate and complete. Guy Lefort, MD.  6:32 AM 09/03/2018                    Guy J. Lefort, MD  09/03/18 2006       Guy J. Lefort, MD  09/03/18 2017

## 2018-09-03 NOTE — H&P
[facility] Medicine H&P Note     Admitting Team: [facility] Hospitalist Team A  Attending Physician: No att. providers found  Resident: Mikal Ferrer MD  Intern: Chico Acosta DO    Date of Admit: 9/3/2018    Chief Complaint     Weakness (Pt to ED via EMS with c/o increased generalized weakness for a few days and multiple falls also. EMS reports blood pressure 90s/60s. Pt has blood pressure of 77/53 on arrival.)   for 2 weeks.    Subjective:      History of Present Illness:  Shant Magana Jr. is a 78 y.o. AA male who  has a past medical history of Cancer, Diabetes mellitus, Hypertension, Hypertrophic cardiomyopathy, and Renal disorder.. The patient presented to [facility] on 9/3/2018 with a primary complaint of Weakness (Pt to ED via EMS with c/o increased generalized weakness for a few days and multiple falls also. EMS reports blood pressure 90s/60s. Pt has blood pressure of 77/53 on arrival.)      The patient was in their usual state of health (lives with daughter, needs help with most ADLs) until about 2 weeks prior to presentation pt started to have increased weakness, dizziness, and loss of balance. Pt reportedly has had four falls in the past two weeks, typically after getting up in the middle of the night to use the bathroom. Pt and daughter report that when he has these episodes, he typically gets really dizzy and weak, and leans up against a wall, then slides down, not really a fast fall. No LOC, usually just drops to ground on knees or buttocks. Pt reports that about two weeks ago he noticed he was starting to have loose stools, which are black in color and sticky. Pt reports a history of 3 strokes last year, and think the symptoms he has been having recently are similar to the way he felt at those times last year. Pt reports some decreased appetite for the past several days prior to presentation with minimal food or water intake. Pt denies fever, chills, N/V, adb pain, headaches, vision  changes, focal deficits.       Past Medical History:  Past Medical History:   Diagnosis Date    Cancer     prostate    Diabetes mellitus     Hypertension     Hypertrophic cardiomyopathy     Renal disorder        Past Surgical History:  Past Surgical History:   Procedure Laterality Date    BACK SURGERY      THYROIDECTOMY         Allergies:  Review of patient's allergies indicates:   Allergen Reactions    Hydralazine analogues Diarrhea       Home Medications:  Prior to Admission medications    Medication Sig Start Date End Date Taking? Authorizing Provider   amlodipine (NORVASC) 10 MG tablet Take 1 tablet (10 mg total) by mouth once daily. 8/28/17 8/28/18  Guanaco Ty MD   aspirin (ECOTRIN) 81 MG EC tablet Take 1 tablet (81 mg total) by mouth once daily. 7/19/17 7/19/18  Gary Otero MD   atorvastatin (LIPITOR) 80 MG tablet Take 1 tablet (80 mg total) by mouth once daily. 6/8/18 6/8/19  SELVIN Ge, JIGNESH   hydroCHLOROthiazide (HYDRODIURIL) 12.5 MG Tab Take 12.5 mg by mouth once daily.  10/9/17   Historical Provider, MD   lisinopril 10 MG tablet Take 1 tablet by mouth once daily. 10/9/17   Historical Provider, MD   losartan (COZAAR) 100 MG tablet Take 1 tablet (100 mg total) by mouth once daily. HOLD TILL SEE YOUR PROVIDER 10/21/17 10/21/18  Elie Orozco MD   metformin (GLUCOPHAGE) 500 MG tablet Take 500 mg by mouth 2 (two) times daily with meals.    Historical Provider, MD   metoprolol tartrate (LOPRESSOR) 25 MG tablet Take 0.5 tablets (12.5 mg total) by mouth every 6 (six) hours. 10/21/17 10/21/18  Elie Orozco MD   PRADAXA 150 mg Cap 1 CAPSULE TWICE A DAY ORALLY 30 DAYS 6/5/18   Historical Provider, MD   tamsulosin (FLOMAX) 0.4 mg Cp24 Take 0.4 mg by mouth once daily.    Historical Provider, MD   vitamin D 1000 units Tab Take 1,000 Units by mouth once daily.    Historical Provider, MD       Family History:  Mother-HTN, HF    Social History:  Social History      Tobacco Use    Smoking status: Former Smoker; quit 30 yrs ago; prev smoked 1ppd     Types: Cigarettes    Smokeless tobacco: Never Used   Substance Use Topics    Alcohol use: Denies    Drug use: Not on file   Pt denies IV or recreational drug use. Not currently sexually active. Lives with daughter who helps with most of his ADLs. 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     Review of Systems:  Pertinent items are noted in HPI. All other systems are reviewed and are negative.    Health Maintaince :   Primary Care Physician: Trent Aldana    Immunizations:   TDap not UTD  Flu not UTD  Pna not UTD    Cancer Screening:  Colonoscopy: never     Objective:   Last 24 Hour Vital Signs:  BP  Min: 72/58  Max: 112/57  Temp  Av.8 °F (36.6 °C)  Min: 97.2 °F (36.2 °C)  Max: 98.3 °F (36.8 °C)  Pulse  Av.7  Min: 56  Max: 66  Resp  Av  Min: 16  Max: 20  SpO2  Av %  Min: 100 %  Max: 100 %  There is no height or weight on file to calculate BMI.  No intake/output data recorded.    Physical Examination:  GEN- alert but not oriented; NAD, laying in bed on urine-soaked sheet  HEENT-PERRL, EOMI, dry mucous membranes, throat without erythema or exudates  NECK-no thyromegaly or other masses  HEART- RRR; 3/6 systolic ejection murmur heard best at left sternal border  RESP-CTAB, normal work of breathing, no wheezes, crackles, or rhonchi  ABD-soft, NT, ND, +BS; no masses or HSM  EXT-no clubbing, cyanosis, or edema; 2+ DP/PT pulses; 4/5 strength in all 4 extremities   NEURO-CN II-XII grossly intact; moves all four extremities equally  SKIN-warm and dry; no rashes      Laboratory:  Most Recent Data:  CBC:   Lab Results   Component Value Date    WBC 6.92 2018    HGB 8.0 (L) 2018    HCT 24.4 (L) 2018     2018    MCV 90 2018    RDW 15.0 (H) 2018     WBC Differential: 57.8 % N, 0 % Bands, 27.1 % L, 8.7 % M, 5.9 % Eo, 0.5 % Baso, 0 additional cells seen  BMP:   Lab Results    Component Value Date     2018    K 4.3 2018     (H) 2018    CO2 15 (L) 2018     (H) 2018    CREATININE 2.6 (H) 2018     (H) 2018    CALCIUM 11.4 (H) 2018    MG 1.4 (L) 10/19/2017    PHOS 3.0 10/19/2017     LFTs:   Lab Results   Component Value Date    PROT 6.1 2018    ALBUMIN 3.3 (L) 2018    BILITOT 0.3 2018    AST 21 2018    ALKPHOS 45 (L) 2018    ALT 14 2018     Coags:   Lab Results   Component Value Date    INR 1.0 10/19/2017     FLP:   Lab Results   Component Value Date    CHOL 149 10/18/2017    HDL 44 10/18/2017    LDLCALC 82.0 10/18/2017    TRIG 115 10/18/2017    CHOLHDL 29.5 10/18/2017     DM:   Lab Results   Component Value Date    HGBA1C 6.2 (H) 10/11/2017    HGBA1C 5.7 (H) 2017    HGBA1C 5.7 (H) 2017    LDLCALC 82.0 10/18/2017    CREATININE 2.6 (H) 2018     Thyroid:   Lab Results   Component Value Date    TSH 3.674 10/18/2017     Anemia: No results found for: IRON, TIBC, FERRITIN, [id number], FOLATE  Cardiac:   Lab Results   Component Value Date    TROPONINI <0.006 10/19/2017     (H) 10/11/2017     Urinalysis:   Lab Results   Component Value Date    COLORU Yellow 10/18/2017    SPECGRAV >=1.030 (A) 10/18/2017    NITRITE Negative 10/18/2017    KETONESU Negative 10/18/2017    UROBILINOGEN Negative 10/18/2017    WBCUA 1 10/11/2017       Trended Lab Data:  Recent Labs   Lab  18   0454   WBC  6.92   HGB  8.0*   HCT  24.4*   PLT  221   MCV  90   RDW  15.0*   NA  139   K  4.3   CL  113*   CO2  15*   BUN  110*   CREATININE  2.6*   GLU  162*   PROT  6.1   ALBUMIN  3.3*   BILITOT  0.3   AST  21   ALKPHOS  45*   ALT  14       Trended Cardiac Data:  No results for input(s): TROPONINI, CKTOTAL, CKMB, BNP in the last 168 hours.    Microbiology Data:  None    Other Results:  EK/3 sinus bradycardia with right shift    Radiology:  Imaging Results          CT Head Without Contrast  (Final result)  Result time 09/03/18 05:07:50    Final result by Delmy Palmer MD (09/03/18 05:07:50)                 Impression:      No CT evidence of acute intracranial abnormality. Clinical correlation and further evaluation as warranted.    Generalized cerebral volume loss, chronic microvascular ischemic change and prior infarcts as detailed above.      Electronically signed by: Delmy Palmer MD  Date:    09/03/2018  Time:    05:07             Narrative:    EXAMINATION:  CT HEAD WITHOUT CONTRAST    CLINICAL HISTORY:  Head trauma, minor, GCS>=13, NOC/NEXUS/CCR neg, first study;    TECHNIQUE:  Low dose axial images were obtained through the head.  Coronal and sagittal reformations were also performed. Contrast was not administered.    COMPARISON:  Head CT and MRI brain 10/18/2017    FINDINGS:  There is generalized cerebral volume loss with compensatory prominence of cerebral sulci and the ventricular system.  There is supratentorial and periventricular white matter hypoattenuation, similar to prior examinations and likely reflecting sequela of chronic microvascular ischemic change.  There is a region of right frontal encephalomalacia in keeping with sequela of prior infarction.  There are additional remote lacunar type infarcts bilateral basal ganglia and left centrum semiovale and right cerebellum.  Gray-white matter differentiation otherwise appears maintained.  There is no acute intracranial hemorrhage, hydrocephalus, midline shift or mass effect.  Paranasal sinuses and mastoid air cells are clear of acute process.  Basilar cisterns are patent the osseous calvarium is intact.                               X-Ray Chest AP Portable (Final result)  Result time 09/03/18 04:46:17    Final result by Delmy Palmer MD (09/03/18 04:46:17)                 Impression:      No acute intrathoracic abnormality identified on this single radiographic view of the chest.      Electronically signed by: Delmy Palmer  MD  Date:    09/03/2018  Time:    04:46             Narrative:    EXAMINATION:  XR CHEST AP PORTABLE    CLINICAL HISTORY:  weakness;    TECHNIQUE:  Single frontal view of the chest was performed.    COMPARISON:  Chest radiograph 10/18/2017    FINDINGS:  The cardiomediastinal silhouette is within normal limits.  There is atherosclerosis of the thoracic aorta.  There has been interval placement of a cardiac loop recorder.  The visualized airway is unremarkable.  The lungs appear symmetrically aerated without definite focal alveolar consolidation. No large pleural effusion or pneumothorax is appreciated.Visualized osseous structures demonstrate no acute abnormality.                                 Assessment:     Shant Magana Jr. is a 78 y.o. 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 08/15/2017    Type 2 diabetes mellitus with complication, without long-term current use of insulin     Essential hypertension 06/07/2017    Bradycardia 06/07/2017        Plan:     Symptomatic anemia  -pt with 2 week hx weakness, dizziness, and 4 falls as well as melanotic stools  -denies ever having colonoscopy  -H/H in ED 8/24.4 (baseline Hgb 13-14 in 2017)  -transfuse 1 unit pRBCs as pt is symptomatic  -consult GI for scope    Metabolic encephalopathy  -pt confused upon arrival to ED  - > etiology likely uremic  -start IVF    SHIKHA on CKD 3  -Cr 2.6 in ED (baseline 1.2-1.8 in 2017)  -start IVF     HTN  -pt with extensive home regimen: losartan 100, HCTZ 25, metoprolol 0.5 q6hrs, amlodipine 10, and  lisinopril 10  -holding all home meds as pt hypotensive with concern for GI bleed    HFpEF  -ECHO with EF 65-70% in 2017  -repeat ECHO    DM  -pt without current DM meds at home  -check A1c  -SSI while inpatient    HLD  -cont home atorvastatin    Hx mutliple CVAs  -takes pradaxa 150mg BID at home  -hold pradaxa initially due to likely GI bleed      Code Status:     Full    Rebeka Robertson MD  \[facility]\"" Internal Medicine HO-I    \[facility]\"" Medicine Hospitalist Pager numbers:   \[facility]\"" Hospitalist Medicine Team A (Hanny/Loco): [phone]  \[facility]\"" Hospitalist Medicine Team B (Kacey/Nico):  [phone]

## 2018-09-04 ENCOUNTER — ANESTHESIA (OUTPATIENT)
Dept: ENDOSCOPY | Facility: HOSPITAL | Age: 78
DRG: 377 | End: 2018-09-04
Payer: MEDICARE

## 2018-09-04 LAB
ALBUMIN SERPL BCP-MCNC: 3.3 G/DL
ALP SERPL-CCNC: 47 U/L
ALT SERPL W/O P-5'-P-CCNC: 18 U/L
ANION GAP SERPL CALC-SCNC: 8 MMOL/L
AST SERPL-CCNC: 24 U/L
BASOPHILS # BLD AUTO: 0.02 K/UL
BASOPHILS NFR BLD: 0.2 %
BILIRUB SERPL-MCNC: 0.5 MG/DL
BUN SERPL-MCNC: 101 MG/DL
CALCIUM SERPL-MCNC: 11.2 MG/DL
CHLORIDE SERPL-SCNC: 115 MMOL/L
CO2 SERPL-SCNC: 17 MMOL/L
CREAT SERPL-MCNC: 2.2 MG/DL
DIFFERENTIAL METHOD: ABNORMAL
EOSINOPHIL # BLD AUTO: 0.2 K/UL
EOSINOPHIL NFR BLD: 2 %
ERYTHROCYTE [DISTWIDTH] IN BLOOD BY AUTOMATED COUNT: 16.7 %
EST. GFR  (AFRICAN AMERICAN): 32 ML/MIN/1.73 M^2
EST. GFR  (NON AFRICAN AMERICAN): 28 ML/MIN/1.73 M^2
FERRITIN SERPL-MCNC: 125 NG/ML
GLUCOSE SERPL-MCNC: 118 MG/DL
HCT VFR BLD AUTO: 29.7 %
HGB BLD-MCNC: 9.6 G/DL
IRON SERPL-MCNC: 91 UG/DL
LYMPHOCYTES # BLD AUTO: 1.4 K/UL
LYMPHOCYTES NFR BLD: 16.3 %
MCH RBC QN AUTO: 29.4 PG
MCHC RBC AUTO-ENTMCNC: 32.3 G/DL
MCV RBC AUTO: 91 FL
MONOCYTES # BLD AUTO: 0.8 K/UL
MONOCYTES NFR BLD: 8.5 %
NEUTROPHILS # BLD AUTO: 6.5 K/UL
NEUTROPHILS NFR BLD: 73 %
PLATELET # BLD AUTO: 206 K/UL
PMV BLD AUTO: 11.8 FL
POCT GLUCOSE: 130 MG/DL (ref 70–110)
POCT GLUCOSE: 135 MG/DL (ref 70–110)
POCT GLUCOSE: 143 MG/DL (ref 70–110)
POCT GLUCOSE: 177 MG/DL (ref 70–110)
POTASSIUM SERPL-SCNC: 5 MMOL/L
PROT SERPL-MCNC: 6.4 G/DL
RBC # BLD AUTO: 3.27 M/UL
SATURATED IRON: 25 %
SODIUM SERPL-SCNC: 140 MMOL/L
TOTAL IRON BINDING CAPACITY: 363 UG/DL
TRANSFERRIN SERPL-MCNC: 245 MG/DL
WBC # BLD AUTO: 8.84 K/UL

## 2018-09-04 PROCEDURE — 63600175 PHARM REV CODE 636 W HCPCS: Performed by: STUDENT IN AN ORGANIZED HEALTH CARE EDUCATION/TRAINING PROGRAM

## 2018-09-04 PROCEDURE — 63600175 PHARM REV CODE 636 W HCPCS: Performed by: NURSE ANESTHETIST, CERTIFIED REGISTERED

## 2018-09-04 PROCEDURE — 43255 EGD CONTROL BLEEDING ANY: CPT | Mod: 59,,, | Performed by: INTERNAL MEDICINE

## 2018-09-04 PROCEDURE — 25000003 PHARM REV CODE 250: Performed by: STUDENT IN AN ORGANIZED HEALTH CARE EDUCATION/TRAINING PROGRAM

## 2018-09-04 PROCEDURE — 43255 EGD CONTROL BLEEDING ANY: CPT | Performed by: INTERNAL MEDICINE

## 2018-09-04 PROCEDURE — 97162 PT EVAL MOD COMPLEX 30 MIN: CPT

## 2018-09-04 PROCEDURE — 43239 EGD BIOPSY SINGLE/MULTIPLE: CPT | Mod: ,,, | Performed by: INTERNAL MEDICINE

## 2018-09-04 PROCEDURE — 88305 TISSUE EXAM BY PATHOLOGIST: CPT | Performed by: PATHOLOGY

## 2018-09-04 PROCEDURE — 88305 TISSUE EXAM BY PATHOLOGIST: CPT | Mod: 26,,, | Performed by: PATHOLOGY

## 2018-09-04 PROCEDURE — 37000008 HC ANESTHESIA 1ST 15 MINUTES: Performed by: INTERNAL MEDICINE

## 2018-09-04 PROCEDURE — 94761 N-INVAS EAR/PLS OXIMETRY MLT: CPT

## 2018-09-04 PROCEDURE — G8987 SELF CARE CURRENT STATUS: HCPCS | Mod: CK

## 2018-09-04 PROCEDURE — 25000003 PHARM REV CODE 250: Performed by: NURSE ANESTHETIST, CERTIFIED REGISTERED

## 2018-09-04 PROCEDURE — 97535 SELF CARE MNGMENT TRAINING: CPT

## 2018-09-04 PROCEDURE — 27201012 HC FORCEPS, HOT/COLD, DISP: Performed by: INTERNAL MEDICINE

## 2018-09-04 PROCEDURE — 88342 IMHCHEM/IMCYTCHM 1ST ANTB: CPT | Mod: 26,,, | Performed by: PATHOLOGY

## 2018-09-04 PROCEDURE — 27202087 HC PROBE, APC: Performed by: INTERNAL MEDICINE

## 2018-09-04 PROCEDURE — 97116 GAIT TRAINING THERAPY: CPT

## 2018-09-04 PROCEDURE — 0W3P8ZZ CONTROL BLEEDING IN GASTROINTESTINAL TRACT, VIA NATURAL OR ARTIFICIAL OPENING ENDOSCOPIC: ICD-10-PCS | Performed by: INTERNAL MEDICINE

## 2018-09-04 PROCEDURE — G8978 MOBILITY CURRENT STATUS: HCPCS | Mod: CK

## 2018-09-04 PROCEDURE — G8979 MOBILITY GOAL STATUS: HCPCS | Mod: CJ

## 2018-09-04 PROCEDURE — 27200997: Performed by: INTERNAL MEDICINE

## 2018-09-04 PROCEDURE — 36415 COLL VENOUS BLD VENIPUNCTURE: CPT

## 2018-09-04 PROCEDURE — C9113 INJ PANTOPRAZOLE SODIUM, VIA: HCPCS | Performed by: STUDENT IN AN ORGANIZED HEALTH CARE EDUCATION/TRAINING PROGRAM

## 2018-09-04 PROCEDURE — G8988 SELF CARE GOAL STATUS: HCPCS | Mod: CJ

## 2018-09-04 PROCEDURE — 0DB68ZX EXCISION OF STOMACH, VIA NATURAL OR ARTIFICIAL OPENING ENDOSCOPIC, DIAGNOSTIC: ICD-10-PCS | Performed by: INTERNAL MEDICINE

## 2018-09-04 PROCEDURE — 85025 COMPLETE CBC W/AUTO DIFF WBC: CPT

## 2018-09-04 PROCEDURE — 43239 EGD BIOPSY SINGLE/MULTIPLE: CPT | Performed by: INTERNAL MEDICINE

## 2018-09-04 PROCEDURE — 80053 COMPREHEN METABOLIC PANEL: CPT

## 2018-09-04 PROCEDURE — 82728 ASSAY OF FERRITIN: CPT

## 2018-09-04 PROCEDURE — 83540 ASSAY OF IRON: CPT

## 2018-09-04 PROCEDURE — 97166 OT EVAL MOD COMPLEX 45 MIN: CPT

## 2018-09-04 PROCEDURE — 11000001 HC ACUTE MED/SURG PRIVATE ROOM

## 2018-09-04 PROCEDURE — 37000009 HC ANESTHESIA EA ADD 15 MINS: Performed by: INTERNAL MEDICINE

## 2018-09-04 RX ORDER — PROPOFOL 10 MG/ML
VIAL (ML) INTRAVENOUS
Status: DISCONTINUED | OUTPATIENT
Start: 2018-09-04 | End: 2018-09-04

## 2018-09-04 RX ORDER — FENTANYL CITRATE 50 UG/ML
INJECTION, SOLUTION INTRAMUSCULAR; INTRAVENOUS
Status: DISCONTINUED | OUTPATIENT
Start: 2018-09-04 | End: 2018-09-04

## 2018-09-04 RX ORDER — PHENYLEPHRINE HYDROCHLORIDE 10 MG/ML
INJECTION INTRAVENOUS
Status: DISCONTINUED | OUTPATIENT
Start: 2018-09-04 | End: 2018-09-04

## 2018-09-04 RX ORDER — LIDOCAINE HCL/PF 100 MG/5ML
SYRINGE (ML) INTRAVENOUS
Status: DISCONTINUED | OUTPATIENT
Start: 2018-09-04 | End: 2018-09-04

## 2018-09-04 RX ORDER — PROPOFOL 10 MG/ML
VIAL (ML) INTRAVENOUS CONTINUOUS PRN
Status: DISCONTINUED | OUTPATIENT
Start: 2018-09-04 | End: 2018-09-04

## 2018-09-04 RX ORDER — SODIUM CHLORIDE 9 MG/ML
INJECTION, SOLUTION INTRAVENOUS CONTINUOUS PRN
Status: DISCONTINUED | OUTPATIENT
Start: 2018-09-04 | End: 2018-09-04

## 2018-09-04 RX ADMIN — INSULIN DETEMIR 5 UNITS: 100 INJECTION, SOLUTION SUBCUTANEOUS at 08:09

## 2018-09-04 RX ADMIN — FENTANYL CITRATE 25 MCG: 50 INJECTION, SOLUTION INTRAMUSCULAR; INTRAVENOUS at 10:09

## 2018-09-04 RX ADMIN — PHENYLEPHRINE HYDROCHLORIDE 100 MCG: 10 INJECTION INTRAVENOUS at 10:09

## 2018-09-04 RX ADMIN — DEXTROSE 40 MG: 50 INJECTION, SOLUTION INTRAVENOUS at 09:09

## 2018-09-04 RX ADMIN — SODIUM CHLORIDE: 9 INJECTION, SOLUTION INTRAVENOUS at 10:09

## 2018-09-04 RX ADMIN — ATORVASTATIN CALCIUM 40 MG: 40 TABLET, FILM COATED ORAL at 09:09

## 2018-09-04 RX ADMIN — PROPOFOL 20 MG: 10 INJECTION, EMULSION INTRAVENOUS at 10:09

## 2018-09-04 RX ADMIN — PROPOFOL 150 MCG/KG/MIN: 10 INJECTION, EMULSION INTRAVENOUS at 10:09

## 2018-09-04 RX ADMIN — LIDOCAINE HYDROCHLORIDE 100 MG: 20 INJECTION, SOLUTION INTRAVENOUS at 10:09

## 2018-09-04 RX ADMIN — VITAMIN D, TAB 1000IU (100/BT) 1000 UNITS: 25 TAB at 09:09

## 2018-09-04 RX ADMIN — PROPOFOL 50 MG: 10 INJECTION, EMULSION INTRAVENOUS at 10:09

## 2018-09-04 NOTE — PLAN OF CARE
Problem: Physical Therapy Goal  Goal: Physical Therapy Goal  Goals to be met by: 10/4/2018     Patient will increase functional independence with mobility by performin. Supine to sit with Modified [place]  2. Sit to supine with Modified [place]  3. Sit to stand transfer with Supervision  4. Gait  x 150 feet with Supervision and Stand-by Assistance with or without AD  5. Ascend/descend 4 stair with bilateral Handrails Stand-by Assistance and Contact Guard Assistance    6. Lower extremity exercise program x10 reps with assistance as needed    Outcome: Ongoing (interventions implemented as appropriate)  Patient evaluated; pt with moderate confusion, needs frequent cues to complete tasks; requires assist for transfers and amb 2/2 decreased balance; at this time, will recommend RW, gait belt, 24 hr supervision and assist along with HH PT/OT.

## 2018-09-04 NOTE — PLAN OF CARE
Report received from Natalia BOO . Preoperative fasting confirmed appropriate for procedure and sedation. NPO p MN. Patent IV access.

## 2018-09-04 NOTE — TRANSFER OF CARE
Anesthesia Transfer of Care Note    Patient: Shant Magana Jr.    Procedure(s) Performed: Procedure(s) (LRB):  EGD (ESOPHAGOGASTRODUODENOSCOPY) (N/A)    Patient location: GI    Anesthesia Type: MAC    Transport from OR: Transported from OR on room air with adequate spontaneous ventilation    Post pain: adequate analgesia    Post assessment: no apparent anesthetic complications and tolerated procedure well    Post vital signs: stable    Level of consciousness: awake, alert and oriented    Nausea/Vomiting: no nausea/vomiting    Complications: none    Transfer of care protocol was followed      Last vitals:   Visit Vitals  BP (!) 144/81   Pulse 65   Temp 36.5 °C (97.7 °F) (Temporal)   Resp 16   Ht 6' (1.829 m)   Wt 90.1 kg (198 lb 9.6 oz)   SpO2 100%   BMI 26.94 kg/m²

## 2018-09-04 NOTE — PLAN OF CARE
Problem: Occupational Therapy Goal  Goal: Occupational Therapy Goal  Goals to be met by: 10/04/2018      Patient will increase functional independence with ADLs by performing:    Feeding with Modified [place].  UE Dressing with Modified [place].  LE Dressing with Set-up Assistance.  Grooming while standing with Supervision.  Toileting from toilet with Supervision for hygiene and clothing management.   Sitting at edge of bed x15 minutes with Supervision.  Rolling to Bilateral with Modified [place].   Supine to sit with Modified [place].  Stand pivot transfers with Supervision.  Step transfer with Supervision  Toilet transfer to toilet with Supervision.  Increased functional strength to WFL for self care.  Upper extremity exercise program x10 reps per handout, with assistance as needed.    Outcome: Ongoing (interventions implemented as appropriate)  Pt would benefit from continued OT services to address deficits in functional mobility and self care. Rec D/C to home with HHOT/PT and 24 hour supervision. DME needs: RW, gait belt. Family may benefit from use of bed alarm to prevent night time falls.

## 2018-09-04 NOTE — [employer] PATIENT INSTRUCTIONS
Discharge Summary/Instructions after an Endoscopic Procedure  Patient Name: Shant Magana  Patient MRN: [id number]  Patient YOB: 1940 Tuesday, September 04, 2018  Oli Cuadra MD  RESTRICTIONS:  During your procedure today, you received medications for sedation.  These   medications may affect your judgment, balance and coordination.  Therefore,   for 24 hours, you have the following restrictions:   - DO NOT drive a car, operate machinery, make legal/financial decisions,   sign important papers or drink alcohol.    ACTIVITY:  Today: no heavy lifting, straining or running due to procedural   sedation/anesthesia.  The following day: return to full activity including work.  DIET:  Eat and drink normally unless instructed otherwise.     TREATMENT FOR COMMON SIDE EFFECTS:  - Mild abdominal pain, nausea, belching, bloating or excessive gas:  rest,   eat lightly and use a heating pad.  - Sore Throat: treat with throat lozenges and/or gargle with warm salt   water.  - Because air was used during the procedure, expelling large amounts of air   from your rectum or belching is normal.  - If a bowel prep was taken, you may not have a bowel movement for 1-3 days.    This is normal.  SYMPTOMS TO WATCH FOR AND REPORT TO YOUR PHYSICIAN:  1. Abdominal pain or bloating, other than gas cramps.  2. Chest pain.  3. Back pain.  4. Signs of infection such as: chills or fever occurring within 24 hours   after the procedure.  5. Rectal bleeding, which would show as bright red, maroon, or black stools.   (A tablespoon of blood from the rectum is not serious, especially if   hemorrhoids are present.)  6. Vomiting.  7. Weakness or dizziness.  GO DIRECTLY TO THE NEAREST EMERGENCY ROOM IF YOU HAVE ANY OF THE FOLLOWING:      Difficulty breathing              Chills and/or fever over 101 F   Persistent vomiting and/or vomiting blood   Severe abdominal pain   Severe chest pain   Black, tarry stools   Bleeding- more than one tablespoon   Any  other symptom or condition that you feel may need urgent attention  Your doctor recommends these additional instructions:  If any biopsies were taken, your doctors clinic will contact you in 1 to 2   weeks with any results.  - Return patient to [facility] for ongoing care.   - Clear liquid diet today and advance tomorrow .   - Continue present medications.   - Can restart aspirin but would hold anticoagulation unless there is a   strong indicator to restart.   - Await pathology results.   - Repeat upper endoscopy in 10 weeks for surveillance.   - Perform a colonoscopy in 10 weeks for CRC screening    - If he develops abdominal pain would get a CT abd/pelvis.   - PPI therapy as inpatient and outpatient.  For questions, problems or results please call your physician - Oli Cuadra MD at Work:  [phone].  EMERGENCY PHONE NUMBER: [phone],  LAB RESULTS: [phone]  IF A COMPLICATION OR EMERGENCY SITUATION ARISES AND YOU ARE UNABLE TO REACH   YOUR PHYSICIAN - GO DIRECTLY TO THE EMERGENCY ROOM.  Oli Cuadra MD  9/4/2018 11:34:36 AM  This report has been verified and signed electronically.  [employer]

## 2018-09-04 NOTE — PROGRESS NOTES
[facility] Medicine Resident HO-III Progress Note    Subjective:      This morning, Mr. Magana is without complaint. He is only oriented to self. He does state that he continues to have dark stools. No family present at bedside.      Objective:   Last 24 Hour Vital Signs:  BP  Min: 94/56  Max: 128/68  Temp  Av.4 °F (36.3 °C)  Min: 96.7 °F (35.9 °C)  Max: 97.8 °F (36.6 °C)  Pulse  Av.9  Min: 54  Max: 71  Resp  Av.8  Min: 16  Max: 18  SpO2  Av %  Min: 98 %  Max: 100 %  Height  Av' (182.9 cm)  Min: 6' (182.9 cm)  Max: 6' (182.9 cm)  Weight  Av.1 kg (198 lb 9.6 oz)  Min: 90.1 kg (198 lb 9.6 oz)  Max: 90.1 kg (198 lb 9.6 oz)  I/O last 3 completed shifts:  In: 1088.8 [Blood:1088.8]  Out: 550 [Urine:550]    Physical Examination:  GEN- alert but not oriented; laying in bed in NAD  HEENT-NC/AT EOMI, dry mucous membranes, throat without erythema or exudates  HEART- RRR; 3/6 ZENON  RESP-CTAB, normal work of breathing, no wheezes, crackles, or rhonchi  ABD-soft, NT, ND, +BS  EXT-no clubbing, cyanosis, or edema; 2+ DP/PT pulses  NEURO- oriented to self only, no focal deficit, moves all four extremities equally  SKIN-warm and dry; no rashes      Laboratory:  Laboratory Data Reviewed: yes  Pertinent Findings:  Post transfusion   Hgb 9.5    Cr 2.4    Am labs pending     Echo   CONCLUSIONS     1 - Hyperdynamic left ventricular systolic function (EF 65-70%).     2 - Impaired LV relaxation, increased LVEDP.     3 - Mild left atrial enlargement.     4 - The estimated PA systolic pressure is 35 mmHg.     5 - LVH    Current Medications:     Infusions:   sodium chloride 0.9% Stopped (18 1018)        Scheduled:   atorvastatin  80 mg Oral Daily    insulin detemir U-100  5 Units Subcutaneous QHS    pantoprazole 40 mg in dextrose 5 % 100 mL infusion (ready to mix system)  40 mg Intravenous BID    vitamin D  1,000 Units Oral Daily        PRN:  sodium chloride, sodium chloride, dextrose 50%, glucagon (human  recombinant), insulin aspart U-100    Assessment:     Shant Magana Jr. 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 08/15/2017    Type 2 diabetes mellitus with complication, without long-term current use of insulin     Essential hypertension 06/07/2017    Bradycardia 06/07/2017        Plan:     Upper GI Bleed with Symptomatic anemia  - pt with 2 week hx weakness, dizziness, and 4 falls as well as melanotic stools  - denies ever having colonoscopy  - H/H in ED 8/24.4 (baseline Hgb 13-14 in 2017)  - Now s/p 2 units pRBCs with H/H 9.5/29.6, am labs pending  - PPI BID  - Plan for EGD today      Metabolic encephalopathy  - pt confused upon arrival to ED  -  > etiology likely uremic  - Placed on IVF prior to blood, giving blood for volume repletion   - BUN likely also elevated from GI blood loss     SHIKHA on CKD 3  - Cr 2.6 in ED (baseline 1.2-1.8 in 2017), post transfusion labs with Cr 2.4, am labs pending  - Started on IVF prior to blood, gave 2 units RBCs for volume repletion. Continued on IVF after blood   - FeNa and FeUrea consistent with prerenal disease   - BUN higher after blood transfusion, suspect this elevation is more likely to GI bleeding   - Continue to monitor       HTN  - pt with extensive home regimen: losartan 100, HCTZ 25, metoprolol 0.5 q6hrs, amlodipine 10, and lisinopril 10  - holding all home meds as pt hypotensive with GI bleed      HFpEF  - ECHO with EF  65-70% in 2017  - Repeat hyperdynamic with diastolic dysfunction   - No valvular dysfunction   - Will need follow up with [facility]      DM  - pt without current DM meds at home  - A1C 5.9  - BGs 130-210 since admission, will add low dose basal insulin to SSI      HLD  - cont home atorvastatin     Hx mutliple CVAs  - takes pradaxa 150mg BID at home  - holding pradaxa due to GI bleed    Dispo: pending EGD and improvement in SHIKHA    Mikal Ferrer MD  [facility] Internal Medicine/Pediatrics HO-III  [facility] Hospitalist Service Team A    [facility] Medicine Hospitalist Pager numbers:   [facility] Hospitalist Medicine Team A (Hanny/Loco): [phone]  [facility] Hospitalist Medicine Team B (Kacey/Nico):  [phone]

## 2018-09-04 NOTE — PLAN OF CARE
Problem: Patient Care Overview  Goal: Plan of Care Review  Outcome: Ongoing (interventions implemented as appropriate)  Reviewed the plan of care with the pt. Pt NPO since midnight for scheduled EGD. Pt had a black bowel movement x 2 this morning. Pt is weak and unsteady when out of bed. No true red alarms noted on tele. Safety measures are in place. The pt verbalizes full understanding of their plan of care.

## 2018-09-04 NOTE — DISCHARGE INSTRUCTIONS
Gastrointestinal (GI) Bleeding, When You Have (English)      Pantoprazole tablets (English)

## 2018-09-04 NOTE — PLAN OF CARE
Received pt to endoscopy unit, AAOX3. VSS. Iv to right forearm infiltrated, IV d/c'd with tip intact. #22 g jelco started to right hand. Pre op assessment complete.

## 2018-09-04 NOTE — MEDICAL/APP STUDENT
Name: Shant Magana  Date: 9/4/2018  MRN: [id number]  Attending: Dr. Jey Gamino    Subjective  Mr. Magana is a 79 yo male who presented with weakness for two weeks. Since yesterday, he reports one black bowel movement. He was afebrile overnight. He denies nausea, vomiting, SOB, fevers, headaches, or abdominal pain. His only complaint this morning was that he was hungry and thirsty.    Objective  VS: Temp: 97.4 (96.7-97.8) BP: 100/68 (/55-72) Pulse: 62 (54-71) RR: 18 (16-18) SpO2 on RA: 99% (%)     Physical exam:  General: well-appearing, but confused. Not oriented to day, year and place. Attention minimal.    Head: Normocephalic, atraumatic  Eyes: pupils equal, round and reactive to light; extraocular movements intact on repeat exam, first exam he did the opposite when asked to do movements to the left; no scleral icterus appreciated  Throat: no erythema or exudates noted, halitosis  Heart: S1 and S2 appreciated in all 4 areas. No other heart sounds heard on exam. No murmurs, rubs or gallops appreciated.   Lung: lungs had vesicular breath sounds bilaterally; no crackles, wheezing, or rhonchi appreciated.   Abdominal: soft and non-distended. No tenderness to palpation on exam. No organomegaly.   Extremities: pulses in upper extremity 2+ bilaterally, pulses in lower extremity 1+ bilaterally; no pedal edema   Neuro: Cns III-XII intact    Labs:  CBC 9/3/2018 19:20 9/2/2018 4:54   WBC 9.73  6.92    RBC 3.27 Abnormally low   2.72 Abnormally low     Hemoglobin 9.5 Abnormally low   8.0 Abnormally low     Hematocrit 29.6 Abnormally low   24.4 Abnormally low     MCV 91  90    MCH 29.1  29.4    MCHC 32.1  32.8    RDW 15.9 Abnormally high   15.0 Abnormally high     Platelets 198  221    MPV 11.6  11.3    Gran # (ANC) 7.8 Abnormally high   5.5    Lymph # 1.1  1.0    Mono # 0.8  0.3    Eos # 0.1  0.1    Baso # 0.02  0.01    Gran% 80.3 Abnormally high   79.6 Abnormally high     Lymph% 10.9 Abnormally low   14.6 Abnormally low      Mono% 7.7  4.9    Eosinophil% 0.5  0.7    Basophil% 0.2  0.1      CMP 9/3/2018 19:10 9/3/2018 4:54   Sodium 139 139    Potassium 4.6 4.3    Chloride 114 Abnormally high 113 Abnormally high     CO2 15 Abnormally low 15 Abnormally low     Glucose 166 Abnormally high 162 Abnormally high     BUN, Bld 119 Abnormally high 110 Abnormally high     Creatinine 2.4 Abnormally high 2.6 Abnormally high     Calcium 11.4 Abnormally high 11.4 Abnormally high     Total Protein  6.1    Albumin  3.3 Abnormally low     Total Bilirubin  0.3      Comment: For infants and newborns, interpretation of results should be based   on gestational age, weight and in agreement with clinical   observations.   Premature Infant recommended reference ranges:   Up to 24 hours.............<8.0 mg/dL   Up to 48 hours............<12.0 mg/dL   3-5 days..................<15.0 mg/dL   6-29 days.................<15.0 mg/dL    Alkaline Phosphatase  45 Abnormally low     AST  21    ALT  14    Anion Gap  11    eGFR if   26 Abnormal     eGFR if non   23 Abnormal       Comment: Calculation used to obtain the estimated glomerular filtration   rate (eGFR) is the CKD-EPI equation.        9/3/2018 11:51   Specimen UA Reference values Urine, Clean Catch    Color, UA Yellow, Straw, Mel Yellow    Appearance, UA Clear Clear    pH, UA 5.0 - 8.0 5.0    Specific Gravity, UA 1.005 - 1.030 1.010    Protein, UA Negative Negative    Glucose, UA Negative Negative    Ketones, UA Negative Negative    Bilirubin (UA) Negative Negative    Occult Blood UA Negative Negative    Nitrite, UA Negative Negative    Urobilinogen, UA <2.0 EU/dL Negative    Leukocytes, UA Negative Negative        Imaging:  CXR AP  FINDINGS:  The cardiomediastinal silhouette is within normal limits.  There is atherosclerosis of the thoracic aorta.  There has been interval placement of a cardiac loop recorder.  The visualized airway is unremarkable.  The lungs appear  symmetrically aerated without definite focal alveolar consolidation. No large pleural effusion or pneumothorax is appreciated.Visualized osseous structures demonstrate no acute abnormality.      Impression       No acute intrathoracic abnormality identified on this single radiographic view of the chest.     Head CT without contrast  FINDINGS:  There is generalized cerebral volume loss with compensatory prominence of cerebral sulci and the ventricular system.  There is supratentorial and periventricular white matter hypoattenuation, similar to prior examinations and likely reflecting sequela of chronic microvascular ischemic change.  There is a region of right frontal encephalomalacia in keeping with sequela of prior infarction.  There are additional remote lacunar type infarcts bilateral basal ganglia and left centrum semiovale and right cerebellum.  Gray-white matter differentiation otherwise appears maintained.  There is no acute intracranial hemorrhage, hydrocephalus, midline shift or mass effect.  Paranasal sinuses and mastoid air cells are clear of acute process.  Basilar cisterns are patent the osseous calvarium is intact.      Impression       No CT evidence of acute intracranial abnormality. Clinical correlation and further evaluation as warranted.    Generalized cerebral volume loss, chronic microvascular ischemic change and prior infarcts as detailed above.       Assessment/Plan: Mr. Magana is a 77 yo male presenting with weakness and encephalopathy likely metabolic in nature secondary to symptomatic anemia. Will assess for cause of anemia and monitor CMP for kidney injury.     Symptomatic anemia/acute GI Bleed  -2 wk hx of weakness, dizziness  -Bowel movements continue to be black  -pt given 1 unit at pRBCs  -GI to scope today   -awaiting results from iron studies as well as folate and B12 levels    Chronic diastolic heart failure:  -repeat echo to assess for current cardiac function    Acute kidney  injury/Chronic kidney disease stage 3  -pt exhibited signs of encephalopathy on arrival-likely uremic  -pt continued to have elevated BUN and Cr as of last CMP  -continue to monitor BUN and Cr    CVA due to embolism of anterior cerebral artery:  -hold anticoagulation until cleared after scope    Essential HTN:   -continue with home regimen to manage blood pressure    Mixed hyperlipidemia:  -continue statin     Type 2 DM:  -check HbA1c  -pt not on metformin, insulin regimen as needed in hospital    Dispo: pt continue to remain inpt pending results of scope. Continue to monitor BUN and Cr for kidney injury and possible worsening of CKD.

## 2018-09-04 NOTE — PT/OT/SLP EVAL
Occupational Therapy   Evaluation    Name: Shant Magana Jr.  MRN: [id number]  Admitting Diagnosis:  Acute upper GI bleed Day of Surgery    Recommendations:     Discharge Recommendations: home health OT, home health PT  Discharge Equipment Recommendations:  walker, rolling(gait belt, bed alarm)  Barriers to discharge:  None    History:     Occupational Profile:  Living Environment: Pt lives with daughter in [facility], 4STE, Tub/sh combo with HH showerhead c TTB.  Previous level of function: Previously Mod I to S with all ADLs and Indep with ambualtion  Equipment Used at Home:  bath bench(Pt has access to BSC and SPC, but does not use)  Assistance upon Discharge: [facility] reports pt has 24 hour S from family.    Past Medical History:   Diagnosis Date    Cancer     prostate    Diabetes mellitus     Hypertension     Hypertrophic cardiomyopathy     Renal disorder        Past Surgical History:   Procedure Laterality Date    BACK SURGERY      THYROIDECTOMY         Subjective     Chief Complaint: Pt with no complaints this PM.  Patient/Family Comments/goals: To return home.    Pain/Comfort:  · Pain Rating 1: 0/10    Patients cultural, spiritual, Anabaptist conflicts given the current situation:      Objective:     Communicated with: nsg prior to session.  Patient found all lines intact, call button in reach, bed alarm on, nsg notified and family present and peripheral IV, telemetry, bed alarm upon OT entry to room.    General Precautions: Standard, fall   Orthopedic Precautions:N/A   Braces: N/A     Occupational Performance:    Bed Mobility:    · Patient completed Rolling/Turning to Left with  stand by assistance  · Patient completed Scooting/Bridging with stand by assistance and contact guard assistance with inc v/c  · Patient completed Supine to Sit with stand by assistance v/c  · Patient completed Sit to Supine with stand by assistance with inc v/c    Functional Mobility/Transfers:  · Patient completed Sit <> Stand Transfer with  "contact guard assistance  with  no assistive device   Functional Mobility: Pt with fair to fair+ sitting balance, fair static standing balance, Fair- to Poor+ dynamic standing balance w/o DME; with use of RW, Fair- balance with occasional R lean of self and RW.  Activities of Daily Living:  · Upper Body Dressing: minimum to moderate assistance to don gown as robe with inc v/c  · Lower Body Dressing: stand by assistance and contact guard assistance to don B socks with v/c for safety in task    Cognitive/Visual Perceptual:  Cognitive/Psychosocial Skills:     -       Oriented to: Person but with difficulty with basic personal info such as birthdate requiring increased prompting for correct date.   -       Follows Commands/attention:Inattentive, Easily distracted, Follows one-step commands and required simplified commands for simple novel tasks/new learning such as "Place hand from RW to bed."  -       Communication: clear/fluent and occasional mumbling  -       Memory: Impaired STM, Impaired LTM and Poor immediate recall  -       Safety awareness/insight to disability: impaired   -       Mood/Affect/Coping skills/emotional control: Appropriate to situation    Physical Exam:  Postural examination/scapula alignment:    -       Rounded shoulders  -       Forward head  Sensation:    -       Intact per pt report  Motor Planning:    -       WFL in routine tasks; required occ [facility] assistance and physical/verbal cues for novel taks  Upper Extremity Range of Motion:     -       Right Upper Extremity: WFL *  -       Left Upper Extremity: lacking ~20% ROM sh. Flexion. With decreased sh abduction. Otherwise WFL  Upper Extremity Strength:   RUE grossly 4- to 4/5              LUE grossly 3- to 3+/5   Strength: BUE WFL  Fine Motor Coordination:  No deficits noted at this time  Gross motor coordination:   Pt with tremors thought out session and more pronounced with fatigue.    AMPAC 6 Click ADL:  AMPAC Total Score: 18    Treatment " "& Education:  Pt educated on role of OT and POC.   Pt performing skills as listed above.  Pt with h/o dementia and dght reports that pt has occasional confusion and decreased command following at baseline; required inc v/c for most tasks and for safety awareness.  Pt attempted to give social history but incorrectly reported that he lived alone and the home set up.   Pt and family educated on possible DME needs but pt with reluctance to use DME. Pt and family may need reinforcement in use of RW for ambulation and BSC at night to decreased falls.  Education:    Patient left HOB elevated with all lines intact, call button in reach, bed alarm on, nsg notified and family present    Assessment:     Shant Magana Jr. is a 78 y.o. male with a medical diagnosis of Acute upper GI bleed.  He presents with the following performance deficits affecting function: weakness, impaired self care skills, impaired balance, decreased safety awareness, decreased ROM, decreased upper extremity function, impaired functional mobilty, impaired endurance, gait instability, impaired cognition, decreased lower extremity function.      Rehab Prognosis: Good; patient would benefit from acute skilled OT services to address these deficits and reach maximum level of function.         Clinical Decision Makin.  OT Mod:  "Pt evaluation falls under moderate complexity for evaluation coding due to identification of 3-5 performance deficits noted as stated above. Eval required Min/Mod assistance to complete on this date and detailed assessment(s) were utilized. Moreover, an expanded review of history and occupational profile obtained with additional review of cognitive, physical and psychosocial hx."     Plan:     Patient to be seen 5 x/week to address the above listed problems via self-care/home management, therapeutic activities, therapeutic exercises  · Plan of Care Expires: 10/04/18  · Plan of Care Reviewed with: patient, family    This Plan of " care has been discussed with the patient who was involved in its development and understands and is in agreement with the identified goals and treatment plan    GOALS:   Multidisciplinary Problems     Occupational Therapy Goals        Problem: Occupational Therapy Goal    Goal Priority Disciplines Outcome Interventions   Occupational Therapy Goal     OT, PT/OT     Description:  Goals to be met by: 10/04/2018      Patient will increase functional independence with ADLs by performing:    Feeding with Modified [place].  UE Dressing with Modified [place].  LE Dressing with Set-up Assistance.  Grooming while standing with Supervision.  Toileting from toilet with Supervision for hygiene and clothing management.   Sitting at edge of bed x15 minutes with Supervision.  Rolling to Bilateral with Modified [place].   Supine to sit with Modified [place].  Stand pivot transfers with Supervision.  Step transfer with Supervision  Toilet transfer to toilet with Supervision.  Increased functional strength to WFL for self care.  Upper extremity exercise program x10 reps per handout, with assistance as needed.                      Time Tracking:     OT Date of Treatment: 09/04/18  OT Start Time: 1348  OT Stop Time: 1434  OT Total Time (min): 46 min    Billable Minutes:Evaluation 10  Self Care/Home Management 13    Allison Henderson OT  9/4/2018

## 2018-09-04 NOTE — ANESTHESIA POSTPROCEDURE EVALUATION
Anesthesia Post Evaluation    Patient: Shant Magana Jr.    Procedure(s) Performed: Procedure(s) (LRB):  EGD (ESOPHAGOGASTRODUODENOSCOPY) (N/A)    Final Anesthesia Type: MAC  Patient location during evaluation: OPS  Patient participation: Yes- Able to Participate  Level of consciousness: awake and alert and oriented  Post-procedure vital signs: reviewed and stable  Pain management: adequate  Airway patency: patent  PONV status at discharge: No PONV  Anesthetic complications: no      Cardiovascular status: blood pressure returned to baseline  Respiratory status: unassisted  Hydration status: euvolemic  Follow-up not needed.        Visit Vitals  BP (!) 146/73 (BP Location: Right arm, Patient Position: Lying)   Pulse 64   Temp 36.3 °C (97.3 °F) (Oral)   Resp 18   Ht 6' (1.829 m)   Wt 90.1 kg (198 lb 9.6 oz)   SpO2 100%   BMI 26.94 kg/m²       Pain/Rosa Score: Pain Assessment Performed: Yes (9/4/2018 11:34 AM)  Presence of Pain: denies (9/4/2018 11:34 AM)  Rosa Score: 10 (9/4/2018 11:34 AM)

## 2018-09-05 VITALS
TEMPERATURE: 98 F | DIASTOLIC BLOOD PRESSURE: 74 MMHG | BODY MASS INDEX: 26.9 KG/M2 | HEART RATE: 74 BPM | WEIGHT: 198.63 LBS | SYSTOLIC BLOOD PRESSURE: 140 MMHG | OXYGEN SATURATION: 100 % | RESPIRATION RATE: 19 BRPM | HEIGHT: 72 IN

## 2018-09-05 PROBLEM — G93.41 ENCEPHALOPATHY, METABOLIC: Status: RESOLVED | Noted: 2018-09-03 | Resolved: 2018-09-05

## 2018-09-05 LAB
ALBUMIN SERPL BCP-MCNC: 3.2 G/DL
ALP SERPL-CCNC: 47 U/L
ALT SERPL W/O P-5'-P-CCNC: 15 U/L
ANION GAP SERPL CALC-SCNC: 5 MMOL/L
AST SERPL-CCNC: 19 U/L
BASOPHILS # BLD AUTO: 0.02 K/UL
BASOPHILS NFR BLD: 0.3 %
BILIRUB SERPL-MCNC: 0.5 MG/DL
BUN SERPL-MCNC: 74 MG/DL
CALCIUM SERPL-MCNC: 10.5 MG/DL
CHLORIDE SERPL-SCNC: 114 MMOL/L
CO2 SERPL-SCNC: 22 MMOL/L
CREAT SERPL-MCNC: 2 MG/DL
DIFFERENTIAL METHOD: ABNORMAL
EOSINOPHIL # BLD AUTO: 0.3 K/UL
EOSINOPHIL NFR BLD: 3.8 %
ERYTHROCYTE [DISTWIDTH] IN BLOOD BY AUTOMATED COUNT: 16.5 %
EST. GFR  (AFRICAN AMERICAN): 36 ML/MIN/1.73 M^2
EST. GFR  (NON AFRICAN AMERICAN): 31 ML/MIN/1.73 M^2
GLUCOSE SERPL-MCNC: 130 MG/DL
HCT VFR BLD AUTO: 27.5 %
HGB BLD-MCNC: 8.8 G/DL
LYMPHOCYTES # BLD AUTO: 1 K/UL
LYMPHOCYTES NFR BLD: 14 %
MCH RBC QN AUTO: 29.3 PG
MCHC RBC AUTO-ENTMCNC: 32 G/DL
MCV RBC AUTO: 92 FL
MONOCYTES # BLD AUTO: 0.5 K/UL
MONOCYTES NFR BLD: 7.5 %
NEUTROPHILS # BLD AUTO: 5.2 K/UL
NEUTROPHILS NFR BLD: 74.4 %
PLATELET # BLD AUTO: 216 K/UL
PMV BLD AUTO: 11.9 FL
POCT GLUCOSE: 117 MG/DL (ref 70–110)
POCT GLUCOSE: 122 MG/DL (ref 70–110)
POTASSIUM SERPL-SCNC: 4.4 MMOL/L
PROT SERPL-MCNC: 5.8 G/DL
RBC # BLD AUTO: 3 M/UL
SODIUM SERPL-SCNC: 141 MMOL/L
WBC # BLD AUTO: 7.02 K/UL

## 2018-09-05 PROCEDURE — 36415 COLL VENOUS BLD VENIPUNCTURE: CPT

## 2018-09-05 PROCEDURE — 80053 COMPREHEN METABOLIC PANEL: CPT

## 2018-09-05 PROCEDURE — 63600175 PHARM REV CODE 636 W HCPCS: Performed by: STUDENT IN AN ORGANIZED HEALTH CARE EDUCATION/TRAINING PROGRAM

## 2018-09-05 PROCEDURE — 94761 N-INVAS EAR/PLS OXIMETRY MLT: CPT

## 2018-09-05 PROCEDURE — 25000003 PHARM REV CODE 250: Performed by: STUDENT IN AN ORGANIZED HEALTH CARE EDUCATION/TRAINING PROGRAM

## 2018-09-05 PROCEDURE — 97535 SELF CARE MNGMENT TRAINING: CPT

## 2018-09-05 PROCEDURE — 85025 COMPLETE CBC W/AUTO DIFF WBC: CPT

## 2018-09-05 PROCEDURE — C9113 INJ PANTOPRAZOLE SODIUM, VIA: HCPCS | Performed by: STUDENT IN AN ORGANIZED HEALTH CARE EDUCATION/TRAINING PROGRAM

## 2018-09-05 RX ORDER — PANTOPRAZOLE SODIUM 40 MG/1
40 TABLET, DELAYED RELEASE ORAL 2 TIMES DAILY
Qty: 60 TABLET | Refills: 2 | Status: ON HOLD | OUTPATIENT
Start: 2018-09-05 | End: 2018-10-18 | Stop reason: HOSPADM

## 2018-09-05 RX ORDER — PANTOPRAZOLE SODIUM 40 MG/1
40 TABLET, DELAYED RELEASE ORAL DAILY
Qty: 30 TABLET | Refills: 2 | Status: SHIPPED | OUTPATIENT
Start: 2018-09-05 | End: 2018-09-05 | Stop reason: SDUPTHER

## 2018-09-05 RX ADMIN — ATORVASTATIN CALCIUM 80 MG: 40 TABLET, FILM COATED ORAL at 08:09

## 2018-09-05 RX ADMIN — DEXTROSE 40 MG: 50 INJECTION, SOLUTION INTRAVENOUS at 08:09

## 2018-09-05 RX ADMIN — VITAMIN D, TAB 1000IU (100/BT) 1000 UNITS: 25 TAB at 08:09

## 2018-09-05 NOTE — PLAN OF CARE
Problem: Patient Care Overview  Goal: Plan of Care Review  Outcome: Ongoing (interventions implemented as appropriate)  Plan of care reviewed with patient. Patient verbalized understanding. Blood glucose monitoring continued.Fall precautions maintained. Bed in lowest position, call light within reach, 2x bed rails, pt wearing slip resistant socks. Patient notified to ask staff for assistance and pt verbalized complete understanding. Pt on telemetry with no ectopy noted. Will continue to monitor. Pt anticipates discharge.

## 2018-09-05 NOTE — PROGRESS NOTES
.Pharmacy New Medication Education    Patient accepted medication education.    Pharmacy educated patient on name and purpose of medications and possible side effects, using the teach-back method.     Current Inpatient Medication Orders   0.9% NaCl infusion (for blood administration)   0.9% NaCl infusion (for blood administration)   0.9% NaCl infusion   atorvastatin tablet 80 mg   dextrose 50% injection 12.5 g   glucagon (human recombinant) injection 1 mg   insulin aspart U-100 pen 0-5 Units   insulin detemir U-100 pen 5 Units   pantoprazole 40 mg in dextrose 5 % 100 mL infusion (ready to mix system)   vitamin D 1000 units tablet 1,000 Units       Learners of pharmacy medication education included:  Patient    Patient +/- learner response:  Verbalized Understanding, Teachback

## 2018-09-05 NOTE — NURSING
Plan of care reviewed with patient, understanding verbalized. Pt remains sinus ramón on tele. Pt disoriented to time, place, and situation. No complaints of pain at this time. Normal saline continuously infusing at 50ml/hr. Bed alarm on, call light in reach, fall precautions in place. No distress noted. Will continue to monitor

## 2018-09-05 NOTE — PLAN OF CARE
Dr. Gooden clinic will call patient to set hospital follow up.    PHN reports they have set the patient to be seen by [facility], [place] sent [facility] notes/referral.    Follow-up With  Details  Why  Contact Info   Trent Aldana MD  Go on 9/12/2018  @ 10:45am  [address]  [address]  [place] [address]  [phone]   Alan Gooden MD  Call  This clinic will call you to schedule follow up appointment.  [address]  [place] [address]  [phone]   Donna Oliver MD  Go on 9/20/2018  @ 2:45pm  [address]  [address]  [place] [address]  [phone]   Benjamin Farias DPM  Go on 9/25/2018  @ 8:15am  [address] [facility] [place] [address]  [phone]   [facility]  [address] [place] [address]  [address]  [facility] [address]  [phone]          09/05/18 1407   Final Note   Assessment Type Final Discharge Note   Discharge Disposition Home-Health   What phone number can be called within the next 1-3 days to see how you are doing after discharge? [phone]   Hospital Follow Up  Appt(s) scheduled? Yes   Discharge plans and expectations educations in teach back method with documentation complete? Yes   Right Care Referral Info   Post Acute Recommendation Home-care

## 2018-09-05 NOTE — DISCHARGE SUMMARY
[facility] Discharge Summary    Primary Team: [facility]ist Team A  Attending Physician: Jey Gamino MD  Resident: Carissa  Intern: Dave    Date of Admit: 9/3/2018  Date of Discharge: 9/5/2018    Discharge to: Home with  PT/OT  Condition: Stable     Discharge Diagnoses     Patient Active Problem List   Diagnosis    Essential hypertension    Bradycardia    Type 2 diabetes mellitus with complication, without long-term current use of insulin    Cerebrovascular accident (CVA) due to embolism of anterior cerebral artery    SHIKHA (acute kidney injury)    Internal carotid artery stenosis, left    Mixed hyperlipidemia    CKD (chronic kidney disease) stage 3, GFR 30-59 ml/min    Chronic diastolic congestive heart failure    Enlarged LA (left atrium)    Symptomatic anemia    Anemia    Weakness    Acute upper GI bleed       Consultants and Procedures     Consultants:  GI    Procedures:   EGD                           - Medium-sized hiatal hernia with a single Phoenix ulcer.                        - Chronic gastritis with hemorrhage. Biopsied.                        - Duodenal erosions with stigmata of recent bleeding.                        - Duodenitis.                        - Duodenal lipoma with pillow sign c/b mucosal tear that was clipped successfully                        - Two bleeding angioectasias in the duodenum, treated with argon plasma coagulation (APC).    Echo                       1 - Hyperdynamic left ventricular systolic function (EF 65-70%).                        2 - Impaired LV relaxation, increased LVEDP.                        3 - Mild left atrial enlargement.                        4 - The estimated PA systolic pressure is 35 mmHg.                        5 - LVH    Brief History of Present Illness      Shant Magana Jr. is a 78 y.o. AA male who  has a past medical history of Cancer, Diabetes mellitus, Hypertension, Hypertrophic cardiomyopathy, and Renal disorder.. The patient  presented to [facility] on 9/3/2018 with a primary complaint of Weakness and falls x 2 weeks.      The patient was in his usual state of health (lives with daughter, needs help with most ADLs) until about 2 weeks prior to presentation pt started to have increased weakness, dizziness, and loss of balance. Pt reportedly has had four falls in the two weeks PTA, typically after getting up in the middle of the night to use the bathroom. No LOC, usually just drops to ground on knees or buttocks. Pt reports that about the same time he noticed he was starting to have loose stools, which are black in color and sticky. Pt reports a history of 3 strokes last year, and think the symptoms he has been having recently are similar to the way he felt at those times last year. Pt reports some decreased appetite for the past several days prior to presentation with minimal food or water intake.      For the full HPI please refer to the History & Physical from this admission.    Hospital Course By Problem with Pertinent Findings     Upper GI Bleed with Symptomatic anemia  - Presented with 2 weeks of weakness, dizziness, and 4 falls as well as melanotic stools  - H/H in ED 8/24.4 (baseline Hgb 13-14 in 2017)  - Given 2 units pRBCs with post transfusion H/H 9.5/29.6. On discharge 8.8/27.5 (has been on gentle IVF)  - IV PPI BID while inpatient, continue PPI BID therapy on discharge per GI  - EGD with numerous foci of bleeding: Phoenix ulcer, hemorrhagic gastritis, duodenal errosion with stigmata of recent bleed, duodenal lipoma with mucosa tear (clipped), 2 bleeding angioectasias (APC), duodenitis. Could not rule out more distal bleeding.   - Holding ASA and Pradaxa per GI.      Metabolic encephalopathy, resolved   - pt confused upon arrival to ED more so than baseline per family   -  > etiology likely uremic. BUN trending down and mental status back to baseline on day of discharge   - BUN likely also elevated  from GI blood loss     SHIKHA on CKD 3, improving   - Cr 2.6 in ED (baseline 1.2-1.8 in 2017), post transfusion labs with Cr 2.4, on discharge 2   - Started on IVF initially and given 2 units RBCs for volume repletion. Restarted on IVF after blood   - FeNa and FeUrea consistent with prerenal disease   - BUN higher after blood transfusion, suspect this elevation is more likely to GI bleeding. Trending down     HTN  - pt with extensive home regimen: losartan 100, HCTZ 25, metoprolol 0.5 q6hrs, amlodipine 10, and lisinopril 10  - holding all home meds as pt hypotensive with GI bleed   - Held losartan, amlodipine, and metoprolol on discharge due to continued low blood pressures off meds while inpatient  - Will likely need some meds added back on with PCP      HFpEF  - ECHO with EF 65-70% in 2017  - Repeat hyperdynamic (EF 70%) with diastolic dysfunction   - No valvular dysfunction   - Will need follow up with Cardiology (has been seen by [facility] in the past for loop recorder implantation, but with PHN so will refer to [place])     DM  - pt without current DM meds at home  - A1C 5.9  - BGs 110s-210s while inpatient, placed on SSI      HLD  - continued home atorvastatin     Hx mutliple CVAs  - takes pradaxa 150mg BID at home, held due to GI bleed   - Needs loop recorder interrogated to assess for paroxsymal AFib to determine if he has indication for a DOAC otherwise could use DAPT for stroke prevention. However he has had a stroke on DAPT   - If AFib CHADsVASc 8  HAS-BLED 4 (with DAPT)    Discharge Medications        Medication List      START taking these medications    pantoprazole 40 MG tablet  Commonly known as:  PROTONIX  Take 1 tablet (40 mg total) by mouth 2 (two) times daily. Take 30 minutes prior to breakfast and dinner        CONTINUE taking these medications    atorvastatin 80 MG tablet  Commonly known as:  LIPITOR  Take 1 tablet (80 mg total) by mouth once daily.     GLUCOPHAGE 500 MG tablet  Generic drug:   metFORMIN     hydroCHLOROthiazide 12.5 MG Tab  Commonly known as:  HYDRODIURIL     lisinopril 10 MG tablet     tamsulosin 0.4 mg Cap  Commonly known as:  FLOMAX     vitamin D 1000 units Tab        STOP taking these medications    amLODIPine 10 MG tablet  Commonly known as:  NORVASC     aspirin 81 MG EC tablet  Commonly known as:  ECOTRIN     losartan 100 MG tablet  Commonly known as:  COZAAR     metoprolol tartrate 25 MG tablet  Commonly known as:  LOPRESSOR     PRADAXA 150 mg Cap  Generic drug:  dabigatran etexilate           Where to Get Your Medications      These medications were sent to [facility]  [address], [place] [address]    Hours:  Mon-Fri, 8a-5:30p Phone:  [phone]   · pantoprazole 40 MG tablet         Discharge Information:   Diet:  Cardiac/DM    Physical Activity:  As tolerated   HH PT/OT    Instructions:  1. Take all medications as prescribed  2. Keep all follow-up appointments  3. Return to the hospital or call your primary care physicians if any worsening symptoms such as weakness, increasing confusion, gross blood in vomit or stool, black stools, other concerning symptoms occur.      Follow-Up Appointments:  PCP 1 week  Cardiology 2 weeks   GI ([place]) 2 weeks     Mikal Ferrer MD  [facility] Internal [facility]/Pediatrics, -[facility]

## 2018-09-05 NOTE — PROGRESS NOTES
[facility] Resident HO-III Progress Note    Subjective:      Today, Mr. Magana is without complain. He denies dark stools, abdominal pain, shortness of breath. No family present at bedside.      Objective:   Last 24 Hour Vital Signs:  BP  Min: 80/56  Max: 146/73  Temp  Av.1 °F (36.2 °C)  Min: 96.2 °F (35.7 °C)  Max: 97.7 °F (36.5 °C)  Pulse  Av.7  Min: 59  Max: 66  Resp  Av.7  Min: 12  Max: 20  SpO2  Av.1 %  Min: 98 %  Max: 100 %  I/O last 3 completed shifts:  In: 802.4 [P.O.:602.4; I.V.:200]  Out: 1238 [Urine:1238]    Physical Examination:  GEN- alert but not oriented; sitting up in bed eating breakfast  HEENT-NC/AT EOMI, MMM,  HEART- RRR; no murmur appreciated   RESP-CTAB, normal work of breathing, no wheezes, crackles, or rhonchi  ABD-soft, NT, ND, +BS  EXT-no clubbing, cyanosis, or edema; 2+ DP/PT pulses  NEURO- oriented to self only but more alert today and able to converse, no focal deficit, moves all four extremities equally  SKIN-warm and dry; no rashes      Laboratory:  Laboratory Data Reviewed: yes  Pertinent Findings:  Hgb 8.8  BUN 74  Cr 2    Current Medications:     Infusions:   sodium chloride 0.9% Stopped (18 1018)        Scheduled:   atorvastatin  80 mg Oral Daily    insulin detemir U-100  5 Units Subcutaneous QHS    pantoprazole 40 mg in dextrose 5 % 100 mL infusion (ready to mix system)  40 mg Intravenous Daily    vitamin D  1,000 Units Oral Daily        PRN:  sodium chloride, sodium chloride, dextrose 50%, glucagon (human recombinant), insulin aspart U-100    Assessment:     Shant Magana Jr. is a 78 y.o.male with  Patient Active Problem List    Diagnosis Date Noted    Symptomatic anemia 2018    Encephalopathy, metabolic 2018    Acute upper GI bleed 2018    Anemia     Weakness     Chronic diastolic congestive heart failure 10/19/2017    Enlarged LA (left atrium) 10/19/2017    Internal carotid artery stenosis, left 10/18/2017    Mixed  hyperlipidemia 10/18/2017    CKD (chronic kidney disease) stage 3, GFR 30-59 ml/min 10/18/2017    SHIKHA (acute kidney injury) 10/14/2017    Cerebrovascular accident (CVA) due to embolism of anterior cerebral artery 08/15/2017    Type 2 diabetes mellitus with complication, without long-term current use of insulin     Essential hypertension 06/07/2017    Bradycardia 06/07/2017        Plan:     Upper GI Bleed with Symptomatic anemia  - pt with 2 week hx weakness, dizziness, and 4 falls as well as melanotic stools  - denies ever having colonoscopy  - H/H in ED 8/24.4 (baseline Hgb 13-14 in 2017)  - Now s/p 2 units pRBCs with post transfusion H/H 9.5/29.6. Today 8.8/27.5 (has been on gentle IVF)  - IV PPI daily, continue PPI therapy on discharge   - EGD with numerous foci of bleeding: Phoenxi ulcer, hemorrhagic gastritis, duodenal errosion with stigmata of recent bleed, duodenal lipoma with mucosa tear (clipped), 2 bleeding angioectasias (APC), duodenitis. Could not rule out more distal bleeding.      Metabolic encephalopathy  - pt confused upon arrival to ED  -  > etiology likely uremic. BUN trending down   - Placed on IVF prior to blood, giving blood for volume repletion   - BUN likely also elevated from GI blood loss     SHIKHA on CKD 3  - Cr 2.6 in ED (baseline 1.2-1.8 in 2017), post transfusion labs with Cr 2.4, now 2   - Started on IVF prior to blood, gave 2 units RBCs for volume repletion. Continued on IVF after blood   - FeNa and FeUrea consistent with prerenal disease   - BUN higher after blood transfusion, suspect this elevation is more likely to GI bleeding. Trending down  - Continue to monitor       HTN  - pt with extensive home regimen: losartan 100, HCTZ 25, metoprolol 0.5 q6hrs, amlodipine 10, and lisinopril 10  - holding all home meds as pt hypotensive with GI bleed   - may not need all meds resumed on discharge, definitely not ACEi and ARB     HFpEF  - ECHO with EF 65-70% in 2017  - Repeat  hyperdynamic with diastolic dysfunction   - No valvular dysfunction   - Will need follow up with [facility]      DM  - pt without current DM meds at home  - A1C 5.9  - BGs 130-210 since admission, will add low dose basal insulin to SSI      HLD  - cont home atorvastatin     Hx mutliple CVAs  - takes pradaxa 150mg BID at home  - holding pradaxa due to GI bleed, GI recommends stopping anticoagulants   - Needs loop recorder interrogated to assess for paroxsymal AFib to determine if he has indication for a DOAC otherwise could use DAPT for stroke prevention. However he has had a stroke on DAPT   - If AFib CHADsVASc 8  HAS-BLED 4 (with DAPT)    PPx: SCDs in setting of GI bleeding     Dispo: possible d/c today     Mikal Ferrer MD  [facility] Internal Medicine/Pediatrics HO-III  [facility] Hospitalist Service Team A    [facility] Medicine Hospitalist Pager numbers:   [facility] Hospitalist Medicine Team A (Hanny/Loco): [phone]  [facility] Hospitalist Medicine Team B (Kacey/Nico):  [phone]

## 2018-09-05 NOTE — PT/OT/SLP EVAL
Physical Therapy Evaluation/Treatment    Patient Name:  Shant Magana Jr.   MRN:  [id number]    Recommendations:     Discharge Recommendations:  home health PT, home health OT   Discharge Equipment Recommendations: walker, rolling(gait belt, bed alarm)   Barriers to discharge: None    Assessment:     Shatn Magana Jr. is a 78 y.o. male admitted with a medical diagnosis of Acute upper GI bleed.  He presents with the following impairments/functional limitations:  weakness, impaired endurance, gait instability, impaired functional mobilty, impaired self care skills, impaired balance, decreased safety awareness, decreased ROM, decreased upper extremity function, decreased lower extremity function, impaired cognition Patient evaluated; pt with moderate confusion, needs frequent cues to complete tasks; requires assist for transfers and amb 2/2 decreased balance; at this time, will recommend RW, gait belt, 24 hr supervision and assist along with  PT/OT..    Rehab Prognosis:  good; patient would benefit from acute skilled PT services to address these deficits and reach maximum level of function.      Recent Surgery: Procedure(s) (LRB):  EGD (ESOPHAGOGASTRODUODENOSCOPY) (N/A) Day of Surgery    Plan:     During this hospitalization, patient to be seen 6 x/week to address the above listed problems via gait training, therapeutic activities, therapeutic exercises, neuromuscular re-education  · Plan of Care Expires:  10/04/18   Plan of Care Reviewed with: patient, family    Subjective     Communicated with nurse prior to session.  Patient found supine with HOB elevated upon PT entry to room, agreeable to evaluation.      Chief Complaint: none stated  Patient comments/goals: to return home  Pain/Comfort:  · Pain Rating 1: 0/10  · Pain Rating 2: 0/10    Patients cultural, spiritual, Cheondoism conflicts given the current situation: n/a    Living Environment:  Pt lives with dtr in [facility], 4 RALF with Raimundo handrails, tub/shower combo with   showerhead  Prior to admission, patients level of function was Devin/Supervision with all ADLs.  Patient has the following equipment: bath bench(Pt has access to BSC and std cane but does not use) Upon discharge, patient will have 24 hr supervision assistance from family.    Objective:     Patient found with: telemetry, bed alarm, peripheral IV     General Precautions: Standard, fall   Orthopedic Precautions:N/A   Braces: N/A     Exams:  · Cognitive Exam:  Patient is oriented to Person with birthdate requiring increased prompting for correct date, Place, situation but not month or year; pt inattentive and easily distracted; requires simplification of commands for tasks into a series of one step commands- slow with processing; impaired STM, LTM, poor immediate recall; impaired safety awareness/insight into disability; pt with h/o dementia and dtr reports that pt has occasional confusion and decreased command following at baseline; pt attempted to give social history but incorrectly reported that he lived alone and the home set up.   · Gross Motor Coordination:  low intensity tremors throughout mainly UEs more pronounced with fatigue  · Postural Exam:  Patient presented with the following abnormalities:    · -       Rounded shoulders  · -       Forward head  · Sensation:    · -       Intact per report of pt  · RLE ROM: WFL  · RLE Strength:R hip~4- to 4 ; R knee~4 to 4+ ; R ankle~4- grossly  · LLE ROM: WFL  · LLE Strength: L hip~3+ to 4- ; L knee~4; L ankle~3+ grossly  · BUEs~see OT notes; decreased L shld range and LUE with decreased strength as compared to RUE    Functional Mobility:  · Bed Mobility:     · Rolling Left:  stand by assistance  · Scooting: stand by assistance, contact guard assistance and with frequent VCs  · Supine to Sit: stand by assistance  · Sit to Supine: stand by assistance and frequent VCs in order to complete task- pt stops in the middle of task  · Transfers:     · Sit <> Stand:  contact guard  assistance with no AD, or with RW and VCs for hand placement with one step serial commands  · Gait: Patient amb without AD with Bunny/modA x 20ft with increased lateral sway L/R with increased rishi toward L; amb 80ft using RW and CGA/Bunyn with small DANIEL 2/2 LE varus with WB along lateral edges of feet; VCs for hand placement with one step at a time directions, increased head lift, midline with VCs/TCs; negotiated turning L with crossing R over L foot and requiring Bunny 2/2 increased lean  · Balance: sit~fair to fair+; standing static~fair; dynamic stand~fair-; amb~fair- to poor+ without use of RW, fair- with use of RW    AM-PAC 6 CLICK MOBILITY  Total Score:18       Therapeutic Activities and Exercises:   Pt and family educated on role of PT/POC; performed activities as described above with increased VCs/TCs to complete most tasks and for safety awareness.      Patient left HOB elevated with all lines intact, call button in reach, bed alarm on, nurse notified and family present.    GOALS:   Multidisciplinary Problems     Physical Therapy Goals        Problem: Physical Therapy Goal    Goal Priority Disciplines Outcome Goal Variances Interventions   Physical Therapy Goal     PT, PT/OT Ongoing (interventions implemented as appropriate)     Description:  Goals to be met by: 10/4/2018     Patient will increase functional independence with mobility by performin. Supine to sit with Modified [place]  2. Sit to supine with Modified [place]  3. Sit to stand transfer with Supervision  4. Gait  x 150 feet with Supervision and Stand-by Assistance with or without AD  5. Ascend/descend 4 stair with bilateral Handrails Stand-by Assistance and Contact Guard Assistance    6. Lower extremity exercise program x10 reps with assistance as needed                      History:     Past Medical History:   Diagnosis Date    Cancer     prostate    Diabetes mellitus     Hypertension     Hypertrophic cardiomyopathy      Renal disorder        Past Surgical History:   Procedure Laterality Date    BACK SURGERY      THYROIDECTOMY         Clinical Decision Making:     History  Co-morbidities and personal factors that may impact the plan of care Examination  Body Structures and Functions, activity limitations and participation restrictions that may impact the plan of care Clinical Presentation   Decision Making/ Complexity Score   Co-morbidities:   [] Time since onset of injury / illness / exacerbation  [x] Status of current condition  [x]Patient's cognitive status and safety concerns    [x] Multiple Medical Problems (see med hx)  Personal Factors:   [] Patient's age  [] Prior Level of function   [] Patient's home situation (environment and family support)  [] Patient's level of motivation  [] Expected progression of patient      HISTORY:(criteria)    [] 32773 - no personal factors/history    [] 26311 - has 1-2 personal factor/comorbidity     [x] 75314 - has >3 personal factor/comorbidity     Body Regions:  [x] Objective examination findings  [] Head     []  Neck  [] Trunk   [] Upper Extremity  [] Lower Extremity    Body Systems:  [x] For communication ability, affect, cognition, language, and learning style: the assessment of the ability to make needs known, consciousness, orientation (person, place, and time), expected emotional /behavioral responses, and learning preferences (eg, learning barriers, education  needs)  [x] For the neuromuscular system: a general assessment of gross coordinated movement (eg, balance, gait, locomotion, transfers, and transitions) and motor function  (motor control and motor learning)  [x] For the musculoskeletal system: the assessment of gross symmetry, gross range of motion, gross strength, height, and weight  [] For the integumentary system: the assessment of pliability(texture), presence of scar formation, skin color, and skin integrity  [] For cardiovascular/pulmonary system: the assessment of heart  rate, respiratory rate, blood pressure, and edema     Activity limitations:    [x] Patient's cognitive status and saf ety concerns          [x] Status of current condition      [] Weight bearing restriction  [] Cardiopulmunary Restriction    Participation Restrictions:   [] Goals and goal agreement with the patient     [] Rehab potential (prognosis) and probable outcome      Examination of Body System: (criteria)    [] 56752 - addressing 1-2 elements    [] 67296 - addressing a total of 3 or more elements     [x] 46210 -  Addressing a total of 4 or more elements         Clinical Presentation: (criteria)  Evolving - 90072     On examination of body system using standardized tests and measures patient presents with 4 or more elements from any of the following: body structures and functions, activity limitations, and/or participation restrictions.  Leading to a clinical presentation that is considered evolving with changing characteristics                              Clinical Decision Making  (Eval Complexity):  Moderate - 66423     Time Tracking:     PT Received On: 09/04/18  PT Start Time: 1348     PT Stop Time: 1434  PT Total Time (min): 46 min with OT    Billable Minutes: Evaluation 13 minutes and Gait Training 10 minutes      Zonia Yusuf, PT  09/04/2018

## 2018-09-05 NOTE — PROGRESS NOTES
[facility]    CC: melena     Interval:  The patient tolerated EGD procedure well yesterday with no complaints this morning. He is hungry and asking for food. The patient denies abdominal pain, bowel movements overnight, fevers, throat soreness, or dysphagia.    Past Medical History:  Hypertrophic cardiomyopathy   Hypertension   DM   Prostate CA     Review of Systems  General: negative for fever or chills  GI: no further episodes of melena, no abdominal pain or nausea, no vomiting    Physical Examination  /62 (BP Location: Left arm)   Pulse (!) 57   Temp 96.3 °F (35.7 °C)   Resp 16   Ht 6' (1.829 m)   Wt 90.1 kg (198 lb 9.6 oz)   SpO2 97%   BMI 26.94 kg/m²     General appearance: alert, cooperative, no distress  HENT: Normocephalic, atraumatic, neck symmetrical, no nasal discharge   Eyes: conjunctivae/corneas clear, PERRL, EOM's intact  Lungs: clear to auscultation bilaterally, no dullness to percussion bilaterally  Heart: RRR; no displacement of the PMI, Grade III systolic ejection murmur  Abdomen: soft, distended, non-tender; normoactive bowel sounds    Imaging:  EGD 9/4 - medium-sized hiatal hernia with single Phoenix ulcer, chronic gastritis with hemorrhage, duodenal erosions with stigmata of recent bleed, duodenitis, duodenal lipoma with pillow sign complicated by mucosal tear (clipped), 2 bleeding angioectasias in duodenum treated with argon plasma coagulation  Assessment:   Shant Magana Jr. is a 78 y.o. male patient with history including ischemic CVA, HTN, diastolic HF on multiple antihypertensives, and implanted loop recorder with 1 week of melena, N/V while on anticoagulation (Pradaxa), associated with normocytic anemia. EGD revealed several causes for melena including erosive gastropathy/duodenitis and duodenal angioectasias     Plan:  Regular diet   Twice daily PPI for now   Restart aspirin for secondary CVA prophylaxis now and ask neurology about indication for pradaxa. If there is a  strong indication for pradaxa, he can restart this therapy now. If he bleeds again on pradaxa, this therapy might not be an option for him   Awaiting pathology results  Repeat EGD in 10 weeks for surveillance  Perform colonoscopy in 10 weeks for CRC screening if benefits outweigh risk

## 2018-09-05 NOTE — PT/OT/SLP PROGRESS
Occupational Therapy   Treatment    Name: Shant Magana Jr.  MRN: [id number]  Admitting Diagnosis:  Acute upper GI bleed  1 Day Post-Op    Recommendations:     Discharge Recommendations: home health OT, home health PT  Discharge Equipment Recommendations:  walker, rolling(gait belt, bed alarm)  Barriers to discharge:  None    Subjective     Communicated with: nsg prior to session.  Pain/Comfort:  · Pain Rating 1: 0/10    Patients cultural, spiritual, Congregational conflicts given the current situation:      Objective:     Patient found with: telemetry, peripheral IV, bed alarm    General Precautions: Standard, fall   Orthopedic Precautions:N/A   Braces: N/A     Occupational Performance:    Bed Mobility:    · Patient completed Rolling/Turning to Left with  supervision  · Patient completed Supine to Sit with supervision     Functional Mobility/Transfers:  · Patient completed Sit <> Stand Transfer with minimum assistance  with  rolling walker   · Patient completed Bed <> Chair Transfer using Stand Pivot technique with supervision and stand by assistance with rolling walker  · Functional Mobility: Pt with Fair+ dynamic sitting balance and Fair dynamic standing balance    Activities of Daily Living:  · Feeding:  modified independence with HOB elevated  · Grooming: contact guard assistance in stance at sink to brush teeth; unable to perform as novel task seated in bedside chair with cup and spit basin.  · Upper Body Dressing: contact guard assistance and minimum assistance to don/doff gown.  · Toileting: moderate assistance with use of adult brief but demo'd ability to perform perineal hygeine CGA    Patient left up in chair with all lines intact, call button in reach, chair alarm on, nsg notified and grand children present    [facility] 6 Click:  [facility] Total Score: 19    Treatment & Education:  Pt performing skills as above.  Pt continues with difficulty to initiate novel tasks such as donning/doffing hospital gown and brushing teeth  seated in bedside chair.  Sons, grandson and granddaughter educated on use do BSC at night and bed alarm for increased safety in toileting at night. Family educated on continued difficulty with novel tasks and instructed that pt will continue to require supervision to use RW and BSC as these are not rote task for pt and may require physical or v/c to complete 2/2 decreased cognition and impaired safety awareness.  Pt continues with impulsivity this date and required increased v/c for safety awareness.  Education:    Assessment:     Shant Magana Jr. is a 78 y.o. male with a medical diagnosis of Acute upper GI bleed.  He presents with deconditioning.  Performance deficits affecting function are weakness, impaired functional mobilty, impaired cognition, decreased safety awareness, impaired coordination, impaired fine motor, decreased coordination, gait instability, impaired endurance, impaired sensation, impaired balance, decreased upper extremity function, decreased ROM, impaired self care skills.      Rehab Prognosis: Fair+; patient would benefit from acute skilled OT services to address these deficits and reach maximum level of function.       Plan:     Patient to be seen 5 x/week to address the above listed problems via self-care/home management, therapeutic activities, therapeutic exercises  · Plan of Care Expires: 10/04/18  · Plan of Care Reviewed with: patient    This Plan of care has been discussed with the patient who was involved in its development and understands and is in agreement with the identified goals and treatment plan    GOALS:   Multidisciplinary Problems     Occupational Therapy Goals        Problem: Occupational Therapy Goal    Goal Priority Disciplines Outcome Interventions   Occupational Therapy Goal     OT, PT/OT Ongoing (interventions implemented as appropriate)    Description:  Goals to be met by: 10/04/2018      Patient will increase functional independence with ADLs by performing:    Feeding  with Modified [place].  UE Dressing with Modified [place].  LE Dressing with Set-up Assistance.  Grooming while standing with Supervision.  Toileting from toilet with Supervision for hygiene and clothing management.   Sitting at edge of bed x15 minutes with Supervision.  Rolling to Bilateral with Modified [place].   Supine to sit with Modified [place].  Stand pivot transfers with Supervision.  Step transfer with Supervision  Toilet transfer to toilet with Supervision.  Increased functional strength to WFL for self care.  Upper extremity exercise program x10 reps per handout, with assistance as needed.                      Time Tracking:     OT Date of Treatment: 09/05/18  OT Start Time: 1243  OT Stop Time: 1337  OT Total Time (min): 54 min    Billable Minutes:Self Care/Home Management 4    Allison Henderson OT  9/5/2018

## 2018-09-05 NOTE — NURSING
Discharge information and education provided, patient voices understanding. Cardiac monitoring and IV catheter discontinued, intact. Discharged via wheelchair. No acute distress noted.

## 2018-09-06 ENCOUNTER — TELEPHONE (OUTPATIENT)
Dept: NEUROLOGY | Facility: HOSPITAL | Age: 78
End: 2018-09-06

## 2018-09-06 NOTE — PT/OT/SLP DISCHARGE
Physical Therapy Discharge Summary    Name: Shant Magana Jr.  MRN: [id number]   Principal Problem: Acute upper GI bleed     Patient Discharged from acute Physical Therapy on 2018.  Please refer to prior PT noted date on 2018 for functional status.     Assessment:     Patient appropriate for care in another setting.    Objective:     GOALS:   Multidisciplinary Problems     Physical Therapy Goals     Not on file          Multidisciplinary Problems (Resolved)        Problem: Physical Therapy Goal    Goal Priority Disciplines Outcome Goal Variances Interventions   Physical Therapy Goal   (Resolved)     PT, PT/OT Outcome(s) achieved     Description:  Goals to be met by: 10/4/2018     Patient will increase functional independence with mobility by performin. Supine to sit with Modified [place]  2. Sit to supine with Modified [place]  3. Sit to stand transfer with Supervision  4. Gait  x 150 feet with Supervision and Stand-by Assistance with or without AD  5. Ascend/descend 4 stair with bilateral Handrails Stand-by Assistance and Contact Guard Assistance    6. Lower extremity exercise program x10 reps with assistance as needed                      Reasons for Discontinuation of Therapy Services  Transfer to alternate level of care.      Plan:     Patient Discharged to: Home with Home Health Service.    Alan Nogueira, PT  2018

## 2018-09-06 NOTE — TELEPHONE ENCOUNTER
----- Message from Breann Barbosa sent at 9/5/2018  1:53 PM CDT -----  GI- Patient was in the hospital and case management would like him to have a follow up with Dr. Gooden. Please let me know if I need to scheduled an appointment.

## 2018-09-06 NOTE — TELEPHONE ENCOUNTER
Clinic appt scheduled with edwin Mckeon, on Wednesday, September 19, 2018, at 145pm.  Mike given clinic address and repeated information given correctly.

## 2018-09-17 ENCOUNTER — TELEPHONE (OUTPATIENT)
Dept: PHARMACY | Facility: CLINIC | Age: 78
End: 2018-09-17

## 2018-09-17 NOTE — TELEPHONE ENCOUNTER
Good Afternoon.    The prior authorization for Mr. Magana's Pantoprazole prescription at the BID dosing has been denied per his [employer] insurance.    The patient has been notified and he is aware of the medication denial.    If there are any additional questions about the denial please contact the pharmacy at, [phone].    Thanks.    Candelaria Richmond CPhT.  Patient Care Advocate  [facility]  [email or web address]

## 2018-09-18 ENCOUNTER — TELEPHONE (OUTPATIENT)
Dept: GASTROENTEROLOGY | Facility: CLINIC | Age: 78
End: 2018-09-18

## 2018-09-18 NOTE — TELEPHONE ENCOUNTER
----- Message from Oli Cuadra MD sent at 9/16/2018  7:44 AM CDT -----  Bx with mucosal erosion/inflammation, but h.pylori staining was indeterminent. We can consider sending blood work to evaluate if indicated. He has a clinic f/u with Dr. Gooden this week

## 2018-09-19 ENCOUNTER — OFFICE VISIT (OUTPATIENT)
Dept: NEUROLOGY | Facility: HOSPITAL | Age: 78
End: 2018-09-19
Attending: INTERNAL MEDICINE
Payer: MEDICARE

## 2018-09-19 VITALS
SYSTOLIC BLOOD PRESSURE: 129 MMHG | TEMPERATURE: 97 F | DIASTOLIC BLOOD PRESSURE: 66 MMHG | WEIGHT: 207.81 LBS | BODY MASS INDEX: 28.15 KG/M2 | HEIGHT: 72 IN | HEART RATE: 78 BPM

## 2018-09-19 DIAGNOSIS — K92.1 MELENA: Primary | ICD-10-CM

## 2018-09-19 DIAGNOSIS — K26.9 DUODENAL ULCER: ICD-10-CM

## 2018-09-19 DIAGNOSIS — Z12.11 ENCOUNTER FOR SCREENING COLONOSCOPY: ICD-10-CM

## 2018-09-19 PROCEDURE — 99213 OFFICE O/P EST LOW 20 MIN: CPT | Performed by: INTERNAL MEDICINE

## 2018-09-19 RX ORDER — ASPIRIN 81 MG/1
81 TABLET ORAL DAILY
Refills: 0 | COMMUNITY
Start: 2018-09-19 | End: 2019-02-12

## 2018-09-19 RX ORDER — SODIUM, POTASSIUM,MAG SULFATES 17.5-3.13G
1 SOLUTION, RECONSTITUTED, ORAL ORAL ONCE
Qty: 1 BOTTLE | Refills: 0 | Status: ON HOLD | OUTPATIENT
Start: 2018-10-17 | End: 2018-10-18 | Stop reason: HOSPADM

## 2018-09-19 NOTE — PROGRESS NOTES
[facility]    CC: melena    HPI 78 y.o. male h/o ischemic CVA, HTN, diastolic HF, and implanted loop recorder here for follow-up from recent hospitalization 9/3-9/5 for acute, foul-smelling, non-painful melena while on antiplatelet therapy (ASA) and anticoagulation (Pradaxa), associated with normocytic anemia. EGD revealed several causes for melena including erosive gastropathy/duodenitis with stigmata of recent bleeding and two angioectasias in 2nd portion of duodenum that were ablated. The patient has not been taking aspirin or pradaxa for past 2 weeks since admission. He denies further episodes of melena or hematochezia. He denies fatigue or dizziness.     Previous hospital records were reviewed. Collateral information was obtained by sister.     Past Medical History:   Diagnosis Date    Cancer     prostate    Diabetes mellitus     Hypertension     Hypertrophic cardiomyopathy     Renal disorder          Review of Systems  General ROS: negative for chills, fever or weight loss  Cardiovascular ROS: no chest pain or dyspnea on exertion  Gastrointestinal ROS: no abdominal pain; positive dark stools    Physical Examination  /66   Pulse 78   Temp 97.3 °F (36.3 °C) (Oral)   Ht 6' (1.829 m)   Wt 94.3 kg (207 lb 12.5 oz)   BMI 28.18 kg/m²   General appearance: alert, cooperative, no distress  HENT: Normocephalic, atraumatic, neck symmetrical, no nasal discharge   Lungs: clear to auscultation bilaterally, no dullness to percussion bilaterally  Heart: regular rate and rhythm without rub; no displacement of the PMI   Abdomen: soft, non-tender; bowel sounds normoactive; no organomegaly  Extremities: extremities symmetric; no clubbing, cyanosis, or edema  Neurologic: Alert and oriented X 3, normal strength, normal coordination and gait    EGD 9/4/2018 - medium-sized hiatal hernia with single [place] ulcer, chronic gastritis with hemorrhage, duodenal erosions with stigmata of recent bleed, duodenitis,  duodenal lipoma with pillow sign complicated by mucosal tear (clipped), 2 bleeding angioectasias in duodenum treated with argon plasma coagulation      Assessment:  Patient is a 78 y.o. male h/o ischemic CVA, HTN, diastolic HF, and implanted loop recorder here for hospital follow-up due to 1 week of melena and N/V while on antiplatelet therapy (aspirin) and anticoagulation (Pradaxa), associated with normocytic anemia. EGD revealed several causes for melena including erosive gastropathy/duodenitis with stigmata of recent bleeding and two angioectasias in 2nd portion of duodenum that were ablated. He has not had further bleeding since discharge. Neurology stopped both aspirin and plavix due to concern for GI bleed    Plan:  - Repeat EGD on 10/18/18 with gastric biopsies to evaluate for H.pylori and confirm healing of ulcer disease. Pathology from previous EGD is indeterminate for H.pylori.   - Perform colonoscopy on 10/18/18 for CRC screening. Suprep sent to [facility]  - Restart ASA today for secondary CVA prophylaxis given high risk.    - Continue PPI BID for now. If EGD on 10/18 shows healing of ulcers and stop PPI and take as needed    Alan Gooden MD   [address], [address]   [place] [id number] [phone]

## 2018-09-19 NOTE — PATIENT INSTRUCTIONS
[facility]   [place] [id number]  [phone]     PATIENT NAME: Shant Magana   PROCEDURE DAY/TIME Thrusday, October 18, 2018          CLEAR LIQUID DIET (START THE DAY BEFORE PROCEDURE): Wednesday, October 17, 2018  Clear Liquid Diet means any liquid from the list below that is not red or purple in color:  · Gatorade, Chucho-Aid, Lemonade (Yellow ONLY)- Gatorade is the preferred liquid  · Tea (no milk or dairy)  · Carbonated beverages (soft drink), regular or diet  · Apple juice, white grape juice, white cranberry juice  · Jell-O (orange, lemon, or lime flavors ONLY)  · Clear, fat-free, beef or chicken broth  · Bouillon, clear consomme  · Snowball, Popsicles (NOT red or purple)  *No Solid Food or Alcohol  ITEMS TO BE PURCHASED FOR PREP (Suprep requires a prescription):                        Suprep Bowel Prep Kit  1. BOWEL PREP INSTRUCTIONS THE DAY BEFORE THE EXAM:  Wednesday, October 18, 2018   2. Drink only clear liquids (see the above diet) all day. Gatorade is the best liquid. Drink an extra 8 ounces of clear liquid every hour from 11am to 5pm.  3. At 6pm, pour one 6-ounce bottle of Suprep liquid into the mixing container then add cool drinking water to the 16 ounce line on the container and mix. Drink all the liquid in the container.  4. Over the nex hour (between 6-7pm), you must drink two more 16-ounce containers of water or other clear liquid.   THE DAY OF THE EXAM: Thrusday, October 18, 2018  1. Take your morning medications, if any, with a small sip of water.  2. Beginning 5 hours before your procedure time, mix and drink the 2nd bottle of                 Suprep liquid followed by two more 16-ounce containers of clear liquid as done        the night before.      *If your procedure is scheduled for the early morning, you will need to get up in the middle of the night to take this dose of preparation. The correct timing of this dose is essential to an effective  preparation. If you do not take this dose, your exam may be incomplete and need to be repeated.*     3. Have nothing to eat or drink for 3 hours before the procedure.  4. Bring someone to drive you home (you should not drive for 12 hrs after the exam)  5. Report to Admitting, 1st floor hospital entrance 1 hour before procedure time.

## 2018-09-27 NOTE — ED NOTES
Per family pt had phone conversation with family member last night at approx 9 pm.  Family states today family member went to pt house and found pt down in front of house, disoriented.    no chest pain and no edema.

## 2018-10-16 ENCOUNTER — TELEPHONE (OUTPATIENT)
Dept: ENDOSCOPY | Facility: HOSPITAL | Age: 78
End: 2018-10-16

## 2018-10-16 NOTE — TELEPHONE ENCOUNTER
Spoke with patient about arrival time @.  . 0700am    Prep instructions reviewed: the day before the procedure, follow a clear liquid diet all day, then start the first 1/2 of prep at 5pm and take 2nd 1/2 of prep @  0100am    .  Pt must be completely NPO when prep completed @    0300      .    Medications: Do not take Insulin or oral diabetic medications the day of the procedure.  Take as prescribed: heart, seizure and blood pressure medication in the morning with a sip of water (less than an ounce).  Take any breathing medications and bring inhalers to hospital with you Leave all valuables and jewelry at home.     Wear comfortable clothes to procedure to change into hospital gown You cannot drive for 24 hours after your procedure because you will receive sedation for your procedure to make you comfortable.  A ride must be provided at discharge.   on any BP or seizure tablet.

## 2018-10-17 ENCOUNTER — TELEPHONE (OUTPATIENT)
Dept: ENDOSCOPY | Facility: HOSPITAL | Age: 78
End: 2018-10-17

## 2018-10-17 NOTE — TELEPHONE ENCOUNTER
Spoke with patient about arrival time @0700 .     Prep instructions reviewed: the day before the procedure, follow a clear liquid diet all day, then start the first 1/2 of prep at 5pm and take 2nd 1/2 of prep @     0308-7126 .  Pt must be completely NPO when prep completed @   0200       .    Medications: Do not take Insulin or oral diabetic medications the day of the procedure.  Take as prescribed: heart, seizure and blood pressure medication in the morning with a sip of water (less than an ounce).  Take any breathing medications and bring inhalers to hospital with you Leave all valuables and jewelry at home.     Wear comfortable clothes to procedure to change into hospital gown You cannot drive for 24 hours after your procedure because you will receive sedation for your procedure to make you comfortable.  A ride must be provided at discharge.

## 2018-10-18 ENCOUNTER — HOSPITAL ENCOUNTER (OUTPATIENT)
Facility: HOSPITAL | Age: 78
Discharge: HOME | End: 2018-10-18
Attending: INTERNAL MEDICINE | Admitting: INTERNAL MEDICINE
Payer: MEDICARE

## 2018-10-18 ENCOUNTER — ANESTHESIA EVENT (OUTPATIENT)
Dept: ENDOSCOPY | Facility: HOSPITAL | Age: 78
End: 2018-10-18
Payer: MEDICARE

## 2018-10-18 ENCOUNTER — ANESTHESIA (OUTPATIENT)
Dept: ENDOSCOPY | Facility: HOSPITAL | Age: 78
End: 2018-10-18
Payer: MEDICARE

## 2018-10-18 VITALS
HEIGHT: 73 IN | WEIGHT: 228 LBS | RESPIRATION RATE: 18 BRPM | DIASTOLIC BLOOD PRESSURE: 60 MMHG | TEMPERATURE: 62 F | BODY MASS INDEX: 30.22 KG/M2 | OXYGEN SATURATION: 100 % | HEART RATE: 62 BPM | SYSTOLIC BLOOD PRESSURE: 134 MMHG

## 2018-10-18 DIAGNOSIS — K63.5 POLYP OF COLON, UNSPECIFIED PART OF COLON, UNSPECIFIED TYPE: ICD-10-CM

## 2018-10-18 DIAGNOSIS — K92.1 MELENA: Primary | ICD-10-CM

## 2018-10-18 DIAGNOSIS — K29.70 GASTRITIS, PRESENCE OF BLEEDING UNSPECIFIED, UNSPECIFIED CHRONICITY, UNSPECIFIED GASTRITIS TYPE: ICD-10-CM

## 2018-10-18 PROCEDURE — 88305 TISSUE EXAM BY PATHOLOGIST: CPT | Mod: 26,,, | Performed by: PATHOLOGY

## 2018-10-18 PROCEDURE — 37000008 HC ANESTHESIA 1ST 15 MINUTES: Performed by: INTERNAL MEDICINE

## 2018-10-18 PROCEDURE — 25000003 PHARM REV CODE 250: Performed by: INTERNAL MEDICINE

## 2018-10-18 PROCEDURE — 88305 TISSUE EXAM BY PATHOLOGIST: CPT | Performed by: PATHOLOGY

## 2018-10-18 PROCEDURE — 27201012 HC FORCEPS, HOT/COLD, DISP: Performed by: INTERNAL MEDICINE

## 2018-10-18 PROCEDURE — 43239 EGD BIOPSY SINGLE/MULTIPLE: CPT | Performed by: INTERNAL MEDICINE

## 2018-10-18 PROCEDURE — 25000003 PHARM REV CODE 250: Performed by: NURSE ANESTHETIST, CERTIFIED REGISTERED

## 2018-10-18 PROCEDURE — 37000009 HC ANESTHESIA EA ADD 15 MINS: Performed by: INTERNAL MEDICINE

## 2018-10-18 PROCEDURE — 45380 COLONOSCOPY AND BIOPSY: CPT | Performed by: INTERNAL MEDICINE

## 2018-10-18 PROCEDURE — 63600175 PHARM REV CODE 636 W HCPCS: Performed by: NURSE ANESTHETIST, CERTIFIED REGISTERED

## 2018-10-18 RX ORDER — SODIUM CHLORIDE 0.9 % (FLUSH) 0.9 %
3 SYRINGE (ML) INJECTION
Status: DISCONTINUED | OUTPATIENT
Start: 2018-10-18 | End: 2018-10-18 | Stop reason: HOSPADM

## 2018-10-18 RX ORDER — PROPOFOL 10 MG/ML
VIAL (ML) INTRAVENOUS
Status: DISCONTINUED | OUTPATIENT
Start: 2018-10-18 | End: 2018-10-18

## 2018-10-18 RX ORDER — SODIUM CHLORIDE 9 MG/ML
INJECTION, SOLUTION INTRAVENOUS CONTINUOUS
Status: DISCONTINUED | OUTPATIENT
Start: 2018-10-18 | End: 2018-10-18 | Stop reason: HOSPADM

## 2018-10-18 RX ORDER — PROPOFOL 10 MG/ML
VIAL (ML) INTRAVENOUS CONTINUOUS PRN
Status: DISCONTINUED | OUTPATIENT
Start: 2018-10-18 | End: 2018-10-18

## 2018-10-18 RX ORDER — EPHEDRINE SULFATE 50 MG/ML
INJECTION, SOLUTION INTRAVENOUS
Status: DISCONTINUED | OUTPATIENT
Start: 2018-10-18 | End: 2018-10-18

## 2018-10-18 RX ORDER — LIDOCAINE HCL/PF 100 MG/5ML
SYRINGE (ML) INTRAVENOUS
Status: DISCONTINUED | OUTPATIENT
Start: 2018-10-18 | End: 2018-10-18

## 2018-10-18 RX ADMIN — EPHEDRINE SULFATE 10 MG: 50 INJECTION, SOLUTION INTRAMUSCULAR; INTRAVENOUS; SUBCUTANEOUS at 09:10

## 2018-10-18 RX ADMIN — EPHEDRINE SULFATE 10 MG: 50 INJECTION, SOLUTION INTRAMUSCULAR; INTRAVENOUS; SUBCUTANEOUS at 08:10

## 2018-10-18 RX ADMIN — SODIUM CHLORIDE: 0.9 INJECTION, SOLUTION INTRAVENOUS at 08:10

## 2018-10-18 RX ADMIN — PROPOFOL 50 MG: 10 INJECTION, EMULSION INTRAVENOUS at 08:10

## 2018-10-18 RX ADMIN — LIDOCAINE HYDROCHLORIDE 100 MG: 20 INJECTION, SOLUTION INTRAVENOUS at 08:10

## 2018-10-18 RX ADMIN — PROPOFOL 150 MCG/KG/MIN: 10 INJECTION, EMULSION INTRAVENOUS at 08:10

## 2018-10-18 RX ADMIN — PROPOFOL 30 MG: 10 INJECTION, EMULSION INTRAVENOUS at 09:10

## 2018-10-18 NOTE — H&P
"[facility]    CC: melena     HPI 78 y.o. male h/o ischemic CVA, HTN, diastolic HF, and implanted loop recorder here for follow-up from recent hospitalization 9/3-9/5 for acute, foul-smelling, non-painful melena while on antiplatelet therapy (ASA) and anticoagulation (Pradaxa), associated with normocytic anemia. EGD revealed several causes for melena including erosive gastropathy/duodenitis with stigmata of recent bleeding and two angioectasias in 2nd portion of duodenum that were ablated. The patient has not been taking aspirin or pradaxa for past 2 weeks since admission. He denies further episodes of melena or hematochezia. He denies fatigue or dizziness.     Past Medical History:   Diagnosis Date    Cancer     prostate    Diabetes mellitus     Hypertension     Hypertrophic cardiomyopathy     Renal disorder      Review of Systems  General ROS: negative for chills, fever or weight loss  Cardiovascular ROS: no chest pain or dyspnea on exertion  Gastrointestinal ROS: no abdominal pain, change in bowel habits, or black/ bloody stools    Physical Examination  /80   Pulse 60   Temp 97.2 °F (36.2 °C)   Resp 17   Ht 6' 1" (1.854 m)   Wt 103.4 kg (228 lb)   SpO2 100%   BMI 30.08 kg/m²   General appearance: alert, cooperative, no distress  HENT: Normocephalic, atraumatic, neck symmetrical, no nasal discharge   Lungs: clear to auscultation bilaterally, no dullness to percussion bilaterally  Heart: regular rate and rhythm without rub; no displacement of the PMI   Abdomen: soft, non-tender; bowel sounds normoactive; no organomegaly  Extremities: extremities symmetric; no clubbing, cyanosis, or edema  Neurologic: Alert and oriented X 3, normal strength, normal coordination and gait    Assessment:   78 y.o. male h/o ischemic CVA, HTN, diastolic HF, and implanted loop recorder here for hospital follow-up due to 1 week of melena and N/V while on antiplatelet therapy (aspirin) and anticoagulation " (Pradaxa), associated with normocytic anemia. EGD revealed several causes for melena including erosive gastropathy/duodenitis with stigmata of recent bleeding and two angioectasias in 2nd portion of duodenum that were ablated. He has not had further bleeding since discharge. Neurology stopped both aspirin and plavix due to concern for GI bleed     Plan:  - EGD today with gastric biopsies to evaluate for H.pylori and confirm healing of ulcer disease  - Colonoscopy today for CRC screening    Alan Gooden MD   [address] [place] [address], [address]   [place] [address] [phone]

## 2018-10-18 NOTE — ANESTHESIA PREPROCEDURE EVALUATION
10/18/2018     Shant Magana Jr. is a 78 y.o., male here for EGD/colonoscopy under MAC    Past Medical History:   Diagnosis Date    Cancer     prostate    Diabetes mellitus     Hypertension     Hypertrophic cardiomyopathy     Renal disorder      Past Surgical History:   Procedure Laterality Date    BACK SURGERY      EGD (ESOPHAGOGASTRODUODENOSCOPY) N/A 9/4/2018    Performed by Oli Cuadra MD at [facility] ENDO    ESOPHAGOGASTRODUODENOSCOPY N/A 9/4/2018    Procedure: EGD (ESOPHAGOGASTRODUODENOSCOPY);  Surgeon: Oli Cuadra MD;  Location: [facility] END[facility];  Service: Endoscopy;  Laterality: N/A;    THYROIDECTOMY      TRANSESOPHAGEAL ECHOCARDIOGRAM (ESDRAS) N/A 10/13/2017    Performed by Donna Oliver MD at [facility] OR    TRANSESOPHAGEAL ECHOCARDIOGRAM (ESDRAS) N/A 7/18/2017    Performed by Guanaco Ty MD at [facility] OR         Pre-op Assessment    I have reviewed the Patient Summary Reports.     I have reviewed the Nursing Notes.   I have reviewed the Medications.     Review of Systems  Anesthesia Hx:  History of prior surgery of interest to airway management or planning:   Hematology/Oncology:         -- Anemia (H/H 8/24):   Cardiovascular:   Hypertension CHF (diastolic Dysfunction) ECG has been reviewed. TTE 9/3/18  CONCLUSIONS     1 - Hyperdynamic left ventricular systolic function (EF 65-70%).     2 - Impaired LV relaxation, increased LVEDP.     3 - Mild left atrial enlargement.     4 - The estimated PA systolic pressure is 35 mmHg.     5 - LVH   Renal/:   Chronic Renal Disease    Hepatic/GI:   GI Bleed   Neurological:   CVA    Endocrine:   Diabetes (HbA1c 5.9), well controlled        Physical Exam  General:  Well nourished    Airway/Jaw/Neck:  AIRWAY FINDINGS: Normal    Eyes/Ears/Nose:  EYES/EARS/NOSE FINDINGS: Normal    Chest/Lungs:  Chest/Lungs Clear    Heart/Vascular:  Heart Findings: Normal        Mental Status:  Mental Status Findings: Normal        Anesthesia Plan  Type of Anesthesia, risks & benefits discussed:  Anesthesia Type:  general, MAC  Patient's Preference:   Intra-op Monitoring Plan:   Intra-op Monitoring Plan Comments:   Post Op Pain Control Plan: multimodal analgesia and IV/PO Opioids PRN  Post Op Pain Control Plan Comments:   Induction:    Beta Blocker:  Patient is not currently on a Beta-Blocker (No further documentation required).       Informed Consent: Patient understands risks and agrees with Anesthesia plan.  Questions answered. Anesthesia consent signed with patient.  ASA Score: 3     Day of Surgery Review of History & Physical:  There are no significant changes.          Ready For Surgery From Anesthesia Perspective.

## 2018-10-18 NOTE — ANESTHESIA POSTPROCEDURE EVALUATION
"Anesthesia Post Evaluation    Patient: Shant Magana Jr.    Procedure(s) Performed: Procedure(s) (LRB):  COLONOSCOPY (N/A)  EGD (ESOPHAGOGASTRODUODENOSCOPY) (N/A)    Final Anesthesia Type: MAC  Patient location during evaluation: GI PACU  Patient participation: Yes- Able to Participate  Level of consciousness: awake and alert and oriented  Post-procedure vital signs: reviewed and stable  Pain management: adequate  Airway patency: patent  PONV status at discharge: No PONV  Anesthetic complications: no      Cardiovascular status: blood pressure returned to baseline, hemodynamically stable and stable  Respiratory status: unassisted, spontaneous ventilation and room air  Hydration status: euvolemic  Follow-up not needed.        Visit Vitals  BP (!) 105/54 (BP Location: Right arm, Patient Position: Lying)   Pulse 65   Temp 36.5 °C (97.7 °F) (Temporal)   Resp (!) 21   Ht 6' 1" (1.854 m)   Wt 103.4 kg (228 lb)   SpO2 100%   BMI 30.08 kg/m²       Pain/Rosa Score: Pain Assessment Performed: Yes (10/18/2018  7:24 AM)  Presence of Pain: denies (10/18/2018  7:24 AM)  Rosa Score: 8 (10/18/2018  9:33 AM)        "

## 2018-10-18 NOTE — PROVATION PATIENT INSTRUCTIONS
Discharge Summary/Instructions after an Endoscopic Procedure  Patient Name: Shant Magana  Patient MRN: [id number]  Patient YOB: 1940 Thursday, October 18, 2018  Alan Gooden MD  RESTRICTIONS:  During your procedure today, you received medications for sedation.  These   medications may affect your judgment, balance and coordination.  Therefore,   for 24 hours, you have the following restrictions:   - DO NOT drive a car, operate machinery, make legal/financial decisions,   sign important papers or drink alcohol.    ACTIVITY:  Today: no heavy lifting, straining or running due to procedural   sedation/anesthesia.  The following day: return to full activity including work.  DIET:  Eat and drink normally unless instructed otherwise.     TREATMENT FOR COMMON SIDE EFFECTS:  - Mild abdominal pain, nausea, belching, bloating or excessive gas:  rest,   eat lightly and use a heating pad.  - Sore Throat: treat with throat lozenges and/or gargle with warm salt   water.  - Because air was used during the procedure, expelling large amounts of air   from your rectum or belching is normal.  - If a bowel prep was taken, you may not have a bowel movement for 1-3 days.    This is normal.  SYMPTOMS TO WATCH FOR AND REPORT TO YOUR PHYSICIAN:  1. Abdominal pain or bloating, other than gas cramps.  2. Chest pain.  3. Back pain.  4. Signs of infection such as: chills or fever occurring within 24 hours   after the procedure.  5. Rectal bleeding, which would show as bright red, maroon, or black stools.   (A tablespoon of blood from the rectum is not serious, especially if   hemorrhoids are present.)  6. Vomiting.  7. Weakness or dizziness.  GO DIRECTLY TO THE NEAREST EMERGENCY ROOM IF YOU HAVE ANY OF THE FOLLOWING:      Difficulty breathing              Chills and/or fever over 101 F   Persistent vomiting and/or vomiting blood   Severe abdominal pain   Severe chest pain   Black, tarry stools   Bleeding- more than one tablespoon   Any  other symptom or condition that you feel may need urgent attention  Your doctor recommends these additional instructions:  If any biopsies were taken, your doctors clinic will contact you in 1 to 2   weeks with any results.  Continue aspirin and resume any other antithrombotics as indicated   Ok to discontinue PPI therapy and change to as needed but may need to   restart of bleeding due to ulcers or camerons erosions recurs   EGD with push enteroscopy as next step if bleeding recurs in the future   Daily multivitamin with iron   Discharge to home   Condition stable   Resume previous diet   The signs and symptoms of potential delayed complications were discussed   with the patient. If signs or symptoms of these complications develop, call   the [facility] at [phone].   Return to normal activities tomorrow.  Written discharge instructions were   provided to the patient.  For questions, problems or results please call your physician - Alan Gooden MD at Work:  [phone].  EMERGENCY PHONE NUMBER: [phone],  LAB RESULTS: [phone]  IF A COMPLICATION OR EMERGENCY SITUATION ARISES AND YOU ARE UNABLE TO REACH   YOUR PHYSICIAN - GO DIRECTLY TO THE EMERGENCY ROOM.  MD Alan Lane MD  10/18/2018 10:16:01 AM  This report has been verified and signed electronically.  [employer]ATION

## 2018-10-18 NOTE — PLAN OF CARE
PIV discontinued with catheter intact. PIV insertion site clean, dry, and intact with no signs/symptoms of redness or swelling. Pressure dressing applied with gauze and coban. Instructed patient and family member to keep dressing on for at least 20-30 minutes. Both verbalized understanding.

## 2018-10-18 NOTE — PLAN OF CARE
Recovery complete. Patient recovered to baseline. Discharge instructions reviewed with patient. VSS and no complaints of pain or discomfort. Patient ready for discharge.

## 2018-10-18 NOTE — PROVATION PATIENT INSTRUCTIONS
Discharge Summary/Instructions after an Endoscopic Procedure  Patient Name: Shant Magana  Patient MRN: [id number]  Patient YOB: 1940 Thursday, October 18, 2018  Alan Gooden MD  RESTRICTIONS:  During your procedure today, you received medications for sedation.  These   medications may affect your judgment, balance and coordination.  Therefore,   for 24 hours, you have the following restrictions:   - DO NOT drive a car, operate machinery, make legal/financial decisions,   sign important papers or drink alcohol.    ACTIVITY:  Today: no heavy lifting, straining or running due to procedural   sedation/anesthesia.  The following day: return to full activity including work.  DIET:  Eat and drink normally unless instructed otherwise.     TREATMENT FOR COMMON SIDE EFFECTS:  - Mild abdominal pain, nausea, belching, bloating or excessive gas:  rest,   eat lightly and use a heating pad.  - Sore Throat: treat with throat lozenges and/or gargle with warm salt   water.  - Because air was used during the procedure, expelling large amounts of air   from your rectum or belching is normal.  - If a bowel prep was taken, you may not have a bowel movement for 1-3 days.    This is normal.  SYMPTOMS TO WATCH FOR AND REPORT TO YOUR PHYSICIAN:  1. Abdominal pain or bloating, other than gas cramps.  2. Chest pain.  3. Back pain.  4. Signs of infection such as: chills or fever occurring within 24 hours   after the procedure.  5. Rectal bleeding, which would show as bright red, maroon, or black stools.   (A tablespoon of blood from the rectum is not serious, especially if   hemorrhoids are present.)  6. Vomiting.  7. Weakness or dizziness.  GO DIRECTLY TO THE NEAREST EMERGENCY ROOM IF YOU HAVE ANY OF THE FOLLOWING:      Difficulty breathing              Chills and/or fever over 101 F   Persistent vomiting and/or vomiting blood   Severe abdominal pain   Severe chest pain   Black, tarry stools   Bleeding- more than one tablespoon   Any  other symptom or condition that you feel may need urgent attention  Your doctor recommends these additional instructions:  If any biopsies were taken, your doctors clinic will contact you in 1 to 2   weeks with any results.  Repeat colonoscopy in 5-10 years if polyp returns adenomatous, otherwise   repeat in 10 years if hyperplastic  Discharge to home   Condition stable   Resume previous diet   The signs and symptoms of potential delayed complications were discussed   with the patient. If signs or symptoms of these complications develop, call   the [facility] at [phone].   Return to normal activities tomorrow.  Written discharge instructions were   provided to the patient.   For questions, problems or results please call your physician - Alan Gooden MD at Work:  [phone].  EMERGENCY PHONE NUMBER: [phone],  LAB RESULTS: [phone]  IF A COMPLICATION OR EMERGENCY SITUATION ARISES AND YOU ARE UNABLE TO REACH   YOUR PHYSICIAN - GO DIRECTLY TO THE EMERGENCY ROOM.  MD Alan Lane MD  10/18/2018 10:13:57 AM  This report has been verified and signed electronically.  PROVATION

## 2018-10-18 NOTE — PLAN OF CARE
Several unsuccessful iv attempts made    Dr tavarez notified      Dr. Tavarez was successful with ultrasound and iv access  Patient tolerated well

## 2018-10-31 ENCOUNTER — TELEPHONE (OUTPATIENT)
Dept: NEUROLOGY | Facility: HOSPITAL | Age: 78
End: 2018-10-31

## 2018-10-31 NOTE — TELEPHONE ENCOUNTER
I have tried calling this patient to notify him of his endoscopy results but have been unable to reach him. His EGD biopsies returned negative for h.pylori. The one small rectal polyp returned a tubular adenoma therefore will plan to repeat his colonoscopy in 5-10 years. If patient returns this call to the clinic, nursing staff can notify him of these results and the plan.

## 2018-11-06 LAB — POCT GLUCOSE: 108 MG/DL (ref 70–110)

## 2019-02-12 ENCOUNTER — OFFICE VISIT (OUTPATIENT)
Dept: CARDIOLOGY | Facility: CLINIC | Age: 79
End: 2019-02-12
Payer: MEDICARE

## 2019-02-12 VITALS
OXYGEN SATURATION: 98 % | BODY MASS INDEX: 27.67 KG/M2 | HEART RATE: 55 BPM | WEIGHT: 208.81 LBS | SYSTOLIC BLOOD PRESSURE: 148 MMHG | DIASTOLIC BLOOD PRESSURE: 80 MMHG | HEIGHT: 73 IN

## 2019-02-12 DIAGNOSIS — K29.01 ACUTE GASTRITIS WITH HEMORRHAGE, UNSPECIFIED GASTRITIS TYPE: ICD-10-CM

## 2019-02-12 DIAGNOSIS — I50.32 CHRONIC DIASTOLIC CONGESTIVE HEART FAILURE: ICD-10-CM

## 2019-02-12 DIAGNOSIS — E21.3 HYPERPARATHYROIDISM: ICD-10-CM

## 2019-02-12 DIAGNOSIS — D64.9 SYMPTOMATIC ANEMIA: ICD-10-CM

## 2019-02-12 DIAGNOSIS — I51.7 ENLARGED LA (LEFT ATRIUM): ICD-10-CM

## 2019-02-12 DIAGNOSIS — I63.422 CEREBROVASCULAR ACCIDENT (CVA) DUE TO EMBOLISM OF LEFT ANTERIOR CEREBRAL ARTERY: Chronic | ICD-10-CM

## 2019-02-12 DIAGNOSIS — I48.0 PAROXYSMAL ATRIAL FIBRILLATION: Primary | ICD-10-CM

## 2019-02-12 DIAGNOSIS — R00.1 BRADYCARDIA: ICD-10-CM

## 2019-02-12 DIAGNOSIS — E78.2 MIXED HYPERLIPIDEMIA: ICD-10-CM

## 2019-02-12 DIAGNOSIS — I10 ESSENTIAL HYPERTENSION: ICD-10-CM

## 2019-02-12 DIAGNOSIS — E11.8 TYPE 2 DIABETES MELLITUS WITH COMPLICATION, WITHOUT LONG-TERM CURRENT USE OF INSULIN: ICD-10-CM

## 2019-02-12 DIAGNOSIS — Z95.818 STATUS POST PLACEMENT OF IMPLANTABLE LOOP RECORDER: ICD-10-CM

## 2019-02-12 DIAGNOSIS — I65.22 INTERNAL CAROTID ARTERY STENOSIS, LEFT: ICD-10-CM

## 2019-02-12 PROCEDURE — 3077F SYST BP >= 140 MM HG: CPT | Mod: CPTII,S$GLB,, | Performed by: INTERNAL MEDICINE

## 2019-02-12 PROCEDURE — 3079F PR MOST RECENT DIASTOLIC BLOOD PRESSURE 80-89 MM HG: ICD-10-PCS | Mod: CPTII,S$GLB,, | Performed by: INTERNAL MEDICINE

## 2019-02-12 PROCEDURE — 1101F PR PT FALLS ASSESS DOC 0-1 FALLS W/OUT INJ PAST YR: ICD-10-PCS | Mod: CPTII,S$GLB,, | Performed by: INTERNAL MEDICINE

## 2019-02-12 PROCEDURE — 93291 INTERROG DEV EVAL SCRMS IP: CPT | Mod: 26,,, | Performed by: INTERNAL MEDICINE

## 2019-02-12 PROCEDURE — 1101F PT FALLS ASSESS-DOCD LE1/YR: CPT | Mod: CPTII,S$GLB,, | Performed by: INTERNAL MEDICINE

## 2019-02-12 PROCEDURE — 93291 PR INTERROG EVAL, IN PERSON,IMPLANT LOOP REC: ICD-10-PCS | Mod: 26,,, | Performed by: INTERNAL MEDICINE

## 2019-02-12 PROCEDURE — 3079F DIAST BP 80-89 MM HG: CPT | Mod: CPTII,S$GLB,, | Performed by: INTERNAL MEDICINE

## 2019-02-12 PROCEDURE — 3077F PR MOST RECENT SYSTOLIC BLOOD PRESSURE >= 140 MM HG: ICD-10-PCS | Mod: CPTII,S$GLB,, | Performed by: INTERNAL MEDICINE

## 2019-02-12 PROCEDURE — 99999 PR PBB SHADOW E&M-EST. PATIENT-LVL III: CPT | Mod: PBBFAC,,, | Performed by: INTERNAL MEDICINE

## 2019-02-12 PROCEDURE — 99214 OFFICE O/P EST MOD 30 MIN: CPT | Mod: 25,S$GLB,, | Performed by: INTERNAL MEDICINE

## 2019-02-12 PROCEDURE — 99214 PR OFFICE/OUTPT VISIT, EST, LEVL IV, 30-39 MIN: ICD-10-PCS | Mod: 25,S$GLB,, | Performed by: INTERNAL MEDICINE

## 2019-02-12 PROCEDURE — 93000 EKG 12-LEAD: ICD-10-PCS | Mod: 59,S$GLB,, | Performed by: INTERNAL MEDICINE

## 2019-02-12 PROCEDURE — 93000 ELECTROCARDIOGRAM COMPLETE: CPT | Mod: 59,S$GLB,, | Performed by: INTERNAL MEDICINE

## 2019-02-12 PROCEDURE — 99999 PR PBB SHADOW E&M-EST. PATIENT-LVL III: ICD-10-PCS | Mod: PBBFAC,,, | Performed by: INTERNAL MEDICINE

## 2019-02-12 RX ORDER — LOSARTAN POTASSIUM 100 MG/1
100 TABLET ORAL DAILY
COMMUNITY
Start: 2019-02-08 | End: 2020-09-10

## 2019-02-12 RX ORDER — PANTOPRAZOLE SODIUM 40 MG/1
40 TABLET, DELAYED RELEASE ORAL DAILY
Refills: 3 | COMMUNITY
Start: 2018-11-25 | End: 2021-01-01

## 2019-02-12 NOTE — PROGRESS NOTES
Subjective:      Patient ID: Shant Magana Jr. is a 79 y.o. male.    Chief Complaint: Follow-up (Loop implant 11/2017 by Dr Ty, no follow up since.)    HPI:    Pt has no significant residual defect since stroke although son says his father has been weak and has had a poor appetite since the stroke.  Pt had a loop recorder put in after the stroke which was interrogated today for the first time.    Walks around the house.  No exercise.    Review of Systems   Cardiovascular: Negative for chest pain, claudication, dyspnea on exertion, irregular heartbeat, leg swelling, near-syncope, orthopnea, palpitations and syncope.      Sugars have been OK.  Pt gets labs with PCP, Dr Aldana    Pt had melena and gastritis in October and was prescribed pantoprazole without recurrence    Pt was treated for prostate cancer in 2010. Pt was given pills to take.    Past Medical History:   Diagnosis Date    Cancer     prostate    Diabetes mellitus     Hypertension     Hypertrophic cardiomyopathy     Renal disorder         Past Surgical History:   Procedure Laterality Date    BACK SURGERY      COLONOSCOPY N/A 10/18/2018    [facility] at [facility]    EGD (ESOPHAGOGASTRODUODENOSCOPY) N/A 10/18/2018    [facility] by [facility] at [facility]    EGD (ESOPHAGOGASTRODUODENOSCOPY) N/A 9/4/2018    Performed by Oli Cuadra MD at [facility] END[facility]    THYROIDECTOMY      TRANSESOPHAGEAL ECHOCARDIOGRAM (ESDRAS) N/A 10/13/2017    Performed by Donna Oliver MD at [facility] OR    TRANSESOPHAGEAL ECHOCARDIOGRAM (ESDRAS) N/A 7/18/2017    Performed by Guanaco Ty MD at [facility] OR       No family history on file.    Social History     Socioeconomic History    Marital status: Single     Spouse name: None    Number of children: None    Years of education: None    Highest education level: None   Social Needs    Financial resource strain: None    Food insecurity - worry: None    Food insecurity - inability: None  "   Transportation needs - medical: None    Transportation needs - non-medical: None   Occupational History    None   Tobacco Use    Smoking status: Former Smoker     Packs/day: 0.90     Years: 3.00     Pack years: 2.70     Types: Cigarettes    Smokeless tobacco: Never Used   Substance and Sexual Activity    Alcohol use: Yes    Drug use: No    Sexual activity: None   Other Topics Concern    None   Social History Narrative    None       Current Outpatient Medications on File Prior to Visit   Medication Sig Dispense Refill    atorvastatin (LIPITOR) 80 MG tablet Take 1 tablet (80 mg total) by mouth once daily. 90 tablet 2    hydroCHLOROthiazide (HYDRODIURIL) 12.5 MG Tab Take 25 mg by mouth once daily.       lisinopril 10 MG tablet Take 1 tablet by mouth once daily.      losartan (COZAAR) 100 MG tablet       pantoprazole (PROTONIX) 40 MG tablet Take 40 mg by mouth once daily.  3    tamsulosin (FLOMAX) 0.4 mg Cp24 Take 0.4 mg by mouth once daily.      vitamin D 1000 units Tab Take 1,000 Units by mouth once daily.      [DISCONTINUED] aspirin (ECOTRIN) 81 MG EC tablet Take 1 tablet (81 mg total) by mouth once daily.  0     No current facility-administered medications on file prior to visit.        Review of patient's allergies indicates:   Allergen Reactions    Hydralazine analogues Diarrhea     Objective:     Vitals:    02/12/19 1135   BP: (!) 173/72   BP Location: Left arm   Patient Position: Sitting   BP Method: Large (Automatic)   Pulse: (!) 55   SpO2: 98%   Weight: 94.7 kg (208 lb 12.8 oz)   Height: 6' 1" (1.854 m)        Physical Exam   Constitutional: He is oriented to person, place, and time. He appears well-developed and well-nourished. No distress.   Eyes: No scleral icterus.   Neck: No JVD present. Carotid bruit is not present.   Cardiovascular: Regular rhythm and normal heart sounds. Exam reveals no gallop and no friction rub.   No murmur heard.  Pulmonary/Chest: Effort normal and breath " sounds normal. No respiratory distress.   Musculoskeletal: He exhibits edema (trace edema bilaterally).   Neurological: He is alert and oriented to person, place, and time.   Skin: Skin is warm and dry. He is not diaphoretic.   Psychiatric: He has a normal mood and affect. His behavior is normal. Judgment and thought content normal.   Vitals reviewed.     ECG today--sinus bradycardia, septal infarct, inverted T waves in inferior and lateral leads.  No significant change    Biotronik Loop recorder interrogated:  Device was originally implanted 11/6/17---Pt had 10 atrial fibrillation episodes the longest of which was 15 hours  Pt had 43 ramón episodes, the slowest of which was 35 bpm for 38 seconds    Note labs last year showed anemia and CKD IV (creatinine 2.2 and 2.0 9/18)and hypercalcemia.  Hgb 8.8    Echocardiogram 9/18 showed LVH, normal LVEF, dilated LA, abn LV relaxation    MRI of brain in 2017 showed multiple old strokes    MRA of neck in 2017 showed moderate left carotid stenosis  Assessment:     1. Paroxysmal atrial fibrillation    2. Cerebrovascular accident (CVA) due to embolism of left anterior cerebral artery    3. Essential hypertension    4. Bradycardia    5. Internal carotid artery stenosis, left    6. Mixed hyperlipidemia    7. Chronic diastolic congestive heart failure    8. Symptomatic anemia    9. Type 2 diabetes mellitus with complication, without long-term current use of insulin    10. Enlarged LA (left atrium)    11. Acute gastritis with hemorrhage, unspecified gastritis type    12. Status post placement of implantable loop recorder    13. Hyperparathyroidism      Plan:   Shant was seen today for follow-up.    Diagnoses and all orders for this visit:    Paroxysmal atrial fibrillation  -     IN OFFICE EKG 12-LEAD (to Muse)  -     CBC auto differential; Future  -     Comprehensive metabolic panel; Future  -     TSH; Future    Cerebrovascular accident (CVA) due to embolism of left anterior cerebral  artery    Essential hypertension    Bradycardia    Internal carotid artery stenosis, left    Mixed hyperlipidemia    Chronic diastolic congestive heart failure    Symptomatic anemia    Type 2 diabetes mellitus with complication, without long-term current use of insulin    Enlarged LA (left atrium)    Acute gastritis with hemorrhage, unspecified gastritis type    Status post placement of implantable loop recorder    Hyperparathyroidism    Other orders  -     apixaban (ELIQUIS) 2.5 mg Tab; Take 1 tablet (2.5 mg total) by mouth 2 (two) times daily.     Will review most recent labs from Dr Aldana    Due to paroxysmal a fib and hx of strokes pt is a candidate for Eliquis    Due to hx of CKD, age of almost 80 and hxof GI blood loss and anemia as recently as October (will need to review most recent lab from Dr Aldana) and very low overall a fib burden I anticipate dosing at 2.5 mg bid. Prescription put in after review of labs below. Pt instructed to discontinue the ASA so long as he is taking Eliquis    Risk of bleeding explained in detail with pt's son as well as with pt.    Follow-up in about 2 months (around 4/12/2019).     Labs from Dr Aldana 1/8/19:  LL 97  HDL 42  Creat 2.41  BUN 49  Na 138  K 3.7  Ca 10.8  Hgb 13.9  HgbA1C 6.5  PTH 98  Pt advised to f/u Dr Toussaint who has been monitoring pt for f/u prostate cancer    F/u with Dr Aldana for CKD 3/1/19

## 2019-03-18 ENCOUNTER — CLINICAL SUPPORT (OUTPATIENT)
Dept: CARDIOLOGY | Facility: CLINIC | Age: 79
End: 2019-03-18
Payer: MEDICARE

## 2019-03-18 DIAGNOSIS — Z95.818 STATUS POST PLACEMENT OF IMPLANTABLE LOOP RECORDER: Primary | ICD-10-CM

## 2019-03-18 PROCEDURE — 93299 PR INTERROG EVAL, REMOTE, UP TO 30 DAYS, CV MON/LOOP REC,TECH REVIEW: CPT | Mod: S$GLB,,, | Performed by: INTERNAL MEDICINE

## 2019-03-18 PROCEDURE — 99499 NO LOS: ICD-10-PCS | Mod: S$GLB,,, | Performed by: INTERNAL MEDICINE

## 2019-03-18 PROCEDURE — 93298 REM INTERROG DEV EVAL SCRMS: CPT | Mod: S$GLB,,, | Performed by: INTERNAL MEDICINE

## 2019-03-18 PROCEDURE — 99499 UNLISTED E&M SERVICE: CPT | Mod: S$GLB,,, | Performed by: INTERNAL MEDICINE

## 2019-03-18 PROCEDURE — 93298 PR INTERROG EVAL, REMOTE, UP TO 30 DAYS,IMPLANT LOOP REC: ICD-10-PCS | Mod: S$GLB,,, | Performed by: INTERNAL MEDICINE

## 2019-03-18 PROCEDURE — 93299 PR INTERROG EVAL, REMOTE, UP TO 30 DAYS, CV MON/LOOP REC,TECH REVIEW: ICD-10-PCS | Mod: S$GLB,,, | Performed by: INTERNAL MEDICINE

## 2019-03-20 NOTE — PROGRESS NOTES
Subjective:      Patient ID: Shant Magana Jr. is a 79 y.o. male.    Chief Complaint: No chief complaint on file.    HPI:    ROS Biotronik implanted loop recorder remote interrogation report downloaded and prepared by medical assistant and interpreted by me and full report scanned into Epic media.  Battery OK  No a fib or ventricular high rates.  Two bradycardia episodes per day with no suddent rate drops and no asystole and no patient triggered episodes.  Normal device function.    Past Medical History:   Diagnosis Date    Cancer     prostate    Diabetes mellitus     Hypertension     Hypertrophic cardiomyopathy     Renal disorder         Past Surgical History:   Procedure Laterality Date    BACK SURGERY      COLONOSCOPY N/A 10/18/2018    [facility] at [facility] END[facility]    EGD (ESOPHAGOGASTRODUODENOSCOPY) N/A 10/18/2018    Performed by [facility] at [facility] END[facility]    EGD (ESOPHAGOGASTRODUODENOSCOPY) N/A 9/4/2018    Performed by Oli Cuadra MD at [facility] ENDO    THYROIDECTOMY      TRANSESOPHAGEAL ECHOCARDIOGRAM (ESDRAS) N/A 10/13/2017    Performed by Donna Oliver MD at [facility] OR    TRANSESOPHAGEAL ECHOCARDIOGRAM (ESDRAS) N/A 7/18/2017    Performed by Guanaco Ty MD at [facility] OR       No family history on file.    Social History     Socioeconomic History    Marital status: Single     Spouse name: Not on file    Number of children: Not on file    Years of education: Not on file    Highest education level: Not on file   Social Needs    Financial resource strain: Not on file    Food insecurity - worry: Not on file    Food insecurity - inability: Not on file    Transportation needs - medical: Not on file    Transportation needs - non-medical: Not on file   Occupational History    Not on file   Tobacco Use    Smoking status: Former Smoker     Packs/day: 0.90     Years: 3.00     Pack years: 2.70     Types: Cigarettes    Smokeless tobacco: Never Used   Substance and Sexual  Activity    Alcohol use: Yes    Drug use: No    Sexual activity: Not on file   Other Topics Concern    Not on file   Social History Narrative    Not on file       Current Outpatient Medications on File Prior to Visit   Medication Sig Dispense Refill    apixaban (ELIQUIS) 2.5 mg Tab Take 1 tablet (2.5 mg total) by mouth 2 (two) times daily. 60 tablet 11    atorvastatin (LIPITOR) 80 MG tablet Take 1 tablet (80 mg total) by mouth once daily. 90 tablet 2    hydroCHLOROthiazide (HYDRODIURIL) 12.5 MG Tab Take 25 mg by mouth once daily.       lisinopril 10 MG tablet Take 1 tablet by mouth once daily.      losartan (COZAAR) 100 MG tablet       pantoprazole (PROTONIX) 40 MG tablet Take 40 mg by mouth once daily.  3    tamsulosin (FLOMAX) 0.4 mg Cp24 Take 0.4 mg by mouth once daily.      vitamin D 1000 units Tab Take 1,000 Units by mouth once daily.       No current facility-administered medications on file prior to visit.        Review of patient's allergies indicates:   Allergen Reactions    Hydralazine analogues Diarrhea     Objective:   There were no vitals filed for this visit.     Physical Exam     Assessment:     1. Status post placement of implantable loop recorder      Plan:   Diagnoses and all orders for this visit:    Status post placement of implantable loop recorder         No Follow-up on file.

## 2019-04-11 ENCOUNTER — OFFICE VISIT (OUTPATIENT)
Dept: CARDIOLOGY | Facility: CLINIC | Age: 79
End: 2019-04-11
Payer: MEDICARE

## 2019-04-11 VITALS
SYSTOLIC BLOOD PRESSURE: 86 MMHG | HEART RATE: 60 BPM | BODY MASS INDEX: 26.69 KG/M2 | HEIGHT: 73 IN | WEIGHT: 201.38 LBS | DIASTOLIC BLOOD PRESSURE: 57 MMHG

## 2019-04-11 DIAGNOSIS — I48.0 PAROXYSMAL ATRIAL FIBRILLATION: ICD-10-CM

## 2019-04-11 DIAGNOSIS — N18.30 CKD (CHRONIC KIDNEY DISEASE) STAGE 3, GFR 30-59 ML/MIN: ICD-10-CM

## 2019-04-11 DIAGNOSIS — E78.2 MIXED HYPERLIPIDEMIA: ICD-10-CM

## 2019-04-11 DIAGNOSIS — I65.22 INTERNAL CAROTID ARTERY STENOSIS, LEFT: ICD-10-CM

## 2019-04-11 DIAGNOSIS — I63.413 CEREBROVASCULAR ACCIDENT (CVA) DUE TO BILATERAL EMBOLISM OF MIDDLE CEREBRAL ARTERIES: Primary | ICD-10-CM

## 2019-04-11 DIAGNOSIS — I10 ESSENTIAL HYPERTENSION: ICD-10-CM

## 2019-04-11 DIAGNOSIS — I50.32 CHRONIC DIASTOLIC CONGESTIVE HEART FAILURE: ICD-10-CM

## 2019-04-11 DIAGNOSIS — E11.8 TYPE 2 DIABETES MELLITUS WITH COMPLICATION, WITHOUT LONG-TERM CURRENT USE OF INSULIN: ICD-10-CM

## 2019-04-11 PROCEDURE — 99999 PR PBB SHADOW E&M-EST. PATIENT-LVL II: ICD-10-PCS | Mod: PBBFAC,,, | Performed by: INTERNAL MEDICINE

## 2019-04-11 PROCEDURE — 99214 OFFICE O/P EST MOD 30 MIN: CPT | Mod: S$GLB,,, | Performed by: INTERNAL MEDICINE

## 2019-04-11 PROCEDURE — 1101F PR PT FALLS ASSESS DOC 0-1 FALLS W/OUT INJ PAST YR: ICD-10-PCS | Mod: CPTII,S$GLB,, | Performed by: INTERNAL MEDICINE

## 2019-04-11 PROCEDURE — 99999 PR PBB SHADOW E&M-EST. PATIENT-LVL II: CPT | Mod: PBBFAC,,, | Performed by: INTERNAL MEDICINE

## 2019-04-11 PROCEDURE — 3074F PR MOST RECENT SYSTOLIC BLOOD PRESSURE < 130 MM HG: ICD-10-PCS | Mod: CPTII,S$GLB,, | Performed by: INTERNAL MEDICINE

## 2019-04-11 PROCEDURE — 99214 PR OFFICE/OUTPT VISIT, EST, LEVL IV, 30-39 MIN: ICD-10-PCS | Mod: S$GLB,,, | Performed by: INTERNAL MEDICINE

## 2019-04-11 PROCEDURE — 3074F SYST BP LT 130 MM HG: CPT | Mod: CPTII,S$GLB,, | Performed by: INTERNAL MEDICINE

## 2019-04-11 PROCEDURE — 3078F PR MOST RECENT DIASTOLIC BLOOD PRESSURE < 80 MM HG: ICD-10-PCS | Mod: CPTII,S$GLB,, | Performed by: INTERNAL MEDICINE

## 2019-04-11 PROCEDURE — 1101F PT FALLS ASSESS-DOCD LE1/YR: CPT | Mod: CPTII,S$GLB,, | Performed by: INTERNAL MEDICINE

## 2019-04-11 PROCEDURE — 3078F DIAST BP <80 MM HG: CPT | Mod: CPTII,S$GLB,, | Performed by: INTERNAL MEDICINE

## 2019-04-11 NOTE — PROGRESS NOTES
Subjective:      Patient ID: Shant Magana Jr. is a 79 y.o. male.    Chief Complaint: Hypertension (Blood pressure check)    HPI:  Walks around house.  Able to walk a long way.  Appetite is better  No bleeding on Eliquis  Review of Systems   Cardiovascular: Negative for chest pain, claudication, dyspnea on exertion, irregular heartbeat, leg swelling, near-syncope, orthopnea, palpitations and syncope.        Past Medical History:   Diagnosis Date    Cancer     prostate    Diabetes mellitus     Hypertension     Hypertrophic cardiomyopathy     Renal disorder         Past Surgical History:   Procedure Laterality Date    BACK SURGERY      COLONOSCOPY N/A 10/18/2018    [facility] at [facility] ENDO    EGD (ESOPHAGOGASTRODUODENOSCOPY) N/A 10/18/2018    [facility] by [facility] at [facility] ENDO    EGD (ESOPHAGOGASTRODUODENOSCOPY) N/A 9/4/2018    [facility] by Oli [facility]ra MD at [facility] ENDO    THYROIDECTOMY      TRANSESOPHAGEAL ECHOCARDIOGRAM (ESDRAS) N/A 10/13/2017    [facility]formed by Donna Oliver [facility] at [facility] OR    TRANSESOPHAGEAL ECHOCARDIOGRAM (ESDRAS) N/A 7/18/2017    Performed by Guanaco Ty MD at [facility] OR       No family history on file.    Social History     Socioeconomic History    Marital status: Single     Spouse name: Not on file    Number of children: Not on file    Years of education: Not on file    Highest education level: Not on file   Occupational History    Not on file   Social Needs    Financial resource strain: Not on file    Food insecurity:     Worry: Not on file     Inability: Not on file    Transportation needs:     Medical: Not on file     Non-medical: Not on file   Tobacco Use    Smoking status: Former Smoker     Packs/day: 0.90     Years: 3.00     Pack years: 2.70     Types: Cigarettes    Smokeless tobacco: Never Used   Substance and Sexual Activity    Alcohol use: Yes    Drug use: No    Sexual activity: Not on file   Lifestyle    Physical  "activity:     Days per week: Not on file     Minutes per session: Not on file    Stress: Not on file   Relationships    Social connections:     Talks on phone: Not on file     Gets together: Not on file     Attends Anabaptist service: Not on file     Active member of club or organization: Not on file     Attends meetings of clubs or organizations: Not on file     Relationship status: Not on file   Other Topics Concern    Not on file   Social History Narrative    Not on file       Current Outpatient Medications on File Prior to Visit   Medication Sig Dispense Refill    apixaban (ELIQUIS) 2.5 mg Tab Take 1 tablet (2.5 mg total) by mouth 2 (two) times daily. 60 tablet 11    atorvastatin (LIPITOR) 80 MG tablet Take 1 tablet (80 mg total) by mouth once daily. 90 tablet 2    losartan (COZAAR) 100 MG tablet       pantoprazole (PROTONIX) 40 MG tablet Take 40 mg by mouth once daily.  3    tamsulosin (FLOMAX) 0.4 mg Cp24 Take 0.4 mg by mouth once daily.      vitamin D 1000 units Tab Take 1,000 Units by mouth once daily.      [DISCONTINUED] hydroCHLOROthiazide (HYDRODIURIL) 12.5 MG Tab Take 25 mg by mouth once daily.       [DISCONTINUED] lisinopril 10 MG tablet Take 1 tablet by mouth once daily.       No current facility-administered medications on file prior to visit.        Review of patient's allergies indicates:   Allergen Reactions    Hydralazine analogues Diarrhea     Objective:     Vitals:    04/11/19 1150   BP: (!) 86/57   BP Location: Left arm   Patient Position: Sitting   BP Method: Large (Automatic)   Pulse: 60   Weight: 91.3 kg (201 lb 6.2 oz)   Height: 6' 1" (1.854 m)        Physical Exam   Constitutional: He is oriented to person, place, and time. He appears well-developed and well-nourished. No distress.   Eyes: No scleral icterus.   Neck: No JVD present. Carotid bruit is not present.   Cardiovascular: Regular rhythm and normal heart sounds. Exam reveals no gallop and no friction rub.   No murmur " heard.  Pulmonary/Chest: Effort normal and breath sounds normal. No respiratory distress.   Musculoskeletal: He exhibits no edema.   Neurological: He is alert and oriented to person, place, and time.   Skin: Skin is warm and dry. He is not diaphoretic.   Psychiatric: He has a normal mood and affect. His behavior is normal. Judgment and thought content normal.   Vitals reviewed.       Assessment:     1. Cerebrovascular accident (CVA) due to bilateral embolism of middle cerebral arteries    2. Essential hypertension    3. Internal carotid artery stenosis, left    4. Chronic diastolic congestive heart failure    5. Mixed hyperlipidemia    6. Paroxysmal atrial fibrillation    7. CKD (chronic kidney disease) stage 3, GFR 30-59 ml/min    8. Type 2 diabetes mellitus with complication, without long-term current use of insulin      Plan:   Shant was seen today for hypertension.    Diagnoses and all orders for this visit:    Cerebrovascular accident (CVA) due to bilateral embolism of middle cerebral arteries    Essential hypertension    Internal carotid artery stenosis, left    Chronic diastolic congestive heart failure    Mixed hyperlipidemia    Paroxysmal atrial fibrillation    CKD (chronic kidney disease) stage 3, GFR 30-59 ml/min    Type 2 diabetes mellitus with complication, without long-term current use of insulin     Due to low blood pressure will discontinue the lisinopril and HCTZ    Rest of meds the same    Get labs ordered last visit  Follow up in about 1 month (around 5/9/2019).

## 2019-04-18 ENCOUNTER — CLINICAL SUPPORT (OUTPATIENT)
Dept: CARDIOLOGY | Facility: CLINIC | Age: 79
End: 2019-04-18
Payer: MEDICARE

## 2019-04-18 DIAGNOSIS — Z95.818 STATUS POST PLACEMENT OF IMPLANTABLE LOOP RECORDER: Primary | ICD-10-CM

## 2019-04-18 PROCEDURE — 99499 UNLISTED E&M SERVICE: CPT | Mod: S$GLB,,, | Performed by: INTERNAL MEDICINE

## 2019-04-18 PROCEDURE — 93299 PR INTERROG EVAL, REMOTE, UP TO 30 DAYS, CV MON/LOOP REC,TECH REVIEW: CPT | Mod: S$GLB,,, | Performed by: INTERNAL MEDICINE

## 2019-04-18 PROCEDURE — 99499 NO LOS: ICD-10-PCS | Mod: S$GLB,,, | Performed by: INTERNAL MEDICINE

## 2019-04-18 PROCEDURE — 93298 REM INTERROG DEV EVAL SCRMS: CPT | Mod: S$GLB,,, | Performed by: INTERNAL MEDICINE

## 2019-04-18 PROCEDURE — 93299 PR INTERROG EVAL, REMOTE, UP TO 30 DAYS, CV MON/LOOP REC,TECH REVIEW: ICD-10-PCS | Mod: S$GLB,,, | Performed by: INTERNAL MEDICINE

## 2019-04-18 PROCEDURE — 93298 PR INTERROG EVAL, REMOTE, UP TO 30 DAYS,IMPLANT LOOP REC: ICD-10-PCS | Mod: S$GLB,,, | Performed by: INTERNAL MEDICINE

## 2019-04-18 NOTE — PROGRESS NOTES
Subjective:      Patient ID: Shant Magana Jr. is a 79 y.o. male.    Chief Complaint: No chief complaint on file.    HPI:    ROS Implanted loop recorder remote interrogation report downloaded and prepared by medical assistant and interpreted by me and full report scanned into Epic media.  Normal device function.   NSR and possible AV radha rhythm with occasional PVC's.  No significant dysrhythmia.    Past Medical History:   Diagnosis Date    Cancer     prostate    Diabetes mellitus     Hypertension     Hypertrophic cardiomyopathy     Renal disorder         Past Surgical History:   Procedure Laterality Date    BACK SURGERY      COLONOSCOPY N/A 10/18/2018    [facility]formed by [facility] at [facility] ENDO    EGD (ESOPHAGOGASTRODUODENOSCOPY) N/A 10/18/2018    Performed by [facility] at [facility] ENDO    EGD (ESOPHAGOGASTRODUODENOSCOPY) N/A 9/4/2018    Performed by Oli Cuadra MD at [facility] ENDO    THYROIDECTOMY      TRANSESOPHAGEAL ECHOCARDIOGRAM (ESDRAS) N/A 10/13/2017    Performed by Donna Oliver MD at [facility] OR    TRANSESOPHAGEAL ECHOCARDIOGRAM (ESDRAS) N/A 7/18/2017    Performed by Guanaco Ty MD at [facility] OR       No family history on file.    Social History     Socioeconomic History    Marital status: Single     Spouse name: Not on file    Number of children: Not on file    Years of education: Not on file    Highest education level: Not on file   Occupational History    Not on file   Social Needs    Financial resource strain: Not on file    Food insecurity:     Worry: Not on file     Inability: Not on file    Transportation needs:     Medical: Not on file     Non-medical: Not on file   Tobacco Use    Smoking status: Former Smoker     Packs/day: 0.90     Years: 3.00     Pack years: 2.70     Types: Cigarettes    Smokeless tobacco: Never Used   Substance and Sexual Activity    Alcohol use: Yes    Drug use: No    Sexual activity: Not on file   Lifestyle    Physical activity:      Days per week: Not on file     Minutes per session: Not on file    Stress: Not on file   Relationships    Social connections:     Talks on phone: Not on file     Gets together: Not on file     Attends Pentecostal service: Not on file     Active member of club or organization: Not on file     Attends meetings of clubs or organizations: Not on file     Relationship status: Not on file   Other Topics Concern    Not on file   Social History Narrative    Not on file       Current Outpatient Medications on File Prior to Visit   Medication Sig Dispense Refill    apixaban (ELIQUIS) 2.5 mg Tab Take 1 tablet (2.5 mg total) by mouth 2 (two) times daily. 60 tablet 11    atorvastatin (LIPITOR) 80 MG tablet Take 1 tablet (80 mg total) by mouth once daily. 90 tablet 2    losartan (COZAAR) 100 MG tablet       pantoprazole (PROTONIX) 40 MG tablet Take 40 mg by mouth once daily.  3    tamsulosin (FLOMAX) 0.4 mg Cp24 Take 0.4 mg by mouth once daily.      vitamin D 1000 units Tab Take 1,000 Units by mouth once daily.       No current facility-administered medications on file prior to visit.        Review of patient's allergies indicates:   Allergen Reactions    Hydralazine analogues Diarrhea     Objective:   There were no vitals filed for this visit.     Physical Exam     Assessment:     1. Status post placement of implantable loop recorder      Plan:   Diagnoses and all orders for this visit:    Status post placement of implantable loop recorder         No follow-ups on file.

## 2019-04-26 ENCOUNTER — TELEPHONE (OUTPATIENT)
Dept: CARDIOLOGY | Facility: CLINIC | Age: 79
End: 2019-04-26

## 2019-04-26 NOTE — TELEPHONE ENCOUNTER
[employer] loop recorder alert shows a regular rhythm with a heart rate in the high 40's and a PVC followed by a 3 second pause.  Sinus bradycardia vs. AV radha rhythm.  Discussed with pt's son.  Pt is ambulatory and asymptomatic.   Will bring pt in to office next week and get a 12 lead ECG and probably do a holter since it is unclear whether or not whether pt is having sinus bradycardia or AV radha rhythm and it is not possible to reprogram the loop recorder to better see the P waves.

## 2019-04-30 ENCOUNTER — OFFICE VISIT (OUTPATIENT)
Dept: CARDIOLOGY | Facility: CLINIC | Age: 79
End: 2019-04-30
Payer: MEDICARE

## 2019-04-30 VITALS
HEART RATE: 57 BPM | BODY MASS INDEX: 27.83 KG/M2 | HEIGHT: 73 IN | SYSTOLIC BLOOD PRESSURE: 132 MMHG | WEIGHT: 210 LBS | OXYGEN SATURATION: 100 % | DIASTOLIC BLOOD PRESSURE: 79 MMHG

## 2019-04-30 DIAGNOSIS — Z95.818 STATUS POST PLACEMENT OF IMPLANTABLE LOOP RECORDER: ICD-10-CM

## 2019-04-30 DIAGNOSIS — I48.0 PAROXYSMAL ATRIAL FIBRILLATION: ICD-10-CM

## 2019-04-30 DIAGNOSIS — I50.32 CHRONIC DIASTOLIC CONGESTIVE HEART FAILURE: ICD-10-CM

## 2019-04-30 DIAGNOSIS — N18.30 CKD (CHRONIC KIDNEY DISEASE) STAGE 3, GFR 30-59 ML/MIN: ICD-10-CM

## 2019-04-30 DIAGNOSIS — E11.8 TYPE 2 DIABETES MELLITUS WITH COMPLICATION, WITHOUT LONG-TERM CURRENT USE OF INSULIN: ICD-10-CM

## 2019-04-30 DIAGNOSIS — R00.1 BRADYCARDIA: ICD-10-CM

## 2019-04-30 DIAGNOSIS — E78.2 MIXED HYPERLIPIDEMIA: ICD-10-CM

## 2019-04-30 DIAGNOSIS — I10 ESSENTIAL HYPERTENSION: Primary | ICD-10-CM

## 2019-04-30 PROCEDURE — 3075F PR MOST RECENT SYSTOLIC BLOOD PRESS GE 130-139MM HG: ICD-10-PCS | Mod: CPTII,S$GLB,, | Performed by: INTERNAL MEDICINE

## 2019-04-30 PROCEDURE — 3078F DIAST BP <80 MM HG: CPT | Mod: CPTII,S$GLB,, | Performed by: INTERNAL MEDICINE

## 2019-04-30 PROCEDURE — 3288F FALL RISK ASSESSMENT DOCD: CPT | Mod: CPTII,S$GLB,, | Performed by: INTERNAL MEDICINE

## 2019-04-30 PROCEDURE — 99214 PR OFFICE/OUTPT VISIT, EST, LEVL IV, 30-39 MIN: ICD-10-PCS | Mod: S$GLB,,, | Performed by: INTERNAL MEDICINE

## 2019-04-30 PROCEDURE — 99999 PR PBB SHADOW E&M-EST. PATIENT-LVL III: CPT | Mod: PBBFAC,,, | Performed by: INTERNAL MEDICINE

## 2019-04-30 PROCEDURE — 1100F PTFALLS ASSESS-DOCD GE2>/YR: CPT | Mod: CPTII,S$GLB,, | Performed by: INTERNAL MEDICINE

## 2019-04-30 PROCEDURE — 93000 EKG 12-LEAD: ICD-10-PCS | Mod: S$GLB,,, | Performed by: INTERNAL MEDICINE

## 2019-04-30 PROCEDURE — 1100F PR PT FALLS ASSESS DOC 2+ FALLS/FALL W/INJURY/YR: ICD-10-PCS | Mod: CPTII,S$GLB,, | Performed by: INTERNAL MEDICINE

## 2019-04-30 PROCEDURE — 3288F PR FALLS RISK ASSESSMENT DOCUMENTED: ICD-10-PCS | Mod: CPTII,S$GLB,, | Performed by: INTERNAL MEDICINE

## 2019-04-30 PROCEDURE — 3075F SYST BP GE 130 - 139MM HG: CPT | Mod: CPTII,S$GLB,, | Performed by: INTERNAL MEDICINE

## 2019-04-30 PROCEDURE — 93000 ELECTROCARDIOGRAM COMPLETE: CPT | Mod: S$GLB,,, | Performed by: INTERNAL MEDICINE

## 2019-04-30 PROCEDURE — 99214 OFFICE O/P EST MOD 30 MIN: CPT | Mod: S$GLB,,, | Performed by: INTERNAL MEDICINE

## 2019-04-30 PROCEDURE — 3078F PR MOST RECENT DIASTOLIC BLOOD PRESSURE < 80 MM HG: ICD-10-PCS | Mod: CPTII,S$GLB,, | Performed by: INTERNAL MEDICINE

## 2019-04-30 PROCEDURE — 99999 PR PBB SHADOW E&M-EST. PATIENT-LVL III: ICD-10-PCS | Mod: PBBFAC,,, | Performed by: INTERNAL MEDICINE

## 2019-04-30 NOTE — PROGRESS NOTES
Subjective:      Patient ID: Shant Magana Jr. is a 79 y.o. male.    Chief Complaint: Follow-up    HPI:  Feels fine.  Walks without difficulty.  No palpitations.  No weakness or fainting.  Fell day after last visit    Review of Systems   Cardiovascular: Negative for chest pain, claudication, dyspnea on exertion, irregular heartbeat, leg swelling, near-syncope, orthopnea, palpitations and syncope.      Pt feels better and is not sleeping as much since the lisinopril and HCTZ were discontinued.    Past Medical History:   Diagnosis Date    Cancer     prostate    Diabetes mellitus     Hypertension     Hypertrophic cardiomyopathy     Renal disorder         Past Surgical History:   Procedure Laterality Date    BACK SURGERY      COLONOSCOPY N/A 10/18/2018    Performed by [facility] at [facility] ENDO    EGD (ESOPHAGOGASTRODUODENOSCOPY) N/A 10/18/2018    Performed by [facility] at [facility] ENDO    EGD (ESOPHAGOGASTRODUODENOSCOPY) N/A 9/4/2018    Performed by Oli Cu[facility]ra MD at [facility] ENDO    THYROIDECTOMY      TRANSESOPHAGEAL ECHOCARDIOGRAM (ESDRAS) N/A 10/13/2017    Performed by Donna Oliver [facility] at [facility] OR    TRANSESOPHAGEAL ECHOCARDIOGRAM (ESDRAS) N/A 7/18/2017    Performed by Guanaco Ty MD at [facility] OR       No family history on file.    Social History     Socioeconomic History    Marital status: Single     Spouse name: Not on file    Number of children: Not on file    Years of education: Not on file    Highest education level: Not on file   Occupational History    Not on file   Social Needs    Financial resource strain: Not on file    Food insecurity:     Worry: Not on file     Inability: Not on file    Transportation needs:     Medical: Not on file     Non-medical: Not on file   Tobacco Use    Smoking status: Former Smoker     Packs/day: 0.90     Years: 3.00     Pack years: 2.70     Types: Cigarettes    Smokeless tobacco: Never Used   Substance and Sexual Activity    Alcohol  "use: Yes    Drug use: No    Sexual activity: Not on file   Lifestyle    Physical activity:     Days per week: Not on file     Minutes per session: Not on file    Stress: Not on file   Relationships    Social connections:     Talks on phone: Not on file     Gets together: Not on file     Attends Episcopal service: Not on file     Active member of club or organization: Not on file     Attends meetings of clubs or organizations: Not on file     Relationship status: Not on file   Other Topics Concern    Not on file   Social History Narrative    Not on file       Current Outpatient Medications on File Prior to Visit   Medication Sig Dispense Refill    apixaban (ELIQUIS) 2.5 mg Tab Take 1 tablet (2.5 mg total) by mouth 2 (two) times daily. 60 tablet 11    atorvastatin (LIPITOR) 80 MG tablet Take 1 tablet (80 mg total) by mouth once daily. 90 tablet 2    losartan (COZAAR) 100 MG tablet Take 100 mg by mouth once daily.       pantoprazole (PROTONIX) 40 MG tablet Take 40 mg by mouth once daily.  3    tamsulosin (FLOMAX) 0.4 mg Cp24 Take 0.4 mg by mouth once daily.      vitamin D 1000 units Tab Take 1,000 Units by mouth once daily.       No current facility-administered medications on file prior to visit.        Review of patient's allergies indicates:   Allergen Reactions    Hydralazine analogues Diarrhea     Objective:     Vitals:    04/30/19 1358   BP: 132/79   BP Location: Right arm   Patient Position: Sitting   BP Method: Large (Automatic)   Pulse: (!) 57   SpO2: 100%   Weight: 95.2 kg (209 lb 15.8 oz)   Height: 6' 1" (1.854 m)        Physical Exam   Constitutional: He is oriented to person, place, and time. He appears well-developed and well-nourished. No distress.   Eyes: No scleral icterus.   Neck: No JVD present. Carotid bruit is not present.   Cardiovascular: Regular rhythm and normal heart sounds. Exam reveals no gallop and no friction rub.   No murmur heard.  Pulmonary/Chest: Effort normal and breath " "sounds normal. No respiratory distress.   Musculoskeletal: He exhibits no edema.   Neurological: He is alert and oriented to person, place, and time.   Skin: Skin is warm and dry. He is not diaphoretic.   Psychiatric: He has a normal mood and affect. His behavior is normal. Judgment and thought content normal.   Vitals reviewed.     ECG: sinus bradycardia, left axis deviation, possible anterior scar    Loop recorder alerts show 3 second pauses after PVC--only one episode of "aystole" since February.  Pt was likely napping at that time.  Note that atrial activity is not well seen on the loop recorder       Wt up 8 lbs  Assessment:     1. Essential hypertension    2. Bradycardia    3. Mixed hyperlipidemia    4. Chronic diastolic congestive heart failure    5. Paroxysmal atrial fibrillation    6. Status post placement of implantable loop recorder    7. CKD (chronic kidney disease) stage 3, GFR 30-59 ml/min    8. Type 2 diabetes mellitus with complication, without long-term current use of insulin      Plan:   Shant was seen today for follow-up.    Diagnoses and all orders for this visit:    Essential hypertension    Bradycardia    Mixed hyperlipidemia    Chronic diastolic congestive heart failure    Paroxysmal atrial fibrillation    Status post placement of implantable loop recorder    CKD (chronic kidney disease) stage 3, GFR 30-59 ml/min    Type 2 diabetes mellitus with complication, without long-term current use of insulin    Other orders  -     IN OFFICE EKG 12-LEAD (to Muse)     Discussed with pt and daughter and grandson to be alert for symptoms of weak spells or fainting which might trigger consideration for a pacemaker.    Pt has remote interrogation device working    Same meds (stay off HCTZ and lisinopril)    Hypotension has resolved off HCTZ and lisinopril    Need copies of labs done by Dr Aldana    RTFROYLAN 3 months with labs already ordered by me    No follow-ups on file.  "

## 2019-05-14 ENCOUNTER — CLINICAL SUPPORT (OUTPATIENT)
Dept: CARDIOLOGY | Facility: CLINIC | Age: 79
End: 2019-05-14
Payer: MEDICARE

## 2019-05-14 DIAGNOSIS — Z95.818 STATUS POST PLACEMENT OF IMPLANTABLE LOOP RECORDER: Primary | ICD-10-CM

## 2019-05-14 PROCEDURE — 99499 NO LOS: ICD-10-PCS | Mod: S$GLB,,, | Performed by: INTERNAL MEDICINE

## 2019-05-14 PROCEDURE — 93298 REM INTERROG DEV EVAL SCRMS: CPT | Mod: S$GLB,,, | Performed by: INTERNAL MEDICINE

## 2019-05-14 PROCEDURE — 99499 UNLISTED E&M SERVICE: CPT | Mod: S$GLB,,, | Performed by: INTERNAL MEDICINE

## 2019-05-14 PROCEDURE — 93299 PR INTERROG EVAL, REMOTE, UP TO 30 DAYS, CV MON/LOOP REC,TECH REVIEW: ICD-10-PCS | Mod: S$GLB,,, | Performed by: INTERNAL MEDICINE

## 2019-05-14 PROCEDURE — 93299 PR INTERROG EVAL, REMOTE, UP TO 30 DAYS, CV MON/LOOP REC,TECH REVIEW: CPT | Mod: S$GLB,,, | Performed by: INTERNAL MEDICINE

## 2019-05-14 PROCEDURE — 93298 PR INTERROG EVAL, REMOTE, UP TO 30 DAYS,IMPLANT LOOP REC: ICD-10-PCS | Mod: S$GLB,,, | Performed by: INTERNAL MEDICINE

## 2019-05-14 NOTE — PROGRESS NOTES
"  Subjective:      Patient ID: Shant Magana Jr. is a 79 y.o. male.    Chief Complaint: No chief complaint on file.    HPI:    [employer] remote loop recorder interrogation report downloaded and prepared by medical assistant and interpreted by me and full report scanned into Epic media.  Battery OK   Triggered episodes for "asystole " show a usually regular rhythm in the 40's and 50's bpm rate with occasional PVC's and PAC's (possibly AV radha rhythm since the atrial activity is not clear).  A post-PVC compensatory pause is noted.  No asystole.    Past Medical History:   Diagnosis Date    Cancer     prostate    Diabetes mellitus     Hypertension     Hypertrophic cardiomyopathy     Renal disorder         Past Surgical History:   Procedure Laterality Date    BACK SURGERY      COLONOSCOPY N/A 10/18/2018    [facility] ENDO    EGD (ESOPHAGOGASTRODUODENOSCOPY) N/A 10/18/2018    [facility]formed by [facility] at [facility] ENDO    EGD (ESOPHAGOGASTRODUODENOSCOPY) N/A 9/4/2018    Performed by Oli [facility]ra MD at [facility] ENDO    THYROIDECTOMY      TRANSESOPHAGEAL ECHOCARDIOGRAM (ESDRAS) N/A 10/13/2017    Performed by Donna Oliver [facility] at [facility] OR    TRANSESOPHAGEAL ECHOCARDIOGRAM (ESDRAS) N/A 7/18/2017    Performed by Guanaco Ty MD at [facility] OR       No family history on file.    Social History     Socioeconomic History    Marital status: Single     Spouse name: Not on file    Number of children: Not on file    Years of education: Not on file    Highest education level: Not on file   Occupational History    Not on file   Social Needs    Financial resource strain: Not on file    Food insecurity:     Worry: Not on file     Inability: Not on file    Transportation needs:     Medical: Not on file     Non-medical: Not on file   Tobacco Use    Smoking status: Former Smoker     Packs/day: 0.90     Years: 3.00     Pack years: 2.70     Types: Cigarettes    Smokeless tobacco: " Never Used   Substance and Sexual Activity    Alcohol use: Yes    Drug use: No    Sexual activity: Not on file   Lifestyle    Physical activity:     Days per week: Not on file     Minutes per session: Not on file    Stress: Not on file   Relationships    Social connections:     Talks on phone: Not on file     Gets together: Not on file     Attends Episcopalian service: Not on file     Active member of club or organization: Not on file     Attends meetings of clubs or organizations: Not on file     Relationship status: Not on file   Other Topics Concern    Not on file   Social History Narrative    Not on file       Current Outpatient Medications on File Prior to Visit   Medication Sig Dispense Refill    apixaban (ELIQUIS) 2.5 mg Tab Take 1 tablet (2.5 mg total) by mouth 2 (two) times daily. 60 tablet 11    atorvastatin (LIPITOR) 80 MG tablet Take 1 tablet (80 mg total) by mouth once daily. 90 tablet 2    losartan (COZAAR) 100 MG tablet Take 100 mg by mouth once daily.       pantoprazole (PROTONIX) 40 MG tablet Take 40 mg by mouth once daily.  3    tamsulosin (FLOMAX) 0.4 mg Cp24 Take 0.4 mg by mouth once daily.      vitamin D 1000 units Tab Take 1,000 Units by mouth once daily.       No current facility-administered medications on file prior to visit.        Review of patient's allergies indicates:   Allergen Reactions    Hydralazine analogues Diarrhea     Objective:   There were no vitals filed for this visit.     Physical Exam     Assessment:     1. Status post placement of implantable loop recorder      Plan:   Diagnoses and all orders for this visit:    Status post placement of implantable loop recorder         No follow-ups on file.

## 2019-05-28 ENCOUNTER — CLINICAL SUPPORT (OUTPATIENT)
Dept: CARDIOLOGY | Facility: CLINIC | Age: 79
End: 2019-05-28
Payer: MEDICARE

## 2019-05-28 DIAGNOSIS — Z95.818 STATUS POST PLACEMENT OF IMPLANTABLE LOOP RECORDER: Primary | ICD-10-CM

## 2019-05-28 PROCEDURE — 93299 PR INTERROG EVAL, REMOTE, UP TO 30 DAYS, CV MON/LOOP REC,TECH REVIEW: CPT | Mod: S$GLB,,, | Performed by: INTERNAL MEDICINE

## 2019-05-28 PROCEDURE — 99499 NO LOS: ICD-10-PCS | Mod: S$GLB,,, | Performed by: INTERNAL MEDICINE

## 2019-05-28 PROCEDURE — 93298 PR INTERROG EVAL, REMOTE, UP TO 30 DAYS,IMPLANT LOOP REC: ICD-10-PCS | Mod: S$GLB,,, | Performed by: INTERNAL MEDICINE

## 2019-05-28 PROCEDURE — 93299 PR INTERROG EVAL, REMOTE, UP TO 30 DAYS, CV MON/LOOP REC,TECH REVIEW: ICD-10-PCS | Mod: S$GLB,,, | Performed by: INTERNAL MEDICINE

## 2019-05-28 PROCEDURE — 99499 UNLISTED E&M SERVICE: CPT | Mod: S$GLB,,, | Performed by: INTERNAL MEDICINE

## 2019-05-28 PROCEDURE — 93298 REM INTERROG DEV EVAL SCRMS: CPT | Mod: S$GLB,,, | Performed by: INTERNAL MEDICINE

## 2019-05-29 NOTE — PROGRESS NOTES
Subjective:      Patient ID: Shant Magana Jr. is a 79 y.o. male.    Chief Complaint: No chief complaint on file.    HPI:    ROS [employer] remote interrogation of implanted loop recorder downloaded and prepared by medical assistant and interpreted by me and full report scanned into Epic media. No dysrhythmia.  Normal device function    Past Medical History:   Diagnosis Date    Cancer     prostate    Diabetes mellitus     Hypertension     Hypertrophic cardiomyopathy     Renal disorder         Past Surgical History:   Procedure Laterality Date    BACK SURGERY      COLONOSCOPY N/A 10/18/2018    [facility]    EGD (ESOPHAGOGASTRODUODENOSCOPY) N/A 10/18/2018    Performed by [facility] MD at [facility] ENDO    EGD (ESOPHAGOGASTRODUODENOSCOPY) N/A 9/4/2018    Performed by Oli [facility]ra MD at [facility] ENDO    THYROIDECTOMY      TRANSESOPHAGEAL ECHOCARDIOGRAM (ESDRAS) N/A 10/13/2017    Performed by Donna Oliver MD at [facility] OR    TRANSESOPHAGEAL ECHOCARDIOGRAM (ESDRAS) N/A 7/18/2017    Performed by Guanaco Ty MD at [facility] OR       No family history on file.    Social History     Socioeconomic History    Marital status: Single     Spouse name: Not on file    Number of children: Not on file    Years of education: Not on file    Highest education level: Not on file   Occupational History    Not on file   Social Needs    Financial resource strain: Not on file    Food insecurity:     Worry: Not on file     Inability: Not on file    Transportation needs:     Medical: Not on file     Non-medical: Not on file   Tobacco Use    Smoking status: Former Smoker     Packs/day: 0.90     Years: 3.00     Pack years: 2.70     Types: Cigarettes    Smokeless tobacco: Never Used   Substance and Sexual Activity    Alcohol use: Yes    Drug use: No    Sexual activity: Not on file   Lifestyle    Physical activity:     Days per week: Not on file     Minutes per session: Not on file     Stress: Not on file   Relationships    Social connections:     Talks on phone: Not on file     Gets together: Not on file     Attends Bahai service: Not on file     Active member of club or organization: Not on file     Attends meetings of clubs or organizations: Not on file     Relationship status: Not on file   Other Topics Concern    Not on file   Social History Narrative    Not on file       Current Outpatient Medications on File Prior to Visit   Medication Sig Dispense Refill    apixaban (ELIQUIS) 2.5 mg Tab Take 1 tablet (2.5 mg total) by mouth 2 (two) times daily. 60 tablet 11    atorvastatin (LIPITOR) 80 MG tablet Take 1 tablet (80 mg total) by mouth once daily. 90 tablet 2    losartan (COZAAR) 100 MG tablet Take 100 mg by mouth once daily.       pantoprazole (PROTONIX) 40 MG tablet Take 40 mg by mouth once daily.  3    tamsulosin (FLOMAX) 0.4 mg Cp24 Take 0.4 mg by mouth once daily.      vitamin D 1000 units Tab Take 1,000 Units by mouth once daily.       No current facility-administered medications on file prior to visit.        Review of patient's allergies indicates:   Allergen Reactions    Hydralazine analogues Diarrhea     Objective:   There were no vitals filed for this visit.     Physical Exam     Assessment:     1. Status post placement of implantable loop recorder      Plan:   Diagnoses and all orders for this visit:    Status post placement of implantable loop recorder         No follow-ups on file.

## 2019-06-21 ENCOUNTER — OFFICE VISIT (OUTPATIENT)
Dept: PODIATRY | Facility: CLINIC | Age: 79
End: 2019-06-21
Payer: MEDICARE

## 2019-06-21 VITALS
BODY MASS INDEX: 27.7 KG/M2 | HEART RATE: 62 BPM | HEIGHT: 73 IN | SYSTOLIC BLOOD PRESSURE: 159 MMHG | WEIGHT: 209 LBS | DIASTOLIC BLOOD PRESSURE: 82 MMHG

## 2019-06-21 DIAGNOSIS — E11.51 TYPE 2 DIABETES MELLITUS WITH PERIPHERAL ANGIOPATHY: Primary | ICD-10-CM

## 2019-06-21 DIAGNOSIS — E11.42 TYPE 2 DIABETES MELLITUS WITH PERIPHERAL NEUROPATHY: ICD-10-CM

## 2019-06-21 DIAGNOSIS — B35.1 ONYCHOMYCOSIS: ICD-10-CM

## 2019-06-21 PROCEDURE — 11721 ROUTINE FOOT CARE: ICD-10-PCS | Mod: Q9,S$GLB,, | Performed by: PODIATRIST

## 2019-06-21 PROCEDURE — 3077F SYST BP >= 140 MM HG: CPT | Mod: CPTII,S$GLB,, | Performed by: PODIATRIST

## 2019-06-21 PROCEDURE — 3288F FALL RISK ASSESSMENT DOCD: CPT | Mod: CPTII,S$GLB,, | Performed by: PODIATRIST

## 2019-06-21 PROCEDURE — 99999 PR PBB SHADOW E&M-EST. PATIENT-LVL III: ICD-10-PCS | Mod: PBBFAC,,, | Performed by: PODIATRIST

## 2019-06-21 PROCEDURE — 3079F DIAST BP 80-89 MM HG: CPT | Mod: CPTII,S$GLB,, | Performed by: PODIATRIST

## 2019-06-21 PROCEDURE — 99499 UNLISTED E&M SERVICE: CPT | Mod: S$GLB,,, | Performed by: PODIATRIST

## 2019-06-21 PROCEDURE — 3079F PR MOST RECENT DIASTOLIC BLOOD PRESSURE 80-89 MM HG: ICD-10-PCS | Mod: CPTII,S$GLB,, | Performed by: PODIATRIST

## 2019-06-21 PROCEDURE — 1100F PR PT FALLS ASSESS DOC 2+ FALLS/FALL W/INJURY/YR: ICD-10-PCS | Mod: CPTII,S$GLB,, | Performed by: PODIATRIST

## 2019-06-21 PROCEDURE — 1100F PTFALLS ASSESS-DOCD GE2>/YR: CPT | Mod: CPTII,S$GLB,, | Performed by: PODIATRIST

## 2019-06-21 PROCEDURE — 3288F PR FALLS RISK ASSESSMENT DOCUMENTED: ICD-10-PCS | Mod: CPTII,S$GLB,, | Performed by: PODIATRIST

## 2019-06-21 PROCEDURE — 11721 DEBRIDE NAIL 6 OR MORE: CPT | Mod: Q9,S$GLB,, | Performed by: PODIATRIST

## 2019-06-21 PROCEDURE — 99999 PR PBB SHADOW E&M-EST. PATIENT-LVL III: CPT | Mod: PBBFAC,,, | Performed by: PODIATRIST

## 2019-06-21 PROCEDURE — 99499 NO LOS: ICD-10-PCS | Mod: S$GLB,,, | Performed by: PODIATRIST

## 2019-06-21 PROCEDURE — 3077F PR MOST RECENT SYSTOLIC BLOOD PRESSURE >= 140 MM HG: ICD-10-PCS | Mod: CPTII,S$GLB,, | Performed by: PODIATRIST

## 2019-06-21 NOTE — PROCEDURES
"Routine Foot Care  Date/Time: 6/21/2019 2:59 PM  Performed by: Benjamin Farias DPM  Authorized by: Benjamin Farias DPM     Time out: Immediately prior to procedure a "time out" was called to verify the correct patient, procedure, equipment, support staff and site/side marked as required.    Consent Done?:  Yes (Verbal)  Hyperkeratotic Skin Lesions?: No      Nail Care Type:  Debride  Location(s): All  (Left 1st Toe, Left 3rd Toe, Left 2nd Toe, Left 4th Toe, Left 5th Toe, Right 1st Toe, Right 2nd Toe, Right 3rd Toe, Right 4th Toe and Right 5th Toe)  Patient tolerance:  Patient tolerated the procedure well with no immediate complications      "

## 2019-06-23 NOTE — PROGRESS NOTES
Subjective:      Patient ID: Shant Magana Jr. is a 79 y.o. male.    Chief Complaint: Ingrown Toenail (Bilateral hallux)    Shant is a 79 y.o. male who presents to the clinic for evaluation and treatment of diabetic feet. Shant has a past medical history of Cancer, Diabetes mellitus, Hypertension, Hypertrophic cardiomyopathy, and Renal disorder. Patient relates no major problem with feet. Only complaints today consist of painful thickened and elongated toenails.    PCP: Trent Aldana MD    Date Last Seen by PCP:     Current shoe gear: Casual shoes    Hemoglobin A1C   Date Value Ref Range Status   09/03/2018 5.9 (H) 4.0 - 5.6 % Final     Comment:     ADA Screening Guidelines:  5.7-6.4%  Consistent with prediabetes  >or=6.5%  Consistent with diabetes  High levels of fetal hemoglobin interfere with the HbA1C  assay. Heterozygous hemoglobin variants (HbS, HgC, etc)do  not significantly interfere with this assay.   However, presence of multiple variants may affect accuracy.     10/11/2017 6.2 (H) 4.0 - 5.6 % Final     Comment:     According to ADA guidelines, hemoglobin A1c <7.0% represents  optimal control in non-pregnant diabetic patients. Different  metrics may apply to specific patient populations.   Standards of Medical Care in Diabetes-2016.  For the purpose of screening for the presence of diabetes:  <5.7%     Consistent with the absence of diabetes  5.7-6.4%  Consistent with increasing risk for diabetes   (prediabetes)  >or=6.5%  Consistent with diabetes  Currently, no consensus exists for use of hemoglobin A1c  for diagnosis of diabetes for children.  This Hemoglobin A1c assay has significant interference with fetal   hemoglobin   (HbF). The results are invalid for patients with abnormal amounts of   HbF,   including those with known Hereditary Persistence   of Fetal Hemoglobin. Heterozygous hemoglobin variants (HbAS, HbAC,   HbAD, HbAE, HbA2) do not significantly interfere with this assay;   however,  "presence of multiple variants in a sample may impact the %   interference.     07/18/2017 5.7 (H) 4.0 - 5.6 % Final     Comment:     According to ADA guidelines, hemoglobin A1c <7.0% represents  optimal control in non-pregnant diabetic patients. Different  metrics may apply to specific patient populations.   Standards of Medical Care in Diabetes-2016.  For the purpose of screening for the presence of diabetes:  <5.7%     Consistent with the absence of diabetes  5.7-6.4%  Consistent with increasing risk for diabetes   (prediabetes)  >or=6.5%  Consistent with diabetes  Currently, no consensus exists for use of hemoglobin A1c  for diagnosis of diabetes for children.  This Hemoglobin A1c assay has significant interference with fetal   hemoglobin   (HbF). The results are invalid for patients with abnormal amounts of   HbF,   including those with known Hereditary Persistence   of Fetal Hemoglobin. Heterozygous hemoglobin variants (HbAS, HbAC,   HbAD, HbAE, HbA2) do not significantly interfere with this assay;   however, presence of multiple variants in a sample may impact the %   interference.       Vitals:    06/21/19 1415   BP: (!) 159/82   Pulse: 62   Weight: 94.8 kg (209 lb)   Height: 6' 1" (1.854 m)   PainSc:   8   PainLoc: Toe      Past Medical History:   Diagnosis Date    Cancer     prostate    Diabetes mellitus     Hypertension     Hypertrophic cardiomyopathy     Renal disorder        Past Surgical History:   Procedure Laterality Date    BACK SURGERY      COLONOSCOPY N/A 10/18/2018    [facility]    EGD (ESOPHAGOGASTRODUODENOSCOPY) N/A 10/18/2018    [facility]    EGD (ESOPHAGOGASTRODUODENOSCOPY) N/A 9/4/2018    [facility] at [facility]    THYROIDECTOMY      TRANSESOPHAGEAL ECHOCARDIOGRAM (ESDRAS) N/A 10/13/2017    [facility] at [facility] OR    TRANSESOPHAGEAL ECHOCARDIOGRAM (ESDRAS) N/A 7/18/2017 "    Per[facility] by Guanaco Ty [facility] at [facility]       History reviewed. No pertinent family history.    Social History     Socioeconomic History    Marital status: Single     Spouse name: Not on file    Number of children: Not on file    Years of education: Not on file    Highest education level: Not on file   Occupational History    Not on file   Social Needs    Financial resource strain: Not on file    Food insecurity:     Worry: Not on file     Inability: Not on file    Transportation needs:     Medical: Not on file     Non-medical: Not on file   Tobacco Use    Smoking status: Former Smoker     Packs/day: 0.90     Years: 3.00     Pack years: 2.70     Types: Cigarettes    Smokeless tobacco: Never Used   Substance and Sexual Activity    Alcohol use: Yes    Drug use: No    Sexual activity: Not on file   Lifestyle    Physical activity:     Days per week: Not on file     Minutes per session: Not on file    Stress: Not on file   Relationships    Social connections:     Talks on phone: Not on file     Gets together: Not on file     Attends Jew service: Not on file     Active member of club or organization: Not on file     Attends meetings of clubs or organizations: Not on file     Relationship status: Not on file   Other Topics Concern    Not on file   Social History Narrative    Not on file       Current Outpatient Medications   Medication Sig Dispense Refill    apixaban (ELIQUIS) 2.5 mg Tab Take 1 tablet (2.5 mg total) by mouth 2 (two) times daily. 60 tablet 11    losartan (COZAAR) 100 MG tablet Take 100 mg by mouth once daily.       pantoprazole (PROTONIX) 40 MG tablet Take 40 mg by mouth once daily.  3    tamsulosin (FLOMAX) 0.4 mg Cp24 Take 0.4 mg by mouth once daily.      vitamin D 1000 units Tab Take 1,000 Units by mouth once daily.      atorvastatin (LIPITOR) 80 MG tablet Take 1 tablet (80 mg total) by mouth once daily. 90 tablet 2     No current facility-administered medications  for this visit.        Review of patient's allergies indicates:   Allergen Reactions    Hydralazine analogues Diarrhea           Review of Systems   Constitution: Negative for chills, fever and malaise/fatigue.   Cardiovascular: Negative for chest pain, claudication and leg swelling.   Respiratory: Negative for cough and shortness of breath.    Skin: Positive for nail changes. Negative for itching and rash.   Musculoskeletal: Positive for muscle weakness. Negative for back pain, joint pain and muscle cramps.   Gastrointestinal: Negative for nausea and vomiting.   Neurological: Negative for numbness, paresthesias and weakness.   Psychiatric/Behavioral: Negative for altered mental status.           Objective:      Physical Exam   Constitutional: He is oriented to person, place, and time. No distress.   Cardiovascular:   Pulses:       Dorsalis pedis pulses are 1+ on the right side, and 0 on the left side.        Posterior tibial pulses are 1+ on the right side, and 0 on the left side.   CFT< 3 secs all toes bilateral foot, skin temp warm to cool bilateral foot, no hair growth bilateral lower extremity, mild non-pitting lower extremity edema bilateral.     Musculoskeletal:        Right foot: There is decreased range of motion and deformity.        Left foot: There is decreased range of motion and deformity.   DF toes, eversion foot + 4/5 bilateral while remaining muscle groups +5/5.    Semi-reducible digital contractures toes 2-5 bilateral foot.    Mild depression of the medial arch with standing.    + equinus that reduces with knee bent bilateral.    No pain with ROM or MMT bilateral lower extremity.   Feet:   Right Foot:   Protective Sensation: 10 sites tested. 5 sites sensed.   Skin Integrity: Positive for dry skin. Negative for callus.   Left Foot:   Protective Sensation: 10 sites tested. 5 sites sensed.   Skin Integrity: Positive for dry skin. Negative for callus.   Neurological: He is alert and oriented to  person, place, and time. A sensory deficit is present.   Moderate reduction in vibratory sensation bilateral foot.   Skin: Skin is warm, dry and intact. Capillary refill takes 2 to 3 seconds. No ecchymosis, no lesion and no rash noted. He is not diaphoretic. No cyanosis or erythema. No pallor. Nails show no clubbing.   Nails 1-5 bilateral foot are elongated 3-4 mm, thickened 3-4 mm, yellow with debris. Moderate incurvated medial and lateral nail borders right hallux and dystrophy left hallux.    No open lesions or macerations bilateral lower extremity.    Skin is thin and atrophied bilateral lower extremity.             Assessment:       Encounter Diagnoses   Name Primary?    Type 2 diabetes mellitus with peripheral angiopathy Yes    Type 2 diabetes mellitus with peripheral neuropathy     Onychomycosis          Plan:       Shant was seen today for ingrown toenail.    Diagnoses and all orders for this visit:    Type 2 diabetes mellitus with peripheral angiopathy  -     Routine Foot Care    Type 2 diabetes mellitus with peripheral neuropathy  -     Routine Foot Care    Onychomycosis  -     Routine Foot Care      I counseled the patient on his conditions, their implications and medical management.    Shoe inspection. Diabetic Foot Education. Patient reminded of the importance of good nutrition and blood sugar control to help prevent podiatric complications of diabetes. Patient instructed on proper foot hygeine. We discussed wearing proper shoe gear, daily foot inspections, never walking without protective shoe gear, never putting sharp instruments to feet, routine podiatric nail visits every 2-3 months.      Routine foot care per attached note. Patient relates relief following the procedure. He will continue to monitor the areas daily, inspect his feet, wear protective shoe gear when ambulatory, moisturizer to maintain skin integrity and follow in this office in approximately 2-3 months, sooner p.r.n.

## 2019-07-02 ENCOUNTER — CLINICAL SUPPORT (OUTPATIENT)
Dept: CARDIOLOGY | Facility: CLINIC | Age: 79
End: 2019-07-02
Payer: MEDICARE

## 2019-07-02 DIAGNOSIS — Z95.818 STATUS POST PLACEMENT OF IMPLANTABLE LOOP RECORDER: Primary | ICD-10-CM

## 2019-07-02 PROCEDURE — 93299 PR INTERROG EVAL, REMOTE, UP TO 30 DAYS, CV MON/LOOP REC,TECH REVIEW: ICD-10-PCS | Mod: S$GLB,,, | Performed by: INTERNAL MEDICINE

## 2019-07-02 PROCEDURE — 99499 NO LOS: ICD-10-PCS | Mod: S$GLB,,, | Performed by: INTERNAL MEDICINE

## 2019-07-02 PROCEDURE — 93298 REM INTERROG DEV EVAL SCRMS: CPT | Mod: S$GLB,,, | Performed by: INTERNAL MEDICINE

## 2019-07-02 PROCEDURE — 93298 PR INTERROG EVAL, REMOTE, UP TO 30 DAYS,IMPLANT LOOP REC: ICD-10-PCS | Mod: S$GLB,,, | Performed by: INTERNAL MEDICINE

## 2019-07-02 PROCEDURE — 99499 UNLISTED E&M SERVICE: CPT | Mod: S$GLB,,, | Performed by: INTERNAL MEDICINE

## 2019-07-02 PROCEDURE — 93299 PR INTERROG EVAL, REMOTE, UP TO 30 DAYS, CV MON/LOOP REC,TECH REVIEW: CPT | Mod: S$GLB,,, | Performed by: INTERNAL MEDICINE

## 2019-07-02 NOTE — PROGRESS NOTES
Subjective:      Patient ID: Shant Magana Jr. is a 79 y.o. male.    Chief Complaint: No chief complaint on file.    HPI:    ROS [employer] implanted loop recorder remote interrogation report downloaded and prepared by medical assistant and interpreted by me and full report scanned into Epic media.  Normal device function.  Battery OK. Normal sinus rhythm and sinus bradycardia with PVC.    Past Medical History:   Diagnosis Date    Cancer     prostate    Diabetes mellitus     Hypertension     Hypertrophic cardiomyopathy     Renal disorder         Past Surgical History:   Procedure Laterality Date    BACK SURGERY      COLONOSCOPY N/A 10/18/2018    [facility]    EGD (ESOPHAGOGASTRODUODENOSCOPY) N/A 10/18/2018    [facility]    EGD (ESOPHAGOGASTRODUODENOSCOPY) N/A 9/4/2018    [facility] at [facility]    THYROIDECTOMY      TRANSESOPHAGEAL ECHOCARDIOGRAM (ESDRAS) N/A 10/13/2017    [facility] Oliver [facility] at [facility] OR    TRANSESOPHAGEAL ECHOCARDIOGRAM (ESDRAS) N/A 7/18/2017    Performed by Guanaco yT MD at [facility] OR       No family history on file.    Social History     Socioeconomic History    Marital status: Single     Spouse name: Not on file    Number of children: Not on file    Years of education: Not on file    Highest education level: Not on file   Occupational History    Not on file   Social Needs    Financial resource strain: Not on file    Food insecurity:     Worry: Not on file     Inability: Not on file    Transportation needs:     Medical: Not on file     Non-medical: Not on file   Tobacco Use    Smoking status: Former Smoker     Packs/day: 0.90     Years: 3.00     Pack years: 2.70     Types: Cigarettes    Smokeless tobacco: Never Used   Substance and Sexual Activity    Alcohol use: Yes    Drug use: No    Sexual activity: Not on file   Lifestyle    Physical activity:     Days per  week: Not on file     Minutes per session: Not on file    Stress: Not on file   Relationships    Social connections:     Talks on phone: Not on file     Gets together: Not on file     Attends Latter day service: Not on file     Active member of club or organization: Not on file     Attends meetings of clubs or organizations: Not on file     Relationship status: Not on file   Other Topics Concern    Not on file   Social History Narrative    Not on file       Current Outpatient Medications on File Prior to Visit   Medication Sig Dispense Refill    apixaban (ELIQUIS) 2.5 mg Tab Take 1 tablet (2.5 mg total) by mouth 2 (two) times daily. 60 tablet 11    atorvastatin (LIPITOR) 80 MG tablet Take 1 tablet (80 mg total) by mouth once daily. 90 tablet 2    losartan (COZAAR) 100 MG tablet Take 100 mg by mouth once daily.       pantoprazole (PROTONIX) 40 MG tablet Take 40 mg by mouth once daily.  3    tamsulosin (FLOMAX) 0.4 mg Cp24 Take 0.4 mg by mouth once daily.      vitamin D 1000 units Tab Take 1,000 Units by mouth once daily.       No current facility-administered medications on file prior to visit.        Review of patient's allergies indicates:   Allergen Reactions    Hydralazine analogues Diarrhea     Objective:   There were no vitals filed for this visit.     Physical Exam     Assessment:     1. Status post placement of implantable loop recorder      Plan:   Diagnoses and all orders for this visit:    Status post placement of implantable loop recorder         No follow-ups on file.

## 2019-08-06 ENCOUNTER — CLINICAL SUPPORT (OUTPATIENT)
Dept: CARDIOLOGY | Facility: CLINIC | Age: 79
End: 2019-08-06
Payer: MEDICARE

## 2019-08-06 DIAGNOSIS — Z95.818 STATUS POST PLACEMENT OF IMPLANTABLE LOOP RECORDER: Primary | ICD-10-CM

## 2019-08-06 PROCEDURE — 93299 PR INTERROG EVAL, REMOTE, UP TO 30 DAYS, CV MON/LOOP REC,TECH REVIEW: ICD-10-PCS | Mod: S$GLB,,, | Performed by: INTERNAL MEDICINE

## 2019-08-06 PROCEDURE — 99499 UNLISTED E&M SERVICE: CPT | Mod: S$GLB,,, | Performed by: INTERNAL MEDICINE

## 2019-08-06 PROCEDURE — 93298 PR INTERROG EVAL, REMOTE, UP TO 30 DAYS,IMPLANT LOOP REC: ICD-10-PCS | Mod: S$GLB,,, | Performed by: INTERNAL MEDICINE

## 2019-08-06 PROCEDURE — 93298 REM INTERROG DEV EVAL SCRMS: CPT | Mod: S$GLB,,, | Performed by: INTERNAL MEDICINE

## 2019-08-06 PROCEDURE — 93299 PR INTERROG EVAL, REMOTE, UP TO 30 DAYS, CV MON/LOOP REC,TECH REVIEW: CPT | Mod: S$GLB,,, | Performed by: INTERNAL MEDICINE

## 2019-08-06 PROCEDURE — 99499 NO LOS: ICD-10-PCS | Mod: S$GLB,,, | Performed by: INTERNAL MEDICINE

## 2019-08-06 NOTE — PROGRESS NOTES
Subjective:      Patient ID: Shant Magana Jr. is a 79 y.o. male.    Chief Complaint: No chief complaint on file.    HPI:    [employer] remote loop recorder report downloaded and prepared by medical assistant and interpreted by me and full report scanned into Epic media.  Battery OK.  Normal device function.  PVC noted on EGM.  Pt is on Eliquis for hx of parox a fib.    Past Medical History:   Diagnosis Date    Cancer     prostate    Diabetes mellitus     Hypertension     Hypertrophic cardiomyopathy     Renal disorder         Past Surgical History:   Procedure Laterality Date    BACK SURGERY      COLONOSCOPY N/A 10/18/2018    [facility]    EGD (ESOPHAGOGASTRODUODENOSCOPY) N/A 10/18/2018    [facility]    EGD (ESOPHAGOGASTRODUODENOSCOPY) N/A 9/4/2018    [facility] at [facility]    THYROIDECTOMY      TRANSESOPHAGEAL ECHOCARDIOGRAM (ESDRAS) N/A 10/13/2017    [facility] at [facility] OR    TRANSESOPHAGEAL ECHOCARDIOGRAM (ESDRAS) N/A 7/18/2017    Performed by Guanaco Ty MD at [facility] OR       No family history on file.    Social History     Socioeconomic History    Marital status: Single     Spouse name: Not on file    Number of children: Not on file    Years of education: Not on file    Highest education level: Not on file   Occupational History    Not on file   Social Needs    Financial resource strain: Not on file    Food insecurity:     Worry: Not on file     Inability: Not on file    Transportation needs:     Medical: Not on file     Non-medical: Not on file   Tobacco Use    Smoking status: Former Smoker     Packs/day: 0.90     Years: 3.00     Pack years: 2.70     Types: Cigarettes    Smokeless tobacco: Never Used   Substance and Sexual Activity    Alcohol use: Yes    Drug use: No    Sexual activity: Not on file   Lifestyle    Physical activity:     Days per week: Not on file      Minutes per session: Not on file    Stress: Not on file   Relationships    Social connections:     Talks on phone: Not on file     Gets together: Not on file     Attends Quaker service: Not on file     Active member of club or organization: Not on file     Attends meetings of clubs or organizations: Not on file     Relationship status: Not on file   Other Topics Concern    Not on file   Social History Narrative    Not on file       Current Outpatient Medications on File Prior to Visit   Medication Sig Dispense Refill    apixaban (ELIQUIS) 2.5 mg Tab Take 1 tablet (2.5 mg total) by mouth 2 (two) times daily. 60 tablet 11    atorvastatin (LIPITOR) 80 MG tablet Take 1 tablet (80 mg total) by mouth once daily. 90 tablet 2    losartan (COZAAR) 100 MG tablet Take 100 mg by mouth once daily.       pantoprazole (PROTONIX) 40 MG tablet Take 40 mg by mouth once daily.  3    tamsulosin (FLOMAX) 0.4 mg Cp24 Take 0.4 mg by mouth once daily.      vitamin D 1000 units Tab Take 1,000 Units by mouth once daily.       No current facility-administered medications on file prior to visit.        Review of patient's allergies indicates:   Allergen Reactions    Hydralazine analogues Diarrhea     Objective:   There were no vitals filed for this visit.     Physical Exam     Assessment:     1. Status post placement of implantable loop recorder      Plan:   Diagnoses and all orders for this visit:    Status post placement of implantable loop recorder         No follow-ups on file.

## 2019-08-13 ENCOUNTER — TELEPHONE (OUTPATIENT)
Dept: CARDIOLOGY | Facility: CLINIC | Age: 79
End: 2019-08-13

## 2019-08-13 NOTE — TELEPHONE ENCOUNTER
Reached out to patient to reschedule missed appointment.  No answer.  Will send appointment letter to patient.

## 2019-09-17 ENCOUNTER — CLINICAL SUPPORT (OUTPATIENT)
Dept: CARDIOLOGY | Facility: CLINIC | Age: 79
End: 2019-09-17
Payer: MEDICARE

## 2019-09-17 DIAGNOSIS — Z95.818 STATUS POST PLACEMENT OF IMPLANTABLE LOOP RECORDER: Primary | ICD-10-CM

## 2019-09-17 PROCEDURE — 93298 PR INTERROG EVAL, REMOTE, UP TO 30 DAYS,IMPLANT LOOP REC: ICD-10-PCS | Mod: S$GLB,,, | Performed by: INTERNAL MEDICINE

## 2019-09-17 PROCEDURE — 99499 NO LOS: ICD-10-PCS | Mod: S$GLB,,, | Performed by: INTERNAL MEDICINE

## 2019-09-17 PROCEDURE — 93299 PR INTERROG EVAL, REMOTE, UP TO 30 DAYS, CV MON/LOOP REC,TECH REVIEW: ICD-10-PCS | Mod: S$GLB,,, | Performed by: INTERNAL MEDICINE

## 2019-09-17 PROCEDURE — 93298 REM INTERROG DEV EVAL SCRMS: CPT | Mod: S$GLB,,, | Performed by: INTERNAL MEDICINE

## 2019-09-17 PROCEDURE — 93299 PR INTERROG EVAL, REMOTE, UP TO 30 DAYS, CV MON/LOOP REC,TECH REVIEW: CPT | Mod: S$GLB,,, | Performed by: INTERNAL MEDICINE

## 2019-09-17 PROCEDURE — 99499 UNLISTED E&M SERVICE: CPT | Mod: S$GLB,,, | Performed by: INTERNAL MEDICINE

## 2019-09-20 NOTE — PROGRESS NOTES
Subjective:      Patient ID: Shant Magana Jr. is a 79 y.o. male.    Chief Complaint: No chief complaint on file.    HPI:    ROS [employer] remote implanted loop recorder interrogation report downloaded and prepared by medical assistant and interpreted by me and full report scanned into Epic media.  Battery OK.  No events.  Normal device function.    Past Medical History:   Diagnosis Date    Cancer     prostate    Diabetes mellitus     Hypertension     Hypertrophic cardiomyopathy     Renal disorder         Past Surgical History:   Procedure Laterality Date    BACK SURGERY      COLONOSCOPY N/A 10/18/2018    [facility]    EGD (ESOPHAGOGASTRODUODENOSCOPY) N/A 10/18/2018    [facility]    EGD (ESOPHAGOGASTRODUODENOSCOPY) N/A 9/4/2018    [facility] at [facility]    THYROIDECTOMY      TRANSESOPHAGEAL ECHOCARDIOGRAM (ESDRAS) N/A 10/13/2017    [facility] OR    TRANSESOPHAGEAL ECHOCARDIOGRAM (ESDRAS) N/A 7/18/2017    Performed by Guanaco Ty MD at [facility] OR       No family history on file.    Social History     Socioeconomic History    Marital status: Single     Spouse name: Not on file    Number of children: Not on file    Years of education: Not on file    Highest education level: Not on file   Occupational History    Not on file   Social Needs    Financial resource strain: Not on file    Food insecurity:     Worry: Not on file     Inability: Not on file    Transportation needs:     Medical: Not on file     Non-medical: Not on file   Tobacco Use    Smoking status: Former Smoker     Packs/day: 0.90     Years: 3.00     Pack years: 2.70     Types: Cigarettes    Smokeless tobacco: Never Used   Substance and Sexual Activity    Alcohol use: Yes    Drug use: No    Sexual activity: Not on file   Lifestyle    Physical activity:     Days per week: Not on file     Minutes per session:  Not on file    Stress: Not on file   Relationships    Social connections:     Talks on phone: Not on file     Gets together: Not on file     Attends [employer] service: Not on file     Active member of club or organization: Not on file     Attends meetings of clubs or organizations: Not on file     Relationship status: Not on file   Other Topics Concern    Not on file   Social History Narrative    Not on file       Current Outpatient Medications on File Prior to Visit   Medication Sig Dispense Refill    apixaban (ELIQUIS) 2.5 mg Tab Take 1 tablet (2.5 mg total) by mouth 2 (two) times daily. 60 tablet 11    atorvastatin (LIPITOR) 80 MG tablet Take 1 tablet (80 mg total) by mouth once daily. 90 tablet 2    losartan (COZAAR) 100 MG tablet Take 100 mg by mouth once daily.       pantoprazole (PROTONIX) 40 MG tablet Take 40 mg by mouth once daily.  3    tamsulosin (FLOMAX) 0.4 mg Cp24 Take 0.4 mg by mouth once daily.      vitamin D 1000 units Tab Take 1,000 Units by mouth once daily.       No current facility-administered medications on file prior to visit.        Review of patient's allergies indicates:   Allergen Reactions    Hydralazine analogues Diarrhea     Objective:   There were no vitals filed for this visit.     Physical Exam     Assessment:     1. Status post placement of implantable loop recorder      Plan:   Diagnoses and all orders for this visit:    Status post placement of implantable loop recorder         No follow-ups on file.

## 2019-10-01 ENCOUNTER — OFFICE VISIT (OUTPATIENT)
Dept: CARDIOLOGY | Facility: CLINIC | Age: 79
End: 2019-10-01
Payer: MEDICARE

## 2019-10-01 VITALS
BODY MASS INDEX: 27.37 KG/M2 | HEART RATE: 56 BPM | DIASTOLIC BLOOD PRESSURE: 82 MMHG | HEIGHT: 73 IN | WEIGHT: 206.56 LBS | SYSTOLIC BLOOD PRESSURE: 158 MMHG

## 2019-10-01 DIAGNOSIS — E11.8 TYPE 2 DIABETES MELLITUS WITH COMPLICATION, WITHOUT LONG-TERM CURRENT USE OF INSULIN: ICD-10-CM

## 2019-10-01 DIAGNOSIS — I95.1 ORTHOSTATIC HYPOTENSION: ICD-10-CM

## 2019-10-01 DIAGNOSIS — I48.0 PAROXYSMAL ATRIAL FIBRILLATION: ICD-10-CM

## 2019-10-01 DIAGNOSIS — I65.22 INTERNAL CAROTID ARTERY STENOSIS, LEFT: ICD-10-CM

## 2019-10-01 DIAGNOSIS — E78.2 MIXED HYPERLIPIDEMIA: ICD-10-CM

## 2019-10-01 DIAGNOSIS — R00.1 BRADYCARDIA: ICD-10-CM

## 2019-10-01 DIAGNOSIS — Z95.818 STATUS POST PLACEMENT OF IMPLANTABLE LOOP RECORDER: ICD-10-CM

## 2019-10-01 DIAGNOSIS — D64.9 ANEMIA, UNSPECIFIED TYPE: ICD-10-CM

## 2019-10-01 DIAGNOSIS — I10 ESSENTIAL HYPERTENSION: Primary | ICD-10-CM

## 2019-10-01 DIAGNOSIS — I63.413 CEREBROVASCULAR ACCIDENT (CVA) DUE TO BILATERAL EMBOLISM OF MIDDLE CEREBRAL ARTERIES: ICD-10-CM

## 2019-10-01 DIAGNOSIS — I50.32 CHRONIC DIASTOLIC CONGESTIVE HEART FAILURE: ICD-10-CM

## 2019-10-01 DIAGNOSIS — N18.30 CKD (CHRONIC KIDNEY DISEASE) STAGE 3, GFR 30-59 ML/MIN: ICD-10-CM

## 2019-10-01 PROCEDURE — 3079F PR MOST RECENT DIASTOLIC BLOOD PRESSURE 80-89 MM HG: ICD-10-PCS | Mod: CPTII,S$GLB,, | Performed by: INTERNAL MEDICINE

## 2019-10-01 PROCEDURE — 93000 EKG 12-LEAD: ICD-10-PCS | Mod: S$GLB,,, | Performed by: INTERNAL MEDICINE

## 2019-10-01 PROCEDURE — 3077F PR MOST RECENT SYSTOLIC BLOOD PRESSURE >= 140 MM HG: ICD-10-PCS | Mod: CPTII,S$GLB,, | Performed by: INTERNAL MEDICINE

## 2019-10-01 PROCEDURE — 1101F PT FALLS ASSESS-DOCD LE1/YR: CPT | Mod: CPTII,S$GLB,, | Performed by: INTERNAL MEDICINE

## 2019-10-01 PROCEDURE — 99214 PR OFFICE/OUTPT VISIT, EST, LEVL IV, 30-39 MIN: ICD-10-PCS | Mod: 25,S$GLB,, | Performed by: INTERNAL MEDICINE

## 2019-10-01 PROCEDURE — 1101F PR PT FALLS ASSESS DOC 0-1 FALLS W/OUT INJ PAST YR: ICD-10-PCS | Mod: CPTII,S$GLB,, | Performed by: INTERNAL MEDICINE

## 2019-10-01 PROCEDURE — 99999 PR PBB SHADOW E&M-EST. PATIENT-LVL II: ICD-10-PCS | Mod: PBBFAC,,, | Performed by: INTERNAL MEDICINE

## 2019-10-01 PROCEDURE — 99214 OFFICE O/P EST MOD 30 MIN: CPT | Mod: 25,S$GLB,, | Performed by: INTERNAL MEDICINE

## 2019-10-01 PROCEDURE — 93000 ELECTROCARDIOGRAM COMPLETE: CPT | Mod: S$GLB,,, | Performed by: INTERNAL MEDICINE

## 2019-10-01 PROCEDURE — 99999 PR PBB SHADOW E&M-EST. PATIENT-LVL II: CPT | Mod: PBBFAC,,, | Performed by: INTERNAL MEDICINE

## 2019-10-01 PROCEDURE — 3079F DIAST BP 80-89 MM HG: CPT | Mod: CPTII,S$GLB,, | Performed by: INTERNAL MEDICINE

## 2019-10-01 PROCEDURE — 3077F SYST BP >= 140 MM HG: CPT | Mod: CPTII,S$GLB,, | Performed by: INTERNAL MEDICINE

## 2019-10-01 RX ORDER — HYDRALAZINE HYDROCHLORIDE 10 MG/1
10 TABLET, FILM COATED ORAL 3 TIMES DAILY
Qty: 90 TABLET | Refills: 11 | Status: ON HOLD | OUTPATIENT
Start: 2019-10-01 | End: 2019-12-16 | Stop reason: HOSPADM

## 2019-10-01 RX ORDER — AMLODIPINE BESYLATE 10 MG/1
10 TABLET ORAL DAILY
Refills: 2 | Status: ON HOLD | COMMUNITY
Start: 2019-09-10 | End: 2020-07-27 | Stop reason: HOSPADM

## 2019-10-01 RX ORDER — LISINOPRIL 10 MG/1
10 TABLET ORAL DAILY
COMMUNITY
End: 2019-10-01

## 2019-10-01 NOTE — PROGRESS NOTES
Subjective:      Patient ID: Shant Magana Jr. is a 79 y.o. male.    Chief Complaint: Follow-up    HPI:  Feels well.    Active with housework and yardwork    Review of Systems   Cardiovascular: Negative for chest pain, claudication, dyspnea on exertion, irregular heartbeat, leg swelling, near-syncope, orthopnea, palpitations and syncope.      Patient and son are not sure that the patient had an adverse reaction to hydralazine (they specifically do not recall a medication which caused diarrhea)    No bleeding  Past Medical History:   Diagnosis Date    Cancer     prostate    Diabetes mellitus     Hypertension     Hypertrophic cardiomyopathy     Renal disorder         Past Surgical History:   Procedure Laterality Date    BACK SURGERY      COLONOSCOPY N/A 10/18/2018    Procedure: COLONOSCOPY;  Surgeon: Alan Gooden MD;  Location: [facility];  Service: Endoscopy;  Laterality: N/A;    ESOPHAGOGASTRODUODENOSCOPY N/A 9/4/2018    Procedure: EGD (ESOPHAGOGASTRODUODENOSCOPY);  Surgeon: Oli Cuadra MD;  Location: [facility];  Service: Endoscopy;  Laterality: N/A;    ESOPHAGOGASTRODUODENOSCOPY N/A 10/18/2018    Procedure: EGD (ESOPHAGOGASTRODUODENOSCOPY);  Surgeon: Alan Gooden MD;  Location: [facility];  Service: Endoscopy;  Laterality: N/A;    THYROIDECTOMY         No family history on file.    Social History     Socioeconomic History    Marital status: Single     Spouse name: Not on file    Number of children: Not on file    Years of education: Not on file    Highest education level: Not on file   Occupational History    Not on file   Social Needs    Financial resource strain: Not on file    Food insecurity:     Worry: Not on file     Inability: Not on file    Transportation needs:     Medical: Not on file     Non-medical: Not on file   Tobacco Use    Smoking status: Former Smoker     Packs/day: 0.90     Years: 3.00     Pack years: 2.70     Types: Cigarettes    Smokeless tobacco: Never Used  "  Substance and Sexual Activity    Alcohol use: Yes    Drug use: No    Sexual activity: Not on file   Lifestyle    Physical activity:     Days per week: Not on file     Minutes per session: Not on file    Stress: Not on file   Relationships    Social connections:     Talks on phone: Not on file     Gets together: Not on file     Attends Religion service: Not on file     Active member of club or organization: Not on file     Attends meetings of clubs or organizations: Not on file     Relationship status: Not on file   Other Topics Concern    Not on file   Social History Narrative    Not on file       Current Outpatient Medications on File Prior to Visit   Medication Sig Dispense Refill    amLODIPine (NORVASC) 10 MG tablet Take 10 mg by mouth once daily.  2    apixaban (ELIQUIS) 2.5 mg Tab Take 1 tablet (2.5 mg total) by mouth 2 (two) times daily. 60 tablet 11    atorvastatin (LIPITOR) 80 MG tablet Take 1 tablet (80 mg total) by mouth once daily. 90 tablet 2    losartan (COZAAR) 100 MG tablet Take 100 mg by mouth once daily.       pantoprazole (PROTONIX) 40 MG tablet Take 40 mg by mouth once daily.  3    tamsulosin (FLOMAX) 0.4 mg Cp24 Take 0.4 mg by mouth once daily.      vitamin D 1000 units Tab Take 1,000 Units by mouth once daily.      [DISCONTINUED] lisinopril 10 MG tablet Take 10 mg by mouth once daily.       No current facility-administered medications on file prior to visit.        Review of patient's allergies indicates:   Allergen Reactions    Hydralazine analogues Diarrhea     Objective:     Vitals:    10/01/19 1534 10/01/19 1559   BP: (!) 154/84 (!) 158/82   BP Location: Left arm Left arm   Patient Position: Sitting Sitting   BP Method: Large (Automatic)    Pulse: (!) 56    Weight: 93.7 kg (206 lb 9.1 oz)    Height: 6' 1" (1.854 m)         Physical Exam   Constitutional: He is oriented to person, place, and time. He appears well-developed and well-nourished. No distress.   Eyes: No " scleral icterus.   Neck: No JVD present. Carotid bruit is not present.   Cardiovascular: Regular rhythm and normal heart sounds. Exam reveals no gallop and no friction rub.   No murmur heard.  Pulmonary/Chest: Effort normal and breath sounds normal. No respiratory distress.   Musculoskeletal: He exhibits no edema.   Neurological: He is alert and oriented to person, place, and time.   Skin: Skin is warm and dry. He is not diaphoretic.   Psychiatric: He has a normal mood and affect. His behavior is normal. Judgment and thought content normal.   Vitals reviewed.     ECG--sinus bradycardia, left axis deviation, anteroseptal infarct, unchanged compared with ECG 's done prior to echocardiogram last year    Recent loop recorder showed no recent a fib    Note echocardiogram last year showed normal LVEF    Assessment:     1. Essential hypertension    2. Internal carotid artery stenosis, left    3. Paroxysmal atrial fibrillation    4. Mixed hyperlipidemia    5. Status post placement of implantable loop recorder    6. Chronic diastolic congestive heart failure    7. Bradycardia    8. Cerebrovascular accident (CVA) due to bilateral embolism of middle cerebral arteries    9. CKD (chronic kidney disease) stage 3, GFR 30-59 ml/min    10. Anemia, unspecified type    11. Type 2 diabetes mellitus with complication, without long-term current use of insulin    12. Orthostatic hypotension      Plan:   Shant was seen today for follow-up.    Diagnoses and all orders for this visit:    Essential hypertension    Internal carotid artery stenosis, left    Paroxysmal atrial fibrillation    Mixed hyperlipidemia    Status post placement of implantable loop recorder    Chronic diastolic congestive heart failure    Bradycardia    Cerebrovascular accident (CVA) due to bilateral embolism of middle cerebral arteries    CKD (chronic kidney disease) stage 3, GFR 30-59 ml/min    Anemia, unspecified type    Type 2 diabetes mellitus with complication,  without long-term current use of insulin    Orthostatic hypotension    Other orders  -     IN OFFICE EKG 12-LEAD (to Muse)       Will discontinue the lisinopril since pt is taking losartan  Continue amlodipine and losartan  Add hydralazine 10 mg tid for hypertension    Pt has labs scheduled for Thursday for Dr Aldana    Follow up in about 4 weeks (around 10/29/2019).

## 2019-10-31 ENCOUNTER — CLINICAL SUPPORT (OUTPATIENT)
Dept: CARDIOLOGY | Facility: CLINIC | Age: 79
End: 2019-10-31
Payer: MEDICARE

## 2019-10-31 DIAGNOSIS — Z95.818 STATUS POST PLACEMENT OF IMPLANTABLE LOOP RECORDER: Primary | ICD-10-CM

## 2019-10-31 PROCEDURE — 93299 PR INTERROG EVAL, REMOTE, UP TO 30 DAYS, CV MON/LOOP REC,TECH REVIEW: CPT | Mod: S$GLB,,, | Performed by: INTERNAL MEDICINE

## 2019-10-31 PROCEDURE — 99499 NO LOS: ICD-10-PCS | Mod: S$GLB,,, | Performed by: INTERNAL MEDICINE

## 2019-10-31 PROCEDURE — 93299 PR INTERROG EVAL, REMOTE, UP TO 30 DAYS, CV MON/LOOP REC,TECH REVIEW: ICD-10-PCS | Mod: S$GLB,,, | Performed by: INTERNAL MEDICINE

## 2019-10-31 PROCEDURE — 99499 UNLISTED E&M SERVICE: CPT | Mod: S$GLB,,, | Performed by: INTERNAL MEDICINE

## 2019-10-31 PROCEDURE — 93298 REM INTERROG DEV EVAL SCRMS: CPT | Mod: S$GLB,,, | Performed by: INTERNAL MEDICINE

## 2019-10-31 PROCEDURE — 93298 PR INTERROG EVAL, REMOTE, UP TO 30 DAYS,IMPLANT LOOP REC: ICD-10-PCS | Mod: S$GLB,,, | Performed by: INTERNAL MEDICINE

## 2019-10-31 NOTE — PROGRESS NOTES
Subjective:      Patient ID: Shant Magana Jr. is a 79 y.o. male.    Chief Complaint: No chief complaint on file.    HPI:    [employer] remote loop recorder report downloaded and prepared by medical assistant and interpreted by me and full report scanned into [employer]    Stable NSR.  No events. Normal device function.  Battery OK        Past Medical History:   Diagnosis Date    Cancer     prostate    Diabetes mellitus     Hypertension     Hypertrophic cardiomyopathy     Renal disorder         Past Surgical History:   Procedure Laterality Date    BACK SURGERY      COLONOSCOPY N/A 10/18/2018    Procedure: COLONOSCOPY;  Surgeon: Alan Gooden MD;  Location: [facility];  Service: Endoscopy;  Laterality: N/A;    ESOPHAGOGASTRODUODENOSCOPY N/A 9/4/2018    Procedure: EGD (ESOPHAGOGASTRODUODENOSCOPY);  Surgeon: Oli Cuadra MD;  Location: [facility];  Service: Endoscopy;  Laterality: N/A;    ESOPHAGOGASTRODUODENOSCOPY N/A 10/18/2018    Procedure: EGD (ESOPHAGOGASTRODUODENOSCOPY);  Surgeon: Alan Gooden MD;  Location: [facility];  Service: Endoscopy;  Laterality: N/A;    THYROIDECTOMY         No family history on file.    Social History     Socioeconomic History    Marital status: Single     Spouse name: Not on file    Number of children: Not on file    Years of education: Not on file    Highest education level: Not on file   Occupational History    Not on file   Social Needs    Financial resource strain: Not on file    Food insecurity:     Worry: Not on file     Inability: Not on file    Transportation needs:     Medical: Not on file     Non-medical: Not on file   Tobacco Use    Smoking status: Former Smoker     Packs/day: 0.90     Years: 3.00     Pack years: 2.70     Types: Cigarettes    Smokeless tobacco: Never Used   Substance and Sexual Activity    Alcohol use: Yes    Drug use: No    Sexual activity: Not on file   Lifestyle    Physical activity:     Days per week: Not on file      Minutes per session: Not on file    Stress: Not on file   Relationships    Social connections:     Talks on phone: Not on file     Gets together: Not on file     Attends [employer] service: Not on file     Active member of club or organization: Not on file     Attends meetings of clubs or organizations: Not on file     Relationship status: Not on file   Other Topics Concern    Not on file   Social History Narrative    Not on file       Current Outpatient Medications on File Prior to Visit   Medication Sig Dispense Refill    amLODIPine (NORVASC) 10 MG tablet Take 10 mg by mouth once daily.  2    apixaban (ELIQUIS) 2.5 mg Tab Take 1 tablet (2.5 mg total) by mouth 2 (two) times daily. 60 tablet 11    atorvastatin (LIPITOR) 80 MG tablet Take 1 tablet (80 mg total) by mouth once daily. 90 tablet 2    hydrALAZINE (APRESOLINE) 10 MG tablet Take 1 tablet (10 mg total) by mouth 3 (three) times daily. 90 tablet 11    losartan (COZAAR) 100 MG tablet Take 100 mg by mouth once daily.       pantoprazole (PROTONIX) 40 MG tablet Take 40 mg by mouth once daily.  3    tamsulosin (FLOMAX) 0.4 mg Cp24 Take 0.4 mg by mouth once daily.      vitamin D 1000 units Tab Take 1,000 Units by mouth once daily.       No current facility-administered medications on file prior to visit.        Review of patient's allergies indicates:   Allergen Reactions    Hydralazine analogues Diarrhea     Objective:   There were no vitals filed for this visit.     Physical Exam     Assessment:     1. Status post placement of implantable loop recorder      Plan:   Diagnoses and all orders for this visit:    Status post placement of implantable loop recorder         No follow-ups on file.

## 2019-11-04 ENCOUNTER — OFFICE VISIT (OUTPATIENT)
Dept: CARDIOLOGY | Facility: CLINIC | Age: 79
End: 2019-11-04
Payer: MEDICARE

## 2019-11-04 VITALS
WEIGHT: 207.31 LBS | OXYGEN SATURATION: 99 % | SYSTOLIC BLOOD PRESSURE: 119 MMHG | DIASTOLIC BLOOD PRESSURE: 78 MMHG | HEIGHT: 73 IN | BODY MASS INDEX: 27.47 KG/M2 | HEART RATE: 57 BPM

## 2019-11-04 DIAGNOSIS — I50.32 CHRONIC DIASTOLIC CONGESTIVE HEART FAILURE: ICD-10-CM

## 2019-11-04 DIAGNOSIS — Z95.818 STATUS POST PLACEMENT OF IMPLANTABLE LOOP RECORDER: ICD-10-CM

## 2019-11-04 DIAGNOSIS — R00.1 BRADYCARDIA: ICD-10-CM

## 2019-11-04 DIAGNOSIS — I95.1 ORTHOSTATIC HYPOTENSION: ICD-10-CM

## 2019-11-04 DIAGNOSIS — E78.2 MIXED HYPERLIPIDEMIA: ICD-10-CM

## 2019-11-04 DIAGNOSIS — I10 ESSENTIAL HYPERTENSION: Primary | ICD-10-CM

## 2019-11-04 DIAGNOSIS — I63.413 CEREBROVASCULAR ACCIDENT (CVA) DUE TO BILATERAL EMBOLISM OF MIDDLE CEREBRAL ARTERIES: ICD-10-CM

## 2019-11-04 DIAGNOSIS — I48.0 PAROXYSMAL ATRIAL FIBRILLATION: ICD-10-CM

## 2019-11-04 DIAGNOSIS — I63.9 CRYPTOGENIC STROKE: ICD-10-CM

## 2019-11-04 DIAGNOSIS — I65.22 INTERNAL CAROTID ARTERY STENOSIS, LEFT: ICD-10-CM

## 2019-11-04 DIAGNOSIS — I51.7 ENLARGED LA (LEFT ATRIUM): ICD-10-CM

## 2019-11-04 DIAGNOSIS — N18.30 CKD (CHRONIC KIDNEY DISEASE) STAGE 3, GFR 30-59 ML/MIN: ICD-10-CM

## 2019-11-04 PROCEDURE — 3074F PR MOST RECENT SYSTOLIC BLOOD PRESSURE < 130 MM HG: ICD-10-PCS | Mod: CPTII,S$GLB,, | Performed by: INTERNAL MEDICINE

## 2019-11-04 PROCEDURE — 99213 OFFICE O/P EST LOW 20 MIN: CPT | Mod: S$GLB,,, | Performed by: INTERNAL MEDICINE

## 2019-11-04 PROCEDURE — 99999 PR PBB SHADOW E&M-EST. PATIENT-LVL II: ICD-10-PCS | Mod: PBBFAC,,, | Performed by: INTERNAL MEDICINE

## 2019-11-04 PROCEDURE — 3078F DIAST BP <80 MM HG: CPT | Mod: CPTII,S$GLB,, | Performed by: INTERNAL MEDICINE

## 2019-11-04 PROCEDURE — 1101F PT FALLS ASSESS-DOCD LE1/YR: CPT | Mod: CPTII,S$GLB,, | Performed by: INTERNAL MEDICINE

## 2019-11-04 PROCEDURE — 3078F PR MOST RECENT DIASTOLIC BLOOD PRESSURE < 80 MM HG: ICD-10-PCS | Mod: CPTII,S$GLB,, | Performed by: INTERNAL MEDICINE

## 2019-11-04 PROCEDURE — 99999 PR PBB SHADOW E&M-EST. PATIENT-LVL II: CPT | Mod: PBBFAC,,, | Performed by: INTERNAL MEDICINE

## 2019-11-04 PROCEDURE — 3074F SYST BP LT 130 MM HG: CPT | Mod: CPTII,S$GLB,, | Performed by: INTERNAL MEDICINE

## 2019-11-04 PROCEDURE — 1101F PR PT FALLS ASSESS DOC 0-1 FALLS W/OUT INJ PAST YR: ICD-10-PCS | Mod: CPTII,S$GLB,, | Performed by: INTERNAL MEDICINE

## 2019-11-04 PROCEDURE — 99213 PR OFFICE/OUTPT VISIT, EST, LEVL III, 20-29 MIN: ICD-10-PCS | Mod: S$GLB,,, | Performed by: INTERNAL MEDICINE

## 2019-11-04 NOTE — PROGRESS NOTES
Subjective:      Patient ID: Shant Magana Jr. is a 79 y.o. male.    Chief Complaint: Follow-up (Hypertension)    HPI:  Active with usual household chores.    No side effects from hydralazine    Stopped the lisinopril    Legs are OK    No bleeding     Sugars are OK    Review of Systems   Cardiovascular: Negative for chest pain, claudication, dyspnea on exertion, irregular heartbeat, leg swelling, near-syncope, orthopnea, palpitations and syncope.        Past Medical History:   Diagnosis Date    Cancer     prostate    Diabetes mellitus     Hypertension     Hypertrophic cardiomyopathy     Renal disorder         Past Surgical History:   Procedure Laterality Date    BACK SURGERY      COLONOSCOPY N/A 10/18/2018    Procedure: COLONOSCOPY;  Surgeon: Alan Gooden MD;  Location: [facility];  Service: Endoscopy;  Laterality: N/A;    ESOPHAGOGASTRODUODENOSCOPY N/A 9/4/2018    Procedure: EGD (ESOPHAGOGASTRODUODENOSCOPY);  Surgeon: Oli Cuadra MD;  Location: [facility];  Service: Endoscopy;  Laterality: N/A;    ESOPHAGOGASTRODUODENOSCOPY N/A 10/18/2018    Procedure: EGD (ESOPHAGOGASTRODUODENOSCOPY);  Surgeon: Alan Gooden MD;  Location: [facility];  Service: Endoscopy;  Laterality: N/A;    THYROIDECTOMY         No family history on file.    Social History     Socioeconomic History    Marital status: Single     Spouse name: Not on file    Number of children: Not on file    Years of education: Not on file    Highest education level: Not on file   Occupational History    Not on file   Social Needs    Financial resource strain: Not on file    Food insecurity:     Worry: Not on file     Inability: Not on file    Transportation needs:     Medical: Not on file     Non-medical: Not on file   Tobacco Use    Smoking status: Former Smoker     Packs/day: 0.90     Years: 3.00     Pack years: 2.70     Types: Cigarettes    Smokeless tobacco: Never Used   Substance and Sexual Activity    Alcohol use: Yes    Drug  "use: No    Sexual activity: Not on file   Lifestyle    Physical activity:     Days per week: Not on file     Minutes per session: Not on file    Stress: Not on file   Relationships    Social connections:     Talks on phone: Not on file     Gets together: Not on file     Attends Protestant service: Not on file     Active member of club or organization: Not on file     Attends meetings of clubs or organizations: Not on file     Relationship status: Not on file   Other Topics Concern    Not on file   Social History Narrative    Not on file       Current Outpatient Medications on File Prior to Visit   Medication Sig Dispense Refill    amLODIPine (NORVASC) 10 MG tablet Take 10 mg by mouth once daily.  2    apixaban (ELIQUIS) 2.5 mg Tab Take 1 tablet (2.5 mg total) by mouth 2 (two) times daily. 60 tablet 11    atorvastatin (LIPITOR) 80 MG tablet Take 1 tablet (80 mg total) by mouth once daily. 90 tablet 2    hydrALAZINE (APRESOLINE) 10 MG tablet Take 1 tablet (10 mg total) by mouth 3 (three) times daily. 90 tablet 11    losartan (COZAAR) 100 MG tablet Take 100 mg by mouth once daily.       pantoprazole (PROTONIX) 40 MG tablet Take 40 mg by mouth once daily.  3    tamsulosin (FLOMAX) 0.4 mg Cp24 Take 0.4 mg by mouth once daily.      vitamin D 1000 units Tab Take 1,000 Units by mouth once daily.       No current facility-administered medications on file prior to visit.        Review of patient's allergies indicates:   Allergen Reactions    Hydralazine analogues Diarrhea     Objective:     Vitals:    11/04/19 1441   BP: 119/78   BP Location: Left arm   Patient Position: Sitting   BP Method: Large (Automatic)   Pulse: (!) 57   SpO2: 99%   Weight: 94 kg (207 lb 5.5 oz)   Height: 6' 1" (1.854 m)        Physical Exam   Constitutional: He is oriented to person, place, and time. He appears well-developed and well-nourished. No distress.   Eyes: No scleral icterus.   Neck: No JVD present. Carotid bruit is not present. "   Cardiovascular: Regular rhythm. Exam reveals no gallop and no friction rub.   Murmur (II/VI systolic) heard.  Pulmonary/Chest: Effort normal and breath sounds normal. No respiratory distress.   Musculoskeletal: He exhibits no edema.   Neurological: He is alert and oriented to person, place, and time.   Skin: Skin is warm and dry. He is not diaphoretic.   Psychiatric: He has a normal mood and affect. His behavior is normal. Judgment and thought content normal.   Vitals reviewed.       Assessment:     1. Essential hypertension    2. Orthostatic hypotension    3. Internal carotid artery stenosis, left    4. Mixed hyperlipidemia    5. Paroxysmal atrial fibrillation    6. Status post placement of implantable loop recorder    7. Enlarged LA (left atrium)    8. Chronic diastolic congestive heart failure    9. Bradycardia    10. Cryptogenic stroke    11. Cerebrovascular accident (CVA) due to bilateral embolism of middle cerebral arteries    12. CKD (chronic kidney disease) stage 3, GFR 30-59 ml/min      Plan:   Shant was seen today for follow-up.    Diagnoses and all orders for this visit:    Essential hypertension    Orthostatic hypotension    Internal carotid artery stenosis, left    Mixed hyperlipidemia    Paroxysmal atrial fibrillation    Status post placement of implantable loop recorder    Enlarged LA (left atrium)    Chronic diastolic congestive heart failure    Bradycardia    Cryptogenic stroke    Cerebrovascular accident (CVA) due to bilateral embolism of middle cerebral arteries    CKD (chronic kidney disease) stage 3, GFR 30-59 ml/min     Same meds    F/u with Dr Aldana who does labs    RTC 6 months    Follow up in about 6 months (around 5/4/2020).

## 2019-12-02 ENCOUNTER — CLINICAL SUPPORT (OUTPATIENT)
Dept: CARDIOLOGY | Facility: CLINIC | Age: 79
End: 2019-12-02
Payer: MEDICARE

## 2019-12-02 DIAGNOSIS — Z95.818 STATUS POST PLACEMENT OF IMPLANTABLE LOOP RECORDER: Primary | ICD-10-CM

## 2019-12-02 PROCEDURE — 93298 REM INTERROG DEV EVAL SCRMS: CPT | Mod: S$GLB,,, | Performed by: INTERNAL MEDICINE

## 2019-12-02 PROCEDURE — 93299 PR INTERROG EVAL, REMOTE, UP TO 30 DAYS, CV MON/LOOP REC,TECH REVIEW: CPT | Mod: S$GLB,,, | Performed by: INTERNAL MEDICINE

## 2019-12-02 PROCEDURE — 93299 PR INTERROG EVAL, REMOTE, UP TO 30 DAYS, CV MON/LOOP REC,TECH REVIEW: ICD-10-PCS | Mod: S$GLB,,, | Performed by: INTERNAL MEDICINE

## 2019-12-02 PROCEDURE — 99499 UNLISTED E&M SERVICE: CPT | Mod: S$GLB,,, | Performed by: INTERNAL MEDICINE

## 2019-12-02 PROCEDURE — 93298 PR INTERROG EVAL, REMOTE, UP TO 30 DAYS,IMPLANT LOOP REC: ICD-10-PCS | Mod: S$GLB,,, | Performed by: INTERNAL MEDICINE

## 2019-12-02 PROCEDURE — 99499 NO LOS: ICD-10-PCS | Mod: S$GLB,,, | Performed by: INTERNAL MEDICINE

## 2019-12-03 NOTE — PROGRESS NOTES
Subjective:      Patient ID: Shant Magana Jr. is a 79 y.o. male.    Chief Complaint: No chief complaint on file.    HPI:    [employer] implanted loop recorder remote interrogation report downloaded and prepared by medical assistant and interpreted by me and full report scanned into [employer]. No a fib.  No VT  Battery OK.  Normal device function    Past Medical History:   Diagnosis Date    Cancer     prostate    Diabetes mellitus     Hypertension     Hypertrophic cardiomyopathy     Renal disorder         Past Surgical History:   Procedure Laterality Date    BACK SURGERY      COLONOSCOPY N/A 10/18/2018    Procedure: COLONOSCOPY;  Surgeon: Alan Gooden MD;  Location: [facility];  Service: Endoscopy;  Laterality: N/A;    ESOPHAGOGASTRODUODENOSCOPY N/A 9/4/2018    Procedure: EGD (ESOPHAGOGASTRODUODENOSCOPY);  Surgeon: Oli Cuadra MD;  Location: [facility];  Service: Endoscopy;  Laterality: N/A;    ESOPHAGOGASTRODUODENOSCOPY N/A 10/18/2018    Procedure: EGD (ESOPHAGOGASTRODUODENOSCOPY);  Surgeon: Alan Gooden MD;  Location: [facility];  Service: Endoscopy;  Laterality: N/A;    THYROIDECTOMY         No family history on file.    Social History     Socioeconomic History    Marital status: Single     Spouse name: Not on file    Number of children: Not on file    Years of education: Not on file    Highest education level: Not on file   Occupational History    Not on file   Social Needs    Financial resource strain: Not on file    Food insecurity:     Worry: Not on file     Inability: Not on file    Transportation needs:     Medical: Not on file     Non-medical: Not on file   Tobacco Use    Smoking status: Former Smoker     Packs/day: 0.90     Years: 3.00     Pack years: 2.70     Types: Cigarettes    Smokeless tobacco: Never Used   Substance and Sexual Activity    Alcohol use: Yes    Drug use: No    Sexual activity: Not on file   Lifestyle    Physical activity:     Days per week: Not on  file     Minutes per session: Not on file    Stress: Not on file   Relationships    Social connections:     Talks on phone: Not on file     Gets together: Not on file     Attends Ep[employer] service: Not on file     Active member of club or organization: Not on file     Attends meetings of clubs or organizations: Not on file     Relationship status: Not on file   Other Topics Concern    Not on file   Social History Narrative    Not on file       Current Outpatient Medications on File Prior to Visit   Medication Sig Dispense Refill    amLODIPine (NORVASC) 10 MG tablet Take 10 mg by mouth once daily.  2    apixaban (ELIQUIS) 2.5 mg Tab Take 1 tablet (2.5 mg total) by mouth 2 (two) times daily. 60 tablet 11    atorvastatin (LIPITOR) 80 MG tablet Take 1 tablet (80 mg total) by mouth once daily. 90 tablet 2    hydrALAZINE (APRESOLINE) 10 MG tablet Take 1 tablet (10 mg total) by mouth 3 (three) times daily. 90 tablet 11    losartan (COZAAR) 100 MG tablet Take 100 mg by mouth once daily.       pantoprazole (PROTONIX) 40 MG tablet Take 40 mg by mouth once daily.  3    tamsulosin (FLOMAX) 0.4 mg Cp24 Take 0.4 mg by mouth once daily.      vitamin D 1000 units Tab Take 1,000 Units by mouth once daily.       No current facility-administered medications on file prior to visit.        Review of patient's allergies indicates:   Allergen Reactions    Hydralazine analogues Diarrhea     Objective:   There were no vitals filed for this visit.     Physical Exam     Assessment:     1. Status post placement of implantable loop recorder      Plan:   Diagnoses and all orders for this visit:    Status post placement of implantable loop recorder         No follow-ups on file.

## 2019-12-10 ENCOUNTER — HOSPITAL ENCOUNTER (INPATIENT)
Facility: HOSPITAL | Age: 79
LOS: 7 days | Discharge: NURSING, REHAB OR LONG-TERM CARE | DRG: 065 | End: 2019-12-17
Attending: EMERGENCY MEDICINE | Admitting: INTERNAL MEDICINE
Payer: MEDICARE

## 2019-12-10 DIAGNOSIS — I50.32 CHRONIC HEART FAILURE WITH PRESERVED EJECTION FRACTION: ICD-10-CM

## 2019-12-10 DIAGNOSIS — E78.2 MIXED HYPERLIPIDEMIA: ICD-10-CM

## 2019-12-10 DIAGNOSIS — I10 ESSENTIAL HYPERTENSION: ICD-10-CM

## 2019-12-10 DIAGNOSIS — R29.90 STROKE-LIKE SYMPTOM: ICD-10-CM

## 2019-12-10 DIAGNOSIS — I63.9 ACUTE EMBOLIC STROKE: ICD-10-CM

## 2019-12-10 DIAGNOSIS — I65.22 INTERNAL CAROTID ARTERY STENOSIS, LEFT: ICD-10-CM

## 2019-12-10 DIAGNOSIS — R53.1 WEAKNESS: ICD-10-CM

## 2019-12-10 DIAGNOSIS — G93.40 ACUTE ENCEPHALOPATHY: ICD-10-CM

## 2019-12-10 DIAGNOSIS — F01.50 VASCULAR DEMENTIA WITHOUT BEHAVIORAL DISTURBANCE: ICD-10-CM

## 2019-12-10 DIAGNOSIS — I63.9 STROKE: ICD-10-CM

## 2019-12-10 DIAGNOSIS — E11.8 TYPE 2 DIABETES MELLITUS WITH COMPLICATION, WITHOUT LONG-TERM CURRENT USE OF INSULIN: ICD-10-CM

## 2019-12-10 DIAGNOSIS — N40.0 BENIGN PROSTATIC HYPERPLASIA, UNSPECIFIED WHETHER LOWER URINARY TRACT SYMPTOMS PRESENT: ICD-10-CM

## 2019-12-10 DIAGNOSIS — N18.30 CKD (CHRONIC KIDNEY DISEASE) STAGE 3, GFR 30-59 ML/MIN: ICD-10-CM

## 2019-12-10 DIAGNOSIS — I48.0 PAROXYSMAL ATRIAL FIBRILLATION: ICD-10-CM

## 2019-12-10 PROBLEM — E87.20 LACTIC ACIDOSIS: Status: ACTIVE | Noted: 2019-12-10

## 2019-12-10 PROBLEM — F03.90 DEMENTIA WITHOUT BEHAVIORAL DISTURBANCE: Status: ACTIVE | Noted: 2019-12-10

## 2019-12-10 LAB
ALBUMIN SERPL BCP-MCNC: 3.5 G/DL (ref 3.5–5.2)
ALP SERPL-CCNC: 81 U/L (ref 55–135)
ALT SERPL W/O P-5'-P-CCNC: 14 U/L (ref 10–44)
AMMONIA PLAS-SCNC: 87 UMOL/L (ref 10–50)
AMPHET+METHAMPHET UR QL: NEGATIVE
ANION GAP SERPL CALC-SCNC: 13 MMOL/L (ref 8–16)
AST SERPL-CCNC: 23 U/L (ref 10–40)
BARBITURATES UR QL SCN>200 NG/ML: NEGATIVE
BASOPHILS # BLD AUTO: 0.01 K/UL (ref 0–0.2)
BASOPHILS NFR BLD: 0.2 % (ref 0–1.9)
BENZODIAZ UR QL SCN>200 NG/ML: NEGATIVE
BILIRUB SERPL-MCNC: 0.9 MG/DL (ref 0.1–1)
BILIRUB UR QL STRIP: NEGATIVE
BNP SERPL-MCNC: 148 PG/ML (ref 0–99)
BUN SERPL-MCNC: 23 MG/DL (ref 8–23)
BZE UR QL SCN: NEGATIVE
CALCIUM SERPL-MCNC: 10.4 MG/DL (ref 8.7–10.5)
CANNABINOIDS UR QL SCN: NEGATIVE
CHLORIDE SERPL-SCNC: 108 MMOL/L (ref 95–110)
CHOLEST SERPL-MCNC: 147 MG/DL (ref 120–199)
CHOLEST/HDLC SERPL: 3.3 {RATIO} (ref 2–5)
CLARITY UR: CLEAR
CO2 SERPL-SCNC: 18 MMOL/L (ref 23–29)
COLOR UR: YELLOW
CREAT SERPL-MCNC: 1.6 MG/DL (ref 0.5–1.4)
CREAT SERPL-MCNC: 1.6 MG/DL (ref 0.5–1.4)
CREAT UR-MCNC: 102 MG/DL (ref 23–375)
DIFFERENTIAL METHOD: ABNORMAL
EOSINOPHIL # BLD AUTO: 0 K/UL (ref 0–0.5)
EOSINOPHIL NFR BLD: 0 % (ref 0–8)
ERYTHROCYTE [DISTWIDTH] IN BLOOD BY AUTOMATED COUNT: 15.7 % (ref 11.5–14.5)
EST. GFR  (AFRICAN AMERICAN): 47 ML/MIN/1.73 M^2
EST. GFR  (NON AFRICAN AMERICAN): 40 ML/MIN/1.73 M^2
GLUCOSE SERPL-MCNC: 122 MG/DL (ref 70–110)
GLUCOSE UR QL STRIP: NEGATIVE
HCT VFR BLD AUTO: 41.2 % (ref 40–54)
HDLC SERPL-MCNC: 44 MG/DL (ref 40–75)
HDLC SERPL: 29.9 % (ref 20–50)
HGB BLD-MCNC: 13.4 G/DL (ref 14–18)
HGB UR QL STRIP: ABNORMAL
INR PPP: 1.1 (ref 0.8–1.2)
KETONES UR QL STRIP: NEGATIVE
LACTATE SERPL-SCNC: 2.9 MMOL/L (ref 0.5–2.2)
LDLC SERPL CALC-MCNC: 94 MG/DL (ref 63–159)
LEUKOCYTE ESTERASE UR QL STRIP: NEGATIVE
LYMPHOCYTES # BLD AUTO: 0.4 K/UL (ref 1–4.8)
LYMPHOCYTES NFR BLD: 6.3 % (ref 18–48)
MCH RBC QN AUTO: 27.4 PG (ref 27–31)
MCHC RBC AUTO-ENTMCNC: 32.5 G/DL (ref 32–36)
MCV RBC AUTO: 84 FL (ref 82–98)
METHADONE UR QL SCN>300 NG/ML: NEGATIVE
MICROSCOPIC COMMENT: NORMAL
MONOCYTES # BLD AUTO: 0.3 K/UL (ref 0.3–1)
MONOCYTES NFR BLD: 4.4 % (ref 4–15)
NEUTROPHILS # BLD AUTO: 5.5 K/UL (ref 1.8–7.7)
NEUTROPHILS NFR BLD: 89.1 % (ref 38–73)
NITRITE UR QL STRIP: NEGATIVE
NONHDLC SERPL-MCNC: 103 MG/DL
OPIATES UR QL SCN: NEGATIVE
PCP UR QL SCN>25 NG/ML: NEGATIVE
PH UR STRIP: 7 [PH] (ref 5–8)
PLATELET # BLD AUTO: 196 K/UL (ref 150–350)
PMV BLD AUTO: 12.1 FL (ref 9.2–12.9)
POC PTINR: 1.1 (ref 0.9–1.2)
POC PTWBT: 12.9 SEC (ref 9.7–14.3)
POCT GLUCOSE: 99 MG/DL (ref 70–110)
POTASSIUM SERPL-SCNC: 4.3 MMOL/L (ref 3.5–5.1)
PROT SERPL-MCNC: 7.2 G/DL (ref 6–8.4)
PROT UR QL STRIP: NEGATIVE
PROTHROMBIN TIME: 11 SEC (ref 9–12.5)
RBC # BLD AUTO: 4.89 M/UL (ref 4.6–6.2)
RBC #/AREA URNS HPF: 3 /HPF (ref 0–4)
SAMPLE: ABNORMAL
SAMPLE: NORMAL
SODIUM SERPL-SCNC: 139 MMOL/L (ref 136–145)
SP GR UR STRIP: 1.02 (ref 1–1.03)
TOXICOLOGY INFORMATION: NORMAL
TRIGL SERPL-MCNC: 45 MG/DL (ref 30–150)
TROPONIN I SERPL DL<=0.01 NG/ML-MCNC: <0.006 NG/ML (ref 0–0.03)
TSH SERPL DL<=0.005 MIU/L-ACNC: 2.53 UIU/ML (ref 0.4–4)
URN SPEC COLLECT METH UR: ABNORMAL
UROBILINOGEN UR STRIP-ACNC: NEGATIVE EU/DL
WBC # BLD AUTO: 6.17 K/UL (ref 3.9–12.7)
WBC #/AREA URNS HPF: 2 /HPF (ref 0–5)

## 2019-12-10 PROCEDURE — G0427 INPT/ED TELECONSULT70: HCPCS | Mod: GT,,, | Performed by: PSYCHIATRY & NEUROLOGY

## 2019-12-10 PROCEDURE — 87040 BLOOD CULTURE FOR BACTERIA: CPT

## 2019-12-10 PROCEDURE — 82962 GLUCOSE BLOOD TEST: CPT

## 2019-12-10 PROCEDURE — 99285 EMERGENCY DEPT VISIT HI MDM: CPT | Mod: 25

## 2019-12-10 PROCEDURE — 85610 PROTHROMBIN TIME: CPT

## 2019-12-10 PROCEDURE — 84443 ASSAY THYROID STIM HORMONE: CPT

## 2019-12-10 PROCEDURE — 12000002 HC ACUTE/MED SURGE SEMI-PRIVATE ROOM

## 2019-12-10 PROCEDURE — 83605 ASSAY OF LACTIC ACID: CPT

## 2019-12-10 PROCEDURE — 82565 ASSAY OF CREATININE: CPT

## 2019-12-10 PROCEDURE — 80053 COMPREHEN METABOLIC PANEL: CPT

## 2019-12-10 PROCEDURE — 81000 URINALYSIS NONAUTO W/SCOPE: CPT | Mod: 59

## 2019-12-10 PROCEDURE — 80307 DRUG TEST PRSMV CHEM ANLYZR: CPT

## 2019-12-10 PROCEDURE — 82140 ASSAY OF AMMONIA: CPT

## 2019-12-10 PROCEDURE — 84484 ASSAY OF TROPONIN QUANT: CPT

## 2019-12-10 PROCEDURE — 99900035 HC TECH TIME PER 15 MIN (STAT)

## 2019-12-10 PROCEDURE — 83880 ASSAY OF NATRIURETIC PEPTIDE: CPT

## 2019-12-10 PROCEDURE — 85025 COMPLETE CBC W/AUTO DIFF WBC: CPT

## 2019-12-10 PROCEDURE — 93005 ELECTROCARDIOGRAM TRACING: CPT

## 2019-12-10 PROCEDURE — 80061 LIPID PANEL: CPT

## 2019-12-10 PROCEDURE — G0427 PR INPT TELEHEALTH CON 70/>M: ICD-10-PCS | Mod: GT,,, | Performed by: PSYCHIATRY & NEUROLOGY

## 2019-12-10 RX ORDER — TAMSULOSIN HYDROCHLORIDE 0.4 MG/1
0.4 CAPSULE ORAL DAILY
Status: DISCONTINUED | OUTPATIENT
Start: 2019-12-11 | End: 2019-12-17 | Stop reason: HOSPADM

## 2019-12-10 RX ORDER — ATORVASTATIN CALCIUM 40 MG/1
80 TABLET, FILM COATED ORAL DAILY
Status: DISCONTINUED | OUTPATIENT
Start: 2019-12-11 | End: 2019-12-17 | Stop reason: HOSPADM

## 2019-12-10 RX ORDER — CHOLECALCIFEROL (VITAMIN D3) 25 MCG
1000 TABLET ORAL DAILY
Status: DISCONTINUED | OUTPATIENT
Start: 2019-12-11 | End: 2019-12-17 | Stop reason: HOSPADM

## 2019-12-10 RX ORDER — PANTOPRAZOLE SODIUM 40 MG/1
40 TABLET, DELAYED RELEASE ORAL DAILY
Status: DISCONTINUED | OUTPATIENT
Start: 2019-12-11 | End: 2019-12-17 | Stop reason: HOSPADM

## 2019-12-10 RX ORDER — LACTULOSE 10 G/15ML
20 SOLUTION ORAL 2 TIMES DAILY
Status: COMPLETED | OUTPATIENT
Start: 2019-12-10 | End: 2019-12-11

## 2019-12-10 RX ORDER — ASPIRIN 81 MG/1
81 TABLET ORAL DAILY
Status: DISCONTINUED | OUTPATIENT
Start: 2019-12-11 | End: 2019-12-17 | Stop reason: HOSPADM

## 2019-12-10 RX ORDER — LABETALOL HYDROCHLORIDE 5 MG/ML
10 INJECTION, SOLUTION INTRAVENOUS EVERY 4 HOURS PRN
Status: DISCONTINUED | OUTPATIENT
Start: 2019-12-10 | End: 2019-12-17 | Stop reason: HOSPADM

## 2019-12-10 RX ORDER — SODIUM CHLORIDE 0.9 % (FLUSH) 0.9 %
10 SYRINGE (ML) INJECTION
Status: DISCONTINUED | OUTPATIENT
Start: 2019-12-10 | End: 2019-12-17 | Stop reason: HOSPADM

## 2019-12-11 LAB
ALBUMIN SERPL BCP-MCNC: 3.6 G/DL (ref 3.5–5.2)
ALP SERPL-CCNC: 86 U/L (ref 55–135)
ALT SERPL W/O P-5'-P-CCNC: 16 U/L (ref 10–44)
ANION GAP SERPL CALC-SCNC: 11 MMOL/L (ref 8–16)
AORTIC ROOT ANNULUS: 3.3 CM
AORTIC VALVE CUSP SEPERATION: 1.98 CM
APTT BLDCRRT: 32.5 SEC (ref 21–32)
AST SERPL-CCNC: 32 U/L (ref 10–40)
AV INDEX (PROSTH): 0.89
AV MEAN GRADIENT: 4 MMHG
AV PEAK GRADIENT: 7 MMHG
AV VALVE AREA: 3.01 CM2
AV VELOCITY RATIO: 0.83
BASOPHILS # BLD AUTO: 0.02 K/UL (ref 0–0.2)
BASOPHILS NFR BLD: 0.4 % (ref 0–1.9)
BILIRUB SERPL-MCNC: 1.2 MG/DL (ref 0.1–1)
BSA FOR ECHO PROCEDURE: 2.26 M2
BUN SERPL-MCNC: 19 MG/DL (ref 8–23)
CALCIUM SERPL-MCNC: 10.5 MG/DL (ref 8.7–10.5)
CHLORIDE SERPL-SCNC: 106 MMOL/L (ref 95–110)
CK MB SERPL-MCNC: 7.2 NG/ML (ref 0.1–6.5)
CK MB SERPL-RTO: 0.8 % (ref 0–5)
CK SERPL-CCNC: 957 U/L (ref 20–200)
CO2 SERPL-SCNC: 24 MMOL/L (ref 23–29)
CREAT SERPL-MCNC: 1.5 MG/DL (ref 0.5–1.4)
CV ECHO LV RWT: 0.75 CM
DIFFERENTIAL METHOD: ABNORMAL
DOP CALC AO PEAK VEL: 1.28 M/S
DOP CALC AO VTI: 22.36 CM
DOP CALC LVOT AREA: 3.4 CM2
DOP CALC LVOT DIAMETER: 2.08 CM
DOP CALC LVOT PEAK VEL: 1.06 M/S
DOP CALC LVOT STROKE VOLUME: 67.35 CM3
DOP CALCLVOT PEAK VEL VTI: 19.83 CM
E WAVE DECELERATION TIME: 276.35 MSEC
E/A RATIO: 0.54
ECHO LV POSTERIOR WALL: 1.5 CM (ref 0.6–1.1)
EOSINOPHIL # BLD AUTO: 0.1 K/UL (ref 0–0.5)
EOSINOPHIL NFR BLD: 1.4 % (ref 0–8)
ERYTHROCYTE [DISTWIDTH] IN BLOOD BY AUTOMATED COUNT: 15.6 % (ref 11.5–14.5)
EST. GFR  (AFRICAN AMERICAN): 50 ML/MIN/1.73 M^2
EST. GFR  (NON AFRICAN AMERICAN): 44 ML/MIN/1.73 M^2
ESTIMATED AVG GLUCOSE: 126 MG/DL (ref 68–131)
FERRITIN SERPL-MCNC: 39 NG/ML (ref 20–300)
FRACTIONAL SHORTENING: 73 % (ref 28–44)
GLUCOSE SERPL-MCNC: 87 MG/DL (ref 70–110)
HBA1C MFR BLD HPLC: 6 % (ref 4–5.6)
HCT VFR BLD AUTO: 41.6 % (ref 40–54)
HGB BLD-MCNC: 13.7 G/DL (ref 14–18)
INR PPP: 1.1 (ref 0.8–1.2)
INTERVENTRICULAR SEPTUM: 1.5 CM (ref 0.6–1.1)
LA MAJOR: 6.84 CM
LA MINOR: 5.7 CM
LA WIDTH: 3.71 CM
LACTATE SERPL-SCNC: 1.2 MMOL/L (ref 0.5–2.2)
LEFT ATRIUM SIZE: 3.2 CM
LEFT ATRIUM VOLUME INDEX: 28.1 ML/M2
LEFT ATRIUM VOLUME: 62.75 CM3
LEFT INTERNAL DIMENSION IN SYSTOLE: 1.1 CM (ref 2.1–4)
LEFT VENTRICLE DIASTOLIC VOLUME INDEX: 18.59 ML/M2
LEFT VENTRICLE DIASTOLIC VOLUME: 41.47 ML
LEFT VENTRICLE MASS INDEX: 104 G/M2
LEFT VENTRICLE SYSTOLIC VOLUME INDEX: 7.9 ML/M2
LEFT VENTRICLE SYSTOLIC VOLUME: 17.7 ML
LEFT VENTRICULAR INTERNAL DIMENSION IN DIASTOLE: 4 CM (ref 3.5–6)
LEFT VENTRICULAR MASS: 232.73 G
LYMPHOCYTES # BLD AUTO: 1 K/UL (ref 1–4.8)
LYMPHOCYTES NFR BLD: 17.2 % (ref 18–48)
MAGNESIUM SERPL-MCNC: 1.8 MG/DL (ref 1.6–2.6)
MCH RBC QN AUTO: 28.1 PG (ref 27–31)
MCHC RBC AUTO-ENTMCNC: 32.9 G/DL (ref 32–36)
MCV RBC AUTO: 85 FL (ref 82–98)
MONOCYTES # BLD AUTO: 0.6 K/UL (ref 0.3–1)
MONOCYTES NFR BLD: 9.7 % (ref 4–15)
MV PEAK A VEL: 0.98 M/S
MV PEAK E VEL: 0.53 M/S
NEUTROPHILS # BLD AUTO: 4.1 K/UL (ref 1.8–7.7)
NEUTROPHILS NFR BLD: 71.3 % (ref 38–73)
PHOSPHATE SERPL-MCNC: 2.3 MG/DL (ref 2.7–4.5)
PISA TR MAX VEL: 1.99 M/S
PLATELET # BLD AUTO: 200 K/UL (ref 150–350)
PMV BLD AUTO: 11.5 FL (ref 9.2–12.9)
POCT GLUCOSE: 111 MG/DL (ref 70–110)
POCT GLUCOSE: 74 MG/DL (ref 70–110)
POCT GLUCOSE: 95 MG/DL (ref 70–110)
POCT GLUCOSE: 97 MG/DL (ref 70–110)
POTASSIUM SERPL-SCNC: 3.7 MMOL/L (ref 3.5–5.1)
PROT SERPL-MCNC: 7.2 G/DL (ref 6–8.4)
PROTHROMBIN TIME: 11.4 SEC (ref 9–12.5)
PULM VEIN S/D RATIO: 1.42
PV PEAK D VEL: 0.24 M/S
PV PEAK S VEL: 0.34 M/S
RA PRESSURE: 3 MMHG
RBC # BLD AUTO: 4.87 M/UL (ref 4.6–6.2)
RIGHT VENTRICULAR END-DIASTOLIC DIMENSION: 2.84 CM
SODIUM SERPL-SCNC: 141 MMOL/L (ref 136–145)
TR MAX PG: 16 MMHG
TROPONIN I SERPL DL<=0.01 NG/ML-MCNC: 0.02 NG/ML (ref 0–0.03)
TV REST PULMONARY ARTERY PRESSURE: 19 MMHG
WBC # BLD AUTO: 5.69 K/UL (ref 3.9–12.7)

## 2019-12-11 PROCEDURE — 84100 ASSAY OF PHOSPHORUS: CPT

## 2019-12-11 PROCEDURE — 82550 ASSAY OF CK (CPK): CPT

## 2019-12-11 PROCEDURE — 85730 THROMBOPLASTIN TIME PARTIAL: CPT

## 2019-12-11 PROCEDURE — 95816 EEG AWAKE AND DROWSY: CPT

## 2019-12-11 PROCEDURE — 92610 EVALUATE SWALLOWING FUNCTION: CPT

## 2019-12-11 PROCEDURE — 11000001 HC ACUTE MED/SURG PRIVATE ROOM

## 2019-12-11 PROCEDURE — 92523 SPEECH SOUND LANG COMPREHEN: CPT

## 2019-12-11 PROCEDURE — 83605 ASSAY OF LACTIC ACID: CPT

## 2019-12-11 PROCEDURE — 97802 MEDICAL NUTRITION INDIV IN: CPT

## 2019-12-11 PROCEDURE — 83735 ASSAY OF MAGNESIUM: CPT

## 2019-12-11 PROCEDURE — 25000003 PHARM REV CODE 250: Performed by: STUDENT IN AN ORGANIZED HEALTH CARE EDUCATION/TRAINING PROGRAM

## 2019-12-11 PROCEDURE — 82728 ASSAY OF FERRITIN: CPT

## 2019-12-11 PROCEDURE — 63600175 PHARM REV CODE 636 W HCPCS: Performed by: STUDENT IN AN ORGANIZED HEALTH CARE EDUCATION/TRAINING PROGRAM

## 2019-12-11 PROCEDURE — 94761 N-INVAS EAR/PLS OXIMETRY MLT: CPT

## 2019-12-11 PROCEDURE — 80053 COMPREHEN METABOLIC PANEL: CPT

## 2019-12-11 PROCEDURE — 82553 CREATINE MB FRACTION: CPT

## 2019-12-11 PROCEDURE — 83540 ASSAY OF IRON: CPT

## 2019-12-11 PROCEDURE — 83036 HEMOGLOBIN GLYCOSYLATED A1C: CPT

## 2019-12-11 PROCEDURE — 95816 PR EEG,W/AWAKE & DROWSY RECORD: ICD-10-PCS | Mod: 26,,, | Performed by: PSYCHIATRY & NEUROLOGY

## 2019-12-11 PROCEDURE — 85610 PROTHROMBIN TIME: CPT

## 2019-12-11 PROCEDURE — 85025 COMPLETE CBC W/AUTO DIFF WBC: CPT

## 2019-12-11 PROCEDURE — 95816 EEG AWAKE AND DROWSY: CPT | Mod: 26,,, | Performed by: PSYCHIATRY & NEUROLOGY

## 2019-12-11 PROCEDURE — 84484 ASSAY OF TROPONIN QUANT: CPT

## 2019-12-11 PROCEDURE — 36415 COLL VENOUS BLD VENIPUNCTURE: CPT

## 2019-12-11 RX ORDER — GLUCAGON 1 MG
1 KIT INJECTION
Status: DISCONTINUED | OUTPATIENT
Start: 2019-12-11 | End: 2019-12-17 | Stop reason: HOSPADM

## 2019-12-11 RX ORDER — AMLODIPINE BESYLATE 5 MG/1
10 TABLET ORAL DAILY
Status: DISCONTINUED | OUTPATIENT
Start: 2019-12-11 | End: 2019-12-17 | Stop reason: HOSPADM

## 2019-12-11 RX ORDER — HYDRALAZINE HYDROCHLORIDE 10 MG/1
10 TABLET, FILM COATED ORAL EVERY 8 HOURS
Status: DISCONTINUED | OUTPATIENT
Start: 2019-12-11 | End: 2019-12-12

## 2019-12-11 RX ORDER — IBUPROFEN 200 MG
16 TABLET ORAL
Status: DISCONTINUED | OUTPATIENT
Start: 2019-12-11 | End: 2019-12-17 | Stop reason: HOSPADM

## 2019-12-11 RX ORDER — IBUPROFEN 200 MG
24 TABLET ORAL
Status: DISCONTINUED | OUTPATIENT
Start: 2019-12-11 | End: 2019-12-17 | Stop reason: HOSPADM

## 2019-12-11 RX ORDER — INSULIN ASPART 100 [IU]/ML
0-5 INJECTION, SOLUTION INTRAVENOUS; SUBCUTANEOUS
Status: DISCONTINUED | OUTPATIENT
Start: 2019-12-11 | End: 2019-12-17 | Stop reason: HOSPADM

## 2019-12-11 RX ORDER — LOSARTAN POTASSIUM 50 MG/1
100 TABLET ORAL DAILY
Status: DISCONTINUED | OUTPATIENT
Start: 2019-12-11 | End: 2019-12-17 | Stop reason: HOSPADM

## 2019-12-11 RX ADMIN — TAMSULOSIN HYDROCHLORIDE 0.4 MG: 0.4 CAPSULE ORAL at 11:12

## 2019-12-11 RX ADMIN — SODIUM CHLORIDE, SODIUM LACTATE, POTASSIUM CHLORIDE, AND CALCIUM CHLORIDE 1000 ML: .6; .31; .03; .02 INJECTION, SOLUTION INTRAVENOUS at 12:12

## 2019-12-11 RX ADMIN — LACTULOSE 20 G: 20 SOLUTION ORAL at 11:12

## 2019-12-11 RX ADMIN — APIXABAN 2.5 MG: 2.5 TABLET, FILM COATED ORAL at 11:12

## 2019-12-11 RX ADMIN — PANTOPRAZOLE SODIUM 40 MG: 40 TABLET, DELAYED RELEASE ORAL at 11:12

## 2019-12-11 RX ADMIN — APIXABAN 2.5 MG: 2.5 TABLET, FILM COATED ORAL at 12:12

## 2019-12-11 RX ADMIN — VITAMIN D, TAB 1000IU (100/BT) 1000 UNITS: 25 TAB at 11:12

## 2019-12-11 RX ADMIN — LOSARTAN POTASSIUM 100 MG: 50 TABLET ORAL at 08:12

## 2019-12-11 RX ADMIN — ATORVASTATIN CALCIUM 80 MG: 40 TABLET, FILM COATED ORAL at 11:12

## 2019-12-11 RX ADMIN — APIXABAN 2.5 MG: 2.5 TABLET, FILM COATED ORAL at 09:12

## 2019-12-11 RX ADMIN — ASPIRIN 81 MG: 81 TABLET, COATED ORAL at 11:12

## 2019-12-11 RX ADMIN — LACTULOSE 20 G: 20 SOLUTION ORAL at 12:12

## 2019-12-11 RX ADMIN — AMLODIPINE BESYLATE 10 MG: 5 TABLET ORAL at 08:12

## 2019-12-11 RX ADMIN — HYDRALAZINE HYDROCHLORIDE 10 MG: 10 TABLET, FILM COATED ORAL at 09:12

## 2019-12-11 NOTE — CONSULTS
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 Per chart review, weakness started yesterday (12/10) around noon and pt experienced 2 falls 2/2 weakness. Associated symptoms included slurred speech and increased confusion from baseline.     Today, pt does not remember why he came to the hospital. He has no complaints at this time; denies headache, dizziness/lightheadedness, numbness/tingling, weakness, fever, chills, chest pain, SOB, abdominal pain, N/V, constipation, diarrhea, dysuria.    Baseline dementia confirmed via pt's daughter - however, she reports worsening confusion over the past several days. On chart review, there is a history of right-sided hemiparesis 2/2 prior CVA documented on 10/8/19. Per pt's daughter, pt initially had right-sided weakness but symptoms resolved with PT. She states that a week ago, his strength was normal and equal bilaterally. Additionally, pt's daughter reports that pt has not been taking his prescribed Eliquis for the past month 2/2 issues with insurance and the pharmacy but that pt has been taking ASA 81 mg daily for several months.     Review of Systems   Constitutional: Negative for chills, diaphoresis, fever, malaise/fatigue and weight loss.   HENT: Negative for congestion, hearing loss, sinus pain and sore throat.    Eyes: Negative for blurred vision, double vision and photophobia.   Respiratory: Negative for cough, sputum production and shortness of breath.    Cardiovascular: Negative for chest pain,  palpitations, orthopnea, claudication, leg swelling and PND.   Gastrointestinal: Negative for abdominal pain, constipation, diarrhea, nausea and vomiting.   Genitourinary: Negative for dysuria, flank pain, frequency, hematuria and urgency.   Musculoskeletal: Positive for falls (two falls prior to presenting to ED). Negative for back pain and myalgias.   Skin: Negative for itching and rash.   Neurological: Positive for speech change (per family member report, slurred speech) and weakness (BLE weakness prior to presenting to ED). Negative for dizziness, tingling, tremors, sensory change, seizures, loss of consciousness and headaches.   Endo/Heme/Allergies: Negative for environmental allergies. Does not bruise/bleed easily.   Psychiatric/Behavioral: Positive for memory loss. Negative for depression and hallucinations. The patient is not nervous/anxious.      PTA Medications   Medication Sig    amLODIPine (NORVASC) 10 MG tablet Take 10 mg by mouth once daily.    apixaban (ELIQUIS) 2.5 mg Tab Take 1 tablet (2.5 mg total) by mouth 2 (two) times daily.    atorvastatin (LIPITOR) 80 MG tablet Take 1 tablet (80 mg total) by mouth once daily.    hydrALAZINE (APRESOLINE) 10 MG tablet Take 1 tablet (10 mg total) by mouth 3 (three) times daily.    losartan (COZAAR) 100 MG tablet Take 100 mg by mouth once daily.     pantoprazole (PROTONIX) 40 MG tablet Take 40 mg by mouth once daily.    tamsulosin (FLOMAX) 0.4 mg Cp24 Take 0.4 mg by mouth once daily.    vitamin D 1000 units Tab Take 1,000 Units by mouth once daily.     Review of patient's allergies indicates:  No Known Allergies    Past Medical History:   Diagnosis Date    Cancer     prostate    Diabetes mellitus     Hypertension     Hypertrophic cardiomyopathy     Renal disorder      Past Surgical History:   Procedure Laterality Date    BACK SURGERY      COLONOSCOPY N/A 10/18/2018    Procedure: COLONOSCOPY;  Surgeon: Alan Gooden MD;  Location: [facility];   Service: Endoscopy;  Laterality: N/A;    ESOPHAGOGASTRODUODENOSCOPY N/A 9/4/2018    Procedure: EGD (ESOPHAGOGASTRODUODENOSCOPY);  Surgeon: Oli Cuadra MD;  Location: [facility];  Service: Endoscopy;  Laterality: N/A;    ESOPHAGOGASTRODUODENOSCOPY N/A 10/18/2018    Procedure: EGD (ESOPHAGOGASTRODUODENOSCOPY);  Surgeon: Alan Gooden MD;  Location: [facility] END[facility];  Service: Endoscopy;  Laterality: N/A;    THYROIDECTOMY       History reviewed. No pertinent family history.     Social History     Tobacco Use    Smoking status: Former Smoker     Packs/day: 0.90     Years: 3.00     Pack years: 2.70     Types: Cigarettes    Smokeless tobacco: Never Used   Substance Use Topics    Alcohol use: Never     Frequency: Never    Drug use: No      OBJECTIVE:     Vital Signs (Most Recent)  Temp: 98.8 °F (37.1 °C) (12/11/19 0457)  Pulse: 63 (12/11/19 0457)  Resp: 16 (12/11/19 0457)  BP: (!) 168/85 (12/11/19 0457)  SpO2: 98 % (12/11/19 0457)    Physical Exam    Mental Status:  Alert and oriented to self and place. Not oriented to time (reported year as 1990).   Difficulty following some commands throughout neuro exam.  Pt does not remember events leading up to his presentation to [facility] ED.       Language:  No aphasia, no dysarthria.      Cranial Nerves:  Visual field was intact to confrontation on the right, EOM intact bilaterally, V1-V3 with good sensation to light touch, no facial asymmetry, hearing grossly intact, palate, and tongue midline. Unable to assess shoulder shrug as pt confused by requested activity, difficulty following some commands     Motor:  Strength: 5/5 in all 4 extremities through out  Tone: Good muscle tone     DTRs:  Symmetric and 2+ through out     Sensation:  Intact to light touch through out     Cerebellar:  Unable to assess as pt confused by requested activity, difficulty following some commands     Gait and Stand:  Did not assess     Lab Results   Component Value Date    WBC 5.69 12/11/2019    HGB  13.7 (L) 12/11/2019    HCT 41.6 12/11/2019    MCV 85 12/11/2019     12/11/2019      Sodium   Date Value Ref Range Status   12/10/2019 139 136 - 145 mmol/L Final     Potassium   Date Value Ref Range Status   12/10/2019 4.3 3.5 - 5.1 mmol/L Final     Chloride   Date Value Ref Range Status   12/10/2019 108 95 - 110 mmol/L Final     CO2   Date Value Ref Range Status   12/10/2019 18 (L) 23 - 29 mmol/L Final     Glucose   Date Value Ref Range Status   12/10/2019 122 (H) 70 - 110 mg/dL Final     BUN, Bld   Date Value Ref Range Status   12/10/2019 23 8 - 23 mg/dL Final     Creatinine   Date Value Ref Range Status   12/10/2019 1.6 (H) 0.5 - 1.4 mg/dL Final     Calcium   Date Value Ref Range Status   12/10/2019 10.4 8.7 - 10.5 mg/dL Final     Total Protein   Date Value Ref Range Status   12/10/2019 7.2 6.0 - 8.4 g/dL Final     Albumin   Date Value Ref Range Status   12/10/2019 3.5 3.5 - 5.2 g/dL Final     Total Bilirubin   Date Value Ref Range Status   12/10/2019 0.9 0.1 - 1.0 mg/dL Final     Comment:     For infants and newborns, interpretation of results should be based  on gestational age, weight and in agreement with clinical  observations.  Premature Infant recommended reference ranges:  Up to 24 hours.............<8.0 mg/dL  Up to 48 hours............<12.0 mg/dL  3-5 days..................<15.0 mg/dL  6-29 days.................<15.0 mg/dL       Alkaline Phosphatase   Date Value Ref Range Status   12/10/2019 81 55 - 135 U/L Final     AST   Date Value Ref Range Status   12/10/2019 23 10 - 40 U/L Final     Comment:     Specimen slightly hemolyzed     ALT   Date Value Ref Range Status   12/10/2019 14 10 - 44 U/L Final     Anion Gap   Date Value Ref Range Status   12/10/2019 13 8 - 16 mmol/L Final     eGFR if    Date Value Ref Range Status   12/10/2019 47 (A) >60 mL/min/1.73 m^2 Final     eGFR if non    Date Value Ref Range Status   12/10/2019 40 (A) >60 mL/min/1.73 m^2 Final      Comment:     Calculation used to obtain the estimated glomerular filtration  rate (eGFR) is the CKD-EPI equation.         Lab Results   Component Value Date    INR 1.1 12/11/2019    INR 1.1 12/10/2019    INR 1.0 10/19/2017      Recent Labs   Lab 12/11/19  0619   *   CPKMB 7.2*   TROPONINI 0.021   MB 0.8      Recent Labs   Lab 12/10/19  2012 12/11/19  0619   TROPONINI <0.006 0.021     Recent Labs   Lab 12/10/19  2155   *     Recent Labs   Lab 12/10/19  2246   COLORU Yellow   SPECGRAV 1.020   PHUR 7.0   PROTEINUA Negative   NITRITE Negative   LEUKOCYTESUR Negative   UROBILINOGEN Negative      Imaging Results          X-Ray Chest AP Portable (Final result)  Result time 12/10/19 21:15:23    Final result by Niels Horton MD (12/10/19 21:15:23)                 Impression:      Borderline cardiomegaly without evidence of CHF.    Loop recorder in place.      Electronically signed by: Niels Horton MD  Date:    12/10/2019  Time:    21:15             Narrative:    EXAMINATION:  XR CHEST AP PORTABLE    CLINICAL HISTORY:  Stroke;    TECHNIQUE:  Single frontal view of the chest was performed.    COMPARISON:  09/03/2018.    FINDINGS:  Monitoring EKG leads are present.  There is a loop recorder present.  There are calcifications of the aortic knob.  There is borderline enlargement of the cardiac silhouette.  The hemidiaphragms are unremarkable.  There is no evidence of free air beneath the hemidiaphragms.  There are no pleural effusions.  There is no evidence of a pneumothorax.  There is no evidence of pneumomediastinum.  No airspace opacity is present.  There are degenerative changes in the osseous structures                               CT Head Without Contrast (Final result)  Result time 12/10/19 19:13:06    Final result by Niels Horton MD (12/10/19 19:13:06)                 Impression:      No CT evidence of acute territorial infarction.  MRI may be obtained for further evaluation.    Changes of chronic small  vessel ischemic disease and cerebral volume loss.      Electronically signed by: Niels Horton MD  Date:    12/10/2019  Time:    19:13             Narrative:    EXAMINATION:  CT HEAD WITHOUT CONTRAST    CLINICAL HISTORY:  Stroke;Extremity Weakness;    TECHNIQUE:  Low dose axial images were obtained through the head.  Coronal and sagittal reformations were also performed. Contrast was not administered.    COMPARISON:  CT scan of the head dated 09/03/2018.    FINDINGS:  The subcutaneous tissues are unremarkable.  The bony calvarium is intact.  There is mucosal thickening within the ethmoid sinuses.  The remainder of the paranasal sinuses are unremarkable.  The mastoid air cells are clear.  The orbits and intraorbital contents are within normal limits.    The craniocervical junction is within normal limits.  There are no extra-axial fluid collections.  There is no evidence of intracranial hemorrhage.  The ventricles and sulci are prominent, suggestive of cerebral volume loss.  There are extensive hypodensities within the periventricular and subcortical white matter.  There are old periventricular infarctions.  The gray-white differentiation is maintained.  There is no evidence of mass effect.                                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 Pt admitted for acute encephalopathy 2/2 possible CVA vs metabolic derangement vs seizure activity.    Acute encephalopathy 2/2 possible CVA vs metabolic derangement vs seizure activity:   - Continue neuro checks q4h and telemetry  - Continue encephalopathy workup, including  potential infectious etiologies  - Ammonia level elevated at 87 compared to previous (41, documented 2 years ago), which could be contributing to current clinical picture although recent baseline unclear  - F/u MRI, MRA, EEG  - Consider LP if workup without significant results   - PT/OT while inpatient    Paroxysmal A-Fib:   - Pt's daughter reports pt has been out of Eliquis for the past month 2/2 difficulty obtaining the medication from the pharmacy and insurance issues; recommend restarting        Ivy Mora MD  [facility] PGY-1

## 2019-12-11 NOTE — ED NOTES
Pt is more alert, is able to converse and carry on a conversation. NO distress. Pt urinated on self. Pt cleaned and changed along w/ the linen.

## 2019-12-11 NOTE — PT/OT/SLP PROGRESS
Occupational Therapy  Visit Attempt     Patient Name:  Shant Magana Jr.   MRN:  [id number]    Patient not seen at this time 2/2 RAFAEL in testing at this time. Will follow up as available    1500- pt remains RAFAEL at this time    Will follow up tomorrow's date     Jaclyn Mcallister OT  12/11/2019

## 2019-12-11 NOTE — SUBJECTIVE & OBJECTIVE
Woke up with symptoms?: no    Recent bleeding noted: no  Does the patient take any Blood Thinners? yes  Medications: Anticoagulants:  apixaban/Eliquis      Past Medical History: hypertension and Afib    Past Surgical History: no relevant surgical history    Family History: no relevant history    Social History: no smoking, no drinking, no drugs    Allergies: Hydralazine Analogues     Review of Systems   Unable to perform ROS: Mental status change     Objective:   Vitals: There were no vitals taken for this visit. BP: 174/84, Respiratory Rate: 18 and Heart Rate: 67    CT READ: Yes  No hemmorhage. No mass effect. No early infarct signs.     Physical Exam   Constitutional: He appears well-developed and well-nourished.   Eyes: EOM are normal.   Pulmonary/Chest: Effort normal.   Neurological: He is alert.

## 2019-12-11 NOTE — CONSULTS
"  [facility]  Adult Nutrition  Consult Note    SUMMARY     Recommendations    Recommendation:   1. Encourage intake at meals as tolerated.   2. Monitor need for supplements    Goals:   Pt will consume a tleast 50-75% intake at meals  Nutrition Goal Status: new  Communication of  Recs: reviewed with RN    Reason for Assessment  Reason For Assessment: consult(assess dietary needs)  Diagnosis: stroke/CVA  Relevant Medical History: DM, HTN, prostate CA, renal d/o, throidectomy  General Information Comments: Pt started on [facility] Soft diet per ST recs. pt off unit at visit. Unable to assess NFPE.  Nutrition Discharge Planning: d/c diet per ST recs    Nutrition Risk Screen  Nutrition Risk Screen: no indicators present    Nutrition/Diet History  Food Preferences: no Restorationist or cultural food prefs identified  Spiritual, Cultural Beliefs, Anglican Practices, Values that Affect Care: no  Factors Affecting Nutritional Intake: None identified at this time    Anthropometrics  Temp: 96.8 °F (36 °C)  Height: 6' 1" (185.4 cm)  Height (inches): 73 in  Weight Method: Bed Scale  Weight: 98.8 kg (217 lb 13 oz)  Weight (lb): 217.82 lb  Ideal Body Weight (IBW), Male: 184 lb  % Ideal Body Weight, Male (lb): 118.38 lb  BMI (Calculated): 28.7  BMI Grade: 25 - 29.9 - overweight     Lab/Procedures/Meds  Pertinent Labs Reviewed: reviewed  Pertinent Labs Comments: Crea 1.5H, Phos 2.3L  Pertinent Medications Reviewed: reviewed  Pertinent Medications Comments: aspirin, pantoprazole, Vitamin D    Estimated/Assessed Needs  Weight Used For Calorie Calculations: 98.8 kg (217 lb 13 oz)  Energy Calorie Requirements (kcal): 2107  Energy Need Method: [place]-St Jeor(x1.2)  Protein Requirements: 79g (0.8g/kg)  Weight Used For Protein Calculations: 98.8 kg (217 lb 13 oz)  Estimated Fluid Requirement Method: RDA Method  RDA Method (mL): 2107     Nutrition Prescription Ordered  Current Diet Order: [facility] Soft    Evaluation of Received " Nutrient/Fluid Intake  I/O: 0/1200  Energy Calories Required: not meeting needs  Protein Required: not meeting needs  Fluid Required: not meeting needs  Comments: LBM 12/10  % Intake of Estimated Energy Needs: Other: diet just started  % Meal Intake: Other: diet just started    Nutrition Risk  Level of Risk/Frequency of Follow-up: (1weekly)     Assessment and Plan  No nutrition dx at this time     Monitor and Evaluation  Food and Nutrient Intake: food and beverage intake  Food and Nutrient Adminstration: diet order  Physical Activity and Function: nutrition-related ADLs and IADLs  Anthropometric Measurements: weight  Biochemical Data, Medical Tests and Procedures: electrolyte and renal panel  Nutrition-Focused Physical Findings: overall appearance     Malnutrition Assessment  Unable to assess at tis time-pt off unit    Nutrition Follow-Up  RD Follow-up?: Yes

## 2019-12-11 NOTE — ED NOTES
Pt pulled condom cath off and urinated on bed. Pt cleaned and changed along w/ linen. New condom cath placed.

## 2019-12-11 NOTE — PT/OT/SLP PROGRESS
Occupational Therapy  Visit Attempt     Patient Name:  Shant Magana Jr.   MRN:  [id number]    Patient not seen this AM 2/2 EEG being performed at bedside at this time. Will follow up as available    Jaclyn Mcallister OT  12/11/2019

## 2019-12-11 NOTE — ED NOTES
Daughter states pt has had slurred speech and weakness of the lower exts since 1300 hrs today. Daughter states he has had 2 previous CVA's w/ the same presentation. Pt is awake and slow to respond to verbal stimuli, w/ short responses only. Pt is able to MORELOS. No respiratory distress observed. Respirations are even and unlabored. Skin is warm, dry and pink. VS. DOTTY x 3mm. BBS- CTA. Abd- SNT. PSM x 4 exts. Pt is connected to the pulse ox, B/P cuff and EKG monitor. Bed is locked and in the low position w/ the side rails up and locked for pt safety. Call bell @ the BS. Will continue to monitor closely.

## 2019-12-11 NOTE — PLAN OF CARE
Recommendation:   1. Encourage intake at meals as tolerated.   2. Monitor need for supplements    Goals:   Pt will consume a tleast 50-75% intake at meals  Nutrition Goal Status: new  Communication of RD Recs: reviewed with RN

## 2019-12-11 NOTE — HPI
LUCILA 12 pm  Prior history of stroke  Progressive onset of word finding difficulty, lower extremity weakness.     Past Medical History:   Diagnosis Date    Cancer     prostate    Diabetes mellitus     Hypertension     Hypertrophic cardiomyopathy     Renal disorder

## 2019-12-11 NOTE — H&P
[facility] Internal Medicine History and Physical - Resident Note    Admitting Team: [facility] Internal Medicine Team A  Attending Physician: Dr. Gamino  Resident: Dr. Castillo  Interns: Dr. Kruse    Date of Admit: 12/10/2019    Chief Complaint and Duration   BLE weakness that began at noon today.    Subjective:      History of Present Illness:  Shant Magana Jr. is a 79 y.o. AAM male who has a pmhx of HTN, Type II DM, Afib ,HLD, CKD III, CVA. The patient presented to the [facility] on 12/10/2019 with a primary complaint of Bilateral LE weakness that began at approx noon today per grandson who lives with the pt. He reports at noon the pt attempted to get out of bed and go to the restroom but he experienced a fall. He reports he was able to help the pt back into bed but at approx 4:30 pm the pt experienced an additional fall which prompted them to present to the ED. He reports the pt was also experiencing slurred speech that began at approx 2:30 pm. Per grandson at baseline the pt does not use a walker, is active and can mow the lawn. He reports the pt started experiencing Dementia after his CVA in 2017 which causes him to become confused at times but otherwise converses appropriately. Per grandson the pt does not have any residual decfits such as weakness from his prior CVAs. He also reports for the past few days the pt has not been feeling well but does not verbalize that he is experiencing any particular symptoms. He reports the pt normally eats 1-3 meals per day depending on his appetite. He reports he has been in taking liquids which is mostly juice. The pt denies fever,chills, weakness, dysuria, N/V/D.     The patient was in their usual state of health until this morning.    Past Medical History:  HTN  Type II DM  A-Fib  HLD  CVA  CKD III    Past Surgical History:  Past Surgical History:   Procedure Laterality Date    BACK SURGERY      COLONOSCOPY N/A 10/18/2018    Procedure: COLONOSCOPY;  Surgeon: Alan Gooden MD;   Location: [facility];  Service: Endoscopy;  Laterality: N/A;    ESOPHAGOGASTRODUODENOSCOPY N/A 9/4/2018    Procedure: EGD (ESOPHAGOGASTRODUODENOSCOPY);  Surgeon: Oli Cuadra MD;  Location: [facility];  Service: Endoscopy;  Laterality: N/A;    ESOPHAGOGASTRODUODENOSCOPY N/A 10/18/2018    Procedure: EGD (ESOPHAGOGASTRODUODENOSCOPY);  Surgeon: Alan Gooden MD;  Location: [facility];  Service: Endoscopy;  Laterality: N/A;    THYROIDECTOMY         Allergies:  Review of patient's allergies indicates:  No Known Allergies    Home Medications:  Prior to Admission medications    Medication Sig Start Date End Date Taking? Authorizing Provider   amLODIPine (NORVASC) 10 MG tablet Take 10 mg by mouth once daily. 9/10/19  Yes Historical Provider, MD   apixaban (ELIQUIS) 2.5 mg Tab Take 1 tablet (2.5 mg total) by mouth 2 (two) times daily. 2/12/19  Yes Lucio Toth MD   atorvastatin (LIPITOR) 80 MG tablet Take 1 tablet (80 mg total) by mouth once daily. 6/8/18 12/10/19 Yes SELVIN Perez, ANP   hydrALAZINE (APRESOLINE) 10 MG tablet Take 1 tablet (10 mg total) by mouth 3 (three) times daily. 10/1/19 9/30/20 Yes Lucio Toth MD   losartan (COZAAR) 100 MG tablet Take 100 mg by mouth once daily.  2/8/19  Yes Historical Provider, MD   pantoprazole (PROTONIX) 40 MG tablet Take 40 mg by mouth once daily. 11/25/18  Yes Historical Provider, MD   tamsulosin (FLOMAX) 0.4 mg Cp24 Take 0.4 mg by mouth once daily.   Yes Historical Provider, MD   vitamin D 1000 units Tab Take 1,000 Units by mouth once daily.   Yes Historical Provider, MD       Family History:  Hx reviewed    Social History:  Social History     Tobacco Use    Smoking status: Former Smoker     Packs/day: 0.90     Years: 3.00     Pack years: 2.70     Types: Cigarettes    Smokeless tobacco: Never Used   Substance Use Topics    Alcohol use: Never     Frequency: Never    Drug use: No       Review of Systems:  Review of Systems   Constitutional:  Negative for chills and fever.   Respiratory: Negative for cough.    Cardiovascular: Negative for chest pain.   Gastrointestinal: Negative for abdominal pain, constipation, nausea and vomiting.   Genitourinary: Negative for dysuria.   Musculoskeletal: Positive for falls.   Neurological: Positive for speech change and weakness. Negative for tingling.     Health Maintenance:   Primary Care Physician: Dr. Aldana  Immunizations:   Influenza is up to date.  Pneumovax is up to date.  Cancer Screening:  Colonoscopy: is up to date.      Objective:   Last 24 Hour Vital Signs:  BP  Min: 174/84  Max: 174/84  Temp  Av.7 °F (37.1 °C)  Min: 98.7 °F (37.1 °C)  Max: 98.7 °F (37.1 °C)  Pulse  Av  Min: 66  Max: 66  Resp  Av  Min: 18  Max: 18  There is no height or weight on file to calculate BMI.  No intake/output data recorded.    Physical Examination:  Physical Exam   Constitutional: He is well-developed, well-nourished, and in no distress. No distress.   HENT:   Head: Normocephalic and atraumatic.   Eyes: EOM are normal.   Neck: Normal range of motion.   Cardiovascular: Normal rate and regular rhythm.   Pulmonary/Chest: Effort normal. No respiratory distress. He has no wheezes.   Abdominal: Soft. He exhibits no distension. There is no tenderness.   Musculoskeletal: He exhibits no edema.   Neurological: He is alert. He has an abnormal Finger-Nose-Finger Test and an abnormal Heel to Rodriguez Test.   Oriented to place but not person or time  CN 2-12 grossly intact  5/5 strength to bilateral UE  4/5 strength to BLE   Skin: Skin is warm. He is not diaphoretic.   Vitals reviewed.      Laboratory:  Most Recent Data:  CBC:   Lab Results   Component Value Date    WBC 6.17 12/10/2019    HGB 13.4 (L) 12/10/2019    HCT 41.2 12/10/2019     12/10/2019    MCV 84 12/10/2019    RDW 15.7 (H) 12/10/2019       BMP:   Lab Results   Component Value Date     12/10/2019    K 4.3 12/10/2019     12/10/2019    CO2 18 (L)  12/10/2019    BUN 23 12/10/2019     (H) 12/10/2019    CALCIUM 10.4 12/10/2019    MG 1.4 (L) 10/19/2017    PHOS 3.0 10/19/2017     LFTs:   Lab Results   Component Value Date    PROT 7.2 12/10/2019    ALBUMIN 3.5 12/10/2019    BILITOT 0.9 12/10/2019    AST 23 12/10/2019    ALKPHOS 81 12/10/2019    ALT 14 12/10/2019     Coags:   Lab Results   Component Value Date    INR 1.1 12/10/2019     FLP:   Lab Results   Component Value Date    CHOL 147 12/10/2019    HDL 44 12/10/2019    LDLCALC 94.0 12/10/2019    TRIG 45 12/10/2019    CHOLHDL 29.9 12/10/2019     DM:   Lab Results   Component Value Date    HGBA1C 5.9 (H) 09/03/2018    HGBA1C 6.2 (H) 10/11/2017    HGBA1C 5.7 (H) 07/18/2017    LDLCALC 94.0 12/10/2019    CREATININE 1.6 (H) 12/10/2019     Thyroid:   Lab Results   Component Value Date    TSH 2.531 12/10/2019     Anemia:   Lab Results   Component Value Date    IRON 91 09/04/2018    TIBC 363 09/04/2018    FERRITIN 125 09/04/2018     Cardiac:   Lab Results   Component Value Date    TROPONINI <0.006 12/10/2019     (H) 12/10/2019     Urinalysis:   Lab Results   Component Value Date    COLORU Yellow 09/03/2018    SPECGRAV 1.010 09/03/2018    NITRITE Negative 09/03/2018    KETONESU Negative 09/03/2018    UROBILINOGEN Negative 09/03/2018    WBCUA 1 10/11/2017       Trended Lab Data:  Recent Labs   Lab 12/10/19  2012   WBC 6.17   HGB 13.4*   HCT 41.2      MCV 84   RDW 15.7*      K 4.3      CO2 18*   BUN 23   *   CALCIUM 10.4   PROT 7.2   ALBUMIN 3.5   BILITOT 0.9   AST 23   ALKPHOS 81   ALT 14       Trended Cardiac Data:  Recent Labs   Lab 12/10/19  2012 12/10/19  2155   TROPONINI <0.006  --    BNP  --  148*       Microbiology Data:  UA pending    Other Laboratory Data:      Other Results:  EKG: sinus rhythm 76 BPM  Radiology:  Ct Head Without Contrast    Result Date: 12/10/2019  EXAMINATION: CT HEAD WITHOUT CONTRAST CLINICAL HISTORY: Stroke;Extremity Weakness; TECHNIQUE: Low dose axial  images were obtained through the head.  Coronal and sagittal reformations were also performed. Contrast was not administered. COMPARISON: CT scan of the head dated 09/03/2018. FINDINGS: The subcutaneous tissues are unremarkable.  The bony calvarium is intact.  There is mucosal thickening within the ethmoid sinuses.  The remainder of the paranasal sinuses are unremarkable.  The mastoid air cells are clear.  The orbits and intraorbital contents are within normal limits. The craniocervical junction is within normal limits.  There are no extra-axial fluid collections.  There is no evidence of intracranial hemorrhage.  The ventricles and sulci are prominent, suggestive of cerebral volume loss.  There are extensive hypodensities within the periventricular and subcortical white matter.  There are old periventricular infarctions.  The gray-white differentiation is maintained.  There is no evidence of mass effect.     No CT evidence of acute territorial infarction.  MRI may be obtained for further evaluation. Changes of chronic small vessel ischemic disease and cerebral volume loss. Electronically signed by: Niels Horton MD Date:    12/10/2019 Time:    19:13    X-ray Chest Ap Portable    Result Date: 12/10/2019  EXAMINATION: XR CHEST AP PORTABLE CLINICAL HISTORY: Stroke; TECHNIQUE: Single frontal view of the chest was performed. COMPARISON: 09/03/2018. FINDINGS: Monitoring EKG leads are present.  There is a loop recorder present.  There are calcifications of the aortic knob.  There is borderline enlargement of the cardiac silhouette.  The hemidiaphragms are unremarkable.  There is no evidence of free air beneath the hemidiaphragms.  There are no pleural effusions.  There is no evidence of a pneumothorax.  There is no evidence of pneumomediastinum.  No airspace opacity is present.  There are degenerative changes in the osseous structures     Borderline cardiomegaly without evidence of CHF. Loop recorder in place.  Electronically signed by: Niels Horton MD Date:    12/10/2019 Time:    21:15       Assessment:     Shant Magana Jr. is a 79 y.o. male with a pmhx of HTH, HLD, Afib, Type II Dm, HFpEF admitted for stroke-like symptoms.  Plan:   Acute encephalopathy 2/2 to possible CVA vs Metabolic derangement vs Seizure Activity  - Pt has dementia at baseline but per grandson neurological status has worsened, presents with BLE weakness and experienced 2 falls PTA  - Pt evaluated by Neuro via Telemedicine in the ED who determined he was not a candidate for TPA  - CT head (-) , UDS (-)  - TSH WNL  - Will obtain MRA brain, MRI brain, MRA neck, TTE, EEG, speech and swallow evaluation  - Neurology consulted  - NIH score 6  - Pt has a hx of 2 prior CVAs in 2017  - Started on ASA 81 mg, Atorvastatin 80 mg  - Q4 hours neuro checks  - Aspiration and fall precautions  - Pt also with hyperammonemia with an ammonia level of 87; pt started on lactulose 20 gm BID    Lactic Acidosis  - lactic acid 2.9  - Will trend lactic acid levels  - IVFs    HFpEF  - 9/3/18 Echo with EF of 65-70% and increased LVEDP  -   - TTE ordered    Hx of Afib  - Eliquis 2.5 mg BID  - Per grandson pt has not been compliant with Eliquis for the past week 2/2 to insurance issues leading to increase cost of the medication    HTN  - Holding home medications and allowing permissive HTN for 24 hours  - PRN Labetalol    Type II DM  - 9/2018 A1c 5.9  - SSI  - repeat A1c ordered    HLD  - Atorvastatin 80 mg    CKD stage 3  - Cr 1.6; appears to be at baseline; stable  - Will renally dose medications and avoid nephrotoxic drugs  - Vitamin D 1000 units    Hx of upper GI bleed  - Pantoprazole 40 mg    BPH  - Tamsulosin .4 mg         DVT: SCDs/Eliquis  Diet: NPO  Dispo: Continue to monitor pt's neurological status and pending imaging results  Code:Full    Felipe Dudley MD  [facility] Internal Medicine [facility]

## 2019-12-11 NOTE — PROCEDURES
Date of service  12/11/2019     Introduction  Electroencephalographic (EEG) recording is performed with electrodes placed according to the International 10-20 placement system. Thirty two (32) channels of digital signal (sampling rate of 512/sec) including T1 and T2 was simultaneously recorded from the scalp and may include EKG, EMG, and/or eye monitors. Recording band pass was 0.1 to 512 Hz. Digital video recording of the patient is simultaneously recorded with the EEG. The patient is instructed to report clinical symptoms which may occur during the recording session. EEG and video recording is stored and archived in digital format. Activation procedures which include photic stimulation, hyperventilation and instructing patients to perform simple tasks are done in selected patients.    The EEG is displayed on a monitor screen and can be reviewed using different montages. Computer assisted analysis is employed to detect spike and electrographic seizure activity. The entire record is submitted for computer analysis. The entire recording is visually reviewed and, the times identified by computer analysis as being spikes or seizures are reviewed again.     Compressed spectral analysis (CSA) is also performed on the activity recorded from each individual channel. This is displayed as a power display of frequencies from 0 to 30 Hz over time. The CSA is reviewed looking for asymmetries in power between homologous areas of the scalp and then compared with the original EEG recording.     Findings  The patient's background consists of diffuse slowing, with predominantly theta range frequencies appreciated.  Superimposed faster frequencies into the beta range are noted.  There is no focality to the slowing.  There are no focal, lateralized, or epileptiform transients.  No electrographic seizures are seen.    Normal sleep architecture is not noted.    Hyperventilation and photic stimulation were not performed.     EKG  demonstrates sinus rhythm with occasional ectopy.    Interpretation  This is an abnormal EEG due to the presence of diffuse slowing as described above.  These findings are indicative of a mild to moderate encephalopathy, though they are not specific for a particular underlying etiology.

## 2019-12-11 NOTE — ED NOTES
Pt awake, slow to respond. Will answer questions w/ short delayed responses. Pt is not oriented, will not answer questions concerning the date or year. No respiratory distress, respirations are even and unlabored. MORELOS. B/P remains elevated. Will continue to monitor closely.

## 2019-12-11 NOTE — H&P
"[facility] Internal Medicine Progress Note - Resident Note      Subjective:      Patient more alert this AM but still oriented only to self and place. He denies any new issues overnight. Afebrile, BP elevated but not requiring prn labetalol.     Objective:   Last 24 Hour Vital Signs:  BP  Min: 168/85  Max: 193/91  Temp  Av.5 °F (36.9 °C)  Min: 97.9 °F (36.6 °C)  Max: 98.8 °F (37.1 °C)  Pulse  Av.8  Min: 61  Max: 66  Resp  Av.3  Min: 16  Max: 18  SpO2  Av %  Min: 98 %  Max: 98 %  Height  Av' 1" (185.4 cm)  Min: 6' 1" (185.4 cm)  Max: 6' 1" (185.4 cm)  Weight  Av.8 kg (217 lb 13 oz)  Min: 98.8 kg (217 lb 13 oz)  Max: 98.8 kg (217 lb 13 oz)  Body mass index is 28.74 kg/m².  I/O last 3 completed shifts:  In: -   Out: 850 [Urine:850]    Physical Examination:  Physical Exam   Constitutional: He is well-developed, well-nourished, and in no distress. No distress.   HENT:   Head: Normocephalic and atraumatic.   Eyes: EOM are normal.   Neck: Normal range of motion.   Cardiovascular: Normal rate and regular rhythm.   Pulmonary/Chest: Effort normal. No respiratory distress. He has no wheezes.   Abdominal: Soft. He exhibits no distension. There is no tenderness.   Musculoskeletal: He exhibits no edema.   Neurological: He is alert. He has an abnormal Cerebellar Exam, an abnormal Finger-Nose-Finger Test and an abnormal Heel to Rodriguez Test.   Oriented to place and person but not time or situation  CN 2-12 grossly intact  5/5 strength to bilateral UE  5/5 strength to BLE   Skin: Skin is warm. He is not diaphoretic.   Vitals reviewed.      Laboratory:  Most Recent Data:  CBC:   Lab Results   Component Value Date    WBC 5.69 2019    HGB 13.7 (L) 2019    HCT 41.6 2019     2019    MCV 85 2019    RDW 15.6 (H) 2019       BMP:   Lab Results   Component Value Date     12/10/2019    K 4.3 12/10/2019     12/10/2019    CO2 18 (L) 12/10/2019    BUN 23 12/10/2019    GLU " 122 (H) 12/10/2019    CALCIUM 10.4 12/10/2019    MG 1.8 12/11/2019    PHOS 2.3 (L) 12/11/2019     LFTs:   Lab Results   Component Value Date    PROT 7.2 12/10/2019    ALBUMIN 3.5 12/10/2019    BILITOT 0.9 12/10/2019    AST 23 12/10/2019    ALKPHOS 81 12/10/2019    ALT 14 12/10/2019     Coags:   Lab Results   Component Value Date    INR 1.1 12/11/2019     FLP:   Lab Results   Component Value Date    CHOL 147 12/10/2019    HDL 44 12/10/2019    LDLCALC 94.0 12/10/2019    TRIG 45 12/10/2019    CHOLHDL 29.9 12/10/2019     DM:   Lab Results   Component Value Date    HGBA1C 5.9 (H) 09/03/2018    HGBA1C 6.2 (H) 10/11/2017    HGBA1C 5.7 (H) 07/18/2017    LDLCALC 94.0 12/10/2019    CREATININE 1.6 (H) 12/10/2019     Thyroid:   Lab Results   Component Value Date    TSH 2.531 12/10/2019     Anemia:   Lab Results   Component Value Date    IRON 91 09/04/2018    TIBC 363 09/04/2018    FERRITIN 125 09/04/2018     Cardiac:   Lab Results   Component Value Date    TROPONINI 0.021 12/11/2019     (H) 12/10/2019     Urinalysis:   Lab Results   Component Value Date    COLORU Yellow 12/10/2019    SPECGRAV 1.020 12/10/2019    NITRITE Negative 12/10/2019    KETONESU Negative 12/10/2019    UROBILINOGEN Negative 12/10/2019    WBCUA 2 12/10/2019       Trended Lab Data:  Recent Labs   Lab 12/10/19  2012 12/11/19  0628   WBC 6.17 5.69   HGB 13.4* 13.7*   HCT 41.2 41.6    200   MCV 84 85   RDW 15.7* 15.6*     --    K 4.3  --      --    CO2 18*  --    BUN 23  --    *  --    CALCIUM 10.4  --    PROT 7.2  --    ALBUMIN 3.5  --    BILITOT 0.9  --    AST 23  --    ALKPHOS 81  --    ALT 14  --        Trended Cardiac Data:  Recent Labs   Lab 12/10/19  2012 12/10/19  2155 12/11/19  0619   TROPONINI <0.006  --  0.021   BNP  --  148*  --        Microbiology Data:  UA pending    Other Laboratory Data:      Other Results:  EKG: sinus rhythm 76 BPM  Radiology:  Ct Head Without Contrast    Result Date: 12/10/2019  EXAMINATION: CT  HEAD WITHOUT CONTRAST CLINICAL HISTORY: Stroke;Extremity Weakness; TECHNIQUE: Low dose axial images were obtained through the head.  Coronal and sagittal reformations were also performed. Contrast was not administered. COMPARISON: CT scan of the head dated 09/03/2018. FINDINGS: The subcutaneous tissues are unremarkable.  The bony calvarium is intact.  There is mucosal thickening within the ethmoid sinuses.  The remainder of the paranasal sinuses are unremarkable.  The mastoid air cells are clear.  The orbits and intraorbital contents are within normal limits. The craniocervical junction is within normal limits.  There are no extra-axial fluid collections.  There is no evidence of intracranial hemorrhage.  The ventricles and sulci are prominent, suggestive of cerebral volume loss.  There are extensive hypodensities within the periventricular and subcortical white matter.  There are old periventricular infarctions.  The gray-white differentiation is maintained.  There is no evidence of mass effect.     No CT evidence of acute territorial infarction.  MRI may be obtained for further evaluation. Changes of chronic small vessel ischemic disease and cerebral volume loss. Electronically signed by: Niels Horton MD Date:    12/10/2019 Time:    19:13    X-ray Chest Ap Portable    Result Date: 12/10/2019  EXAMINATION: XR CHEST AP PORTABLE CLINICAL HISTORY: Stroke; TECHNIQUE: Single frontal view of the chest was performed. COMPARISON: 09/03/2018. FINDINGS: Monitoring EKG leads are present.  There is a loop recorder present.  There are calcifications of the aortic knob.  There is borderline enlargement of the cardiac silhouette.  The hemidiaphragms are unremarkable.  There is no evidence of free air beneath the hemidiaphragms.  There are no pleural effusions.  There is no evidence of a pneumothorax.  There is no evidence of pneumomediastinum.  No airspace opacity is present.  There are degenerative changes in the osseous  structures     Borderline cardiomegaly without evidence of CHF. Loop recorder in place. Electronically signed by: Niels Horton MD Date:    12/10/2019 Time:    21:15       Assessment:     Shant Magana Jr. is a 79 y.o. male with a pmhx of HTH, HLD, Afib, Type II Dm, HFpEF admitted for stroke-like symptoms.  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  - Started on ASA 81 mg, Atorvastatin 80 mg  - Q4 hours neuro checks  - Aspiration and fall precautions  - Pt also with hyperammonemia with an ammonia level of 87; pt started on lactulose 20 gm BID    Lactic Acidosis  - lactic acid 2.9, downtrending with 1L LR to 1.2    HFpEF  - 9/3/18 Echo with EF of 65-70% and increased LVEDP  - , negative Tn, EKG with TWF in inferior leads and RAD, anterior q waves  - TTE ordered    Hx of Afib  - Eliquis 2.5 mg BID  - Per grandson pt has not been compliant with Eliquis for the past week 2/2 to insurance issues leading to increase cost of the medication  -resume along with ASA while inpt    HTN  - Holding home medications and allowing permissive HTN for 24 hours  -will resume losartan 100, norvasc 10, hydralazine 10 once outside window  - PRN Labetalol    Type II DM  - 9/2018 A1c 5.9, not on home meds  - SSI  - repeat A1c ordered    HLD  - Atorvastatin 80 mg    CKD stage 3  - Cr 1.6; appears to be at baseline; stable  - Will renally dose medications and avoid nephrotoxic drugs  - Vitamin D 1000 units    Hx of upper GI bleed  -  Pantoprazole 40 mg    BPH  - Tamsulosin 0.4 mg         DVT: SCDs/Eliquis  Diet: NPO, pending speech eval  Dispo: Continue to monitor pt's neurological status and pending MRI/MRA results, therapy eval, TTE, EEG and neuro eval, possible discharge in 24-48 hours  Code:Full    Trent Castillo MD  [employer] Internal Medicine -III

## 2019-12-11 NOTE — PT/OT/SLP EVAL
Speech Language Pathology Evaluation  Cognitive/Bedside Swallow    Patient Name:  Shant Magana Jr.   MRN:  [id number]  Admitting Diagnosis: Stroke-like symptom    Recommendations:                  General Recommendations:  Dysphagia therapy, Speech/language therapy   Diet recommendations:  Mechanical soft, Thin   Aspiration Precautions: upright for meals, small bites/sips, straws ok, assist for feeding, small meds with water, tray set-up   General Precautions: Standard, fall  Communication strategies:  Repeat instructions, reorient, provide cues, turn off TV/eliminate distractions.     History:     Chief Complaint and Duration   BLE weakness that began at noon today.     Subjective:      History of Present Illness:  Shant Magana Jr. is a 79 y.o. AAM male who has a pmhx of HTN, Type II DM, Afib ,HLD, CKD III, CVA. The patient presented to the [facility] on 12/10/2019 with a primary complaint of Bilateral LE weakness that began at approx noon today per grandson who lives with the pt. He reports at noon the pt attempted to get out of bed and go to the restroom but he experienced a fall. He reports he was able to help the pt back into bed but at approx 4:30 pm the pt experienced an additional fall which prompted them to present to the ED. He reports the pt was also experiencing slurred speech that began at approx 2:30 pm. Per grandson at baseline the pt does not use a walker, is active and can mow the lawn. He reports the pt started experiencing Dementia after his CVA in 2017 which causes him to become confused at times but otherwise converses appropriately. Per grandson the pt does not have any residual decfits such as weakness from his prior CVAs. He also reports for the past few days the pt has not been feeling well but does not verbalize that he is experiencing any particular symptoms. He reports the pt normally eats 1-3 meals per day depending on his appetite. He reports he has been in taking liquids which is mostly  "juice. The pt denies fever,chills, weakness, dysuria, N/V/D.      The patient was in their usual state of health until this morning.      Past Medical History:   Diagnosis Date    Cancer     prostate    Diabetes mellitus     Hypertension     Hypertrophic cardiomyopathy     Renal disorder        Past Surgical History:   Procedure Laterality Date    BACK SURGERY      COLONOSCOPY N/A 10/18/2018    Procedure: COLONOSCOPY;  Surgeon: Alan Gooden MD;  Location: [facility] ENDO;  Service: Endoscopy;  Laterality: N/A;    ESOPHAGOGASTRODUODENOSCOPY N/A 9/4/2018    Procedure: EGD (ESOPHAGOGASTRODUODENOSCOPY);  Surgeon: Oli Cuadra MD;  Location: [facility] ENDO;  Service: Endoscopy;  Laterality: N/A;    ESOPHAGOGASTRODUODENOSCOPY N/A 10/18/2018    Procedure: EGD (ESOPHAGOGASTRODUODENOSCOPY);  Surgeon: Alan Gooden MD;  Location: [facility] ENDO;  Service: Endoscopy;  Laterality: N/A;    THYROIDECTOMY         Social History: Patient lives with dtr and grandson. Some background info gathered by dtr via phone.     Prior Intubation HX:  n/a  CT: No CT evidence of acute territorial infarction.  MRI may be obtained for further evaluation.  No MRI at this time, pending results    Modified Barium Swallow: none per EMR    Chest X-Rays: There are no pleural effusions    Prior diet: pt was on a regular diet and thin liquids, currently placed on [facility] soft and thin liquids    Subjective     Consult received for clinical swallow eval/speech/lang eval this date, SLP did communicate with RN prior to eval/treat.    Patient goals: " I fall down." He could not state reason for hospital admit.     Pain/Comfort:  · Pain Rating 1: 0/10    Objective:   Pt seen in room for ST eval, family friend present. Had to speak to dtr via phone, able to ask her some brief questions.     Cognitive Status:    Arousal/Alertness Delayed response to stimuli no response given at times and Inconsistent responses unreliable orientation info  Attention Unable to " assess/poor   Perseveration No response  Orientation Unable to assess  Memory impaired   Safety awareness impaired      Receptive Language:   Comprehension:   Unreliable with answering questions, not following simple 1 step commands, did not complete oral mech exam despite visual cues    Pragmatics:    Flat affect, blank stare noted    Expressive Language:  Verbal:    Fluent verbal expression, stated DEMETRA, SLIME with cues, counted to 20, did not name items in room        Motor Speech:  Mild slurred speech is present but able to be understood, mumbles at times    Voice:   Raspy voice is present    Visual-Spatial:  Unable to assess at this time, some dcr in tracking was noted     Reading: DNT       Written Expression: DNT      Oral Musculature Evaluation  · Oral Musculature: general weakness, facial asymmetry present  · Dentition: present and adequate  · Mucosal Quality: adequate, good  · Mandibular Strength and Mobility: (dcr'd )  · Oral Labial Strength and Mobility: impaired retraction  · Lingual Strength and Mobility: impaired strength  · Buccal Strength and Mobility: decreased tone  · Volitional Cough: elicited  · Volitional Swallow: timely swallow  · Voice Prior to PO Intake: clear voice    Bedside Swallow Eval: Pt is R handed, but required feeding by SLP  Consistencies Assessed:  · Thin liquids ice chips, water by cup and straw  · Puree pudding by tsp fed by SLP  · Solids cracker 1/4 bite      Oral Phase:   · Slow oral transit time   · Slow mastication  · Fair labial closure/seal noted  · Mild oral residue present, needed cues to clear     Pharyngeal Phase:   · no overt clinical signs/symptoms of aspiration  · no overt clinical signs/symptoms of pharyngeal dysphagia  · multiple spontaneous swallows    Compensatory Strategies  · Alternate sips and bites  · Assist with meals, feeding may be required  · Small meds w/ water     Treatment: ongoing assessment of cognition/language and monitor with meal (diet  tolerance)    Assessment:     Shant Magana Jr. is a 79 y.o. male admitted with Stroke-like symptom who presents with an SLP diagnosis of impaired cognition/communication deficits and oral dysphagia.  Ongoing assessment of deficits and continue to speak w/ family re: prior level.     Goals:   Multidisciplinary Problems     SLP Goals        Problem: SLP Goal    Goal Priority Disciplines Outcome   SLP Goal     SLP Ongoing, Progressing   Description:  Short Term Goals:  1. Pt will participate in ongoing swallow assessment to determine least restrictive diet.   2. Pt will tolerate mech soft tray/thin liquids with no audible s/s of dysphagia and good oral clearance.   3. Pt will implement safe swallowing strategies 100% of the time given min assist.   4. Pt will participate in speech/lang/cog eval to determine level of deficits  5. Pt will state basic orientation x4 with mod cues.  6. Pt will respond to personal y/n related ?s with 70% acc, mod cues.  7. Pt will follow 1-2 part commands with mod cues for 70% acc.  Ongoing goals pending overall progress                         Plan:     · Patient to be seen:  3 x/week   · Plan of Care expires:  01/09/20  · Plan of Care reviewed with:  patient(friend in room)   · SLP Follow-Up:  Yes       Discharge recommendations:  Discharge Facility/Level of Care Needs: (TBD, needs ST follow at DC)       Time Tracking:     SLP Treatment Date:   12/11/19  Speech Start Time:  0930  Speech Stop Time:  0953     Speech Total Time (min):  23 min    Billable Minutes: Eval 11  and Eval Swallow and Oral Function 12    NEVIN Hassan, CCC-SLP  12/11/2019

## 2019-12-11 NOTE — ED PROVIDER NOTES
Encounter Date: 12/10/2019    SCRIBE #1 NOTE: I, Kanchan Farrar, am scribing for, and in the presence of,  Dr. Rees. I have scribed the entire note.       History     Chief Complaint   Patient presents with    Extremity Weakness     Daughter reports that pt started having weakness in bilateral lower extremities about 1PM and is unable to walk.      Shant Magana Jr. is a 78 yo M who  has a past medical history of Cancer, Diabetes mellitus, Hypertension, Hypertrophic cardiomyopathy, and Renal disorder.    The patient presents to the ED due to BLE extremity weakness. Last known normal reported at 1200; gradually worsening with new features. Patient family members at bedside for comprehensive history. Family report two witnessed falls today; both occurred when standing from seated position. Patient is ambulatory with walker at baseline. Family reports slurred speech with delay. Patient usually converses clearly without difficulty. Patient has history of two previous strokes. Family report that presentation today is similar.     The history is provided by a relative.     Review of patient's allergies indicates:   Allergen Reactions    Hydralazine analogues Diarrhea     Past Medical History:   Diagnosis Date    Cancer     prostate    Diabetes mellitus     Hypertension     Hypertrophic cardiomyopathy     Renal disorder      Past Surgical History:   Procedure Laterality Date    BACK SURGERY      COLONOSCOPY N/A 10/18/2018    Procedure: COLONOSCOPY;  Surgeon: Alan Gooden MD;  Location: [facility];  Service: Endoscopy;  Laterality: N/A;    ESOPHAGOGASTRODUODENOSCOPY N/A 9/4/2018    Procedure: EGD (ESOPHAGOGASTRODUODENOSCOPY);  Surgeon: Oli Cuadra MD;  Location: [facility];  Service: Endoscopy;  Laterality: N/A;    ESOPHAGOGASTRODUODENOSCOPY N/A 10/18/2018    Procedure: EGD (ESOPHAGOGASTRODUODENOSCOPY);  Surgeon: Alan Gooden MD;  Location: [facility];  Service: Endoscopy;  Laterality: N/A;     THYROIDECTOMY       No family history on file.  Social History     Tobacco Use    Smoking status: Former Smoker     Packs/day: 0.90     Years: 3.00     Pack years: 2.70     Types: Cigarettes    Smokeless tobacco: Never Used   Substance Use Topics    Alcohol use: Yes    Drug use: No     Review of Systems   Unable to perform ROS: Dementia       Physical Exam     Initial Vitals   BP Pulse Resp Temp SpO2   -- -- -- -- --      MAP       --         Physical Exam    Nursing note and vitals reviewed.  Constitutional: He appears well-developed and well-nourished. He is not diaphoretic. He appears distressed.   HENT:   Head: Normocephalic and atraumatic.   Mouth/Throat: Oropharynx is clear and moist.   Eyes: EOM are normal. Pupils are equal, round, and reactive to light.   Neck: No tracheal deviation present.   Cardiovascular: Normal rate, regular rhythm, normal heart sounds and intact distal pulses.   Pulmonary/Chest: Breath sounds normal. No stridor. No respiratory distress.   Abdominal: Soft. He exhibits no distension and no mass. There is no tenderness.   Musculoskeletal: Normal range of motion. He exhibits no edema.   Neurological: He is alert and oriented to person, place, and time. No cranial nerve deficit or sensory deficit.   4/5 strength x 4 extremities  Does not answer questions properly  CN II-XII intact  Does not follow commands   Word finding difficulty noted  No pronator  drift      Skin: Skin is warm and dry. Capillary refill takes less than 2 seconds. No rash noted.   Psychiatric: He has a normal mood and affect. His behavior is normal. Thought content normal.         ED Course   Procedures  Labs Reviewed   CBC W/ AUTO DIFFERENTIAL - Abnormal; Notable for the following components:       Result Value    Hemoglobin 13.4 (*)     RDW 15.7 (*)     Lymph # 0.4 (*)     Gran% 89.1 (*)     Lymph% 6.3 (*)     All other components within normal limits   COMPREHENSIVE METABOLIC PANEL - Abnormal; Notable for the  following components:    CO2 18 (*)     Glucose 122 (*)     Creatinine 1.6 (*)     eGFR if  47 (*)     eGFR if non  40 (*)     All other components within normal limits   LACTIC ACID, PLASMA - Abnormal; Notable for the following components:    Lactate (Lactic Acid) 2.9 (*)     All other components within normal limits   AMMONIA - Abnormal; Notable for the following components:    Ammonia 87 (*)     All other components within normal limits   B-TYPE NATRIURETIC PEPTIDE - Abnormal; Notable for the following components:     (*)     All other components within normal limits   URINALYSIS, REFLEX TO URINE CULTURE - Abnormal; Notable for the following components:    Occult Blood UA 1+ (*)     All other components within normal limits    Narrative:     Preferred Collection Type->Urine, Clean Catch   ISTAT CREATININE - Abnormal; Notable for the following components:    POC Creatinine 1.6 (*)     All other components within normal limits   PROTIME-INR   TSH   LIPID PANEL   TROPONIN I   DRUG SCREEN PANEL, URINE EMERGENCY    Narrative:     Preferred Collection Type->Urine, Clean Catch   B-TYPE NATRIURETIC PEPTIDE   URINALYSIS MICROSCOPIC    Narrative:     Preferred Collection Type->Urine, Clean Catch   POCT GLUCOSE, HAND-HELD DEVICE   ISTAT PROCEDURE   POCT GLUCOSE   POCT GLUCOSE MONITORING CONTINUOUS     EKG Readings: (Independently Interpreted)   Initial Reading: No STEMI. Heart Rate: 76. Ectopy: PVCs.   Sinus rhythm    t wave inversions lead 2, AVF       X-Rays:   Independently Interpreted Readings:   Other Readings:  Reviewed by myself, read by radiology.      Imaging Results          CT Head Without Contrast (Final result)  Result time 12/10/19 19:13:06    Final result by Niels Horton MD (12/10/19 19:13:06)                 Impression:      No CT evidence of acute territorial infarction.  MRI may be obtained for further evaluation.    Changes of chronic small vessel ischemic disease  and cerebral volume loss.      Electronically signed by: Niels Horton MD  Date:    12/10/2019  Time:    19:13             Narrative:    EXAMINATION:  CT HEAD WITHOUT CONTRAST    CLINICAL HISTORY:  Stroke;Extremity Weakness;    TECHNIQUE:  Low dose axial images were obtained through the head.  Coronal and sagittal reformations were also performed. Contrast was not administered.    COMPARISON:  CT scan of the head dated 09/03/2018.    FINDINGS:  The subcutaneous tissues are unremarkable.  The bony calvarium is intact.  There is mucosal thickening within the ethmoid sinuses.  The remainder of the paranasal sinuses are unremarkable.  The mastoid air cells are clear.  The orbits and intraorbital contents are within normal limits.    The craniocervical junction is within normal limits.  There are no extra-axial fluid collections.  There is no evidence of intracranial hemorrhage.  The ventricles and sulci are prominent, suggestive of cerebral volume loss.  There are extensive hypodensities within the periventricular and subcortical white matter.  There are old periventricular infarctions.  The gray-white differentiation is maintained.  There is no evidence of mass effect.                               Medical Decision Making:   Differential Diagnosis:   Differential Diagnosis includes, but is not limited to:  CVA/TIA, intracranial mass/hemorrhage, head trauma, seizure, status epilepticus, post-ictal state, meningitis/encephalitis, sepsis, MI/ACS, arrhythmia, syncope,   anaphylaxis, thyroid disease, neuroleptic malignant syndrome, serotonin syndrome, CO poisoning, hypoxia/hypercapnea, uremic/hepatic encephalopathy, medication reaction, intentional overdose, metabolic derangement, psychiatric disturbance, substance abuse, alcohol intoxication/withdrawal, hypoglycemia/hyperglycemia, complicated migraine.   Clinical Tests:   Lab Tests: Ordered and Reviewed  Radiological Study: Ordered and Reviewed  Medical Tests: Reviewed and  Ordered  ED Management:  CT head negative for acute pathology     Labs with ammonia elevation. Lactate mildly elevated.      Code stroke activated. Dr. Shepherd evaluated patient, not a TPA candidate. Recommends non emergent MRI/MRA.     Patient will be admitted to [facility] IM for encephalopathy and stroke work up.              Attending Attestation:         Attending Critical Care:   Critical Care Times:   Direct Patient Care (initial evaluation, reassessments, and time considering the case)................................................................10 minutes.   Additional History from reviewing old medical records or taking additional history from the family, EMS, PCP, etc.......................10 minutes.   Ordering, Reviewing, and Interpreting Diagnostic Studies...............................................................................................................5 minutes.   Documentation..................................................................................................................................................................................5 minutes.   Consultation with other Physicians. .................................................................................................................................................5 minutes.   Consultation with the patient's family directly relating to the patient's condition, care, and DNR status (when patient unable)......5 minutes.   ==============================================================  · Total Critical Care Time - exclusive of procedural time: 40 minutes.  ==============================================================  Critical care was necessary to treat or prevent imminent or life-threatening deterioration of the following conditions: CNS failure and stroke.                             Clinical Impression:       ICD-10-CM ICD-9-CM   1. Stroke I63.9 434.91   2. Acute encephalopathy G93.40 348.30   3. Stroke-like  symptom R29.90 781.99          Scribe Attestation I, Sean Rees,  personally performed the services described in this documentation. All medical record entries made by the scribe were at my direction and in my presence.  I have reviewed the chart and agree that the record reflects my personal performance and is accurate and complete. Sean Rees M.D. 11:27 AM12/12/2019       Portions of this note were dictated using voice recognition software and may contain dictation related errors in spelling/grammar/syntax not found on text review                  Sean Rees Jr., MD  12/12/19 1127
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
## 2019-12-11 NOTE — PT/OT/SLP PROGRESS
Physical Therapy  NOT SEEN    Patient Name:  Shant Magana Jr.   MRN:  [id number]    Patient not seen today secondary to Other (Comment)(multiple testing-EEG in am and MRI in pm). Will follow-up tomorrow 12/12/19.    Isadora Ness, PT

## 2019-12-11 NOTE — ED NOTES
"Grandson at the BS, pt carrying on a conversation w/ him. Grandson says pt is slightly confused, "saying some things that don't make sense". Daughter states her father does have some dementia. NO respiratory distress. VS Will continue  To monitor closely.  "

## 2019-12-11 NOTE — PLAN OF CARE
Mr Magana lives in a home with his son, independent with ADL's, uses no DME, remains active.  Pt in for stroke w/u, MRI pending.  Therapy recs pending.    Discharge planning brochure provided. White board updated with CM name & contact info.  Pt encouraged to call with any questions or needs. CM will continue to follow patient throughout the transitions of care, and assist with any discharge needs.       12/11/19 4195   Discharge Assessment   Assessment Type Discharge Planning Assessment   Assessment information obtained from? Medical Record   Communicated expected length of stay with patient/caregiver yes   Prior to hospitilization cognitive status: Alert/Oriented   Prior to hospitalization functional status: Independent   Current cognitive status: Alert/Oriented   Current Functional Status: Independent   Lives With child(michael), adult   Able to Return to Prior Arrangements yes   Is patient able to care for self after discharge? Yes   Who are your caregiver(s) and their phone number(s)?   (son Hoda [phone])   Readmission Within the Last 30 Days no previous admission in last 30 days   Patient currently being followed by outpatient case management? No   Patient currently receives any other outside agency services? No   Equipment Currently Used at Home bath bench   Do you have any problems affording any of your prescribed medications? No   Is the patient taking medications as prescribed? yes   Does the patient have transportation home? Yes   Transportation Anticipated family or friend will provide   Does the patient receive services at the [facility]? No   Discharge Plan A Home with family   Discharge Plan B Home with family;Home Health   DME Needed Upon Discharge  none   Patient/Family in Agreement with Plan yes       Raeann Doe RN    [phone]

## 2019-12-11 NOTE — PLAN OF CARE
Problem: SLP Goal  Goal: SLP Goal  Description  Short Term Goals:  1. Pt will participate in ongoing swallow assessment to determine least restrictive diet.   2. Pt will tolerate mech soft tray/thin liquids with no audible s/s of dysphagia and good oral clearance.   3. Pt will implement safe swallowing strategies 100% of the time given min assist.   4. Pt will participate in speech/lang/cog eval to determine level of deficits  5. Pt will state basic orientation x4 with mod cues.  6. Pt will respond to personal y/n related ?s with 70% acc, mod cues.  7. Pt will follow 1-2 part commands with mod cues for 70% acc.  Ongoing goals pending overall progress        Outcome: Ongoing, Progressing   Swallow eval initiated, pt with confusional state, unreliable with answering questions. Friend present in room and SLP did speak with his dtr via phone.   Pt may initiate mech soft diet and thin liquids for now.

## 2019-12-11 NOTE — ASSESSMENT & PLAN NOTE
No clear focal neurological deficits. However word finding difficulties.   Not a tPA candidate  Obtain MRI of the brain and MRA brain. Seizures remains a diagnosis of exclusion.

## 2019-12-11 NOTE — PROGRESS NOTES
"[facility] Internal Medicine Progress Note - Resident Note        Subjective:      Patient more alert this AM but still oriented only to self and place. He denies any new issues overnight. Afebrile, BP elevated but not requiring prn labetalol.     Objective:   Last 24 Hour Vital Signs:  BP  Min: 168/85  Max: 193/91  Temp  Av.5 °F (36.9 °C)  Min: 97.9 °F (36.6 °C)  Max: 98.8 °F (37.1 °C)  Pulse  Av.8  Min: 61  Max: 66  Resp  Av.3  Min: 16  Max: 18  SpO2  Av %  Min: 98 %  Max: 98 %  Height  Av' 1" (185.4 cm)  Min: 6' 1" (185.4 cm)  Max: 6' 1" (185.4 cm)  Weight  Av.8 kg (217 lb 13 oz)  Min: 98.8 kg (217 lb 13 oz)  Max: 98.8 kg (217 lb 13 oz)  Body mass index is 28.74 kg/m².  I/O last 3 completed shifts:  In: -   Out: 850 [Urine:850]     Physical Examination:  Physical Exam   Constitutional: He is well-developed, well-nourished, and in no distress. No distress.   HENT:   Head: Normocephalic and atraumatic.   Eyes: EOM are normal.   Neck: Normal range of motion.   Cardiovascular: Normal rate and regular rhythm.   Pulmonary/Chest: Effort normal. No respiratory distress. He has no wheezes.   Abdominal: Soft. He exhibits no distension. There is no tenderness.   Musculoskeletal: He exhibits no edema.   Neurological: He is alert. He has an abnormal Cerebellar Exam, an abnormal Finger-Nose-Finger Test and an abnormal Heel to Rodriguez Test.   Oriented to place and person but not time or situation  CN 2-12 grossly intact  5/5 strength to bilateral UE  5/5 strength to BLE   Skin: Skin is warm. He is not diaphoretic.   Vitals reviewed.        Laboratory:  Most Recent Data:  CBC:   Lab Results   Component Value Date     WBC 5.69 2019     HGB 13.7 (L) 2019     HCT 41.6 2019      2019     MCV 85 2019     RDW 15.6 (H) 2019         BMP:         Lab Results   Component Value Date      12/10/2019     K 4.3 12/10/2019      12/10/2019     CO2 18 (L) 12/10/2019     " BUN 23 12/10/2019      (H) 12/10/2019     CALCIUM 10.4 12/10/2019     MG 1.8 12/11/2019     PHOS 2.3 (L) 12/11/2019      LFTs:         Lab Results   Component Value Date     PROT 7.2 12/10/2019     ALBUMIN 3.5 12/10/2019     BILITOT 0.9 12/10/2019     AST 23 12/10/2019     ALKPHOS 81 12/10/2019     ALT 14 12/10/2019      Coags:   Lab Results   Component Value Date     INR 1.1 12/11/2019      FLP:         Lab Results   Component Value Date     CHOL 147 12/10/2019     HDL 44 12/10/2019     LDLCALC 94.0 12/10/2019     TRIG 45 12/10/2019     CHOLHDL 29.9 12/10/2019      DM:         Lab Results   Component Value Date     HGBA1C 5.9 (H) 09/03/2018     HGBA1C 6.2 (H) 10/11/2017     HGBA1C 5.7 (H) 07/18/2017     LDLCALC 94.0 12/10/2019     CREATININE 1.6 (H) 12/10/2019      Thyroid:         Lab Results   Component Value Date     TSH 2.531 12/10/2019      Anemia:         Lab Results   Component Value Date     IRON 91 09/04/2018     TIBC 363 09/04/2018     FERRITIN 125 09/04/2018      Cardiac:         Lab Results   Component Value Date     TROPONINI 0.021 12/11/2019      (H) 12/10/2019      Urinalysis:         Lab Results   Component Value Date     COLORU Yellow 12/10/2019     SPECGRAV 1.020 12/10/2019     NITRITE Negative 12/10/2019     KETONESU Negative 12/10/2019     UROBILINOGEN Negative 12/10/2019     WBCUA 2 12/10/2019         Trended Lab Data:       Recent Labs   Lab 12/10/19  2012 12/11/19  0628   WBC 6.17 5.69   HGB 13.4* 13.7*   HCT 41.2 41.6    200   MCV 84 85   RDW 15.7* 15.6*     --    K 4.3  --      --    CO2 18*  --    BUN 23  --    *  --    CALCIUM 10.4  --    PROT 7.2  --    ALBUMIN 3.5  --    BILITOT 0.9  --    AST 23  --    ALKPHOS 81  --    ALT 14  --          Trended Cardiac Data:        Recent Labs   Lab 12/10/19  2012 12/10/19  2155 12/11/19  0619   TROPONINI <0.006  --  0.021   BNP  --  148*  --          Microbiology Data:  UA pending     Other Laboratory  Data:        Other Results:  EKG: sinus rhythm 76 BPM  Radiology:  Ct Head Without Contrast     Result Date: 12/10/2019  EXAMINATION: CT HEAD WITHOUT CONTRAST CLINICAL HISTORY: Stroke;Extremity Weakness; TECHNIQUE: Low dose axial images were obtained through the head.  Coronal and sagittal reformations were also performed. Contrast was not administered. COMPARISON: CT scan of the head dated 09/03/2018. FINDINGS: The subcutaneous tissues are unremarkable.  The bony calvarium is intact.  There is mucosal thickening within the ethmoid sinuses.  The remainder of the paranasal sinuses are unremarkable.  The mastoid air cells are clear.  The orbits and intraorbital contents are within normal limits. The craniocervical junction is within normal limits.  There are no extra-axial fluid collections.  There is no evidence of intracranial hemorrhage.  The ventricles and sulci are prominent, suggestive of cerebral volume loss.  There are extensive hypodensities within the periventricular and subcortical white matter.  There are old periventricular infarctions.  The gray-white differentiation is maintained.  There is no evidence of mass effect.      No CT evidence of acute territorial infarction.  MRI may be obtained for further evaluation. Changes of chronic small vessel ischemic disease and cerebral volume loss. Electronically signed by:        Niels Horton MD Date:                                           12/10/2019 Time:                                            19:13     X-ray Chest Ap Portable     Result Date: 12/10/2019  EXAMINATION: XR CHEST AP PORTABLE CLINICAL HISTORY: Stroke; TECHNIQUE: Single frontal view of the chest was performed. COMPARISON: 09/03/2018. FINDINGS: Monitoring EKG leads are present.  There is a loop recorder present.  There are calcifications of the aortic knob.  There is borderline enlargement of the cardiac silhouette.  The hemidiaphragms are unremarkable.  There is no evidence of free air beneath  the hemidiaphragms.  There are no pleural effusions.  There is no evidence of a pneumothorax.  There is no evidence of pneumomediastinum.  No airspace opacity is present.  There are degenerative changes in the osseous structures      Borderline cardiomegaly without evidence of CHF. Loop recorder in place. Electronically signed by:       Niels Horton MD Date:                                            12/10/2019 Time:                                            21:15        Assessment:      Shant Magana Jr. is a 79 y.o. male with a pmhx of HTH, HLD, Afib, Type II Dm, HFpEF admitted for stroke-like symptoms.  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  - Started on ASA 81 mg, Atorvastatin 80 mg  - Q4 hours neuro checks  - Aspiration and fall precautions  - Pt also with hyperammonemia with an ammonia level of 87; pt started on lactulose 20 gm BID     Lactic Acidosis  - lactic acid 2.9, downtrending with 1L LR to 1.2     HFpEF  - 9/3/18 Echo with EF of 65-70% and increased LVEDP  - , negative Tn, EKG with TWF in inferior leads and RAD, anterior q waves  - TTE ordered     Hx of Afib  - Eliquis 2.5 mg BID  - Per grandson pt has not been compliant with Eliquis for the past week 2/2 to insurance issues leading to increase cost of the medication  -resume along with ASA while inpt     HTN  - Holding home medications and allowing permissive HTN for 24 hours  -will resume losartan 100, norvasc 10, hydralazine 10 once  outside window  - PRN Labetalol     Type II DM  - 9/2018 A1c 5.9, not on home meds  - SSI  - repeat A1c ordered     HLD  - Atorvastatin 80 mg     CKD stage 3  - Cr 1.6; appears to be at baseline; stable  - Will renally dose medications and avoid nephrotoxic drugs  - Vitamin D 1000 units     Hx of upper GI bleed  - Pantoprazole 40 mg     BPH  - Tamsulosin 0.4 mg            DVT: SCDs/Eliquis  Diet: NPO, pending speech eval  Dispo: Continue to monitor pt's neurological status and pending MRI/MRA results, therapy eval, TTE, EEG and neuro eval, possible discharge in 24-48 hours  Code:Full     Trent Castillo MD  [facility] HO-III

## 2019-12-12 LAB
ALBUMIN SERPL BCP-MCNC: 3.3 G/DL (ref 3.5–5.2)
ALP SERPL-CCNC: 80 U/L (ref 55–135)
ALT SERPL W/O P-5'-P-CCNC: 18 U/L (ref 10–44)
ANION GAP SERPL CALC-SCNC: 10 MMOL/L (ref 8–16)
AST SERPL-CCNC: 40 U/L (ref 10–40)
BASOPHILS # BLD AUTO: 0.02 K/UL (ref 0–0.2)
BASOPHILS NFR BLD: 0.5 % (ref 0–1.9)
BILIRUB SERPL-MCNC: 1.3 MG/DL (ref 0.1–1)
BUN SERPL-MCNC: 19 MG/DL (ref 8–23)
CALCIUM SERPL-MCNC: 10.2 MG/DL (ref 8.7–10.5)
CHLORIDE SERPL-SCNC: 107 MMOL/L (ref 95–110)
CO2 SERPL-SCNC: 20 MMOL/L (ref 23–29)
CREAT SERPL-MCNC: 1.5 MG/DL (ref 0.5–1.4)
DIFFERENTIAL METHOD: ABNORMAL
EOSINOPHIL # BLD AUTO: 0.2 K/UL (ref 0–0.5)
EOSINOPHIL NFR BLD: 4.2 % (ref 0–8)
ERYTHROCYTE [DISTWIDTH] IN BLOOD BY AUTOMATED COUNT: 15.6 % (ref 11.5–14.5)
EST. GFR  (AFRICAN AMERICAN): 50 ML/MIN/1.73 M^2
EST. GFR  (NON AFRICAN AMERICAN): 44 ML/MIN/1.73 M^2
GLUCOSE SERPL-MCNC: 86 MG/DL (ref 70–110)
HCT VFR BLD AUTO: 43.7 % (ref 40–54)
HGB BLD-MCNC: 14.2 G/DL (ref 14–18)
IRON SERPL-MCNC: 95 UG/DL (ref 45–160)
LYMPHOCYTES # BLD AUTO: 0.9 K/UL (ref 1–4.8)
LYMPHOCYTES NFR BLD: 20.8 % (ref 18–48)
MCH RBC QN AUTO: 27.8 PG (ref 27–31)
MCHC RBC AUTO-ENTMCNC: 32.5 G/DL (ref 32–36)
MCV RBC AUTO: 86 FL (ref 82–98)
MONOCYTES # BLD AUTO: 0.4 K/UL (ref 0.3–1)
MONOCYTES NFR BLD: 10.3 % (ref 4–15)
NEUTROPHILS # BLD AUTO: 2.7 K/UL (ref 1.8–7.7)
NEUTROPHILS NFR BLD: 64.2 % (ref 38–73)
PLATELET # BLD AUTO: 164 K/UL (ref 150–350)
PMV BLD AUTO: 11.2 FL (ref 9.2–12.9)
POCT GLUCOSE: 113 MG/DL (ref 70–110)
POCT GLUCOSE: 119 MG/DL (ref 70–110)
POCT GLUCOSE: 141 MG/DL (ref 70–110)
POCT GLUCOSE: 84 MG/DL (ref 70–110)
POTASSIUM SERPL-SCNC: 3.7 MMOL/L (ref 3.5–5.1)
PROT SERPL-MCNC: 6.6 G/DL (ref 6–8.4)
RBC # BLD AUTO: 5.11 M/UL (ref 4.6–6.2)
SATURATED IRON: 24 % (ref 20–50)
SODIUM SERPL-SCNC: 137 MMOL/L (ref 136–145)
TOTAL IRON BINDING CAPACITY: 404 UG/DL (ref 250–450)
TRANSFERRIN SERPL-MCNC: 273 MG/DL (ref 200–375)
WBC # BLD AUTO: 4.27 K/UL (ref 3.9–12.7)

## 2019-12-12 PROCEDURE — 97165 OT EVAL LOW COMPLEX 30 MIN: CPT

## 2019-12-12 PROCEDURE — 97530 THERAPEUTIC ACTIVITIES: CPT

## 2019-12-12 PROCEDURE — 92507 TX SP LANG VOICE COMM INDIV: CPT

## 2019-12-12 PROCEDURE — 85025 COMPLETE CBC W/AUTO DIFF WBC: CPT

## 2019-12-12 PROCEDURE — 94761 N-INVAS EAR/PLS OXIMETRY MLT: CPT

## 2019-12-12 PROCEDURE — 97162 PT EVAL MOD COMPLEX 30 MIN: CPT

## 2019-12-12 PROCEDURE — 36415 COLL VENOUS BLD VENIPUNCTURE: CPT

## 2019-12-12 PROCEDURE — 11000001 HC ACUTE MED/SURG PRIVATE ROOM

## 2019-12-12 PROCEDURE — 80053 COMPREHEN METABOLIC PANEL: CPT

## 2019-12-12 PROCEDURE — 25000003 PHARM REV CODE 250: Performed by: STUDENT IN AN ORGANIZED HEALTH CARE EDUCATION/TRAINING PROGRAM

## 2019-12-12 PROCEDURE — 92526 ORAL FUNCTION THERAPY: CPT

## 2019-12-12 RX ORDER — HYDROCHLOROTHIAZIDE 25 MG/1
25 TABLET ORAL DAILY
Status: DISCONTINUED | OUTPATIENT
Start: 2019-12-12 | End: 2019-12-17 | Stop reason: HOSPADM

## 2019-12-12 RX ADMIN — APIXABAN 2.5 MG: 2.5 TABLET, FILM COATED ORAL at 09:12

## 2019-12-12 RX ADMIN — ASPIRIN 81 MG: 81 TABLET, COATED ORAL at 09:12

## 2019-12-12 RX ADMIN — TAMSULOSIN HYDROCHLORIDE 0.4 MG: 0.4 CAPSULE ORAL at 08:12

## 2019-12-12 RX ADMIN — LOSARTAN POTASSIUM 100 MG: 50 TABLET ORAL at 08:12

## 2019-12-12 RX ADMIN — HYDRALAZINE HYDROCHLORIDE 10 MG: 10 TABLET, FILM COATED ORAL at 05:12

## 2019-12-12 RX ADMIN — PANTOPRAZOLE SODIUM 40 MG: 40 TABLET, DELAYED RELEASE ORAL at 08:12

## 2019-12-12 RX ADMIN — APIXABAN 2.5 MG: 2.5 TABLET, FILM COATED ORAL at 08:12

## 2019-12-12 RX ADMIN — HYDROCHLOROTHIAZIDE 25 MG: 25 TABLET ORAL at 02:12

## 2019-12-12 RX ADMIN — ATORVASTATIN CALCIUM 80 MG: 40 TABLET, FILM COATED ORAL at 08:12

## 2019-12-12 RX ADMIN — AMLODIPINE BESYLATE 10 MG: 5 TABLET ORAL at 08:12

## 2019-12-12 RX ADMIN — VITAMIN D, TAB 1000IU (100/BT) 1000 UNITS: 25 TAB at 08:12

## 2019-12-12 NOTE — PLAN OF CARE
12/12/19 1255   Post-Acute Status   Post-Acute Authorization Placement   Post-Acute Placement Status Referrals Sent   Patient choice form signed by patient/caregiver List with quality metrics by geographic area provided   The [facility] faxed the pt's info to [facility].

## 2019-12-12 NOTE — PLAN OF CARE
Problem: Occupational Therapy Goal  Goal: Occupational Therapy Goal  Description  Goals to be met by: 01/12     Patient will increase functional independence with ADLs by performing:    Feeding with Minimal Assistance.  Grooming while seated with Minimal Assistance.  Toileting from bedside commode with Moderate Assistance for hygiene and clothing management.   Toilet transfer to bedside commode with Contact Guard Assistance.  Increased functional strength to WFL for ADLs.     Outcome: Ongoing, Progressing   Pt found in SF & agreeable to OT eval/tx this AM. Pt lives w/ dgtr & grdson in [place] w/ 4STE & BHR; t/s combo. PLOF: MI w/o amb DME w/ all fxnl tasks except req's SBA for baths on TTB. Pt AO1 to self only & perf the following: sup-->L sidelying w/ CGA for tc's for attn to tasks 2/2 lethargy/decreased ability to follow commands-->EOB w/ Max-total A; sabine'd sitting EOB x ~8min w/ CG-SBA 2/2 pt nodding off & leaning posteriorly, but waking up & recovering balance to midline; scooting to HOB w/ Max A; standing via RW w/ Mod A & EOB custodial elevated; step t/f via RW--b/s chair w/ Mod-Max A 2/2 pt w/ inconsistent ability to follow commands & apraxia. Edu/tx w/ niece in room & dgtr on phone re: role of OT, eval findings, PLOF/living situation & d/c planning. Pt's niece/dgtr verbalized understanding, however pt w/ questionable comprehension.    Pt presents w/ decreased overall endurance/conditioning, balance/mobility & coordination/cognition w/ subsequent decline in (I)/safety w/ BADLs, fxnl mobility & fxnl t/f's.  OT 5x/wk to increase phys/fxnl status & maximize potential to achieve established goals for d/c-->IPR & DME deferred.

## 2019-12-12 NOTE — PROGRESS NOTES
"[facility] Resident HO-3 Progress Note    Subjective:      Shant Magana Jr. is a 79 y.o. AA male who is being followed by the [facility] IM service at [facility] for embolic CVA. Patient reports this AM that he feels stronger but still answering questions inappropriately. He is not aware that he is in the hospital, doesn't know the year nor where he lives. He has yet to work with therapy to get dispo recs.     Objective:   Last 24 Hour Vital Signs:  BP  Min: 123/81  Max: 197/94  Temp  Av.2 °F (36.8 °C)  Min: 96.8 °F (36 °C)  Max: 98.8 °F (37.1 °C)  Pulse  Av.7  Min: 56  Max: 68  Resp  Av  Min: 18  Max: 18  SpO2  Av.2 %  Min: 96 %  Max: 98 %  Height  Av' 1" (185.4 cm)  Min: 6' 1" (185.4 cm)  Max: 6' 1" (185.4 cm)  Weight  Av.8 kg (217 lb 13 oz)  Min: 98.8 kg (217 lb 13 oz)  Max: 98.8 kg (217 lb 13 oz)  I/O last 3 completed shifts:  In: 645 [P.O.:645]  Out: 1201 [Urine:1200; Stool:1]    Physical Examination:  Physical Exam   Constitutional: He is well-developed, well-nourished, and in no distress. No distress.   HENT:   Head: Normocephalic and atraumatic.   Eyes: EOM are normal.   Neck: Normal range of motion.   Cardiovascular: Normal rate and regular rhythm.   Pulmonary/Chest: Effort normal. No respiratory distress. He has no wheezes.   Abdominal: Soft. He exhibits no distension. There is no tenderness.   Musculoskeletal: He exhibits no edema.   Neurological: He is alert. He has , an abnormal Finger-Nose-Finger Test and an abnormal Heel to Rodriguez Test.   Oriented to place and person but not time or situation  CN 2-12 grossly intact  5/5 strength to bilateral UE  4/5 strength to BLE   Skin: Skin is warm. He is not diaphoretic.   Vitals reviewed.    Laboratory:  Laboratory Data Reviewed: yes  Pertinent Findings:      Microbiology Data Reviewed: yes  Pertinent Findings:  none      Radiology Data Reviewed: yes  Pertinent Findings:  TTE: grade I DD, EF 70%, LAE without thrombus    MRI/MRA " :  Numerous acute infarcts throughout the bilateral anterior and posterior circulation, probably embolic.  Multifocal intracranial narrowing, similar to prior MRA   Multifocal stenosis/occlusion of the vertebral arteries, right worse than left.   Approximately 60% stenosis of the left carotid bulb.    Current Medications:     Infusions:       Scheduled:   amLODIPine  10 mg Oral Daily    apixaban  2.5 mg Oral BID    aspirin  81 mg Oral Daily    atorvastatin  80 mg Oral Daily    hydrALAZINE  10 mg Oral Q8H    losartan  100 mg Oral Daily    pantoprazole  40 mg Oral Daily    tamsulosin  0.4 mg Oral Daily    vitamin D  1,000 Units Oral Daily        PRN:  Dextrose 10% Bolus, Dextrose 10% Bolus, glucagon (human recombinant), glucose, glucose, insulin aspart U-100, labetalol, sodium chloride 0.9%    Antibiotics and Day Number of Therapy:  none        Assessment:     Shant Magana Jr. is a 79 y.o.male with  Patient Active Problem List    Diagnosis Date Noted    Stroke-like symptom 12/10/2019    Stroke 12/10/2019    Lactic acidosis 12/10/2019    Dementia without behavioral disturbance 12/10/2019    Orthostatic hypotension 10/01/2019    Paroxysmal atrial fibrillation 02/12/2019    Status post placement of implantable loop recorder 02/12/2019    Hyperparathyroidism 02/12/2019    Gastritis     Polyp of colon     Melena 10/18/2018    Symptomatic anemia 09/03/2018    Acute upper GI bleed 09/03/2018    Anemia     Weakness     Chronic diastolic congestive heart failure 10/19/2017    Enlarged LA (left atrium) 10/19/2017    Internal carotid artery stenosis, left 10/18/2017    Mixed hyperlipidemia 10/18/2017    CKD (chronic kidney disease) stage 3, GFR 30-59 ml/min 10/18/2017    Cryptogenic stroke 10/16/2017    SHIKHA (acute kidney injury) 10/14/2017    Acute encephalopathy 10/11/2017    Cerebrovascular accident (CVA) due to bilateral embolism of middle cerebral arteries 08/15/2017    Type 2 diabetes  mellitus with complication, without long-term current use of insulin     Essential hypertension 06/07/2017    Bradycardia 06/07/2017        Plan:     Acute Multifocal Embolic CVA  - Pt has dementia at baseline but per grandson neurological status has worsened, presents with BLE weakness and experienced 2 falls PTA  - Pt evaluated by Neuro via Telemedicine in the ED who determined he was not a candidate for TPA  - CT head (-) , UDS (-)  - TSH WNL, speech and swallow evaluation, PT/OT  - Neurology consulted  - NIH score 6  - Pt has a hx of 2 prior CVAs in 2017, 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  - Started on ASA 81 mg, Atorvastatin 80 mg  - Pt also with hyperammonemia with an ammonia level of 87; pt started on lactulose 20 gm BID  -MRI/MRA with unchanged stenotic vasculature, multifocal emblic CVA in bilateral hemispheres and cerebellum     Lactic Acidosis, resolved  - lactic acid 2.9, downtrending with 1L LR to 1.2     HFpEF  - 9/3/18 Echo with EF of 65-70% and increased LVEDP  - , negative Tn, EKG with TWF in inferior leads and RAD, anterior q waves  - TTE with LAE no thormbus, grade 1 DD     Hx of Afib  - Eliquis 2.5 mg BID, no BB due to bradycardia (previously stopped)  - Per grandson pt has not been compliant with Eliquis for the past week 2/2 to insurance issues leading to increase cost of the medication  -resume along with ASA while inpt     HTN  - Holding home medications and allowing permissive HTN for 24 hours  -will resume losartan 100, norvasc 10, hydralazine 10 once outside window  - PRN Labetalol     Type II DM  - 9/2018 A1c 5.9, not on home meds  - SSI  - repeat A1c normal     HLD  - Atorvastatin 80 mg, ASA     CKD stage 3  - Cr 1.6; appears to be at baseline; stable  - Will renally dose medications and avoid nephrotoxic drugs  - Vitamin D 1000 units     Hx of upper GI bleed  - Pantoprazole 40 mg     BPH  - Tamsulosin 0.4 mg            DVT: SCDs/Eliquis  Diet: cardiac  Dispo: pending PT/OT eval, likely  IPR  Code:Full         Trent Castillo  [facility] HO-3  [facility] IM Service Team A    [facility] Medicine Hospitalist Pager numbers:   [facility] Hospitalist Medicine Team A (Hanny/Loco): [phone]  [facility] Hospitalist Medicine Team B (Kacey/Nico):  [phone]

## 2019-12-12 NOTE — PT/OT/SLP EVAL
Occupational Therapy   Evaluation/tx    Name: Shant Magana Jr.  MRN: [id number]  Admitting Diagnosis:  Stroke-like symptom      Recommendations:     Discharge Recommendations:  IPR  Discharge Equipment Recommendations:  (defer to IPR)  Barriers to discharge:  Decreased caregiver support, Inaccessible home environment    Assessment:   Pt presents w/ decreased overall endurance/conditioning, balance/mobility & coordination/cognition w/ subsequent decline in (I)/safety w/ BADLs, fxnl mobility & fxnl t/f's.  OT 5x/wk to increase phys/fxnl status & maximize potential to achieve established goals for d/c-->IPR & DME deferred.    Shant Magana Jr. is a 79 y.o. male with a medical diagnosis of Stroke-like symptom.  He presents with . Performance deficits affecting function: weakness, impaired endurance, impaired self care skills, impaired balance, gait instability, impaired functional mobilty, impaired sensation, decreased lower extremity function, decreased upper extremity function, decreased coordination, impaired cognition, decreased safety awareness, pain, abnormal tone, decreased ROM, impaired fine motor, impaired coordination.      Rehab Prognosis: Good; patient would benefit from acute skilled OT services to address these deficits and reach maximum level of function.       Plan:     Patient to be seen 5 x/week to address the above listed problems via self-care/home management, therapeutic activities, therapeutic exercises, neuromuscular re-education  · Plan of Care Expires: 01/12/20  · Plan of Care Reviewed with: patient, daughter(sohail)    Subjective     Chief Complaint: falls  Patient/Family Comments/goals: return to PLOF    Occupational Profile:  Living Environment: w/ dgtr/grdson in [facility] w/ 4STE & BHR; t/s  Previous level of function: MI w/o amb DME except req's SBA for baths on TTB  Roles and Routines: mostly homebound  Equipment Used at Home:  bedside commode, bath bench(has RW & SPC, but doesn;'t use)  Assistance upon  Discharge: pt will require 24/7 S/A at this point    Pain/Comfort:  · Pain Rating 1: 0/10  · Pain Rating Post-Intervention 1: 0/10    Patients cultural, spiritual, [employer] conflicts given the current situation:      Objective:     Communicated with: cole prior to session.  Patient found HOB elevated with bed alarm, telemetry upon OT entry to room.    General Precautions: Standard, fall   Orthopedic Precautions:N/A   Braces: N/A     Occupational Performance:    Bed Mobility:    · Patient completed Rolling/Turning to Left with  contact guard assistance  · Patient completed Supine to Sit with moderate assistance and maximal assistance    Functional Mobility/Transfers:  · Patient completed Sit <> Stand Transfer with moderate assistance  with  rolling walker   · Patient completed Bed <> Chair Transfer using Step Transfer technique with moderate assistance and maximal assistance with rolling walker  · Functional Mobility: .    Activities of Daily Living:  ·     Cognitive/Visual Perceptual:  AO1 for self only  Poor working memory/STM/recall  Slurred speech  Lethargy  Impaired ability to follow commands (~50%)  No neglect/hemianopsia/field cut noted    Physical Exam:  Apraxic movements, perseveration during tasks, fidgiting behavior    B shldr AAROM ~50% before becoming resistive  B elb-->Ds WFL, but impaired FMC R>L    Sit balance: F- to F  Stand balance: P+ w/ decreased WBing noted at RLE    AMPA 6 Click ADL:  [facility] Total Score: 12    Treatment & Education:  Pt found in SF & agreeable to OT eval/tx this AM. Pt lives w/ dgtr & grdson in [facility] w/ 4STE & BHR; t/s combo. PLOF: MI w/o amb DME w/ all fxnl tasks except req's SBA for baths on TTB. Pt AO1 to self only & perf the following: sup-->L sidelying w/ CGA for tc's for attn to tasks 2/2 lethargy/decreased ability to follow commands-->EOB w/ Max-total A; sabine'd sitting EOB x ~8min w/ CG-SBA 2/2 pt nodding off & leaning posteriorly, but waking up & recovering balance to  midline; scooting to HOB w/ Max A; standing via RW w/ Mod A & EOB residential elevated; step t/f via RW--b/s chair w/ Mod-Max A 2/2 pt w/ inconsistent ability to follow commands & apraxia. Edu/tx w/ niece in room & dgtr on phone re: role of OT, eval findings, PLOF/living situation & d/c planning. Pt's niece/dgtr verbalized understanding, however pt w/ questionable comprehension.    Patient left up in chair with all lines intact, call button in reach, chair alarm on, nsg notified and neice present    GOALS:   Multidisciplinary Problems     Occupational Therapy Goals        Problem: Occupational Therapy Goal    Goal Priority Disciplines Outcome Interventions   Occupational Therapy Goal     OT, PT/OT Ongoing, Progressing    Description:  Goals to be met by: 01/12     Patient will increase functional independence with ADLs by performing:    Feeding with Minimal Assistance.  Grooming while seated with Minimal Assistance.  Toileting from bedside commode with Moderate Assistance for hygiene and clothing management.   Toilet transfer to bedside commode with Contact Guard Assistance.  Increased functional strength to WFL for ADLs.                      History:     Past Medical History:   Diagnosis Date    Cancer     prostate    Diabetes mellitus     Hypertension     Hypertrophic cardiomyopathy     Renal disorder        Past Surgical History:   Procedure Laterality Date    BACK SURGERY      COLONOSCOPY N/A 10/18/2018    Procedure: COLONOSCOPY;  Surgeon: Alan Gooden MD;  Location: [facility];  Service: Endoscopy;  Laterality: N/A;    ESOPHAGOGASTRODUODENOSCOPY N/A 9/4/2018    Procedure: EGD (ESOPHAGOGASTRODUODENOSCOPY);  Surgeon: Oli Cuadra MD;  Location: [facility];  Service: Endoscopy;  Laterality: N/A;    ESOPHAGOGASTRODUODENOSCOPY N/A 10/18/2018    Procedure: EGD (ESOPHAGOGASTRODUODENOSCOPY);  Surgeon: Alan Gooden MD;  Location: [facility];  Service: Endoscopy;  Laterality: N/A;    THYROIDECTOMY          Time Tracking:     OT Date of Treatment: 12/12/19  OT Start Time: 0908  OT Stop Time: 1001  OT Total Time (min): 53 min    Billable Minutes:Evaluation 10  Therapeutic Activity 43  Total Time 53    JASPER Garcia  12/12/2019

## 2019-12-12 NOTE — NURSING
Entered  room via virtual system after not hearing any verbal response from pt to check for pt safety.  Pt awakened to voice but pt did not seem to be able to focus onto source.  No distress noted.  Kathie in use in room.  Virtual rounds completed as documented.  Today's lab values, notes, and vital signs up to now have been reviewed.

## 2019-12-12 NOTE — PT/OT/SLP PROGRESS
"Speech Language Pathology Treatment    Patient Name:  Shant Magana Jr.   MRN:  [id number]  Admitting Diagnosis: Stroke-like symptom    Recommendations:                 General Recommendations:  Speech/language therapy and monitor PO diet and ensure safety with meal   Diet recommendations:  Mechanical soft, Liquid Diet Level: Thin   Aspiration Precautions: upright for meals, small bites/sips, needs assist for feeding, supervision with meals, whole meds, tray set-up   General Precautions: Standard, fall  Communication strategies:  Reorient, repeat instructions, provide hand over hand cues, pt tends to perseverate on actions, eliminate distractors    Subjective     Pt seen in room, upright in recliner, niece at bedside and able to provide some background info.  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   Yes  Keep patient NPO? No   Current Respiratory Status: room air      Swallowing: Pt remains on [facility] soft tray,thin liquids. RN reported no trouble w/ swallowing but did require feeding help. Pt w/ limb apraxia and having trouble with self feeding and use utensils correctly and poor hand to mouth coordination is noted. Pt turning spoon over, poor initiation to  cup and spoon.   Pt noted to bringing spoon to chin during frequent trials to .  SLP had to provide hand over hand cues for feeding and brushing his teeth. Diet to be upgraded to soft diet and continue with thin liquids.     Cognition/Communication: Pt cooperative with SLP, pt noted to be sleeping in chair and required frequent cues (wet towel) to maintain alertness and stay awake. Pt not oriented to person, place, situation and could not recall niece at bedside. Pt with poor responses to yes/no questions, unreliable answers provided and increased delay in responses noted, pt with flat " affect and blank stare present. Pt also noted to nod off during session and needed tactile cues to stay alert/awake. Reviewed orientation with pt x2 attempts. Visual and environment cues did not assist. Pt with dcr'd tracking noted to locate items on tray table.  SLP assisted pt with completing 1 part commands including face wiping and brushing his teeth. (he needed hand over hand cues for all parts). Pt made no initiation to wipe face with towel or brush his teeth. Pt did not know next step when completing task. Pt required max set-up for ADLs. Discussed with niece level of deficits and he will need intense therapy and cannot be left alone at home. He will require supervision with his ADLs. She did understand and asked questions re: his deficits. Cont w/ current POC goals which have been updated.     Assessment:     Shant Magana Jr. is a 79 y.o. male admitted with CVA who presents with an SLP diagnosis of cognitive/communication deficits.    Goals:   Multidisciplinary Problems     SLP Goals        Problem: SLP Goal    Goal Priority Disciplines Outcome   SLP Goal     SLP Ongoing, Progressing   Description:  Short Term Goals:  1. Pt will participate in ongoing swallow assessment to determine least restrictive diet.   2. Pt will tolerate mech soft tray/thin liquids with no audible s/s of dysphagia and good oral clearance.   3. Pt will implement safe swallowing strategies 100% of the time given min assist.   4. Pt will participate in speech/lang/cog eval to determine level of deficits  5. Pt will state basic orientation x4 with mod cues.  6. Pt will respond to personal y/n related ?s with 70% acc, mod cues.  7. Pt will follow 1-2 part commands with mod cues for 70% acc.  8. Pt will respond to personal questions within 30-45 seconds with min cues.  9. Pt will complete automatics with mod cues to increase verbal expression.                        Plan:     · Patient to be seen:  3 x/week   · Plan of Care expires:   01/09/20  · Plan of Care reviewed with:  patient(niece )   · SLP Follow-Up:  Yes       Discharge recommendations:  rehabilitation facility     Time Tracking:     SLP Treatment Date:   12/12/19  Speech Start Time:  1011  Speech Stop Time:  1036     Speech Total Time (min):  25 min    Billable Minutes: Speech Therapy Individual 15 and Treatment Swallowing Dysfunction 10      NEVIN Hassan, CCC-SLP  12/12/2019

## 2019-12-12 NOTE — PLAN OF CARE
CM visited with pt in room, sitting up in chair.  Pt alert, able to return greeting.  When asking specific questions about family noted word salad.  Contacted daughter Mike to review d/c plan.  Aware therapy still evaluating, recommending rehab at this time.  Educated on referral criteria, insurance process and possibility of rehab vs SNF at NH, verbalized understanding.  States pt did go to rehab at [facility] in 2017 but at this time family preference is [facility] on [facility].    [facility] to [facility], will continue to follow.    Raeann Doe, RN    [phone]

## 2019-12-12 NOTE — NURSING
Cued into patient's room for evening VN rounds. Patient is confused yelling out. Side rails x2 up with bed alarm activated for pat safety. No family at bedside. Unable to do education or teaching due to confused state. Head of bed elevated. No complaints at this time. Will continue to be available as needed

## 2019-12-12 NOTE — PLAN OF CARE
Problem: Physical Therapy Goal  Goal: Physical Therapy Goal  Description  Goals to be met by: 2020    Patient will increase functional independence with mobility by performin. Supine to sit with Set-up Sullivan  2. Sit to supine with Set-up Sullivan  3. Sit to stand transfer with Modified Sullivan  4. Bed to chair transfer with Supervision using Rolling Walker  5. Gait  x 50 feet with Modified Sullivan using Rolling Walker.   6. Lower extremity exercise program x15 reps per handout, with supervision     Outcome: Ongoing, Progressing       PT initial evaluation completed. Plan of care and goals established and discussed with patient/family.  Despite patient's co-morbidities, including R sided weakness, impaired strength, coordination and balance, patient was living in community setting functioning at Independent level for ADLs, and mobility prior to this event.  There is an expectation of returning to prior level of function to maintain independence thus avoiding readmission.  Patient's clinical condition meets full Inpatient Rehab (IPR) criteria; including the ability to actively participate in 3 hours of therapy.  A lower level of care(SNF) cannot provide the interdisciplinary treatment approach needed.     Discharge Recommendation: Inpatient Rehab Facility  DME Recommendation: defer to IPR

## 2019-12-12 NOTE — PROGRESS NOTES
Progress Note  [facility]    Admit Date: 12/10/2019  LOS: 2    CC: Multifocal embolic CVA    SUBJECTIVE:     Interval History/Significant Events: NAEO. Patient without complaint this morning, however, poorly oriented, confused at times and exhibiting motor apraxia. Patient tolerating PO with breakfast tray at bedside. Therapy at bedside this morning for further physical assessment.      Medications:    Scheduled Meds:   amLODIPine  10 mg Oral Daily    apixaban  2.5 mg Oral BID    aspirin  81 mg Oral Daily    atorvastatin  80 mg Oral Daily    hydrALAZINE  10 mg Oral Q8H    losartan  100 mg Oral Daily    pantoprazole  40 mg Oral Daily    tamsulosin  0.4 mg Oral Daily    vitamin D  1,000 Units Oral Daily     PRN Meds:.Dextrose 10% Bolus, Dextrose 10% Bolus, glucagon (human recombinant), glucose, glucose, insulin aspart U-100, labetalol, sodium chloride 0.9%    OBJECTIVE:     Physical Exam:    Vital Signs (24h Range):   Temp:  [96.8 °F (36 °C)-98.8 °F (37.1 °C)] 98.7 °F (37.1 °C)  Pulse:  [56-68] 59  Resp:  [18] 18  SpO2:  [96 %-98 %] 98 %  BP: (123-197)/(78-98) 123/81    GA: Alert, comfortable, no acute distress.     Neuro:  Mental Status: Alert to person, states he is in a hospital, incorrectly states he is in [place], unintelligible answer for date and year.     Language: mild aphasia, repetition intact, naming 2/3 objects, fluency appears intact, comprehension impaired    CN: PERRLA, EOMI, left lower facial asymmetry with decreased nasolabial fold, V1-V3 tactile sensation intact bilaterally, tongue protrusion midline, symmetric palatal elevation, 5/5 TPZ/SCM bilaterally    Motor: (exam limited due to cooperation and poor understanding)  --LUE strength: 5/5 strength in shoulder abduction, elbow flexion; 4/5 strength in elbow extension and wrist extension  --RUE strength: 5/5 throughout  --BLE kept in extensor positioning at knee and hip despite prompting and would not allow for further evaluation,  intact plantar/dorsiflexion bilaterally    Cerebellar: unable to participate in FTN or HTS    DTR: 2+ symmetric biceps, brachioradialis, and patellar. 1+ achilles bilaterally    Plantar response: downgoing    Sensory: Intact gross tactile sensation throughout BUE and BLE    Labs:    BMP:  Recent Labs   Lab 12/12/19  0507      K 3.7      CO2 20*   BUN 19   CREATININE 1.5*   GLU 86       LFT:   Lab Results   Component Value Date    AST 40 12/12/2019    ALT 18 12/12/2019    ALKPHOS 80 12/12/2019    BILITOT 1.3 (H) 12/12/2019    ALBUMIN 3.3 (L) 12/12/2019    PROT 6.6 12/12/2019       CBC:   Lab Results   Component Value Date    WBC 4.27 12/12/2019    HGB 14.2 12/12/2019    HCT 43.7 12/12/2019    MCV 86 12/12/2019     12/12/2019       Microbiology x 7d:   Microbiology Results (last 7 days)     Procedure Component Value Units Date/Time    Blood Culture #1 **CANNOT BE ORDERED STAT** [[id number]] Collected:  12/10/19 2021    Order Status:  Completed Specimen:  Blood from Peripheral, Wrist, Left Updated:  12/12/19 0812     Blood Culture, Routine No Growth to date      No Growth to date    Blood Culture #2 **CANNOT BE ORDERED STAT** [[id number]] Collected:  12/10/19 2033    Order Status:  Completed Specimen:  Blood from Peripheral, Wrist, Right Updated:  12/12/19 0812     Blood Culture, Routine No Growth to date      No Growth to date          Imaging:  MRI Brain w/o Contrast 12/10 - Numerous acute infarcts throughout the bilateral anterior and posterior circulation, probably embolic.    MRA Head 12/10 - Anterior intracranial circulation: There is focal narrowing of the right anterior cerebral artery, A2 segment, and left anterior cerebral artery, A1 segment, unchanged from prior study.  Middle cerebral arteries are patent.  Posterior intracranial circulation: Left posterior cerebral artery is relatively diminutive, similar to prior study.  Basilar artery and right posterior artery are patent.    MRA Neck  12/10 - The bilateral common carotid arteries are unremarkable.  There is approximately 60% stenosis of the left carotid bulb.  Both vertebral arteries are markedly irregular with multifocal stenosis/occlusion, right worse than left.    ASSESSMENT/PLAN:     80 yo M with extensive PMHx including paroxysmal a-fib, carotid artery stenosis (internal, left), CKD III, CVA (2/2 bilateral embolism of middle cerebral arteries, 2017), dementia, DMT2, HFpEF, HLD, HTN, hyperparathyroidism 2/2 CKD, and vitamin D deficiency who presented to [facility] ED on 12/10/19 for evaluation of BLE weakness associated with two ground-level falls, increased confusion, and slurred speech. Found to have multifocal embolic appearing new ischemic lesions on MRI Brain with stable appearing chronic extracranial and intracranial vascular atherosclerotic lesions.    Acute multifocal embolic CVA  - Etiology: Cardioembolic in setting of noncompliance with anticoagulation 2/2 insurance difficulties, also w/ diffuse cerebrovascular disease  - Risk Factors: pAFib, prior CVA, DM2 (A1c 6), HLD (LDL 94), HTN  - Continue q4 h neuro checks, telemetry  - Allowed for 24 hours of permissive HTN  recommend slow titration of home antihypertensives  - BG<180, SSI as needed  - Continue Apixiban, ASA, statin for secondary prevention  - Discuss alternatives to apixiban if patient not able to afford  - avoid high dose NSAIDs or triptans peristroke  - PT/OT/Speech for dispo recs  - Apraxia and poor orientation likely secondary to multifocal strokes     Neurology will sign off, please call the on call neurology resident for any further questions regarding neurologic management of this patient.     Jacky Gandhi MD   Neurology, PGY-2

## 2019-12-12 NOTE — PLAN OF CARE
Patient was evaluated this afternoon prior to MRI brain being completed, noted to be encephalopathic, but with non focal exam. MRI brain w/ evidence of multifocal areas of acute ischemia suggestive of embolic CVA given known history of p-afib and recent non-compliance w/ eliquis, also w/ multifocal stenosis in his neck and brain. Symptoms were noted around noon the day of presentation after patient fell which may have been from Afib.      Acute CVA  - likely cardio embolic, also w/ diffuse cerebrovascular disease  - continue q4 h neuro checks, telemetry  - stat CT head w/ decreased LOC  - 24h permissive HTN over; resume home medications slowly, SBP<180  - BG<180, SSI as needed  - IVFs @75 cc/hr x 24 h or until PO intake is consistent  - LDL 94, A1c 6  - agree w/ apixiban, ASA, statin for secondary prevention  - discuss alternatives to apixiban if patient not able to afford  - avoid high dose NSAIDs, triptans peristroke  - PT/OT/Speech    We will continue to follow, please page [facility] resident on call with any additional questions.    Fred Hernandez MD  Neurology

## 2019-12-12 NOTE — PLAN OF CARE
VN requested to cue into room by family. Family requesting MRI results. VN  Informed family that MD would discuss final results with them since MRI came back abnormal. VN paged MD team. Awaiting call back.

## 2019-12-12 NOTE — PT/OT/SLP EVAL
Physical Therapy Evaluation    Patient Name:  Shant Magana Jr.   MRN:  [id number]    Recommendations:     Discharge Recommendations:  rehabilitation facility   Discharge Equipment Recommendations: (Defer to IPR)   Barriers to discharge: impaired functional mobility    Assessment:     Shant Magana Jr. is a 79 y.o. male admitted with a medical diagnosis of Stroke-like symptom.  He presents with the following impairments/functional limitations:  weakness, gait instability, decreased upper extremity function, impaired endurance, impaired balance, decreased lower extremity function, impaired coordination, impaired joint extensibility, impaired cardiopulmonary response to activity, decreased safety awareness, impaired functional mobilty, decreased coordination, impaired cognition, pain, impaired self care skills, impaired fine motor. PT initial evaluation completed. Plan of care and goals established and discussed with patient/family. Despite patient's co-morbidities, including R sided weakness, impaired strength, coordination and balance, patient was living in community setting functioning at Independent level for ADLs, and mobility prior to this event.  There is an expectation of returning to prior level of function to maintain independence thus avoiding readmission.  Patient's clinical condition meets full Inpatient Rehab (IPR) criteria; including the ability to actively participate in 3 hours of therapy.  A lower level of care(SNF) cannot provide the interdisciplinary treatment approach needed.     Rehab Prognosis: Good; patient would benefit from acute skilled PT services to address these deficits and reach maximum level of function.    Recent Surgery: * No surgery found *      Plan:     During this hospitalization, patient to be seen 6 x/week to address the identified rehab impairments via gait training, therapeutic activities, therapeutic exercises, neuromuscular re-education and progress toward the following  goals:    · Plan of Care Expires:  01/12/20    Subjective     Chief Complaint: none  Patient/Family Comments/goals: Pt agreed to PT POC  Pain/Comfort:  · Pain Rating 1: 0/10  · Pain Rating Post-Intervention 1: 0/10    Patients cultural, spiritual, Buddhist conflicts given the current situation: no    Living Environment:  Pt lives c/ grandson and daughter in 1SH; 4STE c/ B HR.   Prior to admission, patients level of function was Mod I c/ use of SPC as needed.  Equipment used at home: bedside commode, bath bench.  DME owned (not currently used): none.  Upon discharge, patient will have assistance from family.    Objective:     Communicated with RNElla prior to session.  Patient found HOB elevated with bed alarm, telemetry  upon PT entry to room.    General Precautions: Standard, fall   Orthopedic Precautions:N/A   Braces: N/A     Exams:  · Cognitive Exam:  Patient is oriented to Person and Place  · Gross Motor Coordination:  Impaired c/ functional bed mobility and transfers  · Postural Exam:  Patient presented with the following abnormalities in sitting EOB:    · -       Forward head  · -       Posterior pelvic tilt  · -       Posterior lean  · -    R sided lean  · RLE ROM: WFL  · RLE Strength: tested functionally; pt unable to ff commands for MMT- 2+/5   · LLE ROM: WFL  · LLE Strength: tested functionally; pt unable to ff commands for MMT- 3+/5     Functional Mobility:  · Bed Mobility:     · Rolling Right: minimum assistance  · Scooting in bed: minimum assistance  · Supine to Sit: maximal assistance c/ HOB elevated; VC for sequencing   · Sit to Supine: moderate assistance  · Transfers:     · Sit to Stand:  moderate assistance and maximal assistance with rolling walker  · Bed to Chair: moderate assistance and maximal assistance with  rolling walker  using  Step Transfer  · Gait: 3 steps to EOB; Pt required max A and procedural VC to advance R LE; approximte R W safely;' pt demo persistent posterior-left lean,  unbale to correct c/ VC  · Balance: sitting EOB-  poor, pt demo post/left lean c/ feet off floor, required max  VC + intermittent Mod A to maintain sitting balance; dynamic gait- poor       Therapeutic Activities and Exercises:  Pt instructed in progressive mobility as detailed above.; pt family educated on PT role/POC;   Pt instructed in sitting tolerance activity EOB x 10 min c/ VC for sitting postural control.     AM-PAC 6 CLICK MOBILITY  Total Score:13     Patient left HOB elevated with all lines intact, call button in reach, bed alarm on, RN notified and family present.    GOALS:   Multidisciplinary Problems     Physical Therapy Goals        Problem: Physical Therapy Goal    Goal Priority Disciplines Outcome Goal Variances Interventions   Physical Therapy Goal     PT, PT/OT Ongoing, Progressing     Description:  Goals to be met by: 2020    Patient will increase functional independence with mobility by performin. Supine to sit with Set-up [place]  2. Sit to supine with Set-up [place]  3. Sit to stand transfer with Modified [place]  4. Bed to chair transfer with Supervision using Rolling Walker  5. Gait  x 50 feet with Modified [place] using Rolling Walker.   6. Lower extremity exercise program x15 reps per handout, with supervision                      History:     Past Medical History:   Diagnosis Date    Cancer     prostate    Diabetes mellitus     Hypertension     Hypertrophic cardiomyopathy     Renal disorder        Past Surgical History:   Procedure Laterality Date    BACK SURGERY      COLONOSCOPY N/A 10/18/2018    Procedure: COLONOSCOPY;  Surgeon: Alan Gooden MD;  Location: [facility];  Service: Endoscopy;  Laterality: N/A;    ESOPHAGOGASTRODUODENOSCOPY N/A 2018    Procedure: EGD (ESOPHAGOGASTRODUODENOSCOPY);  Surgeon: Oli Cuadra MD;  Location: [facility];  Service: Endoscopy;  Laterality: N/A;    ESOPHAGOGASTRODUODENOSCOPY N/A 10/18/2018    Procedure:  EGD (ESOPHAGOGASTRODUODENOSCOPY);  Surgeon: Alan Gooden MD;  Location: [facility];  Service: Endoscopy;  Laterality: N/A;    THYROIDECTOMY         Time Tracking:     PT Received On: 12/12/19  PT Start Time: 1346     PT Stop Time: 1411  PT Total Time (min): 25 min     Billable Minutes: Evaluation 10 and Therapeutic Activity 15      Aliya Mclean PT, DPT  12/12/2019

## 2019-12-12 NOTE — PLAN OF CARE
Problem: SLP Goal  Goal: SLP Goal  Description  Short Term Goals:  1. Pt will participate in ongoing swallow assessment to determine least restrictive diet.   2. Pt will tolerate mech soft tray/thin liquids with no audible s/s of dysphagia and good oral clearance.   3. Pt will implement safe swallowing strategies 100% of the time given min assist.   4. Pt will participate in speech/lang/cog eval to determine level of deficits  5. Pt will state basic orientation x4 with mod cues.  6. Pt will respond to personal y/n related ?s with 70% acc, mod cues.  7. Pt will follow 1-2 part commands with mod cues for 70% acc.  8. Pt will respond to personal questions within 30-45 seconds with min cues.  9. Pt will complete automatics with mod cues to increase verbal expression.       Outcome: Ongoing, Progressing   Goals updated, pt with increased verbalizations this session. Pt unable to recall basic orientation given visual and verbal cues. Pt demonstrates impaired cognition/communication deficits at this time and would benefit from continued ST services.

## 2019-12-12 NOTE — PLAN OF CARE
POC reviewed with the pt and family, verbalized understanding.  VSS.  Oriented to self only, reoriented pt during shift.  Appeared to be no complaint of pain or any other distress noted throughout night.  PIV remained intact.  On continuous telemetry monitor, SR.  No ectopy noted.  Blood glucose monitored.  Telesitter in place.  Encouraged to turn frequently.  Neuro check done, some of commands unable to follow.  Safety maintained, free of falls throughout shift.  Instructed to call for any assistance.  Will continue to monitor.

## 2019-12-12 NOTE — PLAN OF CARE
12/12/19 1239   Post-Acute Status   Post-Acute Authorization Placement   Post-Acute Placement Status Pending State Direction   The Sw called the pt's Locet in to [employer] and faxed the PASRR to [email or web address].

## 2019-12-13 PROBLEM — I63.9 ACUTE EMBOLIC STROKE: Status: ACTIVE | Noted: 2017-08-15

## 2019-12-13 LAB
POCT GLUCOSE: 100 MG/DL (ref 70–110)
POCT GLUCOSE: 112 MG/DL (ref 70–110)
POCT GLUCOSE: 112 MG/DL (ref 70–110)
POCT GLUCOSE: 134 MG/DL (ref 70–110)

## 2019-12-13 PROCEDURE — 25000003 PHARM REV CODE 250: Performed by: STUDENT IN AN ORGANIZED HEALTH CARE EDUCATION/TRAINING PROGRAM

## 2019-12-13 PROCEDURE — 97535 SELF CARE MNGMENT TRAINING: CPT

## 2019-12-13 PROCEDURE — 11000001 HC ACUTE MED/SURG PRIVATE ROOM

## 2019-12-13 PROCEDURE — 92507 TX SP LANG VOICE COMM INDIV: CPT

## 2019-12-13 PROCEDURE — 94761 N-INVAS EAR/PLS OXIMETRY MLT: CPT

## 2019-12-13 PROCEDURE — 97116 GAIT TRAINING THERAPY: CPT

## 2019-12-13 PROCEDURE — 97530 THERAPEUTIC ACTIVITIES: CPT

## 2019-12-13 RX ORDER — LACTULOSE 10 G/15ML
20 SOLUTION ORAL ONCE
Status: COMPLETED | OUTPATIENT
Start: 2019-12-13 | End: 2019-12-13

## 2019-12-13 RX ORDER — TALC
6 POWDER (GRAM) TOPICAL ONCE
Status: COMPLETED | OUTPATIENT
Start: 2019-12-13 | End: 2019-12-13

## 2019-12-13 RX ADMIN — HYDROCHLOROTHIAZIDE 25 MG: 25 TABLET ORAL at 09:12

## 2019-12-13 RX ADMIN — LOSARTAN POTASSIUM 100 MG: 50 TABLET ORAL at 09:12

## 2019-12-13 RX ADMIN — PANTOPRAZOLE SODIUM 40 MG: 40 TABLET, DELAYED RELEASE ORAL at 09:12

## 2019-12-13 RX ADMIN — APIXABAN 2.5 MG: 2.5 TABLET, FILM COATED ORAL at 08:12

## 2019-12-13 RX ADMIN — Medication 6 MG: at 10:12

## 2019-12-13 RX ADMIN — TAMSULOSIN HYDROCHLORIDE 0.4 MG: 0.4 CAPSULE ORAL at 09:12

## 2019-12-13 RX ADMIN — APIXABAN 2.5 MG: 2.5 TABLET, FILM COATED ORAL at 09:12

## 2019-12-13 RX ADMIN — VITAMIN D, TAB 1000IU (100/BT) 1000 UNITS: 25 TAB at 09:12

## 2019-12-13 RX ADMIN — ATORVASTATIN CALCIUM 80 MG: 40 TABLET, FILM COATED ORAL at 09:12

## 2019-12-13 RX ADMIN — LACTULOSE 20 G: 20 SOLUTION ORAL at 10:12

## 2019-12-13 RX ADMIN — ASPIRIN 81 MG: 81 TABLET, COATED ORAL at 09:12

## 2019-12-13 RX ADMIN — AMLODIPINE BESYLATE 10 MG: 5 TABLET ORAL at 09:12

## 2019-12-13 NOTE — PT/OT/SLP PROGRESS
Physical Therapy Treatment    Patient Name:  Shant Magana Jr.   MRN:  [id number]    Recommendations:     Discharge Recommendations:  rehabilitation facility   Discharge Equipment Recommendations: (Defer to IPR)   Barriers to discharge: Decreased caregiver support and impaired functional mobility    Assessment:     Shant Magana Jr. is a 79 y.o. male admitted with a medical diagnosis of Stroke-like symptom.  He presents with the following impairments/functional limitations:  weakness, gait instability, decreased upper extremity function, impaired cardiopulmonary response to activity, impaired endurance, impaired balance, decreased lower extremity function, impaired coordination, visual deficits, decreased safety awareness, impaired self care skills, impaired fine motor, impaired functional mobilty, decreased coordination. Patient demonstrating significantly improved functional mobility and LE motor control today; Also demo improved Mentation. Pt with potential to continue to improve in functional mobility.  Still recommending IPR.       Rehab Prognosis: Good; patient would benefit from acute skilled PT services to address these deficits and reach maximum level of function.    Recent Surgery: * No surgery found *      Plan:     During this hospitalization, patient to be seen 6 x/week to address the identified rehab impairments via gait training, therapeutic activities, therapeutic exercises, neuromuscular re-education and progress toward the following goals:    · Plan of Care Expires:  01/12/20    Subjective     Chief Complaint: NA  Patient/Family Comments/goals: Pt demo improved mentation; participation c/ PT session  Pain/Comfort:  · Pain Rating 1: 0/10  · Pain Rating Post-Intervention 1: 0/10      Objective:     Communicated with RNKarma prior to session.  Patient found supine with bed alarm, telemetry upon PT entry to room.     General Precautions: Standard, fall   Orthopedic Precautions:N/A   Braces: N/A      Functional Mobility:  Bed Mobility: Pt required Min A c/ procedural verbal cues and extra time for problem solving  which has also improved.  Sit <>stand transfer: Mod A c/ RW and bed height elevated; 2nd person for safety  Static standing balance: fair c/ min A and RW  Dynamic balance: Poor c/ Mod + min A for 2 people  Gait: 30ft c/ RW c/ mod + Min A of 2 people c/ RW. Pt demo NBOS c/ poor coordination; inconsistent foot placement; poor+ gait motor control, but improved compared to yesterday; L body lean c/ turns      AM-PAC 6 CLICK MOBILITY  Turning over in bed (including adjusting bedclothes, sheets and blankets)?: 3  Sitting down on and standing up from a chair with arms (e.g., wheelchair, bedside commode, etc.): 2  Moving from lying on back to sitting on the side of the bed?: 2  Moving to and from a bed to a chair (including a wheelchair)?: 2  Need to walk in hospital room?: 2  Climbing 3-5 steps with a railing?: 2  Basic Mobility Total Score: 13       Therapeutic Activities and Exercises:  Pt instructed in progressive mobility and gait training as detailed above.     Patient left up in chair with all lines intact, call button in reach, chair alarm on and RN notified..    GOALS:   Multidisciplinary Problems     Physical Therapy Goals        Problem: Physical Therapy Goal    Goal Priority Disciplines Outcome Goal Variances Interventions   Physical Therapy Goal     PT, PT/OT Ongoing, Progressing     Description:  Goals to be met by: 2020    Patient will increase functional independence with mobility by performin. Supine to sit with Set-up [place]  2. Sit to supine with Set-up [place]  3. Sit to stand transfer with Modified [place]  4. Bed to chair transfer with Supervision using Rolling Walker  5. Gait  x 50 feet with Modified [place] using Rolling Walker.   6. Lower extremity exercise program x15 reps per handout, with supervision                      Time Tracking:     PT  Received On: 12/13/19  PT Start Time: 0941     PT Stop Time: 1003  PT Total Time (min): 22 min co-tx c/ OT    Billable Minutes: Therapeutic Activity 15    Treatment Type: Treatment  PT/PTA: PT         Aliya Mclean PT, DPT  12/13/2019

## 2019-12-13 NOTE — PROGRESS NOTES
[facility] IM Resident HO-3 Progress Note    Subjective:      Shant Magana Jr. is a 79 y.o. AA male who is being followed by the [facility] IM service at [facility] for embolic CVA.     Patients daughter in room and reports he has had some improvement in cognition but mild. He is answering questions more appropriately today. Reports he knows he is in the hospital and that he lives in [place], still not oriented to year. He reports feeling well otherwise.     Objective:   Last 24 Hour Vital Signs:  BP  Min: 144/85  Max: 158/80  Temp  Av.7 °F (36.5 °C)  Min: 96.8 °F (36 °C)  Max: 98.3 °F (36.8 °C)  Pulse  Av.1  Min: 58  Max: 66  Resp  Av.2  Min: 17  Max: 18  SpO2  Av %  Min: 95 %  Max: 99 %  I/O last 3 completed shifts:  In: 450 [P.O.:450]  Out: 450 [Urine:450]    Physical Examination:  Physical Exam   Constitutional: He is well-developed, well-nourished, and in no distress. No distress.   HENT:   Head: Normocephalic and atraumatic.   Eyes: EOM are normal.   Neck: Normal range of motion.   Cardiovascular: Normal rate and regular rhythm.   Pulmonary/Chest: Effort normal. No respiratory distress. He has no wheezes.   Abdominal: Soft. He exhibits no distension. There is no tenderness.   Musculoskeletal: He exhibits no edema.   Neurological: He is alert. He has , an abnormal Finger-Nose-Finger Test and an abnormal Heel to Rodriguez Test.   Oriented to place and person but not time or situation  CN 2-12 grossly intact  5/5 strength to bilateral UE  4/5 strength to BLE   Skin: Skin is warm. He is not diaphoretic.   Vitals reviewed.    Laboratory:  Laboratory Data Reviewed: yes  Pertinent Findings:      Microbiology Data Reviewed: yes  Pertinent Findings:  none      Radiology Data Reviewed: yes  Pertinent Findings:  TTE: grade I DD, EF 70%, LAE without thrombus    MRI/MRA :  Numerous acute infarcts throughout the bilateral anterior and posterior circulation, probably embolic.  Multifocal intracranial  narrowing, similar to prior MRA   Multifocal stenosis/occlusion of the vertebral arteries, right worse than left.   Approximately 60% stenosis of the left carotid bulb.    Current Medications:     Infusions:       Scheduled:   amLODIPine  10 mg Oral Daily    apixaban  2.5 mg Oral BID    aspirin  81 mg Oral Daily    atorvastatin  80 mg Oral Daily    hydroCHLOROthiazide  25 mg Oral Daily    losartan  100 mg Oral Daily    pantoprazole  40 mg Oral Daily    tamsulosin  0.4 mg Oral Daily    vitamin D  1,000 Units Oral Daily        PRN:  Dextrose 10% Bolus, Dextrose 10% Bolus, glucagon (human recombinant), glucose, glucose, insulin aspart U-100, labetalol, sodium chloride 0.9%    Antibiotics and Day Number of Therapy:  none        Assessment:     Shant Magana Jr. is a 79 y.o.male with  Patient Active Problem List    Diagnosis Date Noted    Stroke-like symptom 12/10/2019    Stroke 12/10/2019    Lactic acidosis 12/10/2019    Dementia without behavioral disturbance 12/10/2019    Orthostatic hypotension 10/01/2019    Paroxysmal atrial fibrillation 02/12/2019    Status post placement of implantable loop recorder 02/12/2019    Hyperparathyroidism 02/12/2019    Gastritis     Polyp of colon     Melena 10/18/2018    Symptomatic anemia 09/03/2018    Acute upper GI bleed 09/03/2018    Anemia     Weakness     Chronic diastolic congestive heart failure 10/19/2017    Enlarged LA (left atrium) 10/19/2017    Internal carotid artery stenosis, left 10/18/2017    Mixed hyperlipidemia 10/18/2017    CKD (chronic kidney disease) stage 3, GFR 30-59 ml/min 10/18/2017    Cryptogenic stroke 10/16/2017    SHIKHA (acute kidney injury) 10/14/2017    Acute encephalopathy 10/11/2017    Cerebrovascular accident (CVA) due to bilateral embolism of middle cerebral arteries 08/15/2017    Type 2 diabetes mellitus with complication, without long-term current use of insulin     Essential hypertension 06/07/2017    Bradycardia  06/07/2017        Plan:     Acute Multifocal Embolic CVA  - Pt has dementia at baseline but per grandson neurological status has worsened, presents with BLE weakness and experienced 2 falls PTA  - Pt evaluated by Neuro via Telemedicine in the ED who determined he was not a candidate for TPA  - CT head (-) , UDS (-)  - TSH WNL, speech and swallow evaluation, PT/OT  - Neurology consulted  - NIH score 6  - Pt has a hx of 2 prior CVAs in 2017, 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  - Started on ASA 81 mg, Atorvastatin 80 mg  - Pt also with hyperammonemia with an ammonia level of 87; pt started on lactulose 20 gm BID  -MRI/MRA with unchanged stenotic vasculature, multifocal emblic CVA in bilateral hemispheres and cerebellum  -working with PT/OT     Lactic Acidosis, resolved  - lactic acid 2.9, downtrending with 1L LR to 1.2     HFpEF  - 9/3/18 Echo with EF of 65-70% and increased LVEDP  - , negative Tn, EKG with TWF in inferior leads and RAD, anterior q waves  - TTE with LAE no thormbus, grade 1 DD     Hx of Afib  - Eliquis 2.5 mg BID, no BB due to bradycardia (previously stopped)  - Per grandson pt has not been compliant with Eliquis for the past week 2/2 to insurance issues leading to increase cost of the medication  -resume along with ASA while inpt     HTN  - Holding home medications and allowing permissive HTN for 24 hours  -will resume losartan 100, norvasc 10, hydralazine 10 once outside window  - PRN Labetalol     Type II DM  - 9/2018 A1c 5.9, not on home meds  - SSI  - repeat A1c normal     HLD  - Atorvastatin 80 mg, ASA     CKD stage 3  - Cr 1.6; appears to be at baseline; stable  - Will renally dose medications and avoid nephrotoxic drugs  - Vitamin D 1000 units     Hx of upper GI bleed  - Pantoprazole 40 mg     BPH  - Tamsulosin 0.4 mg            DVT: SCDs/Eliquis  Diet: cardiac  Dispo: pending IPR placement, medically ready  Code:Full         Trent Castillo  [facility] Internal Medicine HO-3  [facility] IM Service Team  A    [facility] Medicine Hospitalist Pager numbers:   [facility] Hospitalist Medicine Team A (Hanny/Loco): [phone]  [facility] Hospitalist Medicine Team B (Kacey/Nico):  [phone]

## 2019-12-13 NOTE — PLAN OF CARE
Plan of care reviewed patient verbalized understanding. Medications administered. Cardiac monitoring NSR. Blood glucose monitoring per sliding scale. Neurological assessment q 4hrs. Telesitter monitoring. Bed in the lowest position, call bell within reach, bed alarm on.

## 2019-12-13 NOTE — PLAN OF CARE
"  Problem: Occupational Therapy Goal  Goal: Occupational Therapy Goal  Description  Goals to be met by: 01/12     Patient will increase functional independence with ADLs by performing:    Feeding with Minimal Assistance. --MET 12/13  Grooming while seated with Minimal Assistance. --MET 12/13  Toileting from bedside commode with Moderate Assistance for hygiene and clothing management.   Toilet transfer to bedside commode with Contact Guard Assistance.  Increased functional strength to WFL for ADLs. --MET 12/13      Outcome: Ongoing, Progressing     Patient AO to self and location. Reported month as February, but when given clue of "Sims time" able to state December. Patient demonstrating improved bed mobility, motor planning and command following. Still requires increased time and effort for all tasks 2/2 decreased coordination and some apraxia/motor planning deficits, but all has improved since yesterday's session. Patient has met 3/5 STGs this date. Tolerating OOB to b/s chair. Patient was Mod Independent PTA and will greatly benefit from IPR to address functional deficits.   "

## 2019-12-13 NOTE — PT/OT/SLP PROGRESS
"Speech Language Pathology Treatment    Patient Name:  Shant Magana Jr.   MRN:  [id number]  Admitting Diagnosis: Stroke-like symptom    Recommendations:                 General Recommendations:  Speech/language therapy and Cognitive-linguistic therapy  Diet recommendations:  Dental Soft, Liquid Diet Level: Thin   Aspiration Precautions: upright for meals, assist for feeding, whole meds, slow rate, alternate sips and bites   General Precautions: Standard, fall  Communication strategies:  reorient, speak slowly, repeat instructions, make eye contact, turn off distractors    Subjective     Pt seen in room for ongoing cognitive/communication therapy.   Patient goals: "I got myself here" referring to sitting in chair.      Pain/Comfort:  · Pain Rating 1: 0/10    Objective:     Has the patient been evaluated by SLP for swallowing?   Yes  Keep patient NPO? No   Current Respiratory Status: room air      Cognition/Communication: Pt oriented to self this AM. He requires verbal choice cues to name place, reason. He stated Feb as month and 12 as the year, needs situation cues to recall month and help w/ year. SLP provided him with visual cue to computer screen to help him.   Pt with delay in responses to complex (multiunit questions), he needs cues to respond. Pt recalled working with a "lady, brushed his teeth and cleaned his face." He knew he was out of the bed.   [place] Pt with dcr'd insight into deficits. Pt need cues to help with sequencing pic cards on table (3 actions occurring). Pt with some trouble tracking and identifying actions in pictures. Pt needed frequent cues to determine problems seen in cards.   Cont to address deficits, pt remained alert during entire session which is improved compared to last session.   Pt able to complete 1 step commands more consistently today.   Pt would benefit from continued ST at next level of care preferable Inpatient rehab setting to maximize his " potential.     Assessment:     Shant Magana Jr. is a 79 y.o. male admitted with CVA who presents with an SLP diagnosis of cognitive/communication deficits.     Goals:   Multidisciplinary Problems     SLP Goals        Problem: SLP Goal    Goal Priority Disciplines Outcome   SLP Goal     SLP Ongoing, Progressing   Description:  Short Term Goals:  1. Pt will participate in ongoing swallow assessment to determine least restrictive diet.   2. Pt will tolerate mech soft tray/thin liquids with no audible s/s of dysphagia and good oral clearance.   3. Pt will implement safe swallowing strategies 100% of the time given min assist.   4. Pt will participate in speech/lang/cog eval to determine level of deficits  5. Pt will state basic orientation x4 with mod cues.  6. Pt will respond to personal y/n related ?s with 70% acc, mod cues.  7. Pt will follow 1-2 part commands with mod cues for 70% acc.  8. Pt will respond to personal questions within 30-45 seconds with min cues.  9. Pt will complete automatics with mod cues to increase verbal expression.                        Plan:     · Patient to be seen:  3 x/week   · Plan of Care expires:  01/09/20  · Plan of Care reviewed with:  patient(nidejah )   · SLP Follow-Up:  Yes       Discharge recommendations:  rehabilitation facility     Time Tracking:     SLP Treatment Date:   12/13/19  Speech Start Time:  1050  Speech Stop Time:  1107     Speech Total Time (min):  17 min    Billable Minutes: Speech Therapy Individual 17    NEVIN Hassan, CCC-SLP  12/13/2019

## 2019-12-13 NOTE — PLAN OF CARE
12/13/19 1527   Post-Acute Status   Post-Acute Authorization Placement  (IPR)   Post-Acute Placement Status Pending Payor Medical Review   The Sw spoke to Kenyatta and she did submit to [facility]. The Sw spoke to Ami at [facility] who states she just spoke to Kenyatta and they both agreed they will monitor the pt over the weekend and re-evaluate him Monday for IPR b/c he's not going to possibly be approved today. The team was notified of this info.

## 2019-12-13 NOTE — PLAN OF CARE
12/13/19 1112   Post-Acute Status   Post-Acute Authorization Placement   The Sw spoke to Kenyatta at [facility] who states she's about to submit to [facility]. The Sw spoke to Ami at [facility] who states she will review and this may possibly have to go to their medical review department.

## 2019-12-13 NOTE — PLAN OF CARE
12/13/19 1016   Post-Acute Status   Post-Acute Authorization Placement   Post-Acute Placement Status Pending Post-Acute Medical Review   The pt has been accepted to [facility] medically and Kenyatta will submit to N after the pt works with therapy today.

## 2019-12-13 NOTE — PT/OT/SLP PROGRESS
"Occupational Therapy   Treatment    Name: Shant Magana Jr.  MRN: [id number]  Admitting Diagnosis:  Stroke-like symptom       Recommendations:     Discharge Recommendations: rehabilitation facility  Discharge Equipment Recommendations:  (Defer to IPR)  Barriers to discharge:  Decreased caregiver support, Inaccessible home environment    Assessment:   Patient AO to self and location. Reported month as February, but when given clue of "Audra time" able to state December. Patient demonstrating improved bed mobility, motor planning and command following. Still requires increased time and effort for all tasks 2/2 decreased coordination and some apraxia/motor planning deficits, but all has improved since yesterday's session. Patient has met 3/5 STGs this date. Tolerating OOB to b/s chair. Patient was Mod Independent PTA and will greatly benefit from IPR to address functional deficits.     Shant Magana Jr. is a 79 y.o. male with a medical diagnosis of Stroke-like symptom. Performance deficits affecting function are weakness, impaired endurance, impaired self care skills, impaired functional mobilty, gait instability, impaired balance, impaired cognition, decreased coordination, decreased upper extremity function, decreased lower extremity function, decreased safety awareness, impaired fine motor, impaired coordination, decreased ROM.     Rehab Prognosis:  Good; patient would benefit from acute skilled OT services to address these deficits and reach maximum level of function.       Plan:     Patient to be seen 5 x/week to address the above listed problems via self-care/home management, therapeutic activities, therapeutic exercises, neuromuscular re-education  · Plan of Care Expires: 01/12/20  · Plan of Care Reviewed with: patient, family    Subjective     Pain/Comfort:  · Pain Rating 1: 0/10  · Pain Rating Post-Intervention 1: 0/10    Objective:     Communicated with: Karma oleary prior to session.  Patient found HOB elevated " with bed alarm, telemetry(Kathie-Sys tele-monitor) upon OT entry to room.    General Precautions: Standard, fall   Orthopedic Precautions:N/A   Braces: N/A     Bed Mobility:    · Patient completed Rolling/Turning to Left with  contact guard assistance, with side rail and increased time, effort and VCs  · Patient completed Scooting/Bridging with contact guard assistance and increased time, effort and VCs  · Patient completed Supine to Sit with contact guard assistance, with side rail and increased time, effort and VCs     Functional Mobility/Transfers:  · Patient completed Sit <> Stand Transfer with minimum assistance and moderate assistance  with  rolling walker   · Patient completed Bed <> Chair Transfer using Step Transfer technique with moderate assistance and of 2 persons with rolling walker and after gait trial    Activities of Daily Living:  · Feeding:  stand by assistance to  cup and drink water  · Grooming: minimum assistance 2/2 required (A) to correctly place toothpaste ontro brush head, but able to complete task /c Sup from there  · Upper Body Dressing: minimum assistance to doroteo gown  · Toileting: maximal assistance incontinent in brief    [facility] 6 Click ADL: 15    Treatment & Education:  Patient alert and AO x 2. Patient able to transition to sitting EOB as above, increased time and VCs for all tasks. Decreased motor planning noted, though still an improvement from yesterday's date. Patient doffed soiled gown Bunny and donned clean /c Bunny. Patient instructed in sit <> stand using RW /c min-modA. VCs for proper hand placement on RW. Patient able to maintain static balance /c Bunny while brief changed. Patient instructed in gait training -- see PT note for gait details. VCs needed throughout for upright posture, increasing DANIEL and RW mgmt. Patient sat in b/s chair and completed oral care and facial hygiene as above.     Patient left up in chair with all lines intact, call button in reach, chair alarm on  and nsg notifiedEducation:      GOALS:   Multidisciplinary Problems     Occupational Therapy Goals        Problem: Occupational Therapy Goal    Goal Priority Disciplines Outcome Interventions   Occupational Therapy Goal     OT, PT/OT Ongoing, Progressing    Description:  Goals to be met by: 01/12     Patient will increase functional independence with ADLs by performing:    Feeding with Minimal Assistance. --MET 12/13  Grooming while seated with Minimal Assistance. --MET 12/13  Toileting from bedside commode with Moderate Assistance for hygiene and clothing management.   Toilet transfer to bedside commode with Contact Guard Assistance.  Increased functional strength to WFL for ADLs. --MET 12/13                       Time Tracking:     OT Date of Treatment: 12/13/19  OT Start Time: 0941  OT Stop Time: 1012  OT Total Time (min): 31 min (571-1004 /c PT)    Billable Minutes:Self Care/Home Management 8  Therapeutic Activity 8    RATNA Gutierrez  12/13/2019

## 2019-12-13 NOTE — PLAN OF CARE
Problem: Physical Therapy Goal  Goal: Physical Therapy Goal  Description  Goals to be met by: 2020    Patient will increase functional independence with mobility by performin. Supine to sit with Set-up [place]  2. Sit to supine with Set-up [place]  3. Sit to stand transfer with Modified [place]  4. Bed to chair transfer with Supervision using Rolling Walker  5. Gait  x 50 feet with Modified [place] using Rolling Walker.   6. Lower extremity exercise program x15 reps per handout, with supervision     Outcome: Ongoing, Progressing    Patient demonstrating significantly improved functional mobility and LE motor control today.   Bed Mobility: Pt required Min A c/ procedural verbal cues and extra time for problem solving  which has also improved.  Sit <>stand transfer: Mod A c/ RW and bed height elevated; 2nd person for safety  Static standing balance: fair c/ min A and RW  Dynamic balance: Poor c/ Mod + min A for 2 people  Gait: 30ft c/ RW c/ mod + Min A of 2 people c/ RW. Pt demo NBOS c/ poor coordination; inconsistent foot placement; poor+ gait motor control, but improved compared to yesterday; L body lean c/ turns.  Pt with potential to continue to improve in functional mobility.   till recommending IPR.

## 2019-12-13 NOTE — PLAN OF CARE
Problem: SLP Goal  Goal: SLP Goal  Description  Short Term Goals:  1. Pt will participate in ongoing swallow assessment to determine least restrictive diet.   2. Pt will tolerate mech soft tray/thin liquids with no audible s/s of dysphagia and good oral clearance.   3. Pt will implement safe swallowing strategies 100% of the time given min assist.   4. Pt will participate in speech/lang/cog eval to determine level of deficits  5. Pt will state basic orientation x4 with mod cues.  6. Pt will respond to personal y/n related ?s with 70% acc, mod cues.  7. Pt will follow 1-2 part commands with mod cues for 70% acc.  8. Pt will respond to personal questions within 30-45 seconds with min cues.  9. Pt will complete automatics with mod cues to increase verbal expression.       Outcome: Ongoing, Progressing   Continue current POC goals. Pt with increased interaction this AM.

## 2019-12-13 NOTE — PLAN OF CARE
1932H Patient is awake and oriented except to time. He verbalized he is here because of stroke. Care plan explained; needs reinforcement. Telesitter Avasys at bedside. Family at bedside.  VIP care model explained. Neuro checks every 4 hours. Left facial droop noted. Left arm falls before 10sec but does not hit the bed. On room air, not in distress. On heart monitor running Sinus Bradycardia, heart rate 59. IV site to the left forearm 20G; flushed and saline locked. Maintained on low salt, low fat, dysphagia soft diet; thin liquid. Maintained on fall precaution. Weight shift assistance provided. Bed in lowest position, bed alarms on, call light within reach and instructed to call for help when needed. Will continue  to monitor.    Due medication given; swallow pill whole. Diaper on; incontinence care provided as needed.

## 2019-12-14 LAB
POCT GLUCOSE: 101 MG/DL (ref 70–110)
POCT GLUCOSE: 114 MG/DL (ref 70–110)
POCT GLUCOSE: 114 MG/DL (ref 70–110)
POCT GLUCOSE: 88 MG/DL (ref 70–110)

## 2019-12-14 PROCEDURE — 97116 GAIT TRAINING THERAPY: CPT

## 2019-12-14 PROCEDURE — 25000003 PHARM REV CODE 250: Performed by: STUDENT IN AN ORGANIZED HEALTH CARE EDUCATION/TRAINING PROGRAM

## 2019-12-14 PROCEDURE — 97530 THERAPEUTIC ACTIVITIES: CPT

## 2019-12-14 PROCEDURE — 11000001 HC ACUTE MED/SURG PRIVATE ROOM

## 2019-12-14 PROCEDURE — 94761 N-INVAS EAR/PLS OXIMETRY MLT: CPT

## 2019-12-14 RX ADMIN — LOSARTAN POTASSIUM 100 MG: 50 TABLET ORAL at 09:12

## 2019-12-14 RX ADMIN — PANTOPRAZOLE SODIUM 40 MG: 40 TABLET, DELAYED RELEASE ORAL at 09:12

## 2019-12-14 RX ADMIN — APIXABAN 2.5 MG: 2.5 TABLET, FILM COATED ORAL at 08:12

## 2019-12-14 RX ADMIN — ATORVASTATIN CALCIUM 80 MG: 40 TABLET, FILM COATED ORAL at 09:12

## 2019-12-14 RX ADMIN — ASPIRIN 81 MG: 81 TABLET, COATED ORAL at 09:12

## 2019-12-14 RX ADMIN — APIXABAN 2.5 MG: 2.5 TABLET, FILM COATED ORAL at 09:12

## 2019-12-14 RX ADMIN — AMLODIPINE BESYLATE 10 MG: 5 TABLET ORAL at 09:12

## 2019-12-14 RX ADMIN — TAMSULOSIN HYDROCHLORIDE 0.4 MG: 0.4 CAPSULE ORAL at 09:12

## 2019-12-14 RX ADMIN — VITAMIN D, TAB 1000IU (100/BT) 1000 UNITS: 25 TAB at 09:12

## 2019-12-14 RX ADMIN — HYDROCHLOROTHIAZIDE 25 MG: 25 TABLET ORAL at 09:12

## 2019-12-14 NOTE — PT/OT/SLP PROGRESS
Physical Therapy Treatment    Patient Name:  Shant Magana Jr.   MRN:  [id number]    Recommendations:     Discharge Recommendations:  rehabilitation facility   Discharge Equipment Recommendations: (Defer to IPR)   Barriers to discharge: impaired functional mobility    Assessment:     Shant Magana Jr. is a 79 y.o. male admitted with a medical diagnosis of Acute embolic stroke.  He presents with the following impairments/functional limitations:  weakness, gait instability, impaired balance, impaired endurance, decreased lower extremity function, impaired coordination, impaired joint extensibility, decreased ROM, decreased upper extremity function, decreased safety awareness, impaired self care skills, impaired cognition, pain, impaired functional mobilty, decreased coordination, impaired fine motor. Patient is making progress towards goals; all goals addressed as appropriate. Continue with PT POC. Pt instructed in gait training 15ft x2 at Mod <> Max x 1 c/ RW. Pt c/ L LE weakness and reduced feet clearance c/ gait training.     Rehab Prognosis: Good; patient would benefit from acute skilled PT services to address these deficits and reach maximum level of function.    Recent Surgery: * No surgery found *      Plan:     During this hospitalization, patient to be seen 6 x/week to address the identified rehab impairments via gait training, therapeutic activities, therapeutic exercises, neuromuscular re-education and progress toward the following goals:    · Plan of Care Expires:  01/12/20    Subjective     Chief Complaint: none  Patient/Family Comments/goals: Pt agreed to PT POC  Pain/Comfort:  · Pain Rating 1: 0/10  · Pain Rating Post-Intervention 1: 0/10      Objective:     Communicated with RNMirna prior to session.  Patient found HOB elevated with bed alarm, telemetry upon PT entry to room.     General Precautions: Standard, fall   Orthopedic Precautions:N/A   Braces: N/A     Functional Mobility:  · Bed Mobility:   Extra  time; HOB elevated: VC for sequencing and use of HR; VC + assist for problem solving.   · Rolling Left:  minimum assistance  · Scooting: minimum assistance and moderate assistance  · Supine to Sit: minimum assistance and moderate assistance  · Transfers:     · Sit to Stand:  moderate assistance with rolling walker  · Bed to Chair: moderate assistance and maximal assistance with  rolling walker  using  Step Transfer  · Gait: 15ft x2 c/ RW at Mod <>Max A. Pt demo N DANIEL; reduced L LE WB; L LE clearance; L LE stance time. VC + Mod A to correct gait sequencing and approximate walker safely c/ gait; seated rest x3min d/t fatigue as evidenced by poorer quality of gait and ability to correct gait pattern using VC from PT   · Balance: sitting EOB- far; dynamic gait: fair-      AM-PAC 6 CLICK MOBILITY  Turning over in bed (including adjusting bedclothes, sheets and blankets)?: 3  Sitting down on and standing up from a chair with arms (e.g., wheelchair, bedside commode, etc.): 2  Moving from lying on back to sitting on the side of the bed?: 2  Moving to and from a bed to a chair (including a wheelchair)?: 2  Need to walk in hospital room?: 2  Climbing 3-5 steps with a railing?: 2  Basic Mobility Total Score: 13       Therapeutic Activities and Exercises:   Pt instructed in progressive gait raining and transferred to chair as detailed above.     Patient left up in chair with all lines intact, call button in reach, chair alarm on and RN notified..    GOALS:   Multidisciplinary Problems     Physical Therapy Goals        Problem: Physical Therapy Goal    Goal Priority Disciplines Outcome Goal Variances Interventions   Physical Therapy Goal     PT, PT/OT Ongoing, Progressing     Description:  Goals to be met by: 2020    Patient will increase functional independence with mobility by performin. Supine to sit with Set-up [place]  2. Sit to supine with Set-up [place]  3. Sit to stand transfer with Modified  [place]  4. Bed to chair transfer with Supervision using Rolling Walker  5. Gait  x 50 feet with Modified [place] using Rolling Walker.   6. Lower extremity exercise program x15 reps per handout, with supervision                      Time Tracking:     PT Received On: 12/14/19  PT Start Time: 1057     PT Stop Time: 1120  PT Total Time (min): 23 min     Billable Minutes: Gait Training 13 and Therapeutic Activity 10    Treatment Type: Treatment  PT/PTA: PT           Aliya Mclean PT, DPT  12/14/2019

## 2019-12-14 NOTE — PLAN OF CARE
VN note: VN cued into patient's room for introduction. No family at bedside. VN informed patient that VN would be working closely along side bedside nurse, PCT, and the rest of care team and making rounds throughout the shift. Patient oriented to self only. Allowed time for questions. VN will continue to be available to patient and intervene prn.       12/14/19 1036   Type of Frequent Check   Type Patient Rounds;Other (see comments)  (VN Rounds)   Safety/Activity   Patient Rounds bed in low position;visualized patient   Safety Promotion/Fall Prevention side rails raised x 2;instructed to call staff for mobility   Activity Management activity adjusted per tolerance   Positioning   Body Position supine   Head of Bed (HOB) HOB at 60-90 degrees   Assessments (Pre/Post)   Level of Consciousness (AVPU) alert

## 2019-12-14 NOTE — PLAN OF CARE
1915H Patient is awake and oriented to name, day and month of birthdate only. He is more confused than last night. Grandson at the bedside. Patient keeps getting out of the bed. Care plan explained; no evidence of learning. Telesitter Avasys at bedside. VIP care model explained. Neuro checks every 4 hours. Left facial droop noted. Left arm slightly falls before 10sec but does not hit the bed. On room air, not in distress. On heart monitor running Sinus Rhythm, heart rate 60s. IV site to the left forearm 20G; flushed and saline locked. Maintained on low salt, low fat, dysphagia soft diet; thin liquid. Maintained on fall precaution. Weight shift assistance provided. Bed in lowest position, bed alarms on, call light within reach and instructed to call for help when needed. Will continue  to monitor.     Due medication given; swallow pill whole. Diaper on; incontinence care provided as needed.   Notified MD that patient is restless and keeps getting out of the bed, MD seen and examined patient with order to give lactulose and melatonin. Orders carried out.

## 2019-12-14 NOTE — PROGRESS NOTES
[facility] IM Resident HO-3 Progress Note    Subjective:      Shant Magana Jr. is a 79 y.o. AA male who is being followed by the [facility] IM service at [facility] for embolic CVA.     No acute events overnight. No complaints. Awaiting inpatient rehab placement.      Objective:   Last 24 Hour Vital Signs:  BP  Min: 110/66  Max: 164/81  Temp  Av.4 °F (36.3 °C)  Min: 97.3 °F (36.3 °C)  Max: 97.6 °F (36.4 °C)  Pulse  Av.7  Min: 58  Max: 67  Resp  Av.2  Min: 16  Max: 18  SpO2  Av.5 %  Min: 96 %  Max: 99 %  I/O last 3 completed shifts:  In: 150 [P.O.:150]  Out: 342 [Urine:342]    Physical Examination:  Physical Exam   Constitutional: He is well-developed, well-nourished, and in no distress.   HENT:   Head: Normocephalic and atraumatic. L facial droop.   Eyes: EOM are normal.   Neck: Normal range of motion.   Cardiovascular: Normal rate and regular rhythm.   Pulmonary/Chest: Effort normal. No respiratory distress. He has no wheezes.   Abdominal: Soft. He exhibits no distension. There is no tenderness.   Musculoskeletal: He exhibits no edema.   Neurological: He is alert. He has , an abnormal Finger-Nose-Finger Test and an abnormal Heel to Rodriguez Test.   Oriented to place and person but not time or situation  CN 2-12 grossly intact  5/5 strength to bilateral UE  4/5 strength to BLE   Skin: Skin is warm. He is not diaphoretic.   Vitals reviewed.    Laboratory:  Laboratory Data Reviewed: yes  Pertinent Findings:      Microbiology Data Reviewed: yes  Pertinent Findings:  none      Radiology Data Reviewed: yes  Pertinent Findings:  TTE: grade I DD, EF 70%, LAE without thrombus    MRI/MRA :  Numerous acute infarcts throughout the bilateral anterior and posterior circulation, probably embolic.  Multifocal intracranial narrowing, similar to prior MRA   Multifocal stenosis/occlusion of the vertebral arteries, right worse than left.   Approximately 60% stenosis of the left carotid bulb.    Current  Medications:     Infusions:       Scheduled:   amLODIPine  10 mg Oral Daily    apixaban  2.5 mg Oral BID    aspirin  81 mg Oral Daily    atorvastatin  80 mg Oral Daily    hydroCHLOROthiazide  25 mg Oral Daily    losartan  100 mg Oral Daily    pantoprazole  40 mg Oral Daily    tamsulosin  0.4 mg Oral Daily    vitamin D  1,000 Units Oral Daily        PRN:  Dextrose 10% Bolus, Dextrose 10% Bolus, glucagon (human recombinant), glucose, glucose, insulin aspart U-100, labetalol, sodium chloride 0.9%    Antibiotics and Day Number of Therapy:  none    Assessment:     Shant Magana Jr. is a 79 y.o.male with  Patient Active Problem List    Diagnosis Date Noted    Chronic heart failure with preserved ejection fraction     Benign prostatic hyperplasia     Stroke-like symptom 12/10/2019    Stroke 12/10/2019    Lactic acidosis 12/10/2019    Dementia without behavioral disturbance 12/10/2019    Orthostatic hypotension 10/01/2019    Paroxysmal atrial fibrillation 02/12/2019    Status post placement of implantable loop recorder 02/12/2019    Hyperparathyroidism 02/12/2019    Gastritis     Polyp of colon     Melena 10/18/2018    Symptomatic anemia 09/03/2018    Acute upper GI bleed 09/03/2018    Anemia     Weakness     Chronic diastolic congestive heart failure 10/19/2017    Enlarged LA (left atrium) 10/19/2017    Internal carotid artery stenosis, left 10/18/2017    Mixed hyperlipidemia 10/18/2017    CKD (chronic kidney disease) stage 3, GFR 30-59 ml/min 10/18/2017    Cryptogenic stroke 10/16/2017    SHIKHA (acute kidney injury) 10/14/2017    Acute encephalopathy 10/11/2017    Acute embolic stroke 08/15/2017    Type 2 diabetes mellitus with complication, without long-term current use of insulin     Essential hypertension 06/07/2017    Bradycardia 06/07/2017        Plan:     Acute Multifocal Embolic CVA  - Pt has dementia at baseline but per grandson neurological status has worsened, presents with BLE  weakness and experienced 2 falls PTA  - Pt evaluated by Neuro via Telemedicine in the ED who determined he was not a candidate for TPA  - CT head (-) , UDS (-)  - TSH WNL, speech and swallow evaluation, PT/OT  - Neurology consulted  - NIH score 6  - Pt has a hx of 2 prior CVAs in 2017, 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  - Started on ASA 81 mg, Atorvastatin 80 mg  - Pt also with hyperammonemia with an ammonia level of 87; pt started on lactulose 20 gm BID  -MRI/MRA with unchanged stenotic vasculature, multifocal emblic CVA in bilateral hemispheres and cerebellum  -working with PT/OT, awaiting IPR placement     Lactic Acidosis, resolved  - lactic acid 2.9, downtrending with 1L LR to 1.2     HFpEF  - 9/3/18 Echo with EF of 65-70% and increased LVEDP  - , negative Tn, EKG with TWF in inferior leads and RAD, anterior q waves  - TTE with EF 70%, LAE, no thrombus, grade 1 DD     Hx of Afib  - Eliquis 2.5 mg BID, no BB due to bradycardia (previously stopped)  - Per grandson pt has not been compliant with Eliquis for the past week 2/2 to insurance issues leading to increase cost of the medication  -resume along with ASA while inpt     HTN  - home meds initially held for 24 hours to allow for permissive hypertension  - resumed losartan 100 and norvasc 10 that he is outside window; discontinued home hydralazine, started HCTZ 25 daily     Type II DM  - 9/2018 A1c 5.9, not on home meds  - repeat A1c 6.0  - SSI     HLD  - Atorvastatin 80 mg, ASA     CKD stage 3  - Cr 1.6; appears to be at baseline; stable  - Will renally dose medications and avoid nephrotoxic drugs  - Vitamin D 1000 units     Hx of upper GI bleed  - Pantoprazole 40 mg     BPH  - Tamsulosin 0.4 mg            DVT: SCDs/Eliquis  Diet: cardiac  Dispo: pending IPR placement, medically ready  Code:Full       Crystal Aixa  [facility] Internal Medicine HO-I  [facility] IM Service Team A    [facility] Medicine Hospitalist Pager numbers:   [facility] Hospitalist Medicine Team A  (Hanny/Loco): [phone]  [facility] Hospitalist Medicine Team B (Kacey/Nico):  [phone]

## 2019-12-14 NOTE — PLAN OF CARE
Problem: Physical Therapy Goal  Goal: Physical Therapy Goal  Description  Goals to be met by: 2020    Patient will increase functional independence with mobility by performin. Supine to sit with Set-up [place]  2. Sit to supine with Set-up [place]  3. Sit to stand transfer with Modified [place]  4. Bed to chair transfer with Supervision using Rolling Walker  5. Gait  x 50 feet with Modified [place] using Rolling Walker.   6. Lower extremity exercise program x15 reps per handout, with supervision     Outcome: Ongoing, Progressing       Patient is making progress towards goals; all goals addressed as appropriate. Continue with PT POC. Pt instructed in gait training 15ft x2 at Mod <> Max x 1 c/ RW

## 2019-12-14 NOTE — NURSING
Plan of care reviewed with patient and family. Voiced understanding. NSR on monitor with no red alarms noted. Neuro checks complete. No acute distress noted at this time. Side rails x3, bed low, call bell within reach. Maintain bed alarm for patient safety. Patient will be monitored overnight.

## 2019-12-15 LAB
POCT GLUCOSE: 111 MG/DL (ref 70–110)
POCT GLUCOSE: 112 MG/DL (ref 70–110)
POCT GLUCOSE: 112 MG/DL (ref 70–110)
POCT GLUCOSE: 127 MG/DL (ref 70–110)
POCT GLUCOSE: 89 MG/DL (ref 70–110)

## 2019-12-15 PROCEDURE — 25000003 PHARM REV CODE 250: Performed by: STUDENT IN AN ORGANIZED HEALTH CARE EDUCATION/TRAINING PROGRAM

## 2019-12-15 PROCEDURE — 11000001 HC ACUTE MED/SURG PRIVATE ROOM

## 2019-12-15 RX ADMIN — ASPIRIN 81 MG: 81 TABLET, COATED ORAL at 08:12

## 2019-12-15 RX ADMIN — HYDROCHLOROTHIAZIDE 25 MG: 25 TABLET ORAL at 08:12

## 2019-12-15 RX ADMIN — AMLODIPINE BESYLATE 10 MG: 5 TABLET ORAL at 08:12

## 2019-12-15 RX ADMIN — ATORVASTATIN CALCIUM 80 MG: 40 TABLET, FILM COATED ORAL at 08:12

## 2019-12-15 RX ADMIN — PANTOPRAZOLE SODIUM 40 MG: 40 TABLET, DELAYED RELEASE ORAL at 08:12

## 2019-12-15 RX ADMIN — APIXABAN 2.5 MG: 2.5 TABLET, FILM COATED ORAL at 08:12

## 2019-12-15 RX ADMIN — VITAMIN D, TAB 1000IU (100/BT) 1000 UNITS: 25 TAB at 08:12

## 2019-12-15 RX ADMIN — TAMSULOSIN HYDROCHLORIDE 0.4 MG: 0.4 CAPSULE ORAL at 08:12

## 2019-12-15 RX ADMIN — LOSARTAN POTASSIUM 100 MG: 50 TABLET ORAL at 08:12

## 2019-12-15 NOTE — PROGRESS NOTES
[facility] Res[facility] HO-3 Progress Note    Subjective:      Shant Magana Jr. is a 79 y.o. AA male who is being followed by the [facility] service at [facility] for embolic CVA.     No complaints. Awaiting inpatient rehab placement.      Objective:   Last 24 Hour Vital Signs:  BP  Min: 106/68  Max: 153/75  Temp  Av.9 °F (36.6 °C)  Min: 97 °F (36.1 °C)  Max: 98.4 °F (36.9 °C)  Pulse  Av.2  Min: 57  Max: 63  Resp  Av.6  Min: 16  Max: 20  SpO2  Av.8 %  Min: 95 %  Max: 98 %  Weight  Av.3 kg (192 lb 7.4 oz)  Min: 87.3 kg (192 lb 7.4 oz)  Max: 87.3 kg (192 lb 7.4 oz)  I/O last 3 completed shifts:  In: 750 [P.O.:750]  Out: 1243 [Urine:1242; Stool:1]    Physical Examination:  Physical Exam   Constitutional: He is well-developed, well-nourished, and in no distress.   HENT:   Head: Normocephalic and atraumatic. L facial droop.   Eyes: EOM are normal.   Neck: Normal range of motion.   Cardiovascular: Normal rate and regular rhythm.   Pulmonary/Chest: Effort normal. No respiratory distress. He has no wheezes.   Abdominal: Soft. He exhibits no distension. There is no tenderness.   Musculoskeletal: He exhibits no edema.   Neurological: He is alert. He has , an abnormal Finger-Nose-Finger Test and an abnormal Heel to Rodriguez Test.   Oriented to place and person but not time or situation  CN 2-12 grossly intact  5/5 strength to bilateral UE  4/5 strength to BLE   Skin: Skin is warm. He is not diaphoretic.   Vitals reviewed.    Laboratory:  Laboratory Data Reviewed: yes  Pertinent Findings:      Microbiology Data Reviewed: yes  Pertinent Findings:  none      Radiology Data Reviewed: yes  Pertinent Findings:  TTE: grade I DD, EF 70%, LAE without thrombus    MRI/MRA :  Numerous acute infarcts throughout the bilateral anterior and posterior circulation, probably embolic.  Multifocal intracranial narrowing, similar to prior MRA   Multifocal stenosis/occlusion of the vertebral arteries, right worse than  left.   Approximately 60% stenosis of the left carotid bulb.    Current Medications:     Infusions:       Scheduled:   amLODIPine  10 mg Oral Daily    apixaban  2.5 mg Oral BID    aspirin  81 mg Oral Daily    atorvastatin  80 mg Oral Daily    hydroCHLOROthiazide  25 mg Oral Daily    losartan  100 mg Oral Daily    pantoprazole  40 mg Oral Daily    tamsulosin  0.4 mg Oral Daily    vitamin D  1,000 Units Oral Daily        PRN:  Dextrose 10% Bolus, Dextrose 10% Bolus, glucagon (human recombinant), glucose, glucose, insulin aspart U-100, labetalol, sodium chloride 0.9%    Antibiotics and Day Number of Therapy:  none    Assessment:     Shant Magana Jr. is a 79 y.o.male with  Patient Active Problem List    Diagnosis Date Noted    Chronic heart failure with preserved ejection fraction     Benign prostatic hyperplasia     Stroke-like symptom 12/10/2019    Stroke 12/10/2019    Lactic acidosis 12/10/2019    Dementia without behavioral disturbance 12/10/2019    Orthostatic hypotension 10/01/2019    Paroxysmal atrial fibrillation 02/12/2019    Status post placement of implantable loop recorder 02/12/2019    Hyperparathyroidism 02/12/2019    Gastritis     Polyp of colon     Melena 10/18/2018    Symptomatic anemia 09/03/2018    Acute upper GI bleed 09/03/2018    Anemia     Weakness     Chronic diastolic congestive heart failure 10/19/2017    Enlarged LA (left atrium) 10/19/2017    Internal carotid artery stenosis, left 10/18/2017    Mixed hyperlipidemia 10/18/2017    CKD (chronic kidney disease) stage 3, GFR 30-59 ml/min 10/18/2017    Cryptogenic stroke 10/16/2017    SHIKHA (acute kidney injury) 10/14/2017    Acute encephalopathy 10/11/2017    Acute embolic stroke 08/15/2017    Type 2 diabetes mellitus with complication, without long-term current use of insulin     Essential hypertension 06/07/2017    Bradycardia 06/07/2017        Plan:     Acute Multifocal Embolic CVA  - Pt has dementia at  baseline but per grandson neurological status has worsened, presents with BLE weakness and experienced 2 falls PTA  - Pt evaluated by Neuro via Telemedicine in the ED who determined he was not a candidate for TPA  - CT head (-) , UDS (-)  - TSH WNL, speech and swallow evaluation, PT/OT  - Neurology consulted  - NIH score 6  - Pt has a hx of 2 prior CVAs in 2017, 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  - Started on ASA 81 mg, Atorvastatin 80 mg  - Pt also with hyperammonemia with an ammonia level of 87; pt started on lactulose 20 gm BID  -MRI/MRA with unchanged stenotic vasculature, multifocal emblic CVA in bilateral hemispheres and cerebellum  -working with PT/OT, awaiting IPR placement     Lactic Acidosis, resolved  - lactic acid 2.9, downtrending with 1L LR to 1.2     HFpEF  - 9/3/18 Echo with EF of 65-70% and increased LVEDP  - , negative Tn, EKG with TWF in inferior leads and RAD, anterior q waves  - TTE with EF 70%, LAE, no thrombus, grade 1 DD     Hx of Afib  - Eliquis 2.5 mg BID, no BB due to bradycardia (previously stopped)  - Per grandson pt has not been compliant with Eliquis for the past week 2/2 to insurance issues leading to increase cost of the medication  -resume along with ASA while inpt     HTN  - home meds initially held for 24 hours to allow for permissive hypertension  - resumed losartan 100 and norvasc 10 that he is outside window; discontinued home hydralazine, started HCTZ 25 daily     Type II DM  - 9/2018 A1c 5.9, not on home meds  - repeat A1c 6.0  - SSI     HLD  - Atorvastatin 80 mg, ASA     CKD stage 3  - Cr 1.6; appears to be at baseline; stable  - Will renally dose medications and avoid nephrotoxic drugs  - Vitamin D 1000 units     Hx of upper GI bleed  - Pantoprazole 40 mg     BPH  - Tamsulosin 0.4 mg            DVT: SCDs/Eliquis  Diet: cardiac  Dispo: pending IPR placement, continue PT/OT  Code:Full       Ayana Kruse  [facility] HO-I  [facility] IM Service Team A    [facility] Hospitalist  Pager numbers:   [facility] Hospitalist Medicine Team A (Hanny/Loco): [phone]  [facility] Hospitalist Medicine Team B (Kacey/Nico):  [phone]

## 2019-12-15 NOTE — PLAN OF CARE
VN attempted to round with patient or family. Upon introduction family informed VN patient sleeping and did not give VN permission to turn camera to speak with them and complete pending immunization assessment.  VN to continue to monitor.

## 2019-12-15 NOTE — PLAN OF CARE
1901H Patient is awake and oriented to self, place and month only. Care plan explained; needs reinforcement. Telesitter Avasys at bedside. VIP care model explained. Neuro checks every 4 hours. Left facial droop noted. No drift on all four extremities. On room air, not in distress. On heart monitor running Sinus Rhythm, heart rate 60s. IV site to the left forearm 20G; flushed and saline locked. Maintained on low salt, low fat, dysphagia soft diet; thin liquid. Maintained on fall precaution. Weight shift assistance provided. Bed in lowest position, bed alarms on, call light within reach and instructed to call for help when needed. Will continue  to monitor.     Due medication given; swallow pill whole. Blood sugar monitored and covered per insulin sliding scale. Diaper on; incontinence care provided as needed.

## 2019-12-16 LAB
BACTERIA BLD CULT: NORMAL
BACTERIA BLD CULT: NORMAL
POCT GLUCOSE: 102 MG/DL (ref 70–110)
POCT GLUCOSE: 118 MG/DL (ref 70–110)
POCT GLUCOSE: 146 MG/DL (ref 70–110)
POCT GLUCOSE: 96 MG/DL (ref 70–110)

## 2019-12-16 PROCEDURE — 97112 NEUROMUSCULAR REEDUCATION: CPT

## 2019-12-16 PROCEDURE — 97530 THERAPEUTIC ACTIVITIES: CPT

## 2019-12-16 PROCEDURE — 86580 TB INTRADERMAL TEST: CPT | Performed by: INTERNAL MEDICINE

## 2019-12-16 PROCEDURE — 94761 N-INVAS EAR/PLS OXIMETRY MLT: CPT

## 2019-12-16 PROCEDURE — 25000003 PHARM REV CODE 250: Performed by: STUDENT IN AN ORGANIZED HEALTH CARE EDUCATION/TRAINING PROGRAM

## 2019-12-16 PROCEDURE — 30200315 PPD INTRADERMAL TEST REV CODE 302: Performed by: INTERNAL MEDICINE

## 2019-12-16 PROCEDURE — 97803 MED NUTRITION INDIV SUBSEQ: CPT

## 2019-12-16 PROCEDURE — 92507 TX SP LANG VOICE COMM INDIV: CPT

## 2019-12-16 PROCEDURE — 92526 ORAL FUNCTION THERAPY: CPT

## 2019-12-16 PROCEDURE — 11000001 HC ACUTE MED/SURG PRIVATE ROOM

## 2019-12-16 RX ORDER — ASPIRIN 81 MG/1
81 TABLET ORAL DAILY
Qty: 30 TABLET | Refills: 11 | Status: SHIPPED | OUTPATIENT
Start: 2019-12-17 | End: 2020-07-18

## 2019-12-16 RX ORDER — HYDROCHLOROTHIAZIDE 25 MG/1
25 TABLET ORAL DAILY
Qty: 30 TABLET | Refills: 11 | Status: SHIPPED | OUTPATIENT
Start: 2019-12-17 | End: 2020-07-18

## 2019-12-16 RX ADMIN — PANTOPRAZOLE SODIUM 40 MG: 40 TABLET, DELAYED RELEASE ORAL at 09:12

## 2019-12-16 RX ADMIN — APIXABAN 2.5 MG: 2.5 TABLET, FILM COATED ORAL at 08:12

## 2019-12-16 RX ADMIN — AMLODIPINE BESYLATE 10 MG: 5 TABLET ORAL at 09:12

## 2019-12-16 RX ADMIN — VITAMIN D, TAB 1000IU (100/BT) 1000 UNITS: 25 TAB at 09:12

## 2019-12-16 RX ADMIN — ASPIRIN 81 MG: 81 TABLET, COATED ORAL at 09:12

## 2019-12-16 RX ADMIN — TUBERCULIN PURIFIED PROTEIN DERIVATIVE 5 UNITS: 5 INJECTION INTRADERMAL at 02:12

## 2019-12-16 RX ADMIN — LOSARTAN POTASSIUM 100 MG: 50 TABLET ORAL at 09:12

## 2019-12-16 RX ADMIN — TAMSULOSIN HYDROCHLORIDE 0.4 MG: 0.4 CAPSULE ORAL at 09:12

## 2019-12-16 RX ADMIN — HYDROCHLOROTHIAZIDE 25 MG: 25 TABLET ORAL at 09:12

## 2019-12-16 RX ADMIN — APIXABAN 2.5 MG: 2.5 TABLET, FILM COATED ORAL at 09:12

## 2019-12-16 RX ADMIN — ATORVASTATIN CALCIUM 80 MG: 40 TABLET, FILM COATED ORAL at 09:12

## 2019-12-16 NOTE — PLAN OF CARE
[facility]    FACILITY TRANSFER ORDERS      Patient Name: Shant Magana Jr.  YOB: 1940    PCP: Trent Aldana MD   PCP Address: [address] / [place] [address]  PCP Phone Number: [phone]  PCP Fax: [phone]    Encounter Date: 12/16/2019    Admit to: [facility]    Vital Signs:  Routine    Diagnoses:   Active Hospital Problems    Diagnosis  POA    *Acute embolic stroke [I63.9]  Yes    Chronic heart failure with preserved ejection fraction [I50.32]  Yes    Benign prostatic hyperplasia [N40.0]  Yes    Stroke-like symptom [R29.90]  Yes    Stroke [I63.9]  Yes    Lactic acidosis [E87.2]  Yes    Dementia without behavioral disturbance [F03.90]  Yes    Status post placement of implantable loop recorder [Z95.818]  Yes    Paroxysmal atrial fibrillation [I48.0]  Yes    Weakness [R53.1]  Yes    Enlarged LA (left atrium) [I51.7]  Yes    Internal carotid artery stenosis, left [I65.22]  Yes    Mixed hyperlipidemia [E78.2]  Yes    CKD (chronic kidney disease) stage 3, GFR 30-59 ml/min [N18.3]  Yes     Managed by Dr. Orozco      Acute encephalopathy [G93.40]  Yes    Type 2 diabetes mellitus with complication, without long-term current use of insulin [E11.8]  Yes     Managed by Dr. Orozco      Essential hypertension [I10]  Yes      Resolved Hospital Problems   No resolved problems to display.       Allergies:Review of patient's allergies indicates:  No Known Allergies    Diet: cardiac diet, soft diet and fluid consistency thin    Activities: Up with assistance    Nursing: skilled nursing     Labs: CBC and BMP weekly     CONSULTS:    Physical Therapy to evaluate and treat. , Occupational Therapy to evaluate and treat. and Speech Therapy to evaluate and treat for Language and Swallowing.    MISCELLANEOUS CARE:  none    WOUND CARE ORDERS  None    Medications: Review discharge medications with patient and family and provide education.      Current Discharge  Medication List      START taking these medications    Details   aspirin (ECOTRIN) 81 MG EC tablet Take 1 tablet (81 mg total) by mouth once daily.  Qty: 30 tablet, Refills: 11      hydroCHLOROthiazide (HYDRODIURIL) 25 MG tablet Take 1 tablet (25 mg total) by mouth once daily.  Qty: 30 tablet, Refills: 11         CONTINUE these medications which have NOT CHANGED    Details   amLODIPine (NORVASC) 10 MG tablet Take 10 mg by mouth once daily.  Refills: 2      apixaban (ELIQUIS) 2.5 mg Tab Take 1 tablet (2.5 mg total) by mouth 2 (two) times daily.  Qty: 60 tablet, Refills: 11      atorvastatin (LIPITOR) 80 MG tablet Take 1 tablet (80 mg total) by mouth once daily.  Qty: 90 tablet, Refills: 2      losartan (COZAAR) 100 MG tablet Take 100 mg by mouth once daily.       pantoprazole (PROTONIX) 40 MG tablet Take 40 mg by mouth once daily.  Refills: 3      tamsulosin (FLOMAX) 0.4 mg Cp24 Take 0.4 mg by mouth once daily.      vitamin D 1000 units Tab Take 1,000 Units by mouth once daily.         STOP taking these medications       hydrALAZINE (APRESOLINE) 10 MG tablet Comments:   Reason for Stopping:                    _________________________________  Ayana Kruse MD   [facility] Internal Medicine HO-I  12/16/2019

## 2019-12-16 NOTE — PLAN OF CARE
Patient alert to person.  Incontinent but knows when he needs to be changed.  Fed himself without difficulty. Was able to stand at the bedside but not able to walk Neuro checks revealed no changes. No Distress or complaints of pain my shift.

## 2019-12-16 NOTE — PLAN OF CARE
Problem: Occupational Therapy Goal  Goal: Occupational Therapy Goal  Description  Goals to be met by: 01/12     Patient will increase functional independence with ADLs by performing:    Feeding with Minimal Assistance. --MET 12/13  Grooming while seated with Minimal Assistance. --MET 12/13  Toileting from bedside commode with Moderate Assistance for hygiene and clothing management.   Toilet transfer to bedside commode with Contact Guard Assistance.  Increased functional strength to WFL for ADLs. --MET 12/13      Outcome: Ongoing, Progressing     Patient /c poor attention to task and required max VCs to follow commands. Patient /c difficulty maintaining balance during sitting and standing/gait trials. Cont OT.

## 2019-12-16 NOTE — PLAN OF CARE
CM informed by Ami with [facility] medical director has decided not to approve inpt rehab at this time.  Per Ami documentation demonstrated not enough medical necessity outside of therapy needs.  Agency states pt can get the therapy needed at SNF level and that therapy is not limited to the 1.5 hours.      CM informed primary team, SNF referral approved.  Notice to [facility] with new request sent via [employer], [facility] informed they can proceed with SNF review, aware [facility] will approve providing bed availability.  Daughter Mike notified of above, explained with time for questions, req with [facility] as their preference.  Mike states she or her son will be available for admission paperwork today.    Raeann Doe, RN    [phone]

## 2019-12-16 NOTE — PLAN OF CARE
CM received call from [facility], pt had been accepted for admission today until Medicare liability claim flagged during financial acceptance screen.  informed daughter Mike to contact Medicaid office at [phone] to clear claim prior to admission.  Daughter and family understand pt cannot be admitted without clearance.  Family has completed all needed financial paperwork on their end.  Primary team to hold d/c until tomorrow.    Raeann Doe RN    [phone]

## 2019-12-16 NOTE — PLAN OF CARE
Bed in low position and locked. Alarms on and audible. Call light within reach. Education given on preventing falls and using call light. Understanding verbalized. MYNOR telesitter at bedside for extra safety.

## 2019-12-16 NOTE — PLAN OF CARE
Problem: SLP Goal  Goal: SLP Goal  Description  Short Term Goals:  1. Pt will participate in ongoing swallow assessment to determine least restrictive diet.   2. Pt will tolerate mech soft tray/thin liquids with no audible s/s of dysphagia and good oral clearance.   3. Pt will implement safe swallowing strategies 100% of the time given min assist.   4. Pt will participate in speech/lang/cog eval to determine level of deficits  5. Pt will state basic orientation x4 with mod cues.  6. Pt will respond to personal y/n related ?s with 70% acc, mod cues.  7. Pt will follow 1-2 part commands with mod cues for 70% acc.  8. Pt will respond to personal questions within 30-45 seconds with min cues.  9. Pt will complete automatics with mod cues to increase verbal expression.       Outcome: Ongoing, Progressing   Pt making daily progress, able to engage in conversation with friends at bedside. Pt approved for therapy at SNF level.

## 2019-12-16 NOTE — PLAN OF CARE
Pt awake in bed, eating breakfast.  Pt initially thought he was at [facility], reoriented.  [facility] referral pending insurance auth.  Per [facility] rep, agency did not feel there was enough data to approve rehab at this time and will review past weekend's stay for determination.  Family has been informed that if rehab is denied, SNF level of care would be at a NH facility contracted.  Daughter verbalized understanding.  Will follow.       12/16/19 8643   Discharge Reassessment   Assessment Type Discharge Planning Reassessment   Provided patient/caregiver education on the expected discharge date and the discharge plan Yes   Do you have any problems affording any of your prescribed medications? No   Discharge Plan A Rehab   Discharge Plan B Home with family;Home Health   DME Needed Upon Discharge    (TBD)   Patient choice form signed by patient/caregiver N/A   Anticipated Discharge Disposition   ([facility])   Can the patient answer the patient profile reliably? No historian available   How does the patient rate their overall health at the present time? Fair   Describe the patient's ability to walk at the present time. Major restrictions/daily assistance from another person   Number of comorbid conditions (as recorded on the chart) Four   During the past month, has the patient often been bothered by feeling down, depressed or hopeless? No   During the past month, has the patient often been bothered by little interest or pleasure in doing things? No   Post-Acute Status   Post-Acute Authorization Placement   Post-Acute Placement Status Pending Payor Medical Review   Patient choice form signed by patient/caregiver List with quality metrics by geographic area provided   Discharge Delays None known at this time       Raeann Doe RN    [phone]

## 2019-12-16 NOTE — PT/OT/SLP PROGRESS
Occupational Therapy   Treatment    Name: Shant Magana Jr.  MRN: [id number]  Admitting Diagnosis:  Acute embolic stroke       Recommendations:     Discharge Recommendations: nursing facility, skilled(Patient denied IPR, but approved for SNF)  Discharge Equipment Recommendations:  (Defer to SNF)  Barriers to discharge:  Decreased caregiver support, Inaccessible home environment    Assessment:   Patient /c poor attention to task and required max VCs to follow commands. Patient /c difficulty maintaining balance during sitting and standing/gait trials. Cont OT.     Shant Magana Jr. is a 79 y.o. male with a medical diagnosis of Acute embolic stroke. Performance deficits affecting function are weakness, impaired endurance, impaired self care skills, impaired functional mobilty, gait instability, impaired balance, impaired cognition, decreased upper extremity function, decreased lower extremity function, decreased coordination, decreased safety awareness, impaired coordination, impaired fine motor.     Rehab Prognosis:  Fair+; patient would benefit from acute skilled OT services to address these deficits and reach maximum level of function.       Plan:     Patient to be seen 5 x/week to address the above listed problems via self-care/home management, therapeutic activities, therapeutic exercises, neuromuscular re-education  · Plan of Care Expires: 01/12/20  · Plan of Care Reviewed with: patient    Subjective     Pain/Comfort:  · Pain Rating 1: 0/10    Objective:     Communicated with: nurseMirna prior to session.  Patient found HOB elevated with bed alarm, telemetry upon OT entry to room.    General Precautions: Standard, fall   Orthopedic Precautions:N/A   Braces: N/A      Bed Mobility:    · Patient completed Rolling/Turning to Right with moderate assistance  · Patient completed Scooting/Bridging with minimum assistance  · Patient completed Supine to Sit with minimum assistance and with side rail  · Patient completed Sit to  "Supine with minimum assistance and with leg lift     Functional Mobility/Transfers:  · Patient completed Sit <> Stand Transfer with minimum assistance and of 2 persons  with  rolling walker     Activities of Daily Living:  · Lower Body Dressing: total assistance    · Toileting: total assistance      AMPAC 6 Click ADL: 14    Treatment & Education:  Patient /c bed mob as noted above. Increased time, effort and VCs for all transitions 2/2 decreased attention to task this date. Patient sat EOB /c SB-Luis M for static balance, and modA for dynamic balance during TE. Patient instructed in sit > stand using RW /c modA. Able to take few steps fwd/bwd and laterally to HOB /c modA of 2. Patient /c increased fidgeting this date and states "I'm looking for my money".     Patient left HOB elevated with all lines intact, call button in reach, bed alarm on, nsg notified and family members presentEducation:      GOALS:   Multidisciplinary Problems     Occupational Therapy Goals        Problem: Occupational Therapy Goal    Goal Priority Disciplines Outcome Interventions   Occupational Therapy Goal     OT, PT/OT Ongoing, Progressing    Description:  Goals to be met by: 01/12     Patient will increase functional independence with ADLs by performing:    Feeding with Minimal Assistance. --MET 12/13  Grooming while seated with Minimal Assistance. --MET 12/13  Toileting from bedside commode with Moderate Assistance for hygiene and clothing management.   Toilet transfer to bedside commode with Contact Guard Assistance.  Increased functional strength to WFL for ADLs. --MET 12/13                       Time Tracking:     OT Date of Treatment: 12/16/19  OT Start Time: 1138  OT Stop Time: 1202  OT Total Time (min): 24 mins co-tx /c PTA    Billable Minutes:Therapeutic Activity 12    RATNA Gutierrez  12/16/2019    "

## 2019-12-16 NOTE — NURSING
Notified Dr. Davis of patients 8-beat run of Vtach on telemetry.  Patient has no complaints at the time and Vital signs are stable. No new orders given at this time

## 2019-12-16 NOTE — PLAN OF CARE
12/16/19 1120   Post-Acute Status   Post-Acute Authorization Placement   Post-Acute Placement Status Insurance Denial   The Sw faxed updated info to [facility] and faxed info to [facility] via [facility]. The Sw notified both snf's the pt's ready for d/c today and [facility] is wiling to give the snf auth today and dtr is available to sign admit papers today.

## 2019-12-16 NOTE — PLAN OF CARE
Call from [facility] making contact with daughter to sign admission paperwork today.

## 2019-12-16 NOTE — DISCHARGE INSTRUCTIONS
Stroke, Discharge Instructions for (English) View Edit Remove   Stroke, Risk Factors for (English) View Edit Remove   Stroke, Symptoms (English) View Edit Remove   Aspirin, ASA oral tablets (English) View Edit Remove   Hydrochlorothiazide, HCTZ capsules or tablets (English) View Edit Remove   Diet, Discharge Instructions for Eating a Low-Salt (English) View Edit Remove

## 2019-12-16 NOTE — DISCHARGE SUMMARY
[facility] Hospital Medicine Discharge Summary    Primary Team: [facility] Hospitalist Team A  Attending Physician: Dr. Jennifer Adan  Resident: Anna  Intern: Aixa    Date of Admit: 12/10/2019  Date of Discharge: 12/17/2019    Discharge to: [facility]  Condition: stable    Discharge Diagnoses     Patient Active Problem List   Diagnosis    Essential hypertension    Bradycardia    Type 2 diabetes mellitus with complication, without long-term current use of insulin    Acute embolic stroke    Acute encephalopathy    SHIKHA (acute kidney injury)    Cryptogenic stroke    Internal carotid artery stenosis, left    Mixed hyperlipidemia    CKD (chronic kidney disease) stage 3, GFR 30-59 ml/min    Chronic diastolic congestive heart failure    Enlarged LA (left atrium)    Symptomatic anemia    Anemia    Weakness    Acute upper GI bleed    Melena    Gastritis    Polyp of colon    Paroxysmal atrial fibrillation    Status post placement of implantable loop recorder    Hyperparathyroidism    Orthostatic hypotension    Stroke-like symptom    Stroke    Lactic acidosis    Dementia without behavioral disturbance    Chronic heart failure with preserved ejection fraction    Benign prostatic hyperplasia       Consultants and Procedures     Consultants:  Neurology    Procedures:   None    Imaging:  CXR: Borderline cardiomegaly without evidence of CHF. Loop recorder in place.     CT head: There is no evidence of intracranial hemorrhage.  The ventricles and sulci are prominent, suggestive of cerebral volume loss.  There are extensive hypodensities within the periventricular and subcortical white matter.    MRI brain: Numerous acute infarcts throughout the bilateral anterior and posterior circulation, probably embolic.    MRA head and neck: Multifocal intracranial narrowing. Multifocal stenosis/occlusion of the vertebral arteries, right worse than left. Approximately 60% stenosis of the left carotid bulb.    Brief History  of Present Illness      Mr. Magana is a 79 Y M with h/o CVA (2 embolic CVAs in 2017), A fib, HFpEF, HTN, HLD, DM2, CKD 3, BPH, and dementia who presented 12/10 with BLE weakness and 2 falls prior to admission. Pt was evaluated by neuro telemedicine in the ED, who determined not candidate for TPA as last known normal unknown. CT head was negative for acute infarct. MRI brain remarkable for multifocal acute embolic CVA in b/l hemispheres in cerebellum. He was started on ASA 81 and seen by neurology and PT/OT/SLP. He continued to work with PT/OT while inpatient, who noted impaired functional mobility and need for acute skilled PT services following discharge. Pt insurance denied inpatient rehab. Discharge to SNF with PT/OT.     For the full HPI please refer to the History & Physical from this admission.    Hospital Course By Problem with Pertinent Findings     Acute Multifocal Embolic CVA  - Pt has dementia at baseline but per grandson neurological status has worsened, presents with BLE weakness and experienced 2 falls PTA  - Pt evaluated by Neuro via Telemedicine in the ED who determined he was not a candidate for TPA  - CT head (-) , UDS (-)  - TSH WNL, speech and swallow evaluation, PT/OT  - Neurology consulted  - NIH score 6  - Pt has a hx of 2 prior CVAs in 2017, 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  - Started on ASA 81 mg, Atorvastatin 80 mg  - Pt also with hyperammonemia with an ammonia level of 87; pt started on lactulose 20 gm BID  -MRI/MRA with unchanged stenotic vasculature, multifocal embolic CVA in bilateral hemispheres and cerebellum  -consulted PT/OT, noted impaired functional mobility, discharge to SNF to continue acute skilled PT     Lactic Acidosis, resolved  - lactic acid 2.9, downtrended with 1L LR     HFpEF  - 9/3/18 Echo with EF of 65-70% and increased LVEDP  - , negative Tn, EKG with TWF in inferior leads and RAD, anterior q waves  - TTE with EF 70%, LAE, no thrombus, grade 1 DD     Hx of Afib  - Eliquis 2.5  mg BID, no BB due to bradycardia (previously stopped)  - Per grandson pt has not been compliant with Eliquis for the past week 2/2 to insurance issues leading to increase cost of the medication     HTN  - home meds initially held for 24 hours to allow for permissive hypertension  - resumed losartan 100 and norvasc 10 once outside window; discontinued home hydralazine, started HCTZ 25 daily  - discharge on losartan 100 mg daily, norvasc 10 mg daily, HCTZ 25 mg daily     Type II DM  - 9/2018 A1c 5.9, not on home meds  - repeat A1c 6.0  - SSI while admitted     HLD  - Atorvastatin 80 mg, ASA     CKD stage 3  - Cr 1.6; appears to be at baseline; stable  - medications renally dosed, avoided nephrotoxic drugs  - Vitamin D 1000 units     Hx of upper GI bleed  - Pantoprazole 40 mg     BPH  - Tamsulosin 0.4 mg     Dementia  - reportedly began after CVA in 2017  - at his baseline mental status; oriented to self and place, not oriented to time; otherwise converses appropriately    Discharge Medications      Shant Magana Jr.   Home Medication Instructions KAMILLA:[id number]    Printed on:12/16/19 4521   Medication Information                      amLODIPine (NORVASC) 10 MG tablet  Take 10 mg by mouth once daily.             apixaban (ELIQUIS) 2.5 mg Tab  Take 1 tablet (2.5 mg total) by mouth 2 (two) times daily.             aspirin (ECOTRIN) 81 MG EC tablet  Take 1 tablet (81 mg total) by mouth once daily.             atorvastatin (LIPITOR) 80 MG tablet  Take 1 tablet (80 mg total) by mouth once daily.             hydroCHLOROthiazide (HYDRODIURIL) 25 MG tablet  Take 1 tablet (25 mg total) by mouth once daily.             losartan (COZAAR) 100 MG tablet  Take 100 mg by mouth once daily.              pantoprazole (PROTONIX) 40 MG tablet  Take 40 mg by mouth once daily.             tamsulosin (FLOMAX) 0.4 mg Cp24  Take 0.4 mg by mouth once daily.             vitamin D 1000 units Tab  Take 1,000 Units by mouth once daily.                  Discharge Information:   Diet:  Soft dysphagia/cardiac diet, advance as tolerated    Physical Activity:  Up with assistance             Instructions:  1. Take all medications as prescribed  2. Keep all follow-up appointments  3. Return to the hospital or call your primary care physicians if any worsening symptoms such as fever, chest pain, shortness of breath, return of symptoms, or any other concerns.    Follow-Up Appointments:  Follow up with PCP    Ayana Kruse MD  [facility] Internal Medicine, -

## 2019-12-16 NOTE — PLAN OF CARE
12/16/19 1605   Post-Acute Status   Post-Acute Authorization Placement   Discharge Delays (!) Other   The Sw just spoke to Vivian at [facility] who states the pt has 2 open Medicare claims from when he worked. She states she called the Medicare Open Liability but was only able to leave a message. Vivian states she can't move forward until they're closed. Vivian states she will reach out to the pt's family to see if they can call also. Vivian states she will not be able to move forward until this matter is cleared up. If the case is still open she will not be able to accept the pt.

## 2019-12-16 NOTE — PROGRESS NOTES
[facility] IM Resident HO-3 Progress Note    Subjective:      Shant Magana Jr. is a 79 y.o. AA male who is being followed by the [facility] IM service at [facility] for embolic CVA.     Oriented to self and place. Not oriented to time. Thought his daughter was in the room when she was not. Has baseline dementia. Denies any new complaints.      Objective:   Last 24 Hour Vital Signs:  BP  Min: 135/82  Max: 157/86  Temp  Av.3 °F (36.8 °C)  Min: 97.2 °F (36.2 °C)  Max: 100.1 °F (37.8 °C)  Pulse  Av.6  Min: 60  Max: 68  Resp  Av.3  Min: 16  Max: 20  SpO2  Av.2 %  Min: 94 %  Max: 99 %  I/O last 3 completed shifts:  In: 1095 [P.O.:1095]  Out: 950 [Urine:950]    Physical Examination:  Physical Exam   Constitutional: He is well-developed, well-nourished, and in no distress.   HENT:   Head: Normocephalic and atraumatic. L facial droop.   Eyes: EOM are normal.   Neck: Normal range of motion.   Cardiovascular: Normal rate and regular rhythm.   Pulmonary/Chest: Effort normal. No respiratory distress. He has no wheezes.   Abdominal: Soft. He exhibits no distension. There is no tenderness.   Musculoskeletal: He exhibits no edema.   Neurological: He is alert. He has , an abnormal Finger-Nose-Finger Test and an abnormal Heel to Rodriguez Test.   Oriented to place and person but not time or situation  CN 2-12 grossly intact  5/5 strength to bilateral UE  4/5 strength to BLE   Skin: Skin is warm. He is not diaphoretic.   Vitals reviewed.    Laboratory:  Laboratory Data Reviewed: yes  Pertinent Findings:      Microbiology Data Reviewed: yes  Pertinent Findings:  none      Radiology Data Reviewed: yes  Pertinent Findings:  TTE: grade I DD, EF 70%, LAE without thrombus    MRI/MRA :  Numerous acute infarcts throughout the bilateral anterior and posterior circulation, probably embolic.  Multifocal intracranial narrowing, similar to prior MRA   Multifocal stenosis/occlusion of the vertebral arteries, right worse than  left.   Approximately 60% stenosis of the left carotid bulb.    Current Medications:     Infusions:       Scheduled:   amLODIPine  10 mg Oral Daily    apixaban  2.5 mg Oral BID    aspirin  81 mg Oral Daily    atorvastatin  80 mg Oral Daily    hydroCHLOROthiazide  25 mg Oral Daily    losartan  100 mg Oral Daily    pantoprazole  40 mg Oral Daily    tamsulosin  0.4 mg Oral Daily    vitamin D  1,000 Units Oral Daily        PRN:  Dextrose 10% Bolus, Dextrose 10% Bolus, glucagon (human recombinant), glucose, glucose, insulin aspart U-100, labetalol, sodium chloride 0.9%    Antibiotics and Day Number of Therapy:  none    Assessment:     Shant Magana Jr. is a 79 y.o.male with  Patient Active Problem List    Diagnosis Date Noted    Chronic heart failure with preserved ejection fraction     Benign prostatic hyperplasia     Stroke-like symptom 12/10/2019    Stroke 12/10/2019    Lactic acidosis 12/10/2019    Dementia without behavioral disturbance 12/10/2019    Orthostatic hypotension 10/01/2019    Paroxysmal atrial fibrillation 02/12/2019    Status post placement of implantable loop recorder 02/12/2019    Hyperparathyroidism 02/12/2019    Gastritis     Polyp of colon     Melena 10/18/2018    Symptomatic anemia 09/03/2018    Acute upper GI bleed 09/03/2018    Anemia     Weakness     Chronic diastolic congestive heart failure 10/19/2017    Enlarged LA (left atrium) 10/19/2017    Internal carotid artery stenosis, left 10/18/2017    Mixed hyperlipidemia 10/18/2017    CKD (chronic kidney disease) stage 3, GFR 30-59 ml/min 10/18/2017    Cryptogenic stroke 10/16/2017    SHIKHA (acute kidney injury) 10/14/2017    Acute encephalopathy 10/11/2017    Acute embolic stroke 08/15/2017    Type 2 diabetes mellitus with complication, without long-term current use of insulin     Essential hypertension 06/07/2017    Bradycardia 06/07/2017        Plan:     Acute Multifocal Embolic CVA  - Pt has dementia at  baseline but per charlie neurological status has worsened, presents with BLE weakness and experienced 2 falls PTA  - Pt evaluated by Neuro via Telemedicine in the ED who determined he was not a candidate for TPA  - CT head (-) , UDS (-)  - TSH WNL, speech and swallow evaluation, PT/OT  - Neurology consulted  - NIH score 6  - Pt has a hx of 2 prior CVAs in 2017, 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  - Started on ASA 81 mg, Atorvastatin 80 mg  - Pt also with hyperammonemia with an ammonia level of 87; pt started on lactulose 20 gm BID  -MRI/MRA with unchanged stenotic vasculature, multifocal emblic CVA in bilateral hemispheres and cerebellum  -working with PT/OT, pt with impaired functional mobility, would greatly benefit from inpatient rehab     Lactic Acidosis, resolved  - lactic acid 2.9, downtrending with 1L LR to 1.2     HFpEF  - 9/3/18 Echo with EF of 65-70% and increased LVEDP  - , negative Tn, EKG with TWF in inferior leads and RAD, anterior q waves  - TTE with EF 70%, LAE, no thrombus, grade 1 DD     Hx of Afib  - Eliquis 2.5 mg BID, no BB due to bradycardia (previously stopped)  - Per grandson pt has not been compliant with Eliquis for the past week 2/2 to insurance issues leading to increase cost of the medication  -resume along with ASA while inpt     HTN  - home meds initially held for 24 hours to allow for permissive hypertension  - resumed losartan 100 and norvasc 10 that he is outside window; discontinued home hydralazine, started HCTZ 25 daily     Type II DM  - 9/2018 A1c 5.9, not on home meds  - repeat A1c 6.0  - SSI     HLD  - Atorvastatin 80 mg, ASA     CKD stage 3  - Cr 1.6; appears to be at baseline; stable  - Will renally dose medications and avoid nephrotoxic drugs  - Vitamin D 1000 units     Hx of upper GI bleed  - Pantoprazole 40 mg     BPH  - Tamsulosin 0.4 mg    Dementia  - reportedly began after CVA in 2017  - at his baseline mental status; oriented to self and place, not oriented to time; otherwise  converses appropriately  - making minimal improvement in functional mobility, not independent with ADLs, would benefit from inpatient rehab            DVT: SCDs/Eliquis  Diet: cardiac  Dispo: pending IPR placement, continue PT/OT  Code:Full       Ayana Kruse  [facility] Internal Medicine HO-I  U IM Service Team A    [facility] Medicine Hospitalist Pager numbers:   [facility] Hospitalist Medicine Team A (Hanny/Loco): [phone]  [facility] Hospitalist Medicine Team B (Kacey/Nico):  [phone]

## 2019-12-16 NOTE — NURSING
Plan of care reviewed with patient and family. Voiced understanding. NSR on monitor with no red alarms noted. Patient unable to discharged today due to medicare claim issues. No acute distress noted at this time. Side rails x3, bed low, call bell within reach. Maintain bed alarm for patient safety. Patient will be monitored overnight.

## 2019-12-16 NOTE — PT/OT/SLP PROGRESS
Physical Therapy Treatment    Patient Name:  Shant Magana Jr.   MRN:  [id number]    Recommendations:     Discharge Recommendations:  nursing facility, skilled   Discharge Equipment Recommendations: (defer to IPR)   Barriers to discharge: Increased assistance needed for all functional mobility    Assessment:     Shant Magana Jr. is a 79 y.o. male admitted with a medical diagnosis of Acute embolic stroke.  He presents with the following impairments/functional limitations:  weakness, impaired endurance, impaired self care skills, impaired functional mobilty, gait instability, impaired balance, impaired cognition, decreased coordination, decreased upper extremity function, decreased lower extremity function, decreased safety awareness, impaired coordination, impaired fine motor.  Pt had difficulty staying on task today and needed increased vc's/tc's to redirect.  Increased assistance needed for dynamic sitting balance secondary to posterior lean and standing/gait secondary to posterior lean and RLE weakness.  Pt would continue to benefit from skilled P.T. To assist pt's return to PLOF.      Rehab Prognosis: Fair; patient would benefit from acute skilled PT services to address these deficits and reach maximum level of function.    Recent Surgery: * No surgery found *      Plan:     During this hospitalization, patient to be seen 6 x/week to address the identified rehab impairments via gait training, therapeutic activities, therapeutic exercises, neuromuscular re-education and progress toward the following goals:    · Plan of Care Expires:  01/12/20    Subjective       Patient/Family Comments/goals: Pt agreed to tx.  Pain/Comfort:  · Pain Rating 1: 0/10  · Pain Rating Post-Intervention 1: 0/10      Objective:     Communicated with RN (Mirna) prior to session.  Patient found supine with bed alarm, telemetry upon PT entry to room.     General Precautions: Standard, fall   Orthopedic Precautions:N/A   Braces:       Functional  Mobility:  · Bed Mobility:     · Rolling Right: moderate assistance and with increased time for pt to perform  · Scooting: minimum assistance and to EOB and increased time with tc's for pt to perform as much as possible without assistance.  · Supine to Sit: minimum assistance and with increased time and HOB elevated  · Sit to Supine: minimum assistance and LEs  · Transfers:     · Sit to Stand:  minimum assistance and of 2 persons with rolling walker  · Gait: 3 small steps forward/backward with Rw and Luis M of 2 for balance and Rw management.  Pt ambulates with decreased robert, step length, NBOS, right knee buckling some during left swing(single limb support), mild trunk flexion with increased hip flexion and posterior lean.  · Balance: sitting fair+ to poor+, standing poor+, gait poor      AM-PAC 6 CLICK MOBILITY  Turning over in bed (including adjusting bedclothes, sheets and blankets)?: 2  Sitting down on and standing up from a chair with arms (e.g., wheelchair, bedside commode, etc.): 2  Moving from lying on back to sitting on the side of the bed?: 3  Moving to and from a bed to a chair (including a wheelchair)?: 2  Need to walk in hospital room?: 2  Climbing 3-5 steps with a railing?: 1  Basic Mobility Total Score: 12       Therapeutic Activities and Exercises:   Seated AP, LAQs x 10 reps with Mod A and continual vc's to stay on task.  BRANDI assisted with dynamic sitting balance, Mod A secondary to posterior lean.  Pt initially sat EOB with SBA, but demonstration increased posterior lean with increased sitting time requiring Mod A.    Gentle rocking forward/backward to midline with static hold in trunk flexion to assist with decreasing posterior lean and assist with improved midline orientation.  Static standing with RW with assist and cues for midline standing posture secondary to increased hip flexion and posterior lean.  Difficulty staying on task throughout tx requiring increased vc'/tc's and increased time  for functional mobility.    Patient left supine with all lines intact, call button in reach, bed alarm on and RN notified..    GOALS:   Multidisciplinary Problems     Physical Therapy Goals        Problem: Physical Therapy Goal    Goal Priority Disciplines Outcome Goal Variances Interventions   Physical Therapy Goal     PT, PT/OT Ongoing, Progressing     Description:  Goals to be met by: 2020    Patient will increase functional independence with mobility by performin. Supine to sit with Set-up Rabun  2. Sit to supine with Set-up Rabun  3. Sit to stand transfer with Modified Rabun  4. Bed to chair transfer with Supervision using Rolling Walker  5. Gait  x 50 feet with Modified Rabun using Rolling Walker.   6. Lower extremity exercise program x15 reps per handout, with supervision                      Time Tracking:     PT Received On: 19  PT Start Time: 1138   1127  PT Stop Time: 1202    1137  PT Total Time (min): 24 min     Billable Minutes: Neuromuscular Re-education 12  Co-tx with BRANDI 12 mins  [id number] speaking with  and doctor about pt's prognosis and future placement    Treatment Type: Treatment  PT/PTA: PTA     PTA Visit Number: 1     Juana Gomez PTA  2019

## 2019-12-16 NOTE — PLAN OF CARE
12/16/19 1044   Post-Acute Status   Post-Acute Authorization Placement  (snf)   Post-Acute Placement Status Insurance Denial   The Sw left a message for Samantha at [facility] to return the call in reference to bed availability. The pt was denied for IPR by PHN.

## 2019-12-16 NOTE — PLAN OF CARE
Problem: Physical Therapy Goal  Goal: Physical Therapy Goal  Description  Goals to be met by: 2020    Patient will increase functional independence with mobility by performin. Supine to sit with Set-up Hana  2. Sit to supine with Set-up Hana  3. Sit to stand transfer with Modified Hana  4. Bed to chair transfer with Supervision using Rolling Walker  5. Gait  x 50 feet with Modified Hana using Rolling Walker.   6. Lower extremity exercise program x15 reps per handout, with supervision     Outcome: Ongoing, Progressing   Continue working toward goals

## 2019-12-16 NOTE — PLAN OF CARE
ALYSSA spoke with Ami with [facility], states she has forwarded case to medical director for decision.  Will contact ALYSSA once decision granted.    Raeann Doe RN    [phone]

## 2019-12-16 NOTE — PT/OT/SLP PROGRESS
Speech Language Pathology Treatment    Patient Name:  Shant Magana Jr.   MRN:  [id number]  Admitting Diagnosis: Acute embolic stroke    Recommendations:                 General Recommendations:  Speech/language therapy and Cognitive-linguistic therapy  Diet recommendations:  Dental Soft, Liquid Diet Level: Thin   Aspiration Precautions: follow universal swallow precautions, upright for meals, tray set-up, whole meds, alternate sips and bites   General Precautions: Standard, fall  Communication strategies:  reorient, repeat instructions, speak slowly, eliminate distractions    Subjective     Pt seen at bedside in room, pt had spilled water all over the floor when attempting to pour into his cup.   Housekeeping made aware.   Patient goals: none stated    Pain/Comfort:  · Pain Rating 1: 0/10    Objective:     Has the patient been evaluated by SLP for swallowing?   Yes  Keep patient NPO? No   Current Respiratory Status: room air      Cognition/Communication: Pt seen in room, had spilled water all over himself and on floor. Pt was reoriented with visual cues and able to repeat place and month after 1 minute delay. Pt responded to questions with increased accuracy (70% acc with two repetitions), less delay noted in responding. Pt was awake during session, increased alertness improved since initial eval date. Family friends present in room during mid session, pt able to engage with short responses. Pt required redirection when following body part commands (visual cues increased accuracy).   Pt required verbal cues to respond to situation problem solving questions. Friends at bedside attempted to give pt clues to help answer with dcr'd success noted. Pt will need supervision for ADLs, pt will continue to benefit from further ST at next level of care.     Assessment:     Shant Magana Jr. is a 79 y.o. male admitted with Acute embolic stroke who presents with an SLP diagnosis of cognitive/communication deficits.      Goals:    Multidisciplinary Problems     SLP Goals        Problem: SLP Goal    Goal Priority Disciplines Outcome   SLP Goal     SLP Ongoing, Progressing   Description:  Short Term Goals:  1. Pt will participate in ongoing swallow assessment to determine least restrictive diet.   2. Pt will tolerate mech soft tray/thin liquids with no audible s/s of dysphagia and good oral clearance.   3. Pt will implement safe swallowing strategies 100% of the time given min assist.   4. Pt will participate in speech/lang/cog eval to determine level of deficits  5. Pt will state basic orientation x4 with mod cues.  6. Pt will respond to personal y/n related ?s with 70% acc, mod cues.  7. Pt will follow 1-2 part commands with mod cues for 70% acc.  8. Pt will respond to personal questions within 30-45 seconds with min cues.  9. Pt will complete automatics with mod cues to increase verbal expression.                        Plan:     · Patient to be seen:  3 x/week   · Plan of Care expires:  01/09/20  · Plan of Care reviewed with:  patient(family friends at the bedside)   · SLP Follow-Up:  Yes       Discharge recommendations:  nursing facility, skilled   Barriers to Discharge:  None    Time Tracking:     SLP Treatment Date:   12/16/19  Speech Start Time:  0950  Speech Stop Time:  1007     Speech Total Time (min):  17 min    Billable Minutes: Speech Therapy Individual 17    NEVIN Hassan, CCC-SLP  12/16/2019

## 2019-12-17 VITALS
DIASTOLIC BLOOD PRESSURE: 79 MMHG | OXYGEN SATURATION: 97 % | RESPIRATION RATE: 17 BRPM | BODY MASS INDEX: 25.83 KG/M2 | HEIGHT: 73 IN | SYSTOLIC BLOOD PRESSURE: 146 MMHG | WEIGHT: 194.88 LBS | TEMPERATURE: 98 F | HEART RATE: 66 BPM

## 2019-12-17 LAB
POCT GLUCOSE: 102 MG/DL (ref 70–110)
POCT GLUCOSE: 88 MG/DL (ref 70–110)

## 2019-12-17 PROCEDURE — 25000003 PHARM REV CODE 250: Performed by: STUDENT IN AN ORGANIZED HEALTH CARE EDUCATION/TRAINING PROGRAM

## 2019-12-17 PROCEDURE — 94761 N-INVAS EAR/PLS OXIMETRY MLT: CPT

## 2019-12-17 RX ADMIN — VITAMIN D, TAB 1000IU (100/BT) 1000 UNITS: 25 TAB at 08:12

## 2019-12-17 RX ADMIN — APIXABAN 2.5 MG: 2.5 TABLET, FILM COATED ORAL at 08:12

## 2019-12-17 RX ADMIN — AMLODIPINE BESYLATE 10 MG: 5 TABLET ORAL at 08:12

## 2019-12-17 RX ADMIN — ATORVASTATIN CALCIUM 80 MG: 40 TABLET, FILM COATED ORAL at 08:12

## 2019-12-17 RX ADMIN — PANTOPRAZOLE SODIUM 40 MG: 40 TABLET, DELAYED RELEASE ORAL at 08:12

## 2019-12-17 RX ADMIN — TAMSULOSIN HYDROCHLORIDE 0.4 MG: 0.4 CAPSULE ORAL at 08:12

## 2019-12-17 RX ADMIN — ASPIRIN 81 MG: 81 TABLET, COATED ORAL at 08:12

## 2019-12-17 RX ADMIN — LOSARTAN POTASSIUM 100 MG: 50 TABLET ORAL at 08:12

## 2019-12-17 RX ADMIN — HYDROCHLOROTHIAZIDE 25 MG: 25 TABLET ORAL at 08:12

## 2019-12-17 NOTE — NURSING
IV site and telemetry discontinued without adverse reaction. Patient assisted into wheelchair and discharged via wheelchair van. No acute distress noted. Report given to nurse at [facility].

## 2019-12-17 NOTE — PLAN OF CARE
Per [facility] admissions they are currently working with Medicare office to clear open claim preventing proceeding with admit.  Family has signed admission paperwork and auth has been received from N.  Daughter is working with office also to clear claim.       12/17/19 1037   Post-Acute Status   Post-Acute Authorization Placement   Post-Acute Placement Status Family Barriers   Discharge Delays (!) Other  (pt has open claim needing clearance from Medicare.  )       Raeann Doe, RAJEEV    [phone]

## 2019-12-17 NOTE — PLAN OF CARE
12/17/19 1445   Final Note   Assessment Type Final Discharge Note   Anticipated Discharge Disposition SNF   Right Care Referral Info   Post Acute Recommendation SNF / Sub-Acute Rehab   Referral Type snf   Facility Name [facility]

## 2019-12-17 NOTE — PLAN OF CARE
Call to daughter Mike who is going to facility at 4pm to sign admission paperwork.  Pt will transport at 4:45 to facility, Mike is aware.    Raeann Doe RN    [phone]

## 2019-12-17 NOTE — PLAN OF CARE
1930H Patient is awake and oriented to name, year and day of birth, knows he is in the hospital. Care plan explained; needs reinforcement. Telesitter Avasys at bedside. VIP care model explained. Neuro checks every 4 hours. Slight left facial droop still noted. No drift on all four extremities. On room air, not in distress. On heart monitor running Sinus Rhythm, heart rate 60s. IV site to the left forearm 20G; flushed and saline locked. Right forearm TB skin test, placed on 12/16 at 1455H. Maintained on low salt, low fat, dysphagia soft diet; thin liquid. Maintained on fall precaution. Weight shift assistance provided. Bed in lowest position, bed alarms on, call light within reach and instructed to call for help when needed. Will continue  to monitor.     Due medication given; swallow pill whole. Blood sugar monitored and covered per insulin sliding scale. Diaper on; incontinence care provided as needed.   2200H Verbalized that he is hungry, able to feed himself. No swallowing difficulty noted.

## 2019-12-17 NOTE — PLAN OF CARE
VN cued into room for introduction.  Patient resting comfortably in bed with no distress noted. Bedside nurse at bedside for assessment.  Call bell in reach and bed alarm on. MYNOR at bedside.

## 2019-12-17 NOTE — PLAN OF CARE
12/17/19 1404   Post-Acute Status   Post-Acute Authorization Placement   The Sw spoke to Tg at [facility] who states she still hasn't received the auth from Dana at [facility].

## 2019-12-17 NOTE — PLAN OF CARE
12/17/19 1249   Post-Acute Status   Post-Acute Authorization Placement   The Sw received a call from Tg at [facility] stating she can accept the pt and she will reach out to his dtr to see when she can sign admit paperwork. The Sw spoke to the team to change the snf name on the d/c orders and the Sw faxed the corrected d/c orders to [facility]. The Sw spoke to Dana at [facility] and informed her of the info mentioned above and she states as soon as she comes from lunch she will change the snf to reflect [facility]. The Sw notified Tg and she spoke to the pt's dtr and she will arrive at 4pm to sign the admit papers. The Sw notified the pt's nurse of the info mentioned above.

## 2019-12-17 NOTE — PLAN OF CARE
Per daughter Mike she has contacted Medicare and fax signed letter giving her auth to speak on pt's behalf.  She is currently on line with Medicare working of clearance.    11:55  Call from Mike stating she spoke with Medicare, confirmed pt does still have 2 open claims and are currently still receiving payment for both.  Notified [facility] admissions, will discuss with business office.    [facility] will also review in attempt to accept with open claims.  Will f/u with team.  Daughter is aware pt may need to admit to [facility] instead and in agreement with plan.    Raeann Doe, RN    [phone]

## 2019-12-17 NOTE — PROGRESS NOTES
"[facility]  Adult Nutrition  Progress Note    SUMMARY       Recommendations    Recommendation:   1. Continue to encourage good intake at meals    Goals:   Pt will consume a tleast 50-75% intake at meals  Nutrition Goal Status: progressing towards goal  Communication of RD Recs: reviewed with RN    Reason for Assessment  Reason For Assessment: RD follow-up  Diagnosis: stroke/CVA  Relevant Medical History: DM, HTN, prostate CA, renal d/o, throidectomy  General Information Comments: Pt on dysphagia soft Cardiac diet. Good intake t recent meals. Pt wwas working with therapy at visit-unable to assess NFPE  Nutrition Discharge Planning: d/c diet per ST recs    Nutrition Risk Screen  Nutrition Risk Screen: no indicators present    Nutrition/Diet History  Food Preferences: no Jehovah's witness or cultural food prefs identified  Spiritual, Cultural Beliefs, Christian Practices, Values that Affect Care: no  Factors Affecting Nutritional Intake: None identified at this time    Anthropometrics  Temp: 98.3 °F (36.8 °C)  Height: 6' 1" (185.4 cm)  Height (inches): 73 in  Weight Method: Bed Scale  Weight: 87.3 kg (192 lb 7.4 oz)  Weight (lb): 192.46 lb  Ideal Body Weight (IBW), Male: 184 lb  % Ideal Body Weight, Male (lb): 118.38 lb  BMI (Calculated): 25.4  BMI Grade: 25 - 29.9 - overweight     Lab/Procedures/Meds  Pertinent Labs Reviewed: reviewed  Pertinent Labs Comments: Crea 1.5H, Phos 2.3L  Pertinent Medications Reviewed: reviewed  Pertinent Medications Comments: aspirin, Vit D, pantoprazole    Estimated/Assessed Needs    Weight Used For Calorie Calculations: 87.3 kg (192 lb 7.4 oz)  Energy Calorie Requirements (kcal): 2182 (25 kcal/kg)  Energy Need Method: Kcal/kg  Protein Requirements: 70g (0.8g/kg)  Weight Used For Protein Calculations: 87.3 kg (192 lb 7.4 oz)  Estimated Fluid Requirement Method: RDA Method  RDA Method (mL): 2182     Nutrition Prescription Ordered  Current Diet Order: dysphagia soft " Cardiac    Evaluation of Received Nutrient/Fluid Intake  I/O: 995/420  Energy Calories Required: meeting needs  Protein Required: meeting needs  Fluid Required: meeting needs  Comments: LBM 12/15  % Intake of Estimated Energy Needs: 75 - 100 %  % Meal Intake: 75 - 100 %    Nutrition Risk  Level of Risk/Frequency of Follow-up: (1weekly)     Assessment and Plan  No nutrition dx at this time     Monitor and Evaluation  Food and Nutrient Intake: food and beverage intake  Food and Nutrient Adminstration: diet order  Physical Activity and Function: nutrition-related ADLs and IADLs  Anthropometric Measurements: weight  Biochemical Data, Medical Tests and Procedures: electrolyte and renal panel  Nutrition-Focused Physical Findings: overall appearance     Malnutrition Assessment  Unable to assess NFPE-pt with therapy at visit      Nutrition Follow-Up    RD Follow-up?: Yes

## 2019-12-17 NOTE — PLAN OF CARE
12/17/19 1159   Post-Acute Status   Post-Acute Authorization Placement   Post-Acute Placement Status Pending Post-Acute Medical Review   Discharge Delays (!) Patient and Family Barriers   The Sw spoke to Tg at [facility] who states she will be willing to review the pt's financial info to see if they can accept the pt. If she can accept the pt his dtr will go sign paperwork today and PHN will change the auth to reflect [facility]. Tg will call the Myesha back with a determination. The Sw informed her the pt's ready for d/c today.

## 2019-12-17 NOTE — PLAN OF CARE
12/17/19 1435   Post-Acute Status   Post-Acute Authorization Placement   Post-Acute Placement Status Pending Payor Review   The Sw spoke to Tg at [facility] who states she spoke to Dana at [facility] and she's sending the auth to her. She gave the Sw permission to arrange the w/c van transport for 4:30pm. The Sw requested a 4:30pm w/c van transport via [facility] for the pt. The Sw will inform the pt's nurse and give her the contact info to call report along with the copied chart.

## 2019-12-17 NOTE — PLAN OF CARE
12/17/19 1337   Post-Acute Status   Post-Acute Authorization Placement   Raeann TURNER Faxed the pt's packet to Tg manually at [phone] b/c [facility] went down.

## 2019-12-17 NOTE — PLAN OF CARE
12/17/19 1211   Post-Acute Status   Post-Acute Authorization Placement   Discharge Delays (!) Other   The pt has 2 open claims with [employer] currently open. The Sw faxed the pt's d/c orders to [facility] who states they will review the claims to see if they can admit the pt.

## 2019-12-19 ENCOUNTER — DOCUMENTATION ONLY (OUTPATIENT)
Dept: HEPATOLOGY | Facility: HOSPITAL | Age: 79
End: 2019-12-19

## 2019-12-19 NOTE — PROGRESS NOTES
[facility]                                 Skilled Nursing Facility                   Progress Note     Admit Date:  12/17/2019  [facility]   Principal Problem:  Debility related to recent CVA  HPI obtained from patient interview and chart review     Visit Date: 12/19/2019    Chief Complaint: Establish Care/ Initial Visit S/P hospitalization for CVA    HPI:   Mr. Magana is a 79 Y M with h/o CVA (2 embolic CVAs in 2017), A fib, HFpEF, HTN, HLD, DM2, CKD 3, BPH, and dementia.  He presented to [facility] for bilateral lower extremity weakness as well as falls prior to admission.  CT negative for acute infarct, MRI brain remarkable for multifocal acute embolic CVA in b/l hemispheres in cerebellum.  Neurology consulted, patient was not a candidate for tPA due to unknown time brain since symptoms started.  He was started on ASA, therapy recommended skilled nursing.    Patient will be treated at [facility] SNF with PT and OT to improve functional status and ability to perform ADLs.     Past Medical History: Patient has a past medical history of Cancer, Diabetes mellitus, Hypertension, Hypertrophic cardiomyopathy, and Renal disorder.    Past Surgical History: Patient has a past surgical history that includes Back surgery; Thyroidectomy; Esophagogastroduodenoscopy (N/A, 9/4/2018); Esophagogastroduodenoscopy (N/A, 10/18/2018); and Colonoscopy (N/A, 10/18/2018).    Social History: Patient reports that he has quit smoking. His smoking use included cigarettes. He has a 2.70 pack-year smoking history. He has never used smokeless tobacco. He reports that he does not drink alcohol or use drugs.    Family History:  No reported family history.    Allergies: Patient has No Known Allergies.    ROS  Constitutional: Negative for fever or fatigue.   Eyes: Negative for blurred vision, double vision and discharge.   Respiratory: Negative for cough, shortness of  breath and wheezing.    Cardiovascular: Negative for chest pain, palpitations, and leg swelling.   Gastrointestinal: Negative for abdominal pain, constipation, diarrhea, nausea and vomiting.   Genitourinary: Negative for dysuria, frequency and urgency.   Musculoskeletal:  + generalized weakness. Negative for back pain and myalgias.   Skin: Negative for itching and rash.   Neurological: Negative for dizziness, speech change, and headaches.   Psychiatric/Behavioral: Negative for depression. The patient is not nervous/anxious.      PEx     Constitutional: Patient appears well-developed and in no distress   Head: Normocephalic and atraumatic.   Eyes: Pupils are equal, round, and reactive to light.   Neck: Normal range of motion. Neck supple.   Cardiovascular: Normal rate, regular rhythm and normal heart sounds.    Pulmonary/Chest: Effort normal and breath sounds are clear  Abdominal: Soft. Bowel sounds are normal.   Musculoskeletal: Normal range of motion.   Neurological: Alert and oriented to person, place, and not to time.   Psychiatric: Normal mood and affect. Behavior is normal.   Skin: Skin is warm and dry. Full skin assessment completed during visit, no concerns noted      Assessment and Plan:    Aftercare of Acute Multifocal Embolic CVA, ongoing  - Pt has dementia at baseline but per grandson neurological status has worsened, presents with BLE weakness and experienced 2 falls PTA> not a candidate for TPA>CT head (-) , UDS (-)>MRI/MRA with unchanged stenotic vasculature, multifocal embolic CVA in bilateral hemispheres and cerebellum  - Started on ASA 81 mg, Atorvastatin 80 mg  - Pt also with hyperammonemia with an ammonia level of 87; pt started on lactulose 20 gm BID    Debility r/t recent CVA, ongoing  - Continue with PT/OT for gait training and strengthening and restoration of ADL's   - Encourage mobility, OOB in chair, and early ambulation as appropriate  - Fall precautions   - Monitor for bowel and bladder  dysfunction  - Monitor for and prevent skin breakdown and pressure ulcers  - Continue DVT prophylaxis with  Eliquis    HTN with HLD, ongoing  Hx of Afib with HFpEF, chronic  - TTE with EF 70%  - Continue Eliquis 2.5 mg BID, no BB due to bradycardia (previously stopped)  - Per charlie pt has not been compliant with Eliquis for the past week 2/2 to insurance issues leading to increase cost of the medication  - continue losartan 100 mg daily, norvasc 10 mg daily, HCTZ 25 mg daily, Atorvastatin 80 mg, ASA, (discontinued home hydralazine)    Type II DM, chronic  With CKD stage 3  Vitamin D Deficiency, chronic  - A1c 6.0  - Cr 1.6; appears to be at baseline; stable  - SSI while admitted, diet controlled at home  - Vitamin D 1000 units     Hx of upper GI bleed, stable  - Pantoprazole 40 mg     BPH, chronic  - Tamsulosin 0.4 mg     Dementia, chronic  - reportedly began after CVA in 2017  - at his baseline mental status; oriented to self and place, not oriented to time        Lactic Acidosis, resolved  - lactic acid 2.9, downtrended with 1L LR    Future Appointments   Date Time Provider Department Center   5/4/2020 11:00 AM Lucio Toth MD [facility] CARDIO Este[facility]ia [facility]         I certify that SNF services are required to be given on an inpatient basis because Shant Magana JrSherri needs for skilled nursing care and/or skilled rehabilitation are required on a daily basis and such services can only practically be provided in a skilled nursing facility setting and are for an ongoing condition for which she received inpatient care in the hospital.     Total time of the visit 111 minutes  Non physical exam/ non charting time: 83 minutes   Start Time:1000  Stop Time:1151  Description of non physical exam/non charting time: counseling patient on clinical conditions and therapies provided regarding pain control, therapy plan of care, management of chronic conditions.  Extensive chart review completed including all consultation notes.   All pertinent laboratory and radiographical images reviewed.        Gina Albarado NP          Patient note was created using odal Dictation.  Any errors in syntax or even information may not have been identified and edited on initial review prior to signing this note.

## 2019-12-24 NOTE — PROGRESS NOTES
[facility]                                 Skilled Nursing Facility                   Progress Note     Admit Date:  12/17/2019  KWAME   Principal Problem:  Debility related to recent CVA  HPI obtained from patient interview and chart review     Visit Date: 12/26/2019    Chief Complaint:  Anticoagulant evaluation, PT/OT progression    HPI:   Mr. Magana is a 79 Y M with h/o CVA (2 embolic CVAs in 2017), A fib, HFpEF, HTN, HLD, DM2, CKD 3, BPH, and dementia.  He presented to [facility] for bilateral lower extremity weakness as well as falls prior to admission.  CT negative for acute infarct, MRI brain remarkable for multifocal acute embolic CVA in b/l hemispheres in cerebellum.  Neurology consulted, patient was not a candidate for tPA due to unknown time brain since symptoms started.  He was started on ASA, therapy recommended skilled nursing.    Patient will be treated at [facility] SNF with PT and OT to improve functional status and ability to perform ADLs.     Interval history:  Patient seen today with his daughter at the bedside.  No complaints during visit today.  Discussed cost of Eliquis with patient's daughter during visit today, she stated the medication was too expensive for him to pay for prior to admit.  Instructed her to get in touch with insurance company to see which blood thinners are covered by his insurance and we will change medications according to that list.  Discussed treatment option with Coumadin if all else fails, but would like to see if newer agents are covered before starting Coumadin due to diet restrictions and blood draws needed.  She was in agreement with plan of care.  Patient is progressing well with PT/OT.  Patient medically stable.  Will continue to monitor and treat of chronic conditions.    Past Medical History: Patient has a past medical history of Cancer, Diabetes mellitus, Hypertension, Hypertrophic  cardiomyopathy, and Renal disorder.    Past Surgical History: Patient has a past surgical history that includes Back surgery; Thyroidectomy; Esophagogastroduodenoscopy (N/A, 9/4/2018); Esophagogastroduodenoscopy (N/A, 10/18/2018); and Colonoscopy (N/A, 10/18/2018).    Social History: Patient reports that he has quit smoking. His smoking use included cigarettes. He has a 2.70 pack-year smoking history. He has never used smokeless tobacco. He reports that he does not drink alcohol or use drugs.    Family History:  No reported family history.    Allergies: Patient has No Known Allergies.    ROS  Constitutional: Negative for fever or fatigue.   Eyes: Negative for blurred vision, double vision and discharge.   Respiratory: Negative for cough, shortness of breath and wheezing.    Cardiovascular: Negative for chest pain, palpitations, and leg swelling.   Gastrointestinal: Negative for abdominal pain, constipation, diarrhea, nausea and vomiting.   Genitourinary: Negative for dysuria, frequency and urgency.   Musculoskeletal:  + generalized weakness. Negative for back pain and myalgias.   Skin: Negative for itching and rash.   Neurological: Negative for dizziness, speech change, and headaches.   Psychiatric/Behavioral: Negative for depression. The patient is not nervous/anxious.      PEx     Constitutional: Patient appears well-developed and in no distress   Head: Normocephalic and atraumatic.   Eyes: Pupils are equal, round, and reactive to light.   Neck: Normal range of motion. Neck supple.   Cardiovascular: Normal rate, regular rhythm and normal heart sounds.    Pulmonary/Chest: Effort normal and breath sounds are clear  Abdominal: Soft. Bowel sounds are normal.   Musculoskeletal: Normal range of motion.   Neurological: Alert and oriented to person, place, and not to time.   Psychiatric: Normal mood and affect. Behavior is normal.   Skin: Skin is warm and dry.       Assessment and Plan:    Aftercare of Acute Multifocal  Embolic CVA, ongoing  - Pt has dementia at baseline but per grandson neurological status has worsened, presents with BLE weakness and experienced 2 falls PTA> not a candidate for TPA>CT head (-) , UDS (-)>MRI/MRA with unchanged stenotic vasculature, multifocal embolic CVA in bilateral hemispheres and cerebellum  - Started on ASA 81 mg, Atorvastatin 80 mg  - Pt also with hyperammonemia with an ammonia level of 87; pt started on lactulose 20 gm BID  -12/26 continue current regimen    Debility r/t recent CVA, ongoing  - Continue with PT/OT for gait training and strengthening and restoration of ADL's   - Encourage mobility, OOB in chair, and early ambulation as appropriate  - Fall precautions   - Monitor for bowel and bladder dysfunction  - Monitor for and prevent skin breakdown and pressure ulcers  - Continue DVT prophylaxis with  Eliquis  -12/26 continue PT/OT, may change Eliquis 2 different agent depending on insurance verification    HTN with HLD, ongoing  Hx of Afib with HFpEF, chronic  - TTE with EF 70%  - Continue Eliquis 2.5 mg BID, no BB due to bradycardia (previously stopped)  - Per grandson pt has not been compliant with Eliquis for the past week 2/2 to insurance issues leading to increase cost of the medication  - continue losartan 100 mg daily, norvasc 10 mg daily, HCTZ 25 mg daily, Atorvastatin 80 mg, ASA, (discontinued home hydralazine)  -12/26 continue current regimen, awaiting patient's family to clarify Eliquis cost issue, may need to switch to different anticoagulant    Continued    Type II DM, chronic  With CKD stage 3  Vitamin D Deficiency, chronic  - A1c 6.0  - Cr 1.6; appears to be at baseline; stable  - SSI while admitted, diet controlled at home  - Vitamin D 1000 units     Hx of upper GI bleed, stable  - Pantoprazole 40 mg     BPH, chronic  - Tamsulosin 0.4 mg     Dementia, chronic  - reportedly began after CVA in 2017  - at his baseline mental status; oriented to self and place, not oriented to  time        Lactic Acidosis, resolved  - lactic acid 2.9, downtrended with 1L LR    Future Appointments   Date Time Provider Department Center   5/4/2020 11:00 AM Lucio Toth MD [facility] CARDIO Estephania Albarado NP          Patient note was created using MModal Dictation.  Any errors in syntax or even information may not have been identified and edited on initial review prior to signing this note.

## 2019-12-26 ENCOUNTER — DOCUMENTATION ONLY (OUTPATIENT)
Dept: HEPATOLOGY | Facility: HOSPITAL | Age: 79
End: 2019-12-26

## 2020-01-01 ENCOUNTER — ANESTHESIA (OUTPATIENT)
Dept: CARDIOLOGY | Facility: HOSPITAL | Age: 80
DRG: 186 | End: 2020-01-01
Payer: MEDICARE

## 2020-01-01 ENCOUNTER — HOSPITAL ENCOUNTER (INPATIENT)
Facility: HOSPITAL | Age: 80
LOS: 5 days | Discharge: HOSPICE | DRG: 186 | End: 2020-12-23
Attending: EMERGENCY MEDICINE | Admitting: INTERNAL MEDICINE
Payer: MEDICARE

## 2020-01-01 ENCOUNTER — ANESTHESIA EVENT (OUTPATIENT)
Dept: CARDIOLOGY | Facility: HOSPITAL | Age: 80
End: 2020-01-01

## 2020-01-01 ENCOUNTER — ANESTHESIA EVENT (OUTPATIENT)
Dept: CARDIOLOGY | Facility: HOSPITAL | Age: 80
DRG: 186 | End: 2020-01-01
Payer: MEDICARE

## 2020-01-01 ENCOUNTER — ANESTHESIA (OUTPATIENT)
Dept: CARDIOLOGY | Facility: HOSPITAL | Age: 80
End: 2020-01-01

## 2020-01-01 ENCOUNTER — CLINICAL SUPPORT (OUTPATIENT)
Dept: CARDIOLOGY | Facility: CLINIC | Age: 80
End: 2020-01-01
Payer: MEDICARE

## 2020-01-01 VITALS
SYSTOLIC BLOOD PRESSURE: 120 MMHG | HEART RATE: 72 BPM | OXYGEN SATURATION: 94 % | WEIGHT: 179.44 LBS | BODY MASS INDEX: 25.69 KG/M2 | TEMPERATURE: 99 F | HEIGHT: 70 IN | RESPIRATION RATE: 22 BRPM | DIASTOLIC BLOOD PRESSURE: 67 MMHG

## 2020-01-01 DIAGNOSIS — J90 PLEURAL EFFUSION: ICD-10-CM

## 2020-01-01 DIAGNOSIS — J90 PLEURAL EFFUSION EXUDATIVE: Primary | ICD-10-CM

## 2020-01-01 DIAGNOSIS — I48.0 PAROXYSMAL ATRIAL FIBRILLATION: ICD-10-CM

## 2020-01-01 DIAGNOSIS — Z95.818 STATUS POST PLACEMENT OF IMPLANTABLE LOOP RECORDER: Primary | ICD-10-CM

## 2020-01-01 DIAGNOSIS — R06.02 SOB (SHORTNESS OF BREATH): ICD-10-CM

## 2020-01-01 DIAGNOSIS — C61 PROSTATE CANCER: ICD-10-CM

## 2020-01-01 DIAGNOSIS — R06.02 SHORTNESS OF BREATH: ICD-10-CM

## 2020-01-01 LAB
ALBUMIN SERPL BCP-MCNC: 2.2 G/DL (ref 3.5–5.2)
ALBUMIN SERPL BCP-MCNC: 2.2 G/DL (ref 3.5–5.2)
ALBUMIN SERPL BCP-MCNC: 2.3 G/DL (ref 3.5–5.2)
ALBUMIN SERPL BCP-MCNC: 2.3 G/DL (ref 3.5–5.2)
ALBUMIN SERPL BCP-MCNC: 2.4 G/DL (ref 3.5–5.2)
ALP SERPL-CCNC: 85 U/L (ref 55–135)
ALP SERPL-CCNC: 85 U/L (ref 55–135)
ALP SERPL-CCNC: 88 U/L (ref 55–135)
ALP SERPL-CCNC: 91 U/L (ref 55–135)
ALP SERPL-CCNC: 95 U/L (ref 55–135)
ALT SERPL W/O P-5'-P-CCNC: 32 U/L (ref 10–44)
ALT SERPL W/O P-5'-P-CCNC: 33 U/L (ref 10–44)
ALT SERPL W/O P-5'-P-CCNC: 35 U/L (ref 10–44)
ALT SERPL W/O P-5'-P-CCNC: 35 U/L (ref 10–44)
ALT SERPL W/O P-5'-P-CCNC: 36 U/L (ref 10–44)
ANION GAP SERPL CALC-SCNC: 10 MMOL/L (ref 8–16)
ANION GAP SERPL CALC-SCNC: 11 MMOL/L (ref 8–16)
ANION GAP SERPL CALC-SCNC: 13 MMOL/L (ref 8–16)
ANION GAP SERPL CALC-SCNC: 7 MMOL/L (ref 8–16)
ANION GAP SERPL CALC-SCNC: 9 MMOL/L (ref 8–16)
AST SERPL-CCNC: 24 U/L (ref 10–40)
AST SERPL-CCNC: 29 U/L (ref 10–40)
AST SERPL-CCNC: 30 U/L (ref 10–40)
AST SERPL-CCNC: 31 U/L (ref 10–40)
AST SERPL-CCNC: 32 U/L (ref 10–40)
BACTERIA BLD CULT: NORMAL
BASOPHILS # BLD AUTO: 0.02 K/UL (ref 0–0.2)
BASOPHILS # BLD AUTO: 0.02 K/UL (ref 0–0.2)
BASOPHILS # BLD AUTO: 0.03 K/UL (ref 0–0.2)
BASOPHILS # BLD AUTO: 0.04 K/UL (ref 0–0.2)
BASOPHILS # BLD AUTO: 0.05 K/UL (ref 0–0.2)
BASOPHILS NFR BLD: 0.3 % (ref 0–1.9)
BASOPHILS NFR BLD: 0.3 % (ref 0–1.9)
BASOPHILS NFR BLD: 0.5 % (ref 0–1.9)
BASOPHILS NFR BLD: 0.5 % (ref 0–1.9)
BASOPHILS NFR BLD: 0.8 % (ref 0–1.9)
BILIRUB SERPL-MCNC: 0.4 MG/DL (ref 0.1–1)
BILIRUB SERPL-MCNC: 0.7 MG/DL (ref 0.1–1)
BILIRUB SERPL-MCNC: 0.8 MG/DL (ref 0.1–1)
BNP SERPL-MCNC: 214 PG/ML (ref 0–99)
BUN SERPL-MCNC: 22 MG/DL (ref 8–23)
BUN SERPL-MCNC: 23 MG/DL (ref 8–23)
BUN SERPL-MCNC: 23 MG/DL (ref 8–23)
BUN SERPL-MCNC: 25 MG/DL (ref 8–23)
BUN SERPL-MCNC: 28 MG/DL (ref 8–23)
CALCIUM SERPL-MCNC: 10 MG/DL (ref 8.7–10.5)
CALCIUM SERPL-MCNC: 9.7 MG/DL (ref 8.7–10.5)
CALCIUM SERPL-MCNC: 9.7 MG/DL (ref 8.7–10.5)
CALCIUM SERPL-MCNC: 9.8 MG/DL (ref 8.7–10.5)
CALCIUM SERPL-MCNC: 9.9 MG/DL (ref 8.7–10.5)
CHLORIDE SERPL-SCNC: 100 MMOL/L (ref 95–110)
CHLORIDE SERPL-SCNC: 102 MMOL/L (ref 95–110)
CHLORIDE SERPL-SCNC: 107 MMOL/L (ref 95–110)
CHLORIDE SERPL-SCNC: 108 MMOL/L (ref 95–110)
CHLORIDE SERPL-SCNC: 99 MMOL/L (ref 95–110)
CO2 SERPL-SCNC: 21 MMOL/L (ref 23–29)
CO2 SERPL-SCNC: 23 MMOL/L (ref 23–29)
CO2 SERPL-SCNC: 24 MMOL/L (ref 23–29)
CO2 SERPL-SCNC: 27 MMOL/L (ref 23–29)
CO2 SERPL-SCNC: 28 MMOL/L (ref 23–29)
CREAT SERPL-MCNC: 1.5 MG/DL (ref 0.5–1.4)
CREAT SERPL-MCNC: 1.6 MG/DL (ref 0.5–1.4)
CREAT SERPL-MCNC: 1.6 MG/DL (ref 0.5–1.4)
CREAT SERPL-MCNC: 1.7 MG/DL (ref 0.5–1.4)
CREAT SERPL-MCNC: 1.7 MG/DL (ref 0.5–1.4)
CREAT UR-MCNC: 31.5 MG/DL (ref 23–375)
DIFFERENTIAL METHOD: ABNORMAL
EOSINOPHIL # BLD AUTO: 0 K/UL (ref 0–0.5)
EOSINOPHIL # BLD AUTO: 0 K/UL (ref 0–0.5)
EOSINOPHIL # BLD AUTO: 0.1 K/UL (ref 0–0.5)
EOSINOPHIL NFR BLD: 0.1 % (ref 0–8)
EOSINOPHIL NFR BLD: 0.4 % (ref 0–8)
EOSINOPHIL NFR BLD: 0.6 % (ref 0–8)
EOSINOPHIL NFR BLD: 0.8 % (ref 0–8)
EOSINOPHIL NFR BLD: 1.3 % (ref 0–8)
ERYTHROCYTE [DISTWIDTH] IN BLOOD BY AUTOMATED COUNT: 14.4 % (ref 11.5–14.5)
ERYTHROCYTE [DISTWIDTH] IN BLOOD BY AUTOMATED COUNT: 14.6 % (ref 11.5–14.5)
ERYTHROCYTE [DISTWIDTH] IN BLOOD BY AUTOMATED COUNT: 14.6 % (ref 11.5–14.5)
ERYTHROCYTE [DISTWIDTH] IN BLOOD BY AUTOMATED COUNT: 14.8 % (ref 11.5–14.5)
ERYTHROCYTE [DISTWIDTH] IN BLOOD BY AUTOMATED COUNT: 14.9 % (ref 11.5–14.5)
EST. GFR  (AFRICAN AMERICAN): 43 ML/MIN/1.73 M^2
EST. GFR  (AFRICAN AMERICAN): 43 ML/MIN/1.73 M^2
EST. GFR  (AFRICAN AMERICAN): 46 ML/MIN/1.73 M^2
EST. GFR  (AFRICAN AMERICAN): 46.3 ML/MIN/1.73 M^2
EST. GFR  (AFRICAN AMERICAN): 50 ML/MIN/1.73 M^2
EST. GFR  (NON AFRICAN AMERICAN): 37 ML/MIN/1.73 M^2
EST. GFR  (NON AFRICAN AMERICAN): 37 ML/MIN/1.73 M^2
EST. GFR  (NON AFRICAN AMERICAN): 40 ML/MIN/1.73 M^2
EST. GFR  (NON AFRICAN AMERICAN): 40.1 ML/MIN/1.73 M^2
EST. GFR  (NON AFRICAN AMERICAN): 43 ML/MIN/1.73 M^2
FINAL PATHOLOGIC DIAGNOSIS: ABNORMAL
GLUCOSE SERPL-MCNC: 104 MG/DL (ref 70–110)
GLUCOSE SERPL-MCNC: 108 MG/DL (ref 70–110)
GLUCOSE SERPL-MCNC: 119 MG/DL (ref 70–110)
GLUCOSE SERPL-MCNC: 146 MG/DL (ref 70–110)
GLUCOSE SERPL-MCNC: 96 MG/DL (ref 70–110)
HCT VFR BLD AUTO: 34.3 % (ref 40–54)
HCT VFR BLD AUTO: 35.1 % (ref 40–54)
HCT VFR BLD AUTO: 36.2 % (ref 40–54)
HCT VFR BLD AUTO: 37 % (ref 40–54)
HCT VFR BLD AUTO: 37.7 % (ref 40–54)
HGB BLD-MCNC: 11.4 G/DL (ref 14–18)
HGB BLD-MCNC: 11.6 G/DL (ref 14–18)
HGB BLD-MCNC: 11.8 G/DL (ref 14–18)
HGB BLD-MCNC: 12 G/DL (ref 14–18)
HGB BLD-MCNC: 12.4 G/DL (ref 14–18)
IMM GRANULOCYTES # BLD AUTO: 0.02 K/UL (ref 0–0.04)
IMM GRANULOCYTES # BLD AUTO: 0.03 K/UL (ref 0–0.04)
IMM GRANULOCYTES # BLD AUTO: 0.04 K/UL (ref 0–0.04)
IMM GRANULOCYTES # BLD AUTO: 0.05 K/UL (ref 0–0.04)
IMM GRANULOCYTES # BLD AUTO: 0.07 K/UL (ref 0–0.04)
IMM GRANULOCYTES NFR BLD AUTO: 0.3 % (ref 0–0.5)
IMM GRANULOCYTES NFR BLD AUTO: 0.4 % (ref 0–0.5)
IMM GRANULOCYTES NFR BLD AUTO: 0.5 % (ref 0–0.5)
IMM GRANULOCYTES NFR BLD AUTO: 0.8 % (ref 0–0.5)
IMM GRANULOCYTES NFR BLD AUTO: 0.8 % (ref 0–0.5)
LACTATE SERPL-SCNC: 1.5 MMOL/L (ref 0.5–2.2)
LACTATE SERPL-SCNC: 2.6 MMOL/L (ref 0.5–2.2)
LYMPHOCYTES # BLD AUTO: 0.5 K/UL (ref 1–4.8)
LYMPHOCYTES # BLD AUTO: 0.5 K/UL (ref 1–4.8)
LYMPHOCYTES # BLD AUTO: 0.7 K/UL (ref 1–4.8)
LYMPHOCYTES # BLD AUTO: 0.9 K/UL (ref 1–4.8)
LYMPHOCYTES # BLD AUTO: 1.3 K/UL (ref 1–4.8)
LYMPHOCYTES NFR BLD: 11.3 % (ref 18–48)
LYMPHOCYTES NFR BLD: 13.7 % (ref 18–48)
LYMPHOCYTES NFR BLD: 14.7 % (ref 18–48)
LYMPHOCYTES NFR BLD: 6.9 % (ref 18–48)
LYMPHOCYTES NFR BLD: 7.1 % (ref 18–48)
MAGNESIUM SERPL-MCNC: 1.7 MG/DL (ref 1.6–2.6)
MAGNESIUM SERPL-MCNC: 1.8 MG/DL (ref 1.6–2.6)
MCH RBC QN AUTO: 29.2 PG (ref 27–31)
MCH RBC QN AUTO: 29.4 PG (ref 27–31)
MCH RBC QN AUTO: 29.6 PG (ref 27–31)
MCH RBC QN AUTO: 29.8 PG (ref 27–31)
MCH RBC QN AUTO: 29.9 PG (ref 27–31)
MCHC RBC AUTO-ENTMCNC: 32.4 G/DL (ref 32–36)
MCHC RBC AUTO-ENTMCNC: 32.6 G/DL (ref 32–36)
MCHC RBC AUTO-ENTMCNC: 32.9 G/DL (ref 32–36)
MCHC RBC AUTO-ENTMCNC: 33 G/DL (ref 32–36)
MCHC RBC AUTO-ENTMCNC: 33.2 G/DL (ref 32–36)
MCV RBC AUTO: 89 FL (ref 82–98)
MCV RBC AUTO: 92 FL (ref 82–98)
MCV RBC AUTO: 92 FL (ref 82–98)
MICROSCOPIC EXAM: ABNORMAL
MONOCYTES # BLD AUTO: 0.6 K/UL (ref 0.3–1)
MONOCYTES # BLD AUTO: 0.9 K/UL (ref 0.3–1)
MONOCYTES # BLD AUTO: 0.9 K/UL (ref 0.3–1)
MONOCYTES # BLD AUTO: 1.1 K/UL (ref 0.3–1)
MONOCYTES # BLD AUTO: 1.5 K/UL (ref 0.3–1)
MONOCYTES NFR BLD: 11.1 % (ref 4–15)
MONOCYTES NFR BLD: 13.5 % (ref 4–15)
MONOCYTES NFR BLD: 16.9 % (ref 4–15)
MONOCYTES NFR BLD: 17 % (ref 4–15)
MONOCYTES NFR BLD: 8.7 % (ref 4–15)
NEUTROPHILS # BLD AUTO: 4.2 K/UL (ref 1.8–7.7)
NEUTROPHILS # BLD AUTO: 4.8 K/UL (ref 1.8–7.7)
NEUTROPHILS # BLD AUTO: 5.7 K/UL (ref 1.8–7.7)
NEUTROPHILS # BLD AUTO: 5.7 K/UL (ref 1.8–7.7)
NEUTROPHILS # BLD AUTO: 6.3 K/UL (ref 1.8–7.7)
NEUTROPHILS NFR BLD: 66.4 % (ref 38–73)
NEUTROPHILS NFR BLD: 67 % (ref 38–73)
NEUTROPHILS NFR BLD: 73.1 % (ref 38–73)
NEUTROPHILS NFR BLD: 81.1 % (ref 38–73)
NEUTROPHILS NFR BLD: 83.1 % (ref 38–73)
NRBC BLD-RTO: 0 /100 WBC
PLATELET # BLD AUTO: 282 K/UL (ref 150–350)
PLATELET # BLD AUTO: 292 K/UL (ref 150–350)
PLATELET # BLD AUTO: 329 K/UL (ref 150–350)
PLATELET # BLD AUTO: 357 K/UL (ref 150–350)
PLATELET # BLD AUTO: 415 K/UL (ref 150–350)
PMV BLD AUTO: 10.5 FL (ref 9.2–12.9)
PMV BLD AUTO: 10.8 FL (ref 9.2–12.9)
PMV BLD AUTO: 10.8 FL (ref 9.2–12.9)
PMV BLD AUTO: 10.9 FL (ref 9.2–12.9)
PMV BLD AUTO: 11.7 FL (ref 9.2–12.9)
POCT GLUCOSE: 104 MG/DL (ref 70–110)
POCT GLUCOSE: 108 MG/DL (ref 70–110)
POCT GLUCOSE: 109 MG/DL (ref 70–110)
POCT GLUCOSE: 112 MG/DL (ref 70–110)
POCT GLUCOSE: 113 MG/DL (ref 70–110)
POCT GLUCOSE: 126 MG/DL (ref 70–110)
POCT GLUCOSE: 127 MG/DL (ref 70–110)
POCT GLUCOSE: 94 MG/DL (ref 70–110)
POTASSIUM SERPL-SCNC: 3.9 MMOL/L (ref 3.5–5.1)
POTASSIUM SERPL-SCNC: 4 MMOL/L (ref 3.5–5.1)
POTASSIUM SERPL-SCNC: 4.2 MMOL/L (ref 3.5–5.1)
POTASSIUM SERPL-SCNC: 4.8 MMOL/L (ref 3.5–5.1)
POTASSIUM SERPL-SCNC: 5 MMOL/L (ref 3.5–5.1)
PROCALCITONIN SERPL IA-MCNC: 0.22 NG/ML
PROT SERPL-MCNC: 6.2 G/DL (ref 6–8.4)
PROT SERPL-MCNC: 6.3 G/DL (ref 6–8.4)
PROT SERPL-MCNC: 6.4 G/DL (ref 6–8.4)
PROT SERPL-MCNC: 6.5 G/DL (ref 6–8.4)
PROT SERPL-MCNC: 6.9 G/DL (ref 6–8.4)
RBC # BLD AUTO: 3.85 M/UL (ref 4.6–6.2)
RBC # BLD AUTO: 3.94 M/UL (ref 4.6–6.2)
RBC # BLD AUTO: 3.94 M/UL (ref 4.6–6.2)
RBC # BLD AUTO: 4.03 M/UL (ref 4.6–6.2)
RBC # BLD AUTO: 4.24 M/UL (ref 4.6–6.2)
SARS-COV-2 RDRP RESP QL NAA+PROBE: NEGATIVE
SODIUM SERPL-SCNC: 136 MMOL/L (ref 136–145)
SODIUM SERPL-SCNC: 136 MMOL/L (ref 136–145)
SODIUM SERPL-SCNC: 137 MMOL/L (ref 136–145)
SODIUM SERPL-SCNC: 139 MMOL/L (ref 136–145)
SODIUM SERPL-SCNC: 141 MMOL/L (ref 136–145)
SODIUM UR-SCNC: 129 MMOL/L (ref 20–250)
TROPONIN I SERPL DL<=0.01 NG/ML-MCNC: <0.006 NG/ML (ref 0–0.03)
TROPONIN I SERPL DL<=0.01 NG/ML-MCNC: <0.006 NG/ML (ref 0–0.03)
WBC # BLD AUTO: 6.22 K/UL (ref 3.9–12.7)
WBC # BLD AUTO: 6.54 K/UL (ref 3.9–12.7)
WBC # BLD AUTO: 6.88 K/UL (ref 3.9–12.7)
WBC # BLD AUTO: 7.77 K/UL (ref 3.9–12.7)
WBC # BLD AUTO: 8.53 K/UL (ref 3.9–12.7)

## 2020-01-01 PROCEDURE — 96374 THER/PROPH/DIAG INJ IV PUSH: CPT | Mod: 59

## 2020-01-01 PROCEDURE — 99900035 HC TECH TIME PER 15 MIN (STAT)

## 2020-01-01 PROCEDURE — 93005 ELECTROCARDIOGRAM TRACING: CPT

## 2020-01-01 PROCEDURE — 25000003 PHARM REV CODE 250: Performed by: STUDENT IN AN ORGANIZED HEALTH CARE EDUCATION/TRAINING PROGRAM

## 2020-01-01 PROCEDURE — 85025 COMPLETE CBC W/AUTO DIFF WBC: CPT

## 2020-01-01 PROCEDURE — 11000001 HC ACUTE MED/SURG PRIVATE ROOM

## 2020-01-01 PROCEDURE — 63700000 PHARM REV CODE 250 ALT 637 W/O HCPCS: Performed by: STUDENT IN AN ORGANIZED HEALTH CARE EDUCATION/TRAINING PROGRAM

## 2020-01-01 PROCEDURE — 63600175 PHARM REV CODE 636 W HCPCS: Performed by: INTERNAL MEDICINE

## 2020-01-01 PROCEDURE — 83605 ASSAY OF LACTIC ACID: CPT

## 2020-01-01 PROCEDURE — 25000003 PHARM REV CODE 250: Performed by: INTERNAL MEDICINE

## 2020-01-01 PROCEDURE — 87040 BLOOD CULTURE FOR BACTERIA: CPT

## 2020-01-01 PROCEDURE — 93010 ELECTROCARDIOGRAM REPORT: CPT | Mod: ,,, | Performed by: INTERNAL MEDICINE

## 2020-01-01 PROCEDURE — 99238 PR HOSPITAL DISCHARGE DAY,<30 MIN: ICD-10-PCS | Mod: GW,HPC,, | Performed by: INTERNAL MEDICINE

## 2020-01-01 PROCEDURE — 84300 ASSAY OF URINE SODIUM: CPT

## 2020-01-01 PROCEDURE — 99223 1ST HOSP IP/OBS HIGH 75: CPT | Mod: AI,GW,HPC, | Performed by: INTERNAL MEDICINE

## 2020-01-01 PROCEDURE — G0378 HOSPITAL OBSERVATION PER HR: HCPCS

## 2020-01-01 PROCEDURE — 93010 EKG 12-LEAD: ICD-10-PCS | Mod: ,,, | Performed by: INTERNAL MEDICINE

## 2020-01-01 PROCEDURE — 84484 ASSAY OF TROPONIN QUANT: CPT

## 2020-01-01 PROCEDURE — 63600175 PHARM REV CODE 636 W HCPCS: Performed by: STUDENT IN AN ORGANIZED HEALTH CARE EDUCATION/TRAINING PROGRAM

## 2020-01-01 PROCEDURE — 94760 N-INVAS EAR/PLS OXIMETRY 1: CPT

## 2020-01-01 PROCEDURE — 36415 COLL VENOUS BLD VENIPUNCTURE: CPT

## 2020-01-01 PROCEDURE — 80053 COMPREHEN METABOLIC PANEL: CPT

## 2020-01-01 PROCEDURE — 96375 TX/PRO/DX INJ NEW DRUG ADDON: CPT

## 2020-01-01 PROCEDURE — 99223 PR INITIAL HOSPITAL CARE,LEVL III: ICD-10-PCS | Mod: AI,GW,HPC, | Performed by: INTERNAL MEDICINE

## 2020-01-01 PROCEDURE — 83735 ASSAY OF MAGNESIUM: CPT

## 2020-01-01 PROCEDURE — 99285 EMERGENCY DEPT VISIT HI MDM: CPT | Mod: 25

## 2020-01-01 PROCEDURE — 99232 PR SUBSEQUENT HOSPITAL CARE,LEVL II: ICD-10-PCS | Mod: GW,HPC,, | Performed by: INTERNAL MEDICINE

## 2020-01-01 PROCEDURE — 27000221 HC OXYGEN, UP TO 24 HOURS

## 2020-01-01 PROCEDURE — 96376 TX/PRO/DX INJ SAME DRUG ADON: CPT

## 2020-01-01 PROCEDURE — 96365 THER/PROPH/DIAG IV INF INIT: CPT

## 2020-01-01 PROCEDURE — 99232 SBSQ HOSP IP/OBS MODERATE 35: CPT | Mod: GW,HPC,, | Performed by: INTERNAL MEDICINE

## 2020-01-01 PROCEDURE — 96366 THER/PROPH/DIAG IV INF ADDON: CPT

## 2020-01-01 PROCEDURE — 36000 PLACE NEEDLE IN VEIN: CPT | Performed by: STUDENT IN AN ORGANIZED HEALTH CARE EDUCATION/TRAINING PROGRAM

## 2020-01-01 PROCEDURE — 93298 REM INTERROG DEV EVAL SCRMS: CPT | Mod: GW,S$GLB,, | Performed by: INTERNAL MEDICINE

## 2020-01-01 PROCEDURE — 99238 HOSP IP/OBS DSCHRG MGMT 30/<: CPT | Mod: GW,HPC,, | Performed by: INTERNAL MEDICINE

## 2020-01-01 PROCEDURE — 63600175 PHARM REV CODE 636 W HCPCS: Performed by: EMERGENCY MEDICINE

## 2020-01-01 PROCEDURE — 94761 N-INVAS EAR/PLS OXIMETRY MLT: CPT

## 2020-01-01 PROCEDURE — U0002 COVID-19 LAB TEST NON-CDC: HCPCS

## 2020-01-01 PROCEDURE — 82570 ASSAY OF URINE CREATININE: CPT

## 2020-01-01 PROCEDURE — 93298 PR INTERROG EVAL, REMOTE, UP TO 30 DAYS,IMPLANT LOOP REC: ICD-10-PCS | Mod: GW,S$GLB,, | Performed by: INTERNAL MEDICINE

## 2020-01-01 PROCEDURE — 83880 ASSAY OF NATRIURETIC PEPTIDE: CPT

## 2020-01-01 PROCEDURE — 84145 PROCALCITONIN (PCT): CPT

## 2020-01-01 PROCEDURE — 82962 GLUCOSE BLOOD TEST: CPT

## 2020-01-01 PROCEDURE — 25000003 PHARM REV CODE 250: Performed by: EMERGENCY MEDICINE

## 2020-01-01 RX ORDER — METOPROLOL TARTRATE 25 MG/1
25 TABLET, FILM COATED ORAL ONCE
Status: COMPLETED | OUTPATIENT
Start: 2020-01-01 | End: 2020-01-01

## 2020-01-01 RX ORDER — LIDOCAINE HYDROCHLORIDE 10 MG/ML
5 INJECTION INFILTRATION; PERINEURAL ONCE
Status: COMPLETED | OUTPATIENT
Start: 2020-01-01 | End: 2020-01-01

## 2020-01-01 RX ORDER — CEFEPIME HYDROCHLORIDE 1 G/50ML
2 INJECTION, SOLUTION INTRAVENOUS
Status: DISCONTINUED | OUTPATIENT
Start: 2020-01-01 | End: 2020-01-01

## 2020-01-01 RX ORDER — INSULIN ASPART 100 [IU]/ML
0-5 INJECTION, SOLUTION INTRAVENOUS; SUBCUTANEOUS
Status: DISCONTINUED | OUTPATIENT
Start: 2020-01-01 | End: 2020-01-01 | Stop reason: HOSPADM

## 2020-01-01 RX ORDER — IBUPROFEN 200 MG
16 TABLET ORAL
Status: DISCONTINUED | OUTPATIENT
Start: 2020-01-01 | End: 2020-01-01 | Stop reason: HOSPADM

## 2020-01-01 RX ORDER — IBUPROFEN 200 MG
24 TABLET ORAL
Status: DISCONTINUED | OUTPATIENT
Start: 2020-01-01 | End: 2020-01-01 | Stop reason: HOSPADM

## 2020-01-01 RX ORDER — LOSARTAN POTASSIUM 50 MG/1
100 TABLET ORAL DAILY
Status: DISCONTINUED | OUTPATIENT
Start: 2020-01-01 | End: 2020-01-01

## 2020-01-01 RX ORDER — AZITHROMYCIN 250 MG/1
250 TABLET, FILM COATED ORAL DAILY
Status: DISCONTINUED | OUTPATIENT
Start: 2020-01-01 | End: 2020-01-01 | Stop reason: HOSPADM

## 2020-01-01 RX ORDER — AZITHROMYCIN 250 MG/1
500 TABLET, FILM COATED ORAL DAILY
Status: COMPLETED | OUTPATIENT
Start: 2020-01-01 | End: 2020-01-01

## 2020-01-01 RX ORDER — TALC
6 POWDER (GRAM) TOPICAL NIGHTLY PRN
Status: DISCONTINUED | OUTPATIENT
Start: 2020-01-01 | End: 2020-01-01 | Stop reason: HOSPADM

## 2020-01-01 RX ORDER — AMLODIPINE BESYLATE 5 MG/1
10 TABLET ORAL DAILY
Status: DISCONTINUED | OUTPATIENT
Start: 2020-01-01 | End: 2020-01-01 | Stop reason: HOSPADM

## 2020-01-01 RX ORDER — FUROSEMIDE 10 MG/ML
40 INJECTION INTRAMUSCULAR; INTRAVENOUS ONCE
Status: COMPLETED | OUTPATIENT
Start: 2020-01-01 | End: 2020-01-01

## 2020-01-01 RX ORDER — TAMSULOSIN HYDROCHLORIDE 0.4 MG/1
1 CAPSULE ORAL NIGHTLY
Status: DISCONTINUED | OUTPATIENT
Start: 2020-01-01 | End: 2020-01-01 | Stop reason: HOSPADM

## 2020-01-01 RX ORDER — FUROSEMIDE 10 MG/ML
40 INJECTION INTRAMUSCULAR; INTRAVENOUS
Status: DISCONTINUED | OUTPATIENT
Start: 2020-01-01 | End: 2020-01-01 | Stop reason: HOSPADM

## 2020-01-01 RX ORDER — GLUCAGON 1 MG
1 KIT INJECTION
Status: DISCONTINUED | OUTPATIENT
Start: 2020-01-01 | End: 2020-01-01 | Stop reason: HOSPADM

## 2020-01-01 RX ORDER — SODIUM CHLORIDE 0.9 % (FLUSH) 0.9 %
10 SYRINGE (ML) INJECTION
Status: DISCONTINUED | OUTPATIENT
Start: 2020-01-01 | End: 2020-01-01 | Stop reason: HOSPADM

## 2020-01-01 RX ORDER — ATORVASTATIN CALCIUM 40 MG/1
80 TABLET, FILM COATED ORAL DAILY
Status: DISCONTINUED | OUTPATIENT
Start: 2020-01-01 | End: 2020-01-01 | Stop reason: HOSPADM

## 2020-01-01 RX ORDER — CEFTRIAXONE 1 G/1
1 INJECTION, POWDER, FOR SOLUTION INTRAMUSCULAR; INTRAVENOUS
Status: DISCONTINUED | OUTPATIENT
Start: 2020-01-01 | End: 2020-01-01

## 2020-01-01 RX ORDER — ACETAMINOPHEN 325 MG/1
650 TABLET ORAL ONCE
Status: COMPLETED | OUTPATIENT
Start: 2020-01-01 | End: 2020-01-01

## 2020-01-01 RX ORDER — IPRATROPIUM BROMIDE AND ALBUTEROL SULFATE 2.5; .5 MG/3ML; MG/3ML
3 SOLUTION RESPIRATORY (INHALATION) EVERY 6 HOURS PRN
Status: DISCONTINUED | OUTPATIENT
Start: 2020-01-01 | End: 2020-01-01 | Stop reason: HOSPADM

## 2020-01-01 RX ORDER — METOPROLOL TARTRATE 1 MG/ML
5 INJECTION, SOLUTION INTRAVENOUS ONCE
Status: DISCONTINUED | OUTPATIENT
Start: 2020-01-01 | End: 2020-01-01

## 2020-01-01 RX ORDER — SODIUM CHLORIDE, SODIUM LACTATE, POTASSIUM CHLORIDE, CALCIUM CHLORIDE 600; 310; 30; 20 MG/100ML; MG/100ML; MG/100ML; MG/100ML
1000 INJECTION, SOLUTION INTRAVENOUS
Status: COMPLETED | OUTPATIENT
Start: 2020-01-01 | End: 2020-01-01

## 2020-01-01 RX ORDER — HALOPERIDOL 5 MG/ML
2 INJECTION INTRAMUSCULAR ONCE
Status: COMPLETED | OUTPATIENT
Start: 2020-01-01 | End: 2020-01-01

## 2020-01-01 RX ORDER — PANTOPRAZOLE SODIUM 40 MG/1
40 TABLET, DELAYED RELEASE ORAL DAILY
Status: DISCONTINUED | OUTPATIENT
Start: 2020-01-01 | End: 2020-01-01 | Stop reason: HOSPADM

## 2020-01-01 RX ADMIN — HALOPERIDOL LACTATE 2 MG: 5 INJECTION, SOLUTION INTRAMUSCULAR at 09:12

## 2020-01-01 RX ADMIN — FUROSEMIDE 40 MG: 10 INJECTION, SOLUTION INTRAVENOUS at 05:12

## 2020-01-01 RX ADMIN — THERA TABS 1 TABLET: TAB at 09:12

## 2020-01-01 RX ADMIN — APIXABAN 2.5 MG: 2.5 TABLET, FILM COATED ORAL at 08:12

## 2020-01-01 RX ADMIN — AZITHROMYCIN MONOHYDRATE 250 MG: 250 TABLET ORAL at 09:12

## 2020-01-01 RX ADMIN — CEFEPIME HYDROCHLORIDE 2 G: 2 INJECTION, SOLUTION INTRAVENOUS at 09:12

## 2020-01-01 RX ADMIN — Medication 6 MG: at 08:12

## 2020-01-01 RX ADMIN — THERA TABS 1 TABLET: TAB at 08:12

## 2020-01-01 RX ADMIN — PANTOPRAZOLE SODIUM 40 MG: 40 TABLET, DELAYED RELEASE ORAL at 08:12

## 2020-01-01 RX ADMIN — Medication 6 MG: at 11:12

## 2020-01-01 RX ADMIN — PANTOPRAZOLE SODIUM 40 MG: 40 TABLET, DELAYED RELEASE ORAL at 09:12

## 2020-01-01 RX ADMIN — TAMSULOSIN HYDROCHLORIDE 0.4 MG: 0.4 CAPSULE ORAL at 08:12

## 2020-01-01 RX ADMIN — APIXABAN 2.5 MG: 2.5 TABLET, FILM COATED ORAL at 09:12

## 2020-01-01 RX ADMIN — APIXABAN 2.5 MG: 2.5 TABLET, FILM COATED ORAL at 11:12

## 2020-01-01 RX ADMIN — THERA TABS 1 TABLET: TAB at 10:12

## 2020-01-01 RX ADMIN — FUROSEMIDE 40 MG: 10 INJECTION, SOLUTION INTRAVENOUS at 06:12

## 2020-01-01 RX ADMIN — CEFTRIAXONE SODIUM 1 G: 1 INJECTION, POWDER, FOR SOLUTION INTRAMUSCULAR; INTRAVENOUS at 05:12

## 2020-01-01 RX ADMIN — AMLODIPINE BESYLATE 10 MG: 5 TABLET ORAL at 08:12

## 2020-01-01 RX ADMIN — PANTOPRAZOLE SODIUM 40 MG: 40 TABLET, DELAYED RELEASE ORAL at 10:12

## 2020-01-01 RX ADMIN — AZITHROMYCIN MONOHYDRATE 500 MG: 250 TABLET ORAL at 05:12

## 2020-01-01 RX ADMIN — ATORVASTATIN CALCIUM 80 MG: 40 TABLET, FILM COATED ORAL at 09:12

## 2020-01-01 RX ADMIN — AMLODIPINE BESYLATE 10 MG: 5 TABLET ORAL at 10:12

## 2020-01-01 RX ADMIN — AMLODIPINE BESYLATE 10 MG: 5 TABLET ORAL at 09:12

## 2020-01-01 RX ADMIN — TAMSULOSIN HYDROCHLORIDE 0.4 MG: 0.4 CAPSULE ORAL at 11:12

## 2020-01-01 RX ADMIN — VANCOMYCIN HYDROCHLORIDE 2000 MG: 100 INJECTION, POWDER, LYOPHILIZED, FOR SOLUTION INTRAVENOUS at 10:12

## 2020-01-01 RX ADMIN — SODIUM CHLORIDE, SODIUM LACTATE, POTASSIUM CHLORIDE, AND CALCIUM CHLORIDE 1000 ML: .6; .31; .03; .02 INJECTION, SOLUTION INTRAVENOUS at 10:12

## 2020-01-01 RX ADMIN — PIPERACILLIN AND TAZOBACTAM 4.5 G: 4; .5 INJECTION, POWDER, LYOPHILIZED, FOR SOLUTION INTRAVENOUS; PARENTERAL at 08:12

## 2020-01-01 RX ADMIN — CEFTRIAXONE SODIUM 1 G: 1 INJECTION, POWDER, FOR SOLUTION INTRAMUSCULAR; INTRAVENOUS at 11:12

## 2020-01-01 RX ADMIN — TAMSULOSIN HYDROCHLORIDE 0.4 MG: 0.4 CAPSULE ORAL at 09:12

## 2020-01-01 RX ADMIN — ATORVASTATIN CALCIUM 80 MG: 40 TABLET, FILM COATED ORAL at 08:12

## 2020-01-01 RX ADMIN — AZITHROMYCIN MONOHYDRATE 250 MG: 250 TABLET ORAL at 10:12

## 2020-01-01 RX ADMIN — FUROSEMIDE 40 MG: 10 INJECTION, SOLUTION INTRAVENOUS at 11:12

## 2020-01-01 RX ADMIN — METOPROLOL TARTRATE 25 MG: 25 TABLET, FILM COATED ORAL at 10:12

## 2020-01-01 RX ADMIN — ATORVASTATIN CALCIUM 80 MG: 40 TABLET, FILM COATED ORAL at 10:12

## 2020-01-01 RX ADMIN — ACETAMINOPHEN 650 MG: 325 TABLET ORAL at 08:12

## 2020-01-01 RX ADMIN — APIXABAN 2.5 MG: 2.5 TABLET, FILM COATED ORAL at 10:12

## 2020-01-01 RX ADMIN — CEFTRIAXONE SODIUM 1 G: 1 INJECTION, POWDER, FOR SOLUTION INTRAMUSCULAR; INTRAVENOUS at 12:12

## 2020-01-01 RX ADMIN — LIDOCAINE HYDROCHLORIDE 5 ML: 10 INJECTION, SOLUTION INFILTRATION; PERINEURAL at 04:12

## 2020-01-02 ENCOUNTER — DOCUMENTATION ONLY (OUTPATIENT)
Dept: HEPATOLOGY | Facility: HOSPITAL | Age: 80
End: 2020-01-02

## 2020-01-02 ENCOUNTER — TELEPHONE (OUTPATIENT)
Dept: CARDIOLOGY | Facility: CLINIC | Age: 80
End: 2020-01-02

## 2020-01-02 NOTE — TELEPHONE ENCOUNTER
Reached out to patient regarding alert from [employer], no messages received for at least 21 days.  Patient's son informed me that the patient has been at [facility] since the beginning of December.  He will be discharged on January 6th and will reconnect to remote monitoring then.

## 2020-01-02 NOTE — PROGRESS NOTES
[facility]                                 Skilled Nursing Facility                   Progress Note     Admit Date:  12/17/2019  [facility]   Principal Problem:  Debility related to recent CVA  HPI obtained from patient interview and chart review     Visit Date: 1/2/2020    Chief Complaint:  BP/HR monitoring, PT/OT progression    HPI:   Mr. Magana is a 79 Y M with h/o CVA (2 embolic CVAs in 2017), A fib, HFpEF, HTN, HLD, DM2, CKD 3, BPH, and dementia.  He presented to [facility] for bilateral lower extremity weakness as well as falls prior to admission.  CT negative for acute infarct, MRI brain remarkable for multifocal acute embolic CVA in b/l hemispheres in cerebellum.  Neurology consulted, patient was not a candidate for tPA due to unknown time brain since symptoms started.  He was started on ASA, therapy recommended skilled nursing.    Patient will be treated at [facility] with PT and OT to improve functional status and ability to perform ADLs.     Interval history:  /69, HR 79, O2 sats 93% on room air, stable on current regimen.  No complaints from patient today.  Lengthy discussion had with patient's daughter at bedside during recent visit regarding cost of Eliquis, she has since clarified with insurance and Eliquis is 30 dollars per month under his current insurance plan, she reported to facility staff that this is manageable for them and would prefer to continue on this agent rather than switching to Coumadin due to restrictions with medication.  Discussed with patient today, he verbalized understanding. Patient is progressing well with PT/OT.  Patient medically stable.  Will continue to monitor and treat of chronic conditions.    Past Medical History: Patient has a past medical history of Cancer, Diabetes mellitus, Hypertension, Hypertrophic cardiomyopathy, and Renal disorder.    Past Surgical History: Patient has a past  surgical history that includes Back surgery; Thyroidectomy; Esophagogastroduodenoscopy (N/A, 9/4/2018); Esophagogastroduodenoscopy (N/A, 10/18/2018); and Colonoscopy (N/A, 10/18/2018).    Social History: Patient reports that he has quit smoking. His smoking use included cigarettes. He has a 2.70 pack-year smoking history. He has never used smokeless tobacco. He reports that he does not drink alcohol or use drugs.    Family History:  No reported family history.    Allergies: Patient has No Known Allergies.    ROS  Constitutional: Negative for fever or fatigue.   Eyes: Negative for blurred vision, double vision and discharge.   Respiratory: Negative for cough, shortness of breath and wheezing.    Cardiovascular: Negative for chest pain, palpitations, and leg swelling.   Gastrointestinal: Negative for abdominal pain, constipation, diarrhea, nausea and vomiting.   Genitourinary: Negative for dysuria, frequency and urgency.   Musculoskeletal:  + generalized weakness. Negative for back pain and myalgias.   Skin: Negative for itching and rash.   Neurological: Negative for dizziness, speech change, and headaches.   Psychiatric/Behavioral: Negative for depression. The patient is not nervous/anxious.      PEx     Constitutional: Patient appears well-developed and in no distress   Head: Normocephalic and atraumatic.   Eyes: Pupils are equal, round, and reactive to light.   Neck: Normal range of motion. Neck supple.   Cardiovascular: Normal rate, regular rhythm and normal heart sounds.    Pulmonary/Chest: Effort normal and breath sounds are clear  Abdominal: Soft. Bowel sounds are normal.   Musculoskeletal: Normal range of motion.   Neurological: Alert and oriented to person, place, and not to time.   Psychiatric: Normal mood and affect. Behavior is normal.   Skin: Skin is warm and dry.       Assessment and Plan:    Aftercare of Acute Multifocal Embolic CVA, ongoing  - Pt has dementia at baseline but per grandson neurological  status has worsened, presents with BLE weakness and experienced 2 falls PTA> not a candidate for TPA>CT head (-) , UDS (-)>MRI/MRA with unchanged stenotic vasculature, multifocal embolic CVA in bilateral hemispheres and cerebellum  - Started on ASA 81 mg, Atorvastatin 80 mg  - Pt also with hyperammonemia with an ammonia level of 87; pt started on lactulose 20 gm BID  -1/2 continue current regimen    Debility r/t recent CVA, ongoing  - Continue with PT/OT for gait training and strengthening and restoration of ADL's   - Encourage mobility, OOB in chair, and early ambulation as appropriate  - Fall precautions   - Monitor for bowel and bladder dysfunction  - Monitor for and prevent skin breakdown and pressure ulcers  - Continue DVT prophylaxis with  Eliquis  -1/2 continue PT/OT, continue Eliquis    HTN with HLD, ongoing  Hx of Afib with HFpEF, chronic  - TTE with EF 70%  - Continue Eliquis 2.5 mg BID, no BB due to bradycardia (previously stopped)  - Per grandson pt has not been compliant with Eliquis for the past week 2/2 to insurance issues leading to increase cost of the medication  - continue losartan 100 mg daily, norvasc 10 mg daily, HCTZ 25 mg daily, Atorvastatin 80 mg, ASA, (discontinued home hydralazine)  -1/2 continue current regimen, cause of Eliquis clarified with patient's daughter, only 30 dollars as outpatient under insurance-family stating this is manageable, continue on current medication regimen for anticoagulation    Continued    Type II DM, chronic  With CKD stage 3  Vitamin D Deficiency, chronic  - A1c 6.0  - Cr 1.6; appears to be at baseline; stable  - SSI while admitted, diet controlled at home  - Vitamin D 1000 units     Hx of upper GI bleed, stable  - Pantoprazole 40 mg     BPH, chronic  - Tamsulosin 0.4 mg     Dementia, chronic  - reportedly began after CVA in 2017  - at his baseline mental status; oriented to self and place, not oriented to time        Lactic Acidosis, resolved  - lactic acid  2.9, downtrended with 1L LR    Future Appointments   Date Time Provider Department Center   5/4/2020 11:00 AM Lucio Toth MD [facility] CARDIO Estephania Albarado NP          Patient note was created using MModal Dictation.  Any errors in syntax or even information may not have been identified and edited on initial review prior to signing this note.

## 2020-01-09 ENCOUNTER — CLINICAL SUPPORT (OUTPATIENT)
Dept: CARDIOLOGY | Facility: CLINIC | Age: 80
End: 2020-01-09
Payer: MEDICARE

## 2020-01-09 DIAGNOSIS — Z95.818 STATUS POST PLACEMENT OF IMPLANTABLE LOOP RECORDER: Primary | ICD-10-CM

## 2020-01-09 PROCEDURE — 93298 REM INTERROG DEV EVAL SCRMS: CPT | Mod: S$GLB,,, | Performed by: INTERNAL MEDICINE

## 2020-01-09 PROCEDURE — 93298 PR INTERROG EVAL, REMOTE, UP TO 30 DAYS,IMPLANT LOOP REC: ICD-10-PCS | Mod: S$GLB,,, | Performed by: INTERNAL MEDICINE

## 2020-01-09 PROCEDURE — 99499 NO LOS: ICD-10-PCS | Mod: S$GLB,,, | Performed by: INTERNAL MEDICINE

## 2020-01-09 PROCEDURE — 99499 UNLISTED E&M SERVICE: CPT | Mod: S$GLB,,, | Performed by: INTERNAL MEDICINE

## 2020-01-09 NOTE — PROGRESS NOTES
Subjective:      Patient ID: Shant Magana Jr. is a 80 y.o. male.    Chief Complaint: No chief complaint on file.    HPI:    ROS Bi[employer]ik implanted loop recorder remote interrogation report downloaded and prepared by medical assistant and interpreted by me and full report scanned into Epic media.  Device inappropriately suspected asystole at a time when rhythm was sinus with PAC's.  (R wave was low).  No a fib.  No V tach.    Past Medical History:   Diagnosis Date    Cancer     prostate    Diabetes mellitus     Hypertension     Hypertrophic cardiomyopathy     Renal disorder         Past Surgical History:   Procedure Laterality Date    BACK SURGERY      COLONOSCOPY N/A 10/18/2018    Procedure: COLONOSCOPY;  Surgeon: Alan Gooden MD;  Location: [facility];  Service: Endoscopy;  Laterality: N/A;    ESOPHAGOGASTRODUODENOSCOPY N/A 9/4/2018    Procedure: EGD (ESOPHAGOGASTRODUODENOSCOPY);  Surgeon: Oli Cuadra MD;  Location: [facility];  Service: Endoscopy;  Laterality: N/A;    ESOPHAGOGASTRODUODENOSCOPY N/A 10/18/2018    Procedure: EGD (ESOPHAGOGASTRODUODENOSCOPY);  Surgeon: Alan Gooden MD;  Location: [facility];  Service: Endoscopy;  Laterality: N/A;    THYROIDECTOMY         No family history on file.    Social History     Socioeconomic History    Marital status: Single     Spouse name: Not on file    Number of children: Not on file    Years of education: Not on file    Highest education level: Not on file   Occupational History    Not on file   Social Needs    Financial resource strain: Not on file    Food insecurity:     Worry: Not on file     Inability: Not on file    Transportation needs:     Medical: Not on file     Non-medical: Not on file   Tobacco Use    Smoking status: Former Smoker     Packs/day: 0.90     Years: 3.00     Pack years: 2.70     Types: Cigarettes    Smokeless tobacco: Never Used   Substance and Sexual Activity    Alcohol use: Never     Frequency: Never    Drug  use: No    Sexual activity: Not on file   Lifestyle    Physical activity:     Days per week: Not on file     Minutes per session: Not on file    Stress: Not on file   Relationships    Social connections:     Talks on phone: Not on file     Gets together: Not on file     Attends Restorationism service: Not on file     Active member of club or organization: Not on file     Attends meetings of clubs or organizations: Not on file     Relationship status: Not on file   Other Topics Concern    Not on file   Social History Narrative    Not on file       Current Outpatient Medications on File Prior to Visit   Medication Sig Dispense Refill    amLODIPine (NORVASC) 10 MG tablet Take 10 mg by mouth once daily.  2    apixaban (ELIQUIS) 2.5 mg Tab Take 1 tablet (2.5 mg total) by mouth 2 (two) times daily. 60 tablet 11    aspirin (ECOTRIN) 81 MG EC tablet Take 1 tablet (81 mg total) by mouth once daily. 30 tablet 11    atorvastatin (LIPITOR) 80 MG tablet Take 1 tablet (80 mg total) by mouth once daily. 90 tablet 2    hydroCHLOROthiazide (HYDRODIURIL) 25 MG tablet Take 1 tablet (25 mg total) by mouth once daily. 30 tablet 11    losartan (COZAAR) 100 MG tablet Take 100 mg by mouth once daily.       pantoprazole (PROTONIX) 40 MG tablet Take 40 mg by mouth once daily.  3    tamsulosin (FLOMAX) 0.4 mg Cp24 Take 0.4 mg by mouth once daily.      vitamin D 1000 units Tab Take 1,000 Units by mouth once daily.       No current facility-administered medications on file prior to visit.        Review of patient's allergies indicates:  No Known Allergies  Objective:   There were no vitals filed for this visit.     Physical Exam     Assessment:     1. Status post placement of implantable loop recorder      Plan:   Diagnoses and all orders for this visit:    Status post placement of implantable loop recorder         No follow-ups on file.

## 2020-02-10 ENCOUNTER — CLINICAL SUPPORT (OUTPATIENT)
Dept: CARDIOLOGY | Facility: CLINIC | Age: 80
End: 2020-02-10
Payer: MEDICARE

## 2020-02-10 DIAGNOSIS — Z95.818 STATUS POST PLACEMENT OF IMPLANTABLE LOOP RECORDER: Primary | ICD-10-CM

## 2020-02-10 PROCEDURE — 93298 REM INTERROG DEV EVAL SCRMS: CPT | Mod: S$GLB,,, | Performed by: INTERNAL MEDICINE

## 2020-02-10 PROCEDURE — 99499 NO LOS: ICD-10-PCS | Mod: S$GLB,,, | Performed by: INTERNAL MEDICINE

## 2020-02-10 PROCEDURE — 99499 UNLISTED E&M SERVICE: CPT | Mod: S$GLB,,, | Performed by: INTERNAL MEDICINE

## 2020-02-10 PROCEDURE — 93298 PR INTERROG EVAL, REMOTE, UP TO 30 DAYS,IMPLANT LOOP REC: ICD-10-PCS | Mod: S$GLB,,, | Performed by: INTERNAL MEDICINE

## 2020-02-10 NOTE — PROGRESS NOTES
Subjective:      Patient ID: Shant Magana Jr. is a 80 y.o. male.    Chief Complaint: No chief complaint on file.    HPI:    ROS Bi[employer]ik implanted loop recorder remote interrogation report downloaded and prepared by medical assistant and interpreted by me and full report scanned into [employer]. Stable NSR and sinus bradycardia with occasional PVC's.    Past Medical History:   Diagnosis Date    Cancer     prostate    Diabetes mellitus     Hypertension     Hypertrophic cardiomyopathy     Renal disorder         Past Surgical History:   Procedure Laterality Date    BACK SURGERY      COLONOSCOPY N/A 10/18/2018    Procedure: COLONOSCOPY;  Surgeon: Alan Gooden MD;  Location: [facility];  Service: Endoscopy;  Laterality: N/A;    ESOPHAGOGASTRODUODENOSCOPY N/A 9/4/2018    Procedure: EGD (ESOPHAGOGASTRODUODENOSCOPY);  Surgeon: Oli Cuadra MD;  Location: [facility];  Service: Endoscopy;  Laterality: N/A;    ESOPHAGOGASTRODUODENOSCOPY N/A 10/18/2018    Procedure: EGD (ESOPHAGOGASTRODUODENOSCOPY);  Surgeon: Alan Gooden MD;  Location: [facility];  Service: Endoscopy;  Laterality: N/A;    THYROIDECTOMY         No family history on file.    Social History     Socioeconomic History    Marital status: Single     Spouse name: Not on file    Number of children: Not on file    Years of education: Not on file    Highest education level: Not on file   Occupational History    Not on file   Social Needs    Financial resource strain: Not on file    Food insecurity:     Worry: Not on file     Inability: Not on file    Transportation needs:     Medical: Not on file     Non-medical: Not on file   Tobacco Use    Smoking status: Former Smoker     Packs/day: 0.90     Years: 3.00     Pack years: 2.70     Types: Cigarettes    Smokeless tobacco: Never Used   Substance and Sexual Activity    Alcohol use: Never     Frequency: Never    Drug use: No    Sexual activity: Not on file   Lifestyle    Physical activity:      Days per week: Not on file     Minutes per session: Not on file    Stress: Not on file   Relationships    Social connections:     Talks on phone: Not on file     Gets together: Not on file     Attends Mu-ism service: Not on file     Active member of club or organization: Not on file     Attends meetings of clubs or organizations: Not on file     Relationship status: Not on file   Other Topics Concern    Not on file   Social History Narrative    Not on file       Current Outpatient Medications on File Prior to Visit   Medication Sig Dispense Refill    amLODIPine (NORVASC) 10 MG tablet Take 10 mg by mouth once daily.  2    apixaban (ELIQUIS) 2.5 mg Tab Take 1 tablet (2.5 mg total) by mouth 2 (two) times daily. 60 tablet 11    aspirin (ECOTRIN) 81 MG EC tablet Take 1 tablet (81 mg total) by mouth once daily. 30 tablet 11    atorvastatin (LIPITOR) 80 MG tablet Take 1 tablet (80 mg total) by mouth once daily. 90 tablet 2    hydroCHLOROthiazide (HYDRODIURIL) 25 MG tablet Take 1 tablet (25 mg total) by mouth once daily. 30 tablet 11    losartan (COZAAR) 100 MG tablet Take 100 mg by mouth once daily.       pantoprazole (PROTONIX) 40 MG tablet Take 40 mg by mouth once daily.  3    tamsulosin (FLOMAX) 0.4 mg Cp24 Take 0.4 mg by mouth once daily.      vitamin D 1000 units Tab Take 1,000 Units by mouth once daily.       No current facility-administered medications on file prior to visit.        Review of patient's allergies indicates:  No Known Allergies  Objective:   There were no vitals filed for this visit.     Physical Exam     Assessment:     1. Status post placement of implantable loop recorder      Plan:   Diagnoses and all orders for this visit:    Status post placement of implantable loop recorder         No follow-ups on file.

## 2020-03-10 ENCOUNTER — CLINICAL SUPPORT (OUTPATIENT)
Dept: CARDIOLOGY | Facility: CLINIC | Age: 80
End: 2020-03-10
Payer: MEDICARE

## 2020-03-10 DIAGNOSIS — Z95.818 STATUS POST PLACEMENT OF IMPLANTABLE LOOP RECORDER: Primary | ICD-10-CM

## 2020-03-10 PROCEDURE — 99499 UNLISTED E&M SERVICE: CPT | Mod: S$GLB,,, | Performed by: INTERNAL MEDICINE

## 2020-03-10 PROCEDURE — 99499 NO LOS: ICD-10-PCS | Mod: S$GLB,,, | Performed by: INTERNAL MEDICINE

## 2020-03-10 PROCEDURE — 93298 REM INTERROG DEV EVAL SCRMS: CPT | Mod: S$GLB,,, | Performed by: INTERNAL MEDICINE

## 2020-03-10 PROCEDURE — 93298 PR INTERROG EVAL, REMOTE, UP TO 30 DAYS,IMPLANT LOOP REC: ICD-10-PCS | Mod: S$GLB,,, | Performed by: INTERNAL MEDICINE

## 2020-03-10 NOTE — PROGRESS NOTES
Subjective:      Patient ID: Shant Magana Jr. is a 80 y.o. male.    Chief Complaint: No chief complaint on file.    HPI:    ROS  [employer] remote interrogation of implanted loop recorder report downloaded and prepared by medical assistant and interpreted by me and full report scanned into Epic media.  Device incorrectly read NSR and sinus bradycardia as asystole.  Occ PAC and PVC noted.  No significant dysrhythmia.    Past Medical History:   Diagnosis Date    Cancer     prostate    Diabetes mellitus     Hypertension     Hypertrophic cardiomyopathy     Renal disorder         Past Surgical History:   Procedure Laterality Date    BACK SURGERY      COLONOSCOPY N/A 10/18/2018    Procedure: COLONOSCOPY;  Surgeon: Alan Gooden MD;  Location: [facility];  Service: Endoscopy;  Laterality: N/A;    ESOPHAGOGASTRODUODENOSCOPY N/A 9/4/2018    Procedure: EGD (ESOPHAGOGASTRODUODENOSCOPY);  Surgeon: Oli Cuadra MD;  Location: [facility];  Service: Endoscopy;  Laterality: N/A;    ESOPHAGOGASTRODUODENOSCOPY N/A 10/18/2018    Procedure: EGD (ESOPHAGOGASTRODUODENOSCOPY);  Surgeon: Alan Gooden MD;  Location: [facility];  Service: Endoscopy;  Laterality: N/A;    THYROIDECTOMY         No family history on file.    Social History     Socioeconomic History    Marital status: Single     Spouse name: Not on file    Number of children: Not on file    Years of education: Not on file    Highest education level: Not on file   Occupational History    Not on file   Social Needs    Financial resource strain: Not on file    Food insecurity:     Worry: Not on file     Inability: Not on file    Transportation needs:     Medical: Not on file     Non-medical: Not on file   Tobacco Use    Smoking status: Former Smoker     Packs/day: 0.90     Years: 3.00     Pack years: 2.70     Types: Cigarettes    Smokeless tobacco: Never Used   Substance and Sexual Activity    Alcohol use: Never     Frequency: Never    Drug use: No     Sexual activity: Not on file   Lifestyle    Physical activity:     Days per week: Not on file     Minutes per session: Not on file    Stress: Not on file   Relationships    Social connections:     Talks on phone: Not on file     Gets together: Not on file     Attends Sikhism service: Not on file     Active member of club or organization: Not on file     Attends meetings of clubs or organizations: Not on file     Relationship status: Not on file   Other Topics Concern    Not on file   Social History Narrative    Not on file       Current Outpatient Medications on File Prior to Visit   Medication Sig Dispense Refill    amLODIPine (NORVASC) 10 MG tablet Take 10 mg by mouth once daily.  2    apixaban (ELIQUIS) 2.5 mg Tab Take 1 tablet (2.5 mg total) by mouth 2 (two) times daily. 60 tablet 11    aspirin (ECOTRIN) 81 MG EC tablet Take 1 tablet (81 mg total) by mouth once daily. 30 tablet 11    atorvastatin (LIPITOR) 80 MG tablet Take 1 tablet (80 mg total) by mouth once daily. 90 tablet 2    hydroCHLOROthiazide (HYDRODIURIL) 25 MG tablet Take 1 tablet (25 mg total) by mouth once daily. 30 tablet 11    losartan (COZAAR) 100 MG tablet Take 100 mg by mouth once daily.       pantoprazole (PROTONIX) 40 MG tablet Take 40 mg by mouth once daily.  3    tamsulosin (FLOMAX) 0.4 mg Cp24 Take 0.4 mg by mouth once daily.      vitamin D 1000 units Tab Take 1,000 Units by mouth once daily.       No current facility-administered medications on file prior to visit.        Review of patient's allergies indicates:  No Known Allergies  Objective:   There were no vitals filed for this visit.     Physical Exam     Assessment:     1. Status post placement of implantable loop recorder      Plan:   Diagnoses and all orders for this visit:    Status post placement of implantable loop recorder         No follow-ups on file.

## 2020-04-27 ENCOUNTER — CLINICAL SUPPORT (OUTPATIENT)
Dept: CARDIOLOGY | Facility: CLINIC | Age: 80
End: 2020-04-27
Payer: MEDICARE

## 2020-04-27 DIAGNOSIS — Z95.818 STATUS POST PLACEMENT OF IMPLANTABLE LOOP RECORDER: Primary | ICD-10-CM

## 2020-04-27 PROCEDURE — 99499 UNLISTED E&M SERVICE: CPT | Mod: S$GLB,,, | Performed by: INTERNAL MEDICINE

## 2020-04-27 PROCEDURE — 99499 NO LOS: ICD-10-PCS | Mod: S$GLB,,, | Performed by: INTERNAL MEDICINE

## 2020-04-27 PROCEDURE — 93298 PR INTERROG EVAL, REMOTE, UP TO 30 DAYS,IMPLANT LOOP REC: ICD-10-PCS | Mod: S$GLB,,, | Performed by: INTERNAL MEDICINE

## 2020-04-27 PROCEDURE — 93298 REM INTERROG DEV EVAL SCRMS: CPT | Mod: S$GLB,,, | Performed by: INTERNAL MEDICINE

## 2020-04-27 NOTE — PROGRESS NOTES
Subjective:      Patient ID: Shant Magana Jr. is a 80 y.o. male.    Chief Complaint: No chief complaint on file.    HPI:    [employer] Biotronik loop recorder remote interrogation report downloaded and prepared by medical assistant and interpreted by me and full report scanned into Epic media.  Battery OK. Normal device function.  No events. Stable NSR.    Past Medical History:   Diagnosis Date    Cancer     prostate    Diabetes mellitus     Hypertension     Hypertrophic cardiomyopathy     Renal disorder         Past Surgical History:   Procedure Laterality Date    BACK SURGERY      COLONOSCOPY N/A 10/18/2018    Procedure: COLONOSCOPY;  Surgeon: Alan Gooden MD;  Location: [facility];  Service: Endoscopy;  Laterality: N/A;    ESOPHAGOGASTRODUODENOSCOPY N/A 9/4/2018    Procedure: EGD (ESOPHAGOGASTRODUODENOSCOPY);  Surgeon: Oli Cuadra MD;  Location: [facility];  Service: Endoscopy;  Laterality: N/A;    ESOPHAGOGASTRODUODENOSCOPY N/A 10/18/2018    Procedure: EGD (ESOPHAGOGASTRODUODENOSCOPY);  Surgeon: Alan Gooden MD;  Location: [facility];  Service: Endoscopy;  Laterality: N/A;    THYROIDECTOMY         No family history on file.    Social History     Socioeconomic History    Marital status: Single     Spouse name: Not on file    Number of children: Not on file    Years of education: Not on file    Highest education level: Not on file   Occupational History    Not on file   Social Needs    Financial resource strain: Not on file    Food insecurity:     Worry: Not on file     Inability: Not on file    Transportation needs:     Medical: Not on file     Non-medical: Not on file   Tobacco Use    Smoking status: Former Smoker     Packs/day: 0.90     Years: 3.00     Pack years: 2.70     Types: Cigarettes    Smokeless tobacco: Never Used   Substance and Sexual Activity    Alcohol use: Never     Frequency: Never    Drug use: No    Sexual activity: Not on file   Lifestyle    Physical activity:      Days per week: Not on file     Minutes per session: Not on file    Stress: Not on file   Relationships    Social connections:     Talks on phone: Not on file     Gets together: Not on file     Attends Bahai service: Not on file     Active member of club or organization: Not on file     Attends meetings of clubs or organizations: Not on file     Relationship status: Not on file   Other Topics Concern    Not on file   Social History Narrative    Not on file       Current Outpatient Medications on File Prior to Visit   Medication Sig Dispense Refill    amLODIPine (NORVASC) 10 MG tablet Take 10 mg by mouth once daily.  2    apixaban (ELIQUIS) 2.5 mg Tab Take 1 tablet (2.5 mg total) by mouth 2 (two) times daily. 60 tablet 11    aspirin (ECOTRIN) 81 MG EC tablet Take 1 tablet (81 mg total) by mouth once daily. 30 tablet 11    atorvastatin (LIPITOR) 80 MG tablet Take 1 tablet (80 mg total) by mouth once daily. 90 tablet 2    hydroCHLOROthiazide (HYDRODIURIL) 25 MG tablet Take 1 tablet (25 mg total) by mouth once daily. 30 tablet 11    losartan (COZAAR) 100 MG tablet Take 100 mg by mouth once daily.       pantoprazole (PROTONIX) 40 MG tablet Take 40 mg by mouth once daily.  3    tamsulosin (FLOMAX) 0.4 mg Cp24 Take 0.4 mg by mouth once daily.      vitamin D 1000 units Tab Take 1,000 Units by mouth once daily.       No current facility-administered medications on file prior to visit.        Review of patient's allergies indicates:  No Known Allergies  Objective:   There were no vitals filed for this visit.     Physical Exam     Assessment:     1. Status post placement of implantable loop recorder      Plan:   Diagnoses and all orders for this visit:    Status post placement of implantable loop recorder         No follow-ups on file.

## 2020-04-28 ENCOUNTER — TELEPHONE (OUTPATIENT)
Dept: CARDIOLOGY | Facility: CLINIC | Age: 80
End: 2020-04-28

## 2020-04-28 NOTE — TELEPHONE ENCOUNTER
Reached out to patient to change upcoming appointment to a virtual visit.  Explained to patient's daughter-in-law how to download the andi and check in for appointment. Virtual visit scheduled for 05/04/2020.

## 2020-05-04 ENCOUNTER — OFFICE VISIT (OUTPATIENT)
Dept: CARDIOLOGY | Facility: CLINIC | Age: 80
End: 2020-05-04
Payer: MEDICARE

## 2020-05-04 DIAGNOSIS — I95.1 ORTHOSTATIC HYPOTENSION: ICD-10-CM

## 2020-05-04 DIAGNOSIS — E78.2 MIXED HYPERLIPIDEMIA: ICD-10-CM

## 2020-05-04 DIAGNOSIS — I10 ESSENTIAL HYPERTENSION: ICD-10-CM

## 2020-05-04 DIAGNOSIS — I65.22 INTERNAL CAROTID ARTERY STENOSIS, LEFT: ICD-10-CM

## 2020-05-04 DIAGNOSIS — I63.9 CRYPTOGENIC STROKE: ICD-10-CM

## 2020-05-04 DIAGNOSIS — I48.0 PAROXYSMAL ATRIAL FIBRILLATION: Primary | ICD-10-CM

## 2020-05-04 DIAGNOSIS — R00.1 BRADYCARDIA: ICD-10-CM

## 2020-05-04 DIAGNOSIS — I50.32 CHRONIC DIASTOLIC CONGESTIVE HEART FAILURE: ICD-10-CM

## 2020-05-04 DIAGNOSIS — N18.30 CKD (CHRONIC KIDNEY DISEASE) STAGE 3, GFR 30-59 ML/MIN: ICD-10-CM

## 2020-05-04 DIAGNOSIS — Z95.818 STATUS POST PLACEMENT OF IMPLANTABLE LOOP RECORDER: ICD-10-CM

## 2020-05-04 PROCEDURE — 1159F PR MEDICATION LIST DOCUMENTED IN MEDICAL RECORD: ICD-10-PCS | Mod: 95,,, | Performed by: INTERNAL MEDICINE

## 2020-05-04 PROCEDURE — 1101F PT FALLS ASSESS-DOCD LE1/YR: CPT | Mod: CPTII,95,, | Performed by: INTERNAL MEDICINE

## 2020-05-04 PROCEDURE — 1159F MED LIST DOCD IN RCRD: CPT | Mod: 95,,, | Performed by: INTERNAL MEDICINE

## 2020-05-04 PROCEDURE — 99213 OFFICE O/P EST LOW 20 MIN: CPT | Mod: 95,,, | Performed by: INTERNAL MEDICINE

## 2020-05-04 PROCEDURE — 1101F PR PT FALLS ASSESS DOC 0-1 FALLS W/OUT INJ PAST YR: ICD-10-PCS | Mod: CPTII,95,, | Performed by: INTERNAL MEDICINE

## 2020-05-04 PROCEDURE — 99213 PR OFFICE/OUTPT VISIT, EST, LEVL III, 20-29 MIN: ICD-10-PCS | Mod: 95,,, | Performed by: INTERNAL MEDICINE

## 2020-05-04 NOTE — PROGRESS NOTES
Subjective:      Patient ID: Shant Magana Jr. is a 80 y.o. male.    Chief Complaint: No chief complaint on file.    HPI:    Review of Systems   Cardiovascular: Negative for chest pain, claudication, dyspnea on exertion, irregular heartbeat, leg swelling, near-syncope, orthopnea, palpitations and syncope.    The patient location is: [place], [place]   The chief complaint leading to consultation is: f/u hypertension  Visit type: audiovisual  Total time spent with patient: 10 minutes  Each patient to whom he or she provides medical services by telemedicine is:  (1) informed of the relationship between the physician and patient and the respective role of any other health care provider with respect to management of the patient; and (2) notified that he or she may decline to receive medical services by telemedicine and may withdraw from such care at any time.    Notes: Meds reviewed.  Pt is no longer taking the HCTZ and is no longer taking ASA.  Rest of meds reviewed.    Appetite is good.    Sits a lot during the day.    PCP is Dr Aldana who does labs.    Son states BP is often elevated.    Past Medical History:   Diagnosis Date    Cancer     prostate    Diabetes mellitus     Hypertension     Hypertrophic cardiomyopathy     Renal disorder         Past Surgical History:   Procedure Laterality Date    BACK SURGERY      COLONOSCOPY N/A 10/18/2018    Procedure: COLONOSCOPY;  Surgeon: Alan Gooden MD;  Location: [facility];  Service: Endoscopy;  Laterality: N/A;    ESOPHAGOGASTRODUODENOSCOPY N/A 9/4/2018    Procedure: EGD (ESOPHAGOGASTRODUODENOSCOPY);  Surgeon: Oli Cuadra MD;  Location: [facility];  Service: Endoscopy;  Laterality: N/A;    ESOPHAGOGASTRODUODENOSCOPY N/A 10/18/2018    Procedure: EGD (ESOPHAGOGASTRODUODENOSCOPY);  Surgeon: Alan Gooden MD;  Location: [facility];  Service: Endoscopy;  Laterality: N/A;    THYROIDECTOMY         History reviewed. No pertinent family history.    Social History      Socioeconomic History    Marital status: Single     Spouse name: Not on file    Number of children: Not on file    Years of education: Not on file    Highest education level: Not on file   Occupational History    Not on file   Social Needs    Financial resource strain: Not on file    Food insecurity:     Worry: Not on file     Inability: Not on file    Transportation needs:     Medical: Not on file     Non-medical: Not on file   Tobacco Use    Smoking status: Former Smoker     Packs/day: 0.90     Years: 3.00     Pack years: 2.70     Types: Cigarettes    Smokeless tobacco: Never Used   Substance and Sexual Activity    Alcohol use: Never     Frequency: Never    Drug use: No    Sexual activity: Not on file   Lifestyle    Physical activity:     Days per week: Not on file     Minutes per session: Not on file    Stress: Not on file   Relationships    Social connections:     Talks on phone: Not on file     Gets together: Not on file     Attends Taoism service: Not on file     Active member of club or organization: Not on file     Attends meetings of clubs or organizations: Not on file     Relationship status: Not on file   Other Topics Concern    Not on file   Social History Narrative    Not on file       Current Outpatient Medications on File Prior to Visit   Medication Sig Dispense Refill    amLODIPine (NORVASC) 10 MG tablet Take 10 mg by mouth once daily.  2    apixaban (ELIQUIS) 2.5 mg Tab Take 1 tablet (2.5 mg total) by mouth 2 (two) times daily. 60 tablet 11    atorvastatin (LIPITOR) 80 MG tablet Take 1 tablet (80 mg total) by mouth once daily. 90 tablet 2    losartan (COZAAR) 100 MG tablet Take 100 mg by mouth once daily.       pantoprazole (PROTONIX) 40 MG tablet Take 40 mg by mouth once daily.  3    tamsulosin (FLOMAX) 0.4 mg Cp24 Take 0.4 mg by mouth once daily.      vitamin D 1000 units Tab Take 1,000 Units by mouth once daily.      aspirin (ECOTRIN) 81 MG EC tablet Take 1 tablet  (81 mg total) by mouth once daily. 30 tablet 11    hydroCHLOROthiazide (HYDRODIURIL) 25 MG tablet Take 1 tablet (25 mg total) by mouth once daily. 30 tablet 11     No current facility-administered medications on file prior to visit.        Review of patient's allergies indicates:  No Known Allergies  Objective:   There were no vitals filed for this visit.     Physical Exam   Pt is alert and in no distress.  WDWNNAD  Pt is a poor historian      Loop recorder shows NSR and sinus bradycardia with PAC's    Assessment:     1. Paroxysmal atrial fibrillation    2. Status post placement of implantable loop recorder    3. Orthostatic hypotension    4. Mixed hyperlipidemia    5. Internal carotid artery stenosis, left    6. Essential hypertension    7. Chronic diastolic congestive heart failure    8. Bradycardia    9. CKD (chronic kidney disease) stage 3, GFR 30-59 ml/min    10. Cryptogenic stroke      Plan:   Diagnoses and all orders for this visit:    Paroxysmal atrial fibrillation    Status post placement of implantable loop recorder    Orthostatic hypotension    Mixed hyperlipidemia    Internal carotid artery stenosis, left    Essential hypertension    Chronic diastolic congestive heart failure    Bradycardia    CKD (chronic kidney disease) stage 3, GFR 30-59 ml/min    Cryptogenic stroke     f/u with Dr Aldana    RTC 4 months    Follow up in about 4 months (around 9/4/2020).

## 2020-06-04 ENCOUNTER — CLINICAL SUPPORT (OUTPATIENT)
Dept: CARDIOLOGY | Facility: CLINIC | Age: 80
End: 2020-06-04
Payer: MEDICARE

## 2020-06-04 DIAGNOSIS — Z95.818 STATUS POST PLACEMENT OF IMPLANTABLE LOOP RECORDER: Primary | ICD-10-CM

## 2020-06-04 PROCEDURE — 93298 REM INTERROG DEV EVAL SCRMS: CPT | Mod: S$GLB,,, | Performed by: INTERNAL MEDICINE

## 2020-06-04 PROCEDURE — 99499 UNLISTED E&M SERVICE: CPT | Mod: S$GLB,,, | Performed by: INTERNAL MEDICINE

## 2020-06-04 PROCEDURE — 93298 PR INTERROG EVAL, REMOTE, UP TO 30 DAYS,IMPLANT LOOP REC: ICD-10-PCS | Mod: S$GLB,,, | Performed by: INTERNAL MEDICINE

## 2020-06-04 PROCEDURE — 99499 NO LOS: ICD-10-PCS | Mod: S$GLB,,, | Performed by: INTERNAL MEDICINE

## 2020-06-04 NOTE — PROGRESS NOTES
Subjective:      Patient ID: Shant Magana Jr. is a 80 y.o. male.    Chief Complaint: No chief complaint on file.    HPI:    ROS [employer] remote interrogation of loop recorder report downloaded and prepared by medicla assistant and interpreted by me and full report scanned into Epic media.  Battery OK.  Normal device function.  No events.  Stable NSR with PAC.    Past Medical History:   Diagnosis Date    Cancer     prostate    Diabetes mellitus     Hypertension     Hypertrophic cardiomyopathy     Renal disorder         Past Surgical History:   Procedure Laterality Date    BACK SURGERY      COLONOSCOPY N/A 10/18/2018    Procedure: COLONOSCOPY;  Surgeon: Alan Gooden MD;  Location: [facility];  Service: Endoscopy;  Laterality: N/A;    ESOPHAGOGASTRODUODENOSCOPY N/A 9/4/2018    Procedure: EGD (ESOPHAGOGASTRODUODENOSCOPY);  Surgeon: Oli Cuadra MD;  Location: [facility];  Service: Endoscopy;  Laterality: N/A;    ESOPHAGOGASTRODUODENOSCOPY N/A 10/18/2018    Procedure: EGD (ESOPHAGOGASTRODUODENOSCOPY);  Surgeon: Alan Gooden MD;  Location: [facility];  Service: Endoscopy;  Laterality: N/A;    THYROIDECTOMY         No family history on file.    Social History     Socioeconomic History    Marital status: Single     Spouse name: Not on file    Number of children: Not on file    Years of education: Not on file    Highest education level: Not on file   Occupational History    Not on file   Social Needs    Financial resource strain: Not on file    Food insecurity:     Worry: Not on file     Inability: Not on file    Transportation needs:     Medical: Not on file     Non-medical: Not on file   Tobacco Use    Smoking status: Former Smoker     Packs/day: 0.90     Years: 3.00     Pack years: 2.70     Types: Cigarettes    Smokeless tobacco: Never Used   Substance and Sexual Activity    Alcohol use: Never     Frequency: Never    Drug use: No    Sexual activity: Not on file   Lifestyle    Physical  activity:     Days per week: Not on file     Minutes per session: Not on file    Stress: Not on file   Relationships    Social connections:     Talks on phone: Not on file     Gets together: Not on file     Attends Yarsanism service: Not on file     Active member of club or organization: Not on file     Attends meetings of clubs or organizations: Not on file     Relationship status: Not on file   Other Topics Concern    Not on file   Social History Narrative    Not on file       Current Outpatient Medications on File Prior to Visit   Medication Sig Dispense Refill    amLODIPine (NORVASC) 10 MG tablet Take 10 mg by mouth once daily.  2    apixaban (ELIQUIS) 2.5 mg Tab Take 1 tablet (2.5 mg total) by mouth 2 (two) times daily. 60 tablet 11    aspirin (ECOTRIN) 81 MG EC tablet Take 1 tablet (81 mg total) by mouth once daily. 30 tablet 11    atorvastatin (LIPITOR) 80 MG tablet Take 1 tablet (80 mg total) by mouth once daily. 90 tablet 2    hydroCHLOROthiazide (HYDRODIURIL) 25 MG tablet Take 1 tablet (25 mg total) by mouth once daily. 30 tablet 11    losartan (COZAAR) 100 MG tablet Take 100 mg by mouth once daily.       pantoprazole (PROTONIX) 40 MG tablet Take 40 mg by mouth once daily.  3    tamsulosin (FLOMAX) 0.4 mg Cp24 Take 0.4 mg by mouth once daily.      vitamin D 1000 units Tab Take 1,000 Units by mouth once daily.       No current facility-administered medications on file prior to visit.        Review of patient's allergies indicates:  No Known Allergies  Objective:   There were no vitals filed for this visit.     Physical Exam     Assessment:     1. Status post placement of implantable loop recorder      Plan:   Diagnoses and all orders for this visit:    Status post placement of implantable loop recorder         No follow-ups on file.

## 2020-07-06 ENCOUNTER — CLINICAL SUPPORT (OUTPATIENT)
Dept: CARDIOLOGY | Facility: CLINIC | Age: 80
End: 2020-07-06
Payer: MEDICARE

## 2020-07-06 DIAGNOSIS — Z95.818 STATUS POST PLACEMENT OF IMPLANTABLE LOOP RECORDER: Primary | ICD-10-CM

## 2020-07-06 PROCEDURE — 93298 REM INTERROG DEV EVAL SCRMS: CPT | Mod: S$GLB,,, | Performed by: INTERNAL MEDICINE

## 2020-07-06 PROCEDURE — 93298 PR INTERROG EVAL, REMOTE, UP TO 30 DAYS,IMPLANT LOOP REC: ICD-10-PCS | Mod: S$GLB,,, | Performed by: INTERNAL MEDICINE

## 2020-07-15 NOTE — PROGRESS NOTES
Subjective:      Patient ID: Shant Magana Jr. is a 80 y.o. male.    Chief Complaint: No chief complaint on file.    HPI:    ROS [employer] remote interrogation of implanted loop recorder report interpreted by me and full rport scanned into Epic.  Battery OK  Stable NSR and sinus bradycardia and possible AV radha rhythm at 50 bpm.  Normal device function    Past Medical History:   Diagnosis Date    Cancer     prostate    Diabetes mellitus     Hypertension     Hypertrophic cardiomyopathy     Renal disorder         Past Surgical History:   Procedure Laterality Date    BACK SURGERY      COLONOSCOPY N/A 10/18/2018    Procedure: COLONOSCOPY;  Surgeon: Alan Gooden MD;  Location: [facility];  Service: Endoscopy;  Laterality: N/A;    ESOPHAGOGASTRODUODENOSCOPY N/A 9/4/2018    Procedure: EGD (ESOPHAGOGASTRODUODENOSCOPY);  Surgeon: Oli Cuadra MD;  Location: [facility];  Service: Endoscopy;  Laterality: N/A;    ESOPHAGOGASTRODUODENOSCOPY N/A 10/18/2018    Procedure: EGD (ESOPHAGOGASTRODUODENOSCOPY);  Surgeon: Alan Gooden MD;  Location: [facility];  Service: Endoscopy;  Laterality: N/A;    THYROIDECTOMY         No family history on file.    Social History     Socioeconomic History    Marital status: Single     Spouse name: Not on file    Number of children: Not on file    Years of education: Not on file    Highest education level: Not on file   Occupational History    Not on file   Social Needs    Financial resource strain: Not on file    Food insecurity     Worry: Not on file     Inability: Not on file    Transportation needs     Medical: Not on file     Non-medical: Not on file   Tobacco Use    Smoking status: Former Smoker     Packs/day: 0.90     Years: 3.00     Pack years: 2.70     Types: Cigarettes    Smokeless tobacco: Never Used   Substance and Sexual Activity    Alcohol use: Never     Frequency: Never    Drug use: No    Sexual activity: Not on file   Lifestyle    Physical activity      Days per week: Not on file     Minutes per session: Not on file    Stress: Not on file   Relationships    Social connections     Talks on phone: Not on file     Gets together: Not on file     Attends Christianity service: Not on file     Active member of club or organization: Not on file     Attends meetings of clubs or organizations: Not on file     Relationship status: Not on file   Other Topics Concern    Not on file   Social History Narrative    Not on file       Current Outpatient Medications on File Prior to Visit   Medication Sig Dispense Refill    amLODIPine (NORVASC) 10 MG tablet Take 10 mg by mouth once daily.  2    apixaban (ELIQUIS) 2.5 mg Tab Take 1 tablet (2.5 mg total) by mouth 2 (two) times daily. 60 tablet 11    aspirin (ECOTRIN) 81 MG EC tablet Take 1 tablet (81 mg total) by mouth once daily. 30 tablet 11    atorvastatin (LIPITOR) 80 MG tablet Take 1 tablet (80 mg total) by mouth once daily. 90 tablet 2    hydroCHLOROthiazide (HYDRODIURIL) 25 MG tablet Take 1 tablet (25 mg total) by mouth once daily. 30 tablet 11    losartan (COZAAR) 100 MG tablet Take 100 mg by mouth once daily.       pantoprazole (PROTONIX) 40 MG tablet Take 40 mg by mouth once daily.  3    tamsulosin (FLOMAX) 0.4 mg Cp24 Take 0.4 mg by mouth once daily.      vitamin D 1000 units Tab Take 1,000 Units by mouth once daily.       No current facility-administered medications on file prior to visit.        Review of patient's allergies indicates:  No Known Allergies  Objective:   There were no vitals filed for this visit.     Physical Exam     Assessment:     1. Status post placement of implantable loop recorder      Plan:   Diagnoses and all orders for this visit:    Status post placement of implantable loop recorder         No follow-ups on file.

## 2020-07-18 ENCOUNTER — HOSPITAL ENCOUNTER (INPATIENT)
Facility: HOSPITAL | Age: 80
LOS: 9 days | Discharge: NURSING, REHAB OR LONG-TERM CARE | DRG: 064 | End: 2020-07-27
Attending: EMERGENCY MEDICINE | Admitting: PSYCHIATRY & NEUROLOGY
Payer: MEDICARE

## 2020-07-18 DIAGNOSIS — N17.9 AKI (ACUTE KIDNEY INJURY): ICD-10-CM

## 2020-07-18 DIAGNOSIS — I63.9 EMBOLIC STROKE: ICD-10-CM

## 2020-07-18 DIAGNOSIS — I63.9 STROKE: ICD-10-CM

## 2020-07-18 DIAGNOSIS — I63.411 EMBOLIC STROKE INVOLVING RIGHT MIDDLE CEREBRAL ARTERY: ICD-10-CM

## 2020-07-18 DIAGNOSIS — R41.82 ALTERED MENTAL STATUS: ICD-10-CM

## 2020-07-18 DIAGNOSIS — R09.02 HYPOXIA: Primary | ICD-10-CM

## 2020-07-18 DIAGNOSIS — I48.0 PAROXYSMAL ATRIAL FIBRILLATION: ICD-10-CM

## 2020-07-18 PROBLEM — Z87.891 FORMER SMOKER: Status: ACTIVE | Noted: 2020-07-18

## 2020-07-18 PROBLEM — Z86.73 HISTORY OF STROKE: Status: ACTIVE | Noted: 2017-08-15

## 2020-07-18 PROBLEM — I63.412 EMBOLIC STROKE INVOLVING LEFT MIDDLE CEREBRAL ARTERY: Status: ACTIVE | Noted: 2020-07-18

## 2020-07-18 LAB
ALBUMIN SERPL BCP-MCNC: 3.3 G/DL (ref 3.5–5.2)
ALP SERPL-CCNC: 79 U/L (ref 55–135)
ALT SERPL W/O P-5'-P-CCNC: 13 U/L (ref 10–44)
ANION GAP SERPL CALC-SCNC: 7 MMOL/L (ref 8–16)
AST SERPL-CCNC: 24 U/L (ref 10–40)
BASOPHILS # BLD AUTO: 0.03 K/UL (ref 0–0.2)
BASOPHILS NFR BLD: 0.5 % (ref 0–1.9)
BILIRUB SERPL-MCNC: 1.1 MG/DL (ref 0.1–1)
BUN SERPL-MCNC: 25 MG/DL (ref 8–23)
CALCIUM SERPL-MCNC: 10.3 MG/DL (ref 8.7–10.5)
CHLORIDE SERPL-SCNC: 111 MMOL/L (ref 95–110)
CHOLEST SERPL-MCNC: 124 MG/DL (ref 120–199)
CHOLEST/HDLC SERPL: 3.1 {RATIO} (ref 2–5)
CO2 SERPL-SCNC: 23 MMOL/L (ref 23–29)
CREAT SERPL-MCNC: 2.1 MG/DL (ref 0.5–1.4)
CREAT SERPL-MCNC: 2.2 MG/DL (ref 0.5–1.4)
DIFFERENTIAL METHOD: ABNORMAL
EOSINOPHIL # BLD AUTO: 0.1 K/UL (ref 0–0.5)
EOSINOPHIL NFR BLD: 1.4 % (ref 0–8)
ERYTHROCYTE [DISTWIDTH] IN BLOOD BY AUTOMATED COUNT: 14.5 % (ref 11.5–14.5)
EST. GFR  (AFRICAN AMERICAN): 31.5 ML/MIN/1.73 M^2
EST. GFR  (NON AFRICAN AMERICAN): 27.3 ML/MIN/1.73 M^2
ESTIMATED AVG GLUCOSE: 120 MG/DL (ref 68–131)
GLUCOSE SERPL-MCNC: 149 MG/DL (ref 70–110)
HBA1C MFR BLD HPLC: 5.8 % (ref 4–5.6)
HCT VFR BLD AUTO: 43 % (ref 40–54)
HDLC SERPL-MCNC: 40 MG/DL (ref 40–75)
HDLC SERPL: 32.3 % (ref 20–50)
HGB BLD-MCNC: 13.7 G/DL (ref 14–18)
IMM GRANULOCYTES # BLD AUTO: 0.02 K/UL (ref 0–0.04)
IMM GRANULOCYTES NFR BLD AUTO: 0.4 % (ref 0–0.5)
INR PPP: 1.1 (ref 0.8–1.2)
LDLC SERPL CALC-MCNC: 70.8 MG/DL (ref 63–159)
LYMPHOCYTES # BLD AUTO: 1.1 K/UL (ref 1–4.8)
LYMPHOCYTES NFR BLD: 19.2 % (ref 18–48)
MCH RBC QN AUTO: 28.8 PG (ref 27–31)
MCHC RBC AUTO-ENTMCNC: 31.9 G/DL (ref 32–36)
MCV RBC AUTO: 90 FL (ref 82–98)
MONOCYTES # BLD AUTO: 0.5 K/UL (ref 0.3–1)
MONOCYTES NFR BLD: 9 % (ref 4–15)
NEUTROPHILS # BLD AUTO: 3.9 K/UL (ref 1.8–7.7)
NEUTROPHILS NFR BLD: 69.5 % (ref 38–73)
NONHDLC SERPL-MCNC: 84 MG/DL
NRBC BLD-RTO: 0 /100 WBC
PLATELET # BLD AUTO: 236 K/UL (ref 150–350)
PMV BLD AUTO: 10.9 FL (ref 9.2–12.9)
POC PTINR: 1.3 (ref 0.9–1.2)
POC PTWBT: 15.3 SEC (ref 9.7–14.3)
POTASSIUM SERPL-SCNC: 4 MMOL/L (ref 3.5–5.1)
PROT SERPL-MCNC: 7 G/DL (ref 6–8.4)
PROTHROMBIN TIME: 11.6 SEC (ref 9–12.5)
RBC # BLD AUTO: 4.76 M/UL (ref 4.6–6.2)
SAMPLE: ABNORMAL
SAMPLE: ABNORMAL
SARS-COV-2 RDRP RESP QL NAA+PROBE: NEGATIVE
SODIUM SERPL-SCNC: 141 MMOL/L (ref 136–145)
TRIGL SERPL-MCNC: 66 MG/DL (ref 30–150)
TSH SERPL DL<=0.005 MIU/L-ACNC: 2.83 UIU/ML (ref 0.4–4)
WBC # BLD AUTO: 5.67 K/UL (ref 3.9–12.7)

## 2020-07-18 PROCEDURE — 82565 ASSAY OF CREATININE: CPT

## 2020-07-18 PROCEDURE — 25000003 PHARM REV CODE 250: Performed by: STUDENT IN AN ORGANIZED HEALTH CARE EDUCATION/TRAINING PROGRAM

## 2020-07-18 PROCEDURE — 96360 HYDRATION IV INFUSION INIT: CPT

## 2020-07-18 PROCEDURE — 11000001 HC ACUTE MED/SURG PRIVATE ROOM

## 2020-07-18 PROCEDURE — 99223 PR INITIAL HOSPITAL CARE,LEVL III: ICD-10-PCS | Mod: AI,,, | Performed by: PSYCHIATRY & NEUROLOGY

## 2020-07-18 PROCEDURE — 99285 EMERGENCY DEPT VISIT HI MDM: CPT | Mod: 25

## 2020-07-18 PROCEDURE — 99285 PR EMERGENCY DEPT VISIT,LEVEL V: ICD-10-PCS | Mod: ,,, | Performed by: EMERGENCY MEDICINE

## 2020-07-18 PROCEDURE — 85610 PROTHROMBIN TIME: CPT

## 2020-07-18 PROCEDURE — 99223 1ST HOSP IP/OBS HIGH 75: CPT | Mod: AI,,, | Performed by: PSYCHIATRY & NEUROLOGY

## 2020-07-18 PROCEDURE — 99285 EMERGENCY DEPT VISIT HI MDM: CPT | Mod: ,,, | Performed by: EMERGENCY MEDICINE

## 2020-07-18 PROCEDURE — 63600175 PHARM REV CODE 636 W HCPCS: Performed by: PHYSICIAN ASSISTANT

## 2020-07-18 PROCEDURE — 80061 LIPID PANEL: CPT

## 2020-07-18 PROCEDURE — 99900035 HC TECH TIME PER 15 MIN (STAT)

## 2020-07-18 PROCEDURE — 93010 EKG 12-LEAD: ICD-10-PCS | Mod: ,,, | Performed by: INTERNAL MEDICINE

## 2020-07-18 PROCEDURE — U0002 COVID-19 LAB TEST NON-CDC: HCPCS

## 2020-07-18 PROCEDURE — 93010 ELECTROCARDIOGRAM REPORT: CPT | Mod: ,,, | Performed by: INTERNAL MEDICINE

## 2020-07-18 PROCEDURE — 80053 COMPREHEN METABOLIC PANEL: CPT

## 2020-07-18 PROCEDURE — 25000003 PHARM REV CODE 250: Performed by: PHYSICIAN ASSISTANT

## 2020-07-18 PROCEDURE — 83036 HEMOGLOBIN GLYCOSYLATED A1C: CPT

## 2020-07-18 PROCEDURE — 93005 ELECTROCARDIOGRAM TRACING: CPT

## 2020-07-18 PROCEDURE — 85025 COMPLETE CBC W/AUTO DIFF WBC: CPT

## 2020-07-18 PROCEDURE — 84443 ASSAY THYROID STIM HORMONE: CPT

## 2020-07-18 RX ORDER — SODIUM CHLORIDE 0.9 % (FLUSH) 0.9 %
10 SYRINGE (ML) INJECTION
Status: DISCONTINUED | OUTPATIENT
Start: 2020-07-18 | End: 2020-07-27 | Stop reason: HOSPADM

## 2020-07-18 RX ORDER — LABETALOL HCL 20 MG/4 ML
10 SYRINGE (ML) INTRAVENOUS
Status: DISCONTINUED | OUTPATIENT
Start: 2020-07-18 | End: 2020-07-26

## 2020-07-18 RX ORDER — ONDANSETRON 2 MG/ML
4 INJECTION INTRAMUSCULAR; INTRAVENOUS EVERY 12 HOURS PRN
Status: DISCONTINUED | OUTPATIENT
Start: 2020-07-18 | End: 2020-07-27 | Stop reason: HOSPADM

## 2020-07-18 RX ORDER — MIDAZOLAM HYDROCHLORIDE 1 MG/ML
1 INJECTION INTRAMUSCULAR; INTRAVENOUS ONCE
Status: COMPLETED | OUTPATIENT
Start: 2020-07-18 | End: 2020-07-18

## 2020-07-18 RX ORDER — CHOLECALCIFEROL (VITAMIN D3) 25 MCG
1000 TABLET ORAL DAILY
Status: DISCONTINUED | OUTPATIENT
Start: 2020-07-19 | End: 2020-07-27 | Stop reason: HOSPADM

## 2020-07-18 RX ORDER — PANTOPRAZOLE SODIUM 40 MG/1
40 TABLET, DELAYED RELEASE ORAL DAILY
Status: DISCONTINUED | OUTPATIENT
Start: 2020-07-19 | End: 2020-07-27 | Stop reason: HOSPADM

## 2020-07-18 RX ORDER — TAMSULOSIN HYDROCHLORIDE 0.4 MG/1
0.4 CAPSULE ORAL DAILY
Status: DISCONTINUED | OUTPATIENT
Start: 2020-07-19 | End: 2020-07-27 | Stop reason: HOSPADM

## 2020-07-18 RX ORDER — POLYETHYLENE GLYCOL 3350 17 G/17G
17 POWDER, FOR SOLUTION ORAL DAILY PRN
Status: DISCONTINUED | OUTPATIENT
Start: 2020-07-18 | End: 2020-07-27 | Stop reason: HOSPADM

## 2020-07-18 RX ORDER — ACETAMINOPHEN 325 MG/1
650 TABLET ORAL EVERY 6 HOURS PRN
Status: DISCONTINUED | OUTPATIENT
Start: 2020-07-18 | End: 2020-07-27 | Stop reason: HOSPADM

## 2020-07-18 RX ORDER — ATORVASTATIN CALCIUM 20 MG/1
80 TABLET, FILM COATED ORAL DAILY
Status: DISCONTINUED | OUTPATIENT
Start: 2020-07-19 | End: 2020-07-27 | Stop reason: HOSPADM

## 2020-07-18 RX ORDER — SODIUM CHLORIDE 9 MG/ML
INJECTION, SOLUTION INTRAVENOUS CONTINUOUS
Status: DISCONTINUED | OUTPATIENT
Start: 2020-07-18 | End: 2020-07-19

## 2020-07-18 RX ADMIN — APIXABAN 2.5 MG: 2.5 TABLET, FILM COATED ORAL at 09:07

## 2020-07-18 RX ADMIN — SODIUM CHLORIDE: 0.9 INJECTION, SOLUTION INTRAVENOUS at 09:07

## 2020-07-18 RX ADMIN — MIDAZOLAM 1 MG: 1 INJECTION INTRAMUSCULAR; INTRAVENOUS at 07:07

## 2020-07-18 RX ADMIN — SODIUM CHLORIDE 500 ML: 0.9 INJECTION, SOLUTION INTRAVENOUS at 05:07

## 2020-07-18 NOTE — ED TRIAGE NOTES
Patient is a 80 year old male that presents to ED with c/o Altered Mental Status last known well 2 days ago. Patient is oriented to person only. Patient has hx of CVA and TIAs with no Neuro deficits. Patient states lives with grandson. No slurred speech noted.

## 2020-07-18 NOTE — ED PROVIDER NOTES
"Encounter Date: 7/18/2020       History     Chief Complaint   Patient presents with    Altered Mental Status     Left-sided facial droop and leaning to right side when walking. Family last saw him 2 days ago, so that's the last known well.      80M with h/o atrial fibrillation (with implantable loop recorder), HTN, HLD, diastolic CHF, CKD stage 3, and cryptogenic stroke presents with two days of altered mental status, per family via EMS report.  EMS reports that the family said the patient "hasn't been himself for two days" and was missing his mouth when trying to eat a sandwich.      History limited by patient's altered mental status and lack of family present during evaluation.        Review of patient's allergies indicates:  No Known Allergies  Past Medical History:   Diagnosis Date    Cancer     prostate    Diabetes mellitus     Former smoker 7/18/2020    History of stroke 8/15/2017    Hypertension     Hypertrophic cardiomyopathy     Renal disorder     Stroke      Past Surgical History:   Procedure Laterality Date    BACK SURGERY      COLONOSCOPY N/A 10/18/2018    Procedure: COLONOSCOPY;  Surgeon: Alan Gooden MD;  Location: [facility];  Service: Endoscopy;  Laterality: N/A;    ESOPHAGOGASTRODUODENOSCOPY N/A 9/4/2018    Procedure: EGD (ESOPHAGOGASTRODUODENOSCOPY);  Surgeon: Oli Cuadra MD;  Location: [facility];  Service: Endoscopy;  Laterality: N/A;    ESOPHAGOGASTRODUODENOSCOPY N/A 10/18/2018    Procedure: EGD (ESOPHAGOGASTRODUODENOSCOPY);  Surgeon: Alan Gooden MD;  Location: [facility];  Service: Endoscopy;  Laterality: N/A;    THYROIDECTOMY       History reviewed. No pertinent family history.  Social History     Tobacco Use    Smoking status: Former Smoker     Packs/day: 0.90     Years: 3.00     Pack years: 2.70     Types: Cigarettes    Smokeless tobacco: Never Used   Substance Use Topics    Alcohol use: Never     Frequency: Never    Drug use: No     Review of Systems   Unable " to perform ROS: Mental status change       Physical Exam     Initial Vitals [07/18/20 1541]   BP Pulse Resp Temp SpO2   127/76 74 16 99 °F (37.2 °C) 95 %      MAP       --         Physical Exam    Nursing note and vitals reviewed.  Constitutional: He appears well-developed and well-nourished.   HENT:   Head: Normocephalic and atraumatic.   Right Ear: External ear normal.   Wearing a mask.   Eyes: EOM are normal. Pupils are equal, round, and reactive to light.   Neck: Normal range of motion. Neck supple. No JVD present.   Cardiovascular: Normal rate, regular rhythm and normal heart sounds.   Pulmonary/Chest: Breath sounds normal. No respiratory distress.   Abdominal: Soft. He exhibits no distension.   Musculoskeletal: Normal range of motion.   Neurological: He is alert. He is disoriented. No sensory deficit.   Alert only to person   Follows commands  Left lower facial droop  Abnormal finger to nose exam   Skin: Skin is warm and dry.   Psychiatric:   Patient is oriented to self.  Does not know where he is.  Can follow single step commands but has difficulty following 2 step commands.         ED Course   Procedures  Labs Reviewed   CBC W/ AUTO DIFFERENTIAL - Abnormal; Notable for the following components:       Result Value    Hemoglobin 13.7 (*)     Mean Corpuscular Hemoglobin Conc 31.9 (*)     All other components within normal limits   COMPREHENSIVE METABOLIC PANEL - Abnormal; Notable for the following components:    Chloride 111 (*)     Glucose 149 (*)     BUN, Bld 25 (*)     Creatinine 2.2 (*)     Albumin 3.3 (*)     Total Bilirubin 1.1 (*)     Anion Gap 7 (*)     eGFR if  31.5 (*)     eGFR if non  27.3 (*)     All other components within normal limits   HEMOGLOBIN A1C - Abnormal; Notable for the following components:    Hemoglobin A1C 5.8 (*)     All other components within normal limits    Narrative:     ADD ON GB [id number] PER MICHAELA RAM MD  07/18/2020  19:01    ISTAT  CREATININE - Abnormal; Notable for the following components:    POC Creatinine 2.1 (*)     All other components within normal limits   ISTAT PROCEDURE - Abnormal; Notable for the following components:    POC PTWBT 15.3 (*)     POC PTINR 1.3 (*)     All other components within normal limits   PROTIME-INR   TSH   LIPID PANEL   SARS-COV-2 RNA AMPLIFICATION, QUAL   HEMOGLOBIN A1C   POCT GLUCOSE, HAND-HELD DEVICE          Imaging Results          MRA Neck without contrast (Final result)  Result time 07/18/20 21:31:53    Final result by Leonard Andujar MD (07/18/20 21:31:53)                 Impression:      Area of diffusion restriction with ADC match suggestive of acute infarct in the right parietal lobe. Additional smaller acute infarcts right occipital lobe and right basal ganglia.    Remote right frontal infarct and remote lacunar infarcts in the thalami.  Supratentorial white matter T2/flair hyperintense signal foci suggesting sequela of chronic small vessel ischemic change.    MR angiogram of the head and neck appears unchanged compared to prior exams without any focal occlusion identified.    Critical findings relayed to CHANA REYNOSO by KEILA Jarvis MD on 07/18/2020 at 20:52.    Electronically signed by resident: Akbar Jarvis  Date:    07/18/2020  Time:    20:19    Electronically signed by: Leonard Andujar MD  Date:    07/18/2020  Time:    21:31             Narrative:    EXAMINATION:  MRI BRAIN WITHOUT CONTRAST; MRA NECK WITHOUT CONTRAST; MRA BRAIN WITHOUT CONTRAST    CLINICAL HISTORY:  Altered mental status;; stroke;    TECHNIQUE:  Multiplanar multisequence MR imaging of the brain was performed without the use of intravenous contrast.    Obtained as a separate acquisition from the MRI brain, a time-of-flight MR arteriogram of the cervical and intracranial vasculature with multiple MIPS reconstructions.    COMPARISON:  CT head 07/18/2020, 09/03/2018    MRI brain, MRA brain and neck 12/11/2019 delete that    MRI brain  10/18/2017    FINDINGS:  Brain:    Ventricles normal in size for age.  No hydrocephalus.    No extra-axial blood or fluid collections.    Area of diffusion restriction with ADC match in the right parietal lobe with associated mild cytotoxic edema consistent with acute infarct.  Additional smaller acute infarcts right occipital lobe and right basal ganglia.  Remote right frontal infarct and remote lacunar infarcts in the thalami.  Supratentorial white matter T2/flair hyperintense signal foci suggesting sequela of chronic small vessel ischemic change.  No parenchymal mass or hemorrhage.  Gradient sequence reveals a few scattered remote microhemorrhages, unchanged from prior.    The T2 skull base flow voids are preserved.  Bone marrow signal intensity unremarkable.    MRA:    Common and internal carotid arteries are normal in caliber.  No significant stenosis at either carotid bifurcation.    Left dominant vertebral artery, intermittent narrowing throughout the V1, V2 and V4 segments of the right vertebral artery without evidence of occlusion in similar in comparison to prior exam.  Basilar artery is otherwise patent and unremarkable.    Hypoplastic left A1 and diminutive left PCA unchanged from prior exams.  Otherwise, the ACAs, MCAs and PCAs demonstrate no evidence of  high-grade stenosis, focal occlusion or intracranial aneurysm.                               MRA Brain without contrast (Final result)  Result time 07/18/20 21:31:53    Final result by Leonard Andujar MD (07/18/20 21:31:53)                 Impression:      Area of diffusion restriction with ADC match suggestive of acute infarct in the right parietal lobe. Additional smaller acute infarcts right occipital lobe and right basal ganglia.    Remote right frontal infarct and remote lacunar infarcts in the thalami.  Supratentorial white matter T2/flair hyperintense signal foci suggesting sequela of chronic small vessel ischemic change.    MR angiogram of  the head and neck appears unchanged compared to prior exams without any focal occlusion identified.    Critical findings relayed to CHANA REYNOSO by KEILA Jarvis MD on 07/18/2020 at 20:52.    Electronically signed by resident: Akbar Jarvis  Date:    07/18/2020  Time:    20:19    Electronically signed by: Leonard Andujar MD  Date:    07/18/2020  Time:    21:31             Narrative:    EXAMINATION:  MRI BRAIN WITHOUT CONTRAST; MRA NECK WITHOUT CONTRAST; MRA BRAIN WITHOUT CONTRAST    CLINICAL HISTORY:  Altered mental status;; stroke;    TECHNIQUE:  Multiplanar multisequence MR imaging of the brain was performed without the use of intravenous contrast.    Obtained as a separate acquisition from the MRI brain, a time-of-flight MR arteriogram of the cervical and intracranial vasculature with multiple MIPS reconstructions.    COMPARISON:  CT head 07/18/2020, 09/03/2018    MRI brain, MRA brain and neck 12/11/2019 delete that    MRI brain 10/18/2017    FINDINGS:  Brain:    Ventricles normal in size for age.  No hydrocephalus.    No extra-axial blood or fluid collections.    Area of diffusion restriction with ADC match in the right parietal lobe with associated mild cytotoxic edema consistent with acute infarct.  Additional smaller acute infarcts right occipital lobe and right basal ganglia.  Remote right frontal infarct and remote lacunar infarcts in the thalami.  Supratentorial white matter T2/flair hyperintense signal foci suggesting sequela of chronic small vessel ischemic change.  No parenchymal mass or hemorrhage.  Gradient sequence reveals a few scattered remote microhemorrhages, unchanged from prior.    The T2 skull base flow voids are preserved.  Bone marrow signal intensity unremarkable.    MRA:    Common and internal carotid arteries are normal in caliber.  No significant stenosis at either carotid bifurcation.    Left dominant vertebral artery, intermittent narrowing throughout the V1, V2 and V4 segments of the right  vertebral artery without evidence of occlusion in similar in comparison to prior exam.  Basilar artery is otherwise patent and unremarkable.    Hypoplastic left A1 and diminutive left PCA unchanged from prior exams.  Otherwise, the ACAs, MCAs and PCAs demonstrate no evidence of  high-grade stenosis, focal occlusion or intracranial aneurysm.                               MRI Brain Without Contrast (Final result)  Result time 07/18/20 21:31:53    Final result by Leonard Andujar MD (07/18/20 21:31:53)                 Impression:      Area of diffusion restriction with ADC match suggestive of acute infarct in the right parietal lobe. Additional smaller acute infarcts right occipital lobe and right basal ganglia.    Remote right frontal infarct and remote lacunar infarcts in the thalami.  Supratentorial white matter T2/flair hyperintense signal foci suggesting sequela of chronic small vessel ischemic change.    MR angiogram of the head and neck appears unchanged compared to prior exams without any focal occlusion identified.    Critical findings relayed to CHANA REYNOSO by KEILA Jarvis MD on 07/18/2020 at 20:52.    Electronically signed by resident: Akbar Jarvis  Date:    07/18/2020  Time:    20:19    Electronically signed by: Leonard Andujar MD  Date:    07/18/2020  Time:    21:31             Narrative:    EXAMINATION:  MRI BRAIN WITHOUT CONTRAST; MRA NECK WITHOUT CONTRAST; MRA BRAIN WITHOUT CONTRAST    CLINICAL HISTORY:  Altered mental status;; stroke;    TECHNIQUE:  Multiplanar multisequence MR imaging of the brain was performed without the use of intravenous contrast.    Obtained as a separate acquisition from the MRI brain, a time-of-flight MR arteriogram of the cervical and intracranial vasculature with multiple MIPS reconstructions.    COMPARISON:  CT head 07/18/2020, 09/03/2018    MRI brain, MRA brain and neck 12/11/2019 delete that    MRI brain 10/18/2017    FINDINGS:  Brain:    Ventricles normal in size for age.   No hydrocephalus.    No extra-axial blood or fluid collections.    Area of diffusion restriction with ADC match in the right parietal lobe with associated mild cytotoxic edema consistent with acute infarct.  Additional smaller acute infarcts right occipital lobe and right basal ganglia.  Remote right frontal infarct and remote lacunar infarcts in the thalami.  Supratentorial white matter T2/flair hyperintense signal foci suggesting sequela of chronic small vessel ischemic change.  No parenchymal mass or hemorrhage.  Gradient sequence reveals a few scattered remote microhemorrhages, unchanged from prior.    The T2 skull base flow voids are preserved.  Bone marrow signal intensity unremarkable.    MRA:    Common and internal carotid arteries are normal in caliber.  No significant stenosis at either carotid bifurcation.    Left dominant vertebral artery, intermittent narrowing throughout the V1, V2 and V4 segments of the right vertebral artery without evidence of occlusion in similar in comparison to prior exam.  Basilar artery is otherwise patent and unremarkable.    Hypoplastic left A1 and diminutive left PCA unchanged from prior exams.  Otherwise, the ACAs, MCAs and PCAs demonstrate no evidence of  high-grade stenosis, focal occlusion or intracranial aneurysm.                               X-Ray Chest AP Portable (Final result)  Result time 07/18/20 18:37:48    Final result by Yoni Walker MD (07/18/20 18:37:48)                 Impression:      Findings suggesting right pleural effusion.  No large consolidation seen.      Electronically signed by: Yoni Walker MD  Date:    07/18/2020  Time:    18:37             Narrative:    EXAMINATION:  XR CHEST AP PORTABLE    CLINICAL HISTORY:  Altered mental status, unspecified    TECHNIQUE:  Single frontal view of the chest was performed.    COMPARISON:  Chest radiograph 12/10/2019    FINDINGS:  Monitoring leads overlie the chest.  Patient is slightly rotated.    Left chest  suspected loop recorder device is unchanged.  Cardiomediastinal silhouette is relatively midline and prominent similar to prior noting calcification of the arch.  There is chronic nonspecific elevation of the right hemidiaphragm similar to prior with blunting of the right costophrenic angle and hazy opacification of the right mid to lower lung zone suggesting small to moderate-sized pleural effusion.  Right basilar platelike scarring versus atelectasis noted.  Left hemithorax is well expanded and clear.  No large pneumothorax.  No acute osseous process seen.                                CT Head Without Contrast (Final result)  Result time 07/18/20 17:55:17    Final result by Yoni Walker MD (07/18/20 17:55:17)                 Impression:      1. Interval right parietal lobe suspected acute infarct.  No intracranial hemorrhage.  Further evaluation/follow-up as warranted.  2. Bilateral thalamic suspected lacunar type infarcts, age-indeterminate.  Correlate clinically.  3. Additional multifocal supratentorial and to a lesser extent infratentorial remote infarcts, as detailed above.  4. Generalized cerebral volume loss and sequela of advanced microvascular ischemic changes.  This report was flagged in Epic as abnormal.      Electronically signed by: Yoni Walker MD  Date:    07/18/2020  Time:    17:55             Narrative:    EXAMINATION:  CT HEAD WITHOUT CONTRAST    CLINICAL HISTORY:  Altered mental status;    TECHNIQUE:  Low dose axial CT images obtained throughout the head without intravenous contrast. Sagittal and coronal reconstructions were performed.    COMPARISON:  MRI brain 12/11/2019 and head CT 12/10/2019    FINDINGS:  Patient motion artifact somewhat limits evaluation on initial images but mostly resolved with repeat scanning.    Intracranial compartment: Age-appropriate mild generalized cerebral volume loss.  Ventricles are midline and stable in size and configuration without distortion by mass effect  or acute hydrocephalus.    No extra-axial blood or fluid collections.    Interval moderate-sized area of hypoattenuation involving the right parietal lobe without significant volume loss concerning for recent infarct.  Right frontal lobe small area of encephalomalacia consistent with remote infarct unchanged.  Previous small area of acute infarct involving the high left parietal lobe and upper right occipital lobe now show small area of encephalomalacia consistent with temporal evolution of remote infarcts.  Many of the additional previous acute multifocal small cortical infarcts are not well demonstrated on today's exam.  Multiple remote lacunar type infarcts involving the bilateral caudate, bilateral basal ganglia, bilateral corona radiata, central jillian and right cerebellum.  Subtle hypoattenuating foci without significant volume loss at the bilateral thalami concerning for lacunar type infarcts, age-indeterminate.  Superimposed patchy and confluent hypoattenuation of the subcortical and periventricular white matter consistent with advanced chronic small vessel ischemic change.  No parenchymal mass, hemorrhage, or midline shift.    Skull/extracranial contents (limited evaluation): No fracture. Mastoid air cells and paranasal sinuses are essentially clear.                                 Medical Decision Making:   Initial Assessment:   80M with h/o atrial fibrillation (with implantable loop recorder), HTN, HLD, diastolic CHF, CKD stage 3, and cryptogenic stroke presents with two days of altered mental status, left lower facial drop on exam  Differential Diagnosis:   CVA  Delirium    Clinical Tests:   Lab Tests: Ordered  Radiological Study: Ordered  Medical Tests: Ordered  ED Management:  Stroke code order set initiated.  Initiated workup to identify organic causes of altered mental status.      Vascular neurology consulted and evaluated patient soon after arrival in the ED.  Vascular neurology noted that the left  lower facial droop was present on previous exam and recommended CT of the head.      CT demonstrates interval right parietal lobe suspected acute infarct, MRI ordered per vascular neurology.  Patient admitted to neurology for further evaluation and management.              Attending Attestation:   Physician Attestation Statement for Resident:  As the supervising MD   Physician Attestation Statement: I have personally seen and examined this patient.   I agree with the above history. -: 80-year-old man brought in by EMS after family called 911.  They state he has not been himself past 2 days but today they noted he was walking and staggering into the walls towards the right.  He also missed his mouth when he was trying to feed himself a sandwich.   As the supervising MD I agree with the above PE.   -: 5/5 strength bilateral upper and lower extremities.  Sensation intact to light touch bilaterally.  Patient does seem to have some dysmetria but it is difficult to test this as he has difficulty performing finger-to-nose bilaterally.  When asked to touch my finger he reaches towards my face.  It is unclear whether he has difficulty following this command or difficulty seeing my finger, or if this is true dysmetria.  Patient also appears to favor the right side and has difficulty looking past the midline towards the left.   As the supervising MD I agree with the above treatment, course, plan, and disposition.   -: We consulted the stroke team although this was not clearly a stroke code given that his symptoms have been present for 2 days.  I do suspect stroke.  Differential also included intracranial bleed, intracranial mass, acute delirium.            Attending ED Notes:   At the end of my shift, CT of the head was still pending.  Our plan was to admit to vascular Neurology if this showed a stroke.                        Clinical Impression:       ICD-10-CM ICD-9-CM   1. Stroke  I63.9 434.91   2. Altered mental status   R41.82 780.97   3. Embolic stroke  I63.9 434.11             ED Disposition Condition    Admit                           Roni Last MD  Resident  07/18/20 1841       Kayleen Crystal MD  07/19/20 0843

## 2020-07-18 NOTE — ASSESSMENT & PLAN NOTE
Patient with history of multiple strokes   History of a fib + intracranial/extracranial atherosclerosis  Continue secondary stroke prevention with eliquis + aspirin + atorvastatin 80 mg

## 2020-07-18 NOTE — ASSESSMENT & PLAN NOTE
Stroke risk factor  SBP <220 in setting of acute stroke  Holding home BP medications  Prn labetalol

## 2020-07-18 NOTE — PROVIDER PROGRESS NOTES - EMERGENCY DEPT.
Encounter Date: 7/18/2020    ED Physician Progress Notes        Physician Note:   I assumed care of this patient at change of shift from Dr. Guan. Briefly, this is a 80 y.o. male who presented with acute onset AMS x 2d, today had gait instability, discoordination. L facial droop on exam. Vascular neurology consulted, recommended MRI, which is pending at time of sign out.     ( ) CT shows acute stroke by my independent interpretation. We discussed with vascular neurology who recommended MRI and admission to their service.   ( ) Labs notable for SHIKHA. Small bolus given as has h/o diastolic dysfunction.     Prior to my assumption of care:    Labs Reviewed  CBC W/ AUTO DIFFERENTIAL - Abnormal; Notable for the following components:     Hemoglobin                    13.7 (*)               Mean Corpuscular Hemoglobin Conc   31.9 (*)            All other components within normal limits  ISTAT CREATININE - Abnormal; Notable for the following components:     POC Creatinine                2.1 (*)             All other components within normal limits  ISTAT PROCEDURE - Abnormal; Notable for the following components:     POC PTWBT                     15.3 (*)               POC PTINR                     1.3 (*)             All other components within normal limits  PROTIME-INR  COMPREHENSIVE METABOLIC PANEL  TSH  LIPID PANEL  POCT GLUCOSE, HAND-HELD DEVICE  X-Ray Chest AP Portable   Final Result        Findings suggesting right pleural effusion.  No large consolidation seen.            Electronically signed by: Yoni Walker MD    Date:    07/18/2020    Time:    18:37     CT Head Without Contrast   Final Result   Abnormal        1. Interval right parietal lobe suspected acute infarct.  No intracranial hemorrhage.  Further evaluation/follow-up as warranted.    2. Bilateral thalamic suspected lacunar type infarcts, age-indeterminate.  Correlate clinically.    3. Additional multifocal supratentorial and to a lesser extent  infratentorial remote infarcts, as detailed above.    4. Generalized cerebral volume loss and sequela of advanced microvascular ischemic changes.    This report was flagged in Epic as abnormal.            Electronically signed by: Yoni Walker MD    Date:    07/18/2020    Time:    17:55     MRI Brain Without Contrast    (Results Pending)  MRA Brain without contrast    (Results Pending)  MRA Neck without contrast    (Results Pending)          Final diagnoses:  (I63.9) Stroke

## 2020-07-18 NOTE — ED NOTES
Called stroke team for mild sedation medication for MRI, patient is not following commands and is moving around frequently in stretcher. Awaiting orders.

## 2020-07-18 NOTE — SUBJECTIVE & OBJECTIVE
Past Medical History:   Diagnosis Date    Cancer     prostate    Diabetes mellitus     Hypertension     Hypertrophic cardiomyopathy     Renal disorder      Past Surgical History:   Procedure Laterality Date    BACK SURGERY      COLONOSCOPY N/A 10/18/2018    Procedure: COLONOSCOPY;  Surgeon: Alan Gooden MD;  Location: [facility] ENDO;  Service: Endoscopy;  Laterality: N/A;    ESOPHAGOGASTRODUODENOSCOPY N/A 9/4/2018    Procedure: EGD (ESOPHAGOGASTRODUODENOSCOPY);  Surgeon: Oli Cuadra MD;  Location: [facility] ENDO;  Service: Endoscopy;  Laterality: N/A;    ESOPHAGOGASTRODUODENOSCOPY N/A 10/18/2018    Procedure: EGD (ESOPHAGOGASTRODUODENOSCOPY);  Surgeon: Alan Gooden MD;  Location: [facility] ENDO;  Service: Endoscopy;  Laterality: N/A;    THYROIDECTOMY       No family history on file.  Social History     Tobacco Use    Smoking status: Former Smoker     Packs/day: 0.90     Years: 3.00     Pack years: 2.70     Types: Cigarettes    Smokeless tobacco: Never Used   Substance Use Topics    Alcohol use: Never     Frequency: Never    Drug use: No     Review of patient's allergies indicates:  No Known Allergies    Medications: I have reviewed the current medication administration record.    (Not in a hospital admission)      Review of Systems   Constitutional: Negative for chills and fever.   Eyes: Positive for visual disturbance.   Respiratory: Negative for cough.    Cardiovascular: Negative for chest pain.   Gastrointestinal: Negative for nausea and vomiting.   Neurological: Positive for facial asymmetry. Negative for speech difficulty and weakness.   Psychiatric/Behavioral: Positive for confusion.     Objective:     Vital Signs (Most Recent):  Temp: 99 °F (37.2 °C) (07/18/20 1541)  Pulse: 72 (07/18/20 1643)  Resp: 16 (07/18/20 1541)  BP: (!) 147/79 (07/18/20 1642)  SpO2: 95 % (07/18/20 1643)    Vital Signs Range (Last 24H):  Temp:  [99 °F (37.2 °C)]   Pulse:  [71-76]   Resp:  [16]   BP: (127-147)/(68-83)    SpO2:  [95 %-97 %]     Physical Exam  Vitals signs reviewed.   Constitutional:       General: He is not in acute distress.     Appearance: He is well-developed.   HENT:      Head: Normocephalic and atraumatic.   Cardiovascular:      Rate and Rhythm: Normal rate.   Pulmonary:      Effort: Pulmonary effort is normal. No respiratory distress.   Skin:     General: Skin is warm and dry.   Neurological:      Mental Status: He is alert.         Neurological Exam:   LOC: alert  Attention Span: poor  Language: No aphasia  Articulation: No dysarthria  Orientation: Not oriented to place, Not oriented to time  Visual Fields: Hemianopsia left  EOM (CN III, IV, VI): Gaze preference  right  Facial Movement (CN VII): Lower facial weakness on the Left  Motor: Arm left  Normal 5/5  Leg left  Normal 5/5  Arm right  Normal 5/5  Leg right Normal 5/5  Sensation: Intact to light touch, temperature and vibration  Tone: Normal tone throughout      Laboratory:  CMP:   Recent Labs   Lab 07/18/20  1617   CALCIUM 10.3   ALBUMIN 3.3*   PROT 7.0      K 4.0   CO2 23   *   BUN 25*   CREATININE 2.2*   ALKPHOS 79   ALT 13   AST 24   BILITOT 1.1*     CBC:   Recent Labs   Lab 07/18/20  1617   WBC 5.67   RBC 4.76   HGB 13.7*   HCT 43.0      MCV 90   MCH 28.8   MCHC 31.9*     Lipid Panel:   Recent Labs   Lab 07/18/20  1617   CHOL 124   LDLCALC 70.8   HDL 40   TRIG 66     Coagulation:   Recent Labs   Lab 07/18/20  1617   INR 1.1     Hgb A1C: No results for input(s): HGBA1C in the last 168 hours.  TSH: No results for input(s): TSH in the last 168 hours.    Diagnostic Results:      Brain imaging:      Vessel Imaging:      Cardiac Evaluation:

## 2020-07-18 NOTE — HPI
"Shant Magana Jr. is a 80 y.o. male with PMHx of dementia, multiple strokes, intracranial and extracranial atherosclerosis, DM, HTN, a fib (eliquis with history of noncompliance due to cost) who presented to ED via EMS for AMS x 2 days. Per EMS, family reported that patient "has not been himself." ED provider noted LSW and L hemianopsia on exam and stroke team was consulted.    Stroke provider contacted patient's son (Hoda) for more information and history. His son noticed a change in behavior yesterday around 2pm. He states that yesterday the patient seemed more confused and was walking around aimlessly. This morning, the patient called his son and his son went to check on him. The patient was at home with his grandson and he had fallen between the bed and the dresser. His son noticed that he was inattentive with a facial droop and delayed verbal response. Patient stated he was going to restroom but was instead walking into closet. This persistent change in behavior and mental status prompted family to have patient taken to ED via EMS.    "

## 2020-07-18 NOTE — ASSESSMENT & PLAN NOTE
Shant Magana Jr. is a 80 y.o. male with PMHx of dementia, multiple strokes, intracranial and extracranial atherosclerosis, DM, HTN, a fib (eliquis with history of noncompliance due to cost) who presented to ED via EMS for AMS x 2 days. Family reports confusion, inattentiveness, delayed verbal response, and facial droop. Patient with history of L facial droop from previous stroke. On exam, patient not oriented to place, time, or situation. R gaze preference and L hemianopsia also noted. CT revealed R parietal infarct. Patient admitted to stroke unit. MRI/MRA and echo pending. Etiology likely cardioembolic    Antithrombotics for secondary stroke prevention: Anticoagulants: Apixaban 2.5 mg BID     Statins for secondary stroke prevention and hyperlipidemia, if present:   Statins: Atorvastatin- 80 mg daily    Aggressive risk factor modification: HTN, DM, HLD, A-Fib, atherosclerosis, stroke     Rehab efforts: The patient has been evaluated by a stroke team provider and the therapy needs have been fully considered based off the presenting complaints and exam findings. The following therapy evaluations are needed: PT evaluate and treat, OT evaluate and treat, SLP evaluate and treat, PM&R evaluate for appropriate placement    Diagnostics ordered/pending: HgbA1C to assess blood glucose levels, MRA head to assess vasculature, MRA neck/arch to assess vasculature, MRI head without contrast to assess brain parenchyma, TTE to assess cardiac function/status     VTE prophylaxis: Mechanical prophylaxis: Place SCDs  None: Reason for No Pharmacological VTE Prophylaxis: Currently on anticoagulation    BP parameters: Infarct: No intervention, SBP <220

## 2020-07-18 NOTE — ASSESSMENT & PLAN NOTE
Stroke risk factor  Continue eliquis  Patient compliant with anticoagulation medication per his son

## 2020-07-19 LAB
ALBUMIN SERPL BCP-MCNC: 3 G/DL (ref 3.5–5.2)
ALP SERPL-CCNC: 73 U/L (ref 55–135)
ALT SERPL W/O P-5'-P-CCNC: 13 U/L (ref 10–44)
ANION GAP SERPL CALC-SCNC: 5 MMOL/L (ref 8–16)
APTT BLDCRRT: 28.6 SEC (ref 21–32)
ASCENDING AORTA: 3.98 CM
AST SERPL-CCNC: 24 U/L (ref 10–40)
AV INDEX (PROSTH): 0.88
AV MEAN GRADIENT: 4 MMHG
AV PEAK GRADIENT: 6 MMHG
AV VALVE AREA: 2.83 CM2
AV VELOCITY RATIO: 0.91
BASOPHILS # BLD AUTO: 0.03 K/UL (ref 0–0.2)
BASOPHILS NFR BLD: 0.6 % (ref 0–1.9)
BILIRUB SERPL-MCNC: 0.8 MG/DL (ref 0.1–1)
BILIRUB UR QL STRIP: NEGATIVE
BSA FOR ECHO PROCEDURE: 2.12 M2
BUN SERPL-MCNC: 21 MG/DL (ref 8–23)
CALCIUM SERPL-MCNC: 9.7 MG/DL (ref 8.7–10.5)
CHLORIDE SERPL-SCNC: 111 MMOL/L (ref 95–110)
CK MB SERPL-MCNC: 2.6 NG/ML (ref 0.1–6.5)
CK MB SERPL-RTO: 0.7 % (ref 0–5)
CK SERPL-CCNC: 383 U/L (ref 20–200)
CLARITY UR REFRACT.AUTO: CLEAR
CO2 SERPL-SCNC: 25 MMOL/L (ref 23–29)
COLOR UR AUTO: YELLOW
CREAT SERPL-MCNC: 1.8 MG/DL (ref 0.5–1.4)
CV ECHO LV RWT: 0.65 CM
DIFFERENTIAL METHOD: ABNORMAL
DOP CALC AO PEAK VEL: 1.23 M/S
DOP CALC AO VTI: 23.65 CM
DOP CALC LVOT AREA: 3.2 CM2
DOP CALC LVOT DIAMETER: 2.02 CM
DOP CALC LVOT PEAK VEL: 1.12 M/S
DOP CALC LVOT STROKE VOLUME: 67.04 CM3
DOP CALCLVOT PEAK VEL VTI: 20.93 CM
ECHO LV POSTERIOR WALL: 1.3 CM (ref 0.6–1.1)
EOSINOPHIL # BLD AUTO: 0.2 K/UL (ref 0–0.5)
EOSINOPHIL NFR BLD: 3.3 % (ref 0–8)
ERYTHROCYTE [DISTWIDTH] IN BLOOD BY AUTOMATED COUNT: 14.4 % (ref 11.5–14.5)
EST. GFR  (AFRICAN AMERICAN): 40.2 ML/MIN/1.73 M^2
EST. GFR  (NON AFRICAN AMERICAN): 34.8 ML/MIN/1.73 M^2
FRACTIONAL SHORTENING: 49 % (ref 28–44)
GLUCOSE SERPL-MCNC: 105 MG/DL (ref 70–110)
GLUCOSE UR QL STRIP: NEGATIVE
HCT VFR BLD AUTO: 39.5 % (ref 40–54)
HGB BLD-MCNC: 12.8 G/DL (ref 14–18)
HGB UR QL STRIP: ABNORMAL
IMM GRANULOCYTES # BLD AUTO: 0.02 K/UL (ref 0–0.04)
IMM GRANULOCYTES NFR BLD AUTO: 0.4 % (ref 0–0.5)
INR PPP: 1.1 (ref 0.8–1.2)
INTERVENTRICULAR SEPTUM: 1.6 CM (ref 0.6–1.1)
KETONES UR QL STRIP: NEGATIVE
LA MAJOR: 4.03 CM
LA MINOR: 3.55 CM
LA WIDTH: 3.77 CM
LEFT ATRIUM SIZE: 4.81 CM
LEFT ATRIUM VOLUME INDEX: 27.5 ML/M2
LEFT ATRIUM VOLUME: 58.18 CM3
LEFT INTERNAL DIMENSION IN SYSTOLE: 2.01 CM (ref 2.1–4)
LEFT VENTRICLE DIASTOLIC VOLUME INDEX: 32.37 ML/M2
LEFT VENTRICLE DIASTOLIC VOLUME: 68.6 ML
LEFT VENTRICLE MASS INDEX: 103 G/M2
LEFT VENTRICLE SYSTOLIC VOLUME INDEX: 6.1 ML/M2
LEFT VENTRICLE SYSTOLIC VOLUME: 12.97 ML
LEFT VENTRICULAR INTERNAL DIMENSION IN DIASTOLE: 3.97 CM (ref 3.5–6)
LEFT VENTRICULAR MASS: 218.31 G
LEUKOCYTE ESTERASE UR QL STRIP: NEGATIVE
LYMPHOCYTES # BLD AUTO: 1.1 K/UL (ref 1–4.8)
LYMPHOCYTES NFR BLD: 21.9 % (ref 18–48)
MAGNESIUM SERPL-MCNC: 1.7 MG/DL (ref 1.6–2.6)
MCH RBC QN AUTO: 29.2 PG (ref 27–31)
MCHC RBC AUTO-ENTMCNC: 32.4 G/DL (ref 32–36)
MCV RBC AUTO: 90 FL (ref 82–98)
MICROSCOPIC COMMENT: NORMAL
MONOCYTES # BLD AUTO: 0.5 K/UL (ref 0.3–1)
MONOCYTES NFR BLD: 10.6 % (ref 4–15)
NEUTROPHILS # BLD AUTO: 3.1 K/UL (ref 1.8–7.7)
NEUTROPHILS NFR BLD: 63.2 % (ref 38–73)
NITRITE UR QL STRIP: NEGATIVE
NRBC BLD-RTO: 0 /100 WBC
PH UR STRIP: 6 [PH] (ref 5–8)
PHOSPHATE SERPL-MCNC: 3.2 MG/DL (ref 2.7–4.5)
PISA TR MAX VEL: 2.62 M/S
PLATELET # BLD AUTO: 209 K/UL (ref 150–350)
PMV BLD AUTO: 11 FL (ref 9.2–12.9)
POCT GLUCOSE: 123 MG/DL (ref 70–110)
POCT GLUCOSE: 123 MG/DL (ref 70–110)
POCT GLUCOSE: 99 MG/DL (ref 70–110)
POTASSIUM SERPL-SCNC: 4 MMOL/L (ref 3.5–5.1)
PROT SERPL-MCNC: 6.3 G/DL (ref 6–8.4)
PROT UR QL STRIP: NEGATIVE
PROTHROMBIN TIME: 11.3 SEC (ref 9–12.5)
RA MAJOR: 3.75 CM
RA PRESSURE: 3 MMHG
RA WIDTH: 3.29 CM
RBC # BLD AUTO: 4.38 M/UL (ref 4.6–6.2)
RBC #/AREA URNS AUTO: 4 /HPF (ref 0–4)
RIGHT VENTRICULAR END-DIASTOLIC DIMENSION: 2.65 CM
SINUS: 3.21 CM
SODIUM SERPL-SCNC: 141 MMOL/L (ref 136–145)
SP GR UR STRIP: 1.01 (ref 1–1.03)
SQUAMOUS #/AREA URNS AUTO: 1 /HPF
STJ: 3.45 CM
TDI LATERAL: 0.08 M/S
TDI SEPTAL: 0.04 M/S
TDI: 0.06 M/S
TR MAX PG: 27 MMHG
TRICUSPID ANNULAR PLANE SYSTOLIC EXCURSION: 1.99 CM
TROPONIN I SERPL DL<=0.01 NG/ML-MCNC: <0.006 NG/ML (ref 0–0.03)
TV REST PULMONARY ARTERY PRESSURE: 30 MMHG
URN SPEC COLLECT METH UR: ABNORMAL
WBC # BLD AUTO: 4.89 K/UL (ref 3.9–12.7)
WBC #/AREA URNS AUTO: 2 /HPF (ref 0–5)

## 2020-07-19 PROCEDURE — 99233 SBSQ HOSP IP/OBS HIGH 50: CPT | Mod: ,,, | Performed by: PSYCHIATRY & NEUROLOGY

## 2020-07-19 PROCEDURE — 97161 PT EVAL LOW COMPLEX 20 MIN: CPT

## 2020-07-19 PROCEDURE — 80053 COMPREHEN METABOLIC PANEL: CPT

## 2020-07-19 PROCEDURE — 85610 PROTHROMBIN TIME: CPT

## 2020-07-19 PROCEDURE — 25000003 PHARM REV CODE 250: Performed by: PHYSICIAN ASSISTANT

## 2020-07-19 PROCEDURE — 92610 EVALUATE SWALLOWING FUNCTION: CPT

## 2020-07-19 PROCEDURE — 11000001 HC ACUTE MED/SURG PRIVATE ROOM

## 2020-07-19 PROCEDURE — 84100 ASSAY OF PHOSPHORUS: CPT

## 2020-07-19 PROCEDURE — 85025 COMPLETE CBC W/AUTO DIFF WBC: CPT

## 2020-07-19 PROCEDURE — 82553 CREATINE MB FRACTION: CPT

## 2020-07-19 PROCEDURE — 97535 SELF CARE MNGMENT TRAINING: CPT

## 2020-07-19 PROCEDURE — 81001 URINALYSIS AUTO W/SCOPE: CPT

## 2020-07-19 PROCEDURE — 99233 PR SUBSEQUENT HOSPITAL CARE,LEVL III: ICD-10-PCS | Mod: ,,, | Performed by: PSYCHIATRY & NEUROLOGY

## 2020-07-19 PROCEDURE — 36415 COLL VENOUS BLD VENIPUNCTURE: CPT

## 2020-07-19 PROCEDURE — 97166 OT EVAL MOD COMPLEX 45 MIN: CPT

## 2020-07-19 PROCEDURE — 85730 THROMBOPLASTIN TIME PARTIAL: CPT

## 2020-07-19 PROCEDURE — 94761 N-INVAS EAR/PLS OXIMETRY MLT: CPT

## 2020-07-19 PROCEDURE — 82550 ASSAY OF CK (CPK): CPT

## 2020-07-19 PROCEDURE — 84484 ASSAY OF TROPONIN QUANT: CPT

## 2020-07-19 PROCEDURE — 94640 AIRWAY INHALATION TREATMENT: CPT

## 2020-07-19 PROCEDURE — 25000242 PHARM REV CODE 250 ALT 637 W/ HCPCS: Performed by: STUDENT IN AN ORGANIZED HEALTH CARE EDUCATION/TRAINING PROGRAM

## 2020-07-19 PROCEDURE — 83735 ASSAY OF MAGNESIUM: CPT

## 2020-07-19 PROCEDURE — 97530 THERAPEUTIC ACTIVITIES: CPT

## 2020-07-19 RX ORDER — IPRATROPIUM BROMIDE AND ALBUTEROL SULFATE 2.5; .5 MG/3ML; MG/3ML
3 SOLUTION RESPIRATORY (INHALATION) EVERY 6 HOURS PRN
Status: DISCONTINUED | OUTPATIENT
Start: 2020-07-19 | End: 2020-07-24

## 2020-07-19 RX ADMIN — APIXABAN 2.5 MG: 2.5 TABLET, FILM COATED ORAL at 09:07

## 2020-07-19 RX ADMIN — TAMSULOSIN HYDROCHLORIDE 0.4 MG: 0.4 CAPSULE ORAL at 09:07

## 2020-07-19 RX ADMIN — Medication 1000 UNITS: at 09:07

## 2020-07-19 RX ADMIN — ATORVASTATIN CALCIUM 80 MG: 20 TABLET, FILM COATED ORAL at 09:07

## 2020-07-19 RX ADMIN — IPRATROPIUM BROMIDE AND ALBUTEROL SULFATE 3 ML: .5; 2.5 SOLUTION RESPIRATORY (INHALATION) at 10:07

## 2020-07-19 RX ADMIN — PANTOPRAZOLE SODIUM 40 MG: 40 TABLET, DELAYED RELEASE ORAL at 09:07

## 2020-07-19 NOTE — PT/OT/SLP EVAL
Speech Language Pathology Evaluation  Bedside Swallow    Patient Name:  Shant Magana Jr.   MRN:  [id number]  Admitting Diagnosis: Embolic stroke involving right middle cerebral artery    Recommendations:                 General Recommendations:  Dysphagia therapy and Cognitive-linguistic evaluation  Diet recommendations:  Regular, Thin   Aspiration Precautions: Assistance with meals, Continue alternate means of nutrition, Eliminate distractions, Frequent oral care, HOB to 90 degrees, Meds whole 1 at a time, Remain upright 30 minutes post meal, Small bites/sips and Strict aspiration precautions Continue to monitor for signs and symptoms of aspiration and discontinue oral feeding should you notice any of the following: watery eyes, reddened facial area, wet vocal quality, increased work of breathing, change in respiratory status, increased congestion, coughing, fever, etc.  General Precautions: Standard, aspiration, fall, respiratory  Communication strategies:  provide increased time to answer and go to room if call light pushed    History:     Past Medical History:   Diagnosis Date    Cancer     prostate    Diabetes mellitus     Former smoker 7/18/2020    History of stroke 8/15/2017    Hypertension     Hypertrophic cardiomyopathy     Renal disorder     Stroke        Past Surgical History:   Procedure Laterality Date    BACK SURGERY      COLONOSCOPY N/A 10/18/2018    Procedure: COLONOSCOPY;  Surgeon: Alan Gooden MD;  Location: [facility];  Service: Endoscopy;  Laterality: N/A;    ESOPHAGOGASTRODUODENOSCOPY N/A 9/4/2018    Procedure: EGD (ESOPHAGOGASTRODUODENOSCOPY);  Surgeon: Oli Cuadra MD;  Location: [facility];  Service: Endoscopy;  Laterality: N/A;    ESOPHAGOGASTRODUODENOSCOPY N/A 10/18/2018    Procedure: EGD (ESOPHAGOGASTRODUODENOSCOPY);  Surgeon: Alan Gooden MD;  Location: [facility];  Service: Endoscopy;  Laterality: N/A;    THYROIDECTOMY         Social History: Limited 2/2 no family  "presents and underlying cognitive status    Prior Intubation HX:  None this admission     Modified Barium Swallow: none prior     Chest X-Rays: 7/18/20:    Findings suggesting right pleural effusion.  No large consolidation seen.    Prior diet: unknown    Subjective     RN confirmed Pt passed PATEL, tolerating medications and meals  Pt presents calm  He explains, "No not right now"  He denies pain  No family present in room    Pain/Comfort:  · Pain Rating 1: 0/10    Objective:     Oral Musculature Evaluation  · Oral Musculature: left weakness  · Dentition: present and adequate  · Secretion Management: adequate  · Mucosal Quality: adequate  · Mandibular Strength and Mobility: (mildly prolonged rotoary chew)  · Oral Labial Strength and Mobility: functional retraction  · Lingual Strength and Mobility: functional protrusion  · Volitional Cough: present  · Volitional Swallow: elicited, timely  · Voice Prior to PO Intake: clear, adequate strength  · Oral Musculature Comments: +audible wheezing reviewed with RN and MD    Bedside Swallow Eval:   Consistencies Assessed:  · Thin liquids : cup edge sips x4  · Puree : tsp bites x3  · Soft solids : bits of macaroni and cheese on meal tray x3  · Solids : bites of juan miguel cracker x3     Oral Phase:   · Prolonged mastication    Pharyngeal Phase:   · no overt clinical signs/symptoms of aspiration    Compensatory Strategies  · None    Treatment: Pt found awake in bed with PT at bedside. SLP intiated session upon completion of PT session. Pt with lunch meal tray for regualr diet with thin liquids at bedside. RN in room to assess vitals and reviewed Pt with ST. Pt with minimal PO acceptance. RN aware of minimal acceptance and wheezing. SLP educated Pt on SLP on definition, risks and overt clinical signs of aspiration, implementation of standard aspiration precautions, and SLP POC. Pt with minimal verbal understanding. L inattention evident t/o simple scan tasks. No family present at " bedside. No questions noted. White board updated. Patient remained upright with RN at doorway upon SLP exit from room. RN and MD team notified of findings/recommendations following session.       Assessment:     Shant Magana Jr. is a 80 y.o. male with an SLP diagnosis of risk of aspiration.  He presents with minimal PO acceptance with ST this service day. Strict aspiration precautions advised.     Goals:   Multidisciplinary Problems     SLP Goals        Problem: SLP Goal    Goal Priority Disciplines Outcome   SLP Goal     SLP Ongoing, Progressing   Description: Speech Language Pathology Goals  Goals expected to be met by 7/26/20    1. Pt will participate in ongoing assessment of swallow function to determine safest, least restrictive means of nutrition/hydration  2. Pt will participate in further assessment of cognitive-linguistic skills to determine ongoing POC needs  3. Educate Pt and family on aspiration precautions and SLP POC                       Plan:     · Patient to be seen:  4 x/week   · Plan of Care expires:  08/18/20  · Plan of Care reviewed with:  patient   · SLP Follow-Up:  Yes       Discharge recommendations:  home health speech therapy     Time Tracking:     SLP Treatment Date:   07/19/20  Speech Start Time:  1159  Speech Stop Time:  1215     Speech Total Time (min):  16 min    Billable Minutes: Eval Swallow and Oral Function 8 and Seld Care/Home Management Training 8    CAMMIE Wright., [facility]-SLP  Speech-Language Pathology  Pager: [phone]    07/19/2020

## 2020-07-19 NOTE — ASSESSMENT & PLAN NOTE
Area of cytotoxic cerebral edema identified when reviewing brain imaging in the territory of the R middle cerebral artery. There is no mass effect associated with it. We will continue to monitor the patients clinical exam for any worsening of symptoms which may indicate expansion of the stroke or the area of the edema resulting in the clinical change. The pattern is suggestive of cardioembolic etiology.

## 2020-07-19 NOTE — CONSULTS
Inpatient consult to Physical Medicine Rehab  Consult performed by: Mary Rojas NP  Consult ordered by: Pamela Hutchison PA-C  Reason for consult: assess rehab needs        Reviewed patient history and current admission.  Rehab team following.  Full consult to follow.    SELVIN Cook, FNP-C  Physical Medicine & Rehabilitation   07/19/2020

## 2020-07-19 NOTE — PT/OT/SLP EVAL
Physical Therapy Evaluation    Patient Name:  Shant Magana Jr.   MRN:  [id number]  Admit Date: 7/18/2020  Admitting Diagnosis:  Embolic stroke involving right middle cerebral artery  Length of Stay: 1 days  Recent Surgery: * No surgery found *      Recommendations:     Discharge Recommendations:  home health PT(with 24hr supervision vs. SNF pending family decision.)   Discharge Equipment Recommendations: none   Barriers to discharge: Decreased caregiver support    Assessment:     Shant Magana Jr. is a 80 y.o. male admitted with a medical diagnosis of Embolic stroke involving right middle cerebral artery.  He presents with the following impairments/functional limitations: decreased functional mobility. Pt with absent LUE and LLE sensation and L hemianopsia. Pt requires continued cues for directional following. Shant Magana Jr.'s deficits effect their ability to safely and independently participate in self-care tasks and functional mobility which increases their caregiver burden. Due to his physical therapy diagnosis of debility and deconditioning, they continue to benefit from acute PT services to address the following limitations: high fall risk, weakness, instability, and the need for supervised instruction of exercise. These deficits effect their roles and responsibilities in which they were able to complete prior to admit. Pt would benefit from an intensive and collaborative PT/OT/SLP therapy program to improve quality of life and focus on recovery of impairments.     Problem List: weakness, impaired self care skills, impaired sensation, gait instability, impaired balance, visual deficits, decreased upper extremity function, decreased lower extremity function, decreased safety awareness  Rehab Prognosis: Good; patient would benefit from acute skilled PT services to address these deficits and reach maximum level of function.      Plan:     During this hospitalization, patient to be seen 3 x/week to address the identified  "rehab impairments via gait training, therapeutic activities, therapeutic exercises, neuromuscular re-education and progress towards the established goals.    · Plan of Care Expires:  08/18/20    Subjective   Communicated with RN prior to session.  Patient found supine upon PT entry to room, agreeable to evaluation. Shant Magana Jr.'s alone present during session.    Chief Complaint: Altered Mental Status (Left-sided facial droop and leaning to right side when walking. Family last saw him 2 days ago, so that's the last known well. )    Patient/Family Comments/goals: "What do you want me to do?"  Pain/Comfort:  · Pain Rating 1: 0/10  · Pain Rating Post-Intervention 1: 0/10    Living Environment: *Pt is a questionable historian, family not present.  Patient lives with alone in a single family home with no RALF.   Prior Level of Function:   Patient reports being independent with mobility & with ADLs. Hand Dominance: right. Patient uses DME as follows: bedside commode, bath bench. DME owned (not currently used): none.  Roles/Repsonsibilities:   Work: Retired. Drive: yes. Managing Medicines/Managing Home: yes. Hobbies: none stated.    Patient reports they will have assistance from ([place]) upon discharge.    Objective:   Patient found with: telemetry, bed alarm, SCD     General Precautions: Standard, Cardiac fall   Orthopedic Precautions:N/A   Braces: N/A   Oxygen Device: Nasal Cannula 2L  Vitals: BP (!) 170/79 (BP Location: Left arm, Patient Position: Lying)   Pulse 60   Temp 98.8 °F (37.1 °C) (Oral)   Resp 18   Ht 6' 1" (1.854 m)   Wt 87.5 kg (193 lb)   SpO2 97%   BMI 25.46 kg/m²     Exams:  · Cognition:   · Alert and Cooperative  · AxOx2 (name and place)  · Command following: Follows one-step commands  · Fluency: clear/fluent  · Hearing: Intact ([place])  · Vision:  absent left visual fields     Left LE Right LE   Edema absent absent   ROM AROM WFL AROM WFL   Modified Will Scale 0: No increase in muscle tone 0: No " increase in muscle tone   Strength 4/5 4/5   Sensation absent to light touch intact to light touch   Coordination normal normal     Outcome Measures:  AM-PAC 6 CLICK MOBILITY  Turning over in bed (including adjusting bedclothes, sheets and blankets)?: 3  Sitting down on and standing up from a chair with arms (e.g., wheelchair, bedside commode, etc.): 3  Moving from lying on back to sitting on the side of the bed?: 3  Moving to and from a bed to a chair (including a wheelchair)?: 3  Need to walk in hospital room?: 3  Climbing 3-5 steps with a railing?: 2  Basic Mobility Total Score: 17     Functional Mobility:  Additional staff present: N/A  Bed Mobility:  · Supine to Sit: minimum assistance; HOB elevated and from left side of bed  · Scooting anteriorly to EOB to have both feet planted on floor: contact guard assistance  · Sit to Supine: minimum assistance; HOB flat and to left side of bed    Transfers:   · Sit <> Stand Transfer: minimum assistance with hand-held assist   · Stand <> Sit Transfer: minimum assistance with hand-held assist   · [id number] from EOB      Gait:   · Patient ambulated: 4 - 6 left lateral steps   · Patient required: minimal assist  · Patient used: hand-held assist  · Gait Pattern observed: 2-point gait  · Gait Deviation(s): unsteady gait and decreased robert  · Impairments due to: impaired balance and decreased sensation  · Comments: tactile and verbal cues to step to the left     Therapeutic Activities & Exercises:   Education:  Patient provided with daily orientation and goals of this PT session. Patient and family agreed to participate in session. They were educated to transfer to bedside chair/bedside commode/bathroom with RN/PCT present. Patient educated on Fall risk, home safety, Home exercise program and transfer training by explanation.  Patient was receptive to education and verbalizes understanding. Encouraged patient to perform daily exercises & mobility to increase endurance and  decrease effects of bedrest. Time provided for therapeutic counseling and discussion of current health disposition. All questions answered to patient's and family's satisfaction, within scope of PT practice; voiced no other concerns. White board updated in patient's room, RN notified of session.    Patient left supine with bed alarm, with head in midline, neutral pelvis & heels floated for skin protection with all lines intact, call button in reach and RN notified.    GOALS:   Multidisciplinary Problems     Physical Therapy Goals        Problem: Physical Therapy Goal    Goal Priority Disciplines Outcome Goal Variances Interventions   Physical Therapy Goal     PT, PT/OT Ongoing, Progressing     Description: Goals to be met by: 2020    Patient will increase functional independence with mobility by performin. Supine <> sit with Stand-by Assistance.  2. Sit <> stand transfer with Stand-by Assistance using No Assistive Device.  3. Bed <> chair transfer via Stand Pivot with Stand-by Assistance using No Assistive Device.  4. Dynamic standing for 8 minutes with Contact Guard Assistance using No Assistive Device to prepare for functional tasks in standing.  5. Able to tolerate exercise for 15-20 reps with independence.  6. Gait x 100ft with CGA with RW or No AD to prepare for community ambulation.                         History:     Past Medical History:   Diagnosis Date    Cancer     prostate    Diabetes mellitus     Former smoker 2020    History of stroke 8/15/2017    Hypertension     Hypertrophic cardiomyopathy     Renal disorder     Stroke        Past Surgical History:   Procedure Laterality Date    BACK SURGERY      COLONOSCOPY N/A 10/18/2018    Procedure: COLONOSCOPY;  Surgeon: Alan Gooden MD;  Location: [facility];  Service: Endoscopy;  Laterality: N/A;    ESOPHAGOGASTRODUODENOSCOPY N/A 2018    Procedure: EGD (ESOPHAGOGASTRODUODENOSCOPY);  Surgeon: Oli Cuadra MD;  Location:  [facility] ENDO;  Service: Endoscopy;  Laterality: N/A;    ESOPHAGOGASTRODUODENOSCOPY N/A 10/18/2018    Procedure: EGD (ESOPHAGOGASTRODUODENOSCOPY);  Surgeon: Alan Gooden MD;  Location: [facility];  Service: Endoscopy;  Laterality: N/A;    THYROIDECTOMY         Time Tracking:     PT Received On: 07/19/20  PT Start Time: 1134     PT Stop Time: 1200  PT Total Time (min): 26 min     Billable Minutes: Evaluation 10 and Therapeutic Activity 10    Ban Nguyen PT, DPT  7/19/2020  Pager: [phone]

## 2020-07-19 NOTE — ASSESSMENT & PLAN NOTE
Patient with history of multiple strokes   History of a fib + intracranial/extracranial atherosclerosis  Continue secondary stroke prevention with eliquis + atorvastatin 80 mg

## 2020-07-19 NOTE — PLAN OF CARE
Problem: SLP Goal  Goal: SLP Goal  Description: Speech Language Pathology Goals  Goals expected to be met by 7/26/20    1. Pt will participate in ongoing assessment of swallow function to determine safest, least restrictive means of nutrition/hydration  2. Pt will participate in further assessment of cognitive-linguistic skills to determine ongoing POC needs  3. Educate Pt and family on aspiration precautions and SLP POC      Outcome: Ongoing, Progressing     SLP Bedside Swallow Study initiated. No overt S/S aspiration with trials presented; however, risk of aspiration remains 2/2 decreased attention, weakness and reduced endurance. REC: continue current, regular diet with thin liquids, medications one at a time, provided strict 1:1 assistance, close monitoring with all PO and strict aspiration precautions. Please Continue to monitor for signs and symptoms of aspiration and discontinue oral feeding should you notice any of the following: watery eyes, reddened facial area, wet vocal quality, increased work of breathing, change in respiratory status, increased congestion, coughing, fever, etc. Findings/recs reviewed with RN and MD team.     CAMMIE Wright., [facility]-SLP  Speech-Language Pathology  Pager: [phone]  7/19/2020

## 2020-07-19 NOTE — H&P
[facility]  Vascular Neurology  Comprehensive Stroke Center  History & Physical    Inpatient consult to Vascular (Stroke) Neurology  Consult performed by: Pamela Hutchison PA-C  Consult ordered by: Pamela Hutchison PA-C        Assessment/Plan:     Patient is a 80 y.o. year old male with:    * Embolic stroke involving right middle cerebral artery  Shant Magana Jr. is a 80 y.o. male with PMHx of dementia, multiple strokes, intracranial and extracranial atherosclerosis, DM, HTN, a fib (eliquis with history of noncompliance due to cost) who presented to ED via EMS for AMS x 2 days. Family reports confusion, inattentiveness, delayed verbal response, and facial droop. Patient with history of L facial droop from previous stroke. On exam, patient not oriented to place, time, or situation. R gaze preference and L hemianopsia also noted. CT revealed R parietal infarct. Patient admitted to stroke unit. MRI/MRA and echo pending. Etiology likely cardioembolic    Antithrombotics for secondary stroke prevention: Anticoagulants: Apixaban 2.5 mg BID     Statins for secondary stroke prevention and hyperlipidemia, if present:   Statins: Atorvastatin- 80 mg daily    Aggressive risk factor modification: HTN, DM, HLD, A-Fib, atherosclerosis, stroke     Rehab efforts: The patient has been evaluated by a stroke team provider and the therapy needs have been fully considered based off the presenting complaints and exam findings. The following therapy evaluations are needed: PT evaluate and treat, OT evaluate and treat, SLP evaluate and treat, PM&R evaluate for appropriate placement    Diagnostics ordered/pending: HgbA1C to assess blood glucose levels, MRA head to assess vasculature, MRA neck/arch to assess vasculature, MRI head without contrast to assess brain parenchyma, TTE to assess cardiac function/status     VTE prophylaxis: Mechanical prophylaxis: Place SCDs  None: Reason for No Pharmacological VTE Prophylaxis:  Currently on anticoagulation    BP parameters: Infarct: No intervention, SBP <220        Former smoker  Stroke risk factor  Encourage continued smoking cessation    Benign prostatic hyperplasia  Continue home flomax    Dementia without behavioral disturbance  Delirium precautions ordered    Paroxysmal atrial fibrillation  Stroke risk factor  Continue eliquis  Patient compliant with anticoagulation medication per his son    Chronic diastolic congestive heart failure  Repeat echo pending  Last echo 12/2019 with mild LV diastolic dysfunction    Cytotoxic cerebral edema  Area of cytotoxic cerebral edema identified when reviewing brain imaging in the territory of the R middle cerebral artery. There is no mass effect associated with it. We will continue to monitor the patients clinical exam for any worsening of symptoms which may indicate expansion of the stroke or the area of the edema resulting in the clinical change. The pattern is suggestive of cardioembolic etiology.        Mixed hyperlipidemia  Stroke risk factor  Continue home atorvastatin 80 mg daily    Internal carotid artery stenosis, left  Stroke risk factor  MRA pending      SHIKHA (acute kidney injury)  Cr 2.2, last CMP in 12/2019 with Cr of 1.5 so unclear if patient has new baseline  Will start IVFs and monitor Cr  Avoid nephrotoxins    History of stroke  Patient with history of multiple strokes   History of a fib + intracranial/extracranial atherosclerosis  Continue secondary stroke prevention with eliquis + aspirin + atorvastatin 80 mg        Type 2 diabetes mellitus with complication, without long-term current use of insulin  Stroke risk factor  A1C pending  SSI  Glucose 140-180    Essential hypertension  Stroke risk factor  SBP <220 in setting of acute stroke  Holding home BP medications  Prn labetalol        STROKE DOCUMENTATION     Acute Stroke Times   Symptom Onset Date: 07/16/20    NIH Scale:  Interval: baseline  1a. Level of Consciousness: 0-->Alert,  "keenly responsive  1b. LOC Questions: 2-->Answers neither question correctly  1c. LOC Commands: 0-->Performs both tasks correctly  2. Best Gaze: 1-->Partial gaze palsy, gaze is abnormal in one or both eyes, but forced deviation or total gaze paresis is not present  3. Visual: 2-->Complete hemianopia  4. Facial Palsy: 1-->Minor paralysis (flattened nasolabial fold, asymmetry on smiling)  5a. Motor Arm, Left: 0-->No drift, limb holds 90 (or 45) degrees for full 10 secs  5b. Motor Arm, Right: 0-->No drift, limb holds 90 (or 45) degrees for full 10 secs  6a. Motor Leg, Left: 0-->No drift, leg holds 30 degree position for full 5 secs  6b. Motor Leg, Right: 0-->No drift, leg holds 30 degree position for full 5 secs  7. Limb Ataxia: 0-->Absent  8. Sensory: 0-->Normal, no sensory loss  9. Best Language: 0-->No aphasia, normal  10. Dysarthria: 0-->Normal  11. Extinction and Inattention (formerly Neglect): 0-->No abnormality  Total (NIH Stroke Scale): 6     Modified [place] Score: 0  Royal Coma Scale:    ABCD2 Score:    [id number] Score:   HAS -BLED Score:   ICH Score:   Hunt & Choudhury Classification:      Thrombolysis Candidate? No, Out of window     Delays to Thrombolysis?  No    Interventional Revascularization Candidate?   Is the patient eligible for mechanical endovascular reperfusion (CHATO)?  No;  Large core infarct    Hemorrhagic change of an Ischemic Stroke: Does this patient have an ischemic stroke with hemorrhagic changes? No         Subjective:     History of Present Illness:  Shant Magana Jr. is a 80 y.o. male with PMHx of dementia, multiple strokes, intracranial and extracranial atherosclerosis, DM, HTN, a fib (eliquis with history of noncompliance due to cost) who presented to ED via EMS for AMS x 2 days. Per EMS, family reported that patient "has not been himself." ED provider noted LSW and L hemianopsia on exam and stroke team was consulted.    Stroke provider contacted patient's son (Hoda) for more " information and history. His son noticed a change in behavior yesterday around 2pm. He states that yesterday the patient seemed more confused and was walking around aimlessly. This morning, the patient called his son and his son went to check on him. The patient was at home with his grandson and he had fallen between the bed and the dresser. His son noticed that he was inattentive with a facial droop and delayed verbal response. Patient stated he was going to restroom but was instead walking into closet. This persistent change in behavior and mental status prompted family to have patient taken to ED via EMS.          Past Medical History:   Diagnosis Date    Cancer     prostate    Diabetes mellitus     Hypertension     Hypertrophic cardiomyopathy     Renal disorder      Past Surgical History:   Procedure Laterality Date    BACK SURGERY      COLONOSCOPY N/A 10/18/2018    Procedure: COLONOSCOPY;  Surgeon: Alan Gooden MD;  Location: [facility];  Service: Endoscopy;  Laterality: N/A;    ESOPHAGOGASTRODUODENOSCOPY N/A 9/4/2018    Procedure: EGD (ESOPHAGOGASTRODUODENOSCOPY);  Surgeon: Oli Cuadra MD;  Location: [facility];  Service: Endoscopy;  Laterality: N/A;    ESOPHAGOGASTRODUODENOSCOPY N/A 10/18/2018    Procedure: EGD (ESOPHAGOGASTRODUODENOSCOPY);  Surgeon: Alan Gooden MD;  Location: [facility];  Service: Endoscopy;  Laterality: N/A;    THYROIDECTOMY       No family history on file.  Social History     Tobacco Use    Smoking status: Former Smoker     Packs/day: 0.90     Years: 3.00     Pack years: 2.70     Types: Cigarettes    Smokeless tobacco: Never Used   Substance Use Topics    Alcohol use: Never     Frequency: Never    Drug use: No     Review of patient's allergies indicates:  No Known Allergies    Medications: I have reviewed the current medication administration record.    (Not in a hospital admission)      Review of Systems   Constitutional: Negative for chills and fever.   Eyes:  Positive for visual disturbance.   Respiratory: Negative for cough.    Cardiovascular: Negative for chest pain.   Gastrointestinal: Negative for nausea and vomiting.   Neurological: Positive for facial asymmetry. Negative for speech difficulty and weakness.   Psychiatric/Behavioral: Positive for confusion.     Objective:     Vital Signs (Most Recent):  Temp: 99 °F (37.2 °C) (07/18/20 1541)  Pulse: 72 (07/18/20 1643)  Resp: 16 (07/18/20 1541)  BP: (!) 147/79 (07/18/20 1642)  SpO2: 95 % (07/18/20 1643)    Vital Signs Range (Last 24H):  Temp:  [99 °F (37.2 °C)]   Pulse:  [71-76]   Resp:  [16]   BP: (127-147)/(68-83)   SpO2:  [95 %-97 %]     Physical Exam  Vitals signs reviewed.   Constitutional:       General: He is not in acute distress.     Appearance: He is well-developed.   HENT:      Head: Normocephalic and atraumatic.   Cardiovascular:      Rate and Rhythm: Normal rate.   Pulmonary:      Effort: Pulmonary effort is normal. No respiratory distress.   Skin:     General: Skin is warm and dry.   Neurological:      Mental Status: He is alert.         Neurological Exam:   LOC: alert  Attention Span: poor  Language: No aphasia  Articulation: No dysarthria  Orientation: Not oriented to place, Not oriented to time  Visual Fields: Hemianopsia left  EOM (CN III, IV, VI): Gaze preference  right  Facial Movement (CN VII): Lower facial weakness on the Left  Motor: Arm left  Normal 5/5  Leg left  Normal 5/5  Arm right  Normal 5/5  Leg right Normal 5/5  Sensation: Intact to light touch, temperature and vibration  Tone: Normal tone throughout      Laboratory:  CMP:   Recent Labs   Lab 07/18/20  1617   CALCIUM 10.3   ALBUMIN 3.3*   PROT 7.0      K 4.0   CO2 23   *   BUN 25*   CREATININE 2.2*   ALKPHOS 79   ALT 13   AST 24   BILITOT 1.1*     CBC:   Recent Labs   Lab 07/18/20 1617   WBC 5.67   RBC 4.76   HGB 13.7*   HCT 43.0      MCV 90   MCH 28.8   MCHC 31.9*     Lipid Panel:   Recent Labs   Lab 07/18/20 1617    CHOL 124   LDLCALC 70.8   HDL 40   TRIG 66     Coagulation:   Recent Labs   Lab 07/18/20  1617   INR 1.1     Hgb A1C: No results for input(s): HGBA1C in the last 168 hours.  TSH: No results for input(s): TSH in the last 168 hours.    Diagnostic Results:      Brain imaging:  MRI pending    CT Head. Date: 07/18/20  1. Interval right parietal lobe suspected acute infarct.  No intracranial hemorrhage.  Further evaluation/follow-up as warranted.  2. Bilateral thalamic suspected lacunar type infarcts, age-indeterminate.  Correlate clinically.  3. Additional multifocal supratentorial and to a lesser extent infratentorial remote infarcts, as detailed above.  4. Generalized cerebral volume loss and sequela of advanced microvascular ischemic changes.    Vessel Imaging:  MRA pending    Cardiac Evaluation:   Echo pending        Pamela Hutchison PA-C  Comprehensive Stroke Center  Department of Vascular Neurology   [facility]

## 2020-07-19 NOTE — ASSESSMENT & PLAN NOTE
Repeat echo pending  Last echo 12/2019 with mild LV diastolic dysfunction  Now holding IVF given pleural effusions and wheezing on exam

## 2020-07-19 NOTE — PT/OT/SLP EVAL
Occupational Therapy   Evaluation    Name: Shant Magana Jr.  MRN: [id number]  Admitting Diagnosis:  Embolic stroke involving right middle cerebral artery      Recommendations:     Discharge Recommendations: nursing facility, skilled  Discharge Equipment Recommendations:  none  Barriers to discharge:  Decreased caregiver support    Assessment:     Shant Magana Jr. is a 80 y.o. male with a medical diagnosis of Embolic stroke involving right middle cerebral artery.  He presents with decreased independence with ADL's & severely impaired by hemianopsia. Performance deficits affecting function: weakness, impaired endurance, impaired self care skills, impaired functional mobilty, impaired sensation, visual deficits, impaired balance, impaired cognition, decreased upper extremity function, decreased lower extremity function, decreased coordination, decreased safety awareness, impaired fine motor.      Rehab Prognosis: Fair; patient would benefit from acute skilled OT services to address these deficits and reach maximum level of function.       Plan:     Patient to be seen 3 x/week to address the above listed problems via self-care/home management, therapeutic activities, therapeutic exercises, neuromuscular re-education, cognitive retraining, sensory integration  · Plan of Care Expires: 08/18/20  · Plan of Care Reviewed with: patient    Subjective     Chief Complaint: none stated  Patient/Family Comments/goals: none stated    Occupational Profile:  Living Environment: Pt resides with son in 1 story house with 4 steps 2 rails to enter.  Pt was independent with ADL's & ambulation. 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  Pt enjoys fishing.  Pt has a bathtub for bathing.  Equipment Used at Home:  (RW, Sc, 3 in 1 commode, TTB)  Assistance upon Discharge: per pt son    Pain/Comfort:  · Pain Rating 1: 0/10  · Pain Rating Post-Intervention 1: 0/10    Patients cultural, spiritual, Jehovah's witness conflicts given the current situation:  no    Objective:     Communicated with: RN prior to session.  Patient found supine with bed alarm, telemetry, SCD upon OT entry to room. No family present.    General Precautions: Standard, fall, aspiration   Orthopedic Precautions:N/A   Braces: N/A     Occupational Performance:    Bed Mobility:    · Patient completed Supine to Sit with minimum assistance  · Patient completed Sit to Supine with contact guard assistance    Functional Mobility/Transfers:  · Patient completed Sit <> Stand Transfer with contact guard assistance  with  no assistive device   · Functional Mobility: Min (A) taking steps to & from EOB    Activities of Daily Living:  · Upper Body Dressing: maximal assistance seated EOB  · Lower Body Dressing: total assistance donning socks seated EOB   · Eating: SBA-Min (A) while in bed with (A) for food & for set up    Cognitive/Visual Perceptual:  Cognitive/Psychosocial Skills:     -       Oriented to: Person, Situation and & hospital   -       Follows Commands/attention:Easily distracted and Follows one-step commands  -       Communication: dysarthria  -       Memory: Impaired STM  -       Safety awareness/insight to disability: impaired   Visual/Perceptual:      -left hemianopsia      Physical Exam:  Sensation:    -       Impaired  light/touch LUE  Dominant hand:    -       let  Upper Extremity Range of Motion:     -       Right Upper Extremity: WFL except 100* shoulder flexion  -       Left Upper Extremity: WFL except 90* shoulder flexion  Upper Extremity Strength:    -       Right Upper Extremity: WFL except 3-/5 shoulder flexion  -       Left Upper Extremity: WFL except 3-/5 shoulder flexion   Strength:    -       Right Upper Extremity: WFL  -       Left Upper Extremity: WFL  Fine Motor Coordination:    -       Impaired  Left hand thumb/finger opposition skills  , Right hand thumb/finger opposition skills   and Left hand, manipulation of objects      AMPAC 6 Click ADL:  AMPAC Total Score:  13    Treatment & Education:  Provided education regarding role of OT, POC, & discharge recommendations.  Provided education regarding safety & need for (A) with all OOB activities.  Pt had no further questions & when asked whether there were any concerns pt reported none.    Education:    Patient left supine with all lines intact, call button in reach, bed alarm on and RN notified    GOALS:   Multidisciplinary Problems     Occupational Therapy Goals        Problem: Occupational Therapy Goal    Goal Priority Disciplines Outcome Interventions   Occupational Therapy Goal     OT, PT/OT Ongoing, Progressing    Description: Goals to be met by: 7/29     Patient will increase functional independence with ADLs by performing:    Feeding with Modified [place].  UE Dressing with Minimal Assistance.  LE Dressing with Moderate Assistance.  Grooming while standing with Supervision.  Supine to sit with Supervision.  Step transfer with Supervision                     History:     Past Medical History:   Diagnosis Date    Cancer     prostate    Diabetes mellitus     Former smoker 7/18/2020    History of stroke 8/15/2017    Hypertension     Hypertrophic cardiomyopathy     Renal disorder     Stroke        Past Surgical History:   Procedure Laterality Date    BACK SURGERY      COLONOSCOPY N/A 10/18/2018    Procedure: COLONOSCOPY;  Surgeon: Alan Gooden MD;  Location: [facility];  Service: Endoscopy;  Laterality: N/A;    ESOPHAGOGASTRODUODENOSCOPY N/A 9/4/2018    Procedure: EGD (ESOPHAGOGASTRODUODENOSCOPY);  Surgeon: Oli Cuadra MD;  Location: [facility];  Service: Endoscopy;  Laterality: N/A;    ESOPHAGOGASTRODUODENOSCOPY N/A 10/18/2018    Procedure: EGD (ESOPHAGOGASTRODUODENOSCOPY);  Surgeon: Alan Gooden MD;  Location: [facility];  Service: Endoscopy;  Laterality: N/A;    THYROIDECTOMY         Time Tracking:     OT Date of Treatment: 07/19/20  OT Start Time: 0906  OT Stop Time: 0939  OT Total Time (min):  33 min    Billable Minutes:Evaluation 20  Self Care/Home Management 13    JASPER Chávez  7/19/2020

## 2020-07-19 NOTE — MEDICAL/APP STUDENT
Subjective:       Patient ID: Shant Magana Jr. is a 80 y.o. male.    Chief Complaint: Altered Mental Status (Left-sided facial droop and leaning to right side when walking. Family last saw him 2 days ago, so that's the last known well. )    HPI     Shant Magana is an 79 yo male with an extensive pmh including, HLD, HTN, A-fib with history medication noncompliance, multiple previous strokes, intracranial and extracranial carotid atherosclerosis, CKD 3, prior SHIKHA, HFpEF, and dementia.  He presents on 07/19 after displaying confusion, facial droop, attention deficits, and delayed verbal response. He was last known to be normal 2 days prior to presentation on 07/17. He was not given tPA on arrival due to being outside of window.  He is currently on apixaban 2.5mg at home. Prior noncompliance was secondary to prohibitive medication costs.  His baseline mental status per NP documentation on 01/02/2020 is reported to be oriented to person and place but not time.     Interval history  07/19 patient went for echo this am, awaiting read, pt noted to be wheezing CXR ordered      Objective:         Vitals for the last 24h:   T:98.1-99 - 98.8  Pulse: 59-84  Resp: 14-19  BP:      127-199 (systolic)              64-94 (diastolic)  MAP:   Spo2: 95%-99%    Lab Results   Component Value Date    WBC 4.89 07/19/2020    HGB 12.8 (L) 07/19/2020    HCT 39.5 (L) 07/19/2020    MCV 90 07/19/2020     07/19/2020     CMP  Sodium   Date Value Ref Range Status   07/19/2020 141 136 - 145 mmol/L Final     Potassium   Date Value Ref Range Status   07/19/2020 4.0 3.5 - 5.1 mmol/L Final     Chloride   Date Value Ref Range Status   07/19/2020 111 (H) 95 - 110 mmol/L Final     CO2   Date Value Ref Range Status   07/19/2020 25 23 - 29 mmol/L Final     Glucose   Date Value Ref Range Status   07/19/2020 105 70 - 110 mg/dL Final     BUN, Bld   Date Value Ref Range Status   07/19/2020 21 8 - 23 mg/dL Final     Creatinine   Date Value Ref Range  Status   07/19/2020 1.8 (H) 0.5 - 1.4 mg/dL Final     Calcium   Date Value Ref Range Status   07/19/2020 9.7 8.7 - 10.5 mg/dL Final     Total Protein   Date Value Ref Range Status   07/19/2020 6.3 6.0 - 8.4 g/dL Final     Albumin   Date Value Ref Range Status   07/19/2020 3.0 (L) 3.5 - 5.2 g/dL Final     Total Bilirubin   Date Value Ref Range Status   07/19/2020 0.8 0.1 - 1.0 mg/dL Final     Comment:     For infants and newborns, interpretation of results should be based  on gestational age, weight and in agreement with clinical  observations.  Premature Infant recommended reference ranges:  Up to 24 hours.............<8.0 mg/dL  Up to 48 hours............<12.0 mg/dL  3-5 days..................<15.0 mg/dL  6-29 days.................<15.0 mg/dL       Alkaline Phosphatase   Date Value Ref Range Status   07/19/2020 73 55 - 135 U/L Final     AST   Date Value Ref Range Status   07/19/2020 24 10 - 40 U/L Final     ALT   Date Value Ref Range Status   07/19/2020 13 10 - 44 U/L Final     Anion Gap   Date Value Ref Range Status   07/19/2020 5 (L) 8 - 16 mmol/L Final     eGFR if    Date Value Ref Range Status   07/19/2020 40.2 (A) >60 mL/min/1.73 m^2 Final     eGFR if non    Date Value Ref Range Status   07/19/2020 34.8 (A) >60 mL/min/1.73 m^2 Final     Comment:     Calculation used to obtain the estimated glomerular filtration  rate (eGFR) is the CKD-EPI equation.        Lab Results   Component Value Date    HGBA1C 5.8 (H) 07/18/2020       Physical Exam  Constitutional:       Comments: Oriented to person only   HENT:      Head: Normocephalic and atraumatic.   Eyes:      General: Visual field deficit: questionable visual field deficit on L.   Neck:      Musculoskeletal: Normal range of motion and neck supple.   Cardiovascular:      Rate and Rhythm: Normal rate and regular rhythm.   Pulmonary:      Effort: Pulmonary effort is normal.      Breath sounds: Wheezing present.   Abdominal:       General: Abdomen is flat. Bowel sounds are normal.      Palpations: Abdomen is soft.   Skin:     General: Skin is warm and dry.   Neurological:      Mental Status: He is alert.      GCS: GCS eye subscore is 4. GCS verbal subscore is 5. GCS motor subscore is 6.      Cranial Nerves: Facial asymmetry (L. facial droop) present.      Sensory: Sensation is intact.      Motor: Motor function is intact.      Coordination: Coordination abnormal (on L. arm). Finger-Nose-Finger Test abnormal (on L. arm). Heel to Rodriguez Test normal.      Deep Tendon Reflexes:      Reflex Scores:       Bicep reflexes are 2+ on the right side and 2+ on the left side.       Brachioradialis reflexes are 2+ on the right side and 2+ on the left side.       Patellar reflexes are 3+ on the right side and 3+ on the left side.     Comments: Mild L. Sided neglect.   Pt demonstrates some L- R confusion       Imaging Results          MRA Neck without contrast (Final result)  Result time 07/18/20 21:31:53    Final result by Leonard Andujar MD (07/18/20 21:31:53)                 Impression:      Area of diffusion restriction with ADC match suggestive of acute infarct in the right parietal lobe. Additional smaller acute infarcts right occipital lobe and right basal ganglia.    Remote right frontal infarct and remote lacunar infarcts in the thalami.  Supratentorial white matter T2/flair hyperintense signal foci suggesting sequela of chronic small vessel ischemic change.    MR angiogram of the head and neck appears unchanged compared to prior exams without any focal occlusion identified.    Critical findings relayed to CHANA REYNOSO by KEILA Jarvis MD on 07/18/2020 at 20:52.    Electronically signed by resident: Akbar Jarvis  Date:    07/18/2020  Time:    20:19    Electronically signed by: Leonard Andujar MD  Date:    07/18/2020  Time:    21:31             Narrative:    EXAMINATION:  MRI BRAIN WITHOUT CONTRAST; MRA NECK WITHOUT CONTRAST; MRA BRAIN WITHOUT  CONTRAST    CLINICAL HISTORY:  Altered mental status;; stroke;    TECHNIQUE:  Multiplanar multisequence MR imaging of the brain was performed without the use of intravenous contrast.    Obtained as a separate acquisition from the MRI brain, a time-of-flight MR arteriogram of the cervical and intracranial vasculature with multiple MIPS reconstructions.    COMPARISON:  CT head 07/18/2020, 09/03/2018    MRI brain, MRA brain and neck 12/11/2019 delete that    MRI brain 10/18/2017    FINDINGS:  Brain:    Ventricles normal in size for age.  No hydrocephalus.    No extra-axial blood or fluid collections.    Area of diffusion restriction with ADC match in the right parietal lobe with associated mild cytotoxic edema consistent with acute infarct.  Additional smaller acute infarcts right occipital lobe and right basal ganglia.  Remote right frontal infarct and remote lacunar infarcts in the thalami.  Supratentorial white matter T2/flair hyperintense signal foci suggesting sequela of chronic small vessel ischemic change.  No parenchymal mass or hemorrhage.  Gradient sequence reveals a few scattered remote microhemorrhages, unchanged from prior.    The T2 skull base flow voids are preserved.  Bone marrow signal intensity unremarkable.    MRA:    Common and internal carotid arteries are normal in caliber.  No significant stenosis at either carotid bifurcation.    Left dominant vertebral artery, intermittent narrowing throughout the V1, V2 and V4 segments of the right vertebral artery without evidence of occlusion in similar in comparison to prior exam.  Basilar artery is otherwise patent and unremarkable.    Hypoplastic left A1 and diminutive left PCA unchanged from prior exams.  Otherwise, the ACAs, MCAs and PCAs demonstrate no evidence of  high-grade stenosis, focal occlusion or intracranial aneurysm.                               MRA Brain without contrast (Final result)  Result time 07/18/20 21:31:53    Final result by  Leonard Andujar MD (07/18/20 21:31:53)                 Impression:      Area of diffusion restriction with ADC match suggestive of acute infarct in the right parietal lobe. Additional smaller acute infarcts right occipital lobe and right basal ganglia.    Remote right frontal infarct and remote lacunar infarcts in the thalami.  Supratentorial white matter T2/flair hyperintense signal foci suggesting sequela of chronic small vessel ischemic change.    MR angiogram of the head and neck appears unchanged compared to prior exams without any focal occlusion identified.    Critical findings relayed to CHANA REYNOSO by KELIA Jarvis MD on 07/18/2020 at 20:52.    Electronically signed by resident: Akbar Jarvis  Date:    07/18/2020  Time:    20:19    Electronically signed by: Leonard Andujar MD  Date:    07/18/2020  Time:    21:31             Narrative:    EXAMINATION:  MRI BRAIN WITHOUT CONTRAST; MRA NECK WITHOUT CONTRAST; MRA BRAIN WITHOUT CONTRAST    CLINICAL HISTORY:  Altered mental status;; stroke;    TECHNIQUE:  Multiplanar multisequence MR imaging of the brain was performed without the use of intravenous contrast.    Obtained as a separate acquisition from the MRI brain, a time-of-flight MR arteriogram of the cervical and intracranial vasculature with multiple MIPS reconstructions.    COMPARISON:  CT head 07/18/2020, 09/03/2018    MRI brain, MRA brain and neck 12/11/2019 delete that    MRI brain 10/18/2017    FINDINGS:  Brain:    Ventricles normal in size for age.  No hydrocephalus.    No extra-axial blood or fluid collections.    Area of diffusion restriction with ADC match in the right parietal lobe with associated mild cytotoxic edema consistent with acute infarct.  Additional smaller acute infarcts right occipital lobe and right basal ganglia.  Remote right frontal infarct and remote lacunar infarcts in the thalami.  Supratentorial white matter T2/flair hyperintense signal foci suggesting sequela of chronic small  vessel ischemic change.  No parenchymal mass or hemorrhage.  Gradient sequence reveals a few scattered remote microhemorrhages, unchanged from prior.    The T2 skull base flow voids are preserved.  Bone marrow signal intensity unremarkable.    MRA:    Common and internal carotid arteries are normal in caliber.  No significant stenosis at either carotid bifurcation.    Left dominant vertebral artery, intermittent narrowing throughout the V1, V2 and V4 segments of the right vertebral artery without evidence of occlusion in similar in comparison to prior exam.  Basilar artery is otherwise patent and unremarkable.    Hypoplastic left A1 and diminutive left PCA unchanged from prior exams.  Otherwise, the ACAs, MCAs and PCAs demonstrate no evidence of  high-grade stenosis, focal occlusion or intracranial aneurysm.                               MRI Brain Without Contrast (Final result)  Result time 07/18/20 21:31:53    Final result by Leonard Andujar MD (07/18/20 21:31:53)                 Impression:      Area of diffusion restriction with ADC match suggestive of acute infarct in the right parietal lobe. Additional smaller acute infarcts right occipital lobe and right basal ganglia.    Remote right frontal infarct and remote lacunar infarcts in the thalami.  Supratentorial white matter T2/flair hyperintense signal foci suggesting sequela of chronic small vessel ischemic change.    MR angiogram of the head and neck appears unchanged compared to prior exams without any focal occlusion identified.    Critical findings relayed to CHANA REYNOSO by KEILA Jarvis MD on 07/18/2020 at 20:52.    Electronically signed by resident: Akbar Jarvis  Date:    07/18/2020  Time:    20:19    Electronically signed by: Leonard Andujar MD  Date:    07/18/2020  Time:    21:31             Narrative:    EXAMINATION:  MRI BRAIN WITHOUT CONTRAST; MRA NECK WITHOUT CONTRAST; MRA BRAIN WITHOUT CONTRAST    CLINICAL HISTORY:  Altered mental status;;  stroke;    TECHNIQUE:  Multiplanar multisequence MR imaging of the brain was performed without the use of intravenous contrast.    Obtained as a separate acquisition from the MRI brain, a time-of-flight MR arteriogram of the cervical and intracranial vasculature with multiple MIPS reconstructions.    COMPARISON:  CT head 07/18/2020, 09/03/2018    MRI brain, MRA brain and neck 12/11/2019 delete that    MRI brain 10/18/2017    FINDINGS:  Brain:    Ventricles normal in size for age.  No hydrocephalus.    No extra-axial blood or fluid collections.    Area of diffusion restriction with ADC match in the right parietal lobe with associated mild cytotoxic edema consistent with acute infarct.  Additional smaller acute infarcts right occipital lobe and right basal ganglia.  Remote right frontal infarct and remote lacunar infarcts in the thalami.  Supratentorial white matter T2/flair hyperintense signal foci suggesting sequela of chronic small vessel ischemic change.  No parenchymal mass or hemorrhage.  Gradient sequence reveals a few scattered remote microhemorrhages, unchanged from prior.    The T2 skull base flow voids are preserved.  Bone marrow signal intensity unremarkable.    MRA:    Common and internal carotid arteries are normal in caliber.  No significant stenosis at either carotid bifurcation.    Left dominant vertebral artery, intermittent narrowing throughout the V1, V2 and V4 segments of the right vertebral artery without evidence of occlusion in similar in comparison to prior exam.  Basilar artery is otherwise patent and unremarkable.    Hypoplastic left A1 and diminutive left PCA unchanged from prior exams.  Otherwise, the ACAs, MCAs and PCAs demonstrate no evidence of  high-grade stenosis, focal occlusion or intracranial aneurysm.                               X-Ray Chest AP Portable (Final result)  Result time 07/18/20 18:37:48    Final result by Yoni Walker MD (07/18/20 18:37:48)                  Impression:      Findings suggesting right pleural effusion.  No large consolidation seen.      Electronically signed by: Yoni Walker MD  Date:    07/18/2020  Time:    18:37             Narrative:    EXAMINATION:  XR CHEST AP PORTABLE    CLINICAL HISTORY:  Altered mental status, unspecified    TECHNIQUE:  Single frontal view of the chest was performed.    COMPARISON:  Chest radiograph 12/10/2019    FINDINGS:  Monitoring leads overlie the chest.  Patient is slightly rotated.    Left chest suspected loop recorder device is unchanged.  Cardiomediastinal silhouette is relatively midline and prominent similar to prior noting calcification of the arch.  There is chronic nonspecific elevation of the right hemidiaphragm similar to prior with blunting of the right costophrenic angle and hazy opacification of the right mid to lower lung zone suggesting small to moderate-sized pleural effusion.  Right basilar platelike scarring versus atelectasis noted.  Left hemithorax is well expanded and clear.  No large pneumothorax.  No acute osseous process seen.                                CT Head Without Contrast (Final result)  Result time 07/18/20 17:55:17    Final result by Yoni Walker MD (07/18/20 17:55:17)                 Impression:      1. Interval right parietal lobe suspected acute infarct.  No intracranial hemorrhage.  Further evaluation/follow-up as warranted.  2. Bilateral thalamic suspected lacunar type infarcts, age-indeterminate.  Correlate clinically.  3. Additional multifocal supratentorial and to a lesser extent infratentorial remote infarcts, as detailed above.  4. Generalized cerebral volume loss and sequela of advanced microvascular ischemic changes.  This report was flagged in Epic as abnormal.      Electronically signed by: Yoni Walker MD  Date:    07/18/2020  Time:    17:55             Narrative:    EXAMINATION:  CT HEAD WITHOUT CONTRAST    CLINICAL HISTORY:  Altered mental status;    TECHNIQUE:  Low  dose axial CT images obtained throughout the head without intravenous contrast. Sagittal and coronal reconstructions were performed.    COMPARISON:  MRI brain 12/11/2019 and head CT 12/10/2019    FINDINGS:  Patient motion artifact somewhat limits evaluation on initial images but mostly resolved with repeat scanning.    Intracranial compartment: Age-appropriate mild generalized cerebral volume loss.  Ventricles are midline and stable in size and configuration without distortion by mass effect or acute hydrocephalus.    No extra-axial blood or fluid collections.    Interval moderate-sized area of hypoattenuation involving the right parietal lobe without significant volume loss concerning for recent infarct.  Right frontal lobe small area of encephalomalacia consistent with remote infarct unchanged.  Previous small area of acute infarct involving the high left parietal lobe and upper right occipital lobe now show small area of encephalomalacia consistent with temporal evolution of remote infarcts.  Many of the additional previous acute multifocal small cortical infarcts are not well demonstrated on today's exam.  Multiple remote lacunar type infarcts involving the bilateral caudate, bilateral basal ganglia, bilateral corona radiata, central jillian and right cerebellum.  Subtle hypoattenuating foci without significant volume loss at the bilateral thalami concerning for lacunar type infarcts, age-indeterminate.  Superimposed patchy and confluent hypoattenuation of the subcortical and periventricular white matter consistent with advanced chronic small vessel ischemic change.  No parenchymal mass, hemorrhage, or midline shift.    Skull/extracranial contents (limited evaluation): No fracture. Mastoid air cells and paranasal sinuses are essentially clear.                                 Assessment:       1. Stroke    2. Altered mental status    3. Embolic stroke        Shant Magana is an 81 yo male with an extensive pmh  including, HLD, HTN, A-fib with history medication noncompliance, multiple previous strokes, intracranial and extracranial carotid atherosclerosis, CKD 3, prior SHIKHA, HFpEF, and dementia.  He presents with a R. MCA likely cardioembolic in etiology given his history of afib.    Plan:       R. MCA  - Anticoagulation: Apixaban 2.5mg BID   - Hyperlipidemia and secondary stroke prevention: Atorvastatin 80mg  - Echo results pending  - Awaiting SLP evals and PM&R recs  - PT recommends HHPT with 24hr supervision/assist vs SNF     Altered Mental Status  - Patient demonstrating inattention on exam  - Infectious work-up ordered to r/o infection     HTN  - Monitoring BP  - Okay to start reintroducing home BP meds given few days out of stroke    Wheeze  - CXR and NIFs ordered  -Patient's O2 saturation wnl      SHIKHA  - IVF stopped this am given oral intake  - Will trend his Cr        Ian Wong  [facility]  MS4

## 2020-07-19 NOTE — ASSESSMENT & PLAN NOTE
Cr 2.2, last CMP in 12/2019 with Cr of 1.5 so unclear if patient has new baseline  Will start IVFs and monitor Cr  Avoid nephrotoxins

## 2020-07-19 NOTE — PLAN OF CARE
Problem: Fall Injury Risk  Goal: Absence of Fall and Fall-Related Injury  Outcome: Ongoing, Progressing  Intervention: Promote Injury-Free Environment  Flowsheets (Taken 7/19/2020 0225)  Safety Promotion/Fall Prevention:   assistive device/personal item within reach   bed alarm set   lighting adjusted   medications reviewed   nonskid shoes/socks when out of bed   side rails raised x 2   supervised activity   instructed to call staff for mobility  Environmental Safety Modification:   assistive device/personal items within reach   clutter free environment maintained   lighting adjusted     Problem: Adult Inpatient Plan of Care  Goal: Plan of Care Review  Outcome: Ongoing, Progressing  Flowsheets (Taken 7/19/2020 0225)  Plan of Care Reviewed With: patient  Goal: Optimal Comfort and Wellbeing  Outcome: Ongoing, Progressing  Intervention: Provide Person-Centered Care  Flowsheets (Taken 7/19/2020 0225)  Trust Relationship/Rapport:   care explained   thoughts/feelings acknowledged   reassurance provided     POC reviewed with patient. All questions and concerns reviewed. Fall/safety precautions implemented and maintained. Condom catheter care provided. IVF maintained. Stroke education book at . No acute events noted this shift. Please see flowsheet for full assessment and vitals. Bed locked in lowest position. Call bell within reach. Will continue to monitor.

## 2020-07-19 NOTE — ASSESSMENT & PLAN NOTE
Shant Magana Jr. is a 80 y.o. male with PMHx of dementia, multiple strokes, intracranial and extracranial atherosclerosis, DM, HTN, a fib (eliquis with history of noncompliance due to cost) who presented to ED via EMS for AMS x 2 days. Family reports confusion, inattentiveness, delayed verbal response, and facial droop. Patient with history of L facial droop from previous stroke. On exam, patient not oriented to place, time, or situation. R gaze preference and L hemianopsia also noted. CT revealed R parietal infarct. Patient admitted to stroke unit. Etiology likely cardioembolic    Echo pending 7/19/20    Antithrombotics for secondary stroke prevention: Anticoagulants: Apixaban 2.5 mg BID     Statins for secondary stroke prevention and hyperlipidemia, if present:   Statins: Atorvastatin- 80 mg daily    Aggressive risk factor modification: HTN, DM, HLD, A-Fib, atherosclerosis, stroke     Rehab efforts: The patient has been evaluated by a stroke team provider and the therapy needs have been fully considered based off the presenting complaints and exam findings. The following therapy evaluations are needed: PT evaluate and treat, OT evaluate and treat, SLP evaluate and treat, PM&R evaluate for appropriate placement    Diagnostics ordered/pending: HgbA1C to assess blood glucose levels, MRA head to assess vasculature, MRA neck/arch to assess vasculature, MRI head without contrast to assess brain parenchyma, TTE to assess cardiac function/status     VTE prophylaxis: Mechanical prophylaxis: Place SCDs  None: Reason for No Pharmacological VTE Prophylaxis: Currently on anticoagulation    BP parameters: Infarct: No intervention, SBP <220

## 2020-07-19 NOTE — PLAN OF CARE
Problem: Occupational Therapy Goal  Goal: Occupational Therapy Goal  Description: Goals to be met by: 7/29     Patient will increase functional independence with ADLs by performing:    Feeding with Modified [place].  UE Dressing with Minimal Assistance.  LE Dressing with Moderate Assistance.  Grooming while standing with Supervision.  Supine to sit with Supervision.  Step transfer with Supervision    Outcome: Ongoing, Progressing     OT eval completed.

## 2020-07-19 NOTE — NURSING
2025: Patient arrived via bed per RN in stable condition. Oriented to name and situation. Respirations even and unlabored. Patient on oxygen 2L/NC. No s/s of distress noted. Denies any pain at this time. Oriented patient to room and call bell. Bed locked in lowest position. Call bell within reach. Will continue to monitor.

## 2020-07-19 NOTE — PLAN OF CARE
Plan of Care:  Discharge Recommendation: HHPT with 24hr supervision/assist (vs SNF pending family decision).  Please continue Progressive Mobility Protocol as appropriate.  Appropriate transfer level with nursing staff: Bed <> Chair:  Stand Pivot with minimum assistance with No Assistive Device.    Ban Nguyen PT, DPT  2020  Pager: [phone]    Physical Therapy Treatment Goals:  Multidisciplinary Problems       Physical Therapy Goals          Problem: Physical Therapy Goal    Goal Priority Disciplines Outcome Goal Variances Interventions   Physical Therapy Goal     PT, PT/OT Ongoing, Progressing     Description: Goals to be met by: 2020    Patient will increase functional independence with mobility by performin. Supine <> sit with Stand-by Assistance.  2. Sit <> stand transfer with Stand-by Assistance using No Assistive Device.  3. Bed <> chair transfer via Stand Pivot with Stand-by Assistance using No Assistive Device.  4. Dynamic standing for 8 minutes with Contact Guard Assistance using No Assistive Device to prepare for functional tasks in standing.  5. Able to tolerate exercise for 15-20 reps with independence.  6. Gait x 100ft with CGA with RW or No AD to prepare for community ambulation.

## 2020-07-19 NOTE — ASSESSMENT & PLAN NOTE
Cr 2.2, last CMP in 12/2019 with Cr of 1.5 so unclear if patient has new baseline  Avoid nephrotoxins  Cr now 1.8 on 7/19/20  Holding fluids due to pleural effusions and wheezing on exam

## 2020-07-19 NOTE — SUBJECTIVE & OBJECTIVE
Neurologic Chief Complaint: AMS, Confusion, Inattention, Delayed Verbal Response, L Facial Droop    Subjective:     Interval History: Patient is seen for follow-up neurological assessment and treatment recommendations: Patient admitted overnight. Patient with mild wheezing on exam today. Discontinued IVFs and started PRN DuoNebs, CXR with concern for pleural effusion as on previous imaging. Still pending Echo, A1c, and PT/OT/SLP.    HPI, Past Medical, Family, and Social History remains the same as documented in the initial encounter.     Review of Systems   Constitutional: Negative.    HENT: Negative.    Respiratory: Positive for wheezing. Negative for cough, chest tightness and shortness of breath.    Cardiovascular: Negative for chest pain.   Gastrointestinal: Negative.    Musculoskeletal: Negative.    Neurological: Positive for facial asymmetry and weakness.   Psychiatric/Behavioral: Positive for confusion.   All other systems reviewed and are negative.    Scheduled Meds:   apixaban  2.5 mg Oral BID    atorvastatin  80 mg Oral Daily    pantoprazole  40 mg Oral Daily    tamsulosin  0.4 mg Oral Daily    vitamin D  1,000 Units Oral Daily     Continuous Infusions:   sodium chloride 0.9%       PRN Meds:acetaminophen, albuterol-ipratropium, labetaloL, ondansetron, polyethylene glycol, sodium chloride 0.9%, sodium chloride 0.9%    Objective:     Vital Signs (Most Recent):  Temp: 98.8 °F (37.1 °C) (07/19/20 1100)  Pulse: 60 (07/19/20 1100)  Resp: 18 (07/19/20 1100)  BP: (!) 170/79 (07/19/20 1100)  SpO2: 97 % (07/19/20 1100)  BP Location: Left arm    Vital Signs Range (Last 24H):  Temp:  [98.1 °F (36.7 °C)-99 °F (37.2 °C)]   Pulse:  [59-84]   Resp:  [14-19]   BP: (127-199)/(68-94)   SpO2:  [93 %-99 %]   BP Location: Left arm    Physical Exam  Vitals signs reviewed.   Constitutional:       General: He is not in acute distress.  HENT:      Head: Normocephalic and atraumatic.      Mouth/Throat:      Mouth: Mucous  membranes are moist.      Pharynx: Oropharynx is clear.   Eyes:      Pupils: Pupils are equal, round, and reactive to light.   Neck:      Musculoskeletal: Normal range of motion. No neck rigidity.   Cardiovascular:      Rate and Rhythm: Normal rate.   Pulmonary:      Effort: Pulmonary effort is normal. No respiratory distress.      Breath sounds: Wheezing present.   Musculoskeletal: Normal range of motion.         General: No tenderness.      Right lower leg: No edema.      Left lower leg: No edema.   Neurological:      Mental Status: He is alert.     Neurological Exam:   LOC: alert  Attention Span: poor  Language: No aphasia  Articulation: Dysarthria  Orientation: Person  Visual Fields: Hemianopsia left  EOM (CN III, IV, VI): Gaze preference  right  Pupils (CN II, III): PERRL  Facial Movement (CN VII): Lower facial weakness on the Left  Motor: Arm left  Paresis: 4/5  Leg left  Normal 5/5  Arm right  Normal 5/5  Leg right Normal 5/5  Cebellar: Upper Extremity Appendicular Ataxia (Finger Nose Finger)  Left  Sensation: Intact to light touch, temperature and vibration  Tone: Normal tone throughout    Laboratory:  CMP:   Recent Labs   Lab 07/19/20  0335   CALCIUM 9.7   ALBUMIN 3.0*   PROT 6.3      K 4.0   CO2 25   *   BUN 21   CREATININE 1.8*   ALKPHOS 73   ALT 13   AST 24   BILITOT 0.8     CBC:   Recent Labs   Lab 07/19/20  0335   WBC 4.89   RBC 4.38*   HGB 12.8*   HCT 39.5*      MCV 90   MCH 29.2   MCHC 32.4     Lipid Panel:   Recent Labs   Lab 07/18/20  1617   CHOL 124   LDLCALC 70.8   HDL 40   TRIG 66     Coagulation:   Recent Labs   Lab 07/19/20  0335   INR 1.1   APTT 28.6     Platelet Aggregation Study: No results for input(s): PLTAGG, PLTAGINTERP, PLTAGREGLACO, ADPPLTAGGREG in the last 168 hours.  Hgb A1C:   Recent Labs   Lab 07/18/20  1902   HGBA1C 5.8*     TSH:   Recent Labs   Lab 07/18/20  1617   TSH 2.825       Diagnostic Results     MRI Brain 7/18/20  Area of diffusion restriction with ADC  match suggestive of acute infarct in the right parietal lobe. Additional smaller acute infarcts right occipital lobe and right basal ganglia.     Remote right frontal infarct and remote lacunar infarcts in the thalami.  Supratentorial white matter T2/flair hyperintense signal foci suggesting sequela of chronic small vessel ischemic change.     MR angiogram of the head and neck appears unchanged compared to prior exams without any focal occlusion identified.       CT Head. Date: 07/18/20  1. Interval right parietal lobe suspected acute infarct.  No intracranial hemorrhage.  Further evaluation/follow-up as warranted.  2. Bilateral thalamic suspected lacunar type infarcts, age-indeterminate.  Correlate clinically.  3. Additional multifocal supratentorial and to a lesser extent infratentorial remote infarcts, as detailed above.  4. Generalized cerebral volume loss and sequela of advanced microvascular ischemic changes.     Vessel Imaging:  MRA Head and Neck 7/18/20 - see MRI Brain 7/18/20 above     Cardiac Evaluation:   Echo - pending

## 2020-07-19 NOTE — PROGRESS NOTES
[facility]  Vascular Neurology  Comprehensive Stroke Center  Progress Note    Assessment/Plan:     * Embolic stroke involving right middle cerebral artery  Shant Magana Jr. is a 80 y.o. male with PMHx of dementia, multiple strokes, intracranial and extracranial atherosclerosis, DM, HTN, a fib (eliquis with history of noncompliance due to cost) who presented to ED via EMS for AMS x 2 days. Family reports confusion, inattentiveness, delayed verbal response, and facial droop. Patient with history of L facial droop from previous stroke. On exam, patient not oriented to place, time, or situation. R gaze preference and L hemianopsia also noted. CT revealed R parietal infarct. Patient admitted to stroke unit. Etiology likely cardioembolic    Echo pending 7/19/20    Antithrombotics for secondary stroke prevention: Anticoagulants: Apixaban 2.5 mg BID     Statins for secondary stroke prevention and hyperlipidemia, if present:   Statins: Atorvastatin- 80 mg daily    Aggressive risk factor modification: HTN, DM, HLD, A-Fib, atherosclerosis, stroke     Rehab efforts: The patient has been evaluated by a stroke team provider and the therapy needs have been fully considered based off the presenting complaints and exam findings. The following therapy evaluations are needed: PT evaluate and treat, OT evaluate and treat, SLP evaluate and treat, PM&R evaluate for appropriate placement    Diagnostics ordered/pending: HgbA1C to assess blood glucose levels, MRA head to assess vasculature, MRA neck/arch to assess vasculature, MRI head without contrast to assess brain parenchyma, TTE to assess cardiac function/status     VTE prophylaxis: Mechanical prophylaxis: Place SCDs  None: Reason for No Pharmacological VTE Prophylaxis: Currently on anticoagulation    BP parameters: Infarct: No intervention, SBP <220        Former smoker  Stroke risk factor  Encourage continued smoking cessation    Benign prostatic  hyperplasia  Continue home flomax    Dementia without behavioral disturbance  Delirium precautions ordered    Paroxysmal atrial fibrillation  Stroke risk factor  Continue eliquis  Patient compliant with anticoagulation medication per his son    Chronic diastolic congestive heart failure  Repeat echo pending  Last echo 12/2019 with mild LV diastolic dysfunction  Now holding IVF given pleural effusions and wheezing on exam    Cytotoxic cerebral edema  Area of cytotoxic cerebral edema identified when reviewing brain imaging in the territory of the R middle cerebral artery. There is no mass effect associated with it. We will continue to monitor the patients clinical exam for any worsening of symptoms which may indicate expansion of the stroke or the area of the edema resulting in the clinical change. The pattern is suggestive of cardioembolic etiology.        Mixed hyperlipidemia  Stroke risk factor  Continue home atorvastatin 80 mg daily    Internal carotid artery stenosis, left  Stroke risk factor        SHIKHA (acute kidney injury)  Cr 2.2, last CMP in 12/2019 with Cr of 1.5 so unclear if patient has new baseline  Avoid nephrotoxins  Cr now 1.8 on 7/19/20  Holding fluids due to pleural effusions and wheezing on exam    History of stroke  Patient with history of multiple strokes   History of a fib + intracranial/extracranial atherosclerosis  Continue secondary stroke prevention with eliquis + atorvastatin 80 mg        Type 2 diabetes mellitus with complication, without long-term current use of insulin  Stroke risk factor  A1C pending  SSI  Glucose 140-180    Essential hypertension  Stroke risk factor  SBP <220 in setting of acute stroke  Holding home BP medications  Prn labetalol  Currently at goal, will  as necessary for lowering goals in the future         7/19: Patient with mild wheezing on exam today. Discontinued IVFs and started PRN DuoNebs, CXR with concern for pleural effusion as on previous  imaging. Still pending Echo, A1c, and PT/OT/SLP.    STROKE DOCUMENTATION   Acute Stroke Times   Symptom Onset Date: 07/16/20    NIH Scale:  1a. Level of Consciousness: 0-->Alert, keenly responsive  1b. LOC Questions: 1-->Answers one question correctly  1c. LOC Commands: 0-->Performs both tasks correctly  2. Best Gaze: 1-->Partial gaze palsy, gaze is abnormal in one or both eyes, but forced deviation or total gaze paresis is not present  3. Visual: 1-->Partial hemianopia  4. Facial Palsy: 1-->Minor paralysis (flattened nasolabial fold, asymmetry on smiling)  5a. Motor Arm, Left: 1-->Drift, limb holds 90 (or 45) degrees, but drifts down before full 10 seconds, does not hit bed or other support  5b. Motor Arm, Right: 0-->No drift, limb holds 90 (or 45) degrees for full 10 secs  6a. Motor Leg, Left: 0-->No drift, leg holds 30 degree position for full 5 secs  6b. Motor Leg, Right: 0-->No drift, leg holds 30 degree position for full 5 secs  7. Limb Ataxia: 1-->Present in one limb  8. Sensory: 0-->Normal, no sensory loss  9. Best Language: 0-->No aphasia, normal  10. Dysarthria: 0-->Normal  11. Extinction and Inattention (formerly Neglect): 0-->No abnormality  Total (NIH Stroke Scale): 6       Modified Butch Score: 0  Londonderry Coma Scale:    ABCD2 Score:    [id number] Score:8  HAS -BLED Score:   ICH Score:   Hunt & Choudhury Classification:      Hemorrhagic change of an Ischemic Stroke: Does this patient have an ischemic stroke with hemorrhagic changes? No     Neurologic Chief Complaint: AMS, Confusion, Inattention, Delayed Verbal Response, L Facial Droop    Subjective:     Interval History: Patient is seen for follow-up neurological assessment and treatment recommendations: Patient admitted overnight. Patient with mild wheezing on exam today. Discontinued IVFs and started PRN DuoNebs, CXR with concern for pleural effusion as on previous imaging. Still pending Echo, A1c, and PT/OT/SLP.    HPI, Past Medical, Family, and Social  History remains the same as documented in the initial encounter.     Review of Systems   Constitutional: Negative.    HENT: Negative.    Respiratory: Positive for wheezing. Negative for cough, chest tightness and shortness of breath.    Cardiovascular: Negative for chest pain.   Gastrointestinal: Negative.    Musculoskeletal: Negative.    Neurological: Positive for facial asymmetry and weakness.   Psychiatric/Behavioral: Positive for confusion.   All other systems reviewed and are negative.    Scheduled Meds:   apixaban  2.5 mg Oral BID    atorvastatin  80 mg Oral Daily    pantoprazole  40 mg Oral Daily    tamsulosin  0.4 mg Oral Daily    vitamin D  1,000 Units Oral Daily     Continuous Infusions:   sodium chloride 0.9%       PRN Meds:acetaminophen, albuterol-ipratropium, labetaloL, ondansetron, polyethylene glycol, sodium chloride 0.9%, sodium chloride 0.9%    Objective:     Vital Signs (Most Recent):  Temp: 98.8 °F (37.1 °C) (07/19/20 1100)  Pulse: 60 (07/19/20 1100)  Resp: 18 (07/19/20 1100)  BP: (!) 170/79 (07/19/20 1100)  SpO2: 97 % (07/19/20 1100)  BP Location: Left arm    Vital Signs Range (Last 24H):  Temp:  [98.1 °F (36.7 °C)-99 °F (37.2 °C)]   Pulse:  [59-84]   Resp:  [14-19]   BP: (127-199)/(68-94)   SpO2:  [93 %-99 %]   BP Location: Left arm    Physical Exam  Vitals signs reviewed.   Constitutional:       General: He is not in acute distress.  HENT:      Head: Normocephalic and atraumatic.      Mouth/Throat:      Mouth: Mucous membranes are moist.      Pharynx: Oropharynx is clear.   Eyes:      Pupils: Pupils are equal, round, and reactive to light.   Neck:      Musculoskeletal: Normal range of motion. No neck rigidity.   Cardiovascular:      Rate and Rhythm: Normal rate.   Pulmonary:      Effort: Pulmonary effort is normal. No respiratory distress.      Breath sounds: Wheezing present.   Musculoskeletal: Normal range of motion.         General: No tenderness.      Right lower leg: No edema.       Left lower leg: No edema.   Neurological:      Mental Status: He is alert.     Neurological Exam:   LOC: alert  Attention Span: poor  Language: No aphasia  Articulation: Dysarthria  Orientation: Person  Visual Fields: Hemianopsia left  EOM (CN III, IV, VI): Gaze preference  right  Pupils (CN II, III): PERRL  Facial Movement (CN VII): Lower facial weakness on the Left  Motor: Arm left  Paresis: 4/5  Leg left  Normal 5/5  Arm right  Normal 5/5  Leg right Normal 5/5  Cebellar: Upper Extremity Appendicular Ataxia (Finger Nose Finger)  Left  Sensation: Intact to light touch, temperature and vibration  Tone: Normal tone throughout    Laboratory:  CMP:   Recent Labs   Lab 07/19/20  0335   CALCIUM 9.7   ALBUMIN 3.0*   PROT 6.3      K 4.0   CO2 25   *   BUN 21   CREATININE 1.8*   ALKPHOS 73   ALT 13   AST 24   BILITOT 0.8     CBC:   Recent Labs   Lab 07/19/20  0335   WBC 4.89   RBC 4.38*   HGB 12.8*   HCT 39.5*      MCV 90   MCH 29.2   MCHC 32.4     Lipid Panel:   Recent Labs   Lab 07/18/20  1617   CHOL 124   LDLCALC 70.8   HDL 40   TRIG 66     Coagulation:   Recent Labs   Lab 07/19/20  0335   INR 1.1   APTT 28.6     Platelet Aggregation Study: No results for input(s): PLTAGG, PLTAGINTERP, PLTAGREGLACO, ADPPLTAGGREG in the last 168 hours.  Hgb A1C:   Recent Labs   Lab 07/18/20  1902   HGBA1C 5.8*     TSH:   Recent Labs   Lab 07/18/20  1617   TSH 2.825       Diagnostic Results     MRI Brain 7/18/20  Area of diffusion restriction with ADC match suggestive of acute infarct in the right parietal lobe. Additional smaller acute infarcts right occipital lobe and right basal ganglia.     Remote right frontal infarct and remote lacunar infarcts in the thalami.  Supratentorial white matter T2/flair hyperintense signal foci suggesting sequela of chronic small vessel ischemic change.     MR angiogram of the head and neck appears unchanged compared to prior exams without any focal occlusion identified.       CT Head.  Date: 07/18/20  1. Interval right parietal lobe suspected acute infarct.  No intracranial hemorrhage.  Further evaluation/follow-up as warranted.  2. Bilateral thalamic suspected lacunar type infarcts, age-indeterminate.  Correlate clinically.  3. Additional multifocal supratentorial and to a lesser extent infratentorial remote infarcts, as detailed above.  4. Generalized cerebral volume loss and sequela of advanced microvascular ischemic changes.     Vessel Imaging:  MRA Head and Neck 7/18/20 - see MRI Brain 7/18/20 above     Cardiac Evaluation:   Echo - pending      Hi Morrell MD  Comprehensive Stroke Center  Department of Vascular Neurology   [facility]

## 2020-07-19 NOTE — HOSPITAL COURSE
Admitted with acute encephalopathy x 2 days. MRI brain showed R parietal infarct. MRA negative for LVO or significant stenosis. Etiology likely cardioembolic. Home apixaban 2.5mg resumed without complications. Patient had hypoxia during admission requiring 2L nc. CXR appeared congestion. Initially diuresed with no improvement. Significant expiratory wheezing on exam. Improved with scheduled duonebs. Given smoking history and clinical picture, ambulatory referral was placed to Pulmonology for evaluation of obstructive lung disease and PFTs. PT/OT consulted, recommended SNF. Patient was accepted to [facility] on 7/27. Discussed results and plan with patient/family. Patient verbalized understanding and agreed to plan. Patient stable for discharge to SNF. Patient requires repeat COVID test (pending).

## 2020-07-19 NOTE — ASSESSMENT & PLAN NOTE
Stroke risk factor  SBP <220 in setting of acute stroke  Holding home BP medications  Prn labetalol  Currently at goal, will  as necessary for lowering goals in the future

## 2020-07-20 LAB
ALBUMIN SERPL BCP-MCNC: 2.8 G/DL (ref 3.5–5.2)
ALP SERPL-CCNC: 75 U/L (ref 55–135)
ALT SERPL W/O P-5'-P-CCNC: 13 U/L (ref 10–44)
ANION GAP SERPL CALC-SCNC: 6 MMOL/L (ref 8–16)
AST SERPL-CCNC: 23 U/L (ref 10–40)
BASOPHILS # BLD AUTO: 0.02 K/UL (ref 0–0.2)
BASOPHILS NFR BLD: 0.5 % (ref 0–1.9)
BILIRUB SERPL-MCNC: 0.9 MG/DL (ref 0.1–1)
BUN SERPL-MCNC: 17 MG/DL (ref 8–23)
CALCIUM SERPL-MCNC: 9.6 MG/DL (ref 8.7–10.5)
CHLORIDE SERPL-SCNC: 111 MMOL/L (ref 95–110)
CO2 SERPL-SCNC: 23 MMOL/L (ref 23–29)
CREAT SERPL-MCNC: 1.4 MG/DL (ref 0.5–1.4)
DIFFERENTIAL METHOD: ABNORMAL
EOSINOPHIL # BLD AUTO: 0.2 K/UL (ref 0–0.5)
EOSINOPHIL NFR BLD: 4.6 % (ref 0–8)
ERYTHROCYTE [DISTWIDTH] IN BLOOD BY AUTOMATED COUNT: 14.2 % (ref 11.5–14.5)
EST. GFR  (AFRICAN AMERICAN): 54.5 ML/MIN/1.73 M^2
EST. GFR  (NON AFRICAN AMERICAN): 47.1 ML/MIN/1.73 M^2
GLUCOSE SERPL-MCNC: 105 MG/DL (ref 70–110)
HCT VFR BLD AUTO: 39.5 % (ref 40–54)
HGB BLD-MCNC: 12.9 G/DL (ref 14–18)
IMM GRANULOCYTES # BLD AUTO: 0.01 K/UL (ref 0–0.04)
IMM GRANULOCYTES NFR BLD AUTO: 0.2 % (ref 0–0.5)
LYMPHOCYTES # BLD AUTO: 0.7 K/UL (ref 1–4.8)
LYMPHOCYTES NFR BLD: 17.1 % (ref 18–48)
MCH RBC QN AUTO: 29.1 PG (ref 27–31)
MCHC RBC AUTO-ENTMCNC: 32.7 G/DL (ref 32–36)
MCV RBC AUTO: 89 FL (ref 82–98)
MONOCYTES # BLD AUTO: 0.3 K/UL (ref 0.3–1)
MONOCYTES NFR BLD: 8.1 % (ref 4–15)
NEUTROPHILS # BLD AUTO: 2.8 K/UL (ref 1.8–7.7)
NEUTROPHILS NFR BLD: 69.5 % (ref 38–73)
NRBC BLD-RTO: 0 /100 WBC
PLATELET # BLD AUTO: 212 K/UL (ref 150–350)
PMV BLD AUTO: 11.4 FL (ref 9.2–12.9)
POCT GLUCOSE: 145 MG/DL (ref 70–110)
POCT GLUCOSE: 97 MG/DL (ref 70–110)
POTASSIUM SERPL-SCNC: 3.8 MMOL/L (ref 3.5–5.1)
PROT SERPL-MCNC: 6.1 G/DL (ref 6–8.4)
RBC # BLD AUTO: 4.44 M/UL (ref 4.6–6.2)
SODIUM SERPL-SCNC: 140 MMOL/L (ref 136–145)
WBC # BLD AUTO: 4.09 K/UL (ref 3.9–12.7)

## 2020-07-20 PROCEDURE — 99233 PR SUBSEQUENT HOSPITAL CARE,LEVL III: ICD-10-PCS | Mod: ,,, | Performed by: PSYCHIATRY & NEUROLOGY

## 2020-07-20 PROCEDURE — 97802 MEDICAL NUTRITION INDIV IN: CPT

## 2020-07-20 PROCEDURE — 25000003 PHARM REV CODE 250: Performed by: PHYSICIAN ASSISTANT

## 2020-07-20 PROCEDURE — 92526 ORAL FUNCTION THERAPY: CPT

## 2020-07-20 PROCEDURE — 11000001 HC ACUTE MED/SURG PRIVATE ROOM

## 2020-07-20 PROCEDURE — 25000242 PHARM REV CODE 250 ALT 637 W/ HCPCS: Performed by: STUDENT IN AN ORGANIZED HEALTH CARE EDUCATION/TRAINING PROGRAM

## 2020-07-20 PROCEDURE — 99233 SBSQ HOSP IP/OBS HIGH 50: CPT | Mod: ,,, | Performed by: PSYCHIATRY & NEUROLOGY

## 2020-07-20 PROCEDURE — 99222 PR INITIAL HOSPITAL CARE,LEVL II: ICD-10-PCS | Mod: ,,, | Performed by: NURSE PRACTITIONER

## 2020-07-20 PROCEDURE — 92523 SPEECH SOUND LANG COMPREHEN: CPT

## 2020-07-20 PROCEDURE — 36415 COLL VENOUS BLD VENIPUNCTURE: CPT

## 2020-07-20 PROCEDURE — 80053 COMPREHEN METABOLIC PANEL: CPT

## 2020-07-20 PROCEDURE — 85025 COMPLETE CBC W/AUTO DIFF WBC: CPT

## 2020-07-20 PROCEDURE — 99222 1ST HOSP IP/OBS MODERATE 55: CPT | Mod: ,,, | Performed by: NURSE PRACTITIONER

## 2020-07-20 PROCEDURE — 94640 AIRWAY INHALATION TREATMENT: CPT

## 2020-07-20 RX ADMIN — APIXABAN 2.5 MG: 2.5 TABLET, FILM COATED ORAL at 10:07

## 2020-07-20 RX ADMIN — TAMSULOSIN HYDROCHLORIDE 0.4 MG: 0.4 CAPSULE ORAL at 10:07

## 2020-07-20 RX ADMIN — APIXABAN 2.5 MG: 2.5 TABLET, FILM COATED ORAL at 08:07

## 2020-07-20 RX ADMIN — PANTOPRAZOLE SODIUM 40 MG: 40 TABLET, DELAYED RELEASE ORAL at 10:07

## 2020-07-20 RX ADMIN — IPRATROPIUM BROMIDE AND ALBUTEROL SULFATE 3 ML: .5; 2.5 SOLUTION RESPIRATORY (INHALATION) at 05:07

## 2020-07-20 RX ADMIN — Medication 1000 UNITS: at 10:07

## 2020-07-20 RX ADMIN — ATORVASTATIN CALCIUM 80 MG: 20 TABLET, FILM COATED ORAL at 10:07

## 2020-07-20 RX ADMIN — IPRATROPIUM BROMIDE AND ALBUTEROL SULFATE 3 ML: .5; 2.5 SOLUTION RESPIRATORY (INHALATION) at 11:07

## 2020-07-20 NOTE — CONSULTS
"Food & Nutrition  Education    Diet Education: Cardiac-TLC  Time Spent: 10 minutes  Learners: Pt    Nutrition Education provided with handouts: Cardiac-TLC Nutrition Therapy    Comments: Pt sitting up in bed with bkfst tray to bedside. Pt consumed ~75% of brkfst. Pt denies chew/swallowing issues. No GI distress. Pt reports good PO intake of meals PTA. Denies any recent wt changes. Wt hx confirmed with # (10/2019), admit wt 193#.     Pt noted to be DM2-current HbA1C 5.8 suggesting dietary compliance.     Pt reports following a low Na diet at home. He states "I know all about my diet". Pt would not engage in additional prior intakes or types of meals he consumes. Encouraged diet compliance. Reinforced lean meats, whole grains, limiting processed and fast foods. Pt verbalized understanding. No barriers identified.    All questions and concerns answered. Dietitian's contact information provided.     Follow-Up: 7/27/2020    Please Re-consult as needed    Recommendations:   1.) Suggest cardiac, diabetic diet.     Thanks!      "

## 2020-07-20 NOTE — PLAN OF CARE
CM met with patient in room for Discharge Planning Assessment, patient had difficulty answering questions and requested for me to call edwin Mckeon.  Per daughter and grandson,  patient lives with grandson in a(n) H with 6 steps to enter.   Per daughter and granson, patient was assist/supervision with ADLS and used straight cane for ambulation.  Per daughter and grandson, the patient will have assistance from family upon discharge.  Discharge Planning Booklet given to patient/family and discussed.  All questions addressed.       PCP:  Trent Aldana MD      Pharmacy:    [facility]/pharmacy #[id number] - [place] - [address] [facility]  [address]  [place] [address]  Phone: [phone] Fax: [phone]        Emergency Contacts:  Extended Emergency Contact Information  Primary Emergency Contact: Mike Magana   [place]  Mobile Phone: [phone]  Relation: Daughter  Secondary Emergency Contact: Candace Hobson   [facility]  Home Phone: [phone]  Relation: Daughter      Insurance:    Payor: [employer] MEDICARE / Plan: Vinny 65 / Product Type: Medicare Advantage /        07/20/20 1700   Discharge Assessment   Assessment Type Discharge Planning Assessment   Confirmed/corrected address and phone number on facesheet? Yes   Assessment information obtained from? Other;Caregiver  (grandson and daughter via phone)   Expected Length of Stay (days) 5   Communicated expected length of stay with patient/caregiver yes   Prior to hospitilization cognitive status: Alert/Oriented;No Deficits   Prior to hospitalization functional status: Needs Assistance   Current cognitive status: Alert/Oriented   Current Functional Status: Assistive Equipment;Needs Assistance   Able to Return to Prior Arrangements yes   Is patient able to care for self after discharge? Unable to determine at this time (comments)   Who are your caregiver(s) and  their phone number(s)? Mike Magana daughter [phone]; William Magana grandson who lives with him [phone]   Patient's perception of discharge disposition home health   Readmission Within the Last 30 Days no previous admission in last 30 days   Patient currently being followed by outpatient case management? No   Patient currently receives any other outside agency services? No  (May have had helen  last year)   Equipment Currently Used at Home cane, straight;glucometer;shower chair;grab bar;bedside commode   Do you have any problems affording any of your prescribed medications? TBD   Is the patient taking medications as prescribed? yes   Does the patient have transportation home? Yes   Transportation Anticipated family or friend will provide  (grandson)   Dialysis Name and Scheduled days na   Does the patient receive services at the [facility]? No   Discharge Plan A Home with family;Home Health   Discharge Plan B Skilled Nursing Facility   DME Needed Upon Discharge  other (see comments)  (tbd)   Patient/Family in Agreement with Plan yes       Sandra Perdomo RN  Case Management  Ext: [phone]  07/20/2020  5:04 PM

## 2020-07-20 NOTE — SUBJECTIVE & OBJECTIVE
Past Medical History:   Diagnosis Date    Cancer     prostate    Diabetes mellitus     Former smoker 7/18/2020    History of stroke 8/15/2017    Hypertension     Hypertrophic cardiomyopathy     Renal disorder     Stroke      Past Surgical History:   Procedure Laterality Date    BACK SURGERY      COLONOSCOPY N/A 10/18/2018    Procedure: COLONOSCOPY;  Surgeon: Alan Gooden MD;  Location: [facility];  Service: Endoscopy;  Laterality: N/A;    ESOPHAGOGASTRODUODENOSCOPY N/A 9/4/2018    Procedure: EGD (ESOPHAGOGASTRODUODENOSCOPY);  Surgeon: Oli Cuadra MD;  Location: [facility];  Service: Endoscopy;  Laterality: N/A;    ESOPHAGOGASTRODUODENOSCOPY N/A 10/18/2018    Procedure: EGD (ESOPHAGOGASTRODUODENOSCOPY);  Surgeon: Alan Gooden MD;  Location: [facility];  Service: Endoscopy;  Laterality: N/A;    THYROIDECTOMY       Review of patient's allergies indicates:  No Known Allergies    Scheduled Medications:    apixaban  2.5 mg Oral BID    atorvastatin  80 mg Oral Daily    pantoprazole  40 mg Oral Daily    tamsulosin  0.4 mg Oral Daily    vitamin D  1,000 Units Oral Daily       PRN Medications: acetaminophen, albuterol-ipratropium, labetaloL, ondansetron, polyethylene glycol, sodium chloride 0.9%, sodium chloride 0.9%    Family History     Unknown per patient        Tobacco Use    Smoking status: Former Smoker     Packs/day: 0.90     Years: 3.00     Pack years: 2.70     Types: Cigarettes    Smokeless tobacco: Never Used   Substance and Sexual Activity    Alcohol use: Never     Frequency: Never    Drug use: No    Sexual activity: Not on file     Review of Systems   Constitutional: Positive for activity change.   HENT: Negative for sore throat and trouble swallowing.    Respiratory: Negative for shortness of breath.    Gastrointestinal: Negative for nausea and vomiting.   Musculoskeletal: Positive for gait problem. Negative for back pain.   Skin: Negative for pallor and wound.   Neurological:  Positive for weakness.   Psychiatric/Behavioral: Positive for confusion. Negative for agitation and behavioral problems.     Objective:     Vital Signs (Most Recent):  Temp: 98.2 °F (36.8 °C) (07/20/20 0837)  Pulse: 70 (07/20/20 1103)  Resp: 18 (07/20/20 1103)  BP: 137/85 (07/20/20 0837)  SpO2: 98 % (07/20/20 1103)    Vital Signs (24h Range):  Temp:  [97.4 °F (36.3 °C)-99.1 °F (37.3 °C)] 98.2 °F (36.8 °C)  Pulse:  [58-77] 70  Resp:  [15-22] 18  SpO2:  [94 %-98 %] 98 %  BP: (137-177)/() 137/85     Body mass index is 25.46 kg/m².    Physical Exam  Vitals signs and nursing note reviewed.   Constitutional:       Appearance: Normal appearance. He is well-developed.   HENT:      Head: Normocephalic and atraumatic.      Nose: Nose normal.      Mouth/Throat:      Mouth: Mucous membranes are moist.   Eyes:      General:         Right eye: No discharge.         Left eye: No discharge.      Extraocular Movements: Extraocular movements intact.      Pupils: Pupils are equal, round, and reactive to light.   Neck:      Musculoskeletal: Neck supple.   Pulmonary:      Effort: Pulmonary effort is normal. No respiratory distress.   Abdominal:      General: There is no distension.      Palpations: Abdomen is soft.      Tenderness: There is no abdominal tenderness.   Musculoskeletal:         General: No deformity.      Comments: Moving all exts spontaneously    Skin:     General: Skin is warm and dry.   Neurological:      Mental Status: He is alert.      Sensory: No sensory deficit.      Motor: No abnormal muscle tone.      Comments: Slow to answer questions  Confusion present   Psychiatric:         Mood and Affect: Mood normal.         Behavior: Behavior normal.       NEUROLOGICAL EXAMINATION:     CRANIAL NERVES     CN III, IV, VI   Pupils are equal, round, and reactive to light.      Diagnostic Results: Labs: Reviewed  ECG: Reviewed  CT: Reviewed

## 2020-07-20 NOTE — ASSESSMENT & PLAN NOTE
Shant Magana Jr. is a 80 y.o. male with PMHx of dementia, multiple strokes, intracranial and extracranial atherosclerosis, DM, HTN, a fib (eliquis with history of noncompliance due to cost) who presented to ED via EMS for AMS x 2 days. Family reports confusion, inattentiveness, delayed verbal response, and facial droop. Patient with history of L facial droop from previous stroke. On exam, patient not oriented to place, time, or situation. R gaze preference and L hemianopsia also noted. CT revealed R parietal infarct. Etiology likely cardioembolic      Antithrombotics for secondary stroke prevention: Anticoagulants: Apixaban 2.5 mg BID     Statins for secondary stroke prevention and hyperlipidemia, if present:   Statins: Atorvastatin- 80 mg daily    Aggressive risk factor modification: HTN, DM, HLD, A-Fib, atherosclerosis, stroke     Rehab efforts: The patient has been evaluated by a stroke team provider and the therapy needs have been fully considered based off the presenting complaints and exam findings. The following therapy evaluations are needed: PT evaluate and treat, OT evaluate and treat, SLP evaluate and treat, PM&R evaluate for appropriate placement    Diagnostics ordered/pending: none    VTE prophylaxis: Mechanical prophylaxis: Place SCDs  None: Reason for No Pharmacological VTE Prophylaxis: Currently on anticoagulation    BP parameters: Infarct: No intervention, SBP <220

## 2020-07-20 NOTE — CONSULTS
[facility]  Physical Medicine & Rehab  Consult Note    Patient Name: Shant Magana Jr.  MRN: [id number]  Admission Date: 7/18/2020  Hospital Length of Stay: 2 days  Attending Physician: Gina Huang MD     Inpatient consult to Physical Medicine & Rehabilitation  Consult performed by: Pamela Lopez NP  Consult requested by:  Gina Huang MD    Collaborating Physician: Mayr Carmen Onofre MD  Reason for Consult:  Assess rehabilitation needs     Consults  Subjective:     Principal Problem: Embolic stroke involving right middle cerebral artery    HPI: Shant Magana is a 80-year-old male with PMHx of dementia, multiple strokes 7/17 and 10/17 (CTA revealed moderate high-grade stenosis of R M2, L ICA stenosis, and bilateral vertebral artery stenosis, no intervention performed), A fib (Eliquis hx of noncompliance). Patient presented to [facility] on 7/18 for AMS x 2 days. On arrival, CT revealed R parietal infarct. Hospital course complicated by SHIKHA (Cr now improving was holding fluids 2/2 pleural effusion).     Functional History: Patient lives with son in 1 story house with 4 steps 2 rails to enter. Prior to admission, I. DME: RW and SPC as needed.    Hospital Course: 7/19/20: Evaluated by PT & OT. Bed mobility CGA- Bunny. Sit to stand CGA- Bunny. UBD maxA. LBD totalA. Ambulated 4-6 steps HHA- Bunny.   SLP passed for reg and thin. 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    Past Medical History:   Diagnosis Date    Cancer     prostate    Diabetes mellitus     Former smoker 7/18/2020    History of stroke 8/15/2017    Hypertension     Hypertrophic cardiomyopathy     Renal disorder     Stroke      Past Surgical History:   Procedure Laterality Date    BACK SURGERY      COLONOSCOPY N/A 10/18/2018    Procedure: COLONOSCOPY;  Surgeon: Alan Gooden MD;  Location: [facility];  Service: Endoscopy;  Laterality: N/A;    ESOPHAGOGASTRODUODENOSCOPY N/A 9/4/2018    Procedure:  EGD (ESOPHAGOGASTRODUODENOSCOPY);  Surgeon: Oli Cuadra MD;  Location: [facility];  Service: Endoscopy;  Laterality: N/A;    ESOPHAGOGASTRODUODENOSCOPY N/A 10/18/2018    Procedure: EGD (ESOPHAGOGASTRODUODENOSCOPY);  Surgeon: Alan Gooden MD;  Location: [facility];  Service: Endoscopy;  Laterality: N/A;    THYROIDECTOMY       Review of patient's allergies indicates:  No Known Allergies    Scheduled Medications:    apixaban  2.5 mg Oral BID    atorvastatin  80 mg Oral Daily    pantoprazole  40 mg Oral Daily    tamsulosin  0.4 mg Oral Daily    vitamin D  1,000 Units Oral Daily       PRN Medications: acetaminophen, albuterol-ipratropium, labetaloL, ondansetron, polyethylene glycol, sodium chloride 0.9%, sodium chloride 0.9%    Family History     Unknown per patient        Tobacco Use    Smoking status: Former Smoker     Packs/day: 0.90     Years: 3.00     Pack years: 2.70     Types: Cigarettes    Smokeless tobacco: Never Used   Substance and Sexual Activity    Alcohol use: Never     Frequency: Never    Drug use: No    Sexual activity: Not on file     Review of Systems   Constitutional: Positive for activity change.   HENT: Negative for sore throat and trouble swallowing.    Respiratory: Negative for shortness of breath.    Gastrointestinal: Negative for nausea and vomiting.   Musculoskeletal: Positive for gait problem. Negative for back pain.   Skin: Negative for pallor and wound.   Neurological: Positive for weakness.   Psychiatric/Behavioral: Positive for confusion. Negative for agitation and behavioral problems.     Objective:     Vital Signs (Most Recent):  Temp: 98.2 °F (36.8 °C) (07/20/20 0837)  Pulse: 70 (07/20/20 1103)  Resp: 18 (07/20/20 1103)  BP: 137/85 (07/20/20 0837)  SpO2: 98 % (07/20/20 1103)    Vital Signs (24h Range):  Temp:  [97.4 °F (36.3 °C)-99.1 °F (37.3 °C)] 98.2 °F (36.8 °C)  Pulse:  [58-77] 70  Resp:  [15-22] 18  SpO2:  [94 %-98 %] 98 %  BP: (137-177)/() 137/85     Body  mass index is 25.46 kg/m².    Physical Exam  Vitals signs and nursing note reviewed.   Constitutional:       Appearance: Normal appearance. He is well-developed.   HENT:      Head: Normocephalic and atraumatic.      Nose: Nose normal.      Mouth/Throat:      Mouth: Mucous membranes are moist.   Eyes:      General:         Right eye: No discharge.         Left eye: No discharge.      Extraocular Movements: Extraocular movements intact.      Pupils: Pupils are equal, round, and reactive to light.   Neck:      Musculoskeletal: Neck supple.   Pulmonary:      Effort: Pulmonary effort is normal. No respiratory distress.   Abdominal:      General: There is no distension.      Palpations: Abdomen is soft.      Tenderness: There is no abdominal tenderness.   Musculoskeletal:         General: No deformity.      Comments: Moving all exts spontaneously    Skin:     General: Skin is warm and dry.   Neurological:      Mental Status: He is alert.      Sensory: No sensory deficit.      Motor: No abnormal muscle tone.      Comments: Slow to answer questions  Confusion present   Psychiatric:         Mood and Affect: Mood normal.         Behavior: Behavior normal.     Diagnostic Results: Labs: Reviewed  ECG: Reviewed  CT: Reviewed    Assessment/Plan:     * Embolic stroke involving right middle cerebral artery  - Related to prolonged/acute hospital course.     Recommendations  -  Encourage mobility, OOB in chair at least 3 hours per day, and early ambulation as appropriate  -  PT/OT evaluate and treat  -  Pain management  -  Monitor for and prevent skin breakdown and pressure ulcers  · Early mobility, repositioning/weight shifting every 20-30 minutes when sitting, turn patient every 2 hours, proper mattress/overlay and chair cushioning, pressure relief/heel protector boots  -  DVT prophylaxis    -  Reviewed discharge options (IP rehab, SNF, HH therapy, and OP therapy)    Paroxysmal atrial fibrillation  - Eliquis    SHIKHA (acute kidney  injury)  - Cr now improving was holding fluids 2/2 pleural effusion    Recommend Inpatient Rehab.      Thank you for your consult.     Pamela Lopez NP  Department of Physical Medicine & Rehab  [facility]

## 2020-07-20 NOTE — PLAN OF CARE
07/20/20 1700   Post-Acute Status   Post-Acute Authorization Home Health   Home Health Status Awaiting Internal Medical Clearance   Discharge Delays None known at this time   Discharge Plan   Discharge Plan A Home with family;Home Health   Discharge Plan B Skilled Nursing Facility     Sandra Perdomo RN  Case Management  Ext: [phone]  07/20/2020  5:05 PM

## 2020-07-20 NOTE — SUBJECTIVE & OBJECTIVE
Neurologic Chief Complaint: AMS, Confusion, Inattention, Delayed Verbal Response, L Facial Droop    Subjective:     Interval History: Patient is seen for follow-up neurological assessment and treatment recommendations:     Echo complete, EF 68%, normal LA. Repeat CXR ordered, mild wheezing persists, continue DuoNebs. Therapy recommending SNF vs home health w/ 24 hour supervision. Dispo pending family's decision    HPI, Past Medical, Family, and Social History remains the same as documented in the initial encounter.     Review of Systems   Constitutional: Negative.    HENT: Negative.    Respiratory: Positive for wheezing. Negative for cough, chest tightness and shortness of breath.    Cardiovascular: Negative for chest pain.   Gastrointestinal: Negative.    Musculoskeletal: Negative.    Neurological: Positive for facial asymmetry and weakness.   Psychiatric/Behavioral: Positive for confusion.     Scheduled Meds:   apixaban  2.5 mg Oral BID    atorvastatin  80 mg Oral Daily    pantoprazole  40 mg Oral Daily    tamsulosin  0.4 mg Oral Daily    vitamin D  1,000 Units Oral Daily     Continuous Infusions:   sodium chloride 0.9%       PRN Meds:acetaminophen, albuterol-ipratropium, labetaloL, ondansetron, polyethylene glycol, sodium chloride 0.9%, sodium chloride 0.9%    Objective:     Vital Signs (Most Recent):  Temp: 99.5 °F (37.5 °C) (07/20/20 1550)  Pulse: 69 (07/20/20 1550)  Resp: 20 (07/20/20 1550)  BP: (!) 156/95 (07/20/20 1550)  SpO2: 100 % (07/20/20 1550)  BP Location: Left arm    Vital Signs Range (Last 24H):  Temp:  [97.4 °F (36.3 °C)-99.5 °F (37.5 °C)]   Pulse:  [58-72]   Resp:  [15-22]   BP: (137-177)/()   SpO2:  [94 %-100 %]   BP Location: Left arm    Physical Exam  Vitals signs reviewed.   Constitutional:       General: He is not in acute distress.  HENT:      Head: Normocephalic and atraumatic.      Mouth/Throat:      Mouth: Mucous membranes are moist.      Pharynx: Oropharynx is clear.   Eyes:       Pupils: Pupils are equal, round, and reactive to light.   Neck:      Musculoskeletal: Normal range of motion. No neck rigidity.   Cardiovascular:      Rate and Rhythm: Normal rate.   Pulmonary:      Effort: Pulmonary effort is normal. No respiratory distress.      Breath sounds: Wheezing present.   Musculoskeletal: Normal range of motion.         General: No tenderness.      Right lower leg: No edema.      Left lower leg: No edema.   Neurological:      Mental Status: He is alert. He is disoriented.     Neurological Exam:   LOC: alert  Attention Span: poor  Language: No aphasia  Articulation: Dysarthria  Orientation: Person  Visual Fields: Hemianopsia left  EOM (CN III, IV, VI): Gaze preference  right  Pupils (CN II, III): PERRL  Facial Movement (CN VII): Lower facial weakness on the Left  Motor: Arm left  Paresis: 4/5  Leg left  Normal 5/5  Arm right  Normal 5/5  Leg right Normal 5/5  Cebellar: Upper Extremity Appendicular Ataxia (Finger Nose Finger)  Left  Sensation: Intact to light touch, temperature and vibration  Tone: Normal tone throughout    Laboratory:  CMP:   Recent Labs   Lab 07/20/20  0320   CALCIUM 9.6   ALBUMIN 2.8*   PROT 6.1      K 3.8   CO2 23   *   BUN 17   CREATININE 1.4   ALKPHOS 75   ALT 13   AST 23   BILITOT 0.9     CBC:   Recent Labs   Lab 07/20/20  0320   WBC 4.09   RBC 4.44*   HGB 12.9*   HCT 39.5*      MCV 89   MCH 29.1   MCHC 32.7     Lipid Panel:   Recent Labs   Lab 07/18/20  1617   CHOL 124   LDLCALC 70.8   HDL 40   TRIG 66     Coagulation:   Recent Labs   Lab 07/19/20  0335   INR 1.1   APTT 28.6     Platelet Aggregation Study: No results for input(s): PLTAGG, PLTAGINTERP, PLTAGREGLACO, ADPPLTAGGREG in the last 168 hours.  Hgb A1C:   Recent Labs   Lab 07/18/20  1902   HGBA1C 5.8*     TSH:   Recent Labs   Lab 07/18/20  1617   TSH 2.825       Diagnostic Results     MRI Brain 7/18/20  Area of diffusion restriction with ADC match suggestive of acute infarct in the  right parietal lobe. Additional smaller acute infarcts right occipital lobe and right basal ganglia.     Remote right frontal infarct and remote lacunar infarcts in the thalami.  Supratentorial white matter T2/flair hyperintense signal foci suggesting sequela of chronic small vessel ischemic change.     MR angiogram of the head and neck appears unchanged compared to prior exams without any focal occlusion identified.       CT Head. Date: 07/18/20  1. Interval right parietal lobe suspected acute infarct.  No intracranial hemorrhage.  Further evaluation/follow-up as warranted.  2. Bilateral thalamic suspected lacunar type infarcts, age-indeterminate.  Correlate clinically.  3. Additional multifocal supratentorial and to a lesser extent infratentorial remote infarcts, as detailed above.  4. Generalized cerebral volume loss and sequela of advanced microvascular ischemic changes.     Vessel Imaging:  MRA Head and Neck 7/18/20 - see MRI Brain 7/18/20 above     Cardiac Evaluation:   Echo 7/19/20  · Normal left ventricular systolic function. The estimated ejection fraction is 68%.  · Concentric left ventricular remodeling with thickened walls especially septum and increased myocardial density.  · No wall motion abnormalities.  · Normal LV diastolic function.  · Normal right ventricular systolic function.  · Normal central venous pressure (3 mmHg).  · The estimated PA systolic pressure is 30 mmHg.  · The ascending aorta is mildly dilated.    The left atrial volume index is normal.

## 2020-07-20 NOTE — HPI
Shant Magana is a 80-year-old male with PMHx of dementia, multiple strokes 7/17 and 10/17 (CTA revealed moderate high-grade stenosis of R M2, L ICA stenosis, and bilateral vertebral artery stenosis, no intervention performed), A fib (Eliquis hx of noncompliance). [place] [facility] [place] AMS x 2 days. On arrival, CT revealed R parietal infarct. Hospital course complicated by SHIKHA (Cr now improving was holding fluids 2/2 pleural effusion).     Functional History: Patient lives with son in 1 story house with 4 steps 2 rails to enter. Prior to admission, I. DME: RW and SPC as needed.

## 2020-07-20 NOTE — PT/OT/SLP EVAL
Speech Language Pathology Evaluation  Cognitive Communication    Patient Name:  Shant Magana Jr.   MRN:  [id number]  Admitting Diagnosis: Embolic stroke involving right middle cerebral artery    Recommendations:     Recommendations:                General Recommendations:  Dysphagia therapy, Speech/language therapy and Cognitive-linguistic therapy  Diet recommendations:  Regular, Thin   Aspiration Precautions:  - Strict 1:1 assistance required with all PO  - small bites/sips  - monitor for pocketing or S/S aspiration  - Only when awake and alert  - smaller, more frequent meals t/o the day encouraged   - all PO intake at 90 degree angle  - remain upright 30 minutes+ following all PO intake  - Continue to monitor for signs and symptoms of aspiration and discontinue oral feeding should you notice any of the following: watery eyes, reddened facial area, wet vocal quality, increased work of breathing, change in respiratory status, increased congestion, coughing, fever, etc.  General Precautions: Standard, aspiration, vision impaired, respiratory  Communication strategies:  provide increased time to answer and go to room if call light pushed    History:     Past Medical History:   Diagnosis Date    Cancer     prostate    Diabetes mellitus     Former smoker 7/18/2020    History of stroke 8/15/2017    Hypertension     Hypertrophic cardiomyopathy     Renal disorder     Stroke        Past Surgical History:   Procedure Laterality Date    BACK SURGERY      COLONOSCOPY N/A 10/18/2018    Procedure: COLONOSCOPY;  Surgeon: Alan Gooden MD;  Location: [facility] ENDO;  Service: Endoscopy;  Laterality: N/A;    ESOPHAGOGASTRODUODENOSCOPY N/A 9/4/2018    Procedure: EGD (ESOPHAGOGASTRODUODENOSCOPY);  Surgeon: Oli Cuadra MD;  Location: [facility] ENDO;  Service: Endoscopy;  Laterality: N/A;    ESOPHAGOGASTRODUODENOSCOPY N/A 10/18/2018    Procedure: EGD (ESOPHAGOGASTRODUODENOSCOPY);  Surgeon: Alan Gooden MD;  Location: [facility]  "ENDO;  Service: Endoscopy;  Laterality: N/A;    THYROIDECTOMY         Social History: Limited hx 2/2 underlying cognitive status, no family present in room      Chest X-Rays: 7/19/20: Loop recorder remain.  Significant opacification at the right lung base consistent with pleural effusion and associated atelectatic changes versus airspace consolidation identified.  The left lung is clear.     MRI: 7/18/20: Area of diffusion restriction with ADC match suggestive of acute infarct in the right parietal lobe. Additional smaller acute infarcts right occipital lobe and right basal ganglia.     Remote right frontal infarct and remote lacunar infarcts in the thalami.  Supratentorial white matter T2/flair hyperintense signal foci suggesting sequela of chronic small vessel ischemic change.     MR angiogram of the head and neck appears unchanged compared to prior exams without any focal occlusion identified.    Prior diet: reg/thin    Subjective     SLP reviewed Pt with RN, RN reported Pt tolerating meals, aware of fall risk  Pt presents pleasantly confused  He asks, "When are we going to be by the gate?"  Patient goals: to get home    Pain/Comfort:  · Pain Rating 1: (unable to assess 2/2 cognition, Pt denies pain)    Objective:   Cognitive Status:    Attention: Sustained attention deficit : moderate, Pt easily distracted and noted to pick on blankets intermittenly t/o assessment   Orientation: x1 to person only  Memory: Pt recalled 1 item for immediate recall 20% of attempts, Pt with minimal recall of general events, answers LTM questions with 50% accuracy   Problem Solving: ongoing assessment 2/2 minimal sustained attention   Safety awareness: impaired, Pt found attempting to exit bed upon SLP entrance to room. RN aware  ST to continue to assess math, organization and recall in session     Receptive Language:   Comprehension:      Questions Simple yes/no : WNL  Complex yes/no 80% Accuracy, I'ly  Commands  One step :80% " Accuracy  two step basic commands DEP, followed first step only upon attempts to continue assessment     Pragmatics:    Pt with decreased eye contact and minimal topic maintenance     Expressive Language:  Verbal:    Automatic Speech  Counting : WNL #1-10  Initiation : timely  Repetition Words : WNL  Naming Confrontation : Pt named pictures 7 of 8 attempts WNL and Single word responsive naming : WNL  Sentence formulation : intact       Motor Speech:  Pt with adequate articulation, rate of speech and prosody    Voice:   Quality : clear with intermittent breathiness  Intensity : adequate     Visual-Spatial:  L/R discrimination : impaired and Left Neglect: present    Reading:   Word : Pt compelted simple matching tasks picture to word (FO3) with 80% accuracy across 5 attempts  and Scanning/tracking Pt required moderate verbal and visual cues to track to L of page 100% of attempts      Written Expression:   TBA    Treatment: Pt found at end of bed attempting to turn off call alarm. PCT in hallway and entered room to help SLP reposition in bed and reviewed safety/fall precautions with Pt.  Bed alarm intact. He was seen with cup edge sips thin liquids (single x3, continuous x1, self-fed.)  No overt S/S aspiration with trials of thin liquids; however, risk of aspiration remains 2/2 decreased attention. Silent aspiration cannot be ruled out at the bedside. SLP educated Pt on aspiration, aspiration precautions, fall/call light./bed alarm precautions and attempted to re-orient Pt to setting and situation. Pt with minimal sustained attention and not able to verbalized understanding. White board current. No questions noted. No family present in room.  RN notified and RN aware of visiospatial deficit, decreased STM and need for 24-hours supervision following session. Pt remained upright in bed with call light in reach and bed alarm intact upon SLP exit.     Assessment:   Shant Magana JrSherri is a 80 y.o. male with an SLP diagnosis of  Cognitive-Linguistic Impairment and Visio-Spatial Impairment.  He would benefit from ongoing 24-hour supervision and strict 1:1 assistance with all PO for safety. ST to continue to follow.     Goals:   Multidisciplinary Problems     SLP Goals        Problem: SLP Goal    Goal Priority Disciplines Outcome   SLP Goal     SLP Ongoing, Progressing   Description: Speech Language Pathology Goals  Goals expected to be met by 7/26/20    1. Pt will participate in ongoing assessment of swallow function to determine safest, least restrictive means of nutrition/hydration  2. Pt will participate in further assessment of cognitive-linguistic skills to determine ongoing POC needs  3. Educate Pt and family on aspiration precautions and SLP POC                       Plan:   · Patient to be seen:  4 x/week   · Plan of Care expires:  08/18/20  · Plan of Care reviewed with:  patient   · SLP Follow-Up:  Yes       Discharge recommendations:  Discharge Facility/Level of Care Needs: home health speech therapy   Barriers to Discharge:  Safety Awareness impaired    Time Tracking:   SLP Treatment Date:   07/20/20  Speech Start Time:  1001  Speech Stop Time:  1017     Speech Total Time (min):  16 min    Billable Minutes: Eval 8  and Treatment Swallowing Dysfunction 8    SARABJIT Wright, [facility]-SLP  Speech-Language Pathology  Pager: [phone]    07/20/2020

## 2020-07-20 NOTE — HOSPITAL COURSE
7/19/20: Evaluated by PT & OT. Bed mobility CGA- Bunny. Sit to stand CGA- Bunny. UBD maxA. LBD totalA. Ambulated 4-6 steps HHA- Bunny.   SLP passed for reg and thin. 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

## 2020-07-20 NOTE — PLAN OF CARE
Problem: SLP Goal  Goal: SLP Goal  Description: Speech Language Pathology Goals  Goals expected to be met by 7/26/20    1. Pt will participate in ongoing assessment of swallow function to determine safest, least restrictive means of nutrition/hydration  2. Pt will participate in further assessment of cognitive-linguistic skills to determine ongoing POC needs  3. Educate Pt and family on aspiration precautions and SLP POC      Outcome: Ongoing, Progressing     Patient seen for further assessment of speech, language and cognition and ongoing swallow assessment. RN notified Pt found attempting to exit bed upon SLP entrance. Pt with Cognitive Impairment, L Inattention. Risk of aspiration remains 2/2 decreased sustained attention. 1:1 assistance is required with all PO for safety. Continue to monitor for signs and symptoms of aspiration and discontinue oral feeding should you notice any of the following: watery eyes, reddened facial area, wet vocal quality, increased work of breathing, change in respiratory status, increased congestion, coughing, fever or change in respiratory status. ST to continue to follow.    CAMMIE Wright., [facility]-SLP  Speech-Language Pathology  Pager: [phone]  7/20/2020

## 2020-07-20 NOTE — PLAN OF CARE
Problem: Adjustment to Illness (Stroke, Ischemic/Transient Ischemic Attack)  Goal: Optimal Coping  Outcome: Ongoing, Progressing  Intervention: Support Patient/Family Psychosocial Response to Stroke  Flowsheets (Taken 7/20/2020 0235)  Supportive Measures:   active listening utilized   self-care encouraged  Family/Support System Care:   self-care encouraged   support provided   presence promoted   involvement promoted     Problem: Fall Injury Risk  Goal: Absence of Fall and Fall-Related Injury  Outcome: Ongoing, Progressing  Intervention: Promote Injury-Free Environment  Flowsheets (Taken 7/20/2020 0235)  Safety Promotion/Fall Prevention:   assistive device/personal item within reach   bed alarm set   lighting adjusted   medications reviewed   nonskid shoes/socks when out of bed   side rails raised x 2   supervised activity   instructed to call staff for mobility  Environmental Safety Modification:   assistive device/personal items within reach   clutter free environment maintained   lighting adjusted     Problem: Adult Inpatient Plan of Care  Goal: Plan of Care Review  Outcome: Ongoing, Progressing  Flowsheets (Taken 7/20/2020 0235)  Plan of Care Reviewed With: patient     POC reviewed with patient. All questions and concerns reviewed. Fall/safety precautions implemented and maintained. Condom catheter care provided. Blood glucose monitored. No acute events noted this shift. Please see flowsheet for full assessment and vitals. Bed locked in lowest position. Call bell within reach. Will continue to monitor.

## 2020-07-20 NOTE — ASSESSMENT & PLAN NOTE
Cr 2.2, last CMP in 12/2019 with Cr of 1.5 so unclear if patient has new baseline  Avoid nephrotoxins  Cr continues to improve  Holding fluids due to pleural effusions and wheezing on exam

## 2020-07-20 NOTE — ASSESSMENT & PLAN NOTE
Repeat echo pending  Last echo 12/2019 with mild LV diastolic dysfunction  Now holding IVF given pleural effusions and wheezing on exam  Repeat CXR ordered.

## 2020-07-21 LAB — POCT GLUCOSE: 118 MG/DL (ref 70–110)

## 2020-07-21 PROCEDURE — 63600175 PHARM REV CODE 636 W HCPCS: Performed by: FAMILY MEDICINE

## 2020-07-21 PROCEDURE — 25000242 PHARM REV CODE 250 ALT 637 W/ HCPCS: Performed by: STUDENT IN AN ORGANIZED HEALTH CARE EDUCATION/TRAINING PROGRAM

## 2020-07-21 PROCEDURE — 99232 PR SUBSEQUENT HOSPITAL CARE,LEVL II: ICD-10-PCS | Mod: ,,, | Performed by: NURSE PRACTITIONER

## 2020-07-21 PROCEDURE — 97535 SELF CARE MNGMENT TRAINING: CPT

## 2020-07-21 PROCEDURE — 25000003 PHARM REV CODE 250: Performed by: FAMILY MEDICINE

## 2020-07-21 PROCEDURE — 97110 THERAPEUTIC EXERCISES: CPT

## 2020-07-21 PROCEDURE — 94761 N-INVAS EAR/PLS OXIMETRY MLT: CPT

## 2020-07-21 PROCEDURE — 99232 SBSQ HOSP IP/OBS MODERATE 35: CPT | Mod: ,,, | Performed by: NURSE PRACTITIONER

## 2020-07-21 PROCEDURE — 99233 PR SUBSEQUENT HOSPITAL CARE,LEVL III: ICD-10-PCS | Mod: ,,, | Performed by: PSYCHIATRY & NEUROLOGY

## 2020-07-21 PROCEDURE — 11000001 HC ACUTE MED/SURG PRIVATE ROOM

## 2020-07-21 PROCEDURE — 94640 AIRWAY INHALATION TREATMENT: CPT

## 2020-07-21 PROCEDURE — 25000003 PHARM REV CODE 250: Performed by: PHYSICIAN ASSISTANT

## 2020-07-21 PROCEDURE — 92507 TX SP LANG VOICE COMM INDIV: CPT

## 2020-07-21 PROCEDURE — 99233 SBSQ HOSP IP/OBS HIGH 50: CPT | Mod: ,,, | Performed by: PSYCHIATRY & NEUROLOGY

## 2020-07-21 PROCEDURE — 97530 THERAPEUTIC ACTIVITIES: CPT

## 2020-07-21 RX ORDER — FUROSEMIDE 10 MG/ML
20 INJECTION INTRAMUSCULAR; INTRAVENOUS ONCE
Status: COMPLETED | OUTPATIENT
Start: 2020-07-21 | End: 2020-07-21

## 2020-07-21 RX ORDER — FUROSEMIDE 10 MG/ML
20 INJECTION INTRAMUSCULAR; INTRAVENOUS ONCE
Status: DISCONTINUED | OUTPATIENT
Start: 2020-07-21 | End: 2020-07-21

## 2020-07-21 RX ORDER — LOSARTAN POTASSIUM 50 MG/1
50 TABLET ORAL DAILY
Status: DISCONTINUED | OUTPATIENT
Start: 2020-07-21 | End: 2020-07-22

## 2020-07-21 RX ADMIN — FUROSEMIDE 20 MG: 10 INJECTION, SOLUTION INTRAMUSCULAR; INTRAVENOUS at 05:07

## 2020-07-21 RX ADMIN — TAMSULOSIN HYDROCHLORIDE 0.4 MG: 0.4 CAPSULE ORAL at 08:07

## 2020-07-21 RX ADMIN — IPRATROPIUM BROMIDE AND ALBUTEROL SULFATE 3 ML: .5; 2.5 SOLUTION RESPIRATORY (INHALATION) at 02:07

## 2020-07-21 RX ADMIN — APIXABAN 2.5 MG: 2.5 TABLET, FILM COATED ORAL at 09:07

## 2020-07-21 RX ADMIN — Medication 1000 UNITS: at 08:07

## 2020-07-21 RX ADMIN — PANTOPRAZOLE SODIUM 40 MG: 40 TABLET, DELAYED RELEASE ORAL at 08:07

## 2020-07-21 RX ADMIN — ATORVASTATIN CALCIUM 80 MG: 20 TABLET, FILM COATED ORAL at 08:07

## 2020-07-21 RX ADMIN — APIXABAN 2.5 MG: 2.5 TABLET, FILM COATED ORAL at 08:07

## 2020-07-21 RX ADMIN — LOSARTAN POTASSIUM 50 MG: 50 TABLET ORAL at 08:07

## 2020-07-21 RX ADMIN — IPRATROPIUM BROMIDE AND ALBUTEROL SULFATE 3 ML: .5; 2.5 SOLUTION RESPIRATORY (INHALATION) at 03:07

## 2020-07-21 NOTE — PT/OT/SLP PROGRESS
Occupational Therapy   Treatment    Name: Shant Magana Jr.  MRN: [id number]  Admitting Diagnosis:  Embolic stroke involving right middle cerebral artery       Recommendations:     Discharge Recommendations: nursing facility, skilled  Discharge Equipment Recommendations:  none  Barriers to discharge:  Decreased caregiver support(lives alone; level of skilled assistance required; high fall and safety risk)    Assessment:     Shant Magana Jr. is a 80 y.o. male with a medical diagnosis of Embolic stroke involving right middle cerebral artery.  He presents with L hemiparesis and inattention with overall decline in safety awareness and disorientation. He is a high fall and safety risk at this time and would best benefit from 24 hour care. Performance deficits affecting function are impaired endurance, impaired self care skills, impaired functional mobilty, gait instability, impaired balance, impaired cognition, decreased safety awareness.     Rehab Prognosis:  Fair+; patient would benefit from acute skilled OT services to address these deficits and reach maximum level of function.       Plan:     Patient to be seen 3 x/week to address the above listed problems via self-care/home management, therapeutic activities, therapeutic exercises, neuromuscular re-education, cognitive retraining  · Plan of Care Expires: 08/18/20  · Plan of Care Reviewed with: patient    Subjective     Pain/Comfort:  · Pain Rating 1: 0/10  · Pain Rating Post-Intervention 1: 0/10    Objective:     Communicated with: RN prior to session.  Patient found HOB elevated with bed alarm, telemetry, SCD upon OT entry to room.    General Precautions: Standard, aspiration, fall, vision impaired(delirium)   Orthopedic Precautions:N/A   Braces: N/A     Occupational Performance:     Bed Mobility:    · Patient completed Rolling/Turning to Left with  contact guard assistance  · Patient completed Supine to Sit with contact guard assistance     Functional  "Mobility/Transfers:  · Patient completed Sit <> Stand Transfer with minimum assistance  with  no assistive device   · Patient completed Bed <> Chair Transfer using Step Transfer technique with moderate assistance with no assistive device    Activities of Daily Living:  · Grooming: minimum assistance standing at tray table for oral care with set up; requiring cues for sequencing and visual deficit compensatory techniques  · Upper Body Dressing: minimum assistance EOB to don gown as jacket  · Lower Body Dressing: moderate assistance to don B socks EOB    [facility] 6 Click ADL: 14    Treatment & Education:  -Pt alert and agreeable to therapy session; edu on OT role in care  -Pt oriented to person and place (hospital); disoriented to time (reported Dec 2005) and situation ("not sure"); daily orientation provided- light turned on and shade open for session  -EOB with initial CGA<>with increased dynamic challenge or divided attention requiring mod(A) 2/2 posterior lean  -discussed need for [employer] with RN; chair alarm obtained for use within room at this time   -Communication board updated; questions/concerns addressed within OT scope of practice  -no family at bedside for education     Patient left up in chair with all lines intact, call button in reach, chair alarm on and RN notifiedEducation:      GOALS:   Multidisciplinary Problems     Occupational Therapy Goals        Problem: Occupational Therapy Goal    Goal Priority Disciplines Outcome Interventions   Occupational Therapy Goal     OT, PT/OT Ongoing, Progressing    Description: Goals to be met by: 7/29     Patient will increase functional independence with ADLs by performing:    Feeding with Modified [place].  UE Dressing with Minimal Assistance.  LE Dressing (pants, brief) with Moderate Assistance.  Grooming while standing with Supervision.  Supine to sit with Supervision.  Step transfer with Supervision.  Functional mobility at household distance for ADL task with " min(A) and AD as needed.                     Time Tracking:     OT Date of Treatment: 07/21/20  OT Start Time: 0819  OT Stop Time: 0840  OT Total Time (min): 21 min    Billable Minutes:Self Care/Home Management 21    JASPER aCro  7/21/2020

## 2020-07-21 NOTE — SUBJECTIVE & OBJECTIVE
Past Medical History:   Diagnosis Date    Cancer     prostate    Diabetes mellitus     Former smoker 7/18/2020    History of stroke 8/15/2017    Hypertension     Hypertrophic cardiomyopathy     Renal disorder     Stroke      Past Surgical History:   Procedure Laterality Date    BACK SURGERY      COLONOSCOPY N/A 10/18/2018    Procedure: COLONOSCOPY;  Surgeon: Alan Gooedn MD;  Location: [facility];  Service: Endoscopy;  Laterality: N/A;    ESOPHAGOGASTRODUODENOSCOPY N/A 9/4/2018    Procedure: EGD (ESOPHAGOGASTRODUODENOSCOPY);  Surgeon: Oli Cuadra MD;  Location: [facility];  Service: Endoscopy;  Laterality: N/A;    ESOPHAGOGASTRODUODENOSCOPY N/A 10/18/2018    Procedure: EGD (ESOPHAGOGASTRODUODENOSCOPY);  Surgeon: Alan Gooden MD;  Location: [facility];  Service: Endoscopy;  Laterality: N/A;    THYROIDECTOMY       Review of patient's allergies indicates:  No Known Allergies    Scheduled Medications:    apixaban  2.5 mg Oral BID    atorvastatin  80 mg Oral Daily    furosemide (LASIX) IV  20 mg Intravenous Once    losartan  50 mg Oral Daily    pantoprazole  40 mg Oral Daily    tamsulosin  0.4 mg Oral Daily    vitamin D  1,000 Units Oral Daily       PRN Medications: acetaminophen, albuterol-ipratropium, labetaloL, ondansetron, polyethylene glycol, sodium chloride 0.9%, sodium chloride 0.9%    Family History     Unknown        Tobacco Use    Smoking status: Former Smoker     Packs/day: 0.90     Years: 3.00     Pack years: 2.70     Types: Cigarettes    Smokeless tobacco: Never Used   Substance and Sexual Activity    Alcohol use: Never     Frequency: Never    Drug use: No    Sexual activity: Not on file     Review of Systems   Constitutional: Positive for activity change.   HENT: Negative for sore throat and trouble swallowing.    Respiratory: Negative for shortness of breath.    Gastrointestinal: Negative for nausea and vomiting.   Musculoskeletal: Positive for gait problem. Negative  for back pain.   Skin: Negative for pallor and wound.   Neurological: Positive for weakness.   Psychiatric/Behavioral: Positive for confusion. Negative for agitation and behavioral problems.     Objective:     Vital Signs (Most Recent):  Temp: 98.5 °F (36.9 °C) (07/21/20 0827)  Pulse: 73 (07/21/20 0827)  Resp: 16 (07/21/20 0827)  BP: (!) 157/85 (07/21/20 0827)  SpO2: 97 % (07/21/20 0827)    Vital Signs (24h Range):  Temp:  [97.3 °F (36.3 °C)-99.5 °F (37.5 °C)] 98.5 °F (36.9 °C)  Pulse:  [62-85] 73  Resp:  [16-20] 16  SpO2:  [96 %-100 %] 97 %  BP: (156-215)/() 157/85     Body mass index is 25.46 kg/m².    Physical Exam  Vitals signs and nursing note reviewed.   Constitutional:       Appearance: Normal appearance. He is well-developed.   HENT:      Head: Normocephalic and atraumatic.      Nose: Nose normal.      Mouth/Throat:      Mouth: Mucous membranes are moist.   Eyes:      General:         Right eye: No discharge.         Left eye: No discharge.      Extraocular Movements: Extraocular movements intact.      Pupils: Pupils are equal, round, and reactive to light.   Neck:      Musculoskeletal: Neck supple.   Pulmonary:      Effort: Pulmonary effort is normal. No respiratory distress.   Abdominal:      General: There is no distension.      Palpations: Abdomen is soft.      Tenderness: There is no abdominal tenderness.   Musculoskeletal:         General: No deformity.      Comments: Moving all exts spontaneously    Skin:     General: Skin is warm and dry.   Neurological:      Mental Status: He is alert.      Sensory: No sensory deficit.      Motor: No abnormal muscle tone.      Comments: Slow to answer questions  Confusion present   Psychiatric:         Mood and Affect: Mood normal.         Behavior: Behavior normal.       NEUROLOGICAL EXAMINATION:     CRANIAL NERVES     CN III, IV, VI   Pupils are equal, round, and reactive to light.      Diagnostic Results: Labs: Reviewed  ECG: Reviewed  MRI: Reviewed

## 2020-07-21 NOTE — PT/OT/SLP PROGRESS
Physical Therapy Treatment    Patient Name:  Shant Magana Jr.   MRN:  [id number]    Recommendations:     Discharge Recommendations:  nursing facility, skilled vs HH (pending family decision)  Discharge Equipment Recommendations: none   Barriers to discharge: Decreased caregiver support    Assessment:     Shant Magana Jr. is a 80 y.o. male admitted with a medical diagnosis of Embolic stroke involving right middle cerebral artery.  He presents with the following impairments/functional limitations:  impaired endurance, impaired self care skills, impaired functional mobilty, gait instability, impaired balance, impaired cognition, decreased safety awareness.  Pt would benefit from intensive collaborative rehabilitation for: Dynamic/static standing/sitting balance through skilled balance training, strengthening with the use of skilled therapeutic exercises interventions, mobility and safety training to ensure safe discharge home through skilled patient and caregiver education home management training. Pt continues to benefit from a collaborative PT/OT/SLP program to improve quality of life and focus on recovery of impairments.    Rehab Prognosis: Good; patient would benefit from acute skilled PT services to address these deficits and reach maximum level of function.    Recent Surgery: * No surgery found *      Plan:     During this hospitalization, patient to be seen 3 x/week to address the identified rehab impairments via therapeutic activities, therapeutic exercises, gait training, neuromuscular re-education and progress toward the following goals:    · Plan of Care Expires:  08/18/20    Subjective     Chief Complaint: none verbalized  Comments: Pt requesting nurse call his son to see if he is home.  Pain/Comfort:  · Pain Rating 1: 0/10  · Pain Rating Post-Intervention 1: 0/10      Objective:     Communicated with nursing prior to session.  Patient found standing in hallway holding blanket with chair alarm going off in room  with telemetry upon PT entry to room.     General Precautions: Standard, aspiration, fall, vision impaired   Orthopedic Precautions:N/A   Braces: N/A     Functional Mobility:  · Pt found looking confused in hallway, but patient oriented x3. Pt not oriented to time.  · Bed Mobility:  N/T this date  · Transfers:     · Sit to Stand:  minimum assistance with no AD and hand-held assist, guiding hips back to chair  · Gait: ~124 ft, Bunny with L HHA, no AD, mild unsteadiness present throughout gait  · Balance:   · Static Sitting: SBA  · Dynamic Sitting: SBA-CGA  · Static Standing: CGA with HHA  · Dynamic Standing: Bunny with HHA for ambulation      AM-PAC 6 CLICK MOBILITY  Turning over in bed (including adjusting bedclothes, sheets and blankets)?: 3  Sitting down on and standing up from a chair with arms (e.g., wheelchair, bedside commode, etc.): 3  Moving from lying on back to sitting on the side of the bed?: 3  Moving to and from a bed to a chair (including a wheelchair)?: 3  Need to walk in hospital room?: 3  Climbing 3-5 steps with a railing?: 2  Basic Mobility Total Score: 17       Therapeutic Activities and Exercises:   Pt reoriented to time.  BLE, 1 x 15 each: seated marches, LAQs, seated toe raises, seated heel raises, hip ABD, heel slides, SLR  Pt required frequent redirection to task.  Patient educated on role of therapy, goals of session, and benefits of mobilizing. Discussed PT plan of care during hospitalization. Patient educated that they need to call for assistance to mobilize out of bed. Patient educated on how their diagnosis impacts their mobility within PT scope of practice.       Patient left up in chair with all lines intact, call button in reach, chair alarm on and nursing notified..    GOALS:   Multidisciplinary Problems     Physical Therapy Goals        Problem: Physical Therapy Goal    Goal Priority Disciplines Outcome Goal Variances Interventions   Physical Therapy Goal     PT, PT/OT Ongoing,  Progressing     Description: Goals to be met by: 2020    Patient will increase functional independence with mobility by performin. Supine <> sit with Stand-by Assistance.  2. Sit <> stand transfer with Stand-by Assistance using No Assistive Device.  3. Bed <> chair transfer via Stand Pivot with Stand-by Assistance using No Assistive Device.  4. Dynamic standing for 8 minutes with Contact Guard Assistance using No Assistive Device to prepare for functional tasks in standing.  5. Able to tolerate exercise for 15-20 reps with independence.  6. Gait x 100ft with CGA with RW or No AD to prepare for community ambulation.                         Time Tracking:     PT Received On: 20  PT Start Time: 1032     PT Stop Time: 1056  PT Total Time (min): 24 min     Billable Minutes: Therapeutic Activity 12 and Therapeutic Exercise 12    Treatment Type: Treatment  PT/PTA: PT     PTA Visit Number: 0     Marylou Strange, PT  2020

## 2020-07-21 NOTE — PROGRESS NOTES
[facility] On license of [facility]  Physical Medicine & Rehab  Progress Note    Patient Name: Shant Magana Jr.  MRN: [id number]  Admission Date: 7/18/2020  Length of Stay: 3 days  Attending Physician: Gina uHang MD    Subjective:     Principal Problem:Embolic stroke involving right middle cerebral artery    Hospital Course:   7/19/20: Evaluated by PT & OT. Bed mobility CGA- Bunny. Sit to stand CGA- Bunny. UBD maxA. LBD totalA. Ambulated 4-6 steps HHA- Bunny.   SLP passed for reg and thin. 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    Past Medical History:   Diagnosis Date    Cancer     prostate    Diabetes mellitus     Former smoker 7/18/2020    History of stroke 8/15/2017    Hypertension     Hypertrophic cardiomyopathy     Renal disorder     Stroke      Past Surgical History:   Procedure Laterality Date    BACK SURGERY      COLONOSCOPY N/A 10/18/2018    Procedure: COLONOSCOPY;  Surgeon: Alan Gooden MD;  Location: [facility];  Service: Endoscopy;  Laterality: N/A;    ESOPHAGOGASTRODUODENOSCOPY N/A 9/4/2018    Procedure: EGD (ESOPHAGOGASTRODUODENOSCOPY);  Surgeon: Oli Cuadra MD;  Location: [facility];  Service: Endoscopy;  Laterality: N/A;    ESOPHAGOGASTRODUODENOSCOPY N/A 10/18/2018    Procedure: EGD (ESOPHAGOGASTRODUODENOSCOPY);  Surgeon: Alan Gooden MD;  Location: [facility];  Service: Endoscopy;  Laterality: N/A;    THYROIDECTOMY       Review of patient's allergies indicates:  No Known Allergies    Scheduled Medications:    apixaban  2.5 mg Oral BID    atorvastatin  80 mg Oral Daily    furosemide (LASIX) IV  20 mg Intravenous Once    losartan  50 mg Oral Daily    pantoprazole  40 mg Oral Daily    tamsulosin  0.4 mg Oral Daily    vitamin D  1,000 Units Oral Daily       PRN Medications: acetaminophen, albuterol-ipratropium, labetaloL, ondansetron, polyethylene glycol, sodium chloride 0.9%, sodium chloride 0.9%    Family History     Unknown         Tobacco Use    Smoking status: Former Smoker     Packs/day: 0.90     Years: 3.00     Pack years: 2.70     Types: Cigarettes    Smokeless tobacco: Never Used   Substance and Sexual Activity    Alcohol use: Never     Frequency: Never    Drug use: No    Sexual activity: Not on file     Review of Systems   Constitutional: Positive for activity change.   HENT: Negative for sore throat and trouble swallowing.    Respiratory: Negative for shortness of breath.    Gastrointestinal: Negative for nausea and vomiting.   Musculoskeletal: Positive for gait problem. Negative for back pain.   Skin: Negative for pallor and wound.   Neurological: Positive for weakness.   Psychiatric/Behavioral: Positive for confusion. Negative for agitation and behavioral problems.     Objective:     Vital Signs (Most Recent):  Temp: 98.5 °F (36.9 °C) (07/21/20 0827)  Pulse: 73 (07/21/20 0827)  Resp: 16 (07/21/20 0827)  BP: (!) 157/85 (07/21/20 0827)  SpO2: 97 % (07/21/20 0827)    Vital Signs (24h Range):  Temp:  [97.3 °F (36.3 °C)-99.5 °F (37.5 °C)] 98.5 °F (36.9 °C)  Pulse:  [62-85] 73  Resp:  [16-20] 16  SpO2:  [96 %-100 %] 97 %  BP: (156-215)/() 157/85     Body mass index is 25.46 kg/m².    Physical Exam  Vitals signs and nursing note reviewed.   Constitutional:       Appearance: Normal appearance. He is well-developed.   HENT:      Head: Normocephalic and atraumatic.      Nose: Nose normal.      Mouth/Throat:      Mouth: Mucous membranes are moist.   Eyes:      General:         Right eye: No discharge.         Left eye: No discharge.      Extraocular Movements: Extraocular movements intact.      Pupils: Pupils are equal, round, and reactive to light.   Neck:      Musculoskeletal: Neck supple.   Pulmonary:      Effort: Pulmonary effort is normal. No respiratory distress.   Abdominal:      General: There is no distension.      Palpations: Abdomen is soft.      Tenderness: There is no abdominal tenderness.   Musculoskeletal:          General: No deformity.      Comments: Moving all exts spontaneously    Skin:     General: Skin is warm and dry.   Neurological:      Mental Status: He is alert.      Sensory: No sensory deficit.      Motor: No abnormal muscle tone.      Comments: Slow to answer questions  Confusion present   Psychiatric:         Mood and Affect: Mood normal.         Behavior: Behavior normal.       Diagnostic Results:   Labs: Reviewed  ECG: Reviewed  MRI: Reviewed      Assessment/Plan:      * Embolic stroke involving right middle cerebral artery  - Related to prolonged/acute hospital course.     Recommendations  -  Encourage mobility, OOB in chair at least 3 hours per day, and early ambulation as appropriate  -  PT/OT evaluate and treat  -  Pain management  -  Monitor for and prevent skin breakdown and pressure ulcers  · Early mobility, repositioning/weight shifting every 20-30 minutes when sitting, turn patient every 2 hours, proper mattress/overlay and chair cushioning, pressure relief/heel protector boots  -  DVT prophylaxis    -  Reviewed discharge options (IP rehab, SNF, HH therapy, and OP therapy)    Paroxysmal atrial fibrillation  - Eliquis    SHIKHA (acute kidney injury)  - Cr now improving was holding fluids 2/2 pleural effusion    Recommend Inpatient Rehab.      Pamela Lopez NP  Department of Physical Medicine & Rehab   [facility]

## 2020-07-21 NOTE — SUBJECTIVE & OBJECTIVE
Neurologic Chief Complaint: AMS, Confusion, Inattention, Delayed Verbal Response, L Facial Droop    Subjective:     Interval History: Patient is seen for follow-up neurological assessment and treatment recommendations:     CXR worsened w/ progression of pleural fluid and parenchymal changes, mild expiratory wheezing perists, order lasix 20 mg IVP once, repeat CXR tomorrow. Restart half dose of home Losartan at 50 mg daily. Family unable to provide 24 hr care with HH, CM to send SNF options.     HPI, Past Medical, Family, and Social History remains the same as documented in the initial encounter.     Review of Systems   Constitutional: Negative.    HENT: Negative.    Respiratory: Positive for wheezing. Negative for cough, chest tightness and shortness of breath.    Cardiovascular: Negative for chest pain.   Gastrointestinal: Negative.    Musculoskeletal: Negative.    Neurological: Positive for facial asymmetry and weakness.   Psychiatric/Behavioral: Positive for confusion.     Scheduled Meds:   apixaban  2.5 mg Oral BID    atorvastatin  80 mg Oral Daily    furosemide (LASIX) IV  20 mg Intravenous Once    losartan  50 mg Oral Daily    pantoprazole  40 mg Oral Daily    tamsulosin  0.4 mg Oral Daily    vitamin D  1,000 Units Oral Daily     Continuous Infusions:   sodium chloride 0.9%       PRN Meds:acetaminophen, albuterol-ipratropium, labetaloL, ondansetron, polyethylene glycol, sodium chloride 0.9%, sodium chloride 0.9%    Objective:     Vital Signs (Most Recent):  Temp: 98.5 °F (36.9 °C) (07/21/20 0827)  Pulse: 80 (07/21/20 1307)  Resp: 16 (07/21/20 0827)  BP: (!) 159/87 (07/21/20 1307)  SpO2: 97 % (07/21/20 0827)  BP Location: Left arm    Vital Signs Range (Last 24H):  Temp:  [97.3 °F (36.3 °C)-99.5 °F (37.5 °C)]   Pulse:  [62-85]   Resp:  [16-20]   BP: (156-215)/()   SpO2:  [96 %-100 %]   BP Location: Left arm    Physical Exam  Vitals signs reviewed.   Constitutional:       General: He is not in acute  distress.  HENT:      Head: Normocephalic and atraumatic.      Mouth/Throat:      Mouth: Mucous membranes are moist.      Pharynx: Oropharynx is clear.   Eyes:      Pupils: Pupils are equal, round, and reactive to light.   Neck:      Musculoskeletal: Normal range of motion. No neck rigidity.   Cardiovascular:      Rate and Rhythm: Normal rate.   Pulmonary:      Effort: Pulmonary effort is normal. No respiratory distress.      Breath sounds: Wheezing present.   Musculoskeletal: Normal range of motion.         General: No tenderness.      Right lower leg: No edema.      Left lower leg: No edema.   Neurological:      Mental Status: He is alert. He is disoriented.     Neurological Exam:   LOC: alert  Attention Span: poor  Language: No aphasia  Articulation: Dysarthria  Orientation: Person  Visual Fields: Hemianopsia left  EOM (CN III, IV, VI): Gaze preference  right  Pupils (CN II, III): PERRL  Facial Movement (CN VII): Lower facial weakness on the Left  Motor: Arm left  Paresis: 4/5  Leg left  Normal 5/5  Arm right  Normal 5/5  Leg right Normal 5/5  Cebellar: Upper Extremity Appendicular Ataxia (Finger Nose Finger)  Left  Sensation: Intact to light touch, temperature and vibration  Tone: Normal tone throughout    Laboratory:  CMP:   No results for input(s): GLUCOSE, CALCIUM, ALBUMIN, PROT, NA, K, CO2, CL, BUN, CREATININE, ALKPHOS, ALT, AST, BILITOT in the last 24 hours.  CBC:   Recent Labs   Lab 07/20/20  0320   WBC 4.09   RBC 4.44*   HGB 12.9*   HCT 39.5*      MCV 89   MCH 29.1   MCHC 32.7     Lipid Panel:   Recent Labs   Lab 07/18/20  1617   CHOL 124   LDLCALC 70.8   HDL 40   TRIG 66     Coagulation:   Recent Labs   Lab 07/19/20  0335   INR 1.1   APTT 28.6     Platelet Aggregation Study: No results for input(s): PLTAGG, PLTAGINTERP, PLTAGREGLACO, ADPPLTAGGREG in the last 168 hours.  Hgb A1C:   Recent Labs   Lab 07/18/20  1902   HGBA1C 5.8*     TSH:   Recent Labs   Lab 07/18/20  1617   TSH 2.825        Diagnostic Results     MRI Brain 7/18/20  Area of diffusion restriction with ADC match suggestive of acute infarct in the right parietal lobe. Additional smaller acute infarcts right occipital lobe and right basal ganglia.     Remote right frontal infarct and remote lacunar infarcts in the thalami.  Supratentorial white matter T2/flair hyperintense signal foci suggesting sequela of chronic small vessel ischemic change.     MR angiogram of the head and neck appears unchanged compared to prior exams without any focal occlusion identified.       CT Head. Date: 07/18/20  1. Interval right parietal lobe suspected acute infarct.  No intracranial hemorrhage.  Further evaluation/follow-up as warranted.  2. Bilateral thalamic suspected lacunar type infarcts, age-indeterminate.  Correlate clinically.  3. Additional multifocal supratentorial and to a lesser extent infratentorial remote infarcts, as detailed above.  4. Generalized cerebral volume loss and sequela of advanced microvascular ischemic changes.     Vessel Imaging:  MRA Head and Neck 7/18/20 - see MRI Brain 7/18/20 above     Cardiac Evaluation:   Echo 7/19/20  · Normal left ventricular systolic function. The estimated ejection fraction is 68%.  · Concentric left ventricular remodeling with thickened walls especially septum and increased myocardial density.  · No wall motion abnormalities.  · Normal LV diastolic function.  · Normal right ventricular systolic function.  · Normal central venous pressure (3 mmHg).  · The estimated PA systolic pressure is 30 mmHg.  · The ascending aorta is mildly dilated.    The left atrial volume index is normal.

## 2020-07-21 NOTE — ASSESSMENT & PLAN NOTE
Shant Magana Jr. is a 80 y.o. male with PMHx of dementia, multiple strokes, intracranial and extracranial atherosclerosis, DM, HTN, a fib (eliquis with history of noncompliance due to cost) who presented to ED via EMS for AMS x 2 days. Family reports confusion, inattentiveness, delayed verbal response, and facial droop. Patient with history of L facial droop from previous stroke. On exam, patient not oriented to place, time, or situation. R gaze preference and L hemianopsia also noted. CT revealed R parietal infarct. Etiology likely cardioembolic      Antithrombotics for secondary stroke prevention: Anticoagulants: Apixaban 2.5 mg BID     Statins for secondary stroke prevention and hyperlipidemia, if present:   Statins: Atorvastatin- 80 mg daily    Aggressive risk factor modification: HTN, DM, HLD, A-Fib, atherosclerosis, stroke     Rehab efforts: The patient has been evaluated by a stroke team provider and the therapy needs have been fully considered based off the presenting complaints and exam findings. The following therapy evaluations are needed: PT evaluate and treat, OT evaluate and treat, SLP evaluate and treat, PM&R evaluate for appropriate placement - recommending SNF vs HH w/ 24 hr assistance.     Diagnostics ordered/pending: none    VTE prophylaxis: Mechanical prophylaxis: Place SCDs  None: Reason for No Pharmacological VTE Prophylaxis: Currently on anticoagulation    BP parameters: Infarct: SBP < 160

## 2020-07-21 NOTE — PT/OT/SLP PROGRESS
"Speech Language Pathology Treatment    Patient Name:  Shant Magana Jr.   MRN:  [id number]  Admitting Diagnosis: Embolic stroke involving right middle cerebral artery    Recommendations:                 General Recommendations:  Dysphagia therapy, Cognitive-linguistic therapy and 24-hour supervision for safety  Diet recommendations:  Regular, Liquid Diet Level: Thin   Aspiration Precautions: Pt requires strict 1:1 assistance with all PO for safety. 1 bite/sip at a time, Assistance with meals, Frequent oral care, HOB to 90 degrees, Meds whole 1 at a time, Remain upright 30 minutes post meal, Small bites/sips and Strict aspiration precautions   General Precautions: Standard, aspiration, fall, vision impaired  Communication strategies:  provide increased time to answer and go to room if call light pushed    Subjective     SLP reviewed Pt with RN, RN aware of need for 24-hour supervision for safety  Pt presents calm  He explains "are you getting to the gate?"    Pain/Comfort:  · Pain Rating 1: 0/10    Objective:     Has the patient been evaluated by SLP for swallowing?   Yes  Keep patient NPO? No   Current Respiratory Status: room air      Pt found awake in chair in room with CNA, RN at bedside. RN repositioned Pt in bed as SLP initiated session. Pt endorsed he completed his lunch meal tray. He was oriented to person, place and situation. He denied difficulty with vision or reading. He completed simple reading tasks at the word level with 40% accuracy with min visual cues. He completed simple scan tasks to L with moderate verbal and visual cues from SLP. He answered 2-unit YNQ with 80% accuracy, MIN A. He demonstrated decreased sustained attention and R gaze preference t/o assessment. He was educated on SLP role, safety precautions, fall prcautions, and SLP POC. He nodded along however did not complete verbal teachback or demonstrate understanding across attempts to elicit teachback or return demo of call light. "     Assessment:     Shant Magana Jr. is a 80 y.o. 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  He presents with risk of aspiration 2/2 decreased attention.    Goals:   Multidisciplinary Problems     SLP Goals        Problem: SLP Goal    Goal Priority Disciplines Outcome   SLP Goal     SLP Ongoing, Progressing   Description: Speech Language Pathology Goals  Goals expected to be met by 7/26/20    1. Pt will participate in ongoing assessment of swallow function to determine safest, least restrictive means of nutrition/hydration  2. Pt will complete simple L tracking tasks with 90% accuracy, MOD A  3. Educate Pt and family on aspiration precautions and SLP POC  4. Educate Pt and family on safety awareness and compensatory strategies for memory and visiospatial skills                        Plan:     · Patient to be seen:  4 x/week   · Plan of Care expires:  08/18/20  · Plan of Care reviewed with:  patient   · SLP Follow-Up:  Yes       Discharge recommendations:  nursing facility, skilled     Time Tracking:     SLP Treatment Date:   07/21/20  Speech Start Time:  1249  Speech Stop Time:  1314     Speech Total Time (min):  25 min    Billable Minutes: Speech Therapy Individual 10 and Seld Care/Home Management Training 15    SARABJIT Wright, [facility]-SLP  Speech-Language Pathology  Pager: [phone]    07/21/2020

## 2020-07-21 NOTE — PROGRESS NOTES
[facility]  Vascular Neurology  Comprehensive Stroke Center  Progress Note    Assessment/Plan:     * Embolic stroke involving right middle cerebral artery  Shant Magana Jr. is a 80 y.o. male with PMHx of dementia, multiple strokes, intracranial and extracranial atherosclerosis, DM, HTN, a fib (eliquis with history of noncompliance due to cost) who presented to ED via EMS for AMS x 2 days. Family reports confusion, inattentiveness, delayed verbal response, and facial droop. Patient with history of L facial droop from previous stroke. On exam, patient not oriented to place, time, or situation. R gaze preference and L hemianopsia also noted. CT revealed R parietal infarct. Etiology likely cardioembolic      Antithrombotics for secondary stroke prevention: Anticoagulants: Apixaban 2.5 mg BID     Statins for secondary stroke prevention and hyperlipidemia, if present:   Statins: Atorvastatin- 80 mg daily    Aggressive risk factor modification: HTN, DM, HLD, A-Fib, atherosclerosis, stroke     Rehab efforts: The patient has been evaluated by a stroke team provider and the therapy needs have been fully considered based off the presenting complaints and exam findings. The following therapy evaluations are needed: PT evaluate and treat, OT evaluate and treat, SLP evaluate and treat, PM&R evaluate for appropriate placement - recommending SNF vs HH w/ 24 hr assistance.     Diagnostics ordered/pending: none    VTE prophylaxis: Mechanical prophylaxis: Place SCDs  None: Reason for No Pharmacological VTE Prophylaxis: Currently on anticoagulation    BP parameters: Infarct: SBP < 160        Former smoker  Stroke risk factor  Encourage continued smoking cessation    Benign prostatic hyperplasia  Continue home flomax    Dementia without behavioral disturbance  Delirium precautions ordered    Paroxysmal atrial fibrillation  Stroke risk factor  Continue eliquis  Patient compliant with anticoagulation medication per his  son    Chronic diastolic congestive heart failure  Repeat echo EF 68 %  Last echo 12/2019 with mild LV diastolic dysfunction  Now holding IVF given pleural effusions and wheezing on exam  Lasix 20 mg IVP once, Repeat CXR ordered 7/22.     Cytotoxic cerebral edema  Area of cytotoxic cerebral edema identified when reviewing brain imaging in the territory of the R middle cerebral artery. There is no mass effect associated with it. We will continue to monitor the patients clinical exam for any worsening of symptoms which may indicate expansion of the stroke or the area of the edema resulting in the clinical change. The pattern is suggestive of cardioembolic etiology.        Mixed hyperlipidemia  Stroke risk factor  LDL 70  Continue home atorvastatin 80 mg daily    Internal carotid artery stenosis, left  Stroke risk factor        SHIKHA (acute kidney injury)  Cr 2.2, last CMP in 12/2019 with Cr of 1.5 so unclear if patient has new baseline  Avoid nephrotoxins  Cr continues to improve  Holding fluids due to pleural effusions and wheezing on exam    History of stroke  Patient with history of multiple strokes   History of a fib + intracranial/extracranial atherosclerosis  Continue secondary stroke prevention with eliquis + atorvastatin 80 mg            Type 2 diabetes mellitus with complication, without long-term current use of insulin  Stroke risk factor  A1C 5.8  SSI  Glucose 140-180    Essential hypertension  Stroke risk factor  SBP < 160   Restart home Losartan at decreased dose of 50 mg daily.     Original home meds: Losartan 100 mg daily, Amlodipine 10 mg daily  Continue to monitor BP. Adjust as needed.        7/19: Patient with mild wheezing on exam today. Discontinued IVFs and started PRN DuoNebs, CXR with concern for pleural effusion as on previous imaging. Still pending Echo, A1c, and PT/OT/SLP.  7/20: Echo complete, EF 68%, normal LA. Repeat CXR ordered, mild wheezing persists, continue DuoNebs. Therapy  recommending SNF vs home health w/ 24 hour supervision. Dispo pending family's decision  7/21: CXR worsened w/ progression of pleural fluid and parenchymal changes, mild expiratory wheezing perists, order lasix 20 mg IVP once, repeat CXR tomorrow. Restart half dose of home Losartan at 50 mg daily. Family unable to provide 24 hr care with ALYSSA GONZALES to send SNF options.     STROKE DOCUMENTATION   Acute Stroke Times   Symptom Onset Date: 07/16/20    NIH Scale:  1a. Level of Consciousness: 0-->Alert, keenly responsive  1b. LOC Questions: 1-->Answers one question correctly  1c. LOC Commands: 0-->Performs both tasks correctly  2. Best Gaze: 1-->Partial gaze palsy, gaze is abnormal in one or both eyes, but forced deviation or total gaze paresis is not present  3. Visual: 1-->Partial hemianopia  4. Facial Palsy: 1-->Minor paralysis (flattened nasolabial fold, asymmetry on smiling)  5a. Motor Arm, Left: 1-->Drift, limb holds 90 (or 45) degrees, but drifts down before full 10 seconds, does not hit bed or other support  5b. Motor Arm, Right: 0-->No drift, limb holds 90 (or 45) degrees for full 10 secs  6a. Motor Leg, Left: 0-->No drift, leg holds 30 degree position for full 5 secs  6b. Motor Leg, Right: 0-->No drift, leg holds 30 degree position for full 5 secs  7. Limb Ataxia: 0-->Absent  8. Sensory: 0-->Normal, no sensory loss  9. Best Language: 0-->No aphasia, normal  10. Dysarthria: 0-->Normal  11. Extinction and Inattention (formerly Neglect): 0-->No abnormality  Total (NIH Stroke Scale): 5       Modified [place] Score: 0  [place] Coma Scale:    ABCD2 Score:    [id number] Score:8  HAS -BLED Score:   ICH Score:   Hunt & Choudhury Classification:      Hemorrhagic change of an Ischemic Stroke: Does this patient have an ischemic stroke with hemorrhagic changes? No     Neurologic Chief Complaint: AMS, Confusion, Inattention, Delayed Verbal Response, L Facial Droop    Subjective:     Interval History: Patient is seen for follow-up  neurological assessment and treatment recommendations:     CXR worsened w/ progression of pleural fluid and parenchymal changes, mild expiratory wheezing perists, order lasix 20 mg IVP once, repeat CXR tomorrow. Restart half dose of home Losartan at 50 mg daily. Family unable to provide 24 hr care with HH, CM to send SNF options.     HPI, Past Medical, Family, and Social History remains the same as documented in the initial encounter.     Review of Systems   Constitutional: Negative.    HENT: Negative.    Respiratory: Positive for wheezing. Negative for cough, chest tightness and shortness of breath.    Cardiovascular: Negative for chest pain.   Gastrointestinal: Negative.    Musculoskeletal: Negative.    Neurological: Positive for facial asymmetry and weakness.   Psychiatric/Behavioral: Positive for confusion.     Scheduled Meds:   apixaban  2.5 mg Oral BID    atorvastatin  80 mg Oral Daily    furosemide (LASIX) IV  20 mg Intravenous Once    losartan  50 mg Oral Daily    pantoprazole  40 mg Oral Daily    tamsulosin  0.4 mg Oral Daily    vitamin D  1,000 Units Oral Daily     Continuous Infusions:   sodium chloride 0.9%       PRN Meds:acetaminophen, albuterol-ipratropium, labetaloL, ondansetron, polyethylene glycol, sodium chloride 0.9%, sodium chloride 0.9%    Objective:     Vital Signs (Most Recent):  Temp: 98.5 °F (36.9 °C) (07/21/20 0827)  Pulse: 80 (07/21/20 1307)  Resp: 16 (07/21/20 0827)  BP: (!) 159/87 (07/21/20 1307)  SpO2: 97 % (07/21/20 0827)  BP Location: Left arm    Vital Signs Range (Last 24H):  Temp:  [97.3 °F (36.3 °C)-99.5 °F (37.5 °C)]   Pulse:  [62-85]   Resp:  [16-20]   BP: (156-215)/()   SpO2:  [96 %-100 %]   BP Location: Left arm    Physical Exam  Vitals signs reviewed.   Constitutional:       General: He is not in acute distress.  HENT:      Head: Normocephalic and atraumatic.      Mouth/Throat:      Mouth: Mucous membranes are moist.      Pharynx: Oropharynx is clear.   Eyes:       Pupils: Pupils are equal, round, and reactive to light.   Neck:      Musculoskeletal: Normal range of motion. No neck rigidity.   Cardiovascular:      Rate and Rhythm: Normal rate.   Pulmonary:      Effort: Pulmonary effort is normal. No respiratory distress.      Breath sounds: Wheezing present.   Musculoskeletal: Normal range of motion.         General: No tenderness.      Right lower leg: No edema.      Left lower leg: No edema.   Neurological:      Mental Status: He is alert. He is disoriented.     Neurological Exam:   LOC: alert  Attention Span: poor  Language: No aphasia  Articulation: Dysarthria  Orientation: Person  Visual Fields: Hemianopsia left  EOM (CN III, IV, VI): Gaze preference  right  Pupils (CN II, III): PERRL  Facial Movement (CN VII): Lower facial weakness on the Left  Motor: Arm left  Paresis: 4/5  Leg left  Normal 5/5  Arm right  Normal 5/5  Leg right Normal 5/5  Cebellar: Upper Extremity Appendicular Ataxia (Finger Nose Finger)  Left  Sensation: Intact to light touch, temperature and vibration  Tone: Normal tone throughout    Laboratory:  CMP:   No results for input(s): GLUCOSE, CALCIUM, ALBUMIN, PROT, NA, K, CO2, CL, BUN, CREATININE, ALKPHOS, ALT, AST, BILITOT in the last 24 hours.  CBC:   Recent Labs   Lab 07/20/20  0320   WBC 4.09   RBC 4.44*   HGB 12.9*   HCT 39.5*      MCV 89   MCH 29.1   MCHC 32.7     Lipid Panel:   Recent Labs   Lab 07/18/20  1617   CHOL 124   LDLCALC 70.8   HDL 40   TRIG 66     Coagulation:   Recent Labs   Lab 07/19/20  0335   INR 1.1   APTT 28.6     Platelet Aggregation Study: No results for input(s): PLTAGG, PLTAGINTERP, PLTAGREGLACO, ADPPLTAGGREG in the last 168 hours.  Hgb A1C:   Recent Labs   Lab 07/18/20  1902   HGBA1C 5.8*     TSH:   Recent Labs   Lab 07/18/20  1617   TSH 2.825       Diagnostic Results     MRI Brain 7/18/20  Area of diffusion restriction with ADC match suggestive of acute infarct in the right parietal lobe. Additional smaller acute  infarcts right occipital lobe and right basal ganglia.     Remote right frontal infarct and remote lacunar infarcts in the thalami.  Supratentorial white matter T2/flair hyperintense signal foci suggesting sequela of chronic small vessel ischemic change.     MR angiogram of the head and neck appears unchanged compared to prior exams without any focal occlusion identified.       CT Head. Date: 07/18/20  1. Interval right parietal lobe suspected acute infarct.  No intracranial hemorrhage.  Further evaluation/follow-up as warranted.  2. Bilateral thalamic suspected lacunar type infarcts, age-indeterminate.  Correlate clinically.  3. Additional multifocal supratentorial and to a lesser extent infratentorial remote infarcts, as detailed above.  4. Generalized cerebral volume loss and sequela of advanced microvascular ischemic changes.     Vessel Imaging:  MRA Head and Neck 7/18/20 - see MRI Brain 7/18/20 above     Cardiac Evaluation:   Echo 7/19/20  · Normal left ventricular systolic function. The estimated ejection fraction is 68%.  · Concentric left ventricular remodeling with thickened walls especially septum and increased myocardial density.  · No wall motion abnormalities.  · Normal LV diastolic function.  · Normal right ventricular systolic function.  · Normal central venous pressure (3 mmHg).  · The estimated PA systolic pressure is 30 mmHg.  · The ascending aorta is mildly dilated.    The left atrial volume index is normal.           Felice Carranza NP  Comprehensive Stroke Center  Department of Vascular Neurology   [facility]

## 2020-07-21 NOTE — ASSESSMENT & PLAN NOTE
Stroke risk factor  SBP < 160   Restart home Losartan at decreased dose of 50 mg daily.     Original home meds: Losartan 100 mg daily, Amlodipine 10 mg daily  Continue to monitor BP. Adjust as needed.

## 2020-07-21 NOTE — PLAN OF CARE
Pt is progressing towards goals as expected. Goals remain appropriate continue with current POC.     Marylou Strange, PT, DPT  2020      Problem: Physical Therapy Goal  Goal: Physical Therapy Goal  Description: Goals to be met by: 2020    Patient will increase functional independence with mobility by performin. Supine <> sit with Stand-by Assistance.  2. Sit <> stand transfer with Stand-by Assistance using No Assistive Device.  3. Bed <> chair transfer via Stand Pivot with Stand-by Assistance using No Assistive Device.  4. Dynamic standing for 8 minutes with Contact Guard Assistance using No Assistive Device to prepare for functional tasks in standing.  5. Able to tolerate exercise for 15-20 reps with independence.  6. Gait x 100ft with CGA with RW or No AD to prepare for community ambulation.        Outcome: Ongoing, Progressing

## 2020-07-21 NOTE — PLAN OF CARE
Pt remains free from falls and injuries during shift. Fall precautions maintained. Bed alarm audible. Remains on RA. Systolic goal of less than 220; no PRN medications needed. PRN nebulizer treatments given for wheezing. Denies any pain or SOB during shift. Will continue to monitor pt.

## 2020-07-21 NOTE — PLAN OF CARE
Problem: SLP Goal  Goal: SLP Goal  Description: Speech Language Pathology Goals  Goals expected to be met by 7/26/20    1. Pt will participate in ongoing assessment of swallow function to determine safest, least restrictive means of nutrition/hydration  2. Pt will complete simple L tracking tasks with 90% accuracy, MOD A  3. Educate Pt and family on aspiration precautions and SLP POC  4. Educate Pt and family on safety awareness and compensatory strategies for memory and visiospatial skills       Outcome: Ongoing, Progressing     Pt seen for cognitive-linguistic tx. Current goals remain ongoing. Ongoing 24-hour hour supervision advised for safety. ST to continue to follow.    CAMMIE Wright., [facility]-SLP  Speech-Language Pathology  Pager: [phone]  7/21/2020

## 2020-07-21 NOTE — PHYSICIAN QUERY
"PT Name: Shant Magana Jr.  MR #: [id number]     CDS/: Candace Choe RN, CDS               Contact information: [email or web address]  This form is a permanent document in the medical record.    Query Date: July 21, 2020    Neurological Condition Clarification    By submitting this query, we are merely seeking further clarification of documentation to reflect the severity of illness of your patient. Please utilize your independent clinical judgment when addressing the question(s) below.    The Medical record reflects the following:     Indicators   Supporting Clinical Findings Location in Medical Record   x AMS, Confusion,  LOC, etc.  --Altered Mental Status         -Family last saw him 2 days ago, so that's the last known well.           -presents with two days of altered mental status, per family via EMS report.  EMS reports that the family said the patient "hasn't been himself for two days" and was missing his mouth when trying to eat a sandwich.           -Alert only to person     --On exam is oriented to place.  Able to say days of week forwards, but becomes distracted backwards.  Follows commands.    --Family reports confusion, inattentiveness, delayed verbal response, and facial droop. On exam, patient not oriented to place, time, or situation.   ED Provider Note                  Vas neuro H&P   x Acute/Chronic Illness --Embolic stroke involving right middle cerebral artery            -likely occurred several days ago            -Etiology likely cardioembolic  --Dementia without behavioral disturbance  --Cytotoxic cerebral edema  --SHIKHA (acute kidney injury)   Vas neuro H&P    Radiology Findings      Electrolyte Imbalance     x Medication --Apixaban 2.5 mg BID   --Atorvastatin- 80 mg daily   Vas neuro H&P   x Treatment         --Will start IVFs and monitor Cr  --MRI/MRA and echo pending   Vas Neuro H&P    Other       Encephalopathy- is a general term for any diffuse disease of the brain that alters " brain function or structure. Treatment of the cognitive dysfunction varies but is ultimately dependent on the treatment of the underlying condition.  Major Symptoms of Encephalopathy - Decreased level of consciousness, fluctuating alertness/concentration, confusion, agitation, lethargy, somnolence, drowsiness, obtundation, stupor, or coma.  References: National Institutes of Healths (NIH) National [place] of Neurological Disorders and Strokes;  HCPro 2016; Advisory Board   The noted clinical guidelines are only system guidelines and do not replace the providers clinical judgment.      Provider, please specify the diagnosis or diagnoses associated with above clinical findings.    [    ] Metabolic Encephalopathy, superimposed on Dementia     [   ] Unspecified Encephalopathy, superimposed on Dementia     [ x  ] Other Encephalopathy, superimposed on Dementia (please specify Encephalopathy type): _________stroke___________     [   ] Dementia only     [   ] Other Neurological Condition- (please specify condition): _________     [   ]  Clinically Undetermined           Please document in your progress notes daily for the duration of treatment until resolved, and include in your discharge summary.

## 2020-07-21 NOTE — PLAN OF CARE
Cont to rec SNF  at this time. JASPER Caro 7/21/2020   Problem: Occupational Therapy Goal  Goal: Occupational Therapy Goal  Description: Goals to be met by: 7/29     Patient will increase functional independence with ADLs by performing:    Feeding with Modified [place].  UE Dressing with Minimal Assistance.  LE Dressing (pants, brief) with Moderate Assistance.  Grooming while standing with Supervision.  Supine to sit with Supervision.  Step transfer with Supervision.  Functional mobility at household distance for ADL task with min(A) and AD as needed.    Outcome: Ongoing, Progressing

## 2020-07-21 NOTE — PLAN OF CARE
07/21/20 1656   Post-Acute Status   Post-Acute Authorization Placement   Post-Acute Placement Status Referrals Sent       Referrals sent to:    [facility]:  [facility]:  [facility]:  [facility]:      Awaiting clearance, acceptance and suth.  SW in contact with CM and Medical staff. Will continue to follow and offer support as needed.     Adair Mondragon LMSW  [facility]   Ext. [phone]

## 2020-07-21 NOTE — ASSESSMENT & PLAN NOTE
Repeat echo EF 68 %  Last echo 12/2019 with mild LV diastolic dysfunction  Now holding IVF given pleural effusions and wheezing on exam  Lasix 20 mg IVP once, Repeat CXR ordered 7/22.

## 2020-07-22 LAB
T3FREE SERPL-MCNC: 2.7 PG/ML (ref 2.3–4.2)
T4 SERPL-MCNC: 9.3 UG/DL (ref 4.5–11.5)

## 2020-07-22 PROCEDURE — 94761 N-INVAS EAR/PLS OXIMETRY MLT: CPT

## 2020-07-22 PROCEDURE — 84436 ASSAY OF TOTAL THYROXINE: CPT

## 2020-07-22 PROCEDURE — 99233 SBSQ HOSP IP/OBS HIGH 50: CPT | Mod: ,,, | Performed by: PSYCHIATRY & NEUROLOGY

## 2020-07-22 PROCEDURE — 92526 ORAL FUNCTION THERAPY: CPT

## 2020-07-22 PROCEDURE — 63600175 PHARM REV CODE 636 W HCPCS: Performed by: FAMILY MEDICINE

## 2020-07-22 PROCEDURE — 36415 COLL VENOUS BLD VENIPUNCTURE: CPT

## 2020-07-22 PROCEDURE — 25000003 PHARM REV CODE 250: Performed by: FAMILY MEDICINE

## 2020-07-22 PROCEDURE — 30200315 PPD INTRADERMAL TEST REV CODE 302: Performed by: PSYCHIATRY & NEUROLOGY

## 2020-07-22 PROCEDURE — 92507 TX SP LANG VOICE COMM INDIV: CPT

## 2020-07-22 PROCEDURE — 99233 PR SUBSEQUENT HOSPITAL CARE,LEVL III: ICD-10-PCS | Mod: ,,, | Performed by: PSYCHIATRY & NEUROLOGY

## 2020-07-22 PROCEDURE — 86580 TB INTRADERMAL TEST: CPT | Performed by: PSYCHIATRY & NEUROLOGY

## 2020-07-22 PROCEDURE — 11000001 HC ACUTE MED/SURG PRIVATE ROOM

## 2020-07-22 PROCEDURE — 84481 FREE ASSAY (FT-3): CPT

## 2020-07-22 PROCEDURE — 25000003 PHARM REV CODE 250: Performed by: PHYSICIAN ASSISTANT

## 2020-07-22 RX ORDER — FUROSEMIDE 10 MG/ML
20 INJECTION INTRAMUSCULAR; INTRAVENOUS ONCE
Status: COMPLETED | OUTPATIENT
Start: 2020-07-22 | End: 2020-07-22

## 2020-07-22 RX ORDER — LOSARTAN POTASSIUM 50 MG/1
100 TABLET ORAL DAILY
Status: DISCONTINUED | OUTPATIENT
Start: 2020-07-22 | End: 2020-07-27 | Stop reason: HOSPADM

## 2020-07-22 RX ADMIN — ATORVASTATIN CALCIUM 80 MG: 20 TABLET, FILM COATED ORAL at 08:07

## 2020-07-22 RX ADMIN — APIXABAN 2.5 MG: 2.5 TABLET, FILM COATED ORAL at 08:07

## 2020-07-22 RX ADMIN — FUROSEMIDE 20 MG: 10 INJECTION, SOLUTION INTRAMUSCULAR; INTRAVENOUS at 11:07

## 2020-07-22 RX ADMIN — PANTOPRAZOLE SODIUM 40 MG: 40 TABLET, DELAYED RELEASE ORAL at 08:07

## 2020-07-22 RX ADMIN — TUBERCULIN PURIFIED PROTEIN DERIVATIVE 5 UNITS: 5 INJECTION, SOLUTION INTRADERMAL at 12:07

## 2020-07-22 RX ADMIN — APIXABAN 2.5 MG: 2.5 TABLET, FILM COATED ORAL at 09:07

## 2020-07-22 RX ADMIN — TAMSULOSIN HYDROCHLORIDE 0.4 MG: 0.4 CAPSULE ORAL at 08:07

## 2020-07-22 RX ADMIN — LOSARTAN POTASSIUM 100 MG: 50 TABLET ORAL at 08:07

## 2020-07-22 RX ADMIN — Medication 1000 UNITS: at 08:07

## 2020-07-22 RX ADMIN — Medication 10 MG: at 05:07

## 2020-07-22 NOTE — ASSESSMENT & PLAN NOTE
Patient with history of multiple strokes   History of a fib + intracranial/extracranial atherosclerosis  Continue secondary stroke prevention with eliquis + atorvastatin 80 mg       - Will maintain on geriatric dose of Eliquis as creatinine borderline for normal dosing (and only worsens when not admitted).

## 2020-07-22 NOTE — ASSESSMENT & PLAN NOTE
Shant Magana Jr. is a 80 y.o. male with PMHx of dementia, multiple strokes, intracranial and extracranial atherosclerosis, DM, HTN, a fib (eliquis with history of noncompliance due to cost) who presented to ED via EMS for AMS x 2 days. Family reports confusion, inattentiveness, delayed verbal response, and facial droop. Patient with history of L facial droop from previous stroke. On exam, patient not oriented to place, time, or situation. R gaze preference and L hemianopsia also noted. CT revealed R parietal infarct. Etiology likely cardioembolic      Antithrombotics for secondary stroke prevention: Anticoagulants: Apixaban 2.5 mg BID  - Will maintain on geriatric dose of Eliquis as creatinine borderline for normal dosing (and only worsens when not admitted).    Statins for secondary stroke prevention and hyperlipidemia, if present:   Statins: Atorvastatin- 80 mg daily    Aggressive risk factor modification: HTN, DM, HLD, A-Fib, atherosclerosis, stroke     Rehab efforts: The patient has been evaluated by a stroke team provider and the therapy needs have been fully considered based off the presenting complaints and exam findings. The following therapy evaluations are needed: PT evaluate and treat, OT evaluate and treat, SLP evaluate and treat, PM&R evaluate for appropriate placement - recommending SNF vs HH w/ 24 hr assistance. Spoke to pt's daughter Manuel Lozada 7/21, who states family is unable to provide 24 hr supervision. Daughter agrees with SNF dispo.     Diagnostics ordered/pending: none    VTE prophylaxis: Mechanical prophylaxis: Place SCDs  None: Reason for No Pharmacological VTE Prophylaxis: Currently on anticoagulation    BP parameters: Infarct: SBP < 160

## 2020-07-22 NOTE — PLAN OF CARE
Problem: SLP Goal  Goal: SLP Goal  Description: Speech Language Pathology Goals  Goals expected to be met by 7/26/20    1. Pt will participate in ongoing assessment of swallow function to determine safest, least restrictive means of nutrition/hydration  2. Pt will complete simple L tracking tasks with 90% accuracy, MOD A  3. Educate Pt and family on aspiration precautions and SLP POC  4. Educate Pt and family on safety awareness and compensatory strategies for memory and visiospatial skills       Outcome: Ongoing, Progressing     Goals remain appropriate.   Emily P. Abadie M.S., CCC-SLP  Speech Language Pathologist  [phone]  07/22/2020

## 2020-07-22 NOTE — SUBJECTIVE & OBJECTIVE
Neurologic Chief Complaint: AMS, Confusion, Inattention, Delayed Verbal Response, L Facial Droop    Subjective:     Interval History: Patient is seen for follow-up neurological assessment and treatment recommendations:      Repeat CXR with slight decreased volume of pleural fluid on R, diminished to no breath sounds on R, mild expiratory wheezing persists, ordered another lasix 20 mg IVP once, continue PRN duonebs, continue to monitor. SBP 160s-170s, increased to full dose of home Losartan 100 mg daily. Free T3, T4 pending.     HPI, Past Medical, Family, and Social History remains the same as documented in the initial encounter.     Review of Systems   Constitutional: Negative.    HENT: Negative.    Respiratory: Positive for wheezing. Negative for cough, chest tightness and shortness of breath.    Cardiovascular: Negative for chest pain.   Gastrointestinal: Negative.    Musculoskeletal: Negative.    Neurological: Positive for facial asymmetry and weakness.   Psychiatric/Behavioral: Positive for confusion.     Scheduled Meds:   apixaban  2.5 mg Oral BID    atorvastatin  80 mg Oral Daily    losartan  100 mg Oral Daily    pantoprazole  40 mg Oral Daily    tamsulosin  0.4 mg Oral Daily    vitamin D  1,000 Units Oral Daily     Continuous Infusions:   sodium chloride 0.9%       PRN Meds:acetaminophen, albuterol-ipratropium, labetaloL, ondansetron, polyethylene glycol, sodium chloride 0.9%, sodium chloride 0.9%    Objective:     Vital Signs (Most Recent):  Temp: 98.6 °F (37 °C) (07/22/20 1115)  Pulse: 63 (07/22/20 1115)  Resp: 18 (07/22/20 0732)  BP: 131/81 (07/22/20 1115)  SpO2: 95 % (07/22/20 1115)  BP Location: Left arm    Vital Signs Range (Last 24H):  Temp:  [97.8 °F (36.6 °C)-98.6 °F (37 °C)]   Pulse:  [63-87]   Resp:  [18-20]   BP: (131-184)/()   SpO2:  [93 %-98 %]   BP Location: Left arm    Physical Exam  Vitals signs reviewed.   Constitutional:       General: He is not in acute distress.  HENT:       Head: Normocephalic and atraumatic.      Mouth/Throat:      Mouth: Mucous membranes are moist.      Pharynx: Oropharynx is clear.   Eyes:      Pupils: Pupils are equal, round, and reactive to light.   Neck:      Musculoskeletal: Normal range of motion. No neck rigidity.   Cardiovascular:      Rate and Rhythm: Normal rate.   Pulmonary:      Effort: Pulmonary effort is normal. No respiratory distress.      Breath sounds: Examination of the right-upper field reveals decreased breath sounds. Examination of the right-middle field reveals decreased breath sounds. Examination of the right-lower field reveals decreased breath sounds. Decreased breath sounds and wheezing present.   Musculoskeletal: Normal range of motion.         General: No tenderness.      Right lower leg: No edema.      Left lower leg: No edema.   Neurological:      Mental Status: He is alert. He is disoriented.     Neurological Exam:   LOC: alert  Attention Span: poor  Language: No aphasia  Articulation: Dysarthria  Orientation: Person  Visual Fields: Hemianopsia left  EOM (CN III, IV, VI): Gaze preference  right  Pupils (CN II, III): PERRL  Facial Movement (CN VII): Lower facial weakness on the Left  Motor: Arm left  Paresis: 4/5  Leg left  Normal 5/5  Arm right  Normal 5/5  Leg right Normal 5/5  Cebellar: Upper Extremity Appendicular Ataxia (Finger Nose Finger)  Left  Sensation: Intact to light touch, temperature and vibration  Tone: Normal tone throughout    Laboratory:  CMP:   No results for input(s): GLUCOSE, CALCIUM, ALBUMIN, PROT, NA, K, CO2, CL, BUN, CREATININE, ALKPHOS, ALT, AST, BILITOT in the last 24 hours.  CBC:   Recent Labs   Lab 07/20/20  0320   WBC 4.09   RBC 4.44*   HGB 12.9*   HCT 39.5*      MCV 89   MCH 29.1   MCHC 32.7     Lipid Panel:   Recent Labs   Lab 07/18/20  1617   CHOL 124   LDLCALC 70.8   HDL 40   TRIG 66     Coagulation:   Recent Labs   Lab 07/19/20  0335   INR 1.1   APTT 28.6     Platelet Aggregation Study: No  results for input(s): PLTAGG, PLTAGINTERP, PLTAGREGLACO, ADPPLTAGGREG in the last 168 hours.  Hgb A1C:   Recent Labs   Lab 07/18/20  1902   HGBA1C 5.8*     TSH:   Recent Labs   Lab 07/18/20  1617   TSH 2.825       Diagnostic Results     MRI Brain 7/18/20  Area of diffusion restriction with ADC match suggestive of acute infarct in the right parietal lobe. Additional smaller acute infarcts right occipital lobe and right basal ganglia.     Remote right frontal infarct and remote lacunar infarcts in the thalami.  Supratentorial white matter T2/flair hyperintense signal foci suggesting sequela of chronic small vessel ischemic change.     MR angiogram of the head and neck appears unchanged compared to prior exams without any focal occlusion identified.       CT Head. Date: 07/18/20  1. Interval right parietal lobe suspected acute infarct.  No intracranial hemorrhage.  Further evaluation/follow-up as warranted.  2. Bilateral thalamic suspected lacunar type infarcts, age-indeterminate.  Correlate clinically.  3. Additional multifocal supratentorial and to a lesser extent infratentorial remote infarcts, as detailed above.  4. Generalized cerebral volume loss and sequela of advanced microvascular ischemic changes.     Vessel Imaging:  MRA Head and Neck 7/18/20 - see MRI Brain 7/18/20 above     Cardiac Evaluation:   Echo 7/19/20  · Normal left ventricular systolic function. The estimated ejection fraction is 68%.  · Concentric left ventricular remodeling with thickened walls especially septum and increased myocardial density.  · No wall motion abnormalities.  · Normal LV diastolic function.  · Normal right ventricular systolic function.  · Normal central venous pressure (3 mmHg).  · The estimated PA systolic pressure is 30 mmHg.  · The ascending aorta is mildly dilated.    The left atrial volume index is normal.

## 2020-07-22 NOTE — PLAN OF CARE
Problem: Hemodynamic Instability (Stroke, Ischemic/Transient Ischemic Attack)  Goal: Vital Signs Remain in Desired Range  Outcome: Ongoing, Progressing     Problem: Fall Injury Risk  Goal: Absence of Fall and Fall-Related Injury  Outcome: Ongoing, Progressing     Problem: Adult Inpatient Plan of Care  Goal: Plan of Care Review  Outcome: Ongoing, Progressing     Patient is AAO x3. POC reviewed with patient. Patient verbalized understanding. Patient's breathing is unlabored with equal chest expansion, audible wheezing noted. Patient denies any numbness and tingling. Patient voids per condom cath. Patient remained free from falls. Patient rested well through shift. Bed in lowest position,bed alarm on, side rails up x3, Avasys in place, no complaints or signs of distress. WCTM.  See flowsheets for full assessment and VS info.

## 2020-07-22 NOTE — PT/OT/SLP PROGRESS
Speech Language Pathology Treatment    Patient Name:  Shant Magana Jr.   MRN:  [id number]  Admitting Diagnosis: Embolic stroke involving right middle cerebral artery    Recommendations:                 General Recommendations:  Dysphagia therapy, Cognitive-linguistic therapy and 24-hour supervision for safety  Diet recommendations:  Regular, Liquid Diet Level: Thin   Aspiration Precautions: Pt requires strict 1:1 assistance with all PO for safety. 1 bite/sip at a time, Assistance with meals, Frequent oral care, HOB to 90 degrees, Meds whole 1 at a time, Remain upright 30 minutes post meal, Small bites/sips and Strict aspiration precautions   General Precautions: Standard, aspiration, fall, vision impaired  Communication strategies:  provide increased time to answer and go to room if call light pushed    Subjective     Patient awake and alert. Audible expiratory wheezing appreciated. Sitter present.     Pain/Comfort:  Pain Rating 1: 0/10    Objective:     Has the patient been evaluated by SLP for swallowing?   Yes  Keep patient NPO? No   Current Respiratory Status: room air      Patient tolerated thin liquids via straw sips over 5oz along with regular solids x3 with no overt signs of airway compromise. Patient completed L scanning/cancellation tasks with 25% acc indep, improving to 100% acc given max A.  Patient oriented x3 indep. Skilled education was provided to patient and family members re: diet recs, standard aspiration precautions of which to follow, and ongoing ST plan of care. Patient recalled aspiration precautions 4/4 s/p education.       Assessment:     Shant Magana Jr. is a 80 y.o. 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      Goals:   Multidisciplinary Problems     SLP Goals        Problem: SLP Goal    Goal Priority Disciplines Outcome   SLP Goal     SLP Ongoing, Progressing   Description: Speech Language Pathology Goals  Goals expected to be met by  7/26/20    1. Pt will participate in ongoing assessment of swallow function to determine safest, least restrictive means of nutrition/hydration  2. Pt will complete simple L tracking tasks with 90% accuracy, MOD A  3. Educate Pt and family on aspiration precautions and SLP POC  4. Educate Pt and family on safety awareness and compensatory strategies for memory and visiospatial skills                        Plan:     · Patient to be seen:  4 x/week   · Plan of Care expires:  08/18/20  · Plan of Care reviewed with:  patient   · SLP Follow-Up:  Yes       Discharge recommendations:  nursing facility, skilled     Time Tracking:     SLP Treatment Date:   07/22/20  Speech Start Time:  0955  Speech Stop Time:  1008     Speech Total Time (min):  13 min    Billable Minutes: Speech Therapy Individual 6 and Treatment Swallowing Dysfunction 7    Emily P. Abadie M.S., CCC-SLP  Speech Language Pathologist  [phone]  07/22/2020

## 2020-07-22 NOTE — ASSESSMENT & PLAN NOTE
Stroke risk factor  SBP < 160   Resumed home Losartan at decreased dose of 50 mg daily 7/21, increased to 100 mg on 7/22.     Original home meds: Losartan 100 mg daily, Amlodipine 10 mg daily  Continue to monitor BP. Adjust as needed.

## 2020-07-22 NOTE — PROGRESS NOTES
[facility]  Vascular Neurology  Comprehensive Stroke Center  Progress Note    Assessment/Plan:     * Embolic stroke involving right middle cerebral artery  Shant Magana Jr. is a 80 y.o. male with PMHx of dementia, multiple strokes, intracranial and extracranial atherosclerosis, DM, HTN, a fib (eliquis with history of noncompliance due to cost) who presented to ED via EMS for AMS x 2 days. Family reports confusion, inattentiveness, delayed verbal response, and facial droop. Patient with history of L facial droop from previous stroke. On exam, patient not oriented to place, time, or situation. R gaze preference and L hemianopsia also noted. CT revealed R parietal infarct. Etiology likely cardioembolic      Antithrombotics for secondary stroke prevention: Anticoagulants: Apixaban 2.5 mg BID  - Will maintain on geriatric dose of Eliquis as creatinine borderline for normal dosing (and only worsens when not admitted).    Statins for secondary stroke prevention and hyperlipidemia, if present:   Statins: Atorvastatin- 80 mg daily    Aggressive risk factor modification: HTN, DM, HLD, A-Fib, atherosclerosis, stroke     Rehab efforts: The patient has been evaluated by a stroke team provider and the therapy needs have been fully considered based off the presenting complaints and exam findings. The following therapy evaluations are needed: PT evaluate and treat, OT evaluate and treat, SLP evaluate and treat, PM&R evaluate for appropriate placement - recommending SNF vs HH w/ 24 hr assistance. Spoke to pt's daughter Manuel Lozada 7/21, who states family is unable to provide 24 hr supervision. Daughter agrees with SNF dispo.     Diagnostics ordered/pending: none    VTE prophylaxis: Mechanical prophylaxis: Place SCDs  None: Reason for No Pharmacological VTE Prophylaxis: Currently on anticoagulation    BP parameters: Infarct: SBP < 160        Former smoker  Stroke risk factor  Encourage continued smoking  cessation    Benign prostatic hyperplasia  Continue home flomax    Dementia without behavioral disturbance  Delirium precautions ordered    Paroxysmal atrial fibrillation  Stroke risk factor  Continue eliquis  Patient compliant with anticoagulation medication per his son    Chronic diastolic congestive heart failure  Repeat echo EF 68 %  Last echo 12/2019 with mild LV diastolic dysfunction  Now holding IVF given pleural effusions and wheezing on exam    Given Lasix 20 mg IVP once 7/21  Repeat CXR on 7/22, with slight decreased volume of pleural fluid on R; ordered second lasix 20 mg IVP.         Cytotoxic cerebral edema  Area of cytotoxic cerebral edema identified when reviewing brain imaging in the territory of the R middle cerebral artery. There is no mass effect associated with it. We will continue to monitor the patients clinical exam for any worsening of symptoms which may indicate expansion of the stroke or the area of the edema resulting in the clinical change. The pattern is suggestive of cardioembolic etiology.        Mixed hyperlipidemia  Stroke risk factor  LDL 70  Continue home atorvastatin 80 mg daily    Internal carotid artery stenosis, left  Stroke risk factor        SHIKHA (acute kidney injury)  Cr 2.2, last CMP in 12/2019 with Cr of 1.5 so unclear if patient has new baseline  Avoid nephrotoxins  Cr continues to improve  Holding fluids due to pleural effusions and wheezing on exam    History of stroke  Patient with history of multiple strokes   History of a fib + intracranial/extracranial atherosclerosis  Continue secondary stroke prevention with eliquis + atorvastatin 80 mg       - Will maintain on geriatric dose of Eliquis as creatinine borderline for normal dosing (and only worsens when not admitted).       Type 2 diabetes mellitus with complication, without long-term current use of insulin  Stroke risk factor  A1C 5.8  SSI  Glucose 140-180    Essential hypertension  Stroke risk factor  SBP < 160    Resumed home Losartan at decreased dose of 50 mg daily 7/21, increased to 100 mg on 7/22.     Original home meds: Losartan 100 mg daily, Amlodipine 10 mg daily  Continue to monitor BP. Adjust as needed.          7/19: Patient with mild wheezing on exam today. Discontinued IVFs and started PRN DuoNebs, CXR with concern for pleural effusion as on previous imaging. Still pending Echo, A1c, and PT/OT/SLP.  7/20: Echo complete, EF 68%, normal LA. Repeat CXR ordered, mild wheezing persists, continue DuoNebs. Therapy recommending SNF vs home health w/ 24 hour supervision. Dispo pending family's decision  7/21: CXR worsened w/ progression of pleural fluid and parenchymal changes, mild expiratory wheezing perists, order lasix 20 mg IVP once, repeat CXR tomorrow. Restart half dose of home Losartan at 50 mg daily. Family unable to provide 24 hr care with HH, CM to send SNF options.   7/22: Repeat CXR with slight decreased volume of pleural fluid on R, diminished to no breath sounds on R, mild expiratory wheezing persists, ordered another lasix 20 mg IVP once, continue PRN duonebs, continue to monitor. SBP 160s-170s, increased to full dose of home Losartan 100 mg daily. Free T3, T4 pending.     STROKE DOCUMENTATION   Acute Stroke Times   Symptom Onset Date: 07/16/20    NIH Scale:  1a. Level of Consciousness: 0-->Alert, keenly responsive  1b. LOC Questions: 1-->Answers one question correctly  1c. LOC Commands: 0-->Performs both tasks correctly  2. Best Gaze: 1-->Partial gaze palsy, gaze is abnormal in one or both eyes, but forced deviation or total gaze paresis is not present  3. Visual: 1-->Partial hemianopia  4. Facial Palsy: 1-->Minor paralysis (flattened nasolabial fold, asymmetry on smiling)  5a. Motor Arm, Left: 1-->Drift, limb holds 90 (or 45) degrees, but drifts down before full 10 seconds, does not hit bed or other support  5b. Motor Arm, Right: 0-->No drift, limb holds 90 (or 45) degrees for full 10 secs  6a. Motor  Leg, Left: 0-->No drift, leg holds 30 degree position for full 5 secs  6b. Motor Leg, Right: 0-->No drift, leg holds 30 degree position for full 5 secs  7. Limb Ataxia: 0-->Absent  8. Sensory: 0-->Normal, no sensory loss  9. Best Language: 0-->No aphasia, normal  10. Dysarthria: 0-->Normal  11. Extinction and Inattention (formerly Neglect): 0-->No abnormality  Total (NIH Stroke Scale): 5       Modified [place] Score: 0  [place] Coma Scale:    ABCD2 Score:    [id number] Score:8  HAS -BLED Score:   ICH Score:   Hunt & Choudhury Classification:      Hemorrhagic change of an Ischemic Stroke: Does this patient have an ischemic stroke with hemorrhagic changes? No     Neurologic Chief Complaint: AMS, Confusion, Inattention, Delayed Verbal Response, L Facial Droop    Subjective:     Interval History: Patient is seen for follow-up neurological assessment and treatment recommendations:      Repeat CXR with slight decreased volume of pleural fluid on R, diminished to no breath sounds on R, mild expiratory wheezing persists, ordered another lasix 20 mg IVP once, continue PRN duonebs, continue to monitor. SBP 160s-170s, increased to full dose of home Losartan 100 mg daily. Free T3, T4 pending.     HPI, Past Medical, Family, and Social History remains the same as documented in the initial encounter.     Review of Systems   Constitutional: Negative.    HENT: Negative.    Respiratory: Positive for wheezing. Negative for cough, chest tightness and shortness of breath.    Cardiovascular: Negative for chest pain.   Gastrointestinal: Negative.    Musculoskeletal: Negative.    Neurological: Positive for facial asymmetry and weakness.   Psychiatric/Behavioral: Positive for confusion.     Scheduled Meds:   apixaban  2.5 mg Oral BID    atorvastatin  80 mg Oral Daily    losartan  100 mg Oral Daily    pantoprazole  40 mg Oral Daily    tamsulosin  0.4 mg Oral Daily    vitamin D  1,000 Units Oral Daily     Continuous Infusions:   sodium  chloride 0.9%       PRN Meds:acetaminophen, albuterol-ipratropium, labetaloL, ondansetron, polyethylene glycol, sodium chloride 0.9%, sodium chloride 0.9%    Objective:     Vital Signs (Most Recent):  Temp: 98.6 °F (37 °C) (07/22/20 1115)  Pulse: 63 (07/22/20 1115)  Resp: 18 (07/22/20 0732)  BP: 131/81 (07/22/20 1115)  SpO2: 95 % (07/22/20 1115)  BP Location: Left arm    Vital Signs Range (Last 24H):  Temp:  [97.8 °F (36.6 °C)-98.6 °F (37 °C)]   Pulse:  [63-87]   Resp:  [18-20]   BP: (131-184)/()   SpO2:  [93 %-98 %]   BP Location: Left arm    Physical Exam  Vitals signs reviewed.   Constitutional:       General: He is not in acute distress.  HENT:      Head: Normocephalic and atraumatic.      Mouth/Throat:      Mouth: Mucous membranes are moist.      Pharynx: Oropharynx is clear.   Eyes:      Pupils: Pupils are equal, round, and reactive to light.   Neck:      Musculoskeletal: Normal range of motion. No neck rigidity.   Cardiovascular:      Rate and Rhythm: Normal rate.   Pulmonary:      Effort: Pulmonary effort is normal. No respiratory distress.      Breath sounds: Examination of the right-upper field reveals decreased breath sounds. Examination of the right-middle field reveals decreased breath sounds. Examination of the right-lower field reveals decreased breath sounds. Decreased breath sounds and wheezing present.   Musculoskeletal: Normal range of motion.         General: No tenderness.      Right lower leg: No edema.      Left lower leg: No edema.   Neurological:      Mental Status: He is alert. He is disoriented.     Neurological Exam:   LOC: alert  Attention Span: poor  Language: No aphasia  Articulation: Dysarthria  Orientation: Person  Visual Fields: Hemianopsia left  EOM (CN III, IV, VI): Gaze preference  right  Pupils (CN II, III): PERRL  Facial Movement (CN VII): Lower facial weakness on the Left  Motor: Arm left  Paresis: 4/5  Leg left  Normal 5/5  Arm right  Normal 5/5  Leg right Normal  5/5  Cebellar: Upper Extremity Appendicular Ataxia (Finger Nose Finger)  Left  Sensation: Intact to light touch, temperature and vibration  Tone: Normal tone throughout    Laboratory:  CMP:   No results for input(s): GLUCOSE, CALCIUM, ALBUMIN, PROT, NA, K, CO2, CL, BUN, CREATININE, ALKPHOS, ALT, AST, BILITOT in the last 24 hours.  CBC:   Recent Labs   Lab 07/20/20  0320   WBC 4.09   RBC 4.44*   HGB 12.9*   HCT 39.5*      MCV 89   MCH 29.1   MCHC 32.7     Lipid Panel:   Recent Labs   Lab 07/18/20  1617   CHOL 124   LDLCALC 70.8   HDL 40   TRIG 66     Coagulation:   Recent Labs   Lab 07/19/20  0335   INR 1.1   APTT 28.6     Platelet Aggregation Study: No results for input(s): PLTAGG, PLTAGINTERP, PLTAGREGLACO, ADPPLTAGGREG in the last 168 hours.  Hgb A1C:   Recent Labs   Lab 07/18/20  1902   HGBA1C 5.8*     TSH:   Recent Labs   Lab 07/18/20  1617   TSH 2.825       Diagnostic Results     MRI Brain 7/18/20  Area of diffusion restriction with ADC match suggestive of acute infarct in the right parietal lobe. Additional smaller acute infarcts right occipital lobe and right basal ganglia.     Remote right frontal infarct and remote lacunar infarcts in the thalami.  Supratentorial white matter T2/flair hyperintense signal foci suggesting sequela of chronic small vessel ischemic change.     MR angiogram of the head and neck appears unchanged compared to prior exams without any focal occlusion identified.       CT Head. Date: 07/18/20  1. Interval right parietal lobe suspected acute infarct.  No intracranial hemorrhage.  Further evaluation/follow-up as warranted.  2. Bilateral thalamic suspected lacunar type infarcts, age-indeterminate.  Correlate clinically.  3. Additional multifocal supratentorial and to a lesser extent infratentorial remote infarcts, as detailed above.  4. Generalized cerebral volume loss and sequela of advanced microvascular ischemic changes.     Vessel Imaging:  MRA Head and Neck 7/18/20 - see MRI  Brain 7/18/20 above     Cardiac Evaluation:   Echo 7/19/20  · Normal left ventricular systolic function. The estimated ejection fraction is 68%.  · Concentric left ventricular remodeling with thickened walls especially septum and increased myocardial density.  · No wall motion abnormalities.  · Normal LV diastolic function.  · Normal right ventricular systolic function.  · Normal central venous pressure (3 mmHg).  · The estimated PA systolic pressure is 30 mmHg.  · The ascending aorta is mildly dilated.    The left atrial volume index is normal.           Felice Carranza NP  Comprehensive Stroke Center  Department of Vascular Neurology   [facility]

## 2020-07-22 NOTE — ASSESSMENT & PLAN NOTE
Repeat echo EF 68 %  Last echo 12/2019 with mild LV diastolic dysfunction  Now holding IVF given pleural effusions and wheezing on exam    Given Lasix 20 mg IVP once 7/21  Repeat CXR on 7/22, with slight decreased volume of pleural fluid on R; ordered second lasix 20 mg IVP.

## 2020-07-23 LAB
ANION GAP SERPL CALC-SCNC: 4 MMOL/L (ref 8–16)
BASOPHILS # BLD AUTO: 0.03 K/UL (ref 0–0.2)
BASOPHILS NFR BLD: 0.6 % (ref 0–1.9)
BUN SERPL-MCNC: 23 MG/DL (ref 8–23)
CALCIUM SERPL-MCNC: 9.6 MG/DL (ref 8.7–10.5)
CHLORIDE SERPL-SCNC: 106 MMOL/L (ref 95–110)
CO2 SERPL-SCNC: 27 MMOL/L (ref 23–29)
CREAT SERPL-MCNC: 1.6 MG/DL (ref 0.5–1.4)
DIFFERENTIAL METHOD: ABNORMAL
EOSINOPHIL # BLD AUTO: 0.2 K/UL (ref 0–0.5)
EOSINOPHIL NFR BLD: 4.1 % (ref 0–8)
ERYTHROCYTE [DISTWIDTH] IN BLOOD BY AUTOMATED COUNT: 14.6 % (ref 11.5–14.5)
EST. GFR  (AFRICAN AMERICAN): 46.3 ML/MIN/1.73 M^2
EST. GFR  (NON AFRICAN AMERICAN): 40.1 ML/MIN/1.73 M^2
GLUCOSE SERPL-MCNC: 107 MG/DL (ref 70–110)
HCT VFR BLD AUTO: 39.7 % (ref 40–54)
HGB BLD-MCNC: 12.7 G/DL (ref 14–18)
IMM GRANULOCYTES # BLD AUTO: 0.01 K/UL (ref 0–0.04)
IMM GRANULOCYTES NFR BLD AUTO: 0.2 % (ref 0–0.5)
LYMPHOCYTES # BLD AUTO: 0.9 K/UL (ref 1–4.8)
LYMPHOCYTES NFR BLD: 18.9 % (ref 18–48)
MCH RBC QN AUTO: 29.1 PG (ref 27–31)
MCHC RBC AUTO-ENTMCNC: 32 G/DL (ref 32–36)
MCV RBC AUTO: 91 FL (ref 82–98)
MONOCYTES # BLD AUTO: 0.5 K/UL (ref 0.3–1)
MONOCYTES NFR BLD: 9.2 % (ref 4–15)
NEUTROPHILS # BLD AUTO: 3.3 K/UL (ref 1.8–7.7)
NEUTROPHILS NFR BLD: 67 % (ref 38–73)
NRBC BLD-RTO: 0 /100 WBC
PLATELET # BLD AUTO: 215 K/UL (ref 150–350)
PMV BLD AUTO: 10.5 FL (ref 9.2–12.9)
POCT GLUCOSE: 123 MG/DL (ref 70–110)
POTASSIUM SERPL-SCNC: 4.2 MMOL/L (ref 3.5–5.1)
RBC # BLD AUTO: 4.37 M/UL (ref 4.6–6.2)
SARS-COV-2 RDRP RESP QL NAA+PROBE: NEGATIVE
SODIUM SERPL-SCNC: 137 MMOL/L (ref 136–145)
WBC # BLD AUTO: 4.87 K/UL (ref 3.9–12.7)

## 2020-07-23 PROCEDURE — U0002 COVID-19 LAB TEST NON-CDC: HCPCS

## 2020-07-23 PROCEDURE — 25000003 PHARM REV CODE 250: Performed by: FAMILY MEDICINE

## 2020-07-23 PROCEDURE — 97530 THERAPEUTIC ACTIVITIES: CPT

## 2020-07-23 PROCEDURE — 97535 SELF CARE MNGMENT TRAINING: CPT

## 2020-07-23 PROCEDURE — 11000001 HC ACUTE MED/SURG PRIVATE ROOM

## 2020-07-23 PROCEDURE — 36415 COLL VENOUS BLD VENIPUNCTURE: CPT

## 2020-07-23 PROCEDURE — 94761 N-INVAS EAR/PLS OXIMETRY MLT: CPT

## 2020-07-23 PROCEDURE — 27000221 HC OXYGEN, UP TO 24 HOURS

## 2020-07-23 PROCEDURE — 99900035 HC TECH TIME PER 15 MIN (STAT)

## 2020-07-23 PROCEDURE — 80048 BASIC METABOLIC PNL TOTAL CA: CPT

## 2020-07-23 PROCEDURE — 25000003 PHARM REV CODE 250: Performed by: PHYSICIAN ASSISTANT

## 2020-07-23 PROCEDURE — 99233 PR SUBSEQUENT HOSPITAL CARE,LEVL III: ICD-10-PCS | Mod: ,,, | Performed by: PSYCHIATRY & NEUROLOGY

## 2020-07-23 PROCEDURE — 99233 SBSQ HOSP IP/OBS HIGH 50: CPT | Mod: ,,, | Performed by: PSYCHIATRY & NEUROLOGY

## 2020-07-23 PROCEDURE — 63600175 PHARM REV CODE 636 W HCPCS: Performed by: STUDENT IN AN ORGANIZED HEALTH CARE EDUCATION/TRAINING PROGRAM

## 2020-07-23 PROCEDURE — 97116 GAIT TRAINING THERAPY: CPT

## 2020-07-23 PROCEDURE — 85025 COMPLETE CBC W/AUTO DIFF WBC: CPT

## 2020-07-23 RX ORDER — FUROSEMIDE 10 MG/ML
40 INJECTION INTRAMUSCULAR; INTRAVENOUS ONCE
Status: COMPLETED | OUTPATIENT
Start: 2020-07-23 | End: 2020-07-23

## 2020-07-23 RX ADMIN — FUROSEMIDE 40 MG: 10 INJECTION, SOLUTION INTRAMUSCULAR; INTRAVENOUS at 12:07

## 2020-07-23 RX ADMIN — TAMSULOSIN HYDROCHLORIDE 0.4 MG: 0.4 CAPSULE ORAL at 09:07

## 2020-07-23 RX ADMIN — PANTOPRAZOLE SODIUM 40 MG: 40 TABLET, DELAYED RELEASE ORAL at 09:07

## 2020-07-23 RX ADMIN — LOSARTAN POTASSIUM 100 MG: 50 TABLET ORAL at 09:07

## 2020-07-23 RX ADMIN — APIXABAN 2.5 MG: 2.5 TABLET, FILM COATED ORAL at 09:07

## 2020-07-23 RX ADMIN — Medication 1000 UNITS: at 09:07

## 2020-07-23 RX ADMIN — ATORVASTATIN CALCIUM 80 MG: 20 TABLET, FILM COATED ORAL at 09:07

## 2020-07-23 NOTE — ASSESSMENT & PLAN NOTE
Repeat echo EF 68 %  Last echo 12/2019 with mild LV diastolic dysfunction  Not on diuretic at home    Plan:  - IV lasix 40mg x 1 dose  - Daily weights (standing if tolerated)  - Strict I/Os  - Fluid restriction at 1500mL  - Cardiac diet

## 2020-07-23 NOTE — SUBJECTIVE & OBJECTIVE
Neurologic Chief Complaint: AMS, Confusion, Inattention, Delayed Verbal Response, L Facial Droop    Subjective:     Interval History: NAEON. Good UOP with IV lasix 20mg but strict I/Os not charted. Continues to require 2L nc.     HPI, Past Medical, Family, and Social History remains the same as documented in the initial encounter.     Review of Systems   Constitutional: Negative.    HENT: Negative.    Respiratory: Positive for wheezing. Negative for cough, chest tightness and shortness of breath.    Cardiovascular: Negative for chest pain.   Gastrointestinal: Negative.    Musculoskeletal: Negative.    Neurological: Positive for facial asymmetry and weakness.   Psychiatric/Behavioral: Positive for confusion.     Scheduled Meds:   apixaban  2.5 mg Oral BID    atorvastatin  80 mg Oral Daily    losartan  100 mg Oral Daily    pantoprazole  40 mg Oral Daily    tamsulosin  0.4 mg Oral Daily    vitamin D  1,000 Units Oral Daily     Continuous Infusions:   sodium chloride 0.9%       PRN Meds:acetaminophen, albuterol-ipratropium, labetaloL, ondansetron, polyethylene glycol, sodium chloride 0.9%, sodium chloride 0.9%    Objective:     Vital Signs (Most Recent):  Temp: 98.9 °F (37.2 °C) (07/23/20 1110)  Pulse: 63 (07/23/20 1340)  Resp: 12 (07/23/20 0419)  BP: (!) 147/85 (07/23/20 1340)  SpO2: (!) 93 % (07/23/20 1110)  BP Location: Right arm    Vital Signs Range (Last 24H):  Temp:  [98.2 °F (36.8 °C)-99.2 °F (37.3 °C)]   Pulse:  [56-65]   Resp:  [12-20]   BP: (133-154)/(77-98)   SpO2:  [93 %-98 %]   BP Location: Right arm    Physical Exam  Constitutional:       General: He is not in acute distress.     Appearance: He is well-developed.   HENT:      Head: Normocephalic and atraumatic.      Mouth/Throat:      Pharynx: No oropharyngeal exudate.   Eyes:      Conjunctiva/sclera: Conjunctivae normal.      Pupils: Pupils are equal, round, and reactive to light.   Neck:      Musculoskeletal: Normal range of motion and neck  supple.   Cardiovascular:      Rate and Rhythm: Normal rate and regular rhythm.      Heart sounds: Normal heart sounds. No murmur.   Pulmonary:      Effort: Pulmonary effort is normal. No respiratory distress.      Breath sounds: Rales (bibasilar rales) present. No wheezing.   Abdominal:      General: Bowel sounds are normal. There is no distension.      Palpations: Abdomen is soft.      Tenderness: There is no abdominal tenderness. There is no guarding.   Musculoskeletal:         General: No tenderness.   Skin:     General: Skin is warm and dry.      Findings: No rash.   Neurological:      Mental Status: He is alert. Mental status is at baseline. He is disoriented.      Cranial Nerves: No cranial nerve deficit.      Motor: No abnormal muscle tone.   Psychiatric:         Behavior: Behavior normal.       Neurological Exam:   LOC: alert  Attention Span: poor  Language: No aphasia  Articulation: Dysarthria  Orientation: Person  Visual Fields: Hemianopsia left  EOM (CN III, IV, VI): Gaze preference  right  Pupils (CN II, III): PERRL  Facial Movement (CN VII): Lower facial weakness on the Left  Motor: Arm left  Paresis: 4/5  Leg left  Normal 5/5  Arm right  Normal 5/5  Leg right Normal 5/5  Cebellar: Upper Extremity Appendicular Ataxia (Finger Nose Finger)  Left  Sensation: Intact to light touch, temperature and vibration  Tone: Normal tone throughout    Laboratory:  BMP:   Recent Labs   Lab 07/23/20  0827      K 4.2      CO2 27   BUN 23   CREATININE 1.6*   CALCIUM 9.6     CBC:   Recent Labs   Lab 07/23/20  0826   WBC 4.87   RBC 4.37*   HGB 12.7*   HCT 39.7*      MCV 91   MCH 29.1   MCHC 32.0       Diagnostic Results     Brain Imaging:  MRI Brain 7/18/20  Area of diffusion restriction with ADC match suggestive of acute infarct in the right parietal lobe. Additional smaller acute infarcts right occipital lobe and right basal ganglia.     Remote right frontal infarct and remote lacunar infarcts in the  thalami.  Supratentorial white matter T2/flair hyperintense signal foci suggesting sequela of chronic small vessel ischemic change.     MR angiogram of the head and neck appears unchanged compared to prior exams without any focal occlusion identified.        CT Head. Date: 07/18/20  1. Interval right parietal lobe suspected acute infarct.  No intracranial hemorrhage.  Further evaluation/follow-up as warranted.  2. Bilateral thalamic suspected lacunar type infarcts, age-indeterminate.  Correlate clinically.  3. Additional multifocal supratentorial and to a lesser extent infratentorial remote infarcts, as detailed above.  4. Generalized cerebral volume loss and sequela of advanced microvascular ischemic changes.     Vessel Imaging:  MRA Head and Neck 7/18/20 - see MRI Brain 7/18/20 above     Cardiac Evaluation:   Echo 7/19/20  · Normal left ventricular systolic function. The estimated ejection fraction is 68%.  · Concentric left ventricular remodeling with thickened walls especially septum and increased myocardial density.  · No wall motion abnormalities.  · Normal LV diastolic function.  · Normal right ventricular systolic function.  · Normal central venous pressure (3 mmHg).  · The estimated PA systolic pressure is 30 mmHg.  · The ascending aorta is mildly dilated.    The left atrial volume index is normal.

## 2020-07-23 NOTE — PLAN OF CARE
Problem: Physical Therapy Goal  Goal: Physical Therapy Goal  Description: Goals to be met by: 2020    Patient will increase functional independence with mobility by performin. Supine <> sit with Stand-by Assistance.  2. Sit <> stand transfer with Stand-by Assistance using No Assistive Device.  3. Bed <> chair transfer via Stand Pivot with Stand-by Assistance using No Assistive Device.  4. Dynamic standing for 8 minutes with Contact Guard Assistance using No Assistive Device to prepare for functional tasks in standing.  5. Able to tolerate exercise for 15-20 reps with independence.  6. Gait x 100ft with CGA with RW or No AD to prepare for community ambulation.    Patient tolerated treatment well. Established POC and goals reviewed and remain appropriate. Plan is to continue to improve patient's functional mobility capabilities.      Paradise Guerin, PT, DPT  2020      Outcome: Ongoing, Progressing

## 2020-07-23 NOTE — PT/OT/SLP PROGRESS
"Physical Therapy Treatment    Patient Name:  Shant Magana Jr.   MRN:  [id number]    Recommendations:     Discharge Recommendations:  nursing facility, skilled   Discharge Equipment Recommendations: (TBD)   Barriers to discharge: Inaccessible home, Decreased caregiver support and per family unable to provide 24/7 supervision     Assessment:     Shant Magana Jr. is a 80 y.o. male admitted with a medical diagnosis of Embolic stroke involving right middle cerebral artery.  He presents with the following impairments/functional limitations:  weakness, impaired endurance, impaired self care skills, impaired functional mobilty, gait instability, impaired balance, impaired cognition, decreased coordination, decreased safety awareness. Patient tolerated session well. He was only oriented to self and place. Patient found attempting to climb out of bed, redirected to safely return to bed in order to mobilize. Required encouragement to participate and was high distracted. Patient would continue to benefit from skilled PT services while in the hospital. At this time, upon d/c he is an appropriate candidate for SNF in order to progress towards an improved level of functional mobility independence.     Rehab Prognosis: Fair+; patient would benefit from acute skilled PT services to address these deficits and reach maximum level of function.    Recent Surgery: * No surgery found *      Plan:     During this hospitalization, patient to be seen 3 x/week to address the identified rehab impairments via gait training, therapeutic activities, therapeutic exercises, neuromuscular re-education and progress toward the following goals:    · Plan of Care Expires:  08/18/20    Subjective     Chief Complaint: none   Patient/Family Comments/goals: "I know Sandip Holley is the president."  Pain/Comfort:  · Pain Rating 1: 0/10  · Pain Rating Post-Intervention 1: 0/10      Objective:     Communicated with RN prior to session.  Patient found supine, though BLE " over bedrail  with Condom Catheter, telemetry, oxygen upon PT entry to room.     General Precautions: Standard, fall, aspiration   Orthopedic Precautions:N/A   Braces: N/A     Functional Mobility:  · Bed Mobility:     · Rolling Left:  stand by assistance  · Rolling Right: stand by assistance  · Scooting: stand by assistance  · Supine to Sit: contact guard assistance  · Sit to Supine: contact guard assistance  · Transfers:     · Sit to Stand:  minimum assistance with hand-held assist  · Gait: 12ftx4 with Luis M and HHA  ·  patient highly distracted and vc's to attend to task,  ·  narrow DANIEL, multidirectional postural sway noted   · Balance: sitting EOB - SBA; static standing - CGA; dynamic standing - Luis M     While supine - x8 each: bridges, SLR, ankle DF/PF and hip add/abd     AM-PAC 6 CLICK MOBILITY  Turning over in bed (including adjusting bedclothes, sheets and blankets)?: 4  Sitting down on and standing up from a chair with arms (e.g., wheelchair, bedside commode, etc.): 3  Moving from lying on back to sitting on the side of the bed?: 3  Moving to and from a bed to a chair (including a wheelchair)?: 3  Need to walk in hospital room?: 3  Climbing 3-5 steps with a railing?: 3  Basic Mobility Total Score: 19       Therapeutic Activities and Exercises:  -Patient educated on the continued role and goal of PT  -Questions and concerns answered within the the PT scope of practice.   -White board updated in patient room to reflect level of assistance needed with nursing.     Patient left with bed in chair position with all lines intact, call button in reach, bed alarm on and RN notified..    GOALS:   Multidisciplinary Problems     Physical Therapy Goals        Problem: Physical Therapy Goal    Goal Priority Disciplines Outcome Goal Variances Interventions   Physical Therapy Goal     PT, PT/OT Ongoing, Progressing     Description: Goals to be met by: 7/27/2020    Patient will increase functional independence with mobility  by performin. Supine <> sit with Stand-by Assistance.  2. Sit <> stand transfer with Stand-by Assistance using No Assistive Device.  3. Bed <> chair transfer via Stand Pivot with Stand-by Assistance using No Assistive Device.  4. Dynamic standing for 8 minutes with Contact Guard Assistance using No Assistive Device to prepare for functional tasks in standing.  5. Able to tolerate exercise for 15-20 reps with independence.  6. Gait x 100ft with CGA with RW or No AD to prepare for community ambulation.                         Time Tracking:     PT Received On: 20  PT Start Time: 1155     PT Stop Time: 1220  PT Total Time (min): 25 min     Billable Minutes: Gait Training 10 and Therapeutic Activity 15    Treatment Type: Treatment  PT/PTA: PT     PTA Visit Number: 0     Paradise Guerin, PT  2020

## 2020-07-23 NOTE — PT/OT/SLP PROGRESS
Occupational Therapy   Treatment    Name: Shant Magana Jr.  MRN: [id number]  Admitting Diagnosis:  Embolic stroke involving right middle cerebral artery       Recommendations:     Discharge Recommendations: nursing facility, skilled  Discharge Equipment Recommendations:  none  Barriers to discharge:  Decreased caregiver support(lives alone; level of skilled assistance required; high fall and safety risk)    Assessment:     Shant Magana Jr. is a 80 y.o. male with a medical diagnosis of Embolic stroke involving right middle cerebral artery.  He presents with impaired dynamic balance and impaired mentation. He demo gains towards OT goals but would best benefit from 24 hour care for overall safety at this time 2/2 mentation. Performance deficits affecting function are impaired endurance, impaired self care skills, impaired functional mobilty, gait instability, impaired balance, impaired cognition, decreased safety awareness.     Rehab Prognosis:  Fair; patient would benefit from acute skilled OT services to address these deficits and reach maximum level of function.       Plan:     Patient to be seen 3 x/week to address the above listed problems via self-care/home management, therapeutic activities, therapeutic exercises, neuromuscular re-education, cognitive retraining  · Plan of Care Expires: 08/18/20  · Plan of Care Reviewed with: patient    Subjective     Pain/Comfort:  · Pain Rating 1: 0/10  · Pain Rating Post-Intervention 1: 0/10    Objective:     Communicated with: RN prior to session.  Patient found HOB elevated with telemetry, Condom Catheter, oxygen upon OT entry to room.    General Precautions: Standard, aspiration, fall(delirium)   Orthopedic Precautions:N/A   Braces: N/A     Occupational Performance:     Bed Mobility:    · Patient completed Supine to Sit with minimum assistance  · Patient completed Sit to Supine with minimum assistance     Functional Mobility/Transfers:  · Patient completed Sit <> Stand Transfer  with minimum assistance  with  hand-held assist   · Patient completed Toilet Transfer Step Transfer technique with minimum assistance with  hand-held assist   · Sit<>stand from low sitting toilet with moderate assistance   · Functional Mobility: household distance from bed<>Toilet in bathroom and return to room    Activities of Daily Living:  · Upper Body Dressing: minimum assistance seated EOB  · Lower Body Dressing: moderate assistance B socks EOB  · Toileting: moderate assistance perineal care    AMPAC 6 Click ADL: 14    Treatment & Education:  -Pt alert and agreeable to therapy session; reported orientation to person and day of the week  -re eud on importance of activity and mobility for healing process  -Communication board updated; questions/concerns addressed within OT scope of practice  -no family at bedside for education     Patient left left sidelying with call button in reach and bed alarm onEducation:      GOALS:   Multidisciplinary Problems     Occupational Therapy Goals        Problem: Occupational Therapy Goal    Goal Priority Disciplines Outcome Interventions   Occupational Therapy Goal     OT, PT/OT Ongoing, Progressing    Description: Goals to be met by: 7/29     Patient will increase functional independence with ADLs by performing:    Feeding with Modified [place].  UE Dressing with Minimal Assistance.  LE Dressing (pants, brief) with Moderate Assistance.  Grooming while standing with Supervision.  Supine to sit with Supervision.  Step transfer with Supervision.  Functional mobility at household distance for ADL task with min(A) and AD as needed.                     Time Tracking:     OT Date of Treatment: 07/23/20  OT Start Time: 1507  OT Stop Time: 1524  OT Total Time (min): 17 min    Billable Minutes:Self Care/Home Management 17    JASPER Caro  7/23/2020

## 2020-07-23 NOTE — PLAN OF CARE
Goals remain appropriate. Next scheduled visit for Monday. JASPER Caro 7/23/2020   Problem: Occupational Therapy Goal  Goal: Occupational Therapy Goal  Description: Goals to be met by: 7/29     Patient will increase functional independence with ADLs by performing:    Feeding with Modified [place].  UE Dressing with Minimal Assistance.  LE Dressing (pants, brief) with Moderate Assistance.  Grooming while standing with Supervision.  Supine to sit with Supervision.  Step transfer with Supervision.  Functional mobility at household distance for ADL task with min(A) and AD as needed.    Outcome: Ongoing, Progressing

## 2020-07-23 NOTE — PROGRESS NOTES
[facility]  Vascular Neurology  Comprehensive Stroke Center  Progress Note    Assessment/Plan:     * Embolic stroke involving right middle cerebral artery  Shant Magana Jr. is a 80 y.o. male with PMHx of dementia, multiple strokes, intracranial and extracranial atherosclerosis, DM, HTN, a fib (eliquis with history of noncompliance due to cost) who presented to ED via EMS for AMS x 2 days. Family reports confusion, inattentiveness, delayed verbal response, and facial droop. Patient with history of L facial droop from previous stroke. On exam, patient not oriented to place, time, or situation. R gaze preference and L hemianopsia also noted. CT revealed R parietal infarct. Etiology likely cardioembolic      Antithrombotics for secondary stroke prevention: Anticoagulants: Apixaban 2.5 mg BID  - Will maintain on geriatric dose of Eliquis as creatinine borderline for normal dosing (and only worsens when not admitted).    Statins for secondary stroke prevention and hyperlipidemia, if present:   Statins: Atorvastatin- 80 mg daily    Aggressive risk factor modification: HTN, DM, HLD, A-Fib, atherosclerosis, stroke     Rehab efforts: The patient has been evaluated by a stroke team provider and the therapy needs have been fully considered based off the presenting complaints and exam findings. The following therapy evaluations are needed: PT evaluate and treat, OT evaluate and treat, SLP evaluate and treat, PM&R evaluate for appropriate placement - recommending SNF vs HH w/ 24 hr assistance. Spoke to pt's daughter Manuel Lozada 7/21, who states family is unable to provide 24 hr supervision. Daughter agrees with SNF dispo.     Diagnostics ordered/pending: none    VTE prophylaxis: Mechanical prophylaxis: Place SCDs  None: Reason for No Pharmacological VTE Prophylaxis: Currently on anticoagulation    BP parameters: Infarct: SBP < 160    Cytotoxic cerebral edema  Area of cytotoxic cerebral edema identified when  reviewing brain imaging in the territory of the R middle cerebral artery. There is no mass effect associated with it. We will continue to monitor the patients clinical exam for any worsening of symptoms which may indicate expansion of the stroke or the area of the edema resulting in the clinical change. The pattern is suggestive of cardioembolic etiology.      Former smoker  Stroke risk factor  Encourage continued smoking cessation    Benign prostatic hyperplasia  Continue home flomax    Dementia without behavioral disturbance  Delirium precautions ordered    Status post placement of implantable loop recorder       Paroxysmal atrial fibrillation  Stroke risk factor  Continue eliquis  Patient compliant with anticoagulation medication per his son    Chronic diastolic congestive heart failure  Repeat echo EF 68 %  Last echo 12/2019 with mild LV diastolic dysfunction  Not on diuretic at home    Plan:  - IV lasix 40mg x 1 dose  - Daily weights (standing if tolerated)  - Strict I/Os  - Fluid restriction at 1500mL  - Cardiac diet    CKD (chronic kidney disease) stage 3, GFR 30-59 ml/min  - Trend Cr  - Strict I&Os  - Avoid nephrotoxic agents  - Renally adjust medications    Mixed hyperlipidemia  Stroke risk factor  LDL 70  Continue home atorvastatin 80 mg daily    Internal carotid artery stenosis, left  Stroke risk factor    SHIKHA (acute kidney injury)  Cr 2.2, last CMP in 12/2019 with Cr of 1.5 so unclear if patient has new baseline  Avoid nephrotoxins  Cr continues to improve  Holding fluids due to pleural effusions and wheezing on exam    History of stroke  Patient with history of multiple strokes   History of a fib + intracranial/extracranial atherosclerosis  Continue secondary stroke prevention with eliquis + atorvastatin 80 mg       - Will maintain on geriatric dose of Eliquis as creatinine borderline for normal dosing (and only worsens when not admitted).       Type 2 diabetes mellitus with complication, without  long-term current use of insulin  Stroke risk factor  A1C 5.8  SSI  Glucose 140-180    Essential hypertension  Stroke risk factor  SBP < 160   Resumed home Losartan at decreased dose of 50 mg daily 7/21, increased to 100 mg on 7/22.     Original home meds: Losartan 100 mg daily, Amlodipine 10 mg daily  Continue to monitor BP. Adjust as needed.        Hospital Course:  7/19: Patient with mild wheezing on exam today. Discontinued IVFs and started PRN DuoNebs, CXR with concern for pleural effusion as on previous imaging. Still pending Echo, A1c, and PT/OT/SLP.  7/20: Echo complete, EF 68%, normal LA. Repeat CXR ordered, mild wheezing persists, continue DuoNebs. Therapy recommending SNF vs home health w/ 24 hour supervision. Dispo pending family's decision  7/21: CXR worsened w/ progression of pleural fluid and parenchymal changes, mild expiratory wheezing perists, order lasix 20 mg IVP once, repeat CXR tomorrow. Restart half dose of home Losartan at 50 mg daily. Family unable to provide 24 hr care with HH CM to send SNF options.   7/22: Repeat CXR with slight decreased volume of pleural fluid on R, diminished to no breath sounds on R, mild expiratory wheezing persists, ordered another lasix 20 mg IVP once, continue PRN duonebs, continue to monitor. SBP 160s-170s, increased to full dose of home Losartan 100 mg daily. Free T3, T4 pending.   7/23: Continues to be on 2L nc. IV lasix 40mg given. Otherwise stable.    STROKE DOCUMENTATION   Acute Stroke Times   Symptom Onset Date: 07/16/20    NIH Scale:  Interval: 7 days or at discharge (whichever comes first)  1a. Level of Consciousness: 0-->Alert, keenly responsive  1b. LOC Questions: 1-->Answers one question correctly  1c. LOC Commands: 0-->Performs both tasks correctly  2. Best Gaze: 1-->Partial gaze palsy, gaze is abnormal in one or both eyes, but forced deviation or total gaze paresis is not present  3. Visual: 1-->Partial hemianopia  4. Facial Palsy: 1-->Minor  paralysis (flattened nasolabial fold, asymmetry on smiling)  5a. Motor Arm, Left: 1-->Drift, limb holds 90 (or 45) degrees, but drifts down before full 10 seconds, does not hit bed or other support  5b. Motor Arm, Right: 0-->No drift, limb holds 90 (or 45) degrees for full 10 secs  6a. Motor Leg, Left: 0-->No drift, leg holds 30 degree position for full 5 secs  6b. Motor Leg, Right: 0-->No drift, leg holds 30 degree position for full 5 secs  7. Limb Ataxia: 0-->Absent  8. Sensory: 0-->Normal, no sensory loss  9. Best Language: 0-->No aphasia, normal  10. Dysarthria: 0-->Normal  11. Extinction and Inattention (formerly Neglect): 0-->No abnormality  Total (NIH Stroke Scale): 5       Modified [place] Score: 0  [place] Coma Scale:15   ABCD2 Score:    [id number] Score:8  HAS -BLED Score:   ICH Score:   Hunt & Choudhury Classification:      Hemorrhagic change of an Ischemic Stroke: Does this patient have an ischemic stroke with hemorrhagic changes? No     Neurologic Chief Complaint: AMS, Confusion, Inattention, Delayed Verbal Response, L Facial Droop    Subjective:     Interval History: NAEON. Good UOP with IV lasix 20mg but strict I/Os not charted. Continues to require 2L nc.     HPI, Past Medical, Family, and Social History remains the same as documented in the initial encounter.     Review of Systems   Constitutional: Negative.    HENT: Negative.    Respiratory: Positive for wheezing. Negative for cough, chest tightness and shortness of breath.    Cardiovascular: Negative for chest pain.   Gastrointestinal: Negative.    Musculoskeletal: Negative.    Neurological: Positive for facial asymmetry and weakness.   Psychiatric/Behavioral: Positive for confusion.     Scheduled Meds:   apixaban  2.5 mg Oral BID    atorvastatin  80 mg Oral Daily    losartan  100 mg Oral Daily    pantoprazole  40 mg Oral Daily    tamsulosin  0.4 mg Oral Daily    vitamin D  1,000 Units Oral Daily     Continuous Infusions:   sodium chloride 0.9%        PRN Meds:acetaminophen, albuterol-ipratropium, labetaloL, ondansetron, polyethylene glycol, sodium chloride 0.9%, sodium chloride 0.9%    Objective:     Vital Signs (Most Recent):  Temp: 98.9 °F (37.2 °C) (07/23/20 1110)  Pulse: 63 (07/23/20 1340)  Resp: 12 (07/23/20 0419)  BP: (!) 147/85 (07/23/20 1340)  SpO2: (!) 93 % (07/23/20 1110)  BP Location: Right arm    Vital Signs Range (Last 24H):  Temp:  [98.2 °F (36.8 °C)-99.2 °F (37.3 °C)]   Pulse:  [56-65]   Resp:  [12-20]   BP: (133-154)/(77-98)   SpO2:  [93 %-98 %]   BP Location: Right arm    Physical Exam  Constitutional:       General: He is not in acute distress.     Appearance: He is well-developed.   HENT:      Head: Normocephalic and atraumatic.      Mouth/Throat:      Pharynx: No oropharyngeal exudate.   Eyes:      Conjunctiva/sclera: Conjunctivae normal.      Pupils: Pupils are equal, round, and reactive to light.   Neck:      Musculoskeletal: Normal range of motion and neck supple.   Cardiovascular:      Rate and Rhythm: Normal rate and regular rhythm.      Heart sounds: Normal heart sounds. No murmur.   Pulmonary:      Effort: Pulmonary effort is normal. No respiratory distress.      Breath sounds: Rales (bibasilar rales) present. No wheezing.   Abdominal:      General: Bowel sounds are normal. There is no distension.      Palpations: Abdomen is soft.      Tenderness: There is no abdominal tenderness. There is no guarding.   Musculoskeletal:         General: No tenderness.   Skin:     General: Skin is warm and dry.      Findings: No rash.   Neurological:      Mental Status: He is alert. Mental status is at baseline. He is disoriented.      Cranial Nerves: No cranial nerve deficit.      Motor: No abnormal muscle tone.   Psychiatric:         Behavior: Behavior normal.       Neurological Exam:   LOC: alert  Attention Span: poor  Language: No aphasia  Articulation: Dysarthria  Orientation: Person  Visual Fields: Hemianopsia left  EOM (CN III, IV, VI):  Gaze preference  right  Pupils (CN II, III): PERRL  Facial Movement (CN VII): Lower facial weakness on the Left  Motor: Arm left  Paresis: 4/5  Leg left  Normal 5/5  Arm right  Normal 5/5  Leg right Normal 5/5  Cebellar: Upper Extremity Appendicular Ataxia (Finger Nose Finger)  Left  Sensation: Intact to light touch, temperature and vibration  Tone: Normal tone throughout    Laboratory:  BMP:   Recent Labs   Lab 07/23/20  0827      K 4.2      CO2 27   BUN 23   CREATININE 1.6*   CALCIUM 9.6     CBC:   Recent Labs   Lab 07/23/20  0826   WBC 4.87   RBC 4.37*   HGB 12.7*   HCT 39.7*      MCV 91   MCH 29.1   MCHC 32.0       Diagnostic Results     Brain Imaging:  MRI Brain 7/18/20  Area of diffusion restriction with ADC match suggestive of acute infarct in the right parietal lobe. Additional smaller acute infarcts right occipital lobe and right basal ganglia.     Remote right frontal infarct and remote lacunar infarcts in the thalami.  Supratentorial white matter T2/flair hyperintense signal foci suggesting sequela of chronic small vessel ischemic change.     MR angiogram of the head and neck appears unchanged compared to prior exams without any focal occlusion identified.        CT Head. Date: 07/18/20  1. Interval right parietal lobe suspected acute infarct.  No intracranial hemorrhage.  Further evaluation/follow-up as warranted.  2. Bilateral thalamic suspected lacunar type infarcts, age-indeterminate.  Correlate clinically.  3. Additional multifocal supratentorial and to a lesser extent infratentorial remote infarcts, as detailed above.  4. Generalized cerebral volume loss and sequela of advanced microvascular ischemic changes.     Vessel Imaging:  MRA Head and Neck 7/18/20 - see MRI Brain 7/18/20 above     Cardiac Evaluation:   Echo 7/19/20  · Normal left ventricular systolic function. The estimated ejection fraction is 68%.  · Concentric left ventricular remodeling with thickened walls especially  septum and increased myocardial density.  · No wall motion abnormalities.  · Normal LV diastolic function.  · Normal right ventricular systolic function.  · Normal central venous pressure (3 mmHg).  · The estimated PA systolic pressure is 30 mmHg.  · The ascending aorta is mildly dilated.    The left atrial volume index is normal.      Lloyd Perdomo MD  [facility]  Department of Vascular Neurology   [facility]

## 2020-07-24 PROBLEM — R09.02 HYPOXIA: Status: ACTIVE | Noted: 2020-07-24

## 2020-07-24 LAB
ANION GAP SERPL CALC-SCNC: 11 MMOL/L (ref 8–16)
BNP SERPL-MCNC: 38 PG/ML (ref 0–99)
BUN SERPL-MCNC: 31 MG/DL (ref 8–23)
CALCIUM SERPL-MCNC: 10.9 MG/DL (ref 8.7–10.5)
CHLORIDE SERPL-SCNC: 106 MMOL/L (ref 95–110)
CO2 SERPL-SCNC: 21 MMOL/L (ref 23–29)
CREAT SERPL-MCNC: 1.5 MG/DL (ref 0.5–1.4)
EST. GFR  (AFRICAN AMERICAN): 50.1 ML/MIN/1.73 M^2
EST. GFR  (NON AFRICAN AMERICAN): 43.3 ML/MIN/1.73 M^2
GLUCOSE SERPL-MCNC: 109 MG/DL (ref 70–110)
POCT GLUCOSE: 128 MG/DL (ref 70–110)
POTASSIUM SERPL-SCNC: 4 MMOL/L (ref 3.5–5.1)
SODIUM SERPL-SCNC: 138 MMOL/L (ref 136–145)

## 2020-07-24 PROCEDURE — 25000003 PHARM REV CODE 250: Performed by: PHYSICIAN ASSISTANT

## 2020-07-24 PROCEDURE — 36415 COLL VENOUS BLD VENIPUNCTURE: CPT

## 2020-07-24 PROCEDURE — 27000221 HC OXYGEN, UP TO 24 HOURS

## 2020-07-24 PROCEDURE — 99900035 HC TECH TIME PER 15 MIN (STAT)

## 2020-07-24 PROCEDURE — 83880 ASSAY OF NATRIURETIC PEPTIDE: CPT

## 2020-07-24 PROCEDURE — 99233 SBSQ HOSP IP/OBS HIGH 50: CPT | Mod: GC,,, | Performed by: PSYCHIATRY & NEUROLOGY

## 2020-07-24 PROCEDURE — 25000003 PHARM REV CODE 250: Performed by: FAMILY MEDICINE

## 2020-07-24 PROCEDURE — 25000242 PHARM REV CODE 250 ALT 637 W/ HCPCS: Performed by: STUDENT IN AN ORGANIZED HEALTH CARE EDUCATION/TRAINING PROGRAM

## 2020-07-24 PROCEDURE — 94761 N-INVAS EAR/PLS OXIMETRY MLT: CPT

## 2020-07-24 PROCEDURE — 94640 AIRWAY INHALATION TREATMENT: CPT

## 2020-07-24 PROCEDURE — 11000001 HC ACUTE MED/SURG PRIVATE ROOM

## 2020-07-24 PROCEDURE — 99233 PR SUBSEQUENT HOSPITAL CARE,LEVL III: ICD-10-PCS | Mod: GC,,, | Performed by: PSYCHIATRY & NEUROLOGY

## 2020-07-24 PROCEDURE — 80048 BASIC METABOLIC PNL TOTAL CA: CPT

## 2020-07-24 RX ORDER — IPRATROPIUM BROMIDE AND ALBUTEROL SULFATE 2.5; .5 MG/3ML; MG/3ML
3 SOLUTION RESPIRATORY (INHALATION)
Status: DISCONTINUED | OUTPATIENT
Start: 2020-07-24 | End: 2020-07-27 | Stop reason: HOSPADM

## 2020-07-24 RX ORDER — IPRATROPIUM BROMIDE AND ALBUTEROL SULFATE 2.5; .5 MG/3ML; MG/3ML
3 SOLUTION RESPIRATORY (INHALATION)
Status: DISCONTINUED | OUTPATIENT
Start: 2020-07-24 | End: 2020-07-24

## 2020-07-24 RX ADMIN — PANTOPRAZOLE SODIUM 40 MG: 40 TABLET, DELAYED RELEASE ORAL at 08:07

## 2020-07-24 RX ADMIN — IPRATROPIUM BROMIDE AND ALBUTEROL SULFATE 3 ML: .5; 2.5 SOLUTION RESPIRATORY (INHALATION) at 09:07

## 2020-07-24 RX ADMIN — TAMSULOSIN HYDROCHLORIDE 0.4 MG: 0.4 CAPSULE ORAL at 08:07

## 2020-07-24 RX ADMIN — ATORVASTATIN CALCIUM 80 MG: 20 TABLET, FILM COATED ORAL at 08:07

## 2020-07-24 RX ADMIN — APIXABAN 2.5 MG: 2.5 TABLET, FILM COATED ORAL at 08:07

## 2020-07-24 RX ADMIN — Medication 1000 UNITS: at 08:07

## 2020-07-24 RX ADMIN — IPRATROPIUM BROMIDE AND ALBUTEROL SULFATE 3 ML: .5; 2.5 SOLUTION RESPIRATORY (INHALATION) at 08:07

## 2020-07-24 RX ADMIN — IPRATROPIUM BROMIDE AND ALBUTEROL SULFATE 3 ML: .5; 2.5 SOLUTION RESPIRATORY (INHALATION) at 04:07

## 2020-07-24 RX ADMIN — ACETAMINOPHEN 650 MG: 325 TABLET ORAL at 08:07

## 2020-07-24 RX ADMIN — LOSARTAN POTASSIUM 100 MG: 50 TABLET ORAL at 08:07

## 2020-07-24 NOTE — SUBJECTIVE & OBJECTIVE
Neurologic Chief Complaint: AMS, Confusion, Inattention, Delayed Verbal Response, L Facial Droop      Subjective:     Interval History: NAEON. Received IV lasix 40mg yesterday. Strict I/Os not charted. On 2L nc. BNP wnl.     HPI, Past Medical, Family, and Social History remains the same as documented in the initial encounter.     Review of Systems   Constitutional: Negative.    HENT: Negative.    Respiratory: Positive for wheezing. Negative for cough, chest tightness and shortness of breath.    Cardiovascular: Negative for chest pain.   Gastrointestinal: Negative.    Musculoskeletal: Negative.    Neurological: Positive for facial asymmetry and weakness.   Psychiatric/Behavioral: Positive for confusion.     Scheduled Meds:   albuterol-ipratropium  3 mL Nebulization Q4H WAKE    apixaban  2.5 mg Oral BID    atorvastatin  80 mg Oral Daily    losartan  100 mg Oral Daily    pantoprazole  40 mg Oral Daily    tamsulosin  0.4 mg Oral Daily    vitamin D  1,000 Units Oral Daily     Continuous Infusions:   sodium chloride 0.9%       PRN Meds:acetaminophen, labetaloL, ondansetron, polyethylene glycol, sodium chloride 0.9%, sodium chloride 0.9%    Objective:     Vital Signs (Most Recent):  Temp: 96 °F (35.6 °C) (07/24/20 1301)  Pulse: 61 (07/24/20 0925)  Resp: 18 (07/24/20 0925)  BP: (!) 144/80 (07/24/20 0810)  SpO2: 97 % (07/24/20 0925)  BP Location: Left arm    Vital Signs Range (Last 24H):  Temp:  [96 °F (35.6 °C)-99.3 °F (37.4 °C)]   Pulse:  [57-96]   Resp:  [16-18]   BP: (129-160)/(77-95)   SpO2:  [93 %-98 %]   BP Location: Left arm    Physical Exam  Constitutional:       General: He is not in acute distress.     Appearance: He is well-developed.   HENT:      Head: Normocephalic and atraumatic.      Mouth/Throat:      Pharynx: No oropharyngeal exudate.   Eyes:      Conjunctiva/sclera: Conjunctivae normal.      Pupils: Pupils are equal, round, and reactive to light.   Neck:      Musculoskeletal: Normal range of motion  and neck supple.   Cardiovascular:      Rate and Rhythm: Normal rate and regular rhythm.      Heart sounds: Normal heart sounds. No murmur.   Pulmonary:      Effort: Pulmonary effort is normal. No respiratory distress.      Breath sounds: Wheezing present. No rales.   Abdominal:      General: Bowel sounds are normal. There is no distension.      Palpations: Abdomen is soft.      Tenderness: There is no abdominal tenderness. There is no guarding.   Musculoskeletal:         General: No tenderness.   Skin:     General: Skin is warm and dry.      Findings: No rash.   Neurological:      Mental Status: He is alert. Mental status is at baseline. He is disoriented.      Cranial Nerves: No cranial nerve deficit.      Motor: No abnormal muscle tone.   Psychiatric:         Behavior: Behavior normal.         Neurological Exam:   LOC: alert  Attention Span: poor  Language: No aphasia  Articulation: Dysarthria  Orientation: Person  Visual Fields: Hemianopsia left  EOM (CN III, IV, VI): Gaze preference  right  Pupils (CN II, III): PERRL  Facial Movement (CN VII): Lower facial weakness on the Left  Motor: Arm left  Paresis: 4/5  Leg left  Normal 5/5  Arm right  Normal 5/5  Leg right Normal 5/5  Cebellar: Upper Extremity Appendicular Ataxia (Finger Nose Finger)  Left  Sensation: Intact to light touch, temperature and vibration  Tone: Normal tone throughout    Laboratory:  CMP:   Recent Labs   Lab 07/24/20  0906   CALCIUM 10.9*      K 4.0   CO2 21*      BUN 31*   CREATININE 1.5*     CBC:   Recent Labs   Lab 07/23/20  0826   WBC 4.87   RBC 4.37*   HGB 12.7*   HCT 39.7*      MCV 91   MCH 29.1   MCHC 32.0       Diagnostic Results     Brain Imaging:  MRI Brain 7/18/20  Area of diffusion restriction with ADC match suggestive of acute infarct in the right parietal lobe. Additional smaller acute infarcts right occipital lobe and right basal ganglia.     Remote right frontal infarct and remote lacunar infarcts in the  thalami.  Supratentorial white matter T2/flair hyperintense signal foci suggesting sequela of chronic small vessel ischemic change.     MR angiogram of the head and neck appears unchanged compared to prior exams without any focal occlusion identified.        CT Head. Date: 07/18/20  1. Interval right parietal lobe suspected acute infarct.  No intracranial hemorrhage.  Further evaluation/follow-up as warranted.  2. Bilateral thalamic suspected lacunar type infarcts, age-indeterminate.  Correlate clinically.  3. Additional multifocal supratentorial and to a lesser extent infratentorial remote infarcts, as detailed above.  4. Generalized cerebral volume loss and sequela of advanced microvascular ischemic changes.     Vessel Imaging:  MRA Head and Neck 7/18/20 - see MRI Brain 7/18/20 above     Cardiac Evaluation:   Echo 7/19/20  · Normal left ventricular systolic function. The estimated ejection fraction is 68%.  · Concentric left ventricular remodeling with thickened walls especially septum and increased myocardial density.  · No wall motion abnormalities.  · Normal LV diastolic function.  · Normal right ventricular systolic function.  · Normal central venous pressure (3 mmHg).  · The estimated PA systolic pressure is 30 mmHg.  · The ascending aorta is mildly dilated.    The left atrial volume index is normal.

## 2020-07-24 NOTE — PLAN OF CARE
Problem: Hemodynamic Instability (Stroke, Ischemic/Transient Ischemic Attack)  Goal: Vital Signs Remain in Desired Range  Outcome: Ongoing, Progressing     Problem: Fall Injury Risk  Goal: Absence of Fall and Fall-Related Injury  Outcome: Ongoing, Progressing     Problem: Adult Inpatient Plan of Care  Goal: Plan of Care Review  Outcome: Ongoing, Progressing     Patient is AAO x4, forgetful at times. POC reviewed with patient. Patient verbalized understanding. Patient's breathing is unlabored with equal chest expansion, audible wheezing noted. Patient denies any numbness and tingling. Patient voids per condom cath. Patient remained free from falls. Patient rested well through shift. Bed in lowest position,bed alarm on, side rails up x3, Avasys in place, no complaints or signs of distress. WCTM.  See flowsheets for full assessment and VS info.

## 2020-07-24 NOTE — ASSESSMENT & PLAN NOTE
Repeat echo EF 68 %  Last echo 12/2019 with mild LV diastolic dysfunction  Not on diuretic at home  BNP wnl    Plan:  - Hold off on further diuresis  - Daily weights (standing if tolerated)  - Strict I/Os  - Fluid restriction at 1500mL  - Cardiac diet

## 2020-07-24 NOTE — PLAN OF CARE
07/24/20 1525   Discharge Reassessment   Assessment Type Discharge Planning Reassessment   Discharge Plan A Skilled Nursing Facility   Discharge Plan B Home Health   DME Needed Upon Discharge  other (see comments)  (TBD)   Anticipated Discharge Disposition SNF   Post-Acute Status   Post-Acute Authorization Placement   Post-Acute Placement Status Awaiting Internal Medical Clearance

## 2020-07-24 NOTE — PROGRESS NOTES
[facility]  Vascular Neurology  Comprehensive Stroke Center  Progress Note    Assessment/Plan:     * Embolic stroke involving right middle cerebral artery  Shant Magana Jr. is a 80 y.o. male with PMHx of dementia, multiple strokes, intracranial and extracranial atherosclerosis, DM, HTN, a fib (eliquis with history of noncompliance due to cost) who presented to ED via EMS for AMS x 2 days. Family reports confusion, inattentiveness, delayed verbal response, and facial droop. Patient with history of L facial droop from previous stroke. On exam, patient not oriented to place, time, or situation. R gaze preference and L hemianopsia also noted. CT revealed R parietal infarct. Etiology likely cardioembolic      Antithrombotics for secondary stroke prevention: Anticoagulants: Apixaban 2.5 mg BID  - Will maintain on geriatric dose of Eliquis as creatinine borderline for normal dosing (and only worsens when not admitted).    Statins for secondary stroke prevention and hyperlipidemia, if present:   Statins: Atorvastatin- 80 mg daily    Aggressive risk factor modification: HTN, DM, HLD, A-Fib, atherosclerosis, stroke     Rehab efforts: The patient has been evaluated by a stroke team provider and the therapy needs have been fully considered based off the presenting complaints and exam findings. The following therapy evaluations are needed: PT evaluate and treat, OT evaluate and treat, SLP evaluate and treat, PM&R evaluate for appropriate placement - recommending SNF vs HH w/ 24 hr assistance. Spoke to pt's daughter Manuel Lozada 7/21, who states family is unable to provide 24 hr supervision. Daughter agrees with SNF dispo.     Diagnostics ordered/pending: none    VTE prophylaxis: Mechanical prophylaxis: Place SCDs  None: Reason for No Pharmacological VTE Prophylaxis: Currently on anticoagulation    BP parameters: Infarct: SBP < 160        Cytotoxic cerebral edema  Area of cytotoxic cerebral edema identified when  reviewing brain imaging in the territory of the R middle cerebral artery. There is no mass effect associated with it. We will continue to monitor the patients clinical exam for any worsening of symptoms which may indicate expansion of the stroke or the area of the edema resulting in the clinical change. The pattern is suggestive of cardioembolic etiology.        Hypoxia  Continues to require supplemental O2 despite diuresis  Expiratory wheezing on exam, no h/o COPD/asthma    Plan:  - Scheduled duo-nebs  - Wean supplemental O2 as tolerated    Former smoker  Stroke risk factor  Encourage continued smoking cessation    Benign prostatic hyperplasia  Continue home flomax    Dementia without behavioral disturbance  Delirium precautions ordered    Status post placement of implantable loop recorder       Paroxysmal atrial fibrillation  Stroke risk factor  Continue eliquis  Patient compliant with anticoagulation medication per his son    Chronic diastolic congestive heart failure  Repeat echo EF 68 %  Last echo 12/2019 with mild LV diastolic dysfunction  Not on diuretic at home  BNP wnl    Plan:  - Hold off on further diuresis  - Daily weights (standing if tolerated)  - Strict I/Os  - Fluid restriction at 1500mL  - Cardiac diet    CKD (chronic kidney disease) stage 3, GFR 30-59 ml/min  - Trend Cr  - Strict I&Os  - Avoid nephrotoxic agents  - Renally adjust medications    Mixed hyperlipidemia  Stroke risk factor  LDL 70  Continue home atorvastatin 80 mg daily    Internal carotid artery stenosis, left  Stroke risk factor    SHIKHA (acute kidney injury)  Cr 2.2, last CMP in 12/2019 with Cr of 1.5 so unclear if patient has new baseline  Avoid nephrotoxins  Cr continues to improve  Holding fluids due to pleural effusions and wheezing on exam    History of stroke  Patient with history of multiple strokes   History of a fib + intracranial/extracranial atherosclerosis  Continue secondary stroke prevention with eliquis + atorvastatin  80 mg       - Will maintain on geriatric dose of Eliquis as creatinine borderline for normal dosing (and only worsens when not admitted).       Type 2 diabetes mellitus with complication, without long-term current use of insulin  Stroke risk factor  A1C 5.8  SSI  Glucose 140-180    Essential hypertension  Stroke risk factor  SBP < 160   Resumed home Losartan at decreased dose of 50 mg daily 7/21, increased to 100 mg on 7/22.     Original home meds: Losartan 100 mg daily, Amlodipine 10 mg daily  Continue to monitor BP. Adjust as needed.          7/19: Patient with mild wheezing on exam today. Discontinued IVFs and started PRN DuoNebs, CXR with concern for pleural effusion as on previous imaging. Still pending Echo, A1c, and PT/OT/SLP.  7/20: Echo complete, EF 68%, normal LA. Repeat CXR ordered, mild wheezing persists, continue DuoNebs. Therapy recommending SNF vs home health w/ 24 hour supervision. Dispo pending family's decision  7/21: CXR worsened w/ progression of pleural fluid and parenchymal changes, mild expiratory wheezing perists, order lasix 20 mg IVP once, repeat CXR tomorrow. Restart half dose of home Losartan at 50 mg daily. Family unable to provide 24 hr care with HH, CM to send SNF options.   7/22: Repeat CXR with slight decreased volume of pleural fluid on R, diminished to no breath sounds on R, mild expiratory wheezing persists, ordered another lasix 20 mg IVP once, continue PRN duonebs, continue to monitor. SBP 160s-170s, increased to full dose of home Losartan 100 mg daily. Free T3, T4 pending.   7/23: Continues to be on 2L nc. IV lasix 40mg given. Otherwise stable.  7/24: Good UOP with IV lasix 20mg but strict I/Os not charted. BNP wnl. Continues to require 2L nc, with expiratory wheezing.      STROKE DOCUMENTATION   Acute Stroke Times   Symptom Onset Date: 07/16/20    NIH Scale:  Interval: 7 days or at discharge (whichever comes first)  1a. Level of Consciousness: 0-->Alert, keenly responsive  1b.  LOC Questions: 1-->Answers one question correctly  1c. LOC Commands: 0-->Performs both tasks correctly  2. Best Gaze: 1-->Partial gaze palsy, gaze is abnormal in one or both eyes, but forced deviation or total gaze paresis is not present  3. Visual: 1-->Partial hemianopia  4. Facial Palsy: 1-->Minor paralysis (flattened nasolabial fold, asymmetry on smiling)  5a. Motor Arm, Left: 1-->Drift, limb holds 90 (or 45) degrees, but drifts down before full 10 seconds, does not hit bed or other support  5b. Motor Arm, Right: 0-->No drift, limb holds 90 (or 45) degrees for full 10 secs  6a. Motor Leg, Left: 0-->No drift, leg holds 30 degree position for full 5 secs  6b. Motor Leg, Right: 0-->No drift, leg holds 30 degree position for full 5 secs  7. Limb Ataxia: 0-->Absent  8. Sensory: 0-->Normal, no sensory loss  9. Best Language: 0-->No aphasia, normal  10. Dysarthria: 0-->Normal  11. Extinction and Inattention (formerly Neglect): 0-->No abnormality  Total (NIH Stroke Scale): 5       Modified Tingley Score: 0  [place] Coma Scale:15   ABCD2 Score:    [id number] Score:8  HAS -BLED Score:   ICH Score:   Hunt & Choudhury Classification:      Hemorrhagic change of an Ischemic Stroke: Does this patient have an ischemic stroke with hemorrhagic changes? No     Neurologic Chief Complaint: AMS, Confusion, Inattention, Delayed Verbal Response, L Facial Droop      Subjective:     Interval History: NAEON. Received IV lasix 40mg yesterday. Strict I/Os not charted. On 2L nc. BNP wnl.     HPI, Past Medical, Family, and Social History remains the same as documented in the initial encounter.     Review of Systems   Constitutional: Negative.    HENT: Negative.    Respiratory: Positive for wheezing. Negative for cough, chest tightness and shortness of breath.    Cardiovascular: Negative for chest pain.   Gastrointestinal: Negative.    Musculoskeletal: Negative.    Neurological: Positive for facial asymmetry and weakness.   Psychiatric/Behavioral:  Positive for confusion.     Scheduled Meds:   albuterol-ipratropium  3 mL Nebulization Q4H WAKE    apixaban  2.5 mg Oral BID    atorvastatin  80 mg Oral Daily    losartan  100 mg Oral Daily    pantoprazole  40 mg Oral Daily    tamsulosin  0.4 mg Oral Daily    vitamin D  1,000 Units Oral Daily     Continuous Infusions:   sodium chloride 0.9%       PRN Meds:acetaminophen, labetaloL, ondansetron, polyethylene glycol, sodium chloride 0.9%, sodium chloride 0.9%    Objective:     Vital Signs (Most Recent):  Temp: 96 °F (35.6 °C) (07/24/20 1301)  Pulse: 61 (07/24/20 0925)  Resp: 18 (07/24/20 0925)  BP: (!) 144/80 (07/24/20 0810)  SpO2: 97 % (07/24/20 0925)  BP Location: Left arm    Vital Signs Range (Last 24H):  Temp:  [96 °F (35.6 °C)-99.3 °F (37.4 °C)]   Pulse:  [57-96]   Resp:  [16-18]   BP: (129-160)/(77-95)   SpO2:  [93 %-98 %]   BP Location: Left arm    Physical Exam  Constitutional:       General: He is not in acute distress.     Appearance: He is well-developed.   HENT:      Head: Normocephalic and atraumatic.      Mouth/Throat:      Pharynx: No oropharyngeal exudate.   Eyes:      Conjunctiva/sclera: Conjunctivae normal.      Pupils: Pupils are equal, round, and reactive to light.   Neck:      Musculoskeletal: Normal range of motion and neck supple.   Cardiovascular:      Rate and Rhythm: Normal rate and regular rhythm.      Heart sounds: Normal heart sounds. No murmur.   Pulmonary:      Effort: Pulmonary effort is normal. No respiratory distress.      Breath sounds: Wheezing present. No rales.   Abdominal:      General: Bowel sounds are normal. There is no distension.      Palpations: Abdomen is soft.      Tenderness: There is no abdominal tenderness. There is no guarding.   Musculoskeletal:         General: No tenderness.   Skin:     General: Skin is warm and dry.      Findings: No rash.   Neurological:      Mental Status: He is alert. Mental status is at baseline. He is disoriented.      Cranial Nerves:  No cranial nerve deficit.      Motor: No abnormal muscle tone.   Psychiatric:         Behavior: Behavior normal.         Neurological Exam:   LOC: alert  Attention Span: poor  Language: No aphasia  Articulation: Dysarthria  Orientation: Person  Visual Fields: Hemianopsia left  EOM (CN III, IV, VI): Gaze preference  right  Pupils (CN II, III): PERRL  Facial Movement (CN VII): Lower facial weakness on the Left  Motor: Arm left  Paresis: 4/5  Leg left  Normal 5/5  Arm right  Normal 5/5  Leg right Normal 5/5  Cebellar: Upper Extremity Appendicular Ataxia (Finger Nose Finger)  Left  Sensation: Intact to light touch, temperature and vibration  Tone: Normal tone throughout    Laboratory:  CMP:   Recent Labs   Lab 07/24/20  0906   CALCIUM 10.9*      K 4.0   CO2 21*      BUN 31*   CREATININE 1.5*     CBC:   Recent Labs   Lab 07/23/20  0826   WBC 4.87   RBC 4.37*   HGB 12.7*   HCT 39.7*      MCV 91   MCH 29.1   MCHC 32.0       Diagnostic Results     Brain Imaging:  MRI Brain 7/18/20  Area of diffusion restriction with ADC match suggestive of acute infarct in the right parietal lobe. Additional smaller acute infarcts right occipital lobe and right basal ganglia.     Remote right frontal infarct and remote lacunar infarcts in the thalami.  Supratentorial white matter T2/flair hyperintense signal foci suggesting sequela of chronic small vessel ischemic change.     MR angiogram of the head and neck appears unchanged compared to prior exams without any focal occlusion identified.        CT Head. Date: 07/18/20  1. Interval right parietal lobe suspected acute infarct.  No intracranial hemorrhage.  Further evaluation/follow-up as warranted.  2. Bilateral thalamic suspected lacunar type infarcts, age-indeterminate.  Correlate clinically.  3. Additional multifocal supratentorial and to a lesser extent infratentorial remote infarcts, as detailed above.  4. Generalized cerebral volume loss and sequela of advanced  microvascular ischemic changes.     Vessel Imaging:  MRA Head and Neck 7/18/20 - see MRI Brain 7/18/20 above     Cardiac Evaluation:   Echo 7/19/20  · Normal left ventricular systolic function. The estimated ejection fraction is 68%.  · Concentric left ventricular remodeling with thickened walls especially septum and increased myocardial density.  · No wall motion abnormalities.  · Normal LV diastolic function.  · Normal right ventricular systolic function.  · Normal central venous pressure (3 mmHg).  · The estimated PA systolic pressure is 30 mmHg.  · The ascending aorta is mildly dilated.    The left atrial volume index is normal.        Lloyd Perdomo MD  Comprehensive Stroke Center  Department of Vascular Neurology   [facility]

## 2020-07-24 NOTE — PLAN OF CARE
Patient alert, forgetful. Follows command, POC explained patient verbalized understanding, will cont to reinforce.

## 2020-07-24 NOTE — ASSESSMENT & PLAN NOTE
Continues to require supplemental O2 despite diuresis  Expiratory wheezing on exam, no h/o COPD/asthma    Plan:  - Scheduled duo-nebs  - Wean supplemental O2 as tolerated

## 2020-07-25 PROBLEM — N18.9 ACUTE KIDNEY INJURY SUPERIMPOSED ON CHRONIC KIDNEY DISEASE: Status: RESOLVED | Noted: 2017-10-14 | Resolved: 2020-07-25

## 2020-07-25 PROBLEM — N18.9 ACUTE KIDNEY INJURY SUPERIMPOSED ON CHRONIC KIDNEY DISEASE: Status: ACTIVE | Noted: 2017-10-14

## 2020-07-25 PROBLEM — N17.9 ACUTE KIDNEY INJURY SUPERIMPOSED ON CHRONIC KIDNEY DISEASE: Status: RESOLVED | Noted: 2017-10-14 | Resolved: 2020-07-25

## 2020-07-25 LAB
ANION GAP SERPL CALC-SCNC: 10 MMOL/L (ref 8–16)
BASOPHILS # BLD AUTO: 0.04 K/UL (ref 0–0.2)
BASOPHILS NFR BLD: 0.8 % (ref 0–1.9)
BUN SERPL-MCNC: 31 MG/DL (ref 8–23)
CALCIUM SERPL-MCNC: 10.2 MG/DL (ref 8.7–10.5)
CHLORIDE SERPL-SCNC: 106 MMOL/L (ref 95–110)
CO2 SERPL-SCNC: 24 MMOL/L (ref 23–29)
CREAT SERPL-MCNC: 1.5 MG/DL (ref 0.5–1.4)
DIFFERENTIAL METHOD: ABNORMAL
EOSINOPHIL # BLD AUTO: 0.2 K/UL (ref 0–0.5)
EOSINOPHIL NFR BLD: 3.6 % (ref 0–8)
ERYTHROCYTE [DISTWIDTH] IN BLOOD BY AUTOMATED COUNT: 14.3 % (ref 11.5–14.5)
EST. GFR  (AFRICAN AMERICAN): 50.1 ML/MIN/1.73 M^2
EST. GFR  (NON AFRICAN AMERICAN): 43.3 ML/MIN/1.73 M^2
GLUCOSE SERPL-MCNC: 106 MG/DL (ref 70–110)
HCT VFR BLD AUTO: 44.4 % (ref 40–54)
HGB BLD-MCNC: 14.7 G/DL (ref 14–18)
IMM GRANULOCYTES # BLD AUTO: 0.01 K/UL (ref 0–0.04)
IMM GRANULOCYTES NFR BLD AUTO: 0.2 % (ref 0–0.5)
LYMPHOCYTES # BLD AUTO: 0.9 K/UL (ref 1–4.8)
LYMPHOCYTES NFR BLD: 18 % (ref 18–48)
MCH RBC QN AUTO: 29.5 PG (ref 27–31)
MCHC RBC AUTO-ENTMCNC: 33.1 G/DL (ref 32–36)
MCV RBC AUTO: 89 FL (ref 82–98)
MONOCYTES # BLD AUTO: 0.5 K/UL (ref 0.3–1)
MONOCYTES NFR BLD: 10.6 % (ref 4–15)
NEUTROPHILS # BLD AUTO: 3.4 K/UL (ref 1.8–7.7)
NEUTROPHILS NFR BLD: 66.8 % (ref 38–73)
NRBC BLD-RTO: 0 /100 WBC
PLATELET # BLD AUTO: 232 K/UL (ref 150–350)
PMV BLD AUTO: 10.8 FL (ref 9.2–12.9)
POCT GLUCOSE: 119 MG/DL (ref 70–110)
POCT GLUCOSE: 120 MG/DL (ref 70–110)
POTASSIUM SERPL-SCNC: 3.8 MMOL/L (ref 3.5–5.1)
RBC # BLD AUTO: 4.98 M/UL (ref 4.6–6.2)
SODIUM SERPL-SCNC: 140 MMOL/L (ref 136–145)
TB INDURATION 48 - 72 HR READ: 0 MM
WBC # BLD AUTO: 5.01 K/UL (ref 3.9–12.7)

## 2020-07-25 PROCEDURE — 25000003 PHARM REV CODE 250: Performed by: PHYSICIAN ASSISTANT

## 2020-07-25 PROCEDURE — 94640 AIRWAY INHALATION TREATMENT: CPT

## 2020-07-25 PROCEDURE — 25000242 PHARM REV CODE 250 ALT 637 W/ HCPCS: Performed by: STUDENT IN AN ORGANIZED HEALTH CARE EDUCATION/TRAINING PROGRAM

## 2020-07-25 PROCEDURE — 25000003 PHARM REV CODE 250: Performed by: FAMILY MEDICINE

## 2020-07-25 PROCEDURE — 11000001 HC ACUTE MED/SURG PRIVATE ROOM

## 2020-07-25 PROCEDURE — 80048 BASIC METABOLIC PNL TOTAL CA: CPT

## 2020-07-25 PROCEDURE — 27000221 HC OXYGEN, UP TO 24 HOURS

## 2020-07-25 PROCEDURE — 99233 SBSQ HOSP IP/OBS HIGH 50: CPT | Mod: GC,,, | Performed by: PSYCHIATRY & NEUROLOGY

## 2020-07-25 PROCEDURE — 36415 COLL VENOUS BLD VENIPUNCTURE: CPT

## 2020-07-25 PROCEDURE — 94761 N-INVAS EAR/PLS OXIMETRY MLT: CPT

## 2020-07-25 PROCEDURE — 99233 PR SUBSEQUENT HOSPITAL CARE,LEVL III: ICD-10-PCS | Mod: GC,,, | Performed by: PSYCHIATRY & NEUROLOGY

## 2020-07-25 PROCEDURE — 85025 COMPLETE CBC W/AUTO DIFF WBC: CPT

## 2020-07-25 RX ADMIN — IPRATROPIUM BROMIDE AND ALBUTEROL SULFATE 3 ML: .5; 2.5 SOLUTION RESPIRATORY (INHALATION) at 12:07

## 2020-07-25 RX ADMIN — APIXABAN 2.5 MG: 2.5 TABLET, FILM COATED ORAL at 08:07

## 2020-07-25 RX ADMIN — IPRATROPIUM BROMIDE AND ALBUTEROL SULFATE 3 ML: .5; 2.5 SOLUTION RESPIRATORY (INHALATION) at 08:07

## 2020-07-25 RX ADMIN — LOSARTAN POTASSIUM 100 MG: 50 TABLET ORAL at 08:07

## 2020-07-25 RX ADMIN — IPRATROPIUM BROMIDE AND ALBUTEROL SULFATE 3 ML: .5; 2.5 SOLUTION RESPIRATORY (INHALATION) at 04:07

## 2020-07-25 RX ADMIN — ATORVASTATIN CALCIUM 80 MG: 20 TABLET, FILM COATED ORAL at 08:07

## 2020-07-25 RX ADMIN — TAMSULOSIN HYDROCHLORIDE 0.4 MG: 0.4 CAPSULE ORAL at 08:07

## 2020-07-25 RX ADMIN — Medication 1000 UNITS: at 08:07

## 2020-07-25 RX ADMIN — PANTOPRAZOLE SODIUM 40 MG: 40 TABLET, DELAYED RELEASE ORAL at 08:07

## 2020-07-25 NOTE — ASSESSMENT & PLAN NOTE
Baseline sCr 1.5, sCr 2.2 on admission  No indications for HD at this time    Plan:  - Trend Cr  - Strict I&Os  - Avoid nephrotoxic agents  - Renally adjust medications

## 2020-07-25 NOTE — SUBJECTIVE & OBJECTIVE
Neurologic Chief Complaint: AMS, Confusion, Inattention, Delayed Verbal Response, L Facial Droop    Subjective:     Interval History: NAEON. Off supplemental O2. Dispo pending SNF placement.    HPI, Past Medical, Family, and Social History remains the same as documented in the initial encounter.     Review of Systems   Constitutional: Negative.    HENT: Negative.    Respiratory: Positive for wheezing. Negative for cough, chest tightness and shortness of breath.    Cardiovascular: Negative for chest pain.   Gastrointestinal: Negative.    Musculoskeletal: Negative.    Neurological: Positive for weakness.   Psychiatric/Behavioral: Positive for confusion.     Scheduled Meds:   albuterol-ipratropium  3 mL Nebulization Q4H WAKE    apixaban  2.5 mg Oral BID    atorvastatin  80 mg Oral Daily    losartan  100 mg Oral Daily    pantoprazole  40 mg Oral Daily    tamsulosin  0.4 mg Oral Daily    vitamin D  1,000 Units Oral Daily     Continuous Infusions:   sodium chloride 0.9%       PRN Meds:acetaminophen, labetaloL, ondansetron, polyethylene glycol, sodium chloride 0.9%, sodium chloride 0.9%    Objective:     Vital Signs (Most Recent):  Temp: 98.3 °F (36.8 °C) (07/25/20 0700)  Pulse: 60 (07/25/20 0843)  Resp: 18 (07/25/20 0843)  BP: (!) 175/80 (07/25/20 0700)  SpO2: (!) 93 % (07/25/20 0843)  BP Location: Right arm    Vital Signs Range (Last 24H):  Temp:  [96 °F (35.6 °C)-98.6 °F (37 °C)]   Pulse:  [43-64]   Resp:  [16-19]   BP: (144-179)/(80-85)   SpO2:  [93 %-97 %]   BP Location: Right arm    Physical Exam  Constitutional:       General: He is not in acute distress.     Appearance: He is well-developed.   HENT:      Head: Normocephalic and atraumatic.      Mouth/Throat:      Pharynx: No oropharyngeal exudate.   Eyes:      Conjunctiva/sclera: Conjunctivae normal.      Pupils: Pupils are equal, round, and reactive to light.   Neck:      Musculoskeletal: Normal range of motion and neck supple.   Cardiovascular:      Rate  and Rhythm: Normal rate and regular rhythm.      Heart sounds: Normal heart sounds. No murmur.   Pulmonary:      Effort: Pulmonary effort is normal. No respiratory distress.      Breath sounds: Wheezing present. No rales.   Abdominal:      General: Bowel sounds are normal. There is no distension.      Palpations: Abdomen is soft.      Tenderness: There is no abdominal tenderness. There is no guarding.   Musculoskeletal:         General: No tenderness.   Skin:     General: Skin is warm and dry.      Findings: No rash.   Neurological:      Mental Status: He is alert. Mental status is at baseline. He is disoriented.      Cranial Nerves: No cranial nerve deficit.      Motor: No abnormal muscle tone.   Psychiatric:         Behavior: Behavior normal.         Neurological Exam:   LOC: alert  Attention Span: poor  Language: No aphasia  Articulation: Dysarthria  Orientation: Person  Visual Fields: Hemianopsia left  EOM (CN III, IV, VI): Gaze preference  right  Pupils (CN II, III): PERRL  Facial Movement (CN VII): Lower facial weakness on the Left  Motor: Arm left  Paresis: 4/5  Leg left  Normal 5/5  Arm right  Normal 5/5  Leg right Normal 5/5  Cebellar: Upper Extremity Appendicular Ataxia (Finger Nose Finger)  Left  Sensation: Intact to light touch, temperature and vibration  Tone: Normal tone throughout    Laboratory:  CMP: No results for input(s): GLUCOSE, CALCIUM, ALBUMIN, PROT, NA, K, CO2, CL, BUN, CREATININE, ALKPHOS, ALT, AST, BILITOT in the last 24 hours.  CBC:   Recent Labs   Lab 07/25/20  0803   WBC 5.01   RBC 4.98   HGB 14.7   HCT 44.4      MCV 89   MCH 29.5   MCHC 33.1       Diagnostic Results     Brain Imaging:  MRI Brain 7/18/20  Area of diffusion restriction with ADC match suggestive of acute infarct in the right parietal lobe. Additional smaller acute infarcts right occipital lobe and right basal ganglia.     Remote right frontal infarct and remote lacunar infarcts in the thalami.  Supratentorial white  matter T2/flair hyperintense signal foci suggesting sequela of chronic small vessel ischemic change.     MR angiogram of the head and neck appears unchanged compared to prior exams without any focal occlusion identified.        CT Head. Date: 07/18/20  1. Interval right parietal lobe suspected acute infarct.  No intracranial hemorrhage.  Further evaluation/follow-up as warranted.  2. Bilateral thalamic suspected lacunar type infarcts, age-indeterminate.  Correlate clinically.  3. Additional multifocal supratentorial and to a lesser extent infratentorial remote infarcts, as detailed above.  4. Generalized cerebral volume loss and sequela of advanced microvascular ischemic changes.     Vessel Imaging:  MRA Head and Neck 7/18/20 - see MRI Brain 7/18/20 above     Cardiac Evaluation:   Echo 7/19/20  · Normal left ventricular systolic function. The estimated ejection fraction is 68%.  · Concentric left ventricular remodeling with thickened walls especially septum and increased myocardial density.  · No wall motion abnormalities.  · Normal LV diastolic function.  · Normal right ventricular systolic function.  · Normal central venous pressure (3 mmHg).  · The estimated PA systolic pressure is 30 mmHg.  · The ascending aorta is mildly dilated.    The left atrial volume index is normal.

## 2020-07-25 NOTE — PROGRESS NOTES
[facility]  Vascular Neurology  Comprehensive Stroke Center  Progress Note    Assessment/Plan:     * Embolic stroke involving right middle cerebral artery  Shant Magana Jr. is a 80 y.o. male with PMHx of dementia, multiple strokes, intracranial and extracranial atherosclerosis, DM, HTN, a fib (eliquis with history of noncompliance due to cost) who presented to ED via EMS for AMS x 2 days. Family reports confusion, inattentiveness, delayed verbal response, and facial droop. Patient with history of L facial droop from previous stroke. On exam, patient not oriented to place, time, or situation. R gaze preference and L hemianopsia also noted. CT revealed R parietal infarct. Etiology likely cardioembolic      Antithrombotics for secondary stroke prevention: Anticoagulants: Apixaban 2.5 mg BID  - Will maintain on geriatric dose of Eliquis as creatinine borderline for normal dosing (and only worsens when not admitted).    Statins for secondary stroke prevention and hyperlipidemia, if present:   Statins: Atorvastatin- 80 mg daily    Aggressive risk factor modification: HTN, DM, HLD, A-Fib, atherosclerosis, stroke     Rehab efforts: The patient has been evaluated by a stroke team provider and the therapy needs have been fully considered based off the presenting complaints and exam findings. The following therapy evaluations are needed: PT evaluate and treat, OT evaluate and treat, SLP evaluate and treat, PM&R evaluate for appropriate placement - recommending SNF vs HH w/ 24 hr assistance. Spoke to pt's daughter Manuel Lozada 7/21, who states family is unable to provide 24 hr supervision. Daughter agrees with SNF dispo.     Diagnostics ordered/pending: none    VTE prophylaxis: Mechanical prophylaxis: Place SCDs  None: Reason for No Pharmacological VTE Prophylaxis: Currently on anticoagulation    BP parameters: Infarct: SBP < 160    Cytotoxic cerebral edema  Area of cytotoxic cerebral edema identified when  reviewing brain imaging in the territory of the R middle cerebral artery. There is no mass effect associated with it. We will continue to monitor the patients clinical exam for any worsening of symptoms which may indicate expansion of the stroke or the area of the edema resulting in the clinical change. The pattern is suggestive of cardioembolic etiology.    Hypoxia  Continues to require supplemental O2 despite diuresis  Expiratory wheezing on exam, no h/o COPD/asthma    Plan:  - Scheduled duo-nebs  - Wean supplemental O2 as tolerated    Former smoker  Stroke risk factor  Encourage continued smoking cessation    Benign prostatic hyperplasia  Continue home flomax    Dementia without behavioral disturbance  Delirium precautions ordered    Status post placement of implantable loop recorder       Paroxysmal atrial fibrillation  Stroke risk factor  Anticoagulate with home apixaban 2.5mg BID    Chronic diastolic congestive heart failure  Repeat echo EF 68 %  Last echo 12/2019 with mild LV diastolic dysfunction  Not on diuretic at home  BNP wnl    Plan:  - Hold off on further diuresis  - Daily weights (standing if tolerated)  - Strict I/Os  - Fluid restriction at 1500mL  - Cardiac diet    CKD (chronic kidney disease) stage 3, GFR 30-59 ml/min  - Trend Cr  - Strict I&Os  - Avoid nephrotoxic agents  - Renally adjust medications    Mixed hyperlipidemia  Stroke risk factor  LDL 70  Continue home atorvastatin 80 mg daily    Internal carotid artery stenosis, left  Stroke risk factor    History of stroke  Patient with history of multiple strokes   History of a fib + intracranial/extracranial atherosclerosis  Continue secondary stroke prevention with eliquis + atorvastatin 80 mg     Will maintain on geriatric dose of Eliquis as creatinine borderline for normal dosing (and only worsens when not admitted).     Type 2 diabetes mellitus with complication, without long-term current use of insulin  Stroke risk factor  A1C  5.8  SSI  Glucose 140-180    Essential hypertension  Stroke risk factor  SBP < 160   Resumed home Losartan at decreased dose of 50 mg daily 7/21, increased to 100 mg on 7/22.     Original home meds: Losartan 100 mg daily, Amlodipine 10 mg daily  Continue to monitor BP. Adjust as needed.          Hospital Course:  7/19: Patient with mild wheezing on exam today. Discontinued IVFs and started PRN DuoNebs, CXR with concern for pleural effusion as on previous imaging. Still pending Echo, A1c, and PT/OT/SLP.  7/20: Echo complete, EF 68%, normal LA. Repeat CXR ordered, mild wheezing persists, continue DuoNebs. Therapy recommending SNF vs home health w/ 24 hour supervision. Dispo pending family's decision  7/21: CXR worsened w/ progression of pleural fluid and parenchymal changes, mild expiratory wheezing perists, order lasix 20 mg IVP once, repeat CXR tomorrow. Restart half dose of home Losartan at 50 mg daily. Family unable to provide 24 hr care with HH, CM to send SNF options.   7/22: Repeat CXR with slight decreased volume of pleural fluid on R, diminished to no breath sounds on R, mild expiratory wheezing persists, ordered another lasix 20 mg IVP once, continue PRN duonebs, continue to monitor. SBP 160s-170s, increased to full dose of home Losartan 100 mg daily. Free T3, T4 pending.   7/23: Continues to be on 2L nc. IV lasix 40mg given. Otherwise stable.  7/24: Good UOP with IV lasix 20mg but strict I/Os not charted. BNP wnl. Continues to require 2L nc, with expiratory wheezing.  7/25: Off supplemental O2 after scheduling [facility]    STROKE DOCUMENTATION   Acute Stroke Times   Symptom Onset Date: 07/16/20    NIH Scale:  Interval: 7 days or at discharge (whichever comes first)  1a. Level of Consciousness: 0-->Alert, keenly responsive  1b. LOC Questions: 1-->Answers one question correctly  1c. LOC Commands: 0-->Performs both tasks correctly  2. Best Gaze: 1-->Partial gaze palsy, gaze is abnormal in one or both eyes, but  forced deviation or total gaze paresis is not present  3. Visual: 1-->Partial hemianopia  4. Facial Palsy: 0-->Normal symmetrical movements  5a. Motor Arm, Left: 1-->Drift, limb holds 90 (or 45) degrees, but drifts down before full 10 seconds, does not hit bed or other support  5b. Motor Arm, Right: 0-->No drift, limb holds 90 (or 45) degrees for full 10 secs  6a. Motor Leg, Left: 0-->No drift, leg holds 30 degree position for full 5 secs  6b. Motor Leg, Right: 0-->No drift, leg holds 30 degree position for full 5 secs  7. Limb Ataxia: 0-->Absent  8. Sensory: 0-->Normal, no sensory loss  9. Best Language: 0-->No aphasia, normal  10. Dysarthria: 0-->Normal  11. Extinction and Inattention (formerly Neglect): 0-->No abnormality  Total (NIH Stroke Scale): 4       Modified Vicco Score: 0  [place] Coma Scale:15   ABCD2 Score:    [id number] Score:8  HAS -BLED Score:   ICH Score:   Hunt & Choudhury Classification:      Hemorrhagic change of an Ischemic Stroke: Does this patient have an ischemic stroke with hemorrhagic changes? No     Neurologic Chief Complaint: AMS, Confusion, Inattention, Delayed Verbal Response, L Facial Droop    Subjective:     Interval History: NAEON. Off supplemental O2. Dispo pending SNF placement.    HPI, Past Medical, Family, and Social History remains the same as documented in the initial encounter.     Review of Systems   Constitutional: Negative.    HENT: Negative.    Respiratory: Positive for wheezing. Negative for cough, chest tightness and shortness of breath.    Cardiovascular: Negative for chest pain.   Gastrointestinal: Negative.    Musculoskeletal: Negative.    Neurological: Positive for weakness.   Psychiatric/Behavioral: Positive for confusion.     Scheduled Meds:   albuterol-ipratropium  3 mL Nebulization Q4H WAKE    apixaban  2.5 mg Oral BID    atorvastatin  80 mg Oral Daily    losartan  100 mg Oral Daily    pantoprazole  40 mg Oral Daily    tamsulosin  0.4 mg Oral Daily     vitamin D  1,000 Units Oral Daily     Continuous Infusions:   sodium chloride 0.9%       PRN Meds:acetaminophen, labetaloL, ondansetron, polyethylene glycol, sodium chloride 0.9%, sodium chloride 0.9%    Objective:     Vital Signs (Most Recent):  Temp: 98.3 °F (36.8 °C) (07/25/20 0700)  Pulse: 60 (07/25/20 0843)  Resp: 18 (07/25/20 0843)  BP: (!) 175/80 (07/25/20 0700)  SpO2: (!) 93 % (07/25/20 0843)  BP Location: Right arm    Vital Signs Range (Last 24H):  Temp:  [96 °F (35.6 °C)-98.6 °F (37 °C)]   Pulse:  [43-64]   Resp:  [16-19]   BP: (144-179)/(80-85)   SpO2:  [93 %-97 %]   BP Location: Right arm    Physical Exam  Constitutional:       General: He is not in acute distress.     Appearance: He is well-developed.   HENT:      Head: Normocephalic and atraumatic.      Mouth/Throat:      Pharynx: No oropharyngeal exudate.   Eyes:      Conjunctiva/sclera: Conjunctivae normal.      Pupils: Pupils are equal, round, and reactive to light.   Neck:      Musculoskeletal: Normal range of motion and neck supple.   Cardiovascular:      Rate and Rhythm: Normal rate and regular rhythm.      Heart sounds: Normal heart sounds. No murmur.   Pulmonary:      Effort: Pulmonary effort is normal. No respiratory distress.      Breath sounds: Wheezing present. No rales.   Abdominal:      General: Bowel sounds are normal. There is no distension.      Palpations: Abdomen is soft.      Tenderness: There is no abdominal tenderness. There is no guarding.   Musculoskeletal:         General: No tenderness.   Skin:     General: Skin is warm and dry.      Findings: No rash.   Neurological:      Mental Status: He is alert. Mental status is at baseline. He is disoriented.      Cranial Nerves: No cranial nerve deficit.      Motor: No abnormal muscle tone.   Psychiatric:         Behavior: Behavior normal.         Neurological Exam:   LOC: alert  Attention Span: poor  Language: No aphasia  Articulation: Dysarthria  Orientation: Person  Visual Fields:  Hemianopsia left  EOM (CN III, IV, VI): Gaze preference  right  Pupils (CN II, III): PERRL  Facial Movement (CN VII): Lower facial weakness on the Left  Motor: Arm left  Paresis: 4/5  Leg left  Normal 5/5  Arm right  Normal 5/5  Leg right Normal 5/5  Cebellar: Upper Extremity Appendicular Ataxia (Finger Nose Finger)  Left  Sensation: Intact to light touch, temperature and vibration  Tone: Normal tone throughout    Laboratory:  CMP: No results for input(s): GLUCOSE, CALCIUM, ALBUMIN, PROT, NA, K, CO2, CL, BUN, CREATININE, ALKPHOS, ALT, AST, BILITOT in the last 24 hours.  CBC:   Recent Labs   Lab 07/25/20  0803   WBC 5.01   RBC 4.98   HGB 14.7   HCT 44.4      MCV 89   MCH 29.5   MCHC 33.1       Diagnostic Results     Brain Imaging:  MRI Brain 7/18/20  Area of diffusion restriction with ADC match suggestive of acute infarct in the right parietal lobe. Additional smaller acute infarcts right occipital lobe and right basal ganglia.     Remote right frontal infarct and remote lacunar infarcts in the thalami.  Supratentorial white matter T2/flair hyperintense signal foci suggesting sequela of chronic small vessel ischemic change.     MR angiogram of the head and neck appears unchanged compared to prior exams without any focal occlusion identified.        CT Head. Date: 07/18/20  1. Interval right parietal lobe suspected acute infarct.  No intracranial hemorrhage.  Further evaluation/follow-up as warranted.  2. Bilateral thalamic suspected lacunar type infarcts, age-indeterminate.  Correlate clinically.  3. Additional multifocal supratentorial and to a lesser extent infratentorial remote infarcts, as detailed above.  4. Generalized cerebral volume loss and sequela of advanced microvascular ischemic changes.     Vessel Imaging:  MRA Head and Neck 7/18/20 - see MRI Brain 7/18/20 above     Cardiac Evaluation:   Echo 7/19/20  · Normal left ventricular systolic function. The estimated ejection fraction is  68%.  · Concentric left ventricular remodeling with thickened walls especially septum and increased myocardial density.  · No wall motion abnormalities.  · Normal LV diastolic function.  · Normal right ventricular systolic function.  · Normal central venous pressure (3 mmHg).  · The estimated PA systolic pressure is 30 mmHg.  · The ascending aorta is mildly dilated.    The left atrial volume index is normal.      Lloyd Perdomo MD  Comprehensive Stroke Center  Department of Vascular Neurology   [facility]

## 2020-07-25 NOTE — ASSESSMENT & PLAN NOTE
Patient with history of multiple strokes   History of a fib + intracranial/extracranial atherosclerosis  Continue secondary stroke prevention with eliquis + atorvastatin 80 mg     Will maintain on geriatric dose of Eliquis as creatinine borderline for normal dosing (and only worsens when not admitted).

## 2020-07-26 PROBLEM — R09.02 HYPOXIA: Status: RESOLVED | Noted: 2020-07-24 | Resolved: 2020-07-26

## 2020-07-26 LAB
POCT GLUCOSE: 106 MG/DL (ref 70–110)
POCT GLUCOSE: 112 MG/DL (ref 70–110)
POCT GLUCOSE: 124 MG/DL (ref 70–110)
POCT GLUCOSE: 98 MG/DL (ref 70–110)
POCT GLUCOSE: 99 MG/DL (ref 70–110)

## 2020-07-26 PROCEDURE — 94761 N-INVAS EAR/PLS OXIMETRY MLT: CPT

## 2020-07-26 PROCEDURE — 11000001 HC ACUTE MED/SURG PRIVATE ROOM

## 2020-07-26 PROCEDURE — 99233 SBSQ HOSP IP/OBS HIGH 50: CPT | Mod: GC,,, | Performed by: PSYCHIATRY & NEUROLOGY

## 2020-07-26 PROCEDURE — 94640 AIRWAY INHALATION TREATMENT: CPT

## 2020-07-26 PROCEDURE — 25000003 PHARM REV CODE 250: Performed by: PHYSICIAN ASSISTANT

## 2020-07-26 PROCEDURE — 25000003 PHARM REV CODE 250: Performed by: FAMILY MEDICINE

## 2020-07-26 PROCEDURE — 25000242 PHARM REV CODE 250 ALT 637 W/ HCPCS: Performed by: STUDENT IN AN ORGANIZED HEALTH CARE EDUCATION/TRAINING PROGRAM

## 2020-07-26 PROCEDURE — 99233 PR SUBSEQUENT HOSPITAL CARE,LEVL III: ICD-10-PCS | Mod: GC,,, | Performed by: PSYCHIATRY & NEUROLOGY

## 2020-07-26 RX ADMIN — ATORVASTATIN CALCIUM 80 MG: 20 TABLET, FILM COATED ORAL at 08:07

## 2020-07-26 RX ADMIN — IPRATROPIUM BROMIDE AND ALBUTEROL SULFATE 3 ML: .5; 2.5 SOLUTION RESPIRATORY (INHALATION) at 12:07

## 2020-07-26 RX ADMIN — IPRATROPIUM BROMIDE AND ALBUTEROL SULFATE 3 ML: .5; 2.5 SOLUTION RESPIRATORY (INHALATION) at 03:07

## 2020-07-26 RX ADMIN — PANTOPRAZOLE SODIUM 40 MG: 40 TABLET, DELAYED RELEASE ORAL at 08:07

## 2020-07-26 RX ADMIN — APIXABAN 2.5 MG: 2.5 TABLET, FILM COATED ORAL at 08:07

## 2020-07-26 RX ADMIN — IPRATROPIUM BROMIDE AND ALBUTEROL SULFATE 3 ML: .5; 2.5 SOLUTION RESPIRATORY (INHALATION) at 08:07

## 2020-07-26 RX ADMIN — Medication 1000 UNITS: at 08:07

## 2020-07-26 RX ADMIN — LOSARTAN POTASSIUM 100 MG: 50 TABLET ORAL at 08:07

## 2020-07-26 RX ADMIN — APIXABAN 2.5 MG: 2.5 TABLET, FILM COATED ORAL at 09:07

## 2020-07-26 RX ADMIN — TAMSULOSIN HYDROCHLORIDE 0.4 MG: 0.4 CAPSULE ORAL at 08:07

## 2020-07-26 NOTE — SUBJECTIVE & OBJECTIVE
Neurologic Chief Complaint: AMS, Confusion, Inattention, Delayed Verbal Response, L Facial Droop    Subjective:     Interval History: NAEON. Off supplemental O2. Dispo pending SNF placement.  HPI, Past Medical, Family, and Social History remains the same as documented in the initial encounter.     Review of Systems   Constitutional: Negative.    HENT: Negative.    Respiratory: Positive for wheezing. Negative for cough, chest tightness and shortness of breath.    Cardiovascular: Negative for chest pain.   Gastrointestinal: Negative.    Musculoskeletal: Negative.    Neurological: Positive for weakness.   Psychiatric/Behavioral: Positive for confusion.     Scheduled Meds:   albuterol-ipratropium  3 mL Nebulization Q4H WAKE    apixaban  2.5 mg Oral BID    atorvastatin  80 mg Oral Daily    losartan  100 mg Oral Daily    pantoprazole  40 mg Oral Daily    tamsulosin  0.4 mg Oral Daily    vitamin D  1,000 Units Oral Daily     Continuous Infusions:   sodium chloride 0.9%       PRN Meds:acetaminophen, ondansetron, polyethylene glycol, sodium chloride 0.9%, sodium chloride 0.9%    Objective:     Vital Signs (Most Recent):  Temp: 97.1 °F (36.2 °C) (07/26/20 0552)  Pulse: 63 (07/26/20 0552)  Resp: 18 (07/26/20 0552)  BP: (!) 185/90 (07/26/20 0552)  SpO2: (!) 93 % (07/26/20 0552)  BP Location: Left arm    Vital Signs Range (Last 24H):  Temp:  [97.1 °F (36.2 °C)-98.8 °F (37.1 °C)]   Pulse:  []   Resp:  [16-18]   BP: (153-185)/(75-90)   SpO2:  [93 %-100 %]   BP Location: Left arm    Physical Exam  Constitutional:       General: He is not in acute distress.     Appearance: He is well-developed.   HENT:      Head: Normocephalic and atraumatic.      Mouth/Throat:      Pharynx: No oropharyngeal exudate.   Eyes:      Conjunctiva/sclera: Conjunctivae normal.      Pupils: Pupils are equal, round, and reactive to light.   Neck:      Musculoskeletal: Normal range of motion and neck supple.   Cardiovascular:      Rate and  Rhythm: Normal rate and regular rhythm.      Heart sounds: Normal heart sounds. No murmur.   Pulmonary:      Effort: Pulmonary effort is normal. No respiratory distress.      Breath sounds: Wheezing present. No rales.   Abdominal:      General: Bowel sounds are normal. There is no distension.      Palpations: Abdomen is soft.      Tenderness: There is no abdominal tenderness. There is no guarding.   Musculoskeletal:         General: No tenderness.   Skin:     General: Skin is warm and dry.      Findings: No rash.   Neurological:      Mental Status: He is alert. Mental status is at baseline. He is disoriented.      Cranial Nerves: No cranial nerve deficit.      Motor: No abnormal muscle tone.   Psychiatric:         Behavior: Behavior normal.       Neurological Exam:   LOC: alert  Attention Span: poor  Language: No aphasia  Articulation: Dysarthria  Orientation: Person  Visual Fields: Hemianopsia left  EOM (CN III, IV, VI): Gaze preference  right  Pupils (CN II, III): PERRL  Facial Movement (CN VII): Lower facial weakness on the Left  Motor: Arm left  Paresis: 4/5  Leg left  Normal 5/5  Arm right  Normal 5/5  Leg right Normal 5/5  Cebellar: Upper Extremity Appendicular Ataxia (Finger Nose Finger)  Left  Sensation: Intact to light touch, temperature and vibration  Tone: Normal tone throughout    Laboratory:  None.    Diagnostic Results     Brain Imaging:  MRI Brain 7/18/20  Area of diffusion restriction with ADC match suggestive of acute infarct in the right parietal lobe. Additional smaller acute infarcts right occipital lobe and right basal ganglia.     Remote right frontal infarct and remote lacunar infarcts in the thalami.  Supratentorial white matter T2/flair hyperintense signal foci suggesting sequela of chronic small vessel ischemic change.     MR angiogram of the head and neck appears unchanged compared to prior exams without any focal occlusion identified.        CT Head. Date: 07/18/20  1. Interval right  parietal lobe suspected acute infarct.  No intracranial hemorrhage.  Further evaluation/follow-up as warranted.  2. Bilateral thalamic suspected lacunar type infarcts, age-indeterminate.  Correlate clinically.  3. Additional multifocal supratentorial and to a lesser extent infratentorial remote infarcts, as detailed above.  4. Generalized cerebral volume loss and sequela of advanced microvascular ischemic changes.     Vessel Imaging:  MRA Head and Neck 7/18/20 - see MRI Brain 7/18/20 above     Cardiac Evaluation:   Echo 7/19/20  · Normal left ventricular systolic function. The estimated ejection fraction is 68%.  · Concentric left ventricular remodeling with thickened walls especially septum and increased myocardial density.  · No wall motion abnormalities.  · Normal LV diastolic function.  · Normal right ventricular systolic function.  · Normal central venous pressure (3 mmHg).  · The estimated PA systolic pressure is 30 mmHg.  · The ascending aorta is mildly dilated.    The left atrial volume index is normal.

## 2020-07-26 NOTE — PROGRESS NOTES
[facility]  Vascular Neurology  Comprehensive Stroke Center  Progress Note    Assessment/Plan:     * Embolic stroke involving right middle cerebral artery  Shant Magana Jr. is a 80 y.o. male with PMHx of dementia, multiple strokes, intracranial and extracranial atherosclerosis, DM, HTN, a fib (eliquis with history of noncompliance due to cost) who presented to ED via EMS for AMS x 2 days. Family reports confusion, inattentiveness, delayed verbal response, and facial droop. Patient with history of L facial droop from previous stroke. On exam, patient not oriented to place, time, or situation. R gaze preference and L hemianopsia also noted. CT revealed R parietal infarct. Etiology likely cardioembolic    Antithrombotics for secondary stroke prevention: Anticoagulants: Apixaban 2.5 mg BID  - Will maintain on geriatric dose of Eliquis as creatinine borderline for normal dosing (and only worsens when not admitted).    Statins for secondary stroke prevention and hyperlipidemia, if present:   Statins: Atorvastatin- 80 mg daily    Aggressive risk factor modification: HTN, DM, HLD, A-Fib, atherosclerosis, stroke     Rehab efforts: The patient has been evaluated by a stroke team provider and the therapy needs have been fully considered based off the presenting complaints and exam findings. The following therapy evaluations are needed: PT evaluate and treat, OT evaluate and treat, SLP evaluate and treat, PM&R evaluate for appropriate placement - recommending SNF vs HH w/ 24 hr assistance. Spoke to pt's daughter Manuel Lozada 7/21, who states family is unable to provide 24 hr supervision. Daughter agrees with SNF dispo.     Diagnostics ordered/pending: none    VTE prophylaxis: Mechanical prophylaxis: Place SCDs  None: Reason for No Pharmacological VTE Prophylaxis: Currently on anticoagulation    BP parameters: Infarct: SBP < 160    Cytotoxic cerebral edema  Area of cytotoxic cerebral edema identified when  reviewing brain imaging in the territory of the R middle cerebral artery. There is no mass effect associated with it. We will continue to monitor the patients clinical exam for any worsening of symptoms which may indicate expansion of the stroke or the area of the edema resulting in the clinical change. The pattern is suggestive of cardioembolic etiology.    Former smoker  Stroke risk factor  Encourage continued smoking cessation    Benign prostatic hyperplasia  Continue home flomax    Dementia without behavioral disturbance  Delirium precautions ordered    Status post placement of implantable loop recorder       Paroxysmal atrial fibrillation  Stroke risk factor  Anticoagulate with home apixaban 2.5mg BID    Chronic diastolic congestive heart failure  Repeat echo EF 68 %  Last echo 12/2019 with mild LV diastolic dysfunction  Not on diuretic at home  BNP wnl    Plan:  - Hold off on further diuresis  - Daily weights (standing if tolerated)  - Strict I/Os  - Fluid restriction at 1500mL  - Cardiac diet    CKD (chronic kidney disease) stage 3, GFR 30-59 ml/min  Trend Cr  Strict I&Os  Avoid nephrotoxic agents  Renally adjust medications    Mixed hyperlipidemia  Stroke risk factor  LDL 70  Continue home atorvastatin 80 mg daily    Internal carotid artery stenosis, left  Stroke risk factor    History of stroke  Patient with history of multiple strokes   History of a fib + intracranial/extracranial atherosclerosis  Continue secondary stroke prevention with eliquis + atorvastatin 80 mg     Will maintain on geriatric dose of Eliquis as creatinine borderline for normal dosing (and only worsens when not admitted).     Type 2 diabetes mellitus with complication, without long-term current use of insulin  Stroke risk factor  A1C 5.8  SSI  Glucose 140-180    Essential hypertension  Stroke risk factor  SBP < 160   Resumed home Losartan at decreased dose of 50 mg daily 7/21, increased to 100 mg on 7/22.     Original home meds:  Losartan 100 mg daily, Amlodipine 10 mg daily  Continue to monitor BP. Adjust as needed.        Hospital Course:  7/19: Patient with mild wheezing on exam today. Discontinued IVFs and started PRN DuoNebs, CXR with concern for pleural effusion as on previous imaging. Still pending Echo, A1c, and PT/OT/SLP.  7/20: Echo complete, EF 68%, normal LA. Repeat CXR ordered, mild wheezing persists, continue DuoNebs. Therapy recommending SNF vs home health w/ 24 hour supervision. Dispo pending family's decision  7/21: CXR worsened w/ progression of pleural fluid and parenchymal changes, mild expiratory wheezing perists, order lasix 20 mg IVP once, repeat CXR tomorrow. Restart half dose of home Losartan at 50 mg daily. Family unable to provide 24 hr care with HH, CM to send SNF options.   7/22: Repeat CXR with slight decreased volume of pleural fluid on R, diminished to no breath sounds on R, mild expiratory wheezing persists, ordered another lasix 20 mg IVP once, continue PRN duonebs, continue to monitor. SBP 160s-170s, increased to full dose of home Losartan 100 mg daily. Free T3, T4 pending.   7/23: Continues to be on 2L nc. IV lasix 40mg given. Otherwise stable.  7/24: Good UOP with IV lasix 20mg but strict I/Os not charted. BNP wnl. Continues to require 2L nc, with expiratory wheezing.  7/25: Off supplemental O2 after scheduling duo-nebs  7/25: Off supplemental O2, exam unchanged.  7/26: Off supplemental O2, exam unchanged    STROKE DOCUMENTATION   Acute Stroke Times   Symptom Onset Date: 07/16/20    NIH Scale:  Interval: 7 days or at discharge (whichever comes first)  1a. Level of Consciousness: 0-->Alert, keenly responsive  1b. LOC Questions: 1-->Answers one question correctly  1c. LOC Commands: 0-->Performs both tasks correctly  2. Best Gaze: 1-->Partial gaze palsy, gaze is abnormal in one or both eyes, but forced deviation or total gaze paresis is not present  3. Visual: 1-->Partial hemianopia  4. Facial Palsy:  0-->Normal symmetrical movements  5a. Motor Arm, Left: 1-->Drift, limb holds 90 (or 45) degrees, but drifts down before full 10 seconds, does not hit bed or other support  5b. Motor Arm, Right: 0-->No drift, limb holds 90 (or 45) degrees for full 10 secs  6a. Motor Leg, Left: 0-->No drift, leg holds 30 degree position for full 5 secs  6b. Motor Leg, Right: 0-->No drift, leg holds 30 degree position for full 5 secs  7. Limb Ataxia: 0-->Absent  8. Sensory: 0-->Normal, no sensory loss  9. Best Language: 0-->No aphasia, normal  10. Dysarthria: 0-->Normal  11. Extinction and Inattention (formerly Neglect): 0-->No abnormality  Total (NIH Stroke Scale): 4       Modified [place] Score: 0  [place] Coma Scale:15   ABCD2 Score:    [id number] Score:8  HAS -BLED Score:   ICH Score:   Hunt & Choudhury Classification:      Hemorrhagic change of an Ischemic Stroke: Does this patient have an ischemic stroke with hemorrhagic changes? No     Neurologic Chief Complaint: AMS, Confusion, Inattention, Delayed Verbal Response, L Facial Droop    Subjective:     Interval History: NAEON. Off supplemental O2. Dispo pending SNF placement.    HPI, Past Medical, Family, and Social History remains the same as documented in the initial encounter.     Review of Systems   Constitutional: Negative.    HENT: Negative.    Respiratory: Positive for wheezing. Negative for cough, chest tightness and shortness of breath.    Cardiovascular: Negative for chest pain.   Gastrointestinal: Negative.    Musculoskeletal: Negative.    Neurological: Positive for weakness.   Psychiatric/Behavioral: Positive for confusion.     Scheduled Meds:   albuterol-ipratropium  3 mL Nebulization Q4H WAKE    apixaban  2.5 mg Oral BID    atorvastatin  80 mg Oral Daily    losartan  100 mg Oral Daily    pantoprazole  40 mg Oral Daily    tamsulosin  0.4 mg Oral Daily    vitamin D  1,000 Units Oral Daily     Continuous Infusions:   sodium chloride 0.9%       PRN Meds:acetaminophen,  ondansetron, polyethylene glycol, sodium chloride 0.9%, sodium chloride 0.9%    Objective:     Vital Signs (Most Recent):  Temp: 97.1 °F (36.2 °C) (07/26/20 0552)  Pulse: 63 (07/26/20 0552)  Resp: 18 (07/26/20 0552)  BP: (!) 185/90 (07/26/20 0552)  SpO2: (!) 93 % (07/26/20 0552)  BP Location: Left arm    Vital Signs Range (Last 24H):  Temp:  [97.1 °F (36.2 °C)-98.8 °F (37.1 °C)]   Pulse:  []   Resp:  [16-18]   BP: (153-185)/(75-90)   SpO2:  [93 %-100 %]   BP Location: Left arm    Physical Exam  Constitutional:       General: He is not in acute distress.     Appearance: He is well-developed.   HENT:      Head: Normocephalic and atraumatic.      Mouth/Throat:      Pharynx: No oropharyngeal exudate.   Eyes:      Conjunctiva/sclera: Conjunctivae normal.      Pupils: Pupils are equal, round, and reactive to light.   Neck:      Musculoskeletal: Normal range of motion and neck supple.   Cardiovascular:      Rate and Rhythm: Normal rate and regular rhythm.      Heart sounds: Normal heart sounds. No murmur.   Pulmonary:      Effort: Pulmonary effort is normal. No respiratory distress.      Breath sounds: Wheezing present. No rales.   Abdominal:      General: Bowel sounds are normal. There is no distension.      Palpations: Abdomen is soft.      Tenderness: There is no abdominal tenderness. There is no guarding.   Musculoskeletal:         General: No tenderness.   Skin:     General: Skin is warm and dry.      Findings: No rash.   Neurological:      Mental Status: He is alert. Mental status is at baseline. He is disoriented.      Cranial Nerves: No cranial nerve deficit.      Motor: No abnormal muscle tone.   Psychiatric:         Behavior: Behavior normal.       Neurological Exam:   LOC: alert  Attention Span: poor  Language: No aphasia  Articulation: Dysarthria  Orientation: Person  Visual Fields: Hemianopsia left  EOM (CN III, IV, VI): Gaze preference  right  Pupils (CN II, III): PERRL  Facial Movement (CN VII): Lower  facial weakness on the Left  Motor: Arm left  Paresis: 4/5  Leg left  Normal 5/5  Arm right  Normal 5/5  Leg right Normal 5/5  Cebellar: Upper Extremity Appendicular Ataxia (Finger Nose Finger)  Left  Sensation: Intact to light touch, temperature and vibration  Tone: Normal tone throughout    Laboratory:  None.    Diagnostic Results     Brain Imaging:  MRI Brain 7/18/20  Area of diffusion restriction with ADC match suggestive of acute infarct in the right parietal lobe. Additional smaller acute infarcts right occipital lobe and right basal ganglia.     Remote right frontal infarct and remote lacunar infarcts in the thalami.  Supratentorial white matter T2/flair hyperintense signal foci suggesting sequela of chronic small vessel ischemic change.     MR angiogram of the head and neck appears unchanged compared to prior exams without any focal occlusion identified.        CT Head. Date: 07/18/20  1. Interval right parietal lobe suspected acute infarct.  No intracranial hemorrhage.  Further evaluation/follow-up as warranted.  2. Bilateral thalamic suspected lacunar type infarcts, age-indeterminate.  Correlate clinically.  3. Additional multifocal supratentorial and to a lesser extent infratentorial remote infarcts, as detailed above.  4. Generalized cerebral volume loss and sequela of advanced microvascular ischemic changes.     Vessel Imaging:  MRA Head and Neck 7/18/20 - see MRI Brain 7/18/20 above     Cardiac Evaluation:   Echo 7/19/20  · Normal left ventricular systolic function. The estimated ejection fraction is 68%.  · Concentric left ventricular remodeling with thickened walls especially septum and increased myocardial density.  · No wall motion abnormalities.  · Normal LV diastolic function.  · Normal right ventricular systolic function.  · Normal central venous pressure (3 mmHg).  · The estimated PA systolic pressure is 30 mmHg.  · The ascending aorta is mildly dilated.    The left atrial volume index is  normal.      Lloyd Perdomo MD  Comprehensive Stroke Center  Department of Vascular Neurology   [facility]

## 2020-07-26 NOTE — PLAN OF CARE
Problem: Adjustment to Illness (Stroke, Ischemic/Transient Ischemic Attack)  Goal: Optimal Coping  Outcome: Ongoing, Progressing     Problem: Communication Impairment (Stroke, Ischemic/Transient Ischemic Attack)  Goal: Improved Communication Skills  Outcome: Ongoing, Progressing     Problem: Fall Injury Risk  Goal: Absence of Fall and Fall-Related Injury  Outcome: Ongoing, Progressing       POC reviewed with patient. All questions and concerns reviewed. VSS throughout shift. Fall/safety precautions implemented and maintained. Blood glucose monitored. Bed locked in lowest position. Call bell within reach. Will continue to monitor.

## 2020-07-27 ENCOUNTER — HOSPITAL ENCOUNTER (INPATIENT)
Facility: HOSPITAL | Age: 80
LOS: 15 days | Discharge: HOME HEALTH | DRG: 064 | End: 2020-08-11
Attending: HOSPITALIST | Admitting: PSYCHIATRY & NEUROLOGY
Payer: MEDICARE

## 2020-07-27 VITALS
SYSTOLIC BLOOD PRESSURE: 155 MMHG | TEMPERATURE: 99 F | OXYGEN SATURATION: 96 % | WEIGHT: 193 LBS | DIASTOLIC BLOOD PRESSURE: 88 MMHG | BODY MASS INDEX: 25.58 KG/M2 | HEIGHT: 73 IN | RESPIRATION RATE: 16 BRPM | HEART RATE: 77 BPM

## 2020-07-27 DIAGNOSIS — I63.9 STROKE: ICD-10-CM

## 2020-07-27 DIAGNOSIS — I63.411 EMBOLIC STROKE INVOLVING RIGHT MIDDLE CEREBRAL ARTERY: Primary | ICD-10-CM

## 2020-07-27 DIAGNOSIS — I10 ESSENTIAL HYPERTENSION: ICD-10-CM

## 2020-07-27 DIAGNOSIS — G93.40 ACUTE ENCEPHALOPATHY: ICD-10-CM

## 2020-07-27 LAB
ANION GAP SERPL CALC-SCNC: 9 MMOL/L (ref 8–16)
BASOPHILS # BLD AUTO: 0.04 K/UL (ref 0–0.2)
BASOPHILS NFR BLD: 0.8 % (ref 0–1.9)
BUN SERPL-MCNC: 28 MG/DL (ref 8–23)
CALCIUM SERPL-MCNC: 10.9 MG/DL (ref 8.7–10.5)
CHLORIDE SERPL-SCNC: 111 MMOL/L (ref 95–110)
CO2 SERPL-SCNC: 20 MMOL/L (ref 23–29)
CREAT SERPL-MCNC: 1.4 MG/DL (ref 0.5–1.4)
DIFFERENTIAL METHOD: ABNORMAL
EOSINOPHIL # BLD AUTO: 0.1 K/UL (ref 0–0.5)
EOSINOPHIL NFR BLD: 2.9 % (ref 0–8)
ERYTHROCYTE [DISTWIDTH] IN BLOOD BY AUTOMATED COUNT: 14.3 % (ref 11.5–14.5)
EST. GFR  (AFRICAN AMERICAN): 54.5 ML/MIN/1.73 M^2
EST. GFR  (NON AFRICAN AMERICAN): 47.1 ML/MIN/1.73 M^2
GLUCOSE SERPL-MCNC: 110 MG/DL (ref 70–110)
HCT VFR BLD AUTO: 44.2 % (ref 40–54)
HGB BLD-MCNC: 14.2 G/DL (ref 14–18)
IMM GRANULOCYTES # BLD AUTO: 0.01 K/UL (ref 0–0.04)
IMM GRANULOCYTES NFR BLD AUTO: 0.2 % (ref 0–0.5)
LYMPHOCYTES # BLD AUTO: 0.8 K/UL (ref 1–4.8)
LYMPHOCYTES NFR BLD: 16.4 % (ref 18–48)
MCH RBC QN AUTO: 29 PG (ref 27–31)
MCHC RBC AUTO-ENTMCNC: 32.1 G/DL (ref 32–36)
MCV RBC AUTO: 90 FL (ref 82–98)
MONOCYTES # BLD AUTO: 0.5 K/UL (ref 0.3–1)
MONOCYTES NFR BLD: 10.7 % (ref 4–15)
NEUTROPHILS # BLD AUTO: 3.3 K/UL (ref 1.8–7.7)
NEUTROPHILS NFR BLD: 69 % (ref 38–73)
NRBC BLD-RTO: 0 /100 WBC
PLATELET # BLD AUTO: 211 K/UL (ref 150–350)
PMV BLD AUTO: 11.5 FL (ref 9.2–12.9)
POCT GLUCOSE: 116 MG/DL (ref 70–110)
POCT GLUCOSE: 118 MG/DL (ref 70–110)
POCT GLUCOSE: 131 MG/DL (ref 70–110)
POTASSIUM SERPL-SCNC: 4.2 MMOL/L (ref 3.5–5.1)
RBC # BLD AUTO: 4.9 M/UL (ref 4.6–6.2)
SARS-COV-2 RNA RESP QL NAA+PROBE: NOT DETECTED
SODIUM SERPL-SCNC: 140 MMOL/L (ref 136–145)
WBC # BLD AUTO: 4.75 K/UL (ref 3.9–12.7)

## 2020-07-27 PROCEDURE — 97803 MED NUTRITION INDIV SUBSEQ: CPT

## 2020-07-27 PROCEDURE — 25000003 PHARM REV CODE 250: Performed by: STUDENT IN AN ORGANIZED HEALTH CARE EDUCATION/TRAINING PROGRAM

## 2020-07-27 PROCEDURE — 97535 SELF CARE MNGMENT TRAINING: CPT

## 2020-07-27 PROCEDURE — 11000004 HC SNF PRIVATE

## 2020-07-27 PROCEDURE — 94761 N-INVAS EAR/PLS OXIMETRY MLT: CPT

## 2020-07-27 PROCEDURE — 99233 SBSQ HOSP IP/OBS HIGH 50: CPT | Mod: GC,,, | Performed by: PSYCHIATRY & NEUROLOGY

## 2020-07-27 PROCEDURE — 25000003 PHARM REV CODE 250: Performed by: FAMILY MEDICINE

## 2020-07-27 PROCEDURE — 80048 BASIC METABOLIC PNL TOTAL CA: CPT

## 2020-07-27 PROCEDURE — 36415 COLL VENOUS BLD VENIPUNCTURE: CPT

## 2020-07-27 PROCEDURE — U0003 INFECTIOUS AGENT DETECTION BY NUCLEIC ACID (DNA OR RNA); SEVERE ACUTE RESPIRATORY SYNDROME CORONAVIRUS 2 (SARS-COV-2) (CORONAVIRUS DISEASE [COVID-19]), AMPLIFIED PROBE TECHNIQUE, MAKING USE OF HIGH THROUGHPUT TECHNOLOGIES AS DESCRIBED BY CMS-2020-01-R: HCPCS

## 2020-07-27 PROCEDURE — 97530 THERAPEUTIC ACTIVITIES: CPT | Mod: CQ

## 2020-07-27 PROCEDURE — 97116 GAIT TRAINING THERAPY: CPT | Mod: CQ

## 2020-07-27 PROCEDURE — 92507 TX SP LANG VOICE COMM INDIV: CPT

## 2020-07-27 PROCEDURE — 94640 AIRWAY INHALATION TREATMENT: CPT

## 2020-07-27 PROCEDURE — 25000242 PHARM REV CODE 250 ALT 637 W/ HCPCS: Performed by: STUDENT IN AN ORGANIZED HEALTH CARE EDUCATION/TRAINING PROGRAM

## 2020-07-27 PROCEDURE — 99233 PR SUBSEQUENT HOSPITAL CARE,LEVL III: ICD-10-PCS | Mod: GC,,, | Performed by: PSYCHIATRY & NEUROLOGY

## 2020-07-27 PROCEDURE — 25000003 PHARM REV CODE 250: Performed by: PHYSICIAN ASSISTANT

## 2020-07-27 PROCEDURE — 85025 COMPLETE CBC W/AUTO DIFF WBC: CPT

## 2020-07-27 RX ORDER — AMOXICILLIN 250 MG
1 CAPSULE ORAL 2 TIMES DAILY
Status: CANCELLED | OUTPATIENT
Start: 2020-07-27

## 2020-07-27 RX ORDER — CALCIUM CARBONATE 200(500)MG
500 TABLET,CHEWABLE ORAL 2 TIMES DAILY PRN
Status: DISCONTINUED | OUTPATIENT
Start: 2020-07-27 | End: 2020-08-11 | Stop reason: HOSPADM

## 2020-07-27 RX ORDER — ONDANSETRON 2 MG/ML
4 INJECTION INTRAMUSCULAR; INTRAVENOUS EVERY 12 HOURS PRN
Status: CANCELLED | OUTPATIENT
Start: 2020-07-27

## 2020-07-27 RX ORDER — ACETAMINOPHEN 325 MG/1
650 TABLET ORAL EVERY 6 HOURS PRN
Status: DISCONTINUED | OUTPATIENT
Start: 2020-07-27 | End: 2020-08-11 | Stop reason: HOSPADM

## 2020-07-27 RX ORDER — IPRATROPIUM BROMIDE AND ALBUTEROL SULFATE 2.5; .5 MG/3ML; MG/3ML
3 SOLUTION RESPIRATORY (INHALATION) EVERY 6 HOURS PRN
Status: CANCELLED | OUTPATIENT
Start: 2020-07-27

## 2020-07-27 RX ORDER — PANTOPRAZOLE SODIUM 40 MG/1
40 TABLET, DELAYED RELEASE ORAL DAILY
Status: CANCELLED | OUTPATIENT
Start: 2020-07-28

## 2020-07-27 RX ORDER — ACETAMINOPHEN 325 MG/1
650 TABLET ORAL EVERY 6 HOURS PRN
Status: CANCELLED | OUTPATIENT
Start: 2020-07-27

## 2020-07-27 RX ORDER — CHOLECALCIFEROL (VITAMIN D3) 25 MCG
1000 TABLET ORAL DAILY
Status: CANCELLED | OUTPATIENT
Start: 2020-07-28

## 2020-07-27 RX ORDER — TALC
6 POWDER (GRAM) TOPICAL NIGHTLY PRN
Status: DISCONTINUED | OUTPATIENT
Start: 2020-07-27 | End: 2020-08-11 | Stop reason: HOSPADM

## 2020-07-27 RX ORDER — ATORVASTATIN CALCIUM 80 MG/1
80 TABLET, FILM COATED ORAL DAILY
Qty: 90 TABLET | Refills: 3 | Status: SHIPPED | OUTPATIENT
Start: 2020-07-27 | End: 2021-01-01

## 2020-07-27 RX ORDER — IPRATROPIUM BROMIDE AND ALBUTEROL SULFATE 2.5; .5 MG/3ML; MG/3ML
3 SOLUTION RESPIRATORY (INHALATION) EVERY 6 HOURS PRN
Qty: 1 BOX | Refills: 0 | Status: ON HOLD
Start: 2020-07-27 | End: 2020-08-06 | Stop reason: HOSPADM

## 2020-07-27 RX ORDER — AMOXICILLIN 250 MG
1 CAPSULE ORAL 2 TIMES DAILY
Status: DISCONTINUED | OUTPATIENT
Start: 2020-07-27 | End: 2020-07-29

## 2020-07-27 RX ORDER — TAMSULOSIN HYDROCHLORIDE 0.4 MG/1
0.4 CAPSULE ORAL DAILY
Status: DISCONTINUED | OUTPATIENT
Start: 2020-07-28 | End: 2020-08-11 | Stop reason: HOSPADM

## 2020-07-27 RX ORDER — IPRATROPIUM BROMIDE AND ALBUTEROL SULFATE 2.5; .5 MG/3ML; MG/3ML
3 SOLUTION RESPIRATORY (INHALATION)
Status: CANCELLED | OUTPATIENT
Start: 2020-07-27

## 2020-07-27 RX ORDER — LOSARTAN POTASSIUM 50 MG/1
100 TABLET ORAL DAILY
Status: CANCELLED | OUTPATIENT
Start: 2020-07-28

## 2020-07-27 RX ORDER — IPRATROPIUM BROMIDE AND ALBUTEROL SULFATE 2.5; .5 MG/3ML; MG/3ML
3 SOLUTION RESPIRATORY (INHALATION) EVERY 6 HOURS PRN
Status: DISCONTINUED | OUTPATIENT
Start: 2020-07-27 | End: 2020-07-27

## 2020-07-27 RX ORDER — ATORVASTATIN CALCIUM 20 MG/1
80 TABLET, FILM COATED ORAL DAILY
Status: DISCONTINUED | OUTPATIENT
Start: 2020-07-28 | End: 2020-08-11 | Stop reason: HOSPADM

## 2020-07-27 RX ORDER — PANTOPRAZOLE SODIUM 40 MG/1
40 TABLET, DELAYED RELEASE ORAL DAILY
Status: DISCONTINUED | OUTPATIENT
Start: 2020-07-28 | End: 2020-08-11 | Stop reason: HOSPADM

## 2020-07-27 RX ORDER — CALCIUM CARBONATE 200(500)MG
500 TABLET,CHEWABLE ORAL 2 TIMES DAILY PRN
Status: CANCELLED | OUTPATIENT
Start: 2020-07-27

## 2020-07-27 RX ORDER — POLYETHYLENE GLYCOL 3350 17 G/17G
17 POWDER, FOR SOLUTION ORAL DAILY PRN
Status: CANCELLED | OUTPATIENT
Start: 2020-07-27

## 2020-07-27 RX ORDER — TAMSULOSIN HYDROCHLORIDE 0.4 MG/1
0.4 CAPSULE ORAL DAILY
Status: CANCELLED | OUTPATIENT
Start: 2020-07-28

## 2020-07-27 RX ORDER — ALBUTEROL SULFATE 2.5 MG/.5ML
2.5 SOLUTION RESPIRATORY (INHALATION) EVERY 4 HOURS PRN
Status: DISCONTINUED | OUTPATIENT
Start: 2020-07-27 | End: 2020-08-11 | Stop reason: HOSPADM

## 2020-07-27 RX ORDER — POLYETHYLENE GLYCOL 3350 17 G/17G
17 POWDER, FOR SOLUTION ORAL DAILY PRN
Status: DISCONTINUED | OUTPATIENT
Start: 2020-07-27 | End: 2020-08-11 | Stop reason: HOSPADM

## 2020-07-27 RX ORDER — LOSARTAN POTASSIUM 50 MG/1
100 TABLET ORAL DAILY
Status: DISCONTINUED | OUTPATIENT
Start: 2020-07-28 | End: 2020-07-30

## 2020-07-27 RX ORDER — ATORVASTATIN CALCIUM 20 MG/1
80 TABLET, FILM COATED ORAL DAILY
Status: CANCELLED | OUTPATIENT
Start: 2020-07-28

## 2020-07-27 RX ORDER — IPRATROPIUM BROMIDE AND ALBUTEROL SULFATE 2.5; .5 MG/3ML; MG/3ML
3 SOLUTION RESPIRATORY (INHALATION)
Status: DISCONTINUED | OUTPATIENT
Start: 2020-07-28 | End: 2020-07-28

## 2020-07-27 RX ORDER — ALBUTEROL SULFATE 2.5 MG/.5ML
2.5 SOLUTION RESPIRATORY (INHALATION) EVERY 4 HOURS PRN
Status: CANCELLED | OUTPATIENT
Start: 2020-07-27

## 2020-07-27 RX ORDER — ONDANSETRON 2 MG/ML
4 INJECTION INTRAMUSCULAR; INTRAVENOUS EVERY 12 HOURS PRN
Status: DISCONTINUED | OUTPATIENT
Start: 2020-07-27 | End: 2020-07-29

## 2020-07-27 RX ORDER — CHOLECALCIFEROL (VITAMIN D3) 25 MCG
1000 TABLET ORAL DAILY
Status: DISCONTINUED | OUTPATIENT
Start: 2020-07-28 | End: 2020-08-11 | Stop reason: HOSPADM

## 2020-07-27 RX ORDER — TALC
6 POWDER (GRAM) TOPICAL NIGHTLY PRN
Status: CANCELLED | OUTPATIENT
Start: 2020-07-27

## 2020-07-27 RX ADMIN — IPRATROPIUM BROMIDE AND ALBUTEROL SULFATE 3 ML: .5; 2.5 SOLUTION RESPIRATORY (INHALATION) at 01:07

## 2020-07-27 RX ADMIN — APIXABAN 2.5 MG: 2.5 TABLET, FILM COATED ORAL at 09:07

## 2020-07-27 RX ADMIN — IPRATROPIUM BROMIDE AND ALBUTEROL SULFATE 3 ML: .5; 2.5 SOLUTION RESPIRATORY (INHALATION) at 04:07

## 2020-07-27 RX ADMIN — APIXABAN 2.5 MG: 2.5 TABLET, FILM COATED ORAL at 08:07

## 2020-07-27 RX ADMIN — IPRATROPIUM BROMIDE AND ALBUTEROL SULFATE 3 ML: .5; 2.5 SOLUTION RESPIRATORY (INHALATION) at 09:07

## 2020-07-27 RX ADMIN — Medication 1000 UNITS: at 08:07

## 2020-07-27 RX ADMIN — DOCUSATE SODIUM 50MG AND SENNOSIDES 8.6MG 1 TABLET: 8.6; 5 TABLET, FILM COATED ORAL at 09:07

## 2020-07-27 RX ADMIN — LOSARTAN POTASSIUM 100 MG: 50 TABLET ORAL at 08:07

## 2020-07-27 RX ADMIN — ATORVASTATIN CALCIUM 80 MG: 20 TABLET, FILM COATED ORAL at 08:07

## 2020-07-27 RX ADMIN — PANTOPRAZOLE SODIUM 40 MG: 40 TABLET, DELAYED RELEASE ORAL at 08:07

## 2020-07-27 RX ADMIN — TAMSULOSIN HYDROCHLORIDE 0.4 MG: 0.4 CAPSULE ORAL at 08:07

## 2020-07-27 NOTE — PLAN OF CARE
Problem: Adjustment to Illness (Stroke, Ischemic/Transient Ischemic Attack)  Goal: Optimal Coping  Outcome: Met     Problem: Bowel Elimination Impaired (Stroke, Ischemic/Transient Ischemic Attack)  Goal: Effective Bowel Elimination  Outcome: Met     Problem: Cerebral Tissue Perfusion Risk (Stroke, Ischemic/Transient Ischemic Attack)  Goal: Optimal Cerebral Tissue Perfusion  Outcome: Met     Problem: Communication Impairment (Stroke, Ischemic/Transient Ischemic Attack)  Goal: Improved Communication Skills  Outcome: Met     Problem: Eating/Swallowing Impairment (Stroke, Ischemic/Transient Ischemic Attack)  Goal: Oral Intake without Aspiration  Outcome: Met     Problem: Functional Ability Impaired (Stroke, Ischemic/Transient Ischemic Attack)  Goal: Optimal Functional Ability  Outcome: Met     Problem: Hemodynamic Instability (Stroke, Ischemic/Transient Ischemic Attack)  Goal: Vital Signs Remain in Desired Range  Outcome: Met     Problem: Pain (Stroke, Ischemic/Transient Ischemic Attack)  Goal: Acceptable Pain Control  Outcome: Met     Problem: Sensorimotor Impairment (Stroke, Ischemic/Transient Ischemic Attack)  Goal: Improved Sensorimotor Function  Outcome: Met     Problem: Urinary Elimination Impaired (Stroke, Ischemic/Transient Ischemic Attack)  Goal: Effective Urinary Elimination  Outcome: Met     Problem: Fall Injury Risk  Goal: Absence of Fall and Fall-Related Injury  Outcome: Met     Problem: Wound  Goal: Optimal Wound Healing  Outcome: Met     Problem: Adult Inpatient Plan of Care  Goal: Plan of Care Review  Outcome: Met  Goal: Patient-Specific Goal (Individualization)  Outcome: Met  Goal: Absence of Hospital-Acquired Illness or Injury  Outcome: Met  Goal: Optimal Comfort and Wellbeing  Outcome: Met  Goal: Readiness for Transition of Care  Outcome: Met  Goal: Rounds/Family Conference  Outcome: Met     Problem: Infection  Goal: Infection Symptom Resolution  Outcome: Met     Problem: Diabetes Comorbidity  Goal:  Blood Glucose Level Within Desired Range  Outcome: Met     Problem: Electrolyte Imbalance (Acute Kidney Injury/Impairment)  Goal: Serum Electrolyte Balance  Outcome: Met     Problem: Fluid Imbalance (Acute Kidney Injury/Impairment)  Goal: Optimal Fluid Balance  Outcome: Met     Problem: Hematologic Alteration (Acute Kidney Injury/Impairment)  Goal: Hemoglobin, Hematocrit and Platelets Within Normal Range  Outcome: Met     Problem: Oral Intake Inadequate (Acute Kidney Injury/Impairment)  Goal: Optimal Nutrition Intake  Outcome: Met     Problem: Renal Function Impairment (Acute Kidney Injury/Impairment)  Goal: Effective Renal Function  Outcome: Met     Problem: Skin Injury Risk Increased  Goal: Skin Health and Integrity  Outcome: Met

## 2020-07-27 NOTE — SUBJECTIVE & OBJECTIVE
Neurologic Chief Complaint: AMS, Confusion, Inattention, Delayed Verbal Response, L Facial Droop      Subjective:     Interval History: NAEON. Medically stable for discharge. Patient accepted to [facility]. Discharge pending repeat COVID testing.    HPI, Past Medical, Family, and Social History remains the same as documented in the initial encounter.     Review of Systems   Constitutional: Negative.    HENT: Negative.    Respiratory: Positive for wheezing. Negative for cough, chest tightness and shortness of breath.    Cardiovascular: Negative for chest pain.   Gastrointestinal: Negative.    Musculoskeletal: Negative.    Neurological: Positive for weakness.   Psychiatric/Behavioral: Positive for confusion.     Scheduled Meds:   albuterol-ipratropium  3 mL Nebulization Q4H WAKE    apixaban  2.5 mg Oral BID    atorvastatin  80 mg Oral Daily    losartan  100 mg Oral Daily    pantoprazole  40 mg Oral Daily    tamsulosin  0.4 mg Oral Daily    vitamin D  1,000 Units Oral Daily     Continuous Infusions:   sodium chloride 0.9%       PRN Meds:acetaminophen, ondansetron, polyethylene glycol, sodium chloride 0.9%, sodium chloride 0.9%    Objective:     Vital Signs (Most Recent):  Temp: 98.8 °F (37.1 °C) (07/27/20 0823)  Pulse: 85 (07/27/20 1332)  Resp: 20 (07/27/20 1332)  BP: 123/85 (07/27/20 0800)  SpO2: 98 % (07/27/20 1332)  BP Location: Left arm    Vital Signs Range (Last 24H):  Temp:  [97.3 °F (36.3 °C)-98.9 °F (37.2 °C)]   Pulse:  [45-85]   Resp:  [14-20]   BP: (123-171)/(74-92)   SpO2:  [92 %-98 %]   BP Location: Left arm    Physical Exam  Constitutional:       General: He is not in acute distress.     Appearance: He is well-developed.   HENT:      Head: Normocephalic and atraumatic.      Mouth/Throat:      Pharynx: No oropharyngeal exudate.   Eyes:      Conjunctiva/sclera: Conjunctivae normal.      Pupils: Pupils are equal, round, and reactive to light.   Neck:      Musculoskeletal: Normal range of motion and neck  supple.   Cardiovascular:      Rate and Rhythm: Normal rate and regular rhythm.      Heart sounds: Normal heart sounds. No murmur.   Pulmonary:      Effort: Pulmonary effort is normal. No respiratory distress.      Breath sounds: Wheezing present. No rales.   Abdominal:      General: Bowel sounds are normal. There is no distension.      Palpations: Abdomen is soft.      Tenderness: There is no abdominal tenderness. There is no guarding.   Musculoskeletal:         General: No tenderness.   Skin:     General: Skin is warm and dry.      Findings: No rash.   Neurological:      Mental Status: He is alert. Mental status is at baseline. He is disoriented.      Cranial Nerves: No cranial nerve deficit.      Motor: No abnormal muscle tone.   Psychiatric:         Behavior: Behavior normal.         Neurological Exam:   LOC: alert  Attention Span: poor  Language: No aphasia  Articulation: Dysarthria  Orientation: Person  Visual Fields: Hemianopsia left  EOM (CN III, IV, VI): Gaze preference  right  Pupils (CN II, III): PERRL  Facial Movement (CN VII): Lower facial weakness on the Left  Motor: Arm left  Paresis: 4/5  Leg left  Normal 5/5  Arm right  Normal 5/5  Leg right Normal 5/5  Cebellar: Upper Extremity Appendicular Ataxia (Finger Nose Finger)  Left  Sensation: Intact to light touch, temperature and vibration  Tone: Normal tone throughout    Laboratory:  CMP:   Recent Labs   Lab 07/27/20  0754   CALCIUM 10.9*      K 4.2   CO2 20*   *   BUN 28*   CREATININE 1.4     CBC:   Recent Labs   Lab 07/27/20  0754   WBC 4.75   RBC 4.90   HGB 14.2   HCT 44.2      MCV 90   MCH 29.0   MCHC 32.1       Diagnostic Results     Brain Imaging:  MRI Brain 7/18/20  Area of diffusion restriction with ADC match suggestive of acute infarct in the right parietal lobe. Additional smaller acute infarcts right occipital lobe and right basal ganglia.     Remote right frontal infarct and remote lacunar infarcts in the thalami.   Supratentorial white matter T2/flair hyperintense signal foci suggesting sequela of chronic small vessel ischemic change.     MR angiogram of the head and neck appears unchanged compared to prior exams without any focal occlusion identified.        CT Head. Date: 07/18/20  1. Interval right parietal lobe suspected acute infarct.  No intracranial hemorrhage.  Further evaluation/follow-up as warranted.  2. Bilateral thalamic suspected lacunar type infarcts, age-indeterminate.  Correlate clinically.  3. Additional multifocal supratentorial and to a lesser extent infratentorial remote infarcts, as detailed above.  4. Generalized cerebral volume loss and sequela of advanced microvascular ischemic changes.     Vessel Imaging:  MRA Head and Neck 7/18/20 - see MRI Brain 7/18/20 above     Cardiac Evaluation:   Echo 7/19/20  · Normal left ventricular systolic function. The estimated ejection fraction is 68%.  · Concentric left ventricular remodeling with thickened walls especially septum and increased myocardial density.  · No wall motion abnormalities.  · Normal LV diastolic function.  · Normal right ventricular systolic function.  · Normal central venous pressure (3 mmHg).  · The estimated PA systolic pressure is 30 mmHg.  · The ascending aorta is mildly dilated.    The left atrial volume index is normal.

## 2020-07-27 NOTE — PLAN OF CARE
Goals remain appropriate. JASPER Caro 7/27/2020   Problem: Occupational Therapy Goal  Goal: Occupational Therapy Goal  Description: Goals to be met by: 7/29     Patient will increase functional independence with ADLs by performing:    Feeding with Modified [place].  UE Dressing with Minimal Assistance.  LE Dressing (pants, brief) with Moderate Assistance.  Grooming while standing with Supervision.  Supine to sit with Supervision.  Step transfer with Supervision.  Functional mobility at household distance for ADL task with min(A) and AD as needed.    Outcome: Ongoing, Progressing

## 2020-07-27 NOTE — PROGRESS NOTES
[facility]  Vascular Neurology  Comprehensive Stroke Center  Progress Note    Assessment/Plan:     * Embolic stroke involving right middle cerebral artery  Shant Magana Jr. is a 80 y.o. male with PMHx of dementia, multiple strokes, intracranial and extracranial atherosclerosis, DM, HTN, a fib (eliquis with history of noncompliance due to cost) who presented to ED via EMS for AMS x 2 days. Family reports confusion, inattentiveness, delayed verbal response, and facial droop. Patient with history of L facial droop from previous stroke. On exam, patient not oriented to place, time, or situation. R gaze preference and L hemianopsia also noted. CT revealed R parietal infarct. Etiology likely cardioembolic    Antithrombotics for secondary stroke prevention: Anticoagulants: Apixaban 2.5 mg BID  - Will maintain on geriatric dose of Eliquis as creatinine borderline for normal dosing (and only worsens when not admitted).    Statins for secondary stroke prevention and hyperlipidemia, if present:   Statins: Atorvastatin- 80 mg daily    Aggressive risk factor modification: HTN, DM, HLD, A-Fib, atherosclerosis, stroke     Rehab efforts: The patient has been evaluated by a stroke team provider and the therapy needs have been fully considered based off the presenting complaints and exam findings. The following therapy evaluations are needed: PT evaluate and treat, OT evaluate and treat, SLP evaluate and treat, PM&R evaluate for appropriate placement - recommending SNF vs HH w/ 24 hr assistance. Spoke to pt's daughter Manuel Lozada 7/21, who states family is unable to provide 24 hr supervision. Daughter agrees with SNF dispo.     Diagnostics ordered/pending: none    VTE prophylaxis: Mechanical prophylaxis: Place SCDs  None: Reason for No Pharmacological VTE Prophylaxis: Currently on anticoagulation    BP parameters: Infarct: SBP < 160    Cytotoxic cerebral edema  Area of cytotoxic cerebral edema identified when  reviewing brain imaging in the territory of the R middle cerebral artery. There is no mass effect associated with it. We will continue to monitor the patients clinical exam for any worsening of symptoms which may indicate expansion of the stroke or the area of the edema resulting in the clinical change. The pattern is suggestive of cardioembolic etiology.    Former smoker  Stroke risk factor  Encourage continued smoking cessation    Benign prostatic hyperplasia  Continue home flomax    Dementia without behavioral disturbance  Delirium precautions ordered    Status post placement of implantable loop recorder       Paroxysmal atrial fibrillation  Stroke risk factor  Anticoagulate with home apixaban 2.5mg BID    Chronic diastolic congestive heart failure  Repeat echo EF 68 %  Last echo 12/2019 with mild LV diastolic dysfunction  Not on diuretic at home  BNP wnl    Plan:  - Hold off on further diuresis  - Daily weights (standing if tolerated)  - Strict I/Os  - Fluid restriction at 1500mL  - Cardiac diet    CKD (chronic kidney disease) stage 3, GFR 30-59 ml/min  Trend Cr  Strict I&Os  Avoid nephrotoxic agents  Renally adjust medications    Mixed hyperlipidemia  Stroke risk factor  LDL 70  Continue home atorvastatin 80 mg daily    Internal carotid artery stenosis, left  Stroke risk factor    History of stroke  Patient with history of multiple strokes   History of a fib + intracranial/extracranial atherosclerosis  Continue secondary stroke prevention with eliquis + atorvastatin 80 mg     Will maintain on geriatric dose of Eliquis as creatinine borderline for normal dosing (and only worsens when not admitted).     Type 2 diabetes mellitus with complication, without long-term current use of insulin  Stroke risk factor  A1C 5.8  SSI  Glucose 140-180    Essential hypertension  Stroke risk factor  SBP < 160   Resumed home Losartan at decreased dose of 50 mg daily 7/21, increased to 100 mg on 7/22.     Original home meds:  Losartan 100 mg daily, Amlodipine 10 mg daily  Continue to monitor BP. Adjust as needed.          Admitted with acute encephalopathy x 2 days. MRI brain showed R parietal infarct. MRA negative for LVO or significant stenosis. Etiology likely cardioembolic. Home apixaban 2.5mg resumed without complications. Patient had hypoxia during admission requiring 2L nc. CXR appeared congestion. Initially diuresed with no improvement. Significant expiratory wheezing on exam. Improved with scheduled duonebs. Given smoking history and clinical picture, ambulatory referral was placed to Pulmonology for evaluation of obstructive lung disease and PFTs. PT/OT consulted, recommended SNF. Patient was accepted to [facility] on 7/27. Discussed results and plan with patient/family. Patient verbalized understanding and agreed to plan. Patient stable for discharge to SNF. Patient requires repeat COVID test (pending).    STROKE DOCUMENTATION   Acute Stroke Times   Symptom Onset Date: 07/16/20    NIH Scale:  Interval: 7 days or at discharge (whichever comes first)  1a. Level of Consciousness: 0-->Alert, keenly responsive  1b. LOC Questions: 1-->Answers one question correctly  1c. LOC Commands: 0-->Performs both tasks correctly  2. Best Gaze: 1-->Partial gaze palsy, gaze is abnormal in one or both eyes, but forced deviation or total gaze paresis is not present  3. Visual: 1-->Partial hemianopia  4. Facial Palsy: 0-->Normal symmetrical movements  5a. Motor Arm, Left: 0-->No drift, limb holds 90 (or 45) degrees for full 10 secs  5b. Motor Arm, Right: 0-->No drift, limb holds 90 (or 45) degrees for full 10 secs  6a. Motor Leg, Left: 0-->No drift, leg holds 30 degree position for full 5 secs  6b. Motor Leg, Right: 0-->No drift, leg holds 30 degree position for full 5 secs  7. Limb Ataxia: 0-->Absent  8. Sensory: 0-->Normal, no sensory loss  9. Best Language: 0-->No aphasia, normal  10. Dysarthria: 0-->Normal  11. Extinction and Inattention  (formerly Neglect): 0-->No abnormality  Total (NIH Stroke Scale): 3       Modified [place] Score: 4  [place] Coma Scale:15   ABCD2 Score:    [id number] Score:8  HAS -BLED Score:   ICH Score:   Hunt & Choudhury Classification:      Hemorrhagic change of an Ischemic Stroke: Does this patient have an ischemic stroke with hemorrhagic changes? No     Neurologic Chief Complaint: AMS, Confusion, Inattention, Delayed Verbal Response, L Facial Droop      Subjective:     Interval History: NAEON. Medically stable for discharge. Patient [facility]. Discharge pending repeat COVID testing.    HPI, Past Medical, Family, and Social History remains the same as documented in the initial encounter.     Review of Systems   Constitutional: Negative.    HENT: Negative.    Respiratory: Positive for wheezing. Negative for cough, chest tightness and shortness of breath.    Cardiovascular: Negative for chest pain.   Gastrointestinal: Negative.    Musculoskeletal: Negative.    Neurological: Positive for weakness.   Psychiatric/Behavioral: Positive for confusion.     Scheduled Meds:   albuterol-ipratropium  3 mL Nebulization Q4H WAKE    apixaban  2.5 mg Oral BID    atorvastatin  80 mg Oral Daily    losartan  100 mg Oral Daily    pantoprazole  40 mg Oral Daily    tamsulosin  0.4 mg Oral Daily    vitamin D  1,000 Units Oral Daily     Continuous Infusions:   sodium chloride 0.9%       PRN Meds:acetaminophen, ondansetron, polyethylene glycol, sodium chloride 0.9%, sodium chloride 0.9%    Objective:     Vital Signs (Most Recent):  Temp: 98.8 °F (37.1 °C) (07/27/20 0823)  Pulse: 85 (07/27/20 1332)  Resp: 20 (07/27/20 1332)  BP: 123/85 (07/27/20 0800)  SpO2: 98 % (07/27/20 1332)  BP Location: Left arm    Vital Signs Range (Last 24H):  Temp:  [97.3 °F (36.3 °C)-98.9 °F (37.2 °C)]   Pulse:  [45-85]   Resp:  [14-20]   BP: (123-171)/(74-92)   SpO2:  [92 %-98 %]   BP Location: Left arm    Physical Exam  Constitutional:       General: He is not  in acute distress.     Appearance: He is well-developed.   HENT:      Head: Normocephalic and atraumatic.      Mouth/Throat:      Pharynx: No oropharyngeal exudate.   Eyes:      Conjunctiva/sclera: Conjunctivae normal.      Pupils: Pupils are equal, round, and reactive to light.   Neck:      Musculoskeletal: Normal range of motion and neck supple.   Cardiovascular:      Rate and Rhythm: Normal rate and regular rhythm.      Heart sounds: Normal heart sounds. No murmur.   Pulmonary:      Effort: Pulmonary effort is normal. No respiratory distress.      Breath sounds: Wheezing present. No rales.   Abdominal:      General: Bowel sounds are normal. There is no distension.      Palpations: Abdomen is soft.      Tenderness: There is no abdominal tenderness. There is no guarding.   Musculoskeletal:         General: No tenderness.   Skin:     General: Skin is warm and dry.      Findings: No rash.   Neurological:      Mental Status: He is alert. Mental status is at baseline. He is disoriented.      Cranial Nerves: No cranial nerve deficit.      Motor: No abnormal muscle tone.   Psychiatric:         Behavior: Behavior normal.         Neurological Exam:   LOC: alert  Attention Span: poor  Language: No aphasia  Articulation: Dysarthria  Orientation: Person  Visual Fields: Hemianopsia left  EOM (CN III, IV, VI): Gaze preference  right  Pupils (CN II, III): PERRL  Facial Movement (CN VII): Lower facial weakness on the Left  Motor: Arm left  Paresis: 4/5  Leg left  Normal 5/5  Arm right  Normal 5/5  Leg right Normal 5/5  Cebellar: Upper Extremity Appendicular Ataxia (Finger Nose Finger)  Left  Sensation: Intact to light touch, temperature and vibration  Tone: Normal tone throughout    Laboratory:  CMP:   Recent Labs   Lab 07/27/20  0754   CALCIUM 10.9*      K 4.2   CO2 20*   *   BUN 28*   CREATININE 1.4     CBC:   Recent Labs   Lab 07/27/20  0754   WBC 4.75   RBC 4.90   HGB 14.2   HCT 44.2      MCV 90   MCH 29.0    [facility] 32.1       Diagnostic Results     Brain Imaging:  MRI Brain 7/18/20  Area of diffusion restriction with ADC match suggestive of acute infarct in the right parietal lobe. Additional smaller acute infarcts right occipital lobe and right basal ganglia.     Remote right frontal infarct and remote lacunar infarcts in the thalami.  Supratentorial white matter T2/flair hyperintense signal foci suggesting sequela of chronic small vessel ischemic change.     MR angiogram of the head and neck appears unchanged compared to prior exams without any focal occlusion identified.        CT Head. Date: 07/18/20  1. Interval right parietal lobe suspected acute infarct.  No intracranial hemorrhage.  Further evaluation/follow-up as warranted.  2. Bilateral thalamic suspected lacunar type infarcts, age-indeterminate.  Correlate clinically.  3. Additional multifocal supratentorial and to a lesser extent infratentorial remote infarcts, as detailed above.  4. Generalized cerebral volume loss and sequela of advanced microvascular ischemic changes.     Vessel Imaging:  MRA Head and Neck 7/18/20 - see MRI Brain 7/18/20 above     Cardiac Evaluation:   Echo 7/19/20  · Normal left ventricular systolic function. The estimated ejection fraction is 68%.  · Concentric left ventricular remodeling with thickened walls especially septum and increased myocardial density.  · No wall motion abnormalities.  · Normal LV diastolic function.  · Normal right ventricular systolic function.  · Normal central venous pressure (3 mmHg).  · The estimated PA systolic pressure is 30 mmHg.  · The ascending aorta is mildly dilated.    The left atrial volume index is normal.      Lloyd Perdomo MD  Comprehensive Stroke Center  Department of Vascular Neurology   [facility]

## 2020-07-27 NOTE — PROGRESS NOTES
"[facility]  Adult Nutrition  Progress Note    SUMMARY       Recommendations    Recommendation:   1. Continue 1800 kcal DM/cardiac diet, Fluid restriction per MD   2. Add boost glucose BID to increase intake   3. Suggest MVI   RD to monitor and follow up    Goals: pt to tolerate >85% of EEN/EPN by RD follow up  Nutrition Goal Status: new  Communication of RD Recs: other (comment)(POC)    Reason for Assessment    Reason For Assessment: RD follow-up  Diagnosis: (Stroke)  Relevant Medical History: HTN; DM; HLD; Afib; stroke; cancer  Interdisciplinary Rounds: did not attend  General Information Comments: Pt resting in bed, reported good PO intake at this time. % of meals. Request boost glucose to increase intake. PTA pt states good PO at home and no recent wt loss reported. UBW ~200-210 lbs per chart. NFPE not indicated at this time, pt with no s/s of malnutrition.  Nutrition Discharge Planning: adequate PO intake    Nutrition Risk Screen    Nutrition Risk Screen: no indicators present    Nutrition/Diet History    Spiritual, Cultural Beliefs, Buddhism Practices, Values that Affect Care: no  Food Allergies: NKFA  Factors Affecting Nutritional Intake: None identified at this time    Anthropometrics    Temp: 98.9 °F (37.2 °C)  Height Method: Stated  Height: 6' 1" (185.4 cm)  Height (inches): 73 in  Weight Method: Bed Scale  Weight: 87.5 kg (193 lb)  Weight (lb): 193 lb  Ideal Body Weight (IBW), Male: 184 lb  % Ideal Body Weight, Male (lb): 104.89 %  BMI (Calculated): 25.5  BMI Grade: 25 - 29.9 - overweight       Lab/Procedures/Meds    Pertinent Labs Reviewed: reviewed  Pertinent Labs Comments: BUN 31; Cr 1.5  Pertinent Medications Reviewed: reviewed  Pertinent Medications Comments: Vitamin D; statin; pantoprazole     Estimated/Assessed Needs    Weight Used For Calorie Calculations: 87.5 kg (192 lb 14.4 oz)  Energy Calorie Requirements (kcal): 1965 kcal/d  Energy Need Method: [place](x1.2)  Protein Requirements:  g/day(1.0-1.2 g/kg)  Weight Used For Protein Calculations: 87.5 kg (192 lb 14.4 oz)  Fluid Requirements (mL): 1 mL/kcal or per MD  RDA Method (mL): 1965  CHO Requirement: 245    Nutrition Prescription Ordered    Current Diet Order: 1800 kcal DM/ cardiac diet  Nutrition Order Comments: 1500 mL fluid restriction    Evaluation of Received Nutrient/Fluid Intake    I/O: -2.1 L since admit  Energy Calories Required: meeting needs  Protein Required: meeting needs  Fluid Required: meeting needs  Comments: LBM 7/26  Tolerance: tolerating  % Intake of Estimated Energy Needs: 75 - 100 %  % Meal Intake: 75 - 100 %    Nutrition Risk    Level of Risk/Frequency of Follow-up: low     Assessment and Plan  No nutrition dx at this time    Monitor and Evaluation    Food and Nutrient Intake: energy intake, food and beverage intake  Food and Nutrient Adminstration: diet order  Knowledge/Beliefs/Attitudes: food and nutrition knowledge/skill  Anthropometric Measurements: weight, weight change  Biochemical Data, Medical Tests and Procedures: electrolyte and renal panel, gastrointestinal profile, glucose/endocrine profile, inflammatory profile, lipid profile  Nutrition-Focused Physical Findings: overall appearance     Nutrition Follow-Up    RD Follow-up?: Yes

## 2020-07-27 NOTE — PT/OT/SLP PROGRESS
Occupational Therapy   Treatment    Name: Shant Magana Jr.  MRN: [id number]  Admitting Diagnosis:  Embolic stroke involving right middle cerebral artery       Recommendations:     Discharge Recommendations: nursing facility, skilled  Discharge Equipment Recommendations:  none  Barriers to discharge:  Decreased caregiver support(lives alone; level of skilled assistance required; high fall and safety risk)    Assessment:     Shant Magana Jr. is a 80 y.o. male with a medical diagnosis of Embolic stroke involving right middle cerebral artery.  He presents as a high fall and safety risk at this time. He remains disoriented and requiring added assistance for ADLs/self care/mobility. Cont to rec SNF at this time. Performance deficits affecting function are impaired endurance, impaired self care skills, impaired functional mobilty, gait instability, impaired balance, impaired cognition, decreased safety awareness.     Rehab Prognosis:  Good; patient would benefit from acute skilled OT services to address these deficits and reach maximum level of function.       Plan:     Patient to be seen 3 x/week to address the above listed problems via self-care/home management, therapeutic activities, therapeutic exercises, neuromuscular re-education, cognitive retraining  · Plan of Care Expires: 08/18/20  · Plan of Care Reviewed with: patient    Subjective     Pain/Comfort:  · Pain Rating 1: 0/10  · Pain Rating Post-Intervention 1: 0/10    Objective:     Communicated with: RN prior to session.  Patient found HOB elevated with telemetry upon OT entry to room.    General Precautions: Standard, aspiration, fall, vision impaired, diabetic   Orthopedic Precautions:N/A     Occupational Performance:     Bed Mobility:    · Patient completed Rolling/Turning to Right with contact guard assistance and with side rail  · Patient completed Supine to Sit with contact guard assistance and with side rail  · Patient completed Sit to Supine with contact guard  assistance and with side rail     Functional Mobility/Transfers:  · Patient completed Sit <> Stand Transfer with minimum assistance  with  rolling walker   · Patient completed  Step Transfer technique with minimum assistance with rolling walker  · Patient completed Toilet Transfer Step Transfer technique with minimum assistance with  rolling walker  · Sit<>stand from toilet with RW and moderate assistance   · Functional Mobility: household distances with min(A) and rolling walker  · With pivots/turns in tight space with divided attention ADL task- requiring mod(A)    Activities of Daily Living:  · Grooming: minimum assistance standing at sink for oral care; set up for tasks. verbal cues for visual deficits  · Upper Body Dressing: maximal assistance standing to doff soiled gown and don clean gown  · Toileting: maximal assistance perineal care   · Feeding with set up and supervision    [facility] 6 Click ADL: 13    Treatment & Education:  -Pt alert and agreeable to therapy session; re edu on OT role in care; lights turned on and blinds opened for session   -Pt reported orientation to person; requiring cues for time, place and overall situation   -edu on safety and sequencing with rolling walker for house hold distances and ADL task with moderate cues; one anterior LOB requiring max(A) to self correct  -Communication board updated; questions/concerns addressed within OT scope of practice  -no family at bedside for education     Patient left with bed in chair position with all lines intact, call button in reach, bed alarm on and [employer] presentEducation:      GOALS:   Multidisciplinary Problems     Occupational Therapy Goals        Problem: Occupational Therapy Goal    Goal Priority Disciplines Outcome Interventions   Occupational Therapy Goal     OT, PT/OT Ongoing, Progressing    Description: Goals to be met by: 7/29     Patient will increase functional independence with ADLs by performing:    Feeding with Modified  [place].  UE Dressing with Minimal Assistance.  LE Dressing (pants, brief) with Moderate Assistance.  Grooming while standing with Supervision.  Supine to sit with Supervision.  Step transfer with Supervision.  Functional mobility at household distance for ADL task with min(A) and AD as needed.                     Time Tracking:     OT Date of Treatment: 07/27/20  OT Start Time: 1039  OT Stop Time: 1103  OT Total Time (min): 24 min    Billable Minutes:Self Care/Home Management 24    JASPER Caro  7/27/2020

## 2020-07-27 NOTE — PLAN OF CARE
Problem: Adjustment to Illness (Stroke, Ischemic/Transient Ischemic Attack)  Goal: Optimal Coping  Outcome: Ongoing, Progressing     Problem: Communication Impairment (Stroke, Ischemic/Transient Ischemic Attack)  Goal: Improved Communication Skills  Outcome: Ongoing, Progressing     Problem: Adult Inpatient Plan of Care  Goal: Plan of Care Review  Outcome: Ongoing, Progressing     Problem: Fall Injury Risk  Goal: Absence of Fall and Fall-Related Injury  Outcome: Ongoing, Progressing       POC reviewed with patient. All questions and concerns reviewed. VSS throughout shift. Fall/safety precautions implemented and maintained.  Blood glucose monitored. Bed locked in lowest position. Call bell within reach. Will continue to monitor.

## 2020-07-27 NOTE — PT/OT/SLP PROGRESS
"Physical Therapy Treatment    Patient Name:  Shant Magana Jr.   MRN:  [id number]    Recommendations:     Discharge Recommendations:  nursing facility, skilled   Discharge Equipment Recommendations: other (see comments)(TBD)   Barriers to discharge: Inaccessible home and Decreased caregiver support    Assessment:     Shant Magana Jr. is a 80 y.o. male admitted with a medical diagnosis of Embolic stroke involving right middle cerebral artery.  He presents with the following impairments/functional limitations:  weakness, impaired endurance, impaired self care skills, impaired functional mobilty, gait instability, impaired balance, impaired cognition, decreased coordination, decreased safety awareness Patient tolerated treatment well focusing on bed mobility, transfers and gait. Patient will continue to improve with skilled physical therapy services in order to return to functional baseline.    Rehab Prognosis: Good; patient would benefit from acute skilled PT services to address these deficits and reach maximum level of function.    Recent Surgery: * No surgery found *      Plan:     During this hospitalization, patient to be seen 3 x/week to address the identified rehab impairments via gait training, therapeutic activities, therapeutic exercises, neuromuscular re-education and progress toward the following goals:    · Plan of Care Expires:  08/18/20    Subjective     Chief Complaint: increased fatigue  Patient/Family Comments/goals: "I'll get up"  Pain/Comfort:  · Pain Rating 1: 0/10  · Pain Rating Post-Intervention 1: 0/10      Objective:     Communicated with nursing prior to session.  Patient found HOB elevated with telemetry upon PT entry to room.     General Precautions: Standard, fall, aspiration   Orthopedic Precautions:N/A   Braces:       Functional Mobility:  · Bed Mobility:     · Scooting: contact guard assistance  · Supine to Sit: minimum assistance  · Sit to Supine: contact guard assistance  · Transfers:     · Sit " to Stand:  minimum assistance with rolling walker  · Gait: 22' RW Min A for RW mgmt, gait limited by BM, pt returned to supine  · Balance: seated EOB S vcs for upright posture, static standing CGA      AM-PAC 6 CLICK MOBILITY  Turning over in bed (including adjusting bedclothes, sheets and blankets)?: 4  Sitting down on and standing up from a chair with arms (e.g., wheelchair, bedside commode, etc.): 3  Moving from lying on back to sitting on the side of the bed?: 3  Moving to and from a bed to a chair (including a wheelchair)?: 3  Need to walk in hospital room?: 3  Climbing 3-5 steps with a railing?: 3  Basic Mobility Total Score: 19       Therapeutic Activities and Exercises:  UBD Mod A  Patient educated on importance of increased time out of bed and RHEA throughout the day    Patient left left sidelying with call button in reach and nursing notified..    GOALS:   Multidisciplinary Problems     Physical Therapy Goals        Problem: Physical Therapy Goal    Goal Priority Disciplines Outcome Goal Variances Interventions   Physical Therapy Goal     PT, PT/OT Ongoing, Progressing     Description: Goals to be met by: 2020    Patient will increase functional independence with mobility by performin. Supine <> sit with Stand-by Assistance.  2. Sit <> stand transfer with Stand-by Assistance using No Assistive Device.  3. Bed <> chair transfer via Stand Pivot with Stand-by Assistance using No Assistive Device.  4. Dynamic standing for 8 minutes with Contact Guard Assistance using No Assistive Device to prepare for functional tasks in standing.  5. Able to tolerate exercise for 15-20 reps with independence.  6. Gait x 100ft with CGA with RW or No AD to prepare for community ambulation.                         Time Tracking:     PT Received On: 20  PT Start Time: 1017     PT Stop Time: 1040  PT Total Time (min): 23 min     Billable Minutes: Gait Training 10 and Therapeutic Activity 13    Treatment Type:  Treatment  PT/PTA: PTA     PTA Visit Number: 1     [place], PTA  07/27/2020

## 2020-07-27 NOTE — PT/OT/SLP PROGRESS
"Speech Language Pathology Treatment    Patient Name:  Shant Magana Jr.   MRN:  [id number]  Admitting Diagnosis: Embolic stroke involving right middle cerebral artery    Recommendations:                 General Recommendations:  Dysphagia therapy, Cognitive-linguistic therapy and 24-hour supervision for safety  Diet recommendations:  Regular, Liquid Diet Level: Thin   Aspiration Precautions: Pt requires strict 1:1 assistance with all PO for safety. 1 bite/sip at a time, Assistance with meals, Frequent oral care, HOB to 90 degrees, Meds whole 1 at a time, Remain upright 30 minutes post meal, Small bites/sips and Strict aspiration precautions   General Precautions: Standard, aspiration, fall, vision impaired  Communication strategies:  provide increased time to answer and go to room if call light pushed    Subjective     SLP reviewed Pt with RN  Pt presents lethargic  He explains, "I'm at [facility]"    Pain/Comfort:  · Pain Rating 1: 0/10    Objective:     Has the patient been evaluated by SLP for swallowing?   Yes  Keep patient NPO? No   Current Respiratory Status: room air      Pt found asleep in bed with call light in reach. He woke per simple verbal cues. He demonstrated decreased sustianed eye cotnact and R gaze preference which required consistent cues for Pt to attend to SLP and clock on L. Pt completed simple tracking tasks for FO1-2 items with 100% accuracy provided minimal verbal and visual cues. He completed simple matching tasks for FO3 (numbers) with 33% accuracy provided maximum verbal and visual cues. He was educated on ongoing safety precautions, SLP role and recommendations for ongoing ST upon d/c from acute. No additional questions noted. Pt with minimal sustained attention and did not verbalize understanding of SLP education. Whiteboard remained current. Pt remained in bed with telesitter in room, bed alarm in tact and call light in reach upon SLP exit from room.    Assessment:     Shant Magana Jr. is a 80 " y.o. 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  He presents with minimal sustained attention.      Goals:   Multidisciplinary Problems     SLP Goals        Problem: SLP Goal    Goal Priority Disciplines Outcome   SLP Goal     SLP Ongoing, Progressing   Description: Speech Language Pathology Goals  Goals expected to be met by 7/26/20    1. Pt will participate in ongoing assessment of swallow function to determine safest, least restrictive means of nutrition/hydration  2. Pt will complete simple L tracking tasks with 90% accuracy, MOD A  3. Educate Pt and family on aspiration precautions and SLP POC  4. Educate Pt and family on safety awareness and compensatory strategies for memory and visiospatial skills                        Plan:     · Patient to be seen:  4 x/week   · Plan of Care expires:  08/18/20  · Plan of Care reviewed with:  patient   · SLP Follow-Up:  Yes       Discharge recommendations:  nursing facility, skilled       Time Tracking:     SLP Treatment Date:   07/27/20  Speech Start Time:  1008  Speech Stop Time:  1018     Speech Total Time (min):  10 min    Billable Minutes: Speech Therapy Individual 10    SARABJIT Wright, [facility]-SLP  Speech-Language Pathology  Pager: [phone]    07/27/2020

## 2020-07-27 NOTE — CARE UPDATE
Notified by CM that patient needs repeat COVID test for SNF admission. Ordered at 1:19pm. Specimen was never collected. Will remove discharge order and anticipate discharge tomorrow.     Lloyd Perdomo MD  Medical Resident  [facility]  Pager: [phone]

## 2020-07-27 NOTE — PLAN OF CARE
"Pt's COVID results came back "Not Detected."     RN can call report to [facility] at [phone].    Transportation arranged for wheelchair van via [facility] (x. [phone]). Requested pickup time is 8:00 PM. Requested pickup time is not a guarantee of time of arrival.      RN aware.    Evening MD aware and will contact family to inform.    Jaimie Vila LMSW  [facility]      "

## 2020-07-27 NOTE — PLAN OF CARE
Pt AAOx1, no c/o pain, no c/o sob, reviewed care plan with Pt and Pt family, Pt and Pt family verbalized understanding, no falls experienced during shift, will continue to monitor Pt

## 2020-07-27 NOTE — PLAN OF CARE
Problem: SLP Goal  Goal: SLP Goal  Description: Speech Language Pathology Goals  Goals expected to be met by 7/26/20    1. Pt will participate in ongoing assessment of swallow function to determine safest, least restrictive means of nutrition/hydration  2. Pt will complete simple L tracking tasks with 90% accuracy, MOD A  3. Educate Pt and family on aspiration precautions and SLP POC  4. Educate Pt and family on safety awareness and compensatory strategies for memory and visiospatial skills       Outcome: Ongoing, Progressing     Pt ongoing with current goals. ST to continue to follow.    CAMMIE Wright., [facility]-SLP  Speech-Language Pathology  Pager: [phone]  7/27/2020

## 2020-07-27 NOTE — PLAN OF CARE
[facility]     Department of Hospital Medicine     [address] [place] [address]     [phone] after hours  [phone] fax       NURSING HOME ORDERS    07/27/2020    Admit to Nursing Home:     Skilled Bed                                                Diagnoses:  Active Hospital Problems    Diagnosis  POA    *Embolic stroke involving right middle cerebral artery [I63.411]  Yes     Priority: 1 - High    Cytotoxic cerebral edema [G93.6]  Yes     Priority: 2     Former smoker [Z87.891]  Not Applicable    Benign prostatic hyperplasia [N40.0]  Yes    Dementia without behavioral disturbance [F03.90]  Yes    Paroxysmal atrial fibrillation [I48.0]  Yes    Status post placement of implantable loop recorder [Z95.818]  Yes    Chronic diastolic congestive heart failure [I50.32]  Yes     Managed by Dr. Salcedo      Mixed hyperlipidemia [E78.2]  Yes    Internal carotid artery stenosis, left [I65.22]  Yes    CKD (chronic kidney disease) stage 3, GFR 30-59 ml/min [N18.3]  Yes     Managed by Dr. Orozco      History of stroke [Z86.73]  Not Applicable    Type 2 diabetes mellitus with complication, without long-term current use of insulin [E11.8]  Yes     Managed by Dr. Orozco      Essential hypertension [I10]  Yes      Resolved Hospital Problems    Diagnosis Date Resolved POA    Hypoxia [R09.02] 07/26/2020 Yes    Acute kidney injury superimposed on chronic kidney disease [N17.9, N18.9] 07/25/2020 Yes       Patient is homebound due to:  Embolic stroke involving right middle cerebral artery    Allergies:Review of patient's allergies indicates:  No Known Allergies    Vitals:       Every shift (Skilled Nursing patients)    Diet: cardiac (low Na/Chol)    Acitivities:     - Up in a chair each morning as tolerated   - Ambulate with assistance to bathroom   - Scheduled walks once each shift (every 8 hours)   - May use walker, cane, or self-propelled  wheelchair    LABS:  Per facility protocol   CMP, CBC each month for 3 months    Nursing Precautions:     - Aspiration precautions:             - Total assistance with meals            -  Upright 90 degrees befor during and after meals             -  Suction at bedside          - Fall precautions per nursing home protocol   - Seizure precaution per alf protocol   - Decubitus precautions:        -  for positioning   - Pressure reducing foam mattress   - Turn patient every two hours. Use wedge pillows to anchor patient    CONSULTS:     Physical Therapy to evaluate and treat     Occupational Therapy to evaluate and treat     Speech Therapy  to evaluate and treat     Nutrition to evaluate and recommend diet     Psychiatry to evaluate and follow patients for delirium    MISCELLANEOUS CARE:       Routine Skin for Bedridden Patients:  Apply moisture barrier cream to all    skin folds and wet areas in perineal area daily and after baths and                           all bowel movements.    Medications: Discontinue all previous medication orders, if any. See new list below.     Shant Magana Jr.   Home Medication Instructions KAMILLA:[id number]    Printed on:07/27/20 5126   Medication Information                      albuterol-ipratropium (DUO-NEB) 2.5 mg-0.5 mg/3 mL nebulizer solution  Take 3 mLs by nebulization every 6 (six) hours as needed for Wheezing. Rescue             apixaban (ELIQUIS) 2.5 mg Tab  Take 1 tablet (2.5 mg total) by mouth 2 (two) times daily.             atorvastatin (LIPITOR) 80 MG tablet  Take 1 tablet (80 mg total) by mouth once daily.             losartan (COZAAR) 100 MG tablet  Take 100 mg by mouth once daily.              pantoprazole (PROTONIX) 40 MG tablet  Take 40 mg by mouth once daily.             tamsulosin (FLOMAX) 0.4 mg Cp24  Take 0.4 mg by mouth once daily.             vitamin D 1000 units Tab  Take 1,000 Units by mouth once daily.                  _______________________________  Lloyd Perdomo MD  Medical Resident  [facility]  Pager: [phone]

## 2020-07-27 NOTE — PLAN OF CARE
Recommendations    Recommendation:   1. Continue 1800 kcal DM/cardiac diet, Fluid restriction per MD   2. Add boost glucose BID to increase intake   3. Suggest MVI   RD to monitor and follow up    Goals: pt to tolerate >85% of EEN/EPN by RD follow up  Nutrition Goal Status: new  Communication of RD Recs: other (comment)(POC)

## 2020-07-27 NOTE — NURSING
Patient remains free from injury or fall.no neuro changes noted or reported from initial assessment. Covid test sent to lab. Pending results.  Plan= DC to SNF.  Will continue to monitor.

## 2020-07-27 NOTE — DISCHARGE SUMMARY
"[facility]  Vascular Neurology  Comprehensive Stroke Center  Discharge Summary     Summary:     Admit Date: 7/18/2020  3:46 PM    Discharge Date and Time:  07/27/2020 10:12 AM    Attending Physician: Alan Garcia DO     Discharge Provider: Lloyd Perdomo MD    History of Present Illness: Shant Magana Jr. is a 80 y.o. male with PMHx of dementia, multiple strokes, intracranial and extracranial atherosclerosis, DM, HTN, a fib (eliquis with history of noncompliance due to cost) who presented to ED via EMS for AMS x 2 days. Per EMS, family reported that patient "has not been himself." ED provider noted LSW and L hemianopsia on exam and stroke team was consulted.    Stroke provider contacted patient's son (Hoda) for more information and history. His son noticed a change in behavior yesterday around 2pm. He states that yesterday the patient seemed more confused and was walking around aimlessly. This morning, the patient called his son and his son went to check on him. The patient was at home with his grandson and he had fallen between the bed and the dresser. His son noticed that he was inattentive with a facial droop and delayed verbal response. Patient stated he was going to restroom but was instead walking into closet. This persistent change in behavior and mental status prompted family to have patient taken to ED via EMS.      Hospital Course (synopsis of major diagnoses, care, treatment, and services provided during the course of the hospital stay): Admitted with acute encephalopathy x 2 days. MRI brain showed R parietal infarct. MRA negative for LVO or significant stenosis. Etiology likely cardioembolic. Home apixaban 2.5mg resumed without complications. Patient had hypoxia during admission requiring 2L nc. CXR appeared congestion. Initially diuresed with no improvement. Significant expiratory wheezing on exam. Improved with scheduled duonebs. Given smoking history and clinical picture, " ambulatory referral was placed to Pulmonology for evaluation of obstructive lung disease and PFTs. PT/OT consulted, recommended SNF. Patient was accepted to [facility] on 7/27. Discussed results and plan with patient/family. Patient verbalized understanding and agreed to plan. Patient stable for discharge to SNF.    Stroke Etiology: Probable Atrial fibrillation Cardio-Aortic Embolism (CE)    STROKE DOCUMENTATION   Acute Stroke Times   Symptom Onset Date: 07/16/20     NIH Scale:  Interval: 7 days or at discharge (whichever comes first)  1a. Level of Consciousness: 0-->Alert, keenly responsive  1b. LOC Questions: 1-->Answers one question correctly  1c. LOC Commands: 0-->Performs both tasks correctly  2. Best Gaze: 1-->Partial gaze palsy, gaze is abnormal in one or both eyes, but forced deviation or total gaze paresis is not present  3. Visual: 1-->Partial hemianopia  4. Facial Palsy: 0-->Normal symmetrical movements  5a. Motor Arm, Left: 0-->No drift, limb holds 90 (or 45) degrees for full 10 secs  5b. Motor Arm, Right: 0-->No drift, limb holds 90 (or 45) degrees for full 10 secs  6a. Motor Leg, Left: 0-->No drift, leg holds 30 degree position for full 5 secs  6b. Motor Leg, Right: 0-->No drift, leg holds 30 degree position for full 5 secs  7. Limb Ataxia: 0-->Absent  8. Sensory: 0-->Normal, no sensory loss  9. Best Language: 0-->No aphasia, normal  10. Dysarthria: 0-->Normal  11. Extinction and Inattention (formerly Neglect): 0-->No abnormality  Total (NIH Stroke Scale): 3        Modified Rockbridge Score: 4  Royal Coma Scale:15   ABCD2 Score:    [id number]-YXE Score:8  HAS -BLED Score:   ICH Score:   Hunt & Choudhury Classification:     Vitals:    07/27/20 0941   BP:    Pulse: 68   Resp: 14   Temp:      Physical Exam  Constitutional:       General: He is not in acute distress.     Appearance: He is well-developed.   HENT:      Head: Normocephalic and atraumatic.      Mouth/Throat:      Pharynx: No oropharyngeal exudate.    Eyes:      Conjunctiva/sclera: Conjunctivae normal.      Pupils: Pupils are equal, round, and reactive to light.   Neck:      Musculoskeletal: Normal range of motion and neck supple.   Cardiovascular:      Rate and Rhythm: Normal rate and regular rhythm.      Heart sounds: Normal heart sounds. No murmur.   Pulmonary:      Effort: Pulmonary effort is normal. No respiratory distress.      Breath sounds: No wheezing present. No rales.   Abdominal:      General: Bowel sounds are normal. There is no distension.      Palpations: Abdomen is soft.      Tenderness: There is no abdominal tenderness. There is no guarding.   Musculoskeletal:         General: No tenderness.   Skin:     General: Skin is warm and dry.      Findings: No rash.   Neurological:      Mental Status: He is alert. Mental status is at baseline. He is disoriented.      Cranial Nerves: No cranial nerve deficit.      Motor: No abnormal muscle tone.   Psychiatric:         Behavior: Behavior normal.         Neurological Exam:   LOC: alert  Attention Span: poor  Language: No aphasia  Articulation: Dysarthria  Orientation: Person  Visual Fields: Hemianopsia left  EOM (CN III, IV, VI): Gaze preference  right  Pupils (CN II, III): PERRL  Facial Movement (CN VII): Lower facial weakness on the Left  Motor: Arm left  Paresis: 4/5  Leg left  Normal 5/5  Arm right  Normal 5/5  Leg right Normal 5/5  Cebellar: Upper Extremity Appendicular Ataxia (Finger Nose Finger)  Left  Sensation: Intact to light touch, temperature and vibration  Tone: Normal tone throughout        Assessment/Plan:     Diagnostic Results:    Brain Imaging:  MRI Brain 7/18/20  Area of diffusion restriction with ADC match suggestive of acute infarct in the right parietal lobe. Additional smaller acute infarcts right occipital lobe and right basal ganglia.     Remote right frontal infarct and remote lacunar infarcts in the thalami.  Supratentorial white matter T2/flair hyperintense signal foci  suggesting sequela of chronic small vessel ischemic change.     MR angiogram of the head and neck appears unchanged compared to prior exams without any focal occlusion identified.        CT Head. Date: 07/18/20  1. Interval right parietal lobe suspected acute infarct.  No intracranial hemorrhage.  Further evaluation/follow-up as warranted.  2. Bilateral thalamic suspected lacunar type infarcts, age-indeterminate.  Correlate clinically.  3. Additional multifocal supratentorial and to a lesser extent infratentorial remote infarcts, as detailed above.  4. Generalized cerebral volume loss and sequela of advanced microvascular ischemic changes.     Vessel Imaging:  MRA Head and Neck 7/18/20 - see MRI Brain 7/18/20 above     Cardiac Evaluation:   Echo 7/19/20  · Normal left ventricular systolic function. The estimated ejection fraction is 68%.  · Concentric left ventricular remodeling with thickened walls especially septum and increased myocardial density.  · No wall motion abnormalities.  · Normal LV diastolic function.  · Normal right ventricular systolic function.  · Normal central venous pressure (3 mmHg).  · The estimated PA systolic pressure is 30 mmHg.  · The ascending aorta is mildly dilated.    The left atrial volume index is normal.    Interventions: None    Complications: None    Disposition:     Final Active Diagnoses:    Diagnosis Date Noted POA    PRINCIPAL PROBLEM:  Embolic stroke involving right middle cerebral artery [I63.411] 07/18/2020 Yes    Cytotoxic cerebral edema [G93.6] 10/18/2017 Yes    Former smoker [Z87.891] 07/18/2020 Not Applicable    Benign prostatic hyperplasia [N40.0]  Yes    Dementia without behavioral disturbance [F03.90] 12/10/2019 Yes    Paroxysmal atrial fibrillation [I48.0] 02/12/2019 Yes    Status post placement of implantable loop recorder [Z95.818] 02/12/2019 Yes    Chronic diastolic congestive heart failure [I50.32] 10/19/2017 Yes    Mixed hyperlipidemia [E78.2]  10/18/2017 Yes    Internal carotid artery stenosis, left [I65.22] 10/18/2017 Yes    CKD (chronic kidney disease) stage 3, GFR 30-59 ml/min [N18.3] 10/18/2017 Yes    History of stroke [Z86.73] 08/15/2017 Not Applicable    Type 2 diabetes mellitus with complication, without long-term current use of insulin [E11.8]  Yes    Essential hypertension [I10] 06/07/2017 Yes      Problems Resolved During this Admission:    Diagnosis Date Noted Date Resolved POA    Hypoxia [R09.02] 07/24/2020 07/26/2020 Yes    Acute kidney injury superimposed on chronic kidney disease [N17.9, N18.9] 10/14/2017 07/25/2020 Yes       Recommendations:     Post-discharge complication risks: Falls, Skin breakdown    Stroke Education given to: patient and family    Follow-up in Stroke Clinic in 30 days.     Discharge Plan:  Statin: Atorvastatin 80mg  Anticoagulant: apixaban 2.5mg BID  Aggresive risk factor modification:  Hypertension  Smoking  High Cholesterol  Diet  Exercise  Atrial Fibrillation    Follow Up:  Follow-up Information     Trent Aldana MD.    Specialty: Family Medicine  Why: Outpatient Services  Contact information:  [address]  [address]  [facility] [address] [phone]                   Patient Instructions:      Ambulatory referral/consult to Pulmonology   Standing Status: Future   Referral Priority: Routine Referral Type: Consultation   Referral Reason: Specialty Services Required   Requested Specialty: Pulmonary Disease   Number of Visits Requested: 1       Medications:  Transfer Medications (for Discharge Readmit only):   Current Facility-Administered Medications   Medication Dose Route Frequency Provider Last Rate Last Dose    acetaminophen tablet 650 mg  650 mg Oral Q6H PRN Pamela Hutchison PA-C   650 mg at 07/24/20 0843    albuterol-ipratropium 2.5 mg-0.5 mg/3 mL nebulizer solution 3 mL  3 mL Nebulization Q4H DEAN Perdomo MD   3 mL at 07/27/20 0941    apixaban tablet 2.5 mg  2.5 mg Oral BID  Pamela Hutchison PA-C   2.5 mg at 07/27/20 0847    atorvastatin tablet 80 mg  80 mg Oral Daily Pamela Hutchison PA-C   80 mg at 07/27/20 0846    losartan tablet 100 mg  100 mg Oral Daily Felice Carranza NP   100 mg at 07/27/20 0847    ondansetron injection 4 mg  4 mg Intravenous Q12H PRN Pamela Hutchison PA-C        pantoprazole EC tablet 40 mg  40 mg Oral Daily Pamela Hutchison PA-C   40 mg at 07/27/20 0847    polyethylene glycol packet 17 g  17 g Oral Daily PRN Pamela Hutchison PA-C        sodium chloride 0.9% bolus 500 mL  500 mL Intravenous Continuous PRN Pamela Hutchison PA-C        sodium chloride 0.9% flush 10 mL  10 mL Intravenous PRN Pamela Hutchison PA-C        tamsulosin 24 hr capsule 0.4 mg  0.4 mg Oral Daily Pamela Hutchison PA-C   0.4 mg at 07/27/20 0846    vitamin D 1000 units tablet 1,000 Units  1,000 Units Oral Daily Pamela Hutchison PA-C   1,000 Units at 07/27/20 0846       Lloyd Perdomo MD  Comprehensive Stroke Center  Department of Vascular Neurology   [facility]

## 2020-07-28 LAB — POCT GLUCOSE: 90 MG/DL (ref 70–110)

## 2020-07-28 PROCEDURE — 94640 AIRWAY INHALATION TREATMENT: CPT

## 2020-07-28 PROCEDURE — 25000003 PHARM REV CODE 250: Performed by: STUDENT IN AN ORGANIZED HEALTH CARE EDUCATION/TRAINING PROGRAM

## 2020-07-28 PROCEDURE — 25000242 PHARM REV CODE 250 ALT 637 W/ HCPCS: Performed by: NURSE PRACTITIONER

## 2020-07-28 PROCEDURE — 97535 SELF CARE MNGMENT TRAINING: CPT

## 2020-07-28 PROCEDURE — 97165 OT EVAL LOW COMPLEX 30 MIN: CPT

## 2020-07-28 PROCEDURE — 25000242 PHARM REV CODE 250 ALT 637 W/ HCPCS: Performed by: STUDENT IN AN ORGANIZED HEALTH CARE EDUCATION/TRAINING PROGRAM

## 2020-07-28 PROCEDURE — 97116 GAIT TRAINING THERAPY: CPT

## 2020-07-28 PROCEDURE — 97530 THERAPEUTIC ACTIVITIES: CPT

## 2020-07-28 PROCEDURE — 94761 N-INVAS EAR/PLS OXIMETRY MLT: CPT

## 2020-07-28 PROCEDURE — 97161 PT EVAL LOW COMPLEX 20 MIN: CPT

## 2020-07-28 PROCEDURE — 97802 MEDICAL NUTRITION INDIV IN: CPT

## 2020-07-28 PROCEDURE — 92523 SPEECH SOUND LANG COMPREHEN: CPT

## 2020-07-28 PROCEDURE — 11000004 HC SNF PRIVATE

## 2020-07-28 PROCEDURE — 25000003 PHARM REV CODE 250: Performed by: NURSE PRACTITIONER

## 2020-07-28 RX ORDER — IPRATROPIUM BROMIDE AND ALBUTEROL SULFATE 2.5; .5 MG/3ML; MG/3ML
3 SOLUTION RESPIRATORY (INHALATION)
Status: DISCONTINUED | OUTPATIENT
Start: 2020-07-28 | End: 2020-08-03

## 2020-07-28 RX ADMIN — ATORVASTATIN CALCIUM 80 MG: 20 TABLET, FILM COATED ORAL at 09:07

## 2020-07-28 RX ADMIN — DOCUSATE SODIUM 50MG AND SENNOSIDES 8.6MG 1 TABLET: 8.6; 5 TABLET, FILM COATED ORAL at 08:07

## 2020-07-28 RX ADMIN — IPRATROPIUM BROMIDE AND ALBUTEROL SULFATE 3 ML: .5; 3 SOLUTION RESPIRATORY (INHALATION) at 07:07

## 2020-07-28 RX ADMIN — PANTOPRAZOLE SODIUM 40 MG: 40 TABLET, DELAYED RELEASE ORAL at 09:07

## 2020-07-28 RX ADMIN — APIXABAN 2.5 MG: 2.5 TABLET, FILM COATED ORAL at 08:07

## 2020-07-28 RX ADMIN — TAMSULOSIN HYDROCHLORIDE 0.4 MG: 0.4 CAPSULE ORAL at 09:07

## 2020-07-28 RX ADMIN — LOSARTAN POTASSIUM 100 MG: 50 TABLET, FILM COATED ORAL at 09:07

## 2020-07-28 RX ADMIN — GUAIFENESIN AND DEXTROMETHORPHAN HYDROBROMIDE 1 TABLET: 600; 30 TABLET, EXTENDED RELEASE ORAL at 08:07

## 2020-07-28 RX ADMIN — DOCUSATE SODIUM 50MG AND SENNOSIDES 8.6MG 1 TABLET: 8.6; 5 TABLET, FILM COATED ORAL at 09:07

## 2020-07-28 RX ADMIN — VITAMIN D, TAB 1000IU (100/BT) 1000 UNITS: 25 TAB at 09:07

## 2020-07-28 RX ADMIN — IPRATROPIUM BROMIDE AND ALBUTEROL SULFATE 3 ML: .5; 3 SOLUTION RESPIRATORY (INHALATION) at 11:07

## 2020-07-28 RX ADMIN — APIXABAN 2.5 MG: 2.5 TABLET, FILM COATED ORAL at 09:07

## 2020-07-28 NOTE — PLAN OF CARE
Self feeding difficulty related to cognitive deficits and risk of aspiration as evidenced by hx of CVA's, recommendations from Speech Therapy to assist with meals .  New    Plan  Carbohydrate controlled diet- 2000 calories  Fat and mineral modified diet- cardiac  Fluid restricted diet- 1500 ml    Commercial beverage- protein- Boost glucose BID vanilla    Recommend   Multivitamin/mineral therapy- MVI  Feeding assistance- set-up, positioning   Daily weights

## 2020-07-28 NOTE — PROGRESS NOTES
"                                                        [facility]                                  Skilled Nursing Facility                   Progress Note     Admit Date: 7/27/2020  KWAME TBD  Principal Problem:  Embolic stroke involving right middle cerebral artery   HPI obtained from patient interview and chart review     Chief Complaint: Establish Care/ Initial Visit     HPI:   Mr. Chino Gomez is a 80 year old male with PMHx of dementia, multiple strokes, intracranial and extracranial atherosclerosis, DM, HTN, a fib (eliquis with history of noncompliance due to cost) who presents to SNF following hospitalization for embolic stroke involving right MCA.  Admission to SNF for secondary weakness and debility.     Patient originally presented to [facility] ED on 07/18  AMS x 2 days. Per EMS, family reported that patient "has not been himself."  Patient reportedly more confused, inattentive and had fallen between the bed and the dresser.  ED provider noted LSW and L hemianopsia on exam and stroke team was consulted.     Patient was admitted with acute encephalopathy x 2 days. MRI brain showed R parietal infarct. MRA negative for LVO or significant stenosis. Etiology likely cardioembolic. Home apixaban 2.5mg resumed without complications. Patient had hypoxia during admission requiring 2L NC. CXR appeared congested. Initially diuresed with no improvement. Significant expiratory wheezing on exam. Improved with scheduled duonebs. Given smoking history and clinical picture, ambulatory referral was placed to Pulmonology for evaluation of obstructive lung disease and PFTs. PT/OT consulted, recommended SNF.      Today, patient remains pleasantly confused, he could not state his correct age- stated he was 82-, he did not know the year at all, he stated he was at [facility].  Patient able to follow simple commands and answer yes and no appropriately to other questions.  Patient endorsed dyspnea on exertion and " productive cough.    Patient will be treated at [facility] with PT and OT to improve functional status and ability to perform ADLs.     Past Medical History: Patient has a past medical history of Acute kidney injury superimposed on chronic kidney disease (10/14/2017), Cancer, Diabetes mellitus, Former smoker (7/18/2020), History of stroke (8/15/2017), Hypertension, Hypertrophic cardiomyopathy, Renal disorder, and Stroke.    Past Surgical History: Patient has a past surgical history that includes Back surgery; Thyroidectomy; Esophagogastroduodenoscopy (N/A, 9/4/2018); Esophagogastroduodenoscopy (N/A, 10/18/2018); and Colonoscopy (N/A, 10/18/2018).    Social History: Patient reports that he has quit smoking. His smoking use included cigarettes. He has a 2.70 pack-year smoking history. He has never used smokeless tobacco. He reports that he does not drink alcohol or use drugs.    Family History:  No significant family history to report    Allergies: Patient has No Known Allergies.    ROS  Constitutional: Negative for fever   Eyes: Negative for blurred vision, double vision   Respiratory:  + for intermittent cough, dyspnea on exertion  Cardiovascular: Negative for chest pain, palpitations, and leg swelling.   Gastrointestinal: Negative for abdominal pain, constipation, diarrhea, nausea and vomiting.   Genitourinary: Negative for dysuria, frequency   Musculoskeletal:  + generalized weakness. Negative for back pain and myalgias.   Skin: Negative for itching and rash.   Neurological: Negative for dizziness, headaches.   Psychiatric/Behavioral: Negative for depression. The patient is not nervous/anxious.      24 hour Vital Sign Range   Temp:  [97.6 °F (36.4 °C)-98.9 °F (37.2 °C)]   Pulse:  [63-85]   Resp:  [16-20]   BP: (126-155)/(81-90)   SpO2:  [94 %-98 %]     PEx  Constitutional: Patient appears debilitated.  No distress noted  HENT:   Head: Normocephalic and atraumatic.   Eyes: Pupils are equal, round  Neck: Normal  range of motion. Neck supple.   Cardiovascular: Normal rate, regular rhythm and normal heart sounds.    Pulmonary/Chest: Effort normal and breath sounds are clear  Abdominal: Soft. Bowel sounds are normal.   Musculoskeletal: Normal range of motion.   Neurological: Alert and oriented to person- however states incorrect age, disoriented to place, and time.  Higher level thinking impaired.  Patient has partial hemianopsia to his right eye, no facial asymmetry. LUE- consult port against gravity but not for a full 10 secs. RUE- able to hold for 10 secs; RLE and LLE- able to hold for 5 secs. no sensory abnormality.  No aphasia or dysarthria noted.  Psychiatric: Normal mood and affect. Behavior is normal.   Skin: Skin is warm and dry. Full skin assessment completed.  No skin breakdown noted    No results for input(s): GLUCOSE, NA, K, CL, CO2, BUN, CREATININE, MG in the last 24 hours.    Invalid input(s):  CALCIUM    No results for input(s): WBC, RBC, HGB, HCT, PLT, MCV, MCH, MCHC in the last 24 hours.      Assessment and Plan:    Embolic stroke involving right middle cerebral artery  - Patient with history of L facial droop from previous stroke.  - CT revealed R parietal infarct. Etiology likely cardioembolic  - Antithrombotics for secondary stroke prevention: Anticoagulants: Apixaban 2.5 mg BID  - Will maintain on geriatric dose of Eliquis as creatinine borderline for normal dosing (and only worsens when not admitted).- will investigate patient's appropriateness to be from medication and have him signed up with payment assistant  - Statins for secondary stroke prevention and hyperlipidemia:  continue Atorvastatin- 80 mg daily  - PT/OT  - goal BP- SBP < 160  - [facility] recommended 24 hr assistance once discharged home daughter Manuel Lozada 7/21, who states family is unable to provide 24 hr supervision- information relayed to SNF CM to begin early discussions with patient's family to prepare for DC.     Cytotoxic cerebral edema  -  Area of cytotoxic cerebral edema identified when reviewing brain imaging in the territory of the R middle cerebral artery. There is no mass effect associated with it. We will continue to monitor the patients clinical exam for any worsening of symptoms which may indicate expansion of the stroke or the area of the edema resulting in the clinical change. The pattern is suggestive of cardioembolic etiology.     Former smoker  - Stroke risk factor  - Encourage continued smoking cessation     Benign prostatic hyperplasia  - Continue home flomax 0.4 mg daily     Dementia without behavioral disturbance  Maintain Delirium precautions:  - Avoid antihistamines, anticholinergics, hypnotics, and minimize opiates while controlling for pain as these medications may exacerbate delirium. Cues for day/night will assist with keeping patient calm and oriented - during daytime, please keep adequate light in room (open windows, lights on) and please keep room dim at night-time to encourage normal sleep-wake cycles. Continuing to have nursing and family reorient the patient and encourage family to visit    Status post placement of implantable loop recorder  - follow-up with cardiology after DC from SNF     Paroxysmal atrial fibrillation  - Stroke risk factor  - Anticoagulate with home apixaban 2.5mg BID- CM made aware that patient previously discontinue medication due to not being able to afford the cost and looking into getting him signed up with [facility] nurse medication assistance program     Chronic diastolic congestive heart failure  - Repeat echo EF 68 %  - Last echo 12/2019 with mild LV diastolic dysfunction  - Not on diuretic at home; BNP wnl  - Daily weights (standing if tolerated)  - Fluid restriction at 1500mL  - Cardiac diet     Atelectasis  Right-sided pleural effusion  Productive cough  - improving, patient now tolerating room air, reduced scheduled DuoNebs to q.6 while awake, initiated Mucinex DM  mg q.12 hours.   Initiated incentive spirometry.  Monitor respiratory status closely.     Essential hypertension  - Stroke risk factor  - goal SBP < 160   - Original home meds: Losartan 100 mg daily, Amlodipine 10 mg daily  - continue losartan 100 mg daily at this time, will reinitiate amlodipine if and when appropriate     CKD (chronic kidney disease) stage 3, GFR 30-59 ml/min  - stable, baseline likely 1.5-1.8,  monitor twice weekly BMPs, Avoid nephrotoxic agents, Renally adjust medications.  Patient will need to follow with a nephrologist after DC    Type 2 diabetes mellitus with complication, without long-term current use of insulin  -Stroke risk factor  - A1C 5.8  - 7/2824 hour glucose is , continue low-dose sliding scale insulin and Accu-Cheks AC/HS at this time     Mixed hyperlipidemia  -  Stroke risk factor  - LDL 70  - Continue home atorvastatin 80 mg daily     Internal carotid artery stenosis, left  - Stroke risk factor; continue Eliquis     History of stroke  - Patient with history of multiple strokes   - History of a fib + intracranial/extracranial atherosclerosis  - Continue secondary stroke prevention with eliquis + atorvastatin 80 mg   - Will maintain on geriatric dose of Eliquis as creatinine borderline for normal dosing (and only worsens when not admitted).     Vitamin-D deficiency  - continue vitamin-D 1000 units daily- level is not been checked since 2017- ordered for level with next lab draw.      Debility   - Continue with PT/OT for gait training and strengthening and restoration of ADL's   - Encourage mobility, OOB in chair, and early ambulation as appropriate  - Fall precautions   - Monitor for bowel and bladder dysfunction  - Monitor for and prevent skin breakdown and pressure ulcers  - Continue DVT prophylaxis with  Eliquis 2.5 mg daily        Future Appointments   Date Time Provider Department Center   8/3/2020  3:00 PM Benjamin Farias DPM [facility]   9/10/2020  3:00 PM Lucio HU  MD [facility] C[facility]IO [facility]       I certify that SNF services are required to be given on an inpatient basis because Shant Magana Jr. needs for skilled nursing care and/or skilled rehabilitation are required on a daily basis and such services can only practically be provided in a skilled nursing facility setting and are for an ongoing condition for which she received inpatient care in the hospital.     Total time of the visit 68 minutes [id number]  Non physical exam/ non charting time: 47 minutes   Description of non physical exam/non charting time: counseling patient on clinical conditions and therapies provided regarding post stroke care, importance of compliance with medications, antihypertensive regimen, importance of follow-up appointments at the beginning of discharge planning.  Patient will likely be a difficult discharge as [facility] was recommending 24 hr supervision once home and family stating that they cannot provide this- CM made aware.  Extensive chart review completed including all consultation notes.  All pertinent laboratory and radiographical images reviewed.        Ami Copeland NP  Department of Hospital Medicine   [facility]     DOS: 7/28/2020       Patient note was created using MModal Dictation.  Any errors in syntax or even information may not have been identified and edited on initial review prior to signing this note.

## 2020-07-28 NOTE — PLAN OF CARE
Sent message through In Basket to Pharmacy for medication assistance. Patient is unable to afford Elliquis. Spoke with Felecia in the Pharmacy Department. Someone from that department will reach out to the patient and family. CM will continue to follow up.

## 2020-07-28 NOTE — PLAN OF CARE
Future Appointments   Date Time Provider Department Center   8/3/2020  3:00 PM Benjamin Farias DPM [facility]   9/10/2020  3:00 PM Lucio Toth MD [facility] C[facility]IO [facility] [facility]           07/28/20 0858   Final Note   Assessment Type Final Discharge Note   Anticipated Discharge Disposition SNF   What phone number can be called within the next 1-3 days to see how you are doing after discharge? [phone]   Post-Acute Status   Post-Acute Authorization Placement   Post-Acute Placement Status Set-up Complete  ([facility]

## 2020-07-28 NOTE — PLAN OF CARE
Problem: Adult Inpatient Plan of Care  Goal: Plan of Care Review  Outcome: Ongoing, Progressing  Flowsheets (Taken 7/28/2020 0017)  Plan of Care Reviewed With: patient     Problem: Diabetes Comorbidity  Goal: Blood Glucose Level Within Desired Range  Outcome: Ongoing, Progressing     Problem: Fall Injury Risk  Goal: Absence of Fall and Fall-Related Injury  Outcome: Ongoing, Progressing     Problem: Skin Injury Risk Increased  Goal: Skin Health and Integrity  Outcome: Ongoing, Progressing

## 2020-07-28 NOTE — PLAN OF CARE
Problem: Physical Therapy Goal  Goal: Physical Therapy Goal  Description: Goals to be met by: 20     Patient will increase functional independence with mobility by performin. Supine to sit with Modified [place]  2. Sit to supine with Modified [place]  3. Sit to stand transfer with Supervision using RW or cane.  4. Bed to chair transfer with Supervision using Rolling Walker or straight cane.  5. Gait  x 150 feet with Supervision using Rolling Walker or straight cane.  6. Standing for 5 minutes while performing standing balance activity with SPV using RW or cane.    Outcome: Ongoing, Progressing     Initial evaluation completed and plan of care established.    Zaynab Leong, PT, DPT  2020

## 2020-07-28 NOTE — PLAN OF CARE
Problem: Occupational Therapy Goal  Goal: Occupational Therapy Goal  Description: Goals to be met by: 8/11/20     Patient will increase functional independence with ADLs by performing:    Feeding with Modified [place].  UE Dressing with Supervision.  LE Dressing with Minimal Assistance.  Grooming while seated with Supervision.  Toileting from bedside commode with Contact Guard Assistance for hygiene and clothing management.   Bathing from  sitting at sink with Contact Guard Assistance.  Supine to sit with Modified [place].  Stand pivot transfers with Contact Guard Assistance using RW.  Upper extremity exercise program x10 reps per shld flexion and seated rows. assistance as needed.  Caregiver will be competent when assisting with self care tasks and functional transfers.    Outcome: Ongoing, Progressing

## 2020-07-28 NOTE — PT/OT/SLP EVAL
"Speech Language Pathology  Evaluation    Shant Magana Jr.   MRN: [id number]   Admitting Diagnosis: <principal problem not specified>    Diet recommendations: Solid Diet Level: Regular  Liquid Diet Level: Thin HOB to 90 degrees, Monitor for s/s of aspiration and Standard aspiration precautions    SLP Treatment Date: 07/28/20  Speech Start Time: 1532     Speech Stop Time: 1556     Speech Total (min): 24 min       TREATMENT BILLABLE MINUTES:  Eval 16  and [facility]/Home Management Training 8    Diagnosis: <principal problem not specified>  Right temporal/parietal stroke    Past Medical History:   Diagnosis Date    Acute kidney injury superimposed on chronic kidney disease 10/14/2017    Cancer     prostate    Diabetes mellitus     Former smoker 7/18/2020    History of stroke 8/15/2017    Hypertension     Hypertrophic cardiomyopathy     Renal disorder     Stroke      Past Surgical History:   Procedure Laterality Date    BACK SURGERY      COLONOSCOPY N/A 10/18/2018    Procedure: COLONOSCOPY;  Surgeon: Alan Gooden MD;  Location: [facility];  Service: Endoscopy;  Laterality: N/A;    ESOPHAGOGASTRODUODENOSCOPY N/A 9/4/2018    Procedure: EGD (ESOPHAGOGASTRODUODENOSCOPY);  Surgeon: Oli Cuadra MD;  Location: [facility];  Service: Endoscopy;  Laterality: N/A;    ESOPHAGOGASTRODUODENOSCOPY N/A 10/18/2018    Procedure: EGD (ESOPHAGOGASTRODUODENOSCOPY);  Surgeon: Alan Gooden MD;  Location: [facility];  Service: Endoscopy;  Laterality: N/A;    THYROIDECTOMY              General Precautions: Standard, aspiration, fall, vision impaired    Current Respiratory Status: room air     History of Present Illness:  Shant Magana Jr. is a 80 y.o. male with PMHx of dementia, multiple strokes, intracranial and extracranial atherosclerosis, DM, HTN, a fib (eliquis with history of noncompliance due to cost) who presented to ED via EMS for AMS x 2 days. Per EMS, family reported that patient "has not been himself." ED provider " "noted LSW and L hemianopsia on exam and stroke team was consulted.     Stroke provider contacted patient's son (Hoda) for more information and history. His son noticed a change in behavior yesterday around 2pm. He states that yesterday the patient seemed more confused and was walking around aimlessly. This morning, the patient called his son and his son went to check on him. The patient was at home with his grandson and he had fallen between the bed and the dresser. His son noticed that he was inattentive with a facial droop and delayed verbal response. Patient stated he was going to restroom but was instead walking into closet. This persistent change in behavior and mental status prompted family to have patient taken to ED via EMS.    Social Hx: Per pt, Patient lives with son who is with him all the time.  Pt states that son prepares meals, assists with ADL's, and manages pt's medications.  Pt does not drive.  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  Pt appears to be a questionable historian.     Subjective:  "I had a light heart attack." pt stated when asked why he was admitted to [facility].      Objective:              Cognitive Status:  Behavioral Observations: alert, confused and cooperative-  Memory and Orientation: Pt was unable to orient to month, year, or situation.  He oriented to [facility] given an external aid.  Pt immediately recalled up to 4 digits in a series given repetition.  Following a 2 minute delay, pt was unable to recall 3 unrelated words despite max cues.   Attention: impaired sustained attention  Problem Solving: Pt answered simple problem solving q's with 38% accuracy.    Executive Function: insight/awareness, mental flexibility, organization, planning and self-monitoring    Language: perseveration and delayed    Auditory Comprehension: answers complex yes/no questions with 80% accuracy and with increased processing and response time. Pt " "followed 1-, 2-, and 3-step commands ind'ly, but with increased response time needed at times.    Verbal Expression: Word finding and thought organization deficits evident in expressive language. Pt named objects in room with 60% accuracy ind'ly/80% given cues.  During a word fluency task, pt listed 5 items in a category within one minutevwith mod-max cues (15-20 is wnl).     Motor Speech: motor speech deficits suspected    Voice: wfl    Reading: Pt stated "I can't make that out." when attempting to read a paragraph and letters/words from TV menu/guide. Will further assess.     Writing: tba    Visual-Spatial: will further assess.  Visual deficits evident during attempts to assess reading.     Additional Treatment:  Education provided to pt regarding role of SLP, cognitive-linguistic evaluation after transfer to SNF, aspects of cognition, SLP treatment plan, fall risks, and need to call for assistance before attempting to ambulate or transfer.  Pt with chair alarm set on recliner chair pt was sitting in.  Pt's ability to fully comprehend and carryover education likely decreased 2/2 history of dementia.     Assessment:  Shant Magana Jr. is a 80 y.o. male with a medical diagnosis of <principal problem not specified> and presents with cognitive-linguistic and visual spatial deficits.      Discharge recommendations: Discharge Facility/Level of Care Needs: home health speech therapy(24/hr assistance/supervision at home)     Goals:   Multidisciplinary Problems     SLP Goals        Problem: SLP Goal    Goal Priority Disciplines Outcome   SLP Goal     SLP Ongoing, Progressing   Description: Speech Language Pathology Goals  Goals expected to be met by 8/4:  1. Pt will orient to month, year, and place given external aids.  2. Pt will recall 2/3 related words after a 60 second delay given max cues to improve delayed memory.   3. Pt will immediately recall series of 4 digits with 80% accuracy given mod cues.   4. Pt will answer " simple problem solving q's with 60% accuracy given mod-max cues.   5. Pt will complete word finding tasks with 70% accuracy given mod cues.   6. Pt will participate in further assessment of reading, writing, and visual spatial abilities.                             Plan:   Patient to be seen Therapy Frequency: 3 x/week   Planned Interventions: Cognitive-Linguistic Therapy  Plan of Care expires: 08/27/20  Plan of Care reviewed with: patient  SLP Follow-up?: Yes  SLP - Next Visit Date: 07/29/20           NEVIN Bourgeois, CCC-SLP  07/28/2020      NEVIN Bourgeois, CCC-SLP  Speech Language Pathologist  [phone]  7/28/2020

## 2020-07-28 NOTE — PLAN OF CARE
Problem: Physical Therapy Goal  Goal: Physical Therapy Goal  Description: Goals to be met by: 20     Patient will increase functional independence with mobility by performin. Supine to sit with Modified [place]  2. Sit to supine with Modified [place]  3. Sit to stand transfer with Supervision using RW or cane.  4. Bed to chair transfer with Supervision using Rolling Walker or straight cane.  5. Gait  x 150 feet with Supervision using Rolling Walker or straight cane.  6. Standing for 5 minutes while performing standing balance activity with SPV using RW or cane.    2020 1257 by Zaynab Leong PT  Outcome:  Error  2020 1250 by Zaynab Leong PT  Outcome: Ongoing, Progressing     Initial evaluation completed and plan of care established.    Zaynab Leong PT, DPT  2020

## 2020-07-28 NOTE — PLAN OF CARE
Problem: SLP Goal  Goal: SLP Goal  Description: Speech Language Pathology Goals  Goals expected to be met by 8/4:  1. Pt will orient to month, year, and place given external aids.  2. Pt will recall 2/3 related words after a 60 second delay given max cues to improve delayed memory.   3. Pt will immediately recall series of 4 digits with 80% accuracy given mod cues.   4. Pt will answer simple problem solving q's with 60% accuracy given mod-max cues.   5. Pt will complete word finding tasks with 70% accuracy given mod cues.   6. Pt will participate in further assessment of reading, writing, and visual spatial abilities.           Outcome: Ongoing, Progressing  Speech/language/cognitive evaluation completed.  SLP to follow 3x/wk as pt likely with premorbid cognitive deficits related to h/o multiple previous strokes and dementia.    NEVIN Bourgeois, CCC-SLP  Speech Language Pathologist  [phone]  7/28/2020

## 2020-07-28 NOTE — PLAN OF CARE
"   07/28/20 1345   Discharge Assessment   Assessment Type Discharge Planning Assessment   Confirmed/corrected address and phone number on facesheet? Yes   Assessment information obtained from? Caregiver   Expected Length of Stay (days) 14   Communicated expected length of stay with patient/caregiver yes   Current cognitive status: Not Oriented to Place   Current Functional Status: Needs Assistance   Lives With other relative(s)   Able to Return to Prior Arrangements yes   Is patient able to care for self after discharge? Unable to determine at this time (comments)   Patient's perception of discharge disposition home or selfcare   Readmission Within the Last 30 Days no previous admission in last 30 days   Patient currently being followed by outpatient case management? No   Patient currently receives any other outside agency services? No   Equipment Currently Used at Home none   Do you have any problems affording any of your prescribed medications? Yes   If yes, what medications? Elliquis   Is the patient taking medications as prescribed? yes   Does the patient have transportation home? Yes   Transportation Anticipated family or friend will provide   Does the patient receive services at the [facility]? No   Discharge Plan A Home Health   Discharge Plan B Home with family   DME Needed Upon Discharge  other (see comments)  (TBD)   Patient/Family in Agreement with Plan yes     CM conducted discharge planning assessment with Ms.Tracy Hobson (sister). Explained concerns about the patient possibly needing 24hr supervision. Per Candace, the patient lives with his nephew and his son lives down the street. Candace stated "We've been through this before". "I will talk to the rest of the family". "We will figure something out". Per Candace, the family is very supportive. CM will continue to follow up and assist with needs as indicated.    Trent Aldana MD      [id number], [place] - [address]  [address] [address]  Phone: [phone] Fax: [phone]    Payor: [employer] MANAGED MEDICARE / Plan: [place] [employer] / Product Type: Medicare Advantage /     Roseann Trejo RN Case Manager  [facility]

## 2020-07-28 NOTE — PLAN OF CARE
Problem: Diabetes Comorbidity  Goal: Blood Glucose Level Within Desired Range  Outcome: Ongoing, Progressing     Problem: Fall Injury Risk  Goal: Absence of Fall and Fall-Related Injury  Outcome: Ongoing, Progressing     Patient AAOx3, disoriented to time. VS stable, afebrile. No c/o pain. Skin intact. Fine motor skills limited. Cap refill <3 seconds. Expiratory wheezing, subsided with rest. Free from falls. Nonskid socks OOTB. Bed at lowest position, side rails up x3, and call light within reach. Patient verbalizes understanding of care plan and voices no concerns at this time.

## 2020-07-28 NOTE — PT/OT/SLP EVAL
Occupational Therapy  Evaluation / Treatment    Shant Magana Jr.   MRN: [id number]   Admitting Diagnosis:     OT Date of Treatment: 07/28/20   OT Start Time: 1140  OT Stop Time: 1235  OT Total Time (min): 55 min    Billable Minutes:  Evaluation 15  Self Care/Home Management 40    Diagnosis:    Past Medical History:   Diagnosis Date    Acute kidney injury superimposed on chronic kidney disease 10/14/2017    Cancer     prostate    Diabetes mellitus     Former smoker 7/18/2020    History of stroke 8/15/2017    Hypertension     Hypertrophic cardiomyopathy     Renal disorder     Stroke       Past Surgical History:   Procedure Laterality Date    BACK SURGERY      COLONOSCOPY N/A 10/18/2018    Procedure: COLONOSCOPY;  Surgeon: Alan Gooden MD;  Location: [facility];  Service: Endoscopy;  Laterality: N/A;    ESOPHAGOGASTRODUODENOSCOPY N/A 9/4/2018    Procedure: EGD (ESOPHAGOGASTRODUODENOSCOPY);  Surgeon: Oli Cuadra MD;  Location: [facility];  Service: Endoscopy;  Laterality: N/A;    ESOPHAGOGASTRODUODENOSCOPY N/A 10/18/2018    Procedure: EGD (ESOPHAGOGASTRODUODENOSCOPY);  Surgeon: Alan Gooden MD;  Location: [facility];  Service: Endoscopy;  Laterality: N/A;    THYROIDECTOMY           General Precautions: Standard, aspiration, fall, vision impaired  Orthopedic Precautions: N/A  Braces: N/A    Spiritual, Cultural Beliefs, Judaism Practices, Values that Affect Care: no     Patient History:  Lives With: child(michael), adult(son)  Living Arrangements: house  Home Accessibility: stairs to enter home(4 RALF)  Home Layout: Able to live on 1st floor  Stair Railings at Home: outside, present at both sides  Transportation Anticipated: family or friend will provide  Living Environment Comment: Pt lives in a single story home with his son. Home has 4 RALF and (B) HRs per his son in Medical record. Pt has tub shower and standard height toilet and was (I)ly ambulating and performing self care tasks. Son is able to  assist Post D/C.  Equipment Currently Used at Home: bath bench, bedside commode, cane, straight, walker, rolling    Prior level of function:    Bed Mobility/Transfers: independent  Grooming: independent  Bathing: independent  Upper Body Dressing: independent  Lower Body Dressing: independent  Toileting: independent  Driving License: No  Occupation: Retired  Type of Occupation: [employer]  Leisure and Hobbies: Fishing     Dominant hand: right    Subjective:  Communicated with nurse prior to session.    Chief Complaint: Pt with no complaints at time of Eval.  Patient/Family stated goals: Pt wants to return to PLOF.    Pain/Comfort  Pain Rating 1: 0/10  Pain Rating Post-Intervention 1: 0/10    Objective:   Patient found with: (no lines and supine in bed.)    Cognitive Exam:  Oriented to: Person  Follows Commands/attention: Follows one-step commands  Communication: clear/fluent  Memory:  Pt is questionable historian.  Safety awareness/insight to disability: impaired  Coping skills/emotional control: Appropriate to situation    Visual/perceptual:  Intact    Physical Exam:  Postural examination/scapula alignment:    -       Rounded shoulders  -       Posterior pelvic tilt  Skin integrity: Visible skin intact  Edema: None noted (B) UEs    Sensation:      -       Intact    Upper Extremity Range of Motion:  Right Upper Extremity: WFL except Shld flexion with AROM limited to 0-100 and A/AROM 0-120  Left Upper Extremity: WFL except Shld flexion with AROM limited to 0-90 and A/AROM 0-110    Upper Extremity Strength:  Right Upper Extremity: WFL except shld flexion and abduction with both limited to 3-/5   Left Upper Extremity: WFL except shld flexion and abduction with both limited to 3-/5    Strength: WFLS    Fine motor coordination:      -       Intact (B) UEs    Gross motor coordination: WFL    Occupational Performance:    Bed Mobility:    · Patient completed Rolling/Turning to Left with  minimum assistance  · Patient  completed Rolling/Turning to Right with minimum assistance  · Patient completed Scooting/Bridging with minimum assistance  · Patient completed Supine to Sit with moderate assistance     Functional Mobility/Transfers:  · Patient completed Sit <> Stand Transfer with minimum assistance  with  no assistive device   · Patient completed Bed <> Chair Transfer using Stand Pivot technique with moderate assistance with no assistive device  · Patient completed Toilet Transfer Stand Pivot technique with moderate assistance with  no AD    Activities of Daily Living:  · Feeding:  stand by assistance after set up.  · Grooming: stand by assistance after set up.  · Bathing: moderate assistance with (A) to wash rear and lower body with CGA when standing .  · Upper Body Dressing: minimum assistance donning pull over shirt.  · Lower Body Dressing: maximal assistance with (A) to don socks and to thread LEs into pant legs.   · Toileting: maximal assistance with (A) to clean rear, to steady when standing and to manage clothing up in back when standing.    Additional Treatment:  Pt edu on Plan of care,  safety when performing functional transfers, self care tasks and functional standing activities.  - White board updated  - Self care tasks completed-- as noted above     Patient left up in chair with call button in reach and nurse notified     [facility] 6 Click:  [facility] Total Score: 15    Assessment:  Shant Magana Jr. is a 80 y.o. male with a medical diagnosis of CVA.  Pt presents with performance deficits of Physical skills including impaired functional mobility, strength, functional endurance, fine and gross motor coordination, functional standing balance, and  functional use of ( B ) UEs with thumb finger opposition and manipulation of small objects.  . Pt also demonstrating decreased cognitive function affecting problem solving, safety and performance of Functional Activities, self care activities, as well as functional mobility. Pt is  motivated and would benefit from OT intervention to further his   functional (I)ce and safety.    Rehab identified problem list/impairments: weakness, impaired endurance, impaired self care skills, impaired functional mobilty, gait instability, impaired balance, impaired cognition, decreased upper extremity function, decreased safety awareness, decreased ROM, impaired fine motor    Rehab potential is good    Activity tolerance: Good    Discharge recommendations: home with home health     Barriers to discharge: Inaccessible home environment     Equipment recommendations: (TBD)     GOALS:   Multidisciplinary Problems     Occupational Therapy Goals        Problem: Occupational Therapy Goal    Goal Priority Disciplines Outcome Interventions   Occupational Therapy Goal     OT, PT/OT Ongoing, Progressing    Description: Goals to be met by: 8/11/20     Patient will increase functional independence with ADLs by performing:    Feeding with Modified [place].  UE Dressing with Supervision.  LE Dressing with Minimal Assistance.  Grooming while seated with Supervision.  Toileting from bedside commode with Contact Guard Assistance for hygiene and clothing management.   Bathing from  sitting at sink with Contact Guard Assistance.  Supine to sit with Modified [place].  Stand pivot transfers with Contact Guard Assistance using RW.  Upper extremity exercise program x10 reps per shld flexion and seated rows. assistance as needed.  Caregiver will be competent when assisting with self care tasks and functional transfers.                     PLAN: Patient to be seen 5 x/week to address the above listed problems via self-care/home management, therapeutic activities, therapeutic exercises, neuromuscular re-education, cognitive retraining  Plan of Care expires: 08/28/20  Plan of Care reviewed with: patient    Guanaco Culp, EDILMAR/L  07/28/2020

## 2020-07-28 NOTE — PROGRESS NOTES
"[facility] - Skilled Nursing Care  Adult Nutrition  Progress Note    SUMMARY   Recommendations  Consider assistance with meals, daily weights, MVI, boost glucose vanilla BID  Intervention: Continue diabetic cardiac diet with fluid restriction per MD,  Goals: PO to meet 85% of EEN/EPN by next RD follow up  Nutrition Goal Status: new  Communication of RD Recs: reviewed with physician    Reason for Assessment    Reason For Assessment: consult  Diagnosis: stroke/CVA  Relevant Medical History: DM2, CKD, AFIB, HLD, HF, HTN, CVA, Carotid stenosis, dementia  Interdisciplinary Rounds: did not attend  General Information Comments: lives with family, PO %, requires assistance with feeding per ST with aspiration precautions, fall risk, pt educated on heart healthy diet at [facility], record indicates pt reported he follows his diet at home. NFPE completed.  Nutrition Discharge Planning: DC on diabetic heart healthy diet, fluid per MD, fall risk.    Nutrition Risk Screen    Nutrition Risk Screen: dysphagia or difficulty swallowing    Nutrition/Diet History  Pt states he lives with son and daughter, that she cooks and he eats two meals per day. Denies wt changes.  Patient Reported Diet/Restrictions/Preferences: low salt, diabetic diet  Spiritual, Cultural Beliefs, Hinduism Practices, Values that Affect Care: no  Food Allergies: NKFA  Factors Affecting Nutritional Intake: impaired cognitive status/motor control    Anthropometrics    Temp: 97.8 °F (36.6 °C)  Height: 6' 1" (185.4 cm)  Height (inches): 73 in  Weight: 87.5 kg (192 lb 14.4 oz)  Weight (lb): 192.9 lb  Ideal Body Weight (IBW), Male: 184 lb  % Ideal Body Weight, Male (lb): 104.84 %  BMI (Calculated): 25.5  BMI Grade: 25 - 29.9 - overweight  Usual Body Weight (UBW), k.9 kg  % Usual Body Weight: 96.46  % Weight Change From Usual Weight: -3.74 %       Lab/Procedures/Meds    Pertinent Labs Reviewed: reviewed  Pertinent Labs Comments: HgA1c 5.8,  Pertinent " Medications Reviewed: reviewed  Pertinent Medications Comments: statin, Vit D, apixaban, tamsulosin, pantoprazole,         Estimated/Assessed Needs    Weight Used For Calorie Calculations: 87.5 kg (192 lb 14.4 oz)  Energy Calorie Requirements (kcal): 1966  Energy Need Method: [place]-St Jeor(x 1.2(PAL))  Protein Requirements: 105g-88(x 1.2-1.0)  Weight Used For Protein Calculations: 87.5 kg (192 lb 14.4 oz)  Fluid Requirements (mL): 1500 per MD     RDA Method (mL): 1966  CHO Requirement: 246g  Nutrition Prescription Ordered    Current Diet Order: 2000 diabetic diet, cardiac, 1500 ml fluid restriction  Nutrition Order Comments: PO %  Oral Nutrition Supplement: -    Evaluation of Received Nutrient/Fluid Intake    Energy Calories Required: meeting needs  Protein Required: not meeting needs  Fluid Required: meeting needs  Comments: LBM 7/28/20  Tolerance: tolerating  % Intake of Estimated Energy Needs: 75 - 100 %  % Meal Intake: 75 - 100 %    Nutrition Risk    Level of Risk/Frequency of Follow-up: low     Assessment and Plan  Self feeding difficulty related to cognitive deficits and risk of aspiration as evidenced by hx of CVA's, recommendations from Speech Therapy to assist with meals .  New    Plan  Carbohydrate controlled diet- 2000 calories  Fat and mineral modified diet- cardiac  Fluid restricted diet- 1500 ml    Commercial beverage- protein- Boost glucose BID vanilla    Recommend   Multivitamin/mineral therapy- MVI  Feeding assistance- set-up, positioning   Daily weights      Monitor and Evaluation    Food and Nutrient Intake: food and beverage intake  Food and Nutrient Adminstration: diet order  Physical Activity and Function: nutrition-related ADLs and IADLs  Anthropometric Measurements: weight change  Biochemical Data, Medical Tests and Procedures: glucose/endocrine profile, electrolyte and renal panel     Malnutrition Assessment 7/28/20     Skin (Micronutrient): dry  Hair/Scalp (Micronutrient): dry  Eyes  (Micronutrient): conjunctiva dry  Neck/Chest (Micronutrient): muscle wasting  Musculoskeletal/Lower Extremities: muscle wasting   Micronutrient Evaluation: suspected deficiency       Orbital Region (Subcutaneous Fat Loss): mild depletion  Upper Arm Region (Subcutaneous Fat Loss): mild depletion  Thoracic and Lumbar Region: well nourished   Confucianism Region (Muscle Loss): mild depletion  Clavicle Bone Region (Muscle Loss): moderate depletion  Clavicle and Acromion Bone Region (Muscle Loss): mild depletion  Dorsal Hand (Muscle Loss): mild depletion  Posterior Calf Region (Muscle Loss): well nourished                 Nutrition Follow-Up    RD Follow-up?: Yes

## 2020-07-28 NOTE — NURSING
Patient arrived to room 528 by transport via wheelchair. Stable, 2 person assist. Will continue to monitor.

## 2020-07-28 NOTE — PT/OT/SLP DISCHARGE
Occupational Therapy Discharge Summary    Shant Magana Jr.  MRN: [id number]   Principal Problem: Embolic stroke involving right middle cerebral artery      Patient Discharged from acute Occupational Therapy on 7/27/2020.  Please refer to prior OT note dated 7/27/2020 for functional status.    Assessment:      Patient has not met goals.    Objective:     GOALS:   Multidisciplinary Problems     Occupational Therapy Goals        Problem: Occupational Therapy Goal    Goal Priority Disciplines Outcome Interventions   Occupational Therapy Goal     OT, PT/OT Ongoing, Progressing    Description: Goals to be met by: 7/29     Patient will increase functional independence with ADLs by performing:    Feeding with Modified [place].  UE Dressing with Minimal Assistance.  LE Dressing (pants, brief) with Moderate Assistance.  Grooming while standing with Supervision.  Supine to sit with Supervision.  Step transfer with Supervision.  Functional mobility at household distance for ADL task with min(A) and AD as needed.                     Reasons for Discontinuation of Therapy Services  Transfer to alternate level of care.      Plan:     Patient Discharged to: Skilled Nursing Facility    JASPER Caro  7/28/2020

## 2020-07-28 NOTE — HPI
"Mr. Chino Gomez is a 80 year old male with PMHx of dementia, multiple strokes, intracranial and extracranial atherosclerosis, DM, HTN, a fib (eliquis with history of noncompliance due to cost) who presents to SNF following hospitalization for embolic stroke involving right MCA.  Admission to SNF for secondary weakness and debility. [place] [facility]  AMS x 2 days. Per EMS, family reported that patient "has not been himself." ED provider noted LSW and L hemianopsia on exam and stroke team was consulted. Stroke provider contacted patient's son (Hoda) for more information and history. His son noticed a change in behavior yesterday around 2pm. He stated the patient seemed more confused and was walking around aimlessly. On 7/18  the patient called his son and his son went to check on him. The patient was at home with his grandson and he had fallen between the bed and the dresser. His son noticed that he was inattentive with a facial droop and delayed verbal response. Patient was admitted with acute encephalopathy x 2 days. MRI brain showed R parietal infarct. MRA negative for LVO or significant stenosis. Etiology likely cardioembolic. Home apixaban 2.5mg resumed without complications. Patient had hypoxia during admission requiring 2L NC. CXR appeared congested. Initially diuresed with no improvement. Significant expiratory wheezing on exam. Improved with scheduled duonebs. Given smoking history and clinical picture, ambulatory referral was placed to Pulmonology for evaluation of obstructive lung disease and PFTs. PT/OT consulted, recommended SNF.     PEx  Constitutional: Patient appears debilitated.  No distress noted  HENT:   Head: Normocephalic and atraumatic.   Eyes: Pupils are equal, round  Neck: Normal range of motion. Neck supple.   Cardiovascular: Normal rate, regular rhythm and normal heart sounds.    Pulmonary/Chest: Effort normal and breath sounds are clear  Abdominal: Soft. Bowel " sounds are normal.   Musculoskeletal: Normal range of motion.   Neurological: Alert and oriented to person, place, disoriented to time.  Higher level thinking impaired.  Patient has partial hemianopsia to his right eye, no facial asymmetry. no sensory abnormality.  No aphasia or dysarthria noted.  Psychiatric: Normal mood and affect. Behavior is normal.   Skin: Skin is warm and dry.

## 2020-07-28 NOTE — NURSING
Patient is DC and will transfer to [facility] [phone] report given to Nurse Piña and accepted. Transport scheduled to  at 800pm.

## 2020-07-28 NOTE — PT/OT/SLP EVAL
PhysicalTherapy   Evaluation    Shant Magana Jr.   MRN: [id number]     PT Received On: 07/28/20  PT Start Time: 1424     PT Stop Time: 1502    PT Total Time (min): 38 min       Billable Minutes:  Evaluation 10, Gait Training 15 and Therapeutic Activity 13    Diagnosis: CVA  Past Medical History:   Diagnosis Date    Acute kidney injury superimposed on chronic kidney disease 10/14/2017    Cancer     prostate    Diabetes mellitus     Former smoker 7/18/2020    History of stroke 8/15/2017    Hypertension     Hypertrophic cardiomyopathy     Renal disorder     Stroke       Past Surgical History:   Procedure Laterality Date    BACK SURGERY      COLONOSCOPY N/A 10/18/2018    Procedure: COLONOSCOPY;  Surgeon: Alan Gooden MD;  Location: [facility];  Service: Endoscopy;  Laterality: N/A;    ESOPHAGOGASTRODUODENOSCOPY N/A 9/4/2018    Procedure: EGD (ESOPHAGOGASTRODUODENOSCOPY);  Surgeon: Oli Cuadra MD;  Location: [facility];  Service: Endoscopy;  Laterality: N/A;    ESOPHAGOGASTRODUODENOSCOPY N/A 10/18/2018    Procedure: EGD (ESOPHAGOGASTRODUODENOSCOPY);  Surgeon: Alan Gooden MD;  Location: [facility];  Service: Endoscopy;  Laterality: N/A;    THYROIDECTOMY           General Precautions: Standard, fall  Orthopedic Precautions:     Braces:      Spiritual, Cultural Beliefs, Caodaism Practices, Values that Affect Care: no    Patient History:  Lives With: child(michael), adult  Living Arrangements: house  Home Accessibility: stairs to enter home  Home Layout: Able to live on 1st floor  Stair Railings at Home: outside, present at both sides  Transportation Anticipated: family or friend will provide  Equipment Currently Used at Home: none  DME owned (not currently used): rolling walker, shower chair and transfer tub bench    Previous Level of Function:  Ambulation Skills: independent  Transfer Skills: independent  ADL Skills: independent  Work/Leisure Activity: independent   Pt lives with his son in a single story  home with 4 steps to enter (B handrails). He does not drive. Pt reports being independent with no device prior to admission.     Subjective:  Communicated with nursing prior to session.    Chief Complaint: none  Patient goals: To go home    Pain/Comfort  Pain Rating 1: 0/10  Pain Rating Post-Intervention 1: 0/10    Objective:  Patient found with PCT changing pants.    Cognitive Exam:  Not oriented to time or place.  Follows Commands/attention: Easily distracted and requires constant verbal and tactile cueing to follow commands.  Communication: clear/fluent  Safety awareness/insight to disability: impaired and impulsive  Pt demonstrated very slow processing.     Physical Exam:  Postural examination/scapula alignment:    -       No postural abnormalities identified    Skin integrity: Visible skin intact  Edema: None noted     Sensation:      -       Intact  light/touch B UEs and LEs    Upper Extremity Range of Motion:  Right Upper Extremity: WFL  Left Upper Extremity: WFL    Upper Extremity Strength:  Right Upper Extremity: WFL  Left Upper Extremity: Shoulder 3-/5    Lower Extremity Range of Motion:  Right Lower Extremity: WFL  Left Lower Extremity: WFL    Lower Extremity Strength:  Right Lower Extremity: WFL  Left Lower Extremity: WFL     Gross motor coordination: UE - finger to nose intact B; LE - alternating toe taps impaired      AM-PAC 6 CLICK MOBILITY  Total Score:20    Bed Mobility:  Sit>Supine:SPV  Supine>Sit: SPV    Transfers:  Sit<>Stand: min A with RW and no AD  Stand Pivot Transfer: min-mod A with RW and no AD    Gait:  Amb: ~30 feet with and without RW. Pt required min-mod A at times. He demonstrated a very narrow DANIEL, and required constant cueing for direction, sequencing, and safety.       Therex:  Pt performed standing and seated LE strengthening exercises, but needed constant verbal and tactile cueing to stay on task.    Additional Treatment:  Pt educated on PT plan of care, goals, in-room safety, and  use of call button.    Patient left up in chair with call button in reach, chair alarm on and nursing notified.    Assessment:  Shant Magana Jr. is a 80 y.o. male with a medical diagnosis of CVA. He has some mild residual left sided weakness. Pt is currently most limited by poor safety awareness, decreased coordination, and decreased processing. Today, they required SPV for bed mobility, min-mod A for transfers, and min-mod A for ambulation. He would do best practicing functional mobility with a rolling walker.  Pt would benefit from skilled physical therapy to improve functional mobility and return to max capacity prior level of function. See detailed evaluation below:      Rehab identified problem list/impairments: weakness, impaired endurance, impaired self care skills, impaired functional mobilty, gait instability, impaired balance, impaired cognition, decreased coordination, decreased safety awareness    Rehab potential is good.    Activity tolerance: Good    Discharge recommendations: home with home health( supervision)     Barriers to discharge: Inaccessible home environment    Equipment recommendations: walker, rolling     GOALS:   Multidisciplinary Problems         Multidisciplinary Problems (Resolved)        Problem: Physical Therapy Goal    Goal Priority Disciplines Outcome Goal Variances Interventions   Physical Therapy Goal   (Resolved)     PT, PT/OT  Error     Description: Goals to be met by: 20     Patient will increase functional independence with mobility by performin. Supine to sit with Modified [place]  2. Sit to supine with Modified [place]  3. Sit to stand transfer with Supervision using RW or cane.  4. Bed to chair transfer with Supervision using Rolling Walker or straight cane.  5. Gait  x 150 feet with Supervision using Rolling Walker or straight cane.  6. Standing for 5 minutes while performing standing balance activity with SPV using RW or cane.                      PLAN:    Patient to be seen 5 x/week  to address the above listed problems via gait training, therapeutic activities, therapeutic exercises, neuromuscular re-education  Plan of Care Expires: 08/27/20    Zaynab Leong, PT 7/28/2020

## 2020-07-28 NOTE — HOSPITAL COURSE
Patient progressed well with PT and OT. Patient had no significant events during their stay at SNF. Home health was set up. DME was ordered if needed. Follow up appointment to be made by patient within one week. All prescriptions and discharge instructions were ordered to be given to the patient prior to discharge.

## 2020-07-29 PROCEDURE — 94640 AIRWAY INHALATION TREATMENT: CPT

## 2020-07-29 PROCEDURE — 99900035 HC TECH TIME PER 15 MIN (STAT)

## 2020-07-29 PROCEDURE — 97530 THERAPEUTIC ACTIVITIES: CPT

## 2020-07-29 PROCEDURE — 25000003 PHARM REV CODE 250: Performed by: NURSE PRACTITIONER

## 2020-07-29 PROCEDURE — 97129 THER IVNTJ 1ST 15 MIN: CPT

## 2020-07-29 PROCEDURE — 97535 SELF CARE MNGMENT TRAINING: CPT

## 2020-07-29 PROCEDURE — 11000004 HC SNF PRIVATE

## 2020-07-29 PROCEDURE — 25000003 PHARM REV CODE 250: Performed by: STUDENT IN AN ORGANIZED HEALTH CARE EDUCATION/TRAINING PROGRAM

## 2020-07-29 PROCEDURE — 97116 GAIT TRAINING THERAPY: CPT

## 2020-07-29 PROCEDURE — 94761 N-INVAS EAR/PLS OXIMETRY MLT: CPT

## 2020-07-29 PROCEDURE — 25000242 PHARM REV CODE 250 ALT 637 W/ HCPCS: Performed by: NURSE PRACTITIONER

## 2020-07-29 PROCEDURE — 94799 UNLISTED PULMONARY SVC/PX: CPT

## 2020-07-29 PROCEDURE — 97130 THER IVNTJ EA ADDL 15 MIN: CPT

## 2020-07-29 RX ORDER — LOPERAMIDE HYDROCHLORIDE 2 MG/1
2 CAPSULE ORAL 4 TIMES DAILY PRN
Status: DISCONTINUED | OUTPATIENT
Start: 2020-07-29 | End: 2020-08-11 | Stop reason: HOSPADM

## 2020-07-29 RX ORDER — ONDANSETRON 4 MG/1
4 TABLET, ORALLY DISINTEGRATING ORAL EVERY 12 HOURS PRN
Status: DISCONTINUED | OUTPATIENT
Start: 2020-07-29 | End: 2020-08-11 | Stop reason: HOSPADM

## 2020-07-29 RX ADMIN — LOPERAMIDE HYDROCHLORIDE 2 MG: 2 CAPSULE ORAL at 08:07

## 2020-07-29 RX ADMIN — ATORVASTATIN CALCIUM 80 MG: 20 TABLET, FILM COATED ORAL at 08:07

## 2020-07-29 RX ADMIN — IPRATROPIUM BROMIDE AND ALBUTEROL SULFATE 3 ML: .5; 3 SOLUTION RESPIRATORY (INHALATION) at 07:07

## 2020-07-29 RX ADMIN — VITAMIN D, TAB 1000IU (100/BT) 1000 UNITS: 25 TAB at 08:07

## 2020-07-29 RX ADMIN — GUAIFENESIN AND DEXTROMETHORPHAN HYDROBROMIDE 1 TABLET: 600; 30 TABLET, EXTENDED RELEASE ORAL at 09:07

## 2020-07-29 RX ADMIN — IPRATROPIUM BROMIDE AND ALBUTEROL SULFATE 3 ML: .5; 3 SOLUTION RESPIRATORY (INHALATION) at 01:07

## 2020-07-29 RX ADMIN — LOSARTAN POTASSIUM 100 MG: 50 TABLET, FILM COATED ORAL at 08:07

## 2020-07-29 RX ADMIN — TAMSULOSIN HYDROCHLORIDE 0.4 MG: 0.4 CAPSULE ORAL at 08:07

## 2020-07-29 RX ADMIN — APIXABAN 2.5 MG: 2.5 TABLET, FILM COATED ORAL at 09:07

## 2020-07-29 RX ADMIN — GUAIFENESIN AND DEXTROMETHORPHAN HYDROBROMIDE 1 TABLET: 600; 30 TABLET, EXTENDED RELEASE ORAL at 08:07

## 2020-07-29 RX ADMIN — PANTOPRAZOLE SODIUM 40 MG: 40 TABLET, DELAYED RELEASE ORAL at 08:07

## 2020-07-29 RX ADMIN — APIXABAN 2.5 MG: 2.5 TABLET, FILM COATED ORAL at 08:07

## 2020-07-29 RX ADMIN — THERA TABS 1 TABLET: TAB at 08:07

## 2020-07-29 NOTE — PT/OT/SLP PROGRESS
Physical Therapy  Treatment    Shant Magana Jr.   MRN: [id number]   Admitting Diagnosis: Embolic stroke involving right middle cerebral artery    PT Received On: 07/29/20          Billable Minutes:  Gait Training 24 and Therapeutic Activity 14 Total Time: 38 minutes    Treatment Type: Treatment  PT/PTA: PT     PTA Visit Number: 0       General Precautions: Standard, aspiration, fall    Spiritual, Cultural Beliefs, Hoahaoism Practices, Values that Affect Care: no    Subjective:  Communicated with nursing prior to session.    Pain/Comfort  Pain Rating 1: 0/10  Pain Rating Post-Intervention 1: 0/10    Objective:  Patient found lying in bed. [employer] in room.     AM-PAC 6 CLICK MOBILITY  Total Score:17    Bed Mobility:  Rolling L: SPV  Rolling R: min A  Sit>Supine: SPV  Supine>Sit: SPV    Transfers:  Sit<>Stand: min A with RW  Stand Pivot Transfer: min A with RW    Gait:  Amb: ~50 feet and ~30 feet with min A using a RW. He demonstrated a narrow DANIEL, almost scissoring gait pattern. He needed constant verbal and tactile cueing for safety, direction, and gait sequencing.       Therex:  Pt performed seated and supine LE strengthening exercises including: LAQ, marching, heel raises, toe raises, adductor squeeze, straight leg raises, heel slides. He had trouble staying on task and needed max cueing to complete the exercises.  Attempted the LBE with patient today. He was unable to pedal, despite max support and cueing. He is unable to process the movements.    Additional Treatment:  Pt educated on use of call button and in-room safety. Pt performed multiple sit <> stands today from various surfaces and heights with min A using a RW.    Patient left HOB elevated with call button in reach and [employer] in room.    Assessment:  Shant Magana Jr. is a 80 y.o. male with a medical diagnosis of Embolic stroke involving right middle cerebral artery.  Pt tolerated PT treatment fairly today. He was able to ambulate about 50 feet in the room, but was  very unsteady and unsafe throughout. He needed max verbal and tactile cueing throughout the session for direction, safety, staying on task, and sequencing. Pt had a BM during the session, and had also had a BM in his brief before the session began. Pt was cleaned up and changed, requiring max A for hygiene.     Rehab identified problem list/impairments: impaired self care skills, impaired functional mobilty, gait instability, impaired balance, impaired cognition, decreased safety awareness, impaired coordination, impaired fine motor    Rehab potential is good.    Activity tolerance: Good    Discharge recommendations: home with home health( supervision/assistance)     Barriers to discharge: Inaccessible home environment    Equipment recommendations: walker, rolling     GOALS:   Multidisciplinary Problems     Physical Therapy Goals        Problem: Physical Therapy Goal    Goal Priority Disciplines Outcome Goal Variances Interventions   Physical Therapy Goal     PT, PT/OT Ongoing, Progressing     Description: Goals to be met by: 20     Patient will increase functional independence with mobility by performin. Sit to stand transfer with Supervision  2. Bed to chair transfer with Supervision using Rolling Walker  3. Gait  x 150 feet with Contact Guard Assistance using Rolling Walker.   4. Ascend/descend 4 stair with bilateral Handrails Contact Guard Assistance  5. Stand for 10 minutes with Supervision using Rolling Walker performing a standing activity.               Multidisciplinary Problems (Resolved)        Problem: Physical Therapy Goal    Goal Priority Disciplines Outcome Goal Variances Interventions   Physical Therapy Goal   (Resolved)     PT, PT/OT  Error     Description: Goals to be met by: 20     Patient will increase functional independence with mobility by performin. Supine to sit with Modified [place]  2. Sit to supine with Modified [place]  3. Sit to stand  transfer with Supervision using RW or cane.  4. Bed to chair transfer with Supervision using Rolling Walker or straight cane.  5. Gait  x 150 feet with Supervision using Rolling Walker or straight cane.  6. Standing for 5 minutes while performing standing balance activity with SPV using RW or cane.                     PLAN:    Patient to be seen 5 x/week  to address the above listed problems via gait training, therapeutic activities, therapeutic exercises, neuromuscular re-education  Plan of Care expires: 08/27/20  Plan of Care reviewed with: patient    Zaynab TIP Leong, PT  07/29/2020

## 2020-07-29 NOTE — PLAN OF CARE
Problem: SLP Goal  Goal: SLP Goal  Description: Speech Language Pathology Goals  Goals expected to be met by 8/4:  1. Pt will orient to month, year, and place given external aids.  2. Pt will recall 2/3 related words after a 60 second delay given max cues to improve delayed memory.   3. Pt will immediately recall series of 4 digits with 80% accuracy given mod cues.   4. Pt will answer simple problem solving q's with 60% accuracy given mod-max cues.   5. Pt will complete word finding tasks with 70% accuracy given mod cues.   6. Pt will participate in further assessment of reading, writing, and visual spatial abilities.           Outcome: Ongoing, Progressing  Reading abilities further assessed.  Pt inconsistently able to read or recognize singe words.  Cont POC.   NEVIN Bourgeois, CCC-SLP  Speech Language Pathologist  [phone]  7/29/2020

## 2020-07-29 NOTE — PT/OT/SLP PROGRESS
Occupational Therapy  Treatment    Shant Magana Jr.   MRN: [id number]   Admitting Diagnosis: Embolic stroke involving right middle cerebral artery    OT Date of Treatment: 07/29/20       Billable Minutes:  Self Care/Home Management 45    General Precautions: Standard, aspiration, fall  Orthopedic Precautions: N/A  Braces: N/A    Spiritual, Cultural Beliefs, Catholic Practices, Values that Affect Care: no    Subjective:  Communicated with nurse prior to session.    Pain/Comfort  Pain Rating 1: 0/10  Pain Rating Post-Intervention 1: 0/10    Objective:  Patient found with: (no lines and supine with AVASYS camera in place. )    Occupational Performance:    Bed Mobility:    · Patient completed Rolling/Turning to Left with  stand by assistance  · Patient completed Rolling/Turning to Right with stand by assistance  · Patient completed Scooting/Bridging with minimum assistance  · Patient completed Supine to Sit with minimum assistance  · Patient completed Sit to Supine with minimum assistance     Functional Mobility/Transfers:  · Patient completed Sit <> Stand Transfer with minimum assistance  with  rolling walker   · Patient completed Bed <> Chair Transfer using Stand Pivot technique with moderate assistance with rolling walker  · Functional Mobility: Pt ambulated 6 ft from EOB to W/C with Mod (A) and V/Cs . Pt demonstrating difficulty coordinating advancing of LEs when ambulating.    Activities of Daily Living:  · Feeding:  set up needed for Pt to initiate eating. .  · Grooming: stand by assistance and set up required with grooming performed seated sinkside.  · Upper Body Dressing: minimum assistance with (A) to pull shirt down in back with Pt donning pullover shirt.  · Lower Body Dressing: maximal assistance with (A) to doff and don socks as well as to thread feet into pant legs and t steady when standing with RW to pull pants over his bottom.   · Toileting: maximal assistance Pt able to assist with cleaning front alfonzo area  and pull pants up in front but needing (A) to maintain standing with RW as well as to clean rear and pull pants up in back.    Additional Treatment:  Pt edu on Plan of care,  safety when performing functional transfers, self care tasks and functional standing activities.  - White board updated  - Self care tasks completed-- as noted above     Patient left up in chair with call button in reach    [facility] 6 Click:  [facility] Total Score: 15    ASSESSMENT:  Shant Magana Jr. is a 80 y.o. male with a medical diagnosis of Embolic stroke involving right middle cerebral artery .  Pt tolerated Tx without incident but is requiring (A) to perform self care tasks, functional mobility and functional transfers . Pt is demonstrating difficulty sequencing activities as well as problem solving when experiencing difficulty performing dressing tasks and functional transfers..  He would continue to benefit from OT intervention to further his functional (I)ce and safety.    Rehab identified problem list/impairments: weakness, impaired endurance, impaired self care skills, impaired functional mobilty, gait instability, impaired balance, impaired cognition, decreased upper extremity function, decreased safety awareness, decreased ROM, impaired fine motor    Rehab potential is good    Activity tolerance: Fair    Discharge recommendations: home with home health     Barriers to discharge: Inaccessible home environment     Equipment recommendations: (TBD)     GOALS:   Multidisciplinary Problems     Occupational Therapy Goals        Problem: Occupational Therapy Goal    Goal Priority Disciplines Outcome Interventions   Occupational Therapy Goal     OT, PT/OT Ongoing, Progressing    Description: Goals to be met by: 8/11/20     Patient will increase functional independence with ADLs by performing:    Feeding with Modified [place].  UE Dressing with Supervision.  LE Dressing with Minimal Assistance.  Grooming while seated with  Supervision.  Toileting from bedside commode with Contact Guard Assistance for hygiene and clothing management.   Bathing from  sitting at sink with Contact Guard Assistance.  Supine to sit with Modified [place].  Stand pivot transfers with Contact Guard Assistance using RW.  Upper extremity exercise program x10 reps per shld flexion and seated rows. assistance as needed.  Caregiver will be competent when assisting with self care tasks and functional transfers.                     Plan:  Patient to be seen 5 x/week to address the above listed problems via self-care/home management, therapeutic activities, therapeutic exercises, neuromuscular re-education, cognitive retraining  Plan of Care expires: 08/28/20  Plan of Care reviewed with: patient    Guanaco Culp, OTR/L  07/29/2020

## 2020-07-29 NOTE — CLINICAL REVIEW
Clinical Pharmacy Chart Review Note      Admit Date: 7/27/2020   LOS: 2 days       Shant Magana Jr. is a 80 y.o. male admitted to SNF for PT/OT after hospitalization for embolic stroke involving right middle cerebral artery.    Active Hospital Problems    Diagnosis  POA    *Embolic stroke involving right middle cerebral artery [I63.411]  Yes    Stroke [I63.9]  Yes    Paroxysmal atrial fibrillation [I48.0]  Yes    Status post placement of implantable loop recorder [Z95.818]  Yes    Weakness [R53.1]  Yes    Chronic diastolic congestive heart failure [I50.32]  Yes     Managed by Dr. Salcedo      Internal carotid artery stenosis, left [I65.22]  Yes    Mixed hyperlipidemia [E78.2]  Yes    CKD (chronic kidney disease) stage 3, GFR 30-59 ml/min [N18.3]  Yes     Managed by Dr. Orozco      Cytotoxic cerebral edema [G93.6]  Yes    Acute encephalopathy [G93.40]  Yes    Type 2 diabetes mellitus with complication, without long-term current use of insulin [E11.8]  Yes     Managed by Dr. Orozco      Essential hypertension [I10]  Yes      Resolved Hospital Problems   No resolved problems to display.     Review of patient's allergies indicates:  No Known Allergies  Patient Active Problem List    Diagnosis Date Noted    Stroke 07/27/2020    Embolic stroke involving right middle cerebral artery 07/18/2020    Former smoker 07/18/2020    Chronic heart failure with preserved ejection fraction     Benign prostatic hyperplasia     Stroke-like symptom 12/10/2019    Lactic acidosis 12/10/2019    Dementia without behavioral disturbance 12/10/2019    Orthostatic hypotension 10/01/2019    Paroxysmal atrial fibrillation 02/12/2019    Status post placement of implantable loop recorder 02/12/2019    Hyperparathyroidism 02/12/2019    Gastritis     Polyp of colon     Melena 10/18/2018    Symptomatic anemia 09/03/2018    Acute upper GI bleed 09/03/2018    Anemia     Weakness     Chronic diastolic congestive heart  failure 10/19/2017    Enlarged LA (left atrium) 10/19/2017    Internal carotid artery stenosis, left 10/18/2017    Mixed hyperlipidemia 10/18/2017    CKD (chronic kidney disease) stage 3, GFR 30-59 ml/min 10/18/2017    Cytotoxic cerebral edema 10/18/2017    Cryptogenic stroke 10/16/2017    Acute encephalopathy 10/11/2017    History of stroke 08/15/2017    Type 2 diabetes mellitus with complication, without long-term current use of insulin     Essential hypertension 06/07/2017    Bradycardia 06/07/2017       Scheduled Meds:    albuterol-ipratropium  3 mL Nebulization Q6H WAKE    apixaban  2.5 mg Oral BID    atorvastatin  80 mg Oral Daily    dextromethorphan-guaifenesin  mg  1 tablet Oral BID    losartan  100 mg Oral Daily    multivitamin  1 tablet Oral Daily    pantoprazole  40 mg Oral Daily    senna-docusate 8.6-50 mg  1 tablet Oral BID    tamsulosin  0.4 mg Oral Daily    vitamin D  1,000 Units Oral Daily     Continuous Infusions:   PRN Meds: acetaminophen, albuterol sulfate, calcium carbonate, melatonin, ondansetron, polyethylene glycol    OBJECTIVE:     Vital Signs (Last 24H)  Temp:  [97.8 °F (36.6 °C)-99.2 °F (37.3 °C)]   Pulse:  [60-65]   Resp:  [16-20]   BP: (144-147)/(73-81)   SpO2:  [94 %-96 %]     Laboratory:  CBC:   Recent Labs   Lab 07/23/20  0826 07/25/20  0803 07/27/20  0754   WBC 4.87 5.01 4.75   RBC 4.37* 4.98 4.90   HGB 12.7* 14.7 14.2   HCT 39.7* 44.4 44.2    232 211   MCV 91 89 90   MCH 29.1 29.5 29.0   MCHC 32.0 33.1 32.1     BMP:   Recent Labs   Lab 07/24/20  0906 07/25/20  0803 07/27/20  0754    106 110    140 140   K 4.0 3.8 4.2    106 111*   CO2 21* 24 20*   BUN 31* 31* 28*   CREATININE 1.5* 1.5* 1.4   CALCIUM 10.9* 10.2 10.9*     CMP:   Recent Labs   Lab 07/24/20  0906 07/25/20  0803 07/27/20  0754    106 110   CALCIUM 10.9* 10.2 10.9*    140 140   K 4.0 3.8 4.2   CO2 21* 24 20*    106 111*   BUN 31* 31* 28*   CREATININE  1.5* 1.5* 1.4     Lab Results   Component Value Date    HGBA1C 5.8 (H) 07/18/2020         ASSESSMENT/PLAN:     I have reviewed the medications in compliance with CMS Regulation F756 of the [place]. Based on information gathered, the following items may need to be addressed:    **According to PMH and home medication list, patient takes the following medications at home. These medications are not currently ordered at SNF:  · Amlodipine 10 mg daily    Medications reviewed by PharmD, please re-consult if needed.      Melina James, Pharm. D.  Clinical Pharmacist  [facility]

## 2020-07-29 NOTE — PT/OT/SLP PROGRESS
"Speech Language Pathology  Treatment    Shant Magana Jr.   MRN: [id number]   Admitting Diagnosis: Embolic stroke involving right middle cerebral artery    Diet recommendations: Solid Diet Level: Regular  Liquid Diet Level: Thin set-up Assistance with meals, HOB to 90 degrees, Monitor for s/s of aspiration and Standard aspiration precautions    SLP Treatment Date: 07/29/20  Speech Start Time: 0807     Speech Stop Time: 0839     Speech Total (min): 32 min       TREATMENT BILLABLE MINUTES:  Speech Therapy Individual (cognitive theapy, 2 units) 26 and Seld Care/Home Management Training 8           General Precautions: Standard, aspiration, fall, vision impaired  Current Respiratory Status: room air       Subjective:  "I had a small stroke."       Objective:      Pt noted to have consumed 100% of breakfast upon entry this AM. Pt having difficulty opening juice cup.  SLP assisted with opening. Pt consumed 4oz juice and cup of coffee without overt s/s of aspiration. Nurse came to administer meds. Pt swallowed multiple pills at once with straw sips of water.  Cough x 2 observed afterwards, possibly due to decreased coordination with multiple pills and requirement of larger sip to clear oral cavity.  Will continue to monitor.  Pt was better oriented to situation on this service date.  An external aid was required to orient to year and month given additional cues for month (spelling).  Pt was not oriented to place despite external aid and cues.  Pt was single words on 4/6 trials given mod-max cues.  Pt recalled 3/3 related words after a 1 minute delay following use of memory strategies. He was unable to recall these words following an additional 2 minute delay.  Pt immediately recalled 4 digit series with 80% accuracy ind'ly/100% given A.  Pt stated effects of problem situations with 75% accuracy ind'ly/95% given cues.  [place] convergent categorization tasks were completed with 60% accuracy ind'ly/90% given cues.  Education " provided to pt regarding role of SLP, ongoing assessment of reading and visual spatial abilities, increasing attention to left, orientation and memory tasks, memory strategies, decreasing risks of falling, and SLP treatment plan and POC.  Pt nodding in response to education, but will benefit from continued reinforcement.     Assessment:  Shant Magana Jr. is a 80 y.o. male with a medical diagnosis of Embolic stroke involving right middle cerebral artery and presents with cognitive-linguistic and visual spatial deficits.     Discharge recommendations: Discharge Facility/Level of Care Needs: home health speech therapy(24/7 supervision)     Goals:   Multidisciplinary Problems     SLP Goals        Problem: SLP Goal    Goal Priority Disciplines Outcome   SLP Goal     SLP Ongoing, Progressing   Description: Speech Language Pathology Goals  Goals expected to be met by 8/4:  1. Pt will orient to month, year, and place given external aids.  2. Pt will recall 2/3 related words after a 60 second delay given max cues to improve delayed memory.   3. Pt will immediately recall series of 4 digits with 80% accuracy given mod cues.   4. Pt will answer simple problem solving q's with 60% accuracy given mod-max cues.   5. Pt will complete word finding tasks with 70% accuracy given mod cues.   6. Pt will participate in further assessment of reading, writing, and visual spatial abilities.                             Plan:   Patient to be seen Therapy Frequency: 3 x/week  Planned Interventions: Cognitive-Linguistic Therapy  Plan of Care expires: 08/27/20  Plan of Care reviewed with: patient  SLP Follow-up?: Yes  SLP - Next Visit Date: 07/31/20           NEVIN Bourgeois CCC-SLP  07/29/2020     NEVIN Bourgeois CCC-SLP  Speech Language Pathologist  [phone]  7/29/2020

## 2020-07-29 NOTE — PLAN OF CARE
Problem: Occupational Therapy Goal  Goal: Occupational Therapy Goal  Description: Goals to be met by: 8/11/20     Patient will increase functional independence with ADLs by performing:    Feeding with Modified [place].  UE Dressing with Supervision.  LE Dressing with Minimal Assistance.  Grooming while seated with Supervision.  Toileting from bedside commode with Contact Guard Assistance for hygiene and clothing management.   Bathing from  sitting at sink with Contact Guard Assistance.  Supine to sit with Modified [place].  Stand pivot transfers with Contact Guard Assistance using RW.  Upper extremity exercise program x10 reps per shld flexion and seated rows. assistance as needed.  Caregiver will be competent when assisting with self care tasks and functional transfers.    Outcome: Ongoing, Progressing

## 2020-07-30 ENCOUNTER — TELEPHONE (OUTPATIENT)
Dept: PHARMACY | Facility: CLINIC | Age: 80
End: 2020-07-30

## 2020-07-30 ENCOUNTER — LAB VISIT (OUTPATIENT)
Dept: LAB | Facility: OTHER | Age: 80
End: 2020-07-30
Attending: HOSPITALIST
Payer: MEDICARE

## 2020-07-30 DIAGNOSIS — Z03.818 ENCOUNTER FOR OBSERVATION FOR SUSPECTED EXPOSURE TO OTHER BIOLOGICAL AGENTS RULED OUT: ICD-10-CM

## 2020-07-30 DIAGNOSIS — Z20.822 SUSPECTED COVID-19 VIRUS INFECTION: ICD-10-CM

## 2020-07-30 LAB — 25(OH)D3+25(OH)D2 SERPL-MCNC: 35 NG/ML (ref 30–96)

## 2020-07-30 PROCEDURE — 97542 WHEELCHAIR MNGMENT TRAINING: CPT

## 2020-07-30 PROCEDURE — 97110 THERAPEUTIC EXERCISES: CPT | Mod: CO

## 2020-07-30 PROCEDURE — 97535 SELF CARE MNGMENT TRAINING: CPT | Mod: CO

## 2020-07-30 PROCEDURE — 97530 THERAPEUTIC ACTIVITIES: CPT

## 2020-07-30 PROCEDURE — 94799 UNLISTED PULMONARY SVC/PX: CPT

## 2020-07-30 PROCEDURE — 82306 VITAMIN D 25 HYDROXY: CPT

## 2020-07-30 PROCEDURE — 25000003 PHARM REV CODE 250: Performed by: STUDENT IN AN ORGANIZED HEALTH CARE EDUCATION/TRAINING PROGRAM

## 2020-07-30 PROCEDURE — 99900035 HC TECH TIME PER 15 MIN (STAT)

## 2020-07-30 PROCEDURE — U0003 INFECTIOUS AGENT DETECTION BY NUCLEIC ACID (DNA OR RNA); SEVERE ACUTE RESPIRATORY SYNDROME CORONAVIRUS 2 (SARS-COV-2) (CORONAVIRUS DISEASE [COVID-19]), AMPLIFIED PROBE TECHNIQUE, MAKING USE OF HIGH THROUGHPUT TECHNOLOGIES AS DESCRIBED BY CMS-2020-01-R: HCPCS

## 2020-07-30 PROCEDURE — 25000003 PHARM REV CODE 250: Performed by: NURSE PRACTITIONER

## 2020-07-30 PROCEDURE — 11000004 HC SNF PRIVATE

## 2020-07-30 PROCEDURE — 97116 GAIT TRAINING THERAPY: CPT

## 2020-07-30 PROCEDURE — 94761 N-INVAS EAR/PLS OXIMETRY MLT: CPT

## 2020-07-30 PROCEDURE — 36415 COLL VENOUS BLD VENIPUNCTURE: CPT

## 2020-07-30 PROCEDURE — 25000242 PHARM REV CODE 250 ALT 637 W/ HCPCS: Performed by: NURSE PRACTITIONER

## 2020-07-30 PROCEDURE — 94640 AIRWAY INHALATION TREATMENT: CPT

## 2020-07-30 RX ORDER — LOSARTAN POTASSIUM 50 MG/1
50 TABLET ORAL DAILY
Status: DISCONTINUED | OUTPATIENT
Start: 2020-07-31 | End: 2020-08-03

## 2020-07-30 RX ADMIN — IPRATROPIUM BROMIDE AND ALBUTEROL SULFATE 3 ML: .5; 3 SOLUTION RESPIRATORY (INHALATION) at 08:07

## 2020-07-30 RX ADMIN — IPRATROPIUM BROMIDE AND ALBUTEROL SULFATE 3 ML: .5; 3 SOLUTION RESPIRATORY (INHALATION) at 01:07

## 2020-07-30 RX ADMIN — TAMSULOSIN HYDROCHLORIDE 0.4 MG: 0.4 CAPSULE ORAL at 09:07

## 2020-07-30 RX ADMIN — APIXABAN 2.5 MG: 2.5 TABLET, FILM COATED ORAL at 09:07

## 2020-07-30 RX ADMIN — THERA TABS 1 TABLET: TAB at 09:07

## 2020-07-30 RX ADMIN — GUAIFENESIN AND DEXTROMETHORPHAN HYDROBROMIDE 1 TABLET: 600; 30 TABLET, EXTENDED RELEASE ORAL at 08:07

## 2020-07-30 RX ADMIN — LOSARTAN POTASSIUM 100 MG: 50 TABLET, FILM COATED ORAL at 09:07

## 2020-07-30 RX ADMIN — PANTOPRAZOLE SODIUM 40 MG: 40 TABLET, DELAYED RELEASE ORAL at 09:07

## 2020-07-30 RX ADMIN — APIXABAN 2.5 MG: 2.5 TABLET, FILM COATED ORAL at 08:07

## 2020-07-30 RX ADMIN — GUAIFENESIN AND DEXTROMETHORPHAN HYDROBROMIDE 1 TABLET: 600; 30 TABLET, EXTENDED RELEASE ORAL at 09:07

## 2020-07-30 RX ADMIN — VITAMIN D, TAB 1000IU (100/BT) 1000 UNITS: 25 TAB at 09:07

## 2020-07-30 RX ADMIN — ATORVASTATIN CALCIUM 80 MG: 20 TABLET, FILM COATED ORAL at 09:07

## 2020-07-30 NOTE — TELEPHONE ENCOUNTER
Left VM and mailed letter. Patient may be eligible for [employer] Program. Will need proof of income and proof of how much money was spent out of pocket on prescription copays for this current year.

## 2020-07-30 NOTE — PT/OT/SLP PROGRESS
Physical Therapy  Treatment    Shant Magana Jr.   MRN: [id number]   Admitting Diagnosis: Embolic stroke involving right middle cerebral artery    PT Received On: 07/30/20          Billable Minutes:  Gait Training 10, Therapeutic Activity 9 and Train/Wheelchair Management 20; Total Time: 39 minutes    Treatment Type: Treatment  PT/PTA: PT     PTA Visit Number: 0       General Precautions: Standard, aspiration, fall    Spiritual, Cultural Beliefs, Zoroastrian Practices, Values that Affect Care: no    Subjective:  Communicated with nursing prior to session.    Pain/Comfort  Pain Rating 1: 0/10  Pain Rating Post-Intervention 1: 0/10    Objective:  Patient found seated in wheelchair with Patient found with: (AVASYS)     AM-PAC 6 CLICK MOBILITY  Total Score:17    Transfers:  Sit<>Stand: min A using RW from wheelchair and bench  SPT from bed to chair: min A using RW - max cueing for sequencing and safety.  SPT from bench to chair: min A using RW - max cueing for sequencing and safety    Gait:  Amb ~40 feet (x2) with min A using a RW. Pt required max verbal and tactile cueing for direction, safety, and RW negotiation. He is having a lot of trouble with turns, and demonstrates a very narrow DANIEL. Pt had a seated rest break between bouts of gait training.     Wheelchair Mobility:  Pt able to propel w/c in bouts of 30 feet using B arms and legs. He needed min-mod A for direction. He also required hand-over-hand placement of his hands on the wheels. After set up, he did well with propulsion, but tends to veer to the L.     Therex:  Pt performed seated LE strengthening exercises including: toe raises, heel raises, marching, hip add/abd, LAQ. He needed max verbal and tactile cueing to stay on task.       Additional Treatment:  Pt educated on safety, use of call button, and PT goals.     Patient left in wheelchair at bedside with call button in reach and [employer] in room.    Assessment:  Shant Magana Jr. is a 80 y.o. male with a medical  diagnosis of Embolic stroke involving right middle cerebral artery.  Pt tolerated PT treatment well today. The majority of the session was focused on introducing and practicing wheelchair propulsion. Pt may benefit from a wheelchair for home use to provide a safe option for independent mobility. He is progressing, but still has major processing delays.     Rehab identified problem list/impairments: impaired self care skills, impaired functional mobilty, gait instability, impaired balance, impaired cognition, decreased safety awareness, impaired coordination, impaired fine motor    Rehab potential is fair.    Activity tolerance: Good    Discharge recommendations: home with home health( supervision/assistance)     Barriers to discharge: Inaccessible home environment    Equipment recommendations: rolling walker, wheelchair      GOALS:   Multidisciplinary Problems         Multidisciplinary Problems (Resolved)        Problem: Physical Therapy Goal    Goal Priority Disciplines Outcome Goal Variances Interventions   Physical Therapy Goal   (Resolved)     PT, PT/OT  Error     Description: Goals to be met by: 20     Patient will increase functional independence with mobility by performin. Supine to sit with Modified [place]  2. Sit to supine with Modified [place]  3. Sit to stand transfer with Supervision using RW or cane.  4. Bed to chair transfer with Supervision using Rolling Walker or straight cane.  5. Gait  x 150 feet with Supervision using Rolling Walker or straight cane.  6. Standing for 5 minutes while performing standing balance activity with SPV using RW or cane.                        PLAN:    Patient to be seen 5 x/week  to address the above listed problems via gait training, therapeutic activities, therapeutic exercises, neuromuscular re-education  Plan of Care expires: 20  Plan of Care reviewed with: patient    Zaynab M Salo, PT  2020

## 2020-07-30 NOTE — TELEPHONE ENCOUNTER
----- Message from Christine Guan sent at 7/28/2020  4:31 PM CDT -----    ----- Message -----  From: Roseann Trejo RN  Sent: 7/28/2020   2:04 PM CDT  To: Pharmacy Patient Assistance Team    Patient is unable to afford [employer]. Therefore, he was noncompliant due to affordability issues.

## 2020-07-30 NOTE — PT/OT/SLP PROGRESS
Occupational Therapy  Treatment    Shant Magana Jr.   MRN: [id number]   Admitting Diagnosis: Embolic stroke involving right middle cerebral artery    OT Date of Treatment: 07/30/20       Billable Minutes:39  Self Care/Home Management 24 and Therapeutic Exercise 15    General Precautions: Standard, aspiration, fall  Orthopedic Precautions: N/A  Braces: N/A    Spiritual, Cultural Beliefs, Scientology Practices, Values that Affect Care: no    Subjective:  Communicated with nsg prior to session.  Pt. Agreeable to therapy session    Pain/Comfort  Pain Rating 1: 0/10  Pain Rating Post-Intervention 1: 0/10    Objective:   Pt. Found supine on arrival   Therapy session occurred in room on this day     Occupational Performance:    Bed Mobility:    · Patient completed Rolling/Turning to Right with stand by assistance  · Patient completed Scooting/Bridging with contact guard assistance  · Patient completed Supine to Sit with contact guard assistance with trunk control      Functional Mobility/Transfers:  · Patient completed Sit <> Stand Transfer with minimum assistance  with  rolling walker   · Patient completed Bed <> Chair Transfer using Stand Pivot technique with minimum assistance with rolling walker  · Functional Mobility: not tested    Activities of Daily Living:  · Feeding:  set up assist with opening drinks and tray placement   · Grooming: supervision with oral care while seated at sink level   · Upper Body Dressing: minimum assistance to guide arms through correct sleeves and manage over head  · Lower Body Dressing: moderate assistance to thread through feet and manage over hips instance    AMPAC 6 Click:  AMPAC Total Score: 15    OT Exercises: UE Ergometer 10 mins with moderate resistance. Pt. Required verbal and tactile cues for continuation of task     Additional Treatment:  Pt. With 3# dowel activity with 2x15 reps with  shd flex, bicep curls horz adb/add and forward flex motion to increase BUE ROM and strength,.   Pt.  Performed red theraband exercises 1x10 reps in all planes of motion.   Pt. With standing and therex performed to increase ROM, endurance selfcare task and fxl mobility for independence     Patient left up in chair with all lines intact, call button in reach and telesitter on    ASSESSMENT:  Shant Magana Jr. is a 80 y.o. male with a medical diagnosis of Embolic stroke involving right middle cerebral artery Pt. participated well with session on this day.Pt. required multiple verbal and tactile cues with ADLs and exercises on this day. Pt. Will continue to benefit from continued OT to progress towards goals    Rehab identified problem list/impairments: weakness, impaired endurance, impaired self care skills, impaired functional mobilty, gait instability, impaired balance, impaired cognition, decreased upper extremity function, decreased safety awareness, decreased ROM, impaired fine motor    Rehab potential is fair    Activity tolerance: Fair    Discharge recommendations: home with home health     Barriers to discharge: Inaccessible home environment     Equipment recommendations: (TBD)     GOALS:   Multidisciplinary Problems     Occupational Therapy Goals        Problem: Occupational Therapy Goal    Goal Priority Disciplines Outcome Interventions   Occupational Therapy Goal     OT, PT/OT Ongoing, Progressing    Description: Goals to be met by: 8/11/20     Patient will increase functional independence with ADLs by performing:    Feeding with Modified [place].  UE Dressing with Supervision.  LE Dressing with Minimal Assistance.  Grooming while seated with Supervision.  Toileting from bedside commode with Contact Guard Assistance for hygiene and clothing management.   Bathing from  sitting at sink with Contact Guard Assistance.  Supine to sit with Modified [place].  Stand pivot transfers with Contact Guard Assistance using RW.  Upper extremity exercise program x10 reps per shld flexion and seated rows. assistance  as needed.  Caregiver will be competent when assisting with self care tasks and functional transfers.                 Plan:  Patient to be seen 5 x/week to address the above listed problems via self-care/home management, therapeutic activities, therapeutic exercises, neuromuscular re-education, cognitive retraining  Plan of Care expires: 08/28/20  Plan of Care reviewed with: patient    BRANDI Robles OT and PINKY have discussed the above patients goals and status in collaboration with Plan of Care.  07/30/2020

## 2020-07-30 NOTE — PROGRESS NOTES
[facility]                                  Skilled Nursing Facility                   Progress Note     Admit Date: 7/27/2020  KWAME TBD  Principal Problem:  Embolic stroke involving right middle cerebral artery   HPI obtained from patient interview and chart review     Chief Complaint: Re-evaluation of medical treatment and therapy status:  Low BP's      HPI:   Mr. Chino Gomez is a 80 year old male with PMHx of dementia, multiple strokes, intracranial and extracranial atherosclerosis, DM, HTN, a fib (eliquis with history of noncompliance due to cost) who presents to SNF following hospitalization for embolic stroke involving right MCA.  Admission to SNF for secondary weakness and debility.     Interval history:   24 hr vital sign ranges listed below.  24 hr blood pressure range is 110/60 to 130/65- these readings were taken prior to patient's antihypertensive regimen of losartan 100 mg daily.  Patient is post ischemic stroke and should not have to lower blood pressures to maintain adequate cerebral blood flow- will reduce losartan.  Patient remains pleasantly confused.  Patient denies trouble sleeping at night, abdominal discomfort, nausea, or vomiting.  Patient reports an adequate appetite.  Patient denies dysuria.  Patient reports having regular bowel movements.  Patient progessing with PT/OT-Amb ~40 feet (x2) with min A using a RW. Pt required max verbal and tactile cueing for direction, safety, and RW negotiation. He is having a lot of trouble with turns, and demonstrates a very narrow DANIEL. Pt had a seated rest break between bouts of gait training.  . Continuing to follow and treat all acute and chronic conditions.    Past Medical History: Patient has a past medical history of Acute kidney injury superimposed on chronic kidney disease (10/14/2017), Cancer, Diabetes mellitus, Former smoker (7/18/2020), History of stroke (8/15/2017),  Hypertension, Hypertrophic cardiomyopathy, Renal disorder, and Stroke.    Past Surgical History: Patient has a past surgical history that includes Back surgery; Thyroidectomy; Esophagogastroduodenoscopy (N/A, 9/4/2018); Esophagogastroduodenoscopy (N/A, 10/18/2018); and Colonoscopy (N/A, 10/18/2018).    Social History: Patient reports that he has quit smoking. His smoking use included cigarettes. He has a 2.70 pack-year smoking history. He has never used smokeless tobacco. He reports that he does not drink alcohol or use drugs.    Family History:  No significant family history to report    Allergies: Patient has No Known Allergies.    ROS  Constitutional: Negative for fever   Eyes: Negative for blurred vision, double vision   Respiratory:  + for intermittent cough, dyspnea on exertion  Cardiovascular: Negative for chest pain, palpitations, and leg swelling.   Gastrointestinal: Negative for abdominal pain, constipation, diarrhea, nausea and vomiting.   Genitourinary: Negative for dysuria, frequency   Musculoskeletal:  + generalized weakness. Negative for back pain and myalgias.   Skin: Negative for itching and rash.   Neurological: Negative for dizziness, headaches.   Psychiatric/Behavioral: Negative for depression. The patient is not nervous/anxious.      24 hour Vital Sign Range   Temp:  [97.1 °F (36.2 °C)-98.2 °F (36.8 °C)]   Pulse:  [61-88]   Resp:  [17-20]   BP: (110-130)/(60-65)   SpO2:  [93 %-97 %]     PEx  Constitutional: Patient appears debilitated.  No distress noted  HENT:   Head: Normocephalic and atraumatic.   Eyes: Pupils are equal, round  Neck: Normal range of motion. Neck supple.   Cardiovascular: Normal rate, regular rhythm and normal heart sounds.    Pulmonary/Chest: Effort normal and breath sounds are clear  Abdominal: Soft. Bowel sounds are normal.   Musculoskeletal: Normal range of motion.   Neurological: Alert and oriented to person- however states incorrect age, disoriented to place, and time.   Higher level thinking impaired.  Patient has partial hemianopsia to his right eye, no facial asymmetry. LUE- consult port against gravity but not for a full 10 secs. RUE- able to hold for 10 secs; RLE and LLE- able to hold for 5 secs. no sensory abnormality.  No aphasia or dysarthria noted.  Psychiatric: Normal mood and affect. Behavior is normal.   Skin: Skin is warm and dry.    No results for input(s): GLUCOSE, NA, K, CL, CO2, BUN, CREATININE, MG in the last 24 hours.    Invalid input(s):  CALCIUM    No results for input(s): WBC, RBC, HGB, HCT, PLT, MCV, MCH, MCHC in the last 24 hours.      Assessment and Plan:     Problems addressed today    Essential hypertension  - Stroke risk factor  - goal SBP < 160   - Original home meds: Losartan 100 mg daily, Amlodipine 10 mg daily  - 7/30 reduce losartan to 50 mg daily, continue to hold amlodipine at this time    Dementia without behavioral disturbance  Maintain Delirium precautions:  - Avoid antihistamines, anticholinergics, hypnotics, and minimize opiates while controlling for pain as these medications may exacerbate delirium. Cues for day/night will assist with keeping patient calm and oriented - during daytime, please keep adequate light in room (open windows, lights on) and please keep room dim at night-time to encourage normal sleep-wake cycles. Continuing to have nursing and family reorient the patient and encourage family to visit       Ongoing but stable problems       Embolic stroke involving right middle cerebral artery  - Patient with history of L facial droop from previous stroke.  - CT revealed R parietal infarct. Etiology likely cardioembolic  - Antithrombotics for secondary stroke prevention: Anticoagulants: Apixaban 2.5 mg BID  - Will maintain on geriatric dose of Eliquis as creatinine borderline for normal dosing (and only worsens when not admitted).- will investigate patient's appropriateness to be from medication and have him signed up with payment  assistant  - Statins for secondary stroke prevention and hyperlipidemia:  continue Atorvastatin- 80 mg daily  - PT/OT  - goal BP- SBP < 160  - [facility] [place] 24 hr assistance once discharged home daughter Manuel Lozada 7/21, who states family is unable to provide 24 hr supervision- information relayed to SNF CM to begin early discussions with patient's family to prepare for DC.     Cytotoxic cerebral edema  - Area of cytotoxic cerebral edema identified when reviewing brain imaging in the territory of the R middle cerebral artery. There is no mass effect associated with it. We will continue to monitor the patients clinical exam for any worsening of symptoms which may indicate expansion of the stroke or the area of the edema resulting in the clinical change. The pattern is suggestive of cardioembolic etiology.     Former smoker  - Stroke risk factor  - Encourage continued smoking cessation    Type 2 diabetes mellitus with complication, without long-term current use of insulin  -Stroke risk factor  - A1C 5.8     Benign prostatic hyperplasia  - Continue home flomax 0.4 mg daily    Status post placement of implantable loop recorder  - follow-up with cardiology after DC from [facility]     Paroxysmal atrial fibrillation  - Stroke risk factor  - Anticoagulate with home apixaban 2.5mg BID- CM made aware that patient previously discontinue medication due to not being able to afford the cost and looking into getting him signed up with [facility] nurse medication assistance program     Chronic diastolic congestive heart failure  - Repeat echo EF 68 %  - Last echo 12/2019 with mild LV diastolic dysfunction  - Not on diuretic at home; BNP wnl  - Daily weights (standing if tolerated)  - Fluid restriction at 1500mL  - Cardiac diet     Atelectasis  Right-sided pleural effusion  Productive cough  - improving, patient now tolerating room air, reduced scheduled DuoNebs to q.6 while awake, initiated Mucinex DM  mg q.12 hours.  Initiated  incentive spirometry.  Monitor respiratory status closely.     CKD (chronic kidney disease) stage 3, GFR 30-59 ml/min  - stable, baseline likely 1.5-1.8,  monitor twice weekly BMPs, Avoid nephrotoxic agents, Renally adjust medications.  Patient will need to follow with a nephrologist after DC     Mixed hyperlipidemia  -  Stroke risk factor  - LDL 70  - Continue home atorvastatin 80 mg daily     Internal carotid artery stenosis, left  - Stroke risk factor; continue Eliquis     History of stroke  - Patient with history of multiple strokes   - History of a fib + intracranial/extracranial atherosclerosis  - Continue secondary stroke prevention with eliquis + atorvastatin 80 mg   - Will maintain on geriatric dose of Eliquis as creatinine borderline for normal dosing (and only worsens when not admitted).     Vitamin-D deficiency  - continue vitamin-D 1000 units daily- level is not been checked since 2017- ordered for level with next lab draw.      Debility   - Continue with PT/OT for gait training and strengthening and restoration of ADL's   - Encourage mobility, OOB in chair, and early ambulation as appropriate  - Fall precautions   - Monitor for bowel and bladder dysfunction  - Monitor for and prevent skin breakdown and pressure ulcers  - Continue DVT prophylaxis with  Eliquis 2.5 mg daily        Future Appointments   Date Time Provider Department Center   8/3/2020  3:00 PM Benjamin Farias DPM [facility] PODIAT [place] [facility]   9/10/2020  3:00 PM Lucio Toth MD [facility] CARDIO [place] [facility]         Ami Copeland NP  Department of [facility] Medicine   [facility]     DOS: 7/30/2020       Patient note was created using MModal Dictation.  Any errors in syntax or even information may not have been identified and edited on initial review prior to signing this note.

## 2020-07-30 NOTE — PLAN OF CARE
Problem: Physical Therapy Goal  Goal: Physical Therapy Goal  Description: Goals to be met by: 20     Patient will increase functional independence with mobility by performin. Sit to stand transfer with Supervision  2. Bed to chair transfer with Supervision using Rolling Walker  3. Gait  x 150 feet with Contact Guard Assistance using Rolling Walker.   4. Ascend/descend 4 stair with bilateral Handrails Contact Guard Assistance  5. Stand for 10 minutes with Supervision using Rolling Walker performing a standing activity.  6. Propel w/c 100 feet using B upper and lower extremities with verbal cueing only.    Outcome: Ongoing, Progressing     Pt's goals have been updated and remain appropriate, and pt will continue to benefit from skilled PT services to work towards improved functional mobility.    Zaynab Leong, PT, DPT  2020

## 2020-07-31 LAB
ANION GAP SERPL CALC-SCNC: 7 MMOL/L (ref 8–16)
BASOPHILS # BLD AUTO: 0.03 K/UL (ref 0–0.2)
BASOPHILS NFR BLD: 0.6 % (ref 0–1.9)
BUN SERPL-MCNC: 29 MG/DL (ref 8–23)
CALCIUM SERPL-MCNC: 10.1 MG/DL (ref 8.7–10.5)
CHLORIDE SERPL-SCNC: 106 MMOL/L (ref 95–110)
CO2 SERPL-SCNC: 22 MMOL/L (ref 23–29)
CREAT SERPL-MCNC: 1.5 MG/DL (ref 0.5–1.4)
DIFFERENTIAL METHOD: ABNORMAL
EOSINOPHIL # BLD AUTO: 0.2 K/UL (ref 0–0.5)
EOSINOPHIL NFR BLD: 3.2 % (ref 0–8)
ERYTHROCYTE [DISTWIDTH] IN BLOOD BY AUTOMATED COUNT: 13.9 % (ref 11.5–14.5)
EST. GFR  (AFRICAN AMERICAN): 50.1 ML/MIN/1.73 M^2
EST. GFR  (NON AFRICAN AMERICAN): 43.3 ML/MIN/1.73 M^2
GLUCOSE SERPL-MCNC: 100 MG/DL (ref 70–110)
HCT VFR BLD AUTO: 39.5 % (ref 40–54)
HGB BLD-MCNC: 12.7 G/DL (ref 14–18)
IMM GRANULOCYTES # BLD AUTO: 0.01 K/UL (ref 0–0.04)
IMM GRANULOCYTES NFR BLD AUTO: 0.2 % (ref 0–0.5)
LYMPHOCYTES # BLD AUTO: 1 K/UL (ref 1–4.8)
LYMPHOCYTES NFR BLD: 19.9 % (ref 18–48)
MAGNESIUM SERPL-MCNC: 1.8 MG/DL (ref 1.6–2.6)
MCH RBC QN AUTO: 29.1 PG (ref 27–31)
MCHC RBC AUTO-ENTMCNC: 32.2 G/DL (ref 32–36)
MCV RBC AUTO: 90 FL (ref 82–98)
MONOCYTES # BLD AUTO: 0.5 K/UL (ref 0.3–1)
MONOCYTES NFR BLD: 9.7 % (ref 4–15)
NEUTROPHILS # BLD AUTO: 3.4 K/UL (ref 1.8–7.7)
NEUTROPHILS NFR BLD: 66.4 % (ref 38–73)
NRBC BLD-RTO: 0 /100 WBC
PHOSPHATE SERPL-MCNC: 2.7 MG/DL (ref 2.7–4.5)
PLATELET # BLD AUTO: 244 K/UL (ref 150–350)
PMV BLD AUTO: 11.3 FL (ref 9.2–12.9)
POTASSIUM SERPL-SCNC: 3.9 MMOL/L (ref 3.5–5.1)
RBC # BLD AUTO: 4.37 M/UL (ref 4.6–6.2)
SARS-COV-2 RNA RESP QL NAA+PROBE: NOT DETECTED
SODIUM SERPL-SCNC: 135 MMOL/L (ref 136–145)
WBC # BLD AUTO: 5.07 K/UL (ref 3.9–12.7)

## 2020-07-31 PROCEDURE — 25000242 PHARM REV CODE 250 ALT 637 W/ HCPCS: Performed by: STUDENT IN AN ORGANIZED HEALTH CARE EDUCATION/TRAINING PROGRAM

## 2020-07-31 PROCEDURE — 36415 COLL VENOUS BLD VENIPUNCTURE: CPT

## 2020-07-31 PROCEDURE — 97110 THERAPEUTIC EXERCISES: CPT | Mod: CQ

## 2020-07-31 PROCEDURE — 97535 SELF CARE MNGMENT TRAINING: CPT | Mod: CO

## 2020-07-31 PROCEDURE — 80048 BASIC METABOLIC PNL TOTAL CA: CPT

## 2020-07-31 PROCEDURE — 85025 COMPLETE CBC W/AUTO DIFF WBC: CPT

## 2020-07-31 PROCEDURE — 84100 ASSAY OF PHOSPHORUS: CPT

## 2020-07-31 PROCEDURE — 97110 THERAPEUTIC EXERCISES: CPT | Mod: CO

## 2020-07-31 PROCEDURE — 94761 N-INVAS EAR/PLS OXIMETRY MLT: CPT

## 2020-07-31 PROCEDURE — 97116 GAIT TRAINING THERAPY: CPT | Mod: CQ

## 2020-07-31 PROCEDURE — 97130 THER IVNTJ EA ADDL 15 MIN: CPT

## 2020-07-31 PROCEDURE — 94640 AIRWAY INHALATION TREATMENT: CPT

## 2020-07-31 PROCEDURE — 25000003 PHARM REV CODE 250: Performed by: STUDENT IN AN ORGANIZED HEALTH CARE EDUCATION/TRAINING PROGRAM

## 2020-07-31 PROCEDURE — 83735 ASSAY OF MAGNESIUM: CPT

## 2020-07-31 PROCEDURE — 97129 THER IVNTJ 1ST 15 MIN: CPT

## 2020-07-31 PROCEDURE — 99900035 HC TECH TIME PER 15 MIN (STAT)

## 2020-07-31 PROCEDURE — 25000242 PHARM REV CODE 250 ALT 637 W/ HCPCS: Performed by: NURSE PRACTITIONER

## 2020-07-31 PROCEDURE — 11000004 HC SNF PRIVATE

## 2020-07-31 PROCEDURE — 25000003 PHARM REV CODE 250: Performed by: NURSE PRACTITIONER

## 2020-07-31 PROCEDURE — 97530 THERAPEUTIC ACTIVITIES: CPT | Mod: CQ

## 2020-07-31 RX ORDER — QUETIAPINE FUMARATE 25 MG/1
25 TABLET, FILM COATED ORAL NIGHTLY PRN
Status: DISCONTINUED | OUTPATIENT
Start: 2020-07-31 | End: 2020-08-11 | Stop reason: HOSPADM

## 2020-07-31 RX ORDER — LANOLIN ALCOHOL/MO/W.PET/CERES
400 CREAM (GRAM) TOPICAL 2 TIMES DAILY
Status: COMPLETED | OUTPATIENT
Start: 2020-07-31 | End: 2020-08-01

## 2020-07-31 RX ORDER — SODIUM BICARBONATE 650 MG/1
650 TABLET ORAL ONCE
Status: COMPLETED | OUTPATIENT
Start: 2020-07-31 | End: 2020-07-31

## 2020-07-31 RX ADMIN — Medication 6 MG: at 08:07

## 2020-07-31 RX ADMIN — VITAMIN D, TAB 1000IU (100/BT) 1000 UNITS: 25 TAB at 09:07

## 2020-07-31 RX ADMIN — APIXABAN 2.5 MG: 2.5 TABLET, FILM COATED ORAL at 08:07

## 2020-07-31 RX ADMIN — ATORVASTATIN CALCIUM 80 MG: 20 TABLET, FILM COATED ORAL at 09:07

## 2020-07-31 RX ADMIN — TAMSULOSIN HYDROCHLORIDE 0.4 MG: 0.4 CAPSULE ORAL at 09:07

## 2020-07-31 RX ADMIN — MAGNESIUM OXIDE 400 MG (241.3 MG MAGNESIUM) TABLET 400 MG: TABLET at 08:07

## 2020-07-31 RX ADMIN — MAGNESIUM OXIDE 400 MG (241.3 MG MAGNESIUM) TABLET 400 MG: TABLET at 12:07

## 2020-07-31 RX ADMIN — LOSARTAN POTASSIUM 50 MG: 50 TABLET, FILM COATED ORAL at 09:07

## 2020-07-31 RX ADMIN — GUAIFENESIN AND DEXTROMETHORPHAN HYDROBROMIDE 1 TABLET: 600; 30 TABLET, EXTENDED RELEASE ORAL at 08:07

## 2020-07-31 RX ADMIN — GUAIFENESIN AND DEXTROMETHORPHAN HYDROBROMIDE 1 TABLET: 600; 30 TABLET, EXTENDED RELEASE ORAL at 09:07

## 2020-07-31 RX ADMIN — IPRATROPIUM BROMIDE AND ALBUTEROL SULFATE 3 ML: .5; 3 SOLUTION RESPIRATORY (INHALATION) at 07:07

## 2020-07-31 RX ADMIN — PANTOPRAZOLE SODIUM 40 MG: 40 TABLET, DELAYED RELEASE ORAL at 09:07

## 2020-07-31 RX ADMIN — APIXABAN 2.5 MG: 2.5 TABLET, FILM COATED ORAL at 09:07

## 2020-07-31 RX ADMIN — IPRATROPIUM BROMIDE AND ALBUTEROL SULFATE 3 ML: .5; 3 SOLUTION RESPIRATORY (INHALATION) at 01:07

## 2020-07-31 RX ADMIN — SODIUM BICARBONATE 650 MG TABLET 650 MG: at 12:07

## 2020-07-31 RX ADMIN — ALBUTEROL SULFATE 2.5 MG: 2.5 SOLUTION RESPIRATORY (INHALATION) at 02:07

## 2020-07-31 RX ADMIN — THERA TABS 1 TABLET: TAB at 09:07

## 2020-07-31 NOTE — PLAN OF CARE
Problem: Adult Inpatient Plan of Care  Goal: Plan of Care Review  Outcome: Ongoing, Progressing  Flowsheets (Taken 7/31/2020 0538)  Plan of Care Reviewed With: patient  Goal: Patient-Specific Goal (Individualization)  Outcome: Ongoing, Progressing  Goal: Absence of Hospital-Acquired Illness or Injury  Outcome: Ongoing, Progressing  Goal: Optimal Comfort and Wellbeing  Outcome: Ongoing, Progressing     Problem: Adult Inpatient Plan of Care  Goal: Patient-Specific Goal (Individualization)  Outcome: Ongoing, Progressing     Problem: Fall Injury Risk  Goal: Absence of Fall and Fall-Related Injury  Outcome: Ongoing, Progressing     Problem: Skin Injury Risk Increased  Goal: Skin Health and Integrity  Outcome: Ongoing, Progressing

## 2020-07-31 NOTE — PROGRESS NOTES
[facility]                                  Skilled Nursing Facility                   Progress Note     Admit Date: 7/27/2020  KWAME TBD  Principal Problem:  Embolic stroke involving right middle cerebral artery   HPI obtained from patient interview and chart review     Chief Complaint: Re-evaluation of medical treatment and therapy status: Lab review, re-evaluation of blood pressures, patient reporting hallucinations, patient with agitation overnight.    HPI:   Mr. Chino Gomez is a 80 year old male with PMHx of dementia, multiple strokes, intracranial and extracranial atherosclerosis, DM, HTN, a fib (eliquis with history of noncompliance due to cost) who presents to SNF following hospitalization for embolic stroke involving right MCA.  Admission to SNF for secondary weakness and debility.     Interval history:   All of today's labs reviewed and are listed below.  Mag 1.8 24 hr vital sign ranges listed below.  24 hr blood pressure range is 124/66 to 144/97.  Patient endorses visual hallucinations intermittently throughout the day.  He remains pleasantly confused during the daytime.  In tele sitter in progress.  Last night, nurse reports that patient took off his diaper and walked up and down the hallway, he also threatened to hit the staff on the head.  Patient would likely hospital delirium/ sundowning from dementia- clearance given from staff MD to allow patient to go outside with staff, as long as he is wearing a mask, sanitize hands before after and does not touch anything on the way in our out. Patient reports an adequate appetite.  Patient denies dysuria.  Patient reports having regular bowel movements.  Patient progessing with PT/OT-Amb with RW min A vcs for directional guidance/avoiding obstacles, keeping RW on floor not lifting off floor ~ 30 ft and 44 ft seated rest break, easily distracted . Continuing to follow and treat all acute  and chronic conditions.      Past Medical History: Patient has a past medical history of Acute kidney injury superimposed on chronic kidney disease (10/14/2017), Cancer, Diabetes mellitus, Former smoker (7/18/2020), History of stroke (8/15/2017), Hypertension, Hypertrophic cardiomyopathy, Renal disorder, and Stroke.    Past Surgical History: Patient has a past surgical history that includes Back surgery; Thyroidectomy; Esophagogastroduodenoscopy (N/A, 9/4/2018); Esophagogastroduodenoscopy (N/A, 10/18/2018); and Colonoscopy (N/A, 10/18/2018).    Social History: Patient reports that he has quit smoking. His smoking use included cigarettes. He has a 2.70 pack-year smoking history. He has never used smokeless tobacco. He reports that he does not drink alcohol or use drugs.    Family History:  No significant family history to report    Allergies: Patient has No Known Allergies.    ROS  Constitutional: Negative for fever   Eyes: Negative for blurred vision, double vision   Respiratory:  + for intermittent cough, dyspnea on exertion  Cardiovascular: Negative for chest pain, palpitations, and leg swelling.   Gastrointestinal: Negative for abdominal pain, constipation, diarrhea, nausea and vomiting.   Genitourinary: Negative for dysuria, frequency   Musculoskeletal:  + generalized weakness. Negative for back pain and myalgias.   Skin: Negative for itching and rash.   Neurological: Negative for dizziness, headaches.   Psychiatric/Behavioral: Negative for depression. The patient is not nervous/anxious.      24 hour Vital Sign Range   Temp:  [96.6 °F (35.9 °C)-98.4 °F (36.9 °C)]   Pulse:  [57-80]   Resp:  [14-20]   BP: (124-144)/(66-97)   SpO2:  [92 %-95 %]     PEx  Constitutional: Patient appears debilitated.  No distress noted  HENT:   Head: Normocephalic and atraumatic.   Eyes: Pupils are equal, round  Neck: Normal range of motion. Neck supple.   Cardiovascular: Normal rate, regular rhythm and normal heart sounds.     Pulmonary/Chest: Effort normal and breath sounds are clear  Abdominal: Soft. Bowel sounds are normal.   Musculoskeletal: Normal range of motion.   Neurological: Alert and oriented to person- however states incorrect age, disoriented to place, and time.  Higher level thinking impaired.  Patient has partial hemianopsia to his right eye, no facial asymmetry. LUE- consult port against gravity but not for a full 10 secs. RUE- able to hold for 10 secs; RLE and LLE- able to hold for 5 secs. no sensory abnormality.  No aphasia or dysarthria noted.  Psychiatric: Normal mood and affect. Behavior is normal.   Skin: Skin is warm and dry.    Recent Labs   Lab 07/31/20  0508   *   K 3.9      CO2 22*   BUN 29*   CREATININE 1.5*   MG 1.8       Recent Labs   Lab 07/31/20  0508   WBC 5.07   RBC 4.37*   HGB 12.7*   HCT 39.5*      MCV 90   MCH 29.1   MCHC 32.2         Assessment and Plan:     Problems addressed today        Dementia with behavioral disturbance  Maintain Delirium precautions:  - Avoid antihistamines, anticholinergics, hypnotics, and minimize opiates while controlling for pain as these medications may exacerbate delirium. Cues for day/night will assist with keeping patient calm and oriented - during daytime, please keep adequate light in room (open windows, lights on) and please keep room dim at night-time to encourage normal sleep-wake cycles. Continuing to have nursing and family reorient the patient and encourage family to call  - 7/31 initiated quetiapine 25 mg qHS PRN For agitation    Essential hypertension  - Stroke risk factor  - goal SBP < 160   - Original home meds: Losartan 100 mg daily, Amlodipine 10 mg daily  - 7/31 stable, continue losartan to 50 mg daily, continue to hold amlodipine at this time    Hypomagnesemia  - initiated magnesium oxide 400 mg BID x2 days    CKD (chronic kidney disease) stage 3, GFR 30-59 ml/min  - stable, baseline likely 1.5-1.8,  monitor twice weekly BMPs, Avoid  nephrotoxic agents, Renally adjust medications.  Patient will need to follow with a nephrologist after DC       Ongoing but stable problems       Embolic stroke involving right middle cerebral artery  - Patient with history of L facial droop from previous stroke.  - CT revealed R parietal infarct. Etiology likely cardioembolic  - Antithrombotics for secondary stroke prevention: Anticoagulants: Apixaban 2.5 mg BID  - Will maintain on geriatric dose of Eliquis as creatinine borderline for normal dosing (and only worsens when not admitted).- will investigate patient's appropriateness to be from medication and have him signed up with payment assistant  - Statins for secondary stroke prevention and hyperlipidemia:  continue Atorvastatin- 80 mg daily  - PT/OT  - goal BP- SBP < 160  - [facility] recommended 24 hr assistance once discharged home daughter Manuel Lozada 7/21, who states family is unable to provide 24 hr supervision- information relayed to SNF CM to begin early discussions with patient's family to prepare for DC.     Cytotoxic cerebral edema  - Area of cytotoxic cerebral edema identified when reviewing brain imaging in the territory of the R middle cerebral artery. There is no mass effect associated with it. We will continue to monitor the patients clinical exam for any worsening of symptoms which may indicate expansion of the stroke or the area of the edema resulting in the clinical change. The pattern is suggestive of cardioembolic etiology.     Former smoker  - Stroke risk factor  - Encourage continued smoking cessation    Type 2 diabetes mellitus with complication, without long-term current use of insulin  -Stroke risk factor  - A1C 5.8     Benign prostatic hyperplasia  - Continue home flomax 0.4 mg daily    Status post placement of implantable loop recorder  - follow-up with cardiology after DC from SNF     Paroxysmal atrial fibrillation  - Stroke risk factor  - Anticoagulate with home apixaban 2.5mg BID- ALYSSA made  aware that patient previously discontinue medication due to not being able to afford the cost and looking into getting him signed up with [facility] nurse medication assistance program     Chronic diastolic congestive heart failure  - Repeat echo EF 68 %  - Last echo 12/2019 with mild LV diastolic dysfunction  - Not on diuretic at home; BNP wnl  - Daily weights (standing if tolerated)  - Fluid restriction at 1500mL  - Cardiac diet     Atelectasis  Right-sided pleural effusion  Productive cough  - improving, patient now tolerating room air, reduced scheduled DuoNebs to q.6 while awake, initiated Mucinex DM  mg q.12 hours.  Initiated incentive spirometry.  Monitor respiratory status closely.      Mixed hyperlipidemia  -  Stroke risk factor  - LDL 70  - Continue home atorvastatin 80 mg daily     Internal carotid artery stenosis, left  - Stroke risk factor; continue Eliquis     History of stroke  - Patient with history of multiple strokes   - History of a fib + intracranial/extracranial atherosclerosis  - Continue secondary stroke prevention with eliquis + atorvastatin 80 mg   - Will maintain on geriatric dose of Eliquis as creatinine borderline for normal dosing (and only worsens when not admitted).     Vitamin-D deficiency  - continue vitamin-D 1000 units daily- level is not been checked since 2017- ordered for level with next lab draw.      Debility   - Continue with PT/OT for gait training and strengthening and restoration of ADL's   - Encourage mobility, OOB in chair, and early ambulation as appropriate  - Fall precautions   - Monitor for bowel and bladder dysfunction  - Monitor for and prevent skin breakdown and pressure ulcers  - Continue DVT prophylaxis with  Eliquis 2.5 mg daily        Future Appointments   Date Time Provider Department Center   8/3/2020  3:00 PM Benjamin Farias DPM [facility] [place] [facility]   9/10/2020  3:00 PM Lucio Toth MD [facility] CARDIO [place] [facility]       Ami Copeland,  NP  Department of Hospital Medicine   [facility]     DOS: 7/31/2020       Patient note was created using MModal Dictation.  Any errors in syntax or even information may not have been identified and edited on initial review prior to signing this note.

## 2020-07-31 NOTE — PLAN OF CARE
Pt A & O x4, VS as charted, no s/s skin injury, pt positions independently but requires x 1 assist to stand, AVASYS in place, bed alarm on, fall precautions in place, pain monitored and controlled with scheduled and PRN medication, no signs of infection, call light within reach.

## 2020-07-31 NOTE — PLAN OF CARE
Problem: SLP Goal  Goal: SLP Goal  Description: Speech Language Pathology Goals  Goals expected to be met by 8/4:  1. Pt will orient to month, year, and place given external aids.  2. Pt will recall 2/3 related words after a 60 second delay given max cues to improve delayed memory.   3. Pt will immediately recall series of 4 digits with 80% accuracy given mod cues.   4. Pt will answer simple problem solving q's with 60% accuracy given mod-max cues.   5. Pt will complete word finding tasks with 70% accuracy given mod cues.   6. Pt will participate in further assessment of reading, writing, and visual spatial abilities.           Outcome: Ongoing, Progressing  Cont POC.   NEVIN Bourgeois, CCC-SLP  Speech Language Pathologist  [phone]  7/31/2020

## 2020-07-31 NOTE — PT/OT/SLP PROGRESS
"Physical Therapy  Treatment    Shant Magana Jr.   MRN: [id number]   Admitting Diagnosis: Embolic stroke involving right middle cerebral artery    PT Received On: 07/31/20        Billable Minutes:  Gait Training 15, Therapeutic Activity 10 and Therapeutic Exercise 13    Treatment Type: Treatment  PT/PTA: PTA     PTA Visit Number: 1       General Precautions: Standard, aspiration, fall  Orthopedic Precautions:     Braces:      Spiritual, Cultural Beliefs, Baptist Practices, Values that Affect Care: no    Subjective:  "fine"    Pain/Comfort  Pain Rating 1: 0/10  Pain Rating Post-Intervention 1: 0/10    Objective:   Patient found with: (in bed, wet A to clean and change diaper, doroteo pants)     AM-PAC 6 CLICK MOBILITY  Total Score:17    Bed Mobility:rolloing with rail simple vcs min/CG to complete for cleaning/changing  Supine>Sit: SBA with simple vc/tcs with HOB slightly elev and rail    Transfers:  Sit<>Stand: with RW min A with vcs /tcs for handplacement  Stand Pivot Transfer: with RW min A vc/tcs for sequencing/RW mgmt    Gait:  Amb with RW min A vcs for directional guidance/avoiding obstacles, keeping RW on floor not lifting off floor ~ 30 ft and 44 ft seated rest break, easily distracted    Therex:  2x10 reps A/AA/demo for tech AP,LAQ,hip flex,abd/add    Balance:  With RW min/CGA static    Additional Treatment:  Cleaning/changing diaper/doroteo pants    Patient left up in chair with call button in reach, camera notified, camera present and belongings in reach, chair alarm.    Assessment:  Shant Magana Jr. is a 80 y.o. male with a medical diagnosis of Embolic stroke involving right middle cerebral artery.  Pt tolerated well, requires mod vc/tcs for safety with overall functional mobility, 2* to cognitive deficits,  pt would continue to benefit from skilled PT services to improve overall functional mobility, strength and endurance.  .    Rehab identified problem list/impairments: impaired self care skills, impaired functional " mobilty, gait instability, impaired balance, impaired cognition, decreased safety awareness, impaired coordination, impaired fine motor    Rehab potential is good.    Activity tolerance: Fair    Discharge recommendations: home with home health( supervision/assistance)     Barriers to discharge: Inaccessible home environment    Equipment recommendations: walker, rolling, wheelchair     GOALS:   Multidisciplinary Problems     Physical Therapy Goals        Problem: Physical Therapy Goal    Goal Priority Disciplines Outcome Goal Variances Interventions   Physical Therapy Goal     PT, PT/OT Ongoing, Progressing     Description: Goals to be met by: 20     Patient will increase functional independence with mobility by performin. Sit to stand transfer with Supervision  2. Bed to chair transfer with Supervision using Rolling Walker  3. Gait  x 150 feet with Contact Guard Assistance using Rolling Walker.   4. Ascend/descend 4 stair with bilateral Handrails Contact Guard Assistance  5. Stand for 10 minutes with Supervision using Rolling Walker performing a standing activity.  6. Propel w/c 100 feet using B upper and lower extremities with verbal cueing only.               Multidisciplinary Problems (Resolved)        Problem: Physical Therapy Goal    Goal Priority Disciplines Outcome Goal Variances Interventions   Physical Therapy Goal   (Resolved)     PT, PT/OT  Error     Description: Goals to be met by: 20     Patient will increase functional independence with mobility by performin. Supine to sit with Modified [place]  2. Sit to supine with Modified [place]  3. Sit to stand transfer with Supervision using RW or cane.  4. Bed to chair transfer with Supervision using Rolling Walker or straight cane.  5. Gait  x 150 feet with Supervision using Rolling Walker or straight cane.  6. Standing for 5 minutes while performing standing balance activity with SPV using RW or cane.                      PLAN:    Patient to be seen 5 x/week  to address the above listed problems via gait training, therapeutic activities, therapeutic exercises, neuromuscular re-education  Plan of Care expires: 08/27/20  Plan of Care reviewed with: patient    Nanci Og, PTA  07/31/2020

## 2020-07-31 NOTE — PT/OT/SLP PROGRESS
Occupational Therapy  Treatment    Shant Magana Jr.   MRN: [id number]   Admitting Diagnosis: Embolic stroke involving right middle cerebral artery    OT Date of Treatment: 07/31/20       Billable Minutes:  Self Care/Home Management 30 and Therapeutic Exercise 10    General Precautions: Standard, aspiration, fall  Orthopedic Precautions: N/A  Braces: N/A    Spiritual, Cultural Beliefs, Sabianism Practices, Values that Affect Care: no    Subjective:  Communicated with nsg prior to session.  Pt. Agreeable to therapy session    Pain/Comfort  Pain Rating 1: 0/10  Pain Rating Post-Intervention 1: 0/10    Objective:   Pt. Found seated in bedside chair on arrival with chair alarm on     Occupational Performance:    Bed Mobility:    · Not tested    Functional Mobility/Transfers:  · Patient completed Sit <> Stand Transfer with minimum assistance  with  rolling walker with verbal cues for hand placement and safety  · Patient completed Bed <> Chair Transfer using Stand Pivot technique with minimum assistance with rolling walker with verbal and tactile cues for hand placement and safety  · Functional Mobility: not tested    Activities of Daily Living:  · Grooming: supervision with oral care while seated at sink level. Pt. required verbal cues for initation of task   · Bathing: moderate assistance with (A) to wash [place] and lower body. Pt. required mod verbal and tactile cues   · Upper Body Dressing: minimum assistance to doff/doroteo pullover shirt. Pt required (A) to guide arms through correct sleeves on this day   · Lower Body Dressing: maximal assistance to thread pants through legs and manage over hips and doroteo BLE socks.   · Toileting: maximal assistance with hygiene and clothing management     [facility] 6 Click:  [facility] Total Score: 15    Additional Treatment:  Pt. With 3# dowel activity with 2x20 reps with  shd flex, bicep curls horz adb/add and forward flex motion to increase BUE ROM and strength,.   Pt. With standing and therex  performed to increase ROM, endurance selfcare task and fxl mobility for independence     Patient left up in chair with all lines intact, call button in reach and chair alarm and telesitter on    ASSESSMENT:  Shant Magana Jr. is a 80 y.o. male with a medical diagnosis of Embolic stroke involving right middle cerebral artery Pt. participated well with session on this day. Pt. Required mod v/c and tactile cues for safety with functional mobility 2* to cognitive deficits. Pt. Demonstrated decreased cognitive function affecting problem solving and performance of functional activities, self care, and functional mobility. Pt. Will continue to benefit from continued OT to progress towards goals    Rehab identified problem list/impairments: weakness, impaired endurance, impaired self care skills, impaired functional mobilty, gait instability, impaired balance, impaired cognition, decreased upper extremity function, decreased safety awareness, decreased ROM, impaired fine motor    Rehab potential is fair    Activity tolerance: Fair    Discharge recommendations: home with home health     Barriers to discharge: Inaccessible home environment     Equipment recommendations: (TBD)     GOALS:   Multidisciplinary Problems     Occupational Therapy Goals        Problem: Occupational Therapy Goal    Goal Priority Disciplines Outcome Interventions   Occupational Therapy Goal     OT, PT/OT Ongoing, Progressing    Description: Goals to be met by: 8/11/20     Patient will increase functional independence with ADLs by performing:    Feeding with Modified [place].  UE Dressing with Supervision.  LE Dressing with Minimal Assistance.  Grooming while seated with Supervision.  Toileting from bedside commode with Contact Guard Assistance for hygiene and clothing management.   Bathing from  sitting at sink with Contact Guard Assistance.  Supine to sit with Modified [place].  Stand pivot transfers with Contact Guard Assistance using  RW.  Upper extremity exercise program x10 reps per shld flexion and seated rows. assistance as needed.  Caregiver will be competent when assisting with self care tasks and functional transfers.                 Plan:  Patient to be seen 5 x/week to address the above listed problems via self-care/home management, therapeutic activities, therapeutic exercises, neuromuscular re-education, cognitive retraining  Plan of Care expires: 08/28/20  Plan of Care reviewed with: patient    Elza Latif, BRANDI [place] and PINKY have discussed the above patients goals and status in collaboration with Plan of Care.  07/31/2020

## 2020-07-31 NOTE — PT/OT/SLP PROGRESS
"Speech Language Pathology  Treatment    Shant Magana Jr.   MRN: [id number]   Admitting Diagnosis: Embolic stroke involving right middle cerebral artery    Diet recommendations: Solid Diet Level: Regular  Liquid Diet Level: Thin 1 bite/sip at a time, Alternating bites/sips, set-up Assistance with meals - open drinks, Eliminate distractions, Feed only when awake/alert, HOB to 90 degrees, Monitor for s/s of aspiration, Small bites/sips and Strict aspiration precautions    SLP Treatment Date: 07/31/20  Speech Start Time: 0842     Speech Stop Time: 0912     Speech Total (min): 30 min       TREATMENT BILLABLE MINUTES:  Speech Therapy Individual cognitive therapy, 2 units) 30    Has the patient been evaluated by SLP for swallowing? : Yes  Keep patient NPO?: No   General Precautions: Standard, aspiration, fall, vision impaired  Current Respiratory Status: room air(wheezy breath sounds noted; nurse informed, SPO2 91%, increased to 92-93% with deep breaths. Nurse reported pt received albuterol breathing tx at 0742)       Subjective:  "This is August, isn't it?"         Objective:      Pt was oriented to situation/reason for admission, but not to month, year, or place despite cues.  Pt completed a word list retention task recalling words by attribute from fo3 with 40% accuracy ind'ly/60% given cues.  Nurse administered medications (multiple pills at once) with straw sips of water. Pt swallowed pills without overt s/s of aspiration.    Assessment:  Shant Magana Jr. is a 80 y.o. male with a medical diagnosis of Embolic stroke involving right middle cerebral artery and presents with cognitive-linguistic and visual spatial deficits.    Discharge recommendations: Discharge Facility/Level of Care Needs: home health speech therapy(24/7 supervision)     Goals:   Multidisciplinary Problems     SLP Goals        Problem: SLP Goal    Goal Priority Disciplines Outcome   SLP Goal     SLP Ongoing, Progressing   Description: Speech Language Pathology " Goals  Goals expected to be met by 8/4:  1. Pt will orient to month, year, and place given external aids.  2. Pt will recall 2/3 related words after a 60 second delay given max cues to improve delayed memory.   3. Pt will immediately recall series of 4 digits with 80% accuracy given mod cues.   4. Pt will answer simple problem solving q's with 60% accuracy given mod-max cues.   5. Pt will complete word finding tasks with 70% accuracy given mod cues.   6. Pt will participate in further assessment of reading, writing, and visual spatial abilities.                             Plan:   Patient to be seen Therapy Frequency: 3 x/week  Planned Interventions: Cognitive-Linguistic Therapy  Plan of Care expires: 08/27/20  Plan of Care reviewed with: patient  SLP Follow-up?: Yes  SLP - Next Visit Date: 08/03/20           NEVIN Bourgeois, CCC-SLP  07/31/2020     NEVIN Bourgeois, CCC-SLP  Speech Language Pathologist  [phone]  7/31/2020

## 2020-08-01 PROCEDURE — 11000004 HC SNF PRIVATE

## 2020-08-01 PROCEDURE — 94640 AIRWAY INHALATION TREATMENT: CPT

## 2020-08-01 PROCEDURE — 25000003 PHARM REV CODE 250: Performed by: NURSE PRACTITIONER

## 2020-08-01 PROCEDURE — 94761 N-INVAS EAR/PLS OXIMETRY MLT: CPT

## 2020-08-01 PROCEDURE — 25000003 PHARM REV CODE 250: Performed by: STUDENT IN AN ORGANIZED HEALTH CARE EDUCATION/TRAINING PROGRAM

## 2020-08-01 PROCEDURE — 25000242 PHARM REV CODE 250 ALT 637 W/ HCPCS: Performed by: NURSE PRACTITIONER

## 2020-08-01 RX ADMIN — GUAIFENESIN AND DEXTROMETHORPHAN HYDROBROMIDE 1 TABLET: 600; 30 TABLET, EXTENDED RELEASE ORAL at 10:08

## 2020-08-01 RX ADMIN — ATORVASTATIN CALCIUM 80 MG: 20 TABLET, FILM COATED ORAL at 10:08

## 2020-08-01 RX ADMIN — GUAIFENESIN AND DEXTROMETHORPHAN HYDROBROMIDE 1 TABLET: 600; 30 TABLET, EXTENDED RELEASE ORAL at 08:08

## 2020-08-01 RX ADMIN — POLYETHYLENE GLYCOL 3350 17 G: 17 POWDER, FOR SOLUTION ORAL at 05:08

## 2020-08-01 RX ADMIN — THERA TABS 1 TABLET: TAB at 10:08

## 2020-08-01 RX ADMIN — VITAMIN D, TAB 1000IU (100/BT) 1000 UNITS: 25 TAB at 10:08

## 2020-08-01 RX ADMIN — Medication 6 MG: at 08:08

## 2020-08-01 RX ADMIN — IPRATROPIUM BROMIDE AND ALBUTEROL SULFATE 3 ML: .5; 3 SOLUTION RESPIRATORY (INHALATION) at 01:08

## 2020-08-01 RX ADMIN — PANTOPRAZOLE SODIUM 40 MG: 40 TABLET, DELAYED RELEASE ORAL at 10:08

## 2020-08-01 RX ADMIN — IPRATROPIUM BROMIDE AND ALBUTEROL SULFATE 3 ML: .5; 3 SOLUTION RESPIRATORY (INHALATION) at 07:08

## 2020-08-01 RX ADMIN — APIXABAN 2.5 MG: 2.5 TABLET, FILM COATED ORAL at 08:08

## 2020-08-01 RX ADMIN — LOSARTAN POTASSIUM 50 MG: 50 TABLET, FILM COATED ORAL at 10:08

## 2020-08-01 RX ADMIN — APIXABAN 2.5 MG: 2.5 TABLET, FILM COATED ORAL at 10:08

## 2020-08-01 RX ADMIN — TAMSULOSIN HYDROCHLORIDE 0.4 MG: 0.4 CAPSULE ORAL at 10:08

## 2020-08-01 RX ADMIN — MAGNESIUM OXIDE 400 MG (241.3 MG MAGNESIUM) TABLET 400 MG: TABLET at 10:08

## 2020-08-01 RX ADMIN — MAGNESIUM OXIDE 400 MG (241.3 MG MAGNESIUM) TABLET 400 MG: TABLET at 08:08

## 2020-08-01 NOTE — PLAN OF CARE
Problem: Adult Inpatient Plan of Care  Goal: Plan of Care Review  Outcome: Ongoing, Progressing  Plan of Care Reviewed With: patient  Goal: Patient-Specific Goal (Individualization)  Outcome: Ongoing, Progressing  Goal: Absence of Hospital-Acquired Illness or Injury  Outcome: Ongoing, Progressing  Goal: Optimal Comfort and Wellbeing  Outcome: Ongoing, Progressing  Goal: Readiness for Transition of Care  Outcome: Ongoing, Progressing  Goal: Rounds/Family Conference  Outcome: Ongoing, Progressing     Problem: Fall Injury Risk  Goal: Absence of Fall and Fall-Related Injury  Outcome: Ongoing, Progressing     Problem: Skin Injury Risk Increased  Goal: Skin Health and Integrity  Outcome: Ongoing, Progressing

## 2020-08-02 PROCEDURE — 97530 THERAPEUTIC ACTIVITIES: CPT

## 2020-08-02 PROCEDURE — 25000003 PHARM REV CODE 250: Performed by: STUDENT IN AN ORGANIZED HEALTH CARE EDUCATION/TRAINING PROGRAM

## 2020-08-02 PROCEDURE — 11000004 HC SNF PRIVATE

## 2020-08-02 PROCEDURE — 25000003 PHARM REV CODE 250: Performed by: NURSE PRACTITIONER

## 2020-08-02 PROCEDURE — 97116 GAIT TRAINING THERAPY: CPT

## 2020-08-02 PROCEDURE — 25000242 PHARM REV CODE 250 ALT 637 W/ HCPCS: Performed by: NURSE PRACTITIONER

## 2020-08-02 PROCEDURE — 97112 NEUROMUSCULAR REEDUCATION: CPT

## 2020-08-02 PROCEDURE — 94640 AIRWAY INHALATION TREATMENT: CPT

## 2020-08-02 PROCEDURE — 94761 N-INVAS EAR/PLS OXIMETRY MLT: CPT

## 2020-08-02 PROCEDURE — 97110 THERAPEUTIC EXERCISES: CPT

## 2020-08-02 RX ADMIN — IPRATROPIUM BROMIDE AND ALBUTEROL SULFATE 3 ML: .5; 3 SOLUTION RESPIRATORY (INHALATION) at 01:08

## 2020-08-02 RX ADMIN — LOSARTAN POTASSIUM 50 MG: 50 TABLET, FILM COATED ORAL at 08:08

## 2020-08-02 RX ADMIN — VITAMIN D, TAB 1000IU (100/BT) 1000 UNITS: 25 TAB at 08:08

## 2020-08-02 RX ADMIN — QUETIAPINE FUMARATE 25 MG: 25 TABLET ORAL at 08:08

## 2020-08-02 RX ADMIN — ATORVASTATIN CALCIUM 80 MG: 20 TABLET, FILM COATED ORAL at 08:08

## 2020-08-02 RX ADMIN — GUAIFENESIN AND DEXTROMETHORPHAN HYDROBROMIDE 1 TABLET: 600; 30 TABLET, EXTENDED RELEASE ORAL at 08:08

## 2020-08-02 RX ADMIN — TAMSULOSIN HYDROCHLORIDE 0.4 MG: 0.4 CAPSULE ORAL at 08:08

## 2020-08-02 RX ADMIN — PANTOPRAZOLE SODIUM 40 MG: 40 TABLET, DELAYED RELEASE ORAL at 08:08

## 2020-08-02 RX ADMIN — IPRATROPIUM BROMIDE AND ALBUTEROL SULFATE 3 ML: .5; 3 SOLUTION RESPIRATORY (INHALATION) at 07:08

## 2020-08-02 RX ADMIN — APIXABAN 2.5 MG: 2.5 TABLET, FILM COATED ORAL at 08:08

## 2020-08-02 RX ADMIN — THERA TABS 1 TABLET: TAB at 08:08

## 2020-08-02 NOTE — PT/OT/SLP PROGRESS
Physical Therapy  Treatment    Shant Magana Jr.   MRN: [id number]   Admitting Diagnosis: Embolic stroke involving right middle cerebral artery    PT Received On: 08/02/20          Billable Minutes:  Gait Training 20, Therapeutic Activity 10 and Therapeutic Exercise 8    Treatment Type: Treatment  PT/PTA: PT     PTA Visit Number: 0       General Precautions: Standard, aspiration, fall  Orthopedic Precautions:     Braces:      Spiritual, Cultural Beliefs, Christianity Practices, Values that Affect Care: no    Subjective:  Communicated with patient prior to session.  Agreeable to session    Pain/Comfort  Pain Rating 1: 0/10  Pain Rating Post-Intervention 1: 0/10    Objective:  Patient found seated in bedside chair w/ chair alarm and camera with       AM-PAC 6 CLICK MOBILITY  Total Score:17      Transfers:  Sit<>Stand: w/ RW and min assist from chair, w/c  Cues for technique, attention to the left    Gait:  Amb 120 feet w/ RW and min assist. Directional guidance, patient looking to the right and tending to bump into items on the right, decreased attention to the left. Decreased RW management, picking it up off the floor, more in turns. After activity in w/c, patient ambulates w/ RW and min assist 15 feet back to chair.*    Therex:  Patient uses UBe x 6 minutes for improved endurance for functional mobility. Frequent rest breaks, frequent cues and assist to use LUE.  Quad sets,   Glute sets,   Ankle  Pumps,   hip abduction/adduction,  heelslides,     LAQ   Hip flexion  x20 reps w/ assist as needed    Balance:  Stands w/ RW and CG/min assist    Additional Treatment:  Patient educated on PT POC, gaita nd trf tech, call for assistance    Patient left up in chair with all lines intact, call button in reach, chair alarm on and telesitter called to confirm.    Assessment:  Shant Magana Jr. is a 80 y.o. male with a medical diagnosis of Embolic stroke involving right middle cerebral artery.  Patient w/ increased amb distance. Patient will  benefit from continued physical therapy to address deficits and improve safety and functional mobility. Continue with physical therapy plan of care..    Rehab identified problem list/impairments: impaired self care skills, impaired functional mobilty, gait instability, impaired balance, impaired cognition, decreased safety awareness, impaired coordination, impaired fine motor    Rehab potential is good.    Activity tolerance: Good    Discharge recommendations: home with home health( supervision/assistance)     Barriers to discharge: Inaccessible home environment    Equipment recommendations: walker, rolling, wheelchair     GOALS:   Multidisciplinary Problems     Physical Therapy Goals        Problem: Physical Therapy Goal    Goal Priority Disciplines Outcome Goal Variances Interventions   Physical Therapy Goal     PT, PT/OT Ongoing, Progressing     Description: Goals to be met by: 20     Patient will increase functional independence with mobility by performin. Sit to stand transfer with Supervision  2. Bed to chair transfer with Supervision using Rolling Walker  3. Gait  x 150 feet with Contact Guard Assistance using Rolling Walker.   4. Ascend/descend 4 stair with bilateral Handrails Contact Guard Assistance  5. Stand for 10 minutes with Supervision using Rolling Walker performing a standing activity.  6. Propel w/c 100 feet using B upper and lower extremities with verbal cueing only.               Multidisciplinary Problems (Resolved)        Problem: Physical Therapy Goal    Goal Priority Disciplines Outcome Goal Variances Interventions   Physical Therapy Goal   (Resolved)     PT, PT/OT  Error     Description: Goals to be met by: 20     Patient will increase functional independence with mobility by performin. Supine to sit with Modified [place]  2. Sit to supine with Modified [place]  3. Sit to stand transfer with Supervision using RW or cane.  4. Bed to chair  transfer with Supervision using Rolling Walker or straight cane.  5. Gait  x 150 feet with Supervision using Rolling Walker or straight cane.  6. Standing for 5 minutes while performing standing balance activity with SPV using RW or cane.                     PLAN:    Patient to be seen 5 x/week  to address the above listed problems via gait training, therapeutic activities, therapeutic exercises, neuromuscular re-education  Plan of Care expires: 08/27/20  Plan of Care reviewed with: patient    Clarisa Crow, PT  08/02/2020

## 2020-08-02 NOTE — PLAN OF CARE
Problem: Occupational Therapy Goal  Goal: Occupational Therapy Goal  Description: Goals to be met by: 8/11/20     Patient will increase functional independence with ADLs by performing:    Feeding with Modified [place].  UE Dressing with Supervision.  LE Dressing with Minimal Assistance.  Grooming while seated with Supervision.  Toileting from bedside commode with Contact Guard Assistance for hygiene and clothing management.   Bathing from  sitting at sink with Contact Guard Assistance.  Supine to sit with Modified [place].  Stand pivot transfers with Contact Guard Assistance using RW.  Upper extremity exercise program x10 reps per shld flexion and seated rows. assistance as needed.  Caregiver will be competent when assisting with self care tasks and functional transfers.    Outcome: Ongoing, Progressing    Pt was agreeable to OT and was noted to make slow progress towards his goals in therapy.  Pt's goals remain appropriate at this time.  He will continue to benefit from skilled OT services in order to assist him with increasing his safety and level of independence with self care and mobility tasks.     Breann Phillip, OT  8/2/2020

## 2020-08-02 NOTE — PT/OT/SLP PROGRESS
"Occupational Therapy  Treatment    Shant Magana Jr.   MRN: [id number]   Admitting Diagnosis: Embolic stroke involving right middle cerebral artery    OT Date of Treatment: 08/02/20       Billable Minutes:  Therapeutic Activity 31    General Precautions: Standard, aspiration, fall  Orthopedic Precautions: N/A  Braces: N/A    Spiritual, Cultural Beliefs, Taoism Practices, Values that Affect Care: no    Subjective:  Communicated with nurse prior to session.  "I'm done. I'm going back to bed."  Pt ended session early stating he was tired.  OT attempted to motivate pt to complete exercises, but pt refused.     Pain/Comfort  Pain Rating 1: 0/10  Pain Rating Post-Intervention 1: 0/10    Objective:  Patient found with: (L sidelying in bed - [employer] camera in room)    Occupational Performance:    Bed Mobility:    · Patient completed Scooting/Bridging with stand by assistance  · Patient completed Supine to Sit with stand by assistance  · Patient completed Sit to Supine with stand by assistance     Functional Mobility/Transfers:  · Functional Mobility: Pt sat EOB for tx session - declined getting OOB to bed side chair    Activities of Daily Living:  · Feeding:  supervision set up A to wash face    AMPA 6 Click:  [facility] Total Score: 15    OT Exercises:   · Pt worked on UE strengthening and endurance exercises to improve his safety and independence during functional activities, such as, w/c mobility, transfers, walking with RW, bed mobility and ADLs.  Bimanual UE exercises selected to work on improving  B UE coordination and attention/use of L UE.    · AROM using 1# dowel - pt worked on shoulder flex/ chest press and biceps curls - pt initiated task well and responded to verbal cues and demonstration of exercises - pt with decreased attention span and only able to complete 1 set of 10 reps of all exercises with frequent stops  · UE Ergometer pt with goal of 10', but only able to complete 6' on UBE (total time of task was 12') with " frequent stops and cues for rediraction - pt noted to drop L hand from UBE often and needed cues/assist to grasp L handle bar when resuming - pt required Kiana assist to maintain grasp on L hand for final 2.5 minutes  · Pt noted with decreased attention to L UE during dowel and UBE exercises as evidenced by pt dropping grasp on both items during exercises.  Pt needed increased verbal cues, demonstration of exercises, and ultimately, Kiana assist to maintain grasp on L UE    Additional Treatment:  · Pt noted with decreased attention to task and coordination/attention to Left. OT remained on pt's left side to draw attention to left side.  Informal visual screen to see if vision impaired to left - pt able to correctly identify how many fingers were held up on L for 3/3 - however, unable to identify cat (on therapist's ID badge [place])  · Pt required motivation and increased time to complete exercises - session ended early due to pt laying himself back in bed and stating, he was done with therapy.  Attempts to engage pt and increase his arousal by having pt wash face with wash cloth were unsuccessful.  Pt was still tired after task.     Patient left right sidelying with call button in reach and camera on    ASSESSMENT:  Shant Magana Jr. is a 80 y.o. male with a medical diagnosis of Embolic stroke involving right middle cerebral artery and deficits noted below.  Pt was agreeable to OT and was noted to make slow progress towards his goals in therapy.   Pt noted with decreased attention and function of L UE.  Pt needed increased cues (verbal and tactile), as well as, Kiana, to complete tasks assigned.  Pt's goals remain appropriate at this time.  He will continue to benefit from skilled OT services in order to assist him with increasing his safety and level of independence with self care and mobility tasks.       Rehab identified problem list/impairments: weakness, impaired endurance, impaired self care skills, impaired functional  mobilty, gait instability, impaired balance, impaired cognition, decreased upper extremity function, decreased safety awareness, decreased ROM, impaired fine motor    Rehab potential is good    Activity tolerance: Fair    Discharge recommendations: home with home health     Barriers to discharge: Inaccessible home environment     Equipment recommendations: (TBD)     GOALS:   Multidisciplinary Problems     Occupational Therapy Goals        Problem: Occupational Therapy Goal    Goal Priority Disciplines Outcome Interventions   Occupational Therapy Goal     OT, PT/OT Ongoing, Progressing    Description: Goals to be met by: 8/11/20     Patient will increase functional independence with ADLs by performing:    Feeding with Modified [place].  UE Dressing with Supervision.  LE Dressing with Minimal Assistance.  Grooming while seated with Supervision.  Toileting from bedside commode with Contact Guard Assistance for hygiene and clothing management.   Bathing from  sitting at sink with Contact Guard Assistance.  Supine to sit with Modified [place].  Stand pivot transfers with Contact Guard Assistance using RW.  Upper extremity exercise program x10 reps per shld flexion and seated rows. assistance as needed.  Caregiver will be competent when assisting with self care tasks and functional transfers.                     Plan:  Patient to be seen 5 x/week to address the above listed problems via self-care/home management, therapeutic activities, therapeutic exercises, neuromuscular re-education, cognitive retraining  Plan of Care expires: 08/28/20  Plan of Care reviewed with: patient    Breann WHELAN Kenji, OT  08/02/2020

## 2020-08-03 ENCOUNTER — TELEPHONE (OUTPATIENT)
Dept: PODIATRY | Facility: CLINIC | Age: 80
End: 2020-08-03

## 2020-08-03 PROCEDURE — 11000004 HC SNF PRIVATE

## 2020-08-03 PROCEDURE — 97803 MED NUTRITION INDIV SUBSEQ: CPT

## 2020-08-03 PROCEDURE — 97530 THERAPEUTIC ACTIVITIES: CPT | Mod: CQ

## 2020-08-03 PROCEDURE — 97535 SELF CARE MNGMENT TRAINING: CPT | Mod: CO

## 2020-08-03 PROCEDURE — 97110 THERAPEUTIC EXERCISES: CPT | Mod: CQ

## 2020-08-03 PROCEDURE — 25000003 PHARM REV CODE 250: Performed by: NURSE PRACTITIONER

## 2020-08-03 PROCEDURE — 94761 N-INVAS EAR/PLS OXIMETRY MLT: CPT

## 2020-08-03 PROCEDURE — 97116 GAIT TRAINING THERAPY: CPT | Mod: CQ

## 2020-08-03 PROCEDURE — 92526 ORAL FUNCTION THERAPY: CPT

## 2020-08-03 PROCEDURE — 94640 AIRWAY INHALATION TREATMENT: CPT

## 2020-08-03 PROCEDURE — 25000003 PHARM REV CODE 250: Performed by: STUDENT IN AN ORGANIZED HEALTH CARE EDUCATION/TRAINING PROGRAM

## 2020-08-03 PROCEDURE — 99900058 HC 022 PAID UNDER SNF PPS

## 2020-08-03 PROCEDURE — 92507 TX SP LANG VOICE COMM INDIV: CPT

## 2020-08-03 PROCEDURE — 97110 THERAPEUTIC EXERCISES: CPT | Mod: CO

## 2020-08-03 PROCEDURE — 25000242 PHARM REV CODE 250 ALT 637 W/ HCPCS: Performed by: NURSE PRACTITIONER

## 2020-08-03 RX ORDER — IPRATROPIUM BROMIDE AND ALBUTEROL SULFATE 2.5; .5 MG/3ML; MG/3ML
3 SOLUTION RESPIRATORY (INHALATION) EVERY 6 HOURS PRN
Status: DISCONTINUED | OUTPATIENT
Start: 2020-08-03 | End: 2020-08-11 | Stop reason: HOSPADM

## 2020-08-03 RX ORDER — LOSARTAN POTASSIUM 50 MG/1
100 TABLET ORAL DAILY
Status: DISCONTINUED | OUTPATIENT
Start: 2020-08-04 | End: 2020-08-11 | Stop reason: HOSPADM

## 2020-08-03 RX ADMIN — VITAMIN D, TAB 1000IU (100/BT) 1000 UNITS: 25 TAB at 09:08

## 2020-08-03 RX ADMIN — GUAIFENESIN AND DEXTROMETHORPHAN HYDROBROMIDE 1 TABLET: 600; 30 TABLET, EXTENDED RELEASE ORAL at 08:08

## 2020-08-03 RX ADMIN — LOSARTAN POTASSIUM 50 MG: 50 TABLET, FILM COATED ORAL at 07:08

## 2020-08-03 RX ADMIN — GUAIFENESIN AND DEXTROMETHORPHAN HYDROBROMIDE 1 TABLET: 600; 30 TABLET, EXTENDED RELEASE ORAL at 09:08

## 2020-08-03 RX ADMIN — APIXABAN 2.5 MG: 2.5 TABLET, FILM COATED ORAL at 09:08

## 2020-08-03 RX ADMIN — TAMSULOSIN HYDROCHLORIDE 0.4 MG: 0.4 CAPSULE ORAL at 09:08

## 2020-08-03 RX ADMIN — ATORVASTATIN CALCIUM 80 MG: 20 TABLET, FILM COATED ORAL at 09:08

## 2020-08-03 RX ADMIN — THERA TABS 1 TABLET: TAB at 09:08

## 2020-08-03 RX ADMIN — PANTOPRAZOLE SODIUM 40 MG: 40 TABLET, DELAYED RELEASE ORAL at 09:08

## 2020-08-03 RX ADMIN — IPRATROPIUM BROMIDE AND ALBUTEROL SULFATE 3 ML: .5; 3 SOLUTION RESPIRATORY (INHALATION) at 07:08

## 2020-08-03 RX ADMIN — APIXABAN 2.5 MG: 2.5 TABLET, FILM COATED ORAL at 08:08

## 2020-08-03 NOTE — PROGRESS NOTES
[facility]                                  Skilled Nursing Facility                   Progress Note     Admit Date: 7/27/2020  KWAME TBD  Principal Problem:  Embolic stroke involving right middle cerebral artery   HPI obtained from patient interview and chart review     Chief Complaint:  Nurse reports hypertension, re-evaluation of hallucinations and agitation    HPI:   Mr. Chino Gomez is a 80 year old male with PMHx of dementia, multiple strokes, intracranial and extracranial atherosclerosis, DM, HTN, a fib (eliquis with history of noncompliance due to cost) who presents to SNF following hospitalization for embolic stroke involving right MCA.  Admission to SNF for secondary weakness and debility.     Interval history:   Patient's 24 hr blood pressure range is 137/81 to 174/84.  Will increase patient's losartan to home dose.  Patient still states that he is experiencing hallucinations, he was offered to go outside yesterday with PT, patient refused and states he will go tomorrow.  Nursing is utilized PRN quetiapine for agitation 1 time- reports that patient had a positive effect from this medication.  Will continue to closely monitor patient's mental status.    Past Medical History: Patient has a past medical history of Acute kidney injury superimposed on chronic kidney disease (10/14/2017), Cancer, Diabetes mellitus, Former smoker (7/18/2020), History of stroke (8/15/2017), Hypertension, Hypertrophic cardiomyopathy, Renal disorder, and Stroke.    Past Surgical History: Patient has a past surgical history that includes Back surgery; Thyroidectomy; Esophagogastroduodenoscopy (N/A, 9/4/2018); Esophagogastroduodenoscopy (N/A, 10/18/2018); and Colonoscopy (N/A, 10/18/2018).    Social History: Patient reports that he has quit smoking. His smoking use included cigarettes. He has a 2.70 pack-year smoking history. He has never used smokeless tobacco.  He reports that he does not drink alcohol or use drugs.    Family History:  No significant family history to report    Allergies: Patient has No Known Allergies.    ROS  Constitutional: Negative for fever   Eyes: Negative for blurred vision, double vision   Respiratory:  + for intermittent cough, dyspnea on exertion  Cardiovascular: Negative for chest pain, palpitations, and leg swelling.   Gastrointestinal: Negative for abdominal pain, constipation, diarrhea, nausea and vomiting.   Genitourinary: Negative for dysuria, frequency   Musculoskeletal:  + generalized weakness. Negative for back pain and myalgias.   Skin: Negative for itching and rash.   Neurological: Negative for dizziness, headaches.   Psychiatric/Behavioral: Negative for depression. The patient is not nervous/anxious.      24 hour Vital Sign Range   Temp:  [97.5 °F (36.4 °C)-97.7 °F (36.5 °C)]   Pulse:  [60-72]   Resp:  [18-22]   BP: (139-174)/(81-84)   SpO2:  [94 %-95 %]     PEx  Constitutional: Patient appears debilitated.  No distress noted  HENT:   Head: Normocephalic and atraumatic.   Eyes: Pupils are equal, round  Neck: Normal range of motion. Neck supple.   Cardiovascular: Normal rate, regular rhythm and normal heart sounds.    Pulmonary/Chest: Effort normal and breath sounds are clear  Abdominal: Soft. Bowel sounds are normal.   Musculoskeletal: Normal range of motion.   Neurological: Alert and oriented to person- however states incorrect age, disoriented to place, and time.  Higher level thinking impaired.  Patient has partial hemianopsia to his right eye, no facial asymmetry. LUE- consult port against gravity but not for a full 10 secs. RUE- able to hold for 10 secs; RLE and LLE- able to hold for 5 secs. no sensory abnormality.  No aphasia or dysarthria noted.  Psychiatric: Normal mood and affect. Behavior is normal.   Skin: Skin is warm and dry.    No results for input(s): GLUCOSE, NA, K, CL, CO2, BUN, CREATININE, MG in the last 24  hours.    Invalid input(s):  CALCIUM    No results for input(s): WBC, RBC, HGB, HCT, PLT, MCV, MCH, MCHC in the last 24 hours.      Assessment and Plan:     Problems addressed today      Essential hypertension  - Stroke risk factor  - goal SBP < 160   - Original home meds: Losartan 100 mg daily, Amlodipine 10 mg daily  - 7/31 stable, continue losartan to 50 mg daily, continue to hold amlodipine at this time  - 8/3 increase losartan to 100 mg daily, will initiate amlodipine if hypertension continues    Dementia with behavioral disturbance  Maintain Delirium precautions:  - Avoid antihistamines, anticholinergics, hypnotics, and minimize opiates while controlling for pain as these medications may exacerbate delirium. Cues for day/night will assist with keeping patient calm and oriented - during daytime, please keep adequate light in room (open windows, lights on) and please keep room dim at night-time to encourage normal sleep-wake cycles. Continuing to have nursing and family reorient the patient and encourage family to call  - 8/3 persists, continue quetiapine 25 mg qHS PRN For agitation       Ongoing problems        CKD (chronic kidney disease) stage 3, GFR 30-59 ml/min  - stable, baseline likely 1.5-1.8,  monitor twice weekly BMPs, Avoid nephrotoxic agents, Renally adjust medications.  Patient will need to follow with a nephrologist after DC      Embolic stroke involving right middle cerebral artery  - Patient with history of L facial droop from previous stroke.  - CT revealed R parietal infarct. Etiology likely cardioembolic  - Antithrombotics for secondary stroke prevention: Anticoagulants: Apixaban 2.5 mg BID  - Will maintain on geriatric dose of Eliquis as creatinine borderline for normal dosing (and only worsens when not admitted).- will investigate patient's appropriateness to be from medication and have him signed up with payment assistant  - Statins for secondary stroke prevention and hyperlipidemia:   continue Atorvastatin- 80 mg daily  - PT/OT  - goal BP- SBP < 160  - [facility] recommended 24 hr assistance once discharged home daughter Manuel Lozada 7/21, who states family is unable to provide 24 hr supervision- information relayed to SNF CM to begin early discussions with patient's family to prepare for DC.     Cytotoxic cerebral edema  - Area of cytotoxic cerebral edema identified when reviewing brain imaging in the territory of the R middle cerebral artery. There is no mass effect associated with it. We will continue to monitor the patients clinical exam for any worsening of symptoms which may indicate expansion of the stroke or the area of the edema resulting in the clinical change. The pattern is suggestive of cardioembolic etiology.     Former smoker  - Stroke risk factor  - Encourage continued smoking cessation    Type 2 diabetes mellitus with complication, without long-term current use of insulin  -Stroke risk factor  - A1C 5.8     Benign prostatic hyperplasia  - Continue home flomax 0.4 mg daily    Status post placement of implantable loop recorder  - follow-up with cardiology after DC from [facility]     Paroxysmal atrial fibrillation  - Stroke risk factor  - Anticoagulate with home apixaban 2.5mg BID- CM made aware that patient previously discontinue medication due to not being able to afford the cost and looking into getting him signed up with [facility] nurse medication assistance program     Chronic diastolic congestive heart failure  - Repeat echo EF 68 %  - Last echo 12/2019 with mild LV diastolic dysfunction  - Not on diuretic at home; BNP wnl  - Daily weights (standing if tolerated)  - Fluid restriction at 1500mL  - Cardiac diet     Atelectasis  Right-sided pleural effusion  Productive cough  - improving, patient now tolerating room air, reduced scheduled DuoNebs to q.6 while awake, initiated Mucinex DM  mg q.12 hours.  Initiated incentive spirometry.  Monitor respiratory status closely.      Mixed  hyperlipidemia  -  Stroke risk factor  - LDL 70  - Continue home atorvastatin 80 mg daily     Internal carotid artery stenosis, left  - Stroke risk factor; continue Eliquis     History of stroke  - Patient with history of multiple strokes   - History of a fib + intracranial/extracranial atherosclerosis  - Continue secondary stroke prevention with eliquis + atorvastatin 80 mg   - Will maintain on geriatric dose of Eliquis as creatinine borderline for normal dosing (and only worsens when not admitted).     Vitamin-D deficiency  - continue vitamin-D 1000 units daily- level is not been checked since 2017- ordered for level with next lab draw.      Debility   - Continue with PT/OT for gait training and strengthening and restoration of ADL's   - Encourage mobility, OOB in chair, and early ambulation as appropriate  - Fall precautions   - Monitor for bowel and bladder dysfunction  - Monitor for and prevent skin breakdown and pressure ulcers  - Continue DVT prophylaxis with  Eliquis 2.5 mg daily        Future Appointments   Date Time Provider Department Center   9/10/2020  3:00 PM Lucio Toth MD [facility] CARDIO [place] Erica Copeland NP  Department of Hospital Medicine   [facility]     DOS: 8/3/2020       Patient note was created using MModal Dictation.  Any errors in syntax or even information may not have been identified and edited on initial review prior to signing this note.

## 2020-08-03 NOTE — PT/OT/SLP PROGRESS
"Physical Therapy  Treatment    Shant Magana Jr.   MRN: [id number]   Admitting Diagnosis: Embolic stroke involving right middle cerebral artery    PT Received On: 08/03/20        Billable Minutes:  Gait Training 16, Therapeutic Activity 15 and Therapeutic Exercise 15    Treatment Type: Treatment  PT/PTA: PTA     PTA Visit Number: 1       General Precautions: Standard, aspiration, fall  Orthopedic Precautions:     Braces:      Spiritual, Cultural Beliefs, Yarsanism Practices, Values that Affect Care: no    Subjective:  1st attempts in am "not now, later"  2nd attempt "alright"    Pain/Comfort  Pain Rating 1: 0/10  Pain Rating Post-Intervention 1: 0/10    Objective:   Patient found with: (in bed, camera present)     AM-PAC 6 CLICK MOBILITY  Total Score:17    Bed Mobility:  Supine>Sit: SBA with  HOB elev and rail, simple vcs    Transfers:  Sit<>Stand: min A with RW vcs for safety/tech/handplacement  Stand Pivot Transfer: min/CGA with RW vcs for safety with sequencing, not to  walker    Gait:  Amb with RW min/CGA vcs for guidance/staying focus/avoiding obstacles, easily distracted ~ 20 ft x 2 trials needed to go to the bathroom and 60 ft    Therex:  2x10 reps AP,LAQ,hip flex,abd/add vc/demo for tech    Additional Treatment:  toileting min/CG with RW pt first said he would stand then once RW was in front decided to sit, mod/min with diaper and pants mgmt, set up for handhygiene    Patient left up in chair with call button in reach, nsg notified and belongings in reach and chair alarm.    Assessment:  Shant Magana Jr. is a 80 y.o. male with a medical diagnosis of Embolic stroke involving right middle cerebral artery.  Pt tolerated well, pt would continue to benefit from skilled PT services to improve overall functional mobility, strength and endurance.  .    Rehab identified problem list/impairments: impaired self care skills, impaired functional mobilty, gait instability, impaired balance, impaired cognition, decreased " safety awareness, impaired coordination, impaired fine motor    Rehab potential is good.    Activity tolerance: Fair    Discharge recommendations: home with home health( supervision/assistance)     Barriers to discharge: Inaccessible home environment    Equipment recommendations: walker, rolling, wheelchair     GOALS:   Multidisciplinary Problems     Physical Therapy Goals        Problem: Physical Therapy Goal    Goal Priority Disciplines Outcome Goal Variances Interventions   Physical Therapy Goal     PT, PT/OT Ongoing, Progressing     Description: Goals to be met by: 20     Patient will increase functional independence with mobility by performin. Sit to stand transfer with Supervision  2. Bed to chair transfer with Supervision using Rolling Walker  3. Gait  x 150 feet with Contact Guard Assistance using Rolling Walker.   4. Ascend/descend 4 stair with bilateral Handrails Contact Guard Assistance  5. Stand for 10 minutes with Supervision using Rolling Walker performing a standing activity.  6. Propel w/c 100 feet using B upper and lower extremities with verbal cueing only.               Multidisciplinary Problems (Resolved)        Problem: Physical Therapy Goal    Goal Priority Disciplines Outcome Goal Variances Interventions   Physical Therapy Goal   (Resolved)     PT, PT/OT  Error     Description: Goals to be met by: 20     Patient will increase functional independence with mobility by performin. Supine to sit with Modified [place]  2. Sit to supine with Modified [place]  3. Sit to stand transfer with Supervision using RW or cane.  4. Bed to chair transfer with Supervision using Rolling Walker or straight cane.  5. Gait  x 150 feet with Supervision using Rolling Walker or straight cane.  6. Standing for 5 minutes while performing standing balance activity with SPV using RW or cane.                     PLAN:    Patient to be seen 5 x/week  to address the above listed  problems via gait training, therapeutic activities, therapeutic exercises, neuromuscular re-education  Plan of Care expires: 08/27/20  Plan of Care reviewed with: patient    Nanci Og, PTA  08/03/2020

## 2020-08-03 NOTE — PT/OT/SLP PROGRESS
"Speech Language Pathology  Treatment    Shant Magana Jr.   MRN: [id number]   Admitting Diagnosis: Embolic stroke involving right middle cerebral artery    Diet recommendations: Solid Diet Level: Regular  Liquid Diet Level: Thin 1 bite/sip at a time, Alternating bites/sips, set-up Assistance with meals - open drinks, Eliminate distractions, Feed only when awake/alert, HOB to 90 degrees, Monitor for s/s of aspiration, Small bites/sips and Strict aspiration precautions    SLP Treatment Date: 08/03/20  Speech Start Time: 1329     Speech Stop Time: 1351     Speech Total (min): 22 min       TREATMENT BILLABLE MINUTES:  Speech Therapy Individual 14 and Treatment Swallowing Dysfunction 8    Has the patient been evaluated by SLP for swallowing? : Yes  Keep patient NPO?: No   General Precautions: Standard, fall, aspiration, vision impaired  Current Respiratory Status: room air       Subjective:  Patient awake/alert with flat affect and minimal speech during session  "1961" Patient reported incorrect year of birth (1940)  Communicated with nurse prior to session       Objective:   Patient found with: (lying in bed with avasys in room)  Patient seen for ongoing cognitive-linguistic therapy and monitoring of diet tolerance. He reported no difficulties with current diet and he tolerated thin liquids x2 (via straw) and solids x6 (saltines) with no overt signs of aspiration. Patient with limited initiation and largely single word responses throughout session. Patient was oriented to self only, but oriented to month, year and place with max assist. He answered 2/5 complex yes/no questions with mod assist and was 4/6 on simple binary choice tasks with max assist. He required consistent redirection to task and repetition. SLP educated patient regarding the POC for ST and he acknowledged teaching, but demonstrated limited understanding. Patient left in bed with call light within reach and avasys in place.     Assessment:  Shant Magana Jr. is a " 80 y.o. male with a medical diagnosis of Embolic stroke involving right middle cerebral artery and presents with cognitive-linguistic and visual spatial deficits.    Discharge recommendations: Discharge Facility/Level of Care Needs: home health speech therapy(24/7 supervision)     Goals:   Multidisciplinary Problems     SLP Goals        Problem: SLP Goal    Goal Priority Disciplines Outcome   SLP Goal     SLP Ongoing, Progressing   Description: Speech Language Pathology Goals  Goals expected to be met by 8/4:  1. Pt will orient to month, year, and place given external aids.  2. Pt will recall 2/3 related words after a 60 second delay given max cues to improve delayed memory.   3. Pt will immediately recall series of 4 digits with 80% accuracy given mod cues.   4. Pt will answer simple problem solving q's with 60% accuracy given mod-max cues.   5. Pt will complete word finding tasks with 70% accuracy given mod cues.   6. Pt will participate in further assessment of reading, writing, and visual spatial abilities.                             Plan:   Patient to be seen Therapy Frequency: 3 x/week  Planned Interventions: Cognitive-Linguistic Therapy  Plan of Care expires: 08/27/20  Plan of Care reviewed with: patient  SLP Follow-up?: Yes  SLP - Next Visit Date: 08/05/20           Hi Portillo CCC-SLP  Speech-Language Pathology  Pager: [phone]   08/03/2020

## 2020-08-03 NOTE — PLAN OF CARE
Self feeding difficulty related to cognitive deficits and risk of aspiration as evidenced by hx of CVA's, recommendations from Speech Therapy to assist with meals .  Resolved , continue aspiration precautions     Plan  Carbohydrate controlled diet- 2000 calories  Fat and mineral modified diet- cardiac  Fluid restricted diet- 1500 ml    Commercial beverage- protein- Boost glucose BID vanilla  Multivitamin/mineral therapy- MVI

## 2020-08-03 NOTE — NURSING
/84 admin scheduled BP meds before scheduled time, will monitor and will secure chat notify JULIA Cedillo

## 2020-08-03 NOTE — PROGRESS NOTES
"[facility] - Skilled Nursing Care  Adult Nutrition  Progress Note    SUMMARY   Recommendations  Recommendation: Continue diabetic, fluid restricted diet 1500 ml, cardiac diet, RD following  Goals: PO to meet 85% of EEN/EPN by next RD follow up  Nutrition Goal Status: progressing towards goal. Patient feeding himself , aspiration precautions in place  Reason for Assessment    Reason For Assessment: RD follow-up  Diagnosis: stroke/CVA  Relevant Medical History: DM2, CKD, AFIB, HLD, HF, HTN, CVA, Carotid stenosis, dementia  Interdisciplinary Rounds: attended  General Information Comments: P %, pt does not acknowledge getting oral supplement , will fu with dietary  Nutrition Discharge Planning: DC on diabetic heart healthy diet, fluid per MD, fall risk,    Nutrition Risk Screen    Nutrition Risk Screen: dysphagia or difficulty swallowing    Nutrition/Diet History    Patient Reported Diet/Restrictions/Preferences: low salt, diabetic diet  Spiritual, Cultural Beliefs, Tenriism Practices, Values that Affect Care: no  Food Allergies: NKFA  Factors Affecting Nutritional Intake: impaired cognitive status/motor control    Anthropometrics    Temp: 97.7 °F (36.5 °C)  Height: 6' 1" (185.4 cm)  Height (inches): 73 in  Weight Method: Bed Scale  Weight: 82.8 kg (182 lb 8.7 oz)  Weight (lb): 182.54 lb  Ideal Body Weight (IBW), Male: 184 lb  % Ideal Body Weight, Male (lb): 104.84 %  BMI (Calculated): 24.1  BMI Grade: 25 - 29.9 - overweight  Usual Body Weight (UBW), k.9 kg  % Usual Body Weight: 96.46  % Weight Change From Usual Weight: -3.74 %       Lab/Procedures/Meds    Pertinent Labs Reviewed: reviewed  Pertinent Labs Comments: Hg 7.8, Hct 26.2  Pertinent Medications Reviewed: reviewed  Pertinent Medications Comments: MVI         Estimated/Assessed Needs    Weight Used For Calorie Calculations: 87.5 kg (192 lb 14.4 oz)  Energy Calorie Requirements (kcal):   Energy Need Method: [place]-St Jeor(x 1.2(PAL))  Protein " Requirements: 105g-88(x 1.2-1.0)  Weight Used For Protein Calculations: 87.5 kg (192 lb 14.4 oz)  Fluid Requirements (mL): 1500 per MD     RDA Method (mL): 1966  CHO Requirement: 246g      Nutrition Prescription Ordered    Current Diet Order: 2000 diabetic, cardiac, 1500 fluid restrictio  Nutrition Order Comments: PO %  Oral Nutrition Supplement: Boost glucose BID    Evaluation of Received Nutrient/Fluid Intake    Energy Calories Required: meeting needs  Protein Required: meeting needs  Fluid Required: meeting needs  Tolerance: tolerating  % Intake of Estimated Energy Needs: %  % Meal Intake: %    Nutrition Risk   one time per week    Level of Risk/Frequency of Follow-up: low     Assessment and Plan   Self feeding difficulty related to cognitive deficits and risk of aspiration as evidenced by hx of CVA's, recommendations from Speech Therapy to assist with meals .  Ongoing     Plan  Carbohydrate controlled diet- 2000 calories  Fat and mineral modified diet- cardiac  Fluid restricted diet- 1500 ml    Commercial beverage- protein- Boost glucose BID vanilla  Multivitamin/mineral therapy- MVI    Monitor and Evaluation    Food and Nutrient Intake: food and beverage intake  Food and Nutrient Adminstration: diet order  Physical Activity and Function: nutrition-related ADLs and IADLs  Anthropometric Measurements: weight change  Biochemical Data, Medical Tests and Procedures: glucose/endocrine profile, electrolyte and renal panel     Malnutrition Assessment   7/28/20     Skin (Micronutrient): dry  Hair/Scalp (Micronutrient): dry  Eyes (Micronutrient): conjunctiva dry  Neck/Chest (Micronutrient): muscle wasting  Musculoskeletal/Lower Extremities: muscle wasting   Micronutrient Evaluation: suspected deficiency       Orbital Region (Subcutaneous Fat Loss): mild depletion  Upper Arm Region (Subcutaneous Fat Loss): mild depletion  Thoracic and Lumbar Region: well nourished   [place] Region (Muscle Loss): mild  depletion  Clavicle Bone Region (Muscle Loss): moderate depletion  Clavicle and Acromion Bone Region (Muscle Loss): mild depletion  Dorsal Hand (Muscle Loss): mild depletion  Posterior Calf Region (Muscle Loss): well nourished                 Nutrition Follow-Up    RD Follow-up?: Yes

## 2020-08-03 NOTE — TELEPHONE ENCOUNTER
Spoke with patients family member. They stated that patient is in the hospital and that they would call to reschedule when possible.

## 2020-08-03 NOTE — TELEPHONE ENCOUNTER
----- Message from Juana Byrd sent at 8/3/2020  7:56 AM CDT -----  Regarding: Cancel  Appointment  Type:Appointment Request    Name of Caller:per Ms Boles calling from skill nursing patient need to cancel appointment scheduled for today  When is the first available appointment?  Symptoms:  Best Call Back Number:  Additional Information:

## 2020-08-03 NOTE — H&P
"Hospital Medicine  History and Physical Exam    Admit Date: 7/27/2020  KWAME TBD  Principal Problem:  Embolic stroke involving right middle cerebral artery   Primary care Physician: Trent Aldana MD  Code status: Full Code    HPI includes summarization of recent events from medical records.  HPI:   Shant Magana Jr. is a 80 y.o. male with PMHx of dementia, multiple strokes, intracranial and extracranial atherosclerosis, DM, HTN, a fib (eliquis with history of noncompliance due to cost) who presented to ED via EMS for AMS x 2 days. Per EMS, family reported that patient "has not been himself." ED provider noted LSW and L hemianopsia on exam and stroke team was consulted.     Stroke provider contacted patient's son (Hoda) for more information and history. His son noticed a change in behavior yesterday around 2pm. He states that yesterday the patient seemed more confused and was walking around aimlessly. This morning, the patient called his son and his son went to check on him. The patient was at home with his grandson and he had fallen between the bed and the dresser. His son noticed that he was inattentive with a facial droop and delayed verbal response. Patient stated he was going to restroom but was instead walking into closet. This persistent change in behavior and mental status prompted family to have patient taken to ED via EMS.    Admitted with acute encephalopathy x 2 days. MRI brain showed R parietal infarct. MRA negative for LVO or significant stenosis. Etiology likely cardioembolic. Home apixaban 2.5mg resumed without complications. Patient had hypoxia during admission requiring 2L nc. CXR appeared congestion. Initially diuresed with no improvement. Significant expiratory wheezing on exam. Improved with scheduled duonebs. Given smoking history and clinical picture, ambulatory referral was placed to Pulmonology for evaluation of obstructive lung disease and PFTs. PT/OT consulted, recommended SNF. " Patient was accepted to [facility] on 7/27. Discussed results and plan with patient/family. Patient verbalized understanding and agreed to plan. Patient stable for discharge to SNF.    Patient transferred to [facility] with PT and OT to improve functional status and ability to perform ADLs.       Past Medical History: Patient has a past medical history of Acute kidney injury superimposed on chronic kidney disease (10/14/2017), Cancer, Diabetes mellitus, Former smoker (7/18/2020), History of stroke (8/15/2017), Hypertension, Hypertrophic cardiomyopathy, Renal disorder, and Stroke.    Past Surgical History: Patient has a past surgical history that includes Back surgery; Thyroidectomy; Esophagogastroduodenoscopy (N/A, 9/4/2018); Esophagogastroduodenoscopy (N/A, 10/18/2018); and Colonoscopy (N/A, 10/18/2018).    Social History: Patient reports that he has quit smoking. His smoking use included cigarettes. He has a 2.70 pack-year smoking history. He has never used smokeless tobacco. He reports that he does not drink alcohol or use drugs.    Family History: family history is not on file.    Medications: reviewed     Allergies: Patient has No Known Allergies.    ROS  Constitutional: no fever or chills  Respiratory: no cough or shortness of breath  Cardiovascular: no chest pain or palpitations  Gastrointestinal: no nausea or vomiting, no abdominal pain or change in bowel habits  Genitourinary: no hematuria or dysuria  Integument/Breast: no rash or pruritis  Hematologic/Lymphatic: no easy bruising or lymphadenopathy  Musculoskeletal: no arthralgias or myalgias  Neurological: no seizures or tremors  Behavioral/Psych: no depression or anxiety    PEx  Temp:  [97.5 °F (36.4 °C)-97.7 °F (36.5 °C)]   Pulse:  [60-72]   Resp:  [18-22]   BP: (139-174)/(81-84)   SpO2:  [94 %-95 %]   Body mass index is 24.08 kg/m².     General appearance: no distress  Mental status: Alert and oriented x 3  HEENT:  conjunctivae/corneas clear,  PERRL  Neck: supple, thyroid not enlarged  Pulm:   normal respiratory effort, CTA B, no c/w/r  Card: RRR, no murmur  Abd: soft, NT, ND, BS present; no masses, no organomegaly  Ext: no edema  Pulses: 2+, symmetric  Skin: color, texture, turgor normal. No rashes or lesions  Neuro: CN II-XII grossly intact, no focal numbness or weakness, normal strength and tone     Results for BREA WHITE JR. (MRN [id number]) as of 8/3/2020 15:21   Ref. Range 7/25/2020 08:03 7/27/2020 07:54   WBC Latest Ref Range: 3.90 - 12.70 K/uL 5.01 4.75   RBC Latest Ref Range: 4.60 - 6.20 M/uL 4.98 4.90   Hemoglobin Latest Ref Range: 14.0 - 18.0 g/dL 14.7 14.2   Hematocrit Latest Ref Range: 40.0 - 54.0 % 44.4 44.2   MCV Latest Ref Range: 82 - 98 fL 89 90   MCH Latest Ref Range: 27.0 - 31.0 pg 29.5 29.0   MCHC Latest Ref Range: 32.0 - 36.0 g/dL 33.1 32.1   RDW Latest Ref Range: 11.5 - 14.5 % 14.3 14.3   Platelets Latest Ref Range: 150 - 350 K/uL 232 211   MPV Latest Ref Range: 9.2 - 12.9 fL 10.8 11.5   Gran% Latest Ref Range: 38.0 - 73.0 % 66.8 69.0   Gran # (ANC) Latest Ref Range: 1.8 - 7.7 K/uL 3.4 3.3   Lymph% Latest Ref Range: 18.0 - 48.0 % 18.0 16.4 (L)   Lymph # Latest Ref Range: 1.0 - 4.8 K/uL 0.9 (L) 0.8 (L)   Mono% Latest Ref Range: 4.0 - 15.0 % 10.6 10.7   Mono # Latest Ref Range: 0.3 - 1.0 K/uL 0.5 0.5   Eosinophil% Latest Ref Range: 0.0 - 8.0 % 3.6 2.9   Eos # Latest Ref Range: 0.0 - 0.5 K/uL 0.2 0.1   Basophil% Latest Ref Range: 0.0 - 1.9 % 0.8 0.8   Baso # Latest Ref Range: 0.00 - 0.20 K/uL 0.04 0.04   Immature Grans (Abs) Latest Ref Range: 0.00 - 0.04 K/uL 0.01 0.01   Immature Granulocytes Latest Ref Range: 0.0 - 0.5 % 0.2 0.2   Sodium Latest Ref Range: 136 - 145 mmol/L 140 140   Potassium Latest Ref Range: 3.5 - 5.1 mmol/L 3.8 4.2   Chloride Latest Ref Range: 95 - 110 mmol/L 106 111 (H)   CO2 Latest Ref Range: 23 - 29 mmol/L 24 20 (L)   Anion Gap Latest Ref Range: 8 - 16 mmol/L 10 9   BUN, Bld Latest Ref Range: 8 - 23 mg/dL 31 (H) 28  (H)   Creatinine Latest Ref Range: 0.5 - 1.4 mg/dL 1.5 (H) 1.4   eGFR if non African American Latest Ref Range: >60 mL/min/1.73 m^2 43.3 (A) 47.1 (A)   eGFR if African American Latest Ref Range: >60 mL/min/1.73 m^2 50.1 (A) 54.5 (A)   Glucose Latest Ref Range: 70 - 110 mg/dL 106 110   Calcium Latest Ref Range: 8.7 - 10.5 mg/dL 10.2 10.9 (H)   POCT Glucose Latest Ref Range: 70 - 110 mg/dL       X-Ray Chest 1 View  Order: [id number]  Status:  Final result   Visible to patient:  Yes (Patient Portal) Next appt:  09/10/2020 at 03:00 PM in Cardiology (Lucio Toth MD)  Details    Reading Physician Reading Date Result Priority   Shant De Gzuman MD  [phone]  [phone] 7/23/2020 Routine      Narrative & Impression     EXAMINATION:  XR CHEST 1 VIEW     CLINICAL HISTORY:  follow up;     TECHNIQUE:  Single frontal view of the chest was performed.     COMPARISON:  None 07/22/2020     FINDINGS:  Right-sided pleural effusion remain.  No significant changes.     Impression:     See above        Electronically signed by: Shant De Guzman MD  Date:                                            07/23/2020  Time:                                           08:38               Assessment and Plan:  Embolic stroke involving right middle cerebral artery  Shant Magana Jr. is a 80 y.o. male with PMHx of dementia, multiple strokes, intracranial and extracranial atherosclerosis, DM, HTN, a fib (eliquis with history of noncompliance due to cost) who presented to ED via EMS for AMS x 2 days. Family reports confusion, inattentiveness, delayed verbal response, and facial droop. Patient with history of L facial droop from previous stroke. On exam, patient not oriented to place, time, or situation. R gaze preference and L hemianopsia also noted. CT revealed R parietal infarct. Etiology likely cardioembolic        Antithrombotics for secondary stroke prevention: Anticoagulants: Apixaban 2.5 mg BID  - Will maintain on geriatric dose of Eliquis as  creatinine borderline for normal dosing (and only worsens when not admitted).     Statins for secondary stroke prevention and hyperlipidemia, if present:   Statins: Atorvastatin- 80 mg daily     Aggressive risk factor modification: HTN, DM, HLD, A-Fib, atherosclerosis, stroke     Rehab efforts: The patient has been evaluated by a stroke team provider and the therapy needs have been fully considered based off the presenting complaints and exam findings. The following therapy evaluations are needed: PT evaluate and treat, OT evaluate and treat, SLP evaluate and treat, PM&R evaluate for appropriate placement - recommending SNF vs HH w/ 24 hr assistance. Spoke to pt's daughter Manuel Lozada 7/21, who states family is unable to provide 24 hr supervision. Daughter agrees with SNF dispo.      Diagnostics ordered/pending: none     VTE prophylaxis: Mechanical prophylaxis: Place SCDs  None: Reason for No Pharmacological VTE Prophylaxis: Currently on anticoagulation     BP parameters: Infarct: SBP < 160           Former smoker  Stroke risk factor  Encourage continued smoking cessation     Benign prostatic hyperplasia  Continue home flomax     Dementia without behavioral disturbance  Delirium precautions ordered     Paroxysmal atrial fibrillation  Stroke risk factor  Continue eliquis  Patient compliant with anticoagulation medication per his son     Chronic diastolic congestive heart failure  Repeat echo EF 68 %  Last echo 12/2019 with mild LV diastolic dysfunction  Now holding IVF given pleural effusions and wheezing on exam     Given Lasix 20 mg IVP once 7/21  Repeat CXR on 7/22, with slight decreased volume of pleural fluid on R; ordered second lasix 20 mg IVP.            Cytotoxic cerebral edema  Area of cytotoxic cerebral edema identified when reviewing brain imaging in the territory of the R middle cerebral artery. There is no mass effect associated with it. We will continue to monitor the patients clinical exam for any  worsening of symptoms which may indicate expansion of the stroke or the area of the edema resulting in the clinical change. The pattern is suggestive of cardioembolic etiology.           Mixed hyperlipidemia  Stroke risk factor  LDL 70  Continue home atorvastatin 80 mg daily     Internal carotid artery stenosis, left  Stroke risk factor           SHIKHA (acute kidney injury)  Cr 2.2, last CMP in 12/2019 with Cr of 1.5 so unclear if patient has new baseline  Avoid nephrotoxins  Cr continues to improve  Holding fluids due to pleural effusions and wheezing on exam     History of stroke  Patient with history of multiple strokes   History of a fib + intracranial/extracranial atherosclerosis  Continue secondary stroke prevention with eliquis + atorvastatin 80 mg        - Will maintain on geriatric dose of Eliquis as creatinine borderline for normal dosing (and only worsens when not admitted).         Type 2 diabetes mellitus with complication, without long-term current use of insulin  Stroke risk factor  A1C 5.8  SSI  Glucose 140-180     Essential hypertension  Stroke risk factor  SBP < 160   Resumed home Losartan at decreased dose of 50 mg daily 7/21, increased to 100 mg on 7/22.      Original home meds: Losartan 100 mg daily, Amlodipine 10 mg daily  Continue to monitor BP. Adjust as needed.    Future Appointments   Date Time Provider Department Center   9/10/2020  3:00 PM Lucio Toth MD [facility]           I certify that SNF services are required to be given on an inpatient basis because Shant Magana Jr. needs for skilled nursing care and/or skilled rehabilitation are required on a daily basis and such services can only practically be provided in a skilled nursing facility setting and are for an ongoing condition for which she received inpatient care in the hospital.     Time (minutes) spent in care of the patient including face-to-face contact and coordination of care while on unit: 40    Quita MCCOLLUM  MD [place]

## 2020-08-03 NOTE — PT/OT/SLP PROGRESS
Occupational Therapy  Treatment    Shant Magana Jr.   MRN: [id number]   Admitting Diagnosis: Embolic stroke involving right middle cerebral artery    OT Date of Treatment: 08/03/20       Billable Minutes:  Self Care/Home Management 23 and Therapeutic Exercise 21    General Precautions: Standard, aspiration, fall  Orthopedic Precautions: N/A  Braces: N/A    Spiritual, Cultural Beliefs, Caodaism Practices, Values that Affect Care: no    Subjective:  Communicated with nsg prior to session.  Pt. Agreeable to therapy session  Therapy session occurred in room   Pain/Comfort  Pain Rating 1: 0/10  Pain Rating Post-Intervention 1: 0/10  Objective:   Pt. Found supine on arrival with telesitter present     Occupational Performance:    Bed Mobility:    · Patient completed Rolling/Turning to Left with  stand by assistance  · Patient completed Rolling/Turning to Right with stand by assistance  · Patient completed Scooting/Bridging with stand by assistance  · Patient completed Supine to Sit with minimum assistance with trunk control      Functional Mobility/Transfers:  · Patient completed Sit <> Stand Transfer with minimum assistance  with  rolling walker   · Patient completed Bed <> Chair Transfer using Step Transfer technique with minimum assistance with rolling walker    Activities of Daily Living:  · Grooming: minimum assistance with oral care. Pt. Required (A) with opening/closing toothpaste  · Upper Body Dressing: minimum assistance to doff/doroteo pullover shirt while seated EOB  · Lower Body Dressing: minimum assistance to thread BLEs through pants. Pt. able to manage pants over hips instance with RW to steady  · Toileting: minimum assistance with cleaning of [place] Area. Pt. (A) with cleaning front area. Diaper soiled on arrival     AMPAC 6 Click:  AMPAC Total Score: 15    OT Exercises: UE Ergometer 6 mins with moderate resistance. Pt. with decreased attention span and only able to complete 6 mins    Additional Treatment:  Pt.  Performed visual perception task while seated with crossing midline to promote L and R side awareness with visual scanning along with controlled movements as it relates to task.  Pt. With 2# dowel activity with 2x15 reps with  shd flex, bicep curls horz adb/add and forward flex motion to increase BUE ROM and strength,.-Pt. Noted with decreased attention to LUE  Pt. With standing and therex performed to increase ROM, endurance selfcare task and fxl mobility for independence     Patient left up in chair with all lines intact, call button in reach and telesitter  notified and bedside chair alarm on    ASSESSMENT:  Shant Magana Jr. is a 80 y.o. male with a medical diagnosis of Embolic stroke involving right middle cerebral artery Pt. participated well with session on this day. Pt. Shows decreased attention span when given activities on this day. Pt. Required multiple verbal and tactile cues for continuation of task. Pt. Will continue to benefit from continued OT to progress towards goals    Rehab identified problem list/impairments: weakness, impaired endurance, impaired self care skills, impaired functional mobilty, gait instability, impaired balance, impaired cognition, decreased upper extremity function, decreased safety awareness, decreased ROM, impaired fine motor    Rehab potential is fair    Activity tolerance: Fair    Discharge recommendations: home with home health     Barriers to discharge: Inaccessible home environment     Equipment recommendations: (TBD)     GOALS:   Multidisciplinary Problems     Occupational Therapy Goals        Problem: Occupational Therapy Goal    Goal Priority Disciplines Outcome Interventions   Occupational Therapy Goal     OT, PT/OT Ongoing, Progressing    Description: Goals to be met by: 8/11/20     Patient will increase functional independence with ADLs by performing:    Feeding with Modified [place].  UE Dressing with Supervision.  LE Dressing with Minimal Assistance.  Grooming  while seated with Supervision.  Toileting from bedside commode with Contact Guard Assistance for hygiene and clothing management.   Bathing from  sitting at sink with Contact Guard Assistance.  Supine to sit with Modified [place].  Stand pivot transfers with Contact Guard Assistance using RW.  Upper extremity exercise program x10 reps per shld flexion and seated rows. assistance as needed.  Caregiver will be competent when assisting with self care tasks and functional transfers.                 Plan:  Patient to be seen 5 x/week to address the above listed problems via self-care/home management, therapeutic activities, therapeutic exercises, neuromuscular re-education, cognitive retraining  Plan of Care expires: 08/28/20  Plan of Care reviewed with: patient    Elza Latif, BRANDI [place] and PINKY have discussed the above patients goals and status in collaboration with Plan of Care.  08/03/2020

## 2020-08-04 LAB
ANION GAP SERPL CALC-SCNC: 4 MMOL/L (ref 8–16)
BASOPHILS # BLD AUTO: 0.05 K/UL (ref 0–0.2)
BASOPHILS NFR BLD: 1.1 % (ref 0–1.9)
BUN SERPL-MCNC: 30 MG/DL (ref 8–23)
CALCIUM SERPL-MCNC: 10.2 MG/DL (ref 8.7–10.5)
CHLORIDE SERPL-SCNC: 110 MMOL/L (ref 95–110)
CO2 SERPL-SCNC: 25 MMOL/L (ref 23–29)
CREAT SERPL-MCNC: 1.5 MG/DL (ref 0.5–1.4)
DIFFERENTIAL METHOD: ABNORMAL
EOSINOPHIL # BLD AUTO: 0.2 K/UL (ref 0–0.5)
EOSINOPHIL NFR BLD: 5.3 % (ref 0–8)
ERYTHROCYTE [DISTWIDTH] IN BLOOD BY AUTOMATED COUNT: 14.3 % (ref 11.5–14.5)
EST. GFR  (AFRICAN AMERICAN): 50.1 ML/MIN/1.73 M^2
EST. GFR  (NON AFRICAN AMERICAN): 43.3 ML/MIN/1.73 M^2
GLUCOSE SERPL-MCNC: 92 MG/DL (ref 70–110)
HCT VFR BLD AUTO: 41.5 % (ref 40–54)
HGB BLD-MCNC: 13.4 G/DL (ref 14–18)
IMM GRANULOCYTES # BLD AUTO: 0.01 K/UL (ref 0–0.04)
IMM GRANULOCYTES NFR BLD AUTO: 0.2 % (ref 0–0.5)
LYMPHOCYTES # BLD AUTO: 1 K/UL (ref 1–4.8)
LYMPHOCYTES NFR BLD: 21.8 % (ref 18–48)
MAGNESIUM SERPL-MCNC: 1.8 MG/DL (ref 1.6–2.6)
MCH RBC QN AUTO: 29 PG (ref 27–31)
MCHC RBC AUTO-ENTMCNC: 32.3 G/DL (ref 32–36)
MCV RBC AUTO: 90 FL (ref 82–98)
MONOCYTES # BLD AUTO: 0.5 K/UL (ref 0.3–1)
MONOCYTES NFR BLD: 10.6 % (ref 4–15)
NEUTROPHILS # BLD AUTO: 2.8 K/UL (ref 1.8–7.7)
NEUTROPHILS NFR BLD: 61 % (ref 38–73)
NRBC BLD-RTO: 0 /100 WBC
PHOSPHATE SERPL-MCNC: 2.8 MG/DL (ref 2.7–4.5)
PLATELET # BLD AUTO: 275 K/UL (ref 150–350)
PMV BLD AUTO: 10.9 FL (ref 9.2–12.9)
POTASSIUM SERPL-SCNC: 4.5 MMOL/L (ref 3.5–5.1)
RBC # BLD AUTO: 4.62 M/UL (ref 4.6–6.2)
SODIUM SERPL-SCNC: 139 MMOL/L (ref 136–145)
WBC # BLD AUTO: 4.54 K/UL (ref 3.9–12.7)

## 2020-08-04 PROCEDURE — 97110 THERAPEUTIC EXERCISES: CPT | Mod: CQ

## 2020-08-04 PROCEDURE — 25000003 PHARM REV CODE 250: Performed by: NURSE PRACTITIONER

## 2020-08-04 PROCEDURE — 80048 BASIC METABOLIC PNL TOTAL CA: CPT

## 2020-08-04 PROCEDURE — 94640 AIRWAY INHALATION TREATMENT: CPT

## 2020-08-04 PROCEDURE — 83735 ASSAY OF MAGNESIUM: CPT

## 2020-08-04 PROCEDURE — 11000004 HC SNF PRIVATE

## 2020-08-04 PROCEDURE — 84100 ASSAY OF PHOSPHORUS: CPT

## 2020-08-04 PROCEDURE — 97116 GAIT TRAINING THERAPY: CPT | Mod: CQ

## 2020-08-04 PROCEDURE — 25000242 PHARM REV CODE 250 ALT 637 W/ HCPCS: Performed by: NURSE PRACTITIONER

## 2020-08-04 PROCEDURE — 36415 COLL VENOUS BLD VENIPUNCTURE: CPT

## 2020-08-04 PROCEDURE — 94761 N-INVAS EAR/PLS OXIMETRY MLT: CPT

## 2020-08-04 PROCEDURE — 97535 SELF CARE MNGMENT TRAINING: CPT

## 2020-08-04 PROCEDURE — 25000003 PHARM REV CODE 250: Performed by: STUDENT IN AN ORGANIZED HEALTH CARE EDUCATION/TRAINING PROGRAM

## 2020-08-04 PROCEDURE — 85025 COMPLETE CBC W/AUTO DIFF WBC: CPT

## 2020-08-04 PROCEDURE — 97530 THERAPEUTIC ACTIVITIES: CPT | Mod: CQ

## 2020-08-04 RX ADMIN — APIXABAN 2.5 MG: 2.5 TABLET, FILM COATED ORAL at 09:08

## 2020-08-04 RX ADMIN — TAMSULOSIN HYDROCHLORIDE 0.4 MG: 0.4 CAPSULE ORAL at 09:08

## 2020-08-04 RX ADMIN — GUAIFENESIN AND DEXTROMETHORPHAN HYDROBROMIDE 1 TABLET: 600; 30 TABLET, EXTENDED RELEASE ORAL at 08:08

## 2020-08-04 RX ADMIN — THERA TABS 1 TABLET: TAB at 09:08

## 2020-08-04 RX ADMIN — IPRATROPIUM BROMIDE AND ALBUTEROL SULFATE 3 ML: .5; 3 SOLUTION RESPIRATORY (INHALATION) at 05:08

## 2020-08-04 RX ADMIN — LOSARTAN POTASSIUM 100 MG: 50 TABLET, FILM COATED ORAL at 09:08

## 2020-08-04 RX ADMIN — GUAIFENESIN AND DEXTROMETHORPHAN HYDROBROMIDE 1 TABLET: 600; 30 TABLET, EXTENDED RELEASE ORAL at 09:08

## 2020-08-04 RX ADMIN — APIXABAN 2.5 MG: 2.5 TABLET, FILM COATED ORAL at 08:08

## 2020-08-04 RX ADMIN — PANTOPRAZOLE SODIUM 40 MG: 40 TABLET, DELAYED RELEASE ORAL at 09:08

## 2020-08-04 RX ADMIN — ATORVASTATIN CALCIUM 80 MG: 20 TABLET, FILM COATED ORAL at 09:08

## 2020-08-04 RX ADMIN — VITAMIN D, TAB 1000IU (100/BT) 1000 UNITS: 25 TAB at 09:08

## 2020-08-04 NOTE — PROGRESS NOTES
[facility]                                  Skilled Nursing Facility                   Progress Note     Admit Date: 7/27/2020  KWAME TBD  Principal Problem:  Embolic stroke involving right middle cerebral artery   HPI obtained from patient interview and chart review     Chief Complaint:  Re-evaluation of medical treatment and therapy status: Lab review, re-evaluation of hypertension, hallucinations and agitation, hypomagnesemia    HPI:   Mr. Chino Gomez is a 80 year old male with PMHx of dementia, multiple strokes, intracranial and extracranial atherosclerosis, DM, HTN, a fib (eliquis with history of noncompliance due to cost) who presents to SNF following hospitalization for embolic stroke involving right MCA.  Admission to SNF for secondary weakness and debility.     Interval history:   All of today's labs reviewed and are listed below.  Mag 1.8, BUN/creatinine stable at 30/1.5.  24 hr vital sign ranges listed below.  Blood pressure 146/80.  Patient is still endorsing intermittent hallucinations, he has been less agitated and has not required PRN Seroquel since 08/02.  Patient denies trouble sleeping at night, shortness of breath, abdominal discomfort, nausea, or vomiting.  Patient reports an adequate appetite.  Patient denies dysuria.  Patient reports having regular bowel movements.  Patient progessing with PT/OT-Amb with RW CGA vc/tcs for guidance/staying docus/easily distracted ~ 120 ft no LOB vcs not to  walker, avoiding obstacles and not crossing feet when making turns  . Continuing to follow and treat all acute and chronic conditions.    Past Medical History: Patient has a past medical history of Acute kidney injury superimposed on chronic kidney disease (10/14/2017), Cancer, Diabetes mellitus, Former smoker (7/18/2020), History of stroke (8/15/2017), Hypertension, Hypertrophic cardiomyopathy, Renal disorder, and Stroke.    Past  Surgical History: Patient has a past surgical history that includes Back surgery; Thyroidectomy; Esophagogastroduodenoscopy (N/A, 9/4/2018); Esophagogastroduodenoscopy (N/A, 10/18/2018); and Colonoscopy (N/A, 10/18/2018).    Social History: Patient reports that he has quit smoking. His smoking use included cigarettes. He has a 2.70 pack-year smoking history. He has never used smokeless tobacco. He reports that he does not drink alcohol or use drugs.    Family History:  No significant family history to report    Allergies: Patient has No Known Allergies.    ROS  Constitutional: Negative for fever   Eyes: Negative for blurred vision, double vision   Respiratory:  + for intermittent cough, dyspnea on exertion  Cardiovascular: Negative for chest pain, palpitations, and leg swelling.   Gastrointestinal: Negative for abdominal pain, constipation, diarrhea, nausea and vomiting.   Genitourinary: Negative for dysuria, frequency   Musculoskeletal:  + generalized weakness. Negative for back pain and myalgias.   Skin: Negative for itching and rash.   Neurological: Negative for dizziness, headaches.   Psychiatric/Behavioral: Negative for depression. The patient is not nervous/anxious.      24 hour Vital Sign Range   Temp:  [98.8 °F (37.1 °C)]   Pulse:  [60]   Resp:  [16]   BP: (146)/(80)   SpO2:  [94 %]     PEx  Constitutional: Patient appears debilitated.  No distress noted  HENT:   Head: Normocephalic and atraumatic.   Eyes: Pupils are equal, round  Neck: Normal range of motion. Neck supple.   Cardiovascular: Normal rate, regular rhythm and normal heart sounds.    Pulmonary/Chest: Effort normal and breath sounds are clear  Abdominal: Soft. Bowel sounds are normal.   Musculoskeletal: Normal range of motion.   Neurological: Alert and oriented to person- however states incorrect age, disoriented to place, and time.  Higher level thinking impaired.  Patient has partial hemianopsia to his right eye, no facial asymmetry. LUE-  consult port against gravity but not for a full 10 secs. RUE- able to hold for 10 secs; RLE and LLE- able to hold for 5 secs. no sensory abnormality.  No aphasia or dysarthria noted.  Psychiatric: Normal mood and affect. Behavior is normal.   Skin: Skin is warm and dry.    Recent Labs   Lab 08/04/20  0500      K 4.5      CO2 25   BUN 30*   CREATININE 1.5*   MG 1.8       Recent Labs   Lab 08/04/20  0501   WBC 4.54   RBC 4.62   HGB 13.4*   HCT 41.5      MCV 90   MCH 29.0   MCHC 32.3         Assessment and Plan:     Problems addressed today    Hypomagnesemia  - initiated magnesium oxide 400 mg BID x2 days    Essential hypertension  - Stroke risk factor  - goal SBP < 160   - Original home meds: Losartan 100 mg daily, Amlodipine 10 mg daily  - 7/31 stable, continue losartan to 50 mg daily, continue to hold amlodipine at this time  - 8/3 increase losartan to 100 mg daily, will initiate amlodipine if hypertension continues  - 8/4 stable, continue current treatment    Dementia with behavioral disturbance  Maintain Delirium precautions:  - Avoid antihistamines, anticholinergics, hypnotics, and minimize opiates while controlling for pain as these medications may exacerbate delirium. Cues for day/night will assist with keeping patient calm and oriented - during daytime, please keep adequate light in room (open windows, lights on) and please keep room dim at night-time to encourage normal sleep-wake cycles. Continuing to have nursing and family reorient the patient and encourage family to call  - 8/4 persists, continue quetiapine 25 mg qHS PRN For agitation    CKD (chronic kidney disease) stage 3, GFR 30-59 ml/min  - stable, baseline likely 1.5-1.8,  monitor twice weekly BMPs, Avoid nephrotoxic agents, Renally adjust medications.  Patient will need to follow with a nephrologist after DC         Ongoing but stable problems        Embolic stroke involving right middle cerebral artery  - Patient with history of  L facial droop from previous stroke.  - CT revealed R parietal infarct. Etiology likely cardioembolic  - Antithrombotics for secondary stroke prevention: Anticoagulants: Apixaban 2.5 mg BID  - Will maintain on geriatric dose of Eliquis as creatinine borderline for normal dosing (and only worsens when not admitted).- will investigate patient's appropriateness to be from medication and have him signed up with payment assistant  - Statins for secondary stroke prevention and hyperlipidemia:  continue Atorvastatin- 80 mg daily  - PT/OT  - goal BP- SBP < 160  - [facility] recommended 24 hr assistance once discharged home daughter Manuel Lozada 7/21, who states family is unable to provide 24 hr supervision- information relayed to SNF CM to begin early discussions with patient's family to prepare for DC.     Cytotoxic cerebral edema  - Area of cytotoxic cerebral edema identified when reviewing brain imaging in the territory of the R middle cerebral artery. There is no mass effect associated with it. We will continue to monitor the patients clinical exam for any worsening of symptoms which may indicate expansion of the stroke or the area of the edema resulting in the clinical change. The pattern is suggestive of cardioembolic etiology.     Former smoker  - Stroke risk factor  - Encourage continued smoking cessation    Type 2 diabetes mellitus with complication, without long-term current use of insulin  -Stroke risk factor  - A1C 5.8     Benign prostatic hyperplasia  - Continue home flomax 0.4 mg daily    Status post placement of implantable loop recorder  - follow-up with cardiology after DC from SNF     Paroxysmal atrial fibrillation  - Stroke risk factor  - Anticoagulate with home apixaban 2.5mg BID- CM made aware that patient previously discontinue medication due to not being able to afford the cost and looking into getting him signed up with [facility] nurse medication assistance program     Chronic diastolic congestive heart  failure  - Repeat echo EF 68 %  - Last echo 12/2019 with mild LV diastolic dysfunction  - Not on diuretic at home; BNP wnl  - Daily weights (standing if tolerated)  - Fluid restriction at 1500mL  - Cardiac diet     Atelectasis  Right-sided pleural effusion  Productive cough  - improving, patient now tolerating room air, reduced scheduled DuoNebs to q.6 while awake, initiated Mucinex DM  mg q.12 hours.  Initiated incentive spirometry.  Monitor respiratory status closely.      Mixed hyperlipidemia  -  Stroke risk factor  - LDL 70  - Continue home atorvastatin 80 mg daily     Internal carotid artery stenosis, left  - Stroke risk factor; continue Eliquis     History of stroke  - Patient with history of multiple strokes   - History of a fib + intracranial/extracranial atherosclerosis  - Continue secondary stroke prevention with eliquis + atorvastatin 80 mg   - Will maintain on geriatric dose of Eliquis as creatinine borderline for normal dosing (and only worsens when not admitted).     Vitamin-D deficiency  - continue vitamin-D 1000 units daily- level is not been checked since 2017- ordered for level with next lab draw.      Debility   - Continue with PT/OT for gait training and strengthening and restoration of ADL's   - Encourage mobility, OOB in chair, and early ambulation as appropriate  - Fall precautions   - Monitor for bowel and bladder dysfunction  - Monitor for and prevent skin breakdown and pressure ulcers  - Continue DVT prophylaxis with  Eliquis 2.5 mg daily        Future Appointments   Date Time Provider Department Center   9/10/2020  3:00 PM Lucio Toth MD [facility] CARDIO [facility]       Ami Copeland NP  Department of [facility]   [facility]     DOS: 8/4/2020       Patient note was created using MModal Dictation.  Any errors in syntax or even information may not have been identified and edited on initial review prior to signing this note.

## 2020-08-04 NOTE — PLAN OF CARE
Problem: Occupational Therapy Goal  Goal: Occupational Therapy Goal  Description: Goals to be met by: 8/11/20     Patient will increase functional independence with ADLs by performing:    Feeding with Modified [place].  UE Dressing with Supervision.  LE Dressing with Minimal Assistance.  Grooming while seated with Supervision.  Toileting from bedside commode with Contact Guard Assistance for hygiene and clothing management.   Bathing from  sitting at sink with Contact Guard Assistance.  Supine to sit with Modified [place].  Stand pivot transfers with Contact Guard Assistance using RW.  Upper extremity exercise program x10 reps per shld flexion and seated rows. assistance as needed.  Caregiver will be competent when assisting with self care tasks and functional transfers.    Outcome: Ongoing, Progressing

## 2020-08-04 NOTE — PT/OT/SLP PROGRESS
"Physical Therapy  Treatment    Shant Magana Jr.   MRN: [id number]   Admitting Diagnosis: Embolic stroke involving right middle cerebral artery    PT Received On: 08/04/20        Billable Minutes:  Gait Training 13, Therapeutic Activity 21 and Therapeutic Exercise 12    Treatment Type: Treatment  PT/PTA: PTA     PTA Visit Number: 2       General Precautions: Standard, aspiration, fall  Orthopedic Precautions:     Braces:      Spiritual, Cultural Beliefs, Methodist Practices, Values that Affect Care: no    Subjective:  'alright"    Pain/Comfort  Pain Rating 1: 0/10  Pain Rating Post-Intervention 1: 0/10    Objective:   Patient found with: (in bed, camera present)     AM-PAC 6 CLICK MOBILITY  Total Score:17    Bed Mobility: HOB flat no rail, simple vcs  Sit>Supine:S/mod I  Supine>Sit: S/mod I    Transfers:  Sit<>Stand: CGA x 1, close SBA x 2 vcs for tech  Stand Pivot Transfer: CGA with RW vcs for sequencing    Gait:  Amb with RW CGA vc/tcs for guidance/staying docus/easily distracted ~ 120 ft no LOB vcs not to  walker, avoiding obstacles and not crossing feet when making turns     Therex:  2x10 reps AP,LAQ,hip flex    Balance:  Dyn standing bal act with RW  min A/CGA no UE support, CG/SBA with uni lat support, vcs for inc DANIEL and erect posture performing peg board with RUE, able to recall 5/6 colors (difficulty with red and orange)  ~ 5:30 sec    Patient left up in chair with call button in reach, nsg present and belongings in reach, chair alarm.    Assessment:  Shant Magana Jr. is a 80 y.o. male with a medical diagnosis of Embolic stroke involving right middle cerebral artery.  Pt tolerated well, pt would continue to benefit from skilled PT services to improve overall functional mobility, strength and endurance.  .    Rehab identified problem list/impairments: impaired self care skills, impaired functional mobilty, gait instability, impaired balance, impaired cognition, decreased safety awareness, impaired " coordination, impaired fine motor    Rehab potential is good.    Activity tolerance: Fair    Discharge recommendations: home with home health( supervision/assistance)     Barriers to discharge: Inaccessible home environment    Equipment recommendations: walker, rolling, wheelchair     GOALS:   Multidisciplinary Problems     Physical Therapy Goals        Problem: Physical Therapy Goal    Goal Priority Disciplines Outcome Goal Variances Interventions   Physical Therapy Goal     PT, PT/OT Ongoing, Progressing     Description: Goals to be met by: 20     Patient will increase functional independence with mobility by performin. Sit to stand transfer with Supervision  2. Bed to chair transfer with Supervision using Rolling Walker  3. Gait  x 150 feet with Contact Guard Assistance using Rolling Walker.   4. Ascend/descend 4 stair with bilateral Handrails Contact Guard Assistance  5. Stand for 10 minutes with Supervision using Rolling Walker performing a standing activity.  6. Propel w/c 100 feet using B upper and lower extremities with verbal cueing only.               Multidisciplinary Problems (Resolved)        Problem: Physical Therapy Goal    Goal Priority Disciplines Outcome Goal Variances Interventions   Physical Therapy Goal   (Resolved)     PT, PT/OT  Error     Description: Goals to be met by: 20     Patient will increase functional independence with mobility by performin. Supine to sit with Modified [place]  2. Sit to supine with Modified [place]  3. Sit to stand transfer with Supervision using RW or cane.  4. Bed to chair transfer with Supervision using Rolling Walker or straight cane.  5. Gait  x 150 feet with Supervision using Rolling Walker or straight cane.  6. Standing for 5 minutes while performing standing balance activity with SPV using RW or cane.                     PLAN:    Patient to be seen 5 x/week  to address the above listed problems via gait  training, therapeutic activities, therapeutic exercises, neuromuscular re-education  Plan of Care expires: 08/27/20  Plan of Care reviewed with: patient    Nanci Og, PTA  08/04/2020

## 2020-08-04 NOTE — PT/OT/SLP PROGRESS
Occupational Therapy  Treatment    Shant Magana Jr.   MRN: [id number]   Admitting Diagnosis: Embolic stroke involving right middle cerebral artery    OT Date of Treatment: 08/04/20     Billable Minutes:  Self Care/Home Management 45    General Precautions: Standard, fall  Orthopedic Precautions: N/A  Braces: N/A    Spiritual, Cultural Beliefs, Restorationism Practices, Values that Affect Care: no    Subjective:  Communicated with nurse prior to session.    Pain/Comfort  Pain Rating 1: 0/10  Pain Rating Post-Intervention 1: 0/10    Objective:  Patient found with: (no lines and supine in bed with AVASYS system in place.)    Occupational Performance:    Bed Mobility:    · Patient completed Rolling/Turning to Right with supervision  · Patient completed Scooting/Bridging with supervision  · Patient completed Supine to Sit with supervision     Functional Mobility/Transfers:  · Patient completed Sit <> Stand Transfer with contact guard assistance  with  rolling walker   · Patient completed Bed <> Chair Transfer using Stand Pivot technique with contact guard assistance with rolling walker  · Patient completed Toilet Transfer Stand Pivot technique with contact guard assistance with  rolling walker  · Functional Mobility: Pt ambulated from restroom to EOB with RW and CGA. V/Cs required for safety secondary to Pt's impulsivity and poor safety awareness.    Activities of Daily Living:  · Feeding:  set up only. .  · Grooming: supervision when seated sinkside to perform all grooming.  · Bathing: contact guard assistance when Pt standing to wash alfonzo area. V/Cs required throughout for safety.  · Upper Body Dressing: stand by assistance with V/Cs needed to don /doff pullover shirt.  · Lower Body Dressing: contact guard assistance when standing to manage pants over hips. Increased time and V/Cs required throughout.  · Toileting: contact guard assistance when standing to perform alfonzo care and to steady when standing.    Additional  Treatment:  Pt edu on Plan of care,  safety when performing functional transfers, self care tasks and functional standing activities.  - White board updated  - Self care tasks completed-- as noted above     Patient left up in chair with call button in reach and PCT notified    [facility] 6 Click:  [facility] Total Score: 18    ASSESSMENT:  Shant Magana Jr. is a 80 y.o. male with a medical diagnosis of Embolic stroke involving right middle cerebral artery .  Pt tolerated Tx without incident but continues to require V/Cs and limited assist to safely perform self care tasks, functional mobility and functional transfers .  He continues to demonstrate poor safety awareness and would continue to benefit from OT intervention to further his functional (I)ce and safety.    Rehab identified problem list/impairments: weakness, impaired endurance, impaired self care skills, impaired functional mobilty, gait instability, impaired balance, impaired cognition, decreased upper extremity function, decreased safety awareness, decreased ROM, impaired fine motor    Rehab potential is good    Activity tolerance: Good    Discharge recommendations: home with home health     Barriers to discharge: Inaccessible home environment     Equipment recommendations: (TBD)     GOALS:   Multidisciplinary Problems     Occupational Therapy Goals        Problem: Occupational Therapy Goal    Goal Priority Disciplines Outcome Interventions   Occupational Therapy Goal     OT, PT/OT Ongoing, Progressing    Description: Goals to be met by: 8/11/20     Patient will increase functional independence with ADLs by performing:    Feeding with Modified [place].  UE Dressing with Supervision.  LE Dressing with Minimal Assistance.  Grooming while seated with Supervision.  Toileting from bedside commode with Contact Guard Assistance for hygiene and clothing management.   Bathing from  sitting at sink with Contact Guard Assistance.  Supine to sit with Modified  [place].  Stand pivot transfers with Contact Guard Assistance using RW.  Upper extremity exercise program x10 reps per shld flexion and seated rows. assistance as needed.  Caregiver will be competent when assisting with self care tasks and functional transfers.                     Plan:  Patient to be seen 5 x/week to address the above listed problems via self-care/home management, therapeutic activities, therapeutic exercises, neuromuscular re-education, cognitive retraining  Plan of Care expires: 08/28/20  Plan of Care reviewed with: patient    Guanaco Culp OTR/MADELEINE  08/04/2020

## 2020-08-05 PROCEDURE — 97110 THERAPEUTIC EXERCISES: CPT | Mod: CO

## 2020-08-05 PROCEDURE — 25000003 PHARM REV CODE 250: Performed by: NURSE PRACTITIONER

## 2020-08-05 PROCEDURE — 92507 TX SP LANG VOICE COMM INDIV: CPT

## 2020-08-05 PROCEDURE — 97530 THERAPEUTIC ACTIVITIES: CPT | Mod: CQ

## 2020-08-05 PROCEDURE — 25000003 PHARM REV CODE 250: Performed by: STUDENT IN AN ORGANIZED HEALTH CARE EDUCATION/TRAINING PROGRAM

## 2020-08-05 PROCEDURE — 97535 SELF CARE MNGMENT TRAINING: CPT

## 2020-08-05 PROCEDURE — 97116 GAIT TRAINING THERAPY: CPT | Mod: CQ

## 2020-08-05 PROCEDURE — 97535 SELF CARE MNGMENT TRAINING: CPT | Mod: CO

## 2020-08-05 PROCEDURE — 11000004 HC SNF PRIVATE

## 2020-08-05 RX ADMIN — TAMSULOSIN HYDROCHLORIDE 0.4 MG: 0.4 CAPSULE ORAL at 08:08

## 2020-08-05 RX ADMIN — Medication 6 MG: at 09:08

## 2020-08-05 RX ADMIN — THERA TABS 1 TABLET: TAB at 08:08

## 2020-08-05 RX ADMIN — VITAMIN D, TAB 1000IU (100/BT) 1000 UNITS: 25 TAB at 08:08

## 2020-08-05 RX ADMIN — GUAIFENESIN AND DEXTROMETHORPHAN HYDROBROMIDE 1 TABLET: 600; 30 TABLET, EXTENDED RELEASE ORAL at 08:08

## 2020-08-05 RX ADMIN — QUETIAPINE FUMARATE 25 MG: 25 TABLET ORAL at 09:08

## 2020-08-05 RX ADMIN — PANTOPRAZOLE SODIUM 40 MG: 40 TABLET, DELAYED RELEASE ORAL at 08:08

## 2020-08-05 RX ADMIN — LOSARTAN POTASSIUM 100 MG: 50 TABLET, FILM COATED ORAL at 08:08

## 2020-08-05 RX ADMIN — APIXABAN 2.5 MG: 2.5 TABLET, FILM COATED ORAL at 09:08

## 2020-08-05 RX ADMIN — APIXABAN 2.5 MG: 2.5 TABLET, FILM COATED ORAL at 08:08

## 2020-08-05 RX ADMIN — ATORVASTATIN CALCIUM 80 MG: 20 TABLET, FILM COATED ORAL at 08:08

## 2020-08-05 RX ADMIN — GUAIFENESIN AND DEXTROMETHORPHAN HYDROBROMIDE 1 TABLET: 600; 30 TABLET, EXTENDED RELEASE ORAL at 09:08

## 2020-08-05 NOTE — TREATMENT PLAN
"Rehab Services' DME recommendations    Shant Magana Jr.  MRN: [id number]    PLEASE CONFIRM WITH FAMILY      [x] Walker Adult (5'4"-6"6")    Wheels Yes     [x] Wheelchair  Number of hours up in a wheelchair per day 8      Style Light weight        Justification for light weight w/c: patient cannot propel in a standard wheelchair, patient can and does self-propel in a lightweight wheelchair, patient has impaired ability to participate in MRADLs, mobility limitations cannot be sifficiently resolved with a cane/walker, the home provides adequate access between rooms for a wheelchair, a wheelchair will significantly improve the ability to participate in MRADLs and will be used in the home on a regular basis, the patient is willing to use a wheelchair in the home and the patient has a caregiver who is available, willing, and able to provide assistance with the wheelchair    Seat Width 18 (Standard adult)    Seat Depth 18    Back Height Standard    Leg Support Standard and Swing Away    Arm Height Full and Swing Away    Lap Belt Velcro    Accessories Anti-tippers and Safety belt    Cushion Basic    Justification for Cushion maintain skin integrity    Justification for wheelchair order: (Please select all that apply) Caregiver is capable and willing to operate wheelchair safely, Patient's upper body strength is sufficient for propulsion, The patient requires the use of a wheelchair for ADLs within the home and Patient mobility limitations cannot be sufficiently resolved by the use of other ambulatory therapies      [x] Home health PT, OT, SLP and [employer] Og, PTA 8/5/2020        "

## 2020-08-05 NOTE — PT/OT/SLP PROGRESS
Occupational Therapy  Treatment    Shant Magana Jr.   MRN: [id number]   Admitting Diagnosis: Embolic stroke involving right middle cerebral artery    OT Date of Treatment: 08/05/20       Billable Minutes:53  Self Care/Home Management 38 and Therapeutic Exercise 15    General Precautions: Standard, fall  Orthopedic Precautions: N/A  Braces: N/A    Spiritual, Cultural Beliefs, Mandaeism Practices, Values that Affect Care: no    Subjective:  Communicated with nsg prior to session.  Pt. Agreeable to tx    Pain/Comfort  Pain Rating 1: 0/10  Pain Rating Post-Intervention 1: 0/10    Objective:   Pt. Supine on arrival with AVS camera     Occupational Performance:    Bed Mobility:    · Patient completed Scooting/Bridging with moderate assistance  · Patient completed Supine to Sit with moderate assistance     Functional Mobility/Transfers:  · Patient completed Sit <> Stand Transfer with minimum assistance  with  no assistive device   · Patient completed Bed <> Chair Transfer using Stand Pivot technique with contact guard assistance with no assistive device  · Patient completed Toilet Transfer Stand Pivot technique with contact guard assistance with  no AD with cues for safety and hand placement       Activities of Daily Living:  · Feeding:  supervision with lunch management   · Grooming: stand by assistance at sink level with oral care and cues for intiation and completion of task  · Upper Body Dressing: maximal assistance to mange gown  · Lower Body Dressing: moderate assistance to doroteo pants seated and to manage over hips instance  · Toileting: maximal assistance with  cleaning aspects and clothing aspects from 3n1 level     [facility] 6 Click:  [facility] Total Score: 18    OT Exercises: UE Ergometer 10 min with intermitten breaks    Additional Treatment:   Pt. With 3# dowel activity with 2x10 reps with  shd flex, bicep curls horz adb/add and forward flex motion to increase BUE ROM and strength with (A) and cues to continue with movement  and counting aspects    Pt. Seated EOB with SBA to Min A at times cues to correct posture     Patient left up in chair with all lines intact, call button in reach and with AVS camera     ASSESSMENT:  Shant Magana Jr. is a 80 y.o. male with a medical diagnosis of Embolic stroke involving right middle cerebral artery Pt. participated fair with session on this day. Pt. Continues to require increase time and cues during session. Pt. Continues with intermittent  breaks  Pt demos physical deficits with balance  functional mobility, UB strength, endurance  level of functional indep with daily tasks and activities and selfcare skills .Pt. Will continue to benefit from continued OT to progress towards goals  .    Rehab identified problem list/impairments: weakness, impaired endurance, impaired self care skills, impaired functional mobilty, gait instability, impaired balance, impaired cognition, decreased upper extremity function, decreased safety awareness, decreased ROM, impaired fine motor    Rehab potential is fair    Activity tolerance: Fair    Discharge recommendations: home with home health     Barriers to discharge: Inaccessible home environment     Equipment recommendations: (TBD)     GOALS:   Multidisciplinary Problems     Occupational Therapy Goals        Problem: Occupational Therapy Goal    Goal Priority Disciplines Outcome Interventions   Occupational Therapy Goal     OT, PT/OT Ongoing, Progressing    Description: Goals to be met by: 8/11/20     Patient will increase functional independence with ADLs by performing:    Feeding with Modified [place].  UE Dressing with Supervision.  LE Dressing with Minimal Assistance.  Grooming while seated with Supervision.  Toileting from bedside commode with Contact Guard Assistance for hygiene and clothing management.   Bathing from  sitting at sink with Contact Guard Assistance.  Supine to sit with Modified [place].  Stand pivot transfers with Contact Guard Assistance  using RW.  Upper extremity exercise program x10 reps per shld flexion and seated rows. assistance as needed.  Caregiver will be competent when assisting with self care tasks and functional transfers.                     Plan:  Patient to be seen 5 x/week to address the above listed problems via self-care/home management, therapeutic activities, therapeutic exercises, neuromuscular re-education, cognitive retraining  Plan of Care expires: 08/28/20  Plan of Care reviewed with: patient  The JASPER and PINKY have collaborated and discussed the patient's status, treatment plan and progress toward established goals.   PINKY Thomas/MADELEINE 8/5/2020

## 2020-08-05 NOTE — PLAN OF CARE
Spoke with Hoda Magana (son) regarding updates in discharge planning. Tentative discharge date of 8/11 provided to Mr. Magana. CM will continue to follow up.

## 2020-08-05 NOTE — PT/OT/SLP PROGRESS
"Physical Therapy  Treatment    Shant Magana Jr.   MRN: [id number]   Admitting Diagnosis: Embolic stroke involving right middle cerebral artery    PT Received On: 08/05/20        Billable Minutes:  Gait Training 12 and Therapeutic Activity 15    Treatment Type: Treatment  PT/PTA: PTA     PTA Visit Number: 3       General Precautions: Standard, aspiration, fall  Orthopedic Precautions:     Braces:      Spiritual, Cultural Beliefs, Gnosticist Practices, Values that Affect Care: no    Subjective:  Alright, pt napping upon arrival, agreeable to therapy with education/encouragement    Pain/Comfort  Pain Rating 1: 0/10  Pain Rating Post-Intervention 1: 0/10    Objective:   Patient found with: (in BSC with chair alarm and camera)     AM-PAC 6 CLICK MOBILITY  Total Score:17    Transfers:  Sit<>Stand: with RW CGA simple vcs for tech/handplacement  Stand Pivot Transfer: with RW CGA vcs for safety/sequencing, not leaving RW behind    Gait:  Amb with RW CGA with max vcs for safety awareness/directional guidance ~ 80 ft easily distracted     Advanced Gait:  Curb Step: asc/david 4" curb with BUE on bed rail simple vcs CGA x 4 trials    Therex:  2x10 reps with simple vc/demo/A/AA as needed for ROM/tech  AP,LAQ,hip flex    Patient left up in chair with call button in reach, camera/chair alarm present and belonging sin reach.    Assessment:  Shant Magana Jr. is a 80 y.o. male with a medical diagnosis of Embolic stroke involving right middle cerebral artery.  Pt tolerated fairly well, pt appeared sleepy during session, mod vc/tcs for engagement, pt would continue to benefit from skilled PT services to improve overall functional mobility, strength and endurance.  .    Rehab identified problem list/impairments: impaired self care skills, impaired functional mobilty, gait instability, impaired balance, impaired cognition, decreased safety awareness, impaired coordination, impaired fine motor    Rehab potential is good.    Activity tolerance: " Good    Discharge recommendations: home with home health( supervision/assistance)     Barriers to discharge: Inaccessible home environment    Equipment recommendations: walker, rolling, wheelchair     GOALS:   Multidisciplinary Problems     Physical Therapy Goals        Problem: Physical Therapy Goal    Goal Priority Disciplines Outcome Goal Variances Interventions   Physical Therapy Goal     PT, PT/OT Ongoing, Progressing     Description: Goals to be met by: 20     Patient will increase functional independence with mobility by performin. Sit to stand transfer with Supervision  2. Bed to chair transfer with Supervision using Rolling Walker  3. Gait  x 150 feet with Contact Guard Assistance using Rolling Walker.   4. Ascend/descend 4 stair with bilateral Handrails Contact Guard Assistance met  5. Stand for 10 minutes with Supervision using Rolling Walker performing a standing activity.  6. Propel w/c 100 feet using B upper and lower extremities with verbal cueing only.               Multidisciplinary Problems (Resolved)        Problem: Physical Therapy Goal    Goal Priority Disciplines Outcome Goal Variances Interventions   Physical Therapy Goal   (Resolved)     PT, PT/OT  Error     Description: Goals to be met by: 20     Patient will increase functional independence with mobility by performin. Supine to sit with Modified [place]  2. Sit to supine with Modified [place]  3. Sit to stand transfer with Supervision using RW or cane.  4. Bed to chair transfer with Supervision using Rolling Walker or straight cane.  5. Gait  x 150 feet with Supervision using Rolling Walker or straight cane.  6. Standing for 5 minutes while performing standing balance activity with SPV using RW or cane.                     PLAN:    Patient to be seen 5 x/week  to address the above listed problems via gait training, therapeutic activities, therapeutic exercises, neuromuscular re-education  Plan  of Care expires: 08/27/20  Plan of Care reviewed with: patient    Nanci Og, PTA  08/05/2020

## 2020-08-05 NOTE — PT/OT/SLP PROGRESS
Speech Language Pathology  Treatment    Shant Magana Jr.   MRN: [id number]   Admitting Diagnosis: Embolic stroke involving right middle cerebral artery    Diet recommendations: Solid Diet Level: Regular  Liquid Diet Level: Thin 1 bite/sip at a time, Alternating bites/sips, set-up Assistance with meals - open drinks, Eliminate distractions, Feed only when awake/alert, HOB to 90 degrees, Monitor for s/s of aspiration, Small bites/sips and Strict aspiration precautions    SLP Treatment Date: 08/05/20  Speech Start Time: 1240     Speech Stop Time: 1300     Speech Total (min): 20 min       TREATMENT BILLABLE MINUTES:  Speech Therapy Individual 12 and Self Care/Home Management Training 8    Has the patient been evaluated by SLP for swallowing? : Yes  Keep patient NPO?: No   General Precautions: Standard, fall  Current Respiratory Status: room air       Subjective:  Patient awake/alert with flat affect during session  Communicated with nurse prior to session       Objective:   Patient found with: (sitting up in chair)  Patient seen for ongoing cognitive-linguistic therapy. He was sitting up in chair during session. Patient with limited initiation and increased verbal perseveration during this session. He was oriented to self only despite max assist and verbal/visual cueing. He completed 2/5 word-finding tasks with min assist, increasing to 4/5 with max assist and repetition. He answered 3/6 yes/no questions involving personal information with max assist. He required consistent redirection to task throughout session. SLP educated patient regarding the POC for ST and he acknowledged teaching, but demonstrated limited understanding. Patient left in chair with call light within reach and avasys in place.     Assessment:  Shant Magana Jr. is a 80 y.o. male with a medical diagnosis of Embolic stroke involving right middle cerebral artery and presents with cognitive-linguistic and visual spatial deficits.    Discharge recommendations:  Discharge Facility/Level of Care Needs: home health speech therapy     Goals:   Multidisciplinary Problems     SLP Goals        Problem: SLP Goal    Goal Priority Disciplines Outcome   SLP Goal     SLP Ongoing, Progressing   Description: Speech Language Pathology Goals  Goals expected to be met by 8/4:  1. Pt will orient to month, year, and place given external aids.  2. Pt will recall 2/3 related words after a 60 second delay given max cues to improve delayed memory.   3. Pt will immediately recall series of 4 digits with 80% accuracy given mod cues.   4. Pt will answer simple problem solving q's with 60% accuracy given mod-max cues.   5. Pt will complete word finding tasks with 70% accuracy given mod cues.   6. Pt will participate in further assessment of reading, writing, and visual spatial abilities.                             Plan:   Patient to be seen Therapy Frequency: 3 x/week  Planned Interventions: Cognitive-Linguistic Therapy  Plan of Care expires: 08/27/20  Plan of Care reviewed with: patient  SLP Follow-up?: Yes  SLP - Next Visit Date: 08/07/20         Hi Portillo CCC-SLP  Speech-Language Pathology  Pager: [phone]   08/05/2020

## 2020-08-06 ENCOUNTER — LAB VISIT (OUTPATIENT)
Dept: LAB | Facility: OTHER | Age: 80
End: 2020-08-06
Attending: HOSPITALIST
Payer: MEDICARE

## 2020-08-06 DIAGNOSIS — Z03.818 ENCOUNTER FOR OBSERVATION FOR SUSPECTED EXPOSURE TO OTHER BIOLOGICAL AGENTS RULED OUT: ICD-10-CM

## 2020-08-06 PROCEDURE — 97530 THERAPEUTIC ACTIVITIES: CPT | Mod: CQ

## 2020-08-06 PROCEDURE — 97530 THERAPEUTIC ACTIVITIES: CPT | Mod: CO

## 2020-08-06 PROCEDURE — 97110 THERAPEUTIC EXERCISES: CPT | Mod: CO

## 2020-08-06 PROCEDURE — U0003 INFECTIOUS AGENT DETECTION BY NUCLEIC ACID (DNA OR RNA); SEVERE ACUTE RESPIRATORY SYNDROME CORONAVIRUS 2 (SARS-COV-2) (CORONAVIRUS DISEASE [COVID-19]), AMPLIFIED PROBE TECHNIQUE, MAKING USE OF HIGH THROUGHPUT TECHNOLOGIES AS DESCRIBED BY CMS-2020-01-R: HCPCS

## 2020-08-06 PROCEDURE — 25000003 PHARM REV CODE 250: Performed by: STUDENT IN AN ORGANIZED HEALTH CARE EDUCATION/TRAINING PROGRAM

## 2020-08-06 PROCEDURE — 25000003 PHARM REV CODE 250: Performed by: NURSE PRACTITIONER

## 2020-08-06 PROCEDURE — 11000004 HC SNF PRIVATE

## 2020-08-06 RX ORDER — TALC
6 POWDER (GRAM) TOPICAL NIGHTLY PRN
Refills: 0 | COMMUNITY
Start: 2020-08-06

## 2020-08-06 RX ORDER — ACETAMINOPHEN 325 MG/1
650 TABLET ORAL EVERY 6 HOURS PRN
Refills: 0 | COMMUNITY
Start: 2020-08-06

## 2020-08-06 RX ADMIN — PANTOPRAZOLE SODIUM 40 MG: 40 TABLET, DELAYED RELEASE ORAL at 08:08

## 2020-08-06 RX ADMIN — APIXABAN 2.5 MG: 2.5 TABLET, FILM COATED ORAL at 09:08

## 2020-08-06 RX ADMIN — LOSARTAN POTASSIUM 100 MG: 50 TABLET, FILM COATED ORAL at 08:08

## 2020-08-06 RX ADMIN — TAMSULOSIN HYDROCHLORIDE 0.4 MG: 0.4 CAPSULE ORAL at 08:08

## 2020-08-06 RX ADMIN — GUAIFENESIN AND DEXTROMETHORPHAN HYDROBROMIDE 1 TABLET: 600; 30 TABLET, EXTENDED RELEASE ORAL at 09:08

## 2020-08-06 RX ADMIN — ATORVASTATIN CALCIUM 80 MG: 20 TABLET, FILM COATED ORAL at 08:08

## 2020-08-06 RX ADMIN — VITAMIN D, TAB 1000IU (100/BT) 1000 UNITS: 25 TAB at 08:08

## 2020-08-06 RX ADMIN — THERA TABS 1 TABLET: TAB at 08:08

## 2020-08-06 RX ADMIN — ACETAMINOPHEN 650 MG: 325 TABLET ORAL at 08:08

## 2020-08-06 RX ADMIN — GUAIFENESIN AND DEXTROMETHORPHAN HYDROBROMIDE 1 TABLET: 600; 30 TABLET, EXTENDED RELEASE ORAL at 08:08

## 2020-08-06 RX ADMIN — APIXABAN 2.5 MG: 2.5 TABLET, FILM COATED ORAL at 08:08

## 2020-08-06 NOTE — PT/OT/SLP PROGRESS
Physical Therapy  Treatment    Shant Magana Jr.   MRN: [id number]   Admitting Diagnosis: Embolic stroke involving right middle cerebral artery    PT Received On: 08/06/20          Billable Minutes:  Therapeutic Activity 20    Treatment Type: Treatment  PT/PTA: PTA     PTA Visit Number: 4       General Precautions: Standard, aspiration, fall  Orthopedic Precautions:     Braces:      Spiritual, Cultural Beliefs, Confucianist Practices, Values that Affect Care: no    Subjective:  Communicated with nursing prior to session.  Pt agreed to work with therapy.     Pain/Comfort  Pain Rating 1: 0/10  Pain Rating Post-Intervention 1: 0/10    Objective:  Patient found supine in bed.        AM-PAC 6 CLICK MOBILITY  Total Score:17    Bed Mobility:  Sit>Supine: on bed with Min A   Supine>Sit: on bed with Min A and use of bed rail    Transfers:  Sit<>Stand: to/from EOB w/ RW and CGA    Gait:  Amb ~80ft in room w/ RW and CGA-Min A. Cueing for RW mgmt and posture.      Therex:  -BLE therex 2x10 reps with assistance and cueing as needed:   -AP   -LAQ   -Hip flexion    -Hip abd/add      Patient left supine with all lines intact, call button in reach and nursing notified.    Assessment:  Shant Magana Jr. is a 80 y.o. male with a medical diagnosis of Embolic stroke involving right middle cerebral artery.  Pt tolerated treatment well, and will continue to benefit from PT services at this time. Continue with PT POC as indicated. .    Rehab identified problem list/impairments: impaired self care skills, impaired functional mobilty, gait instability, impaired balance, impaired cognition, decreased safety awareness, impaired coordination, impaired fine motor    Rehab potential is good.    Activity tolerance: Good    Discharge recommendations: home with home health(24/7 supervision/assistance)     Barriers to discharge: Inaccessible home environment    Equipment recommendations: walker, rolling, wheelchair     GOALS:   Multidisciplinary Problems      Physical Therapy Goals        Problem: Physical Therapy Goal    Goal Priority Disciplines Outcome Goal Variances Interventions   Physical Therapy Goal     PT, PT/OT Ongoing, Progressing     Description: Goals to be met by: 20     Patient will increase functional independence with mobility by performin. Sit to stand transfer with Supervision  2. Bed to chair transfer with Supervision using Rolling Walker  3. Gait  x 150 feet with Contact Guard Assistance using Rolling Walker.   4. Ascend/descend 4 stair with bilateral Handrails Contact Guard Assistance met  5. Stand for 10 minutes with Supervision using Rolling Walker performing a standing activity.  6. Propel w/c 100 feet using B upper and lower extremities with verbal cueing only.               Multidisciplinary Problems (Resolved)        Problem: Physical Therapy Goal    Goal Priority Disciplines Outcome Goal Variances Interventions   Physical Therapy Goal   (Resolved)     PT, PT/OT  Error     Description: Goals to be met by: 20     Patient will increase functional independence with mobility by performin. Supine to sit with Modified [place]  2. Sit to supine with Modified [place]  3. Sit to stand transfer with Supervision using RW or cane.  4. Bed to chair transfer with Supervision using Rolling Walker or straight cane.  5. Gait  x 150 feet with Supervision using Rolling Walker or straight cane.  6. Standing for 5 minutes while performing standing balance activity with SPV using RW or cane.                     PLAN:    Patient to be seen 5 x/week  to address the above listed problems via gait training, therapeutic activities, therapeutic exercises, neuromuscular re-education  Plan of Care expires: 20  Plan of Care reviewed with: patient    Melina Watters, PTA  2020

## 2020-08-06 NOTE — PT/OT/SLP PROGRESS
Occupational Therapy  Treatment    Shant Magana Jr.   MRN: [id number]   Admitting Diagnosis: Embolic stroke involving right middle cerebral artery    OT Date of Treatment: 08/06/20       Billable Minutes:38  Therapeutic Activity 23 and Therapeutic Exercise 15    General Precautions: Standard, fall  Orthopedic Precautions: N/A  Braces: N/A    Spiritual, Cultural Beliefs, Rastafarian Practices, Values that Affect Care: no    Subjective:  Communicated with nsg prior to session.  Pt. Agreeable to tx    Pain/Comfort  Pain Rating 1: 0/10  Pain Rating Post-Intervention 1: 0/10    Objective:   Pt. Supine on arrival with AVS camera    Occupational Performance:    Bed Mobility:    · Patient completed Scooting/Bridging with moderate assistance  · Patient completed Supine to Sit with moderate assistance     Functional Mobility/Transfers:  · Patient completed Sit <> Stand Transfer with contact guard assistance and minimum assistance  with  rolling walker   · Patient completed Bed <> Chair Transfer using Stand Pivot technique with contact guard assistance and minimum assistance with rolling walker Pt. With cues for safety and hand placement      Activities of Daily Living:  · Feeding:  supervision with eating remainder of lunch  · Lower Body Dressing: moderate assistance to doff/doroteo BLE socks    [facility] 6 Click:  [facility] Total Score: 18    OT Exercises: UE Ergometer 10 min with intemitten breaks EOB with Min A for support and trunk balance    Additional Treatment:  Pt. Also with EOB posture with Min A to CGA Pt. With cues and (A) with leaning forward to work on trunk flexion and unsupported sitting bal.  Pt. With standing act on this day with task. Pt. With CGA for balance aspects with task with x 3  min with RW standing bal and min cues through out with weight shifting and use of BUE's incorporated and crossing mid line and facilitation with posture in prep for home management Pt. With 3# dowel activity with AAROM 2x20 reps with  shd flex,  bicep curls horz adb/add and forward flex motion to increase BUE ROM and strength,.   Pt. With standing and therex performed to increase ROM, endurance selfcare task and fxl mobility for independence     Patient left up in chair with all lines intact and call button in reach    ASSESSMENT:  Shant Magana Jr. is a 80 y.o. male with a medical diagnosis of Embolic stroke involving right middle cerebral artery Pt. participated well with session on this day.Pt demos physical deficits with balance  functional mobility, UB strength, endurance  level of functional indep with daily tasks and activities and selfcare skills .Pt. Will continue to benefit from continued OT to progress towards goals  .    Rehab identified problem list/impairments: weakness, impaired endurance, impaired self care skills, impaired functional mobilty, gait instability, impaired balance, impaired cognition, decreased upper extremity function, decreased safety awareness, decreased ROM, impaired fine motor    Rehab potential is fair    Activity tolerance: Fair    Discharge recommendations: home with home health     Barriers to discharge: Inaccessible home environment     Equipment recommendations: (TBD)     GOALS:   Multidisciplinary Problems     Occupational Therapy Goals        Problem: Occupational Therapy Goal    Goal Priority Disciplines Outcome Interventions   Occupational Therapy Goal     OT, PT/OT Ongoing, Progressing    Description: Goals to be met by: 8/11/20     Patient will increase functional independence with ADLs by performing:    Feeding with Modified [place].  UE Dressing with Supervision.  LE Dressing with Minimal Assistance.  Grooming while seated with Supervision.  Toileting from bedside commode with Contact Guard Assistance for hygiene and clothing management.   Bathing from  sitting at sink with Contact Guard Assistance.  Supine to sit with Modified [place].  Stand pivot transfers with Contact Guard Assistance using  RW.  Upper extremity exercise program x10 reps per shld flexion and seated rows. assistance as needed.  Caregiver will be competent when assisting with self care tasks and functional transfers.                     Plan:  Patient to be seen 5 x/week to address the above listed problems via self-care/home management, therapeutic activities, therapeutic exercises, neuromuscular re-education, cognitive retraining  Plan of Care expires: 08/28/20  Plan of Care reviewed with: patient    RATNA Thomas  08/06/2020

## 2020-08-06 NOTE — PLAN OF CARE
IDT plan of care discussed with Candace Hobson (daughter) # [phone]. Tentative discharge date of 8/11 provided.  PT/OT update provided. Candace confirmed that the patient will have 24hr assistance when he is discharged from the facility. CM will continue to follow up.

## 2020-08-06 NOTE — PLAN OF CARE
[facility] reviewed with Candace Hobson (daughter) # [phone]. Tentative discharge date of 8/11 provided. Family agreeable with discharge date.

## 2020-08-07 LAB
ANION GAP SERPL CALC-SCNC: 5 MMOL/L (ref 8–16)
BASOPHILS # BLD AUTO: 0.03 K/UL (ref 0–0.2)
BASOPHILS NFR BLD: 0.6 % (ref 0–1.9)
BUN SERPL-MCNC: 34 MG/DL (ref 8–23)
CALCIUM SERPL-MCNC: 10.4 MG/DL (ref 8.7–10.5)
CHLORIDE SERPL-SCNC: 110 MMOL/L (ref 95–110)
CO2 SERPL-SCNC: 25 MMOL/L (ref 23–29)
CREAT SERPL-MCNC: 1.6 MG/DL (ref 0.5–1.4)
DIFFERENTIAL METHOD: ABNORMAL
EOSINOPHIL # BLD AUTO: 0.3 K/UL (ref 0–0.5)
EOSINOPHIL NFR BLD: 5.6 % (ref 0–8)
ERYTHROCYTE [DISTWIDTH] IN BLOOD BY AUTOMATED COUNT: 14.7 % (ref 11.5–14.5)
EST. GFR  (AFRICAN AMERICAN): 46.3 ML/MIN/1.73 M^2
EST. GFR  (NON AFRICAN AMERICAN): 40.1 ML/MIN/1.73 M^2
GLUCOSE SERPL-MCNC: 100 MG/DL (ref 70–110)
HCT VFR BLD AUTO: 40.5 % (ref 40–54)
HGB BLD-MCNC: 12.8 G/DL (ref 14–18)
IMM GRANULOCYTES # BLD AUTO: 0.01 K/UL (ref 0–0.04)
IMM GRANULOCYTES NFR BLD AUTO: 0.2 % (ref 0–0.5)
LYMPHOCYTES # BLD AUTO: 1.1 K/UL (ref 1–4.8)
LYMPHOCYTES NFR BLD: 22.1 % (ref 18–48)
MAGNESIUM SERPL-MCNC: 2 MG/DL (ref 1.6–2.6)
MCH RBC QN AUTO: 29 PG (ref 27–31)
MCHC RBC AUTO-ENTMCNC: 31.6 G/DL (ref 32–36)
MCV RBC AUTO: 92 FL (ref 82–98)
MONOCYTES # BLD AUTO: 0.5 K/UL (ref 0.3–1)
MONOCYTES NFR BLD: 10.4 % (ref 4–15)
NEUTROPHILS # BLD AUTO: 2.9 K/UL (ref 1.8–7.7)
NEUTROPHILS NFR BLD: 61.1 % (ref 38–73)
NRBC BLD-RTO: 0 /100 WBC
PHOSPHATE SERPL-MCNC: 2.7 MG/DL (ref 2.7–4.5)
PLATELET # BLD AUTO: 312 K/UL (ref 150–350)
PMV BLD AUTO: 11 FL (ref 9.2–12.9)
POTASSIUM SERPL-SCNC: 4.2 MMOL/L (ref 3.5–5.1)
RBC # BLD AUTO: 4.41 M/UL (ref 4.6–6.2)
SARS-COV-2 RNA RESP QL NAA+PROBE: NOT DETECTED
SODIUM SERPL-SCNC: 140 MMOL/L (ref 136–145)
WBC # BLD AUTO: 4.79 K/UL (ref 3.9–12.7)

## 2020-08-07 PROCEDURE — 85025 COMPLETE CBC W/AUTO DIFF WBC: CPT

## 2020-08-07 PROCEDURE — 97129 THER IVNTJ 1ST 15 MIN: CPT

## 2020-08-07 PROCEDURE — 97130 THER IVNTJ EA ADDL 15 MIN: CPT

## 2020-08-07 PROCEDURE — 84100 ASSAY OF PHOSPHORUS: CPT

## 2020-08-07 PROCEDURE — 94761 N-INVAS EAR/PLS OXIMETRY MLT: CPT

## 2020-08-07 PROCEDURE — 97530 THERAPEUTIC ACTIVITIES: CPT

## 2020-08-07 PROCEDURE — 11000004 HC SNF PRIVATE

## 2020-08-07 PROCEDURE — 80048 BASIC METABOLIC PNL TOTAL CA: CPT

## 2020-08-07 PROCEDURE — 97116 GAIT TRAINING THERAPY: CPT

## 2020-08-07 PROCEDURE — 25000003 PHARM REV CODE 250: Performed by: STUDENT IN AN ORGANIZED HEALTH CARE EDUCATION/TRAINING PROGRAM

## 2020-08-07 PROCEDURE — 36415 COLL VENOUS BLD VENIPUNCTURE: CPT

## 2020-08-07 PROCEDURE — 97110 THERAPEUTIC EXERCISES: CPT | Mod: CO

## 2020-08-07 PROCEDURE — 25000242 PHARM REV CODE 250 ALT 637 W/ HCPCS: Performed by: NURSE PRACTITIONER

## 2020-08-07 PROCEDURE — 94640 AIRWAY INHALATION TREATMENT: CPT

## 2020-08-07 PROCEDURE — 97110 THERAPEUTIC EXERCISES: CPT

## 2020-08-07 PROCEDURE — 97535 SELF CARE MNGMENT TRAINING: CPT | Mod: CO

## 2020-08-07 PROCEDURE — 25000003 PHARM REV CODE 250: Performed by: NURSE PRACTITIONER

## 2020-08-07 PROCEDURE — 83735 ASSAY OF MAGNESIUM: CPT

## 2020-08-07 RX ORDER — AMLODIPINE BESYLATE 10 MG/1
10 TABLET ORAL DAILY
Status: DISCONTINUED | OUTPATIENT
Start: 2020-08-07 | End: 2020-08-11 | Stop reason: HOSPADM

## 2020-08-07 RX ADMIN — ATORVASTATIN CALCIUM 80 MG: 20 TABLET, FILM COATED ORAL at 08:08

## 2020-08-07 RX ADMIN — GUAIFENESIN AND DEXTROMETHORPHAN HYDROBROMIDE 1 TABLET: 600; 30 TABLET, EXTENDED RELEASE ORAL at 10:08

## 2020-08-07 RX ADMIN — LOSARTAN POTASSIUM 100 MG: 50 TABLET, FILM COATED ORAL at 08:08

## 2020-08-07 RX ADMIN — TAMSULOSIN HYDROCHLORIDE 0.4 MG: 0.4 CAPSULE ORAL at 08:08

## 2020-08-07 RX ADMIN — THERA TABS 1 TABLET: TAB at 08:08

## 2020-08-07 RX ADMIN — PANTOPRAZOLE SODIUM 40 MG: 40 TABLET, DELAYED RELEASE ORAL at 08:08

## 2020-08-07 RX ADMIN — APIXABAN 2.5 MG: 2.5 TABLET, FILM COATED ORAL at 08:08

## 2020-08-07 RX ADMIN — IPRATROPIUM BROMIDE AND ALBUTEROL SULFATE 3 ML: .5; 3 SOLUTION RESPIRATORY (INHALATION) at 10:08

## 2020-08-07 RX ADMIN — VITAMIN D, TAB 1000IU (100/BT) 1000 UNITS: 25 TAB at 08:08

## 2020-08-07 RX ADMIN — AMLODIPINE BESYLATE 10 MG: 10 TABLET ORAL at 11:08

## 2020-08-07 RX ADMIN — APIXABAN 2.5 MG: 2.5 TABLET, FILM COATED ORAL at 10:08

## 2020-08-07 RX ADMIN — GUAIFENESIN AND DEXTROMETHORPHAN HYDROBROMIDE 1 TABLET: 600; 30 TABLET, EXTENDED RELEASE ORAL at 08:08

## 2020-08-07 RX ADMIN — IPRATROPIUM BROMIDE AND ALBUTEROL SULFATE 3 ML: .5; 3 SOLUTION RESPIRATORY (INHALATION) at 08:08

## 2020-08-07 NOTE — PT/OT/SLP PROGRESS
"Physical Therapy  Treatment    Shant Magana Jr.   MRN: [id number]   Admitting Diagnosis: Embolic stroke involving right middle cerebral artery    PT Received On: 08/07/20          Billable Minutes:  Gait Training 10, Therapeutic Activity 15 and Therapeutic Exercise 15    Treatment Type: Treatment  PT/PTA: PT     PTA Visit Number: 0       General Precautions: Standard, aspiration, fall  Orthopedic Precautions:     Braces:      Spiritual, Cultural Beliefs, Roman Catholic Practices, Values that Affect Care: no    Subjective:  Communicated with patient prior to session.  Patient agreeable to session    Pain/Comfort  Pain Rating 1: 0/10    Objective:  Patient found supine       AM-PAC 6 CLICK MOBILITY  Total Score:17    Bed Mobility:  Sit>Supine: SBA     Transfers:  Sit<>Stand: Bunny with RW. Pt with forward flexed posture, req VC to stand up fully and to push up with arm rests     Gait:  Amb 94 ft with RW and Bunny. Pt with FFP, decreased step length, slow gait speed. Pt easily distracted, req VC for attention to task. 1 LOB with turning that req modA from PT to correct.     Advanced Gait:  Curb Step: 5.5" step x 2 with RW and modA. Pt req VC for technique and safety. Pt too fatigued to complete further step trials     Therex:  Seated in bedside chair with back supported, BLEs, 2 sets x 10 reps ea:  - LAQ, ankle PF/DF, LAQ   Pt req inc time and frequent cueing for attn to task     Balance:  Standing balance to complete activity with RW for UE support x 4 mins    Additional Treatment:  Treatment & Education:  · Therapist provided facilitation and instruction of proper body mechanics, energy conservation, and fall prevention strategies during tasks listed above.  · Encouraged patient to sit up in bedside chair to increase OOB/activity tolerance.  · Instructed patient to call nursing staff for assist with transfers.   · Educated patient on PT POC and answered all questions within PT scope of practice.      Patient left up in chair with " all lines intact, call button in reach and chair alarm on.    Assessment:  Shant Magana Jr. is a 80 y.o. male with a medical diagnosis of Embolic stroke involving right middle cerebral artery.  Pt tolerated treatment well this session. Pt pleasantly confused this date and delayed command following but willing to participate.     Rehab identified problem list/impairments: impaired self care skills, impaired functional mobilty, gait instability, impaired balance, impaired cognition, decreased safety awareness, impaired coordination, impaired fine motor    Rehab potential is good.    Activity tolerance: Good    Discharge recommendations: home with home health( supervision/assistance)     Barriers to discharge: Inaccessible home environment    Equipment recommendations: walker, rolling, wheelchair     GOALS:   Multidisciplinary Problems     Physical Therapy Goals        Problem: Physical Therapy Goal    Goal Priority Disciplines Outcome Goal Variances Interventions   Physical Therapy Goal     PT, PT/OT Ongoing, Progressing     Description: Goals to be met by: 20     Patient will increase functional independence with mobility by performin. Sit to stand transfer with Supervision  2. Bed to chair transfer with Supervision using Rolling Walker  3. Gait  x 150 feet with Contact Guard Assistance using Rolling Walker.   4. Ascend/descend 4 stair with bilateral Handrails Contact Guard Assistance met  5. Stand for 10 minutes with Supervision using Rolling Walker performing a standing activity.  6. Propel w/c 100 feet using B upper and lower extremities with verbal cueing only.               Multidisciplinary Problems (Resolved)        Problem: Physical Therapy Goal    Goal Priority Disciplines Outcome Goal Variances Interventions   Physical Therapy Goal   (Resolved)     PT, PT/OT  Error     Description: Goals to be met by: 20     Patient will increase functional independence with mobility by  performin. Supine to sit with Modified [place]  2. Sit to supine with Modified [place]  3. Sit to stand transfer with Supervision using RW or cane.  4. Bed to chair transfer with Supervision using Rolling Walker or straight cane.  5. Gait  x 150 feet with Supervision using Rolling Walker or straight cane.  6. Standing for 5 minutes while performing standing balance activity with SPV using RW or cane.                     PLAN:    Patient to be seen 5 x/week  to address the above listed problems via gait training, therapeutic activities, therapeutic exercises, neuromuscular re-education  Plan of Care expires: 20  Plan of Care reviewed with: patient    HILARIO ADRIANA, PT  2020

## 2020-08-07 NOTE — PLAN OF CARE
Plan of care reviewed with patient.  Patient verbalized understanding and had no further questions.  Patient worked with physical and  occupational therapy throughout the shift.  Patient remained oriented to person, place only, but able to follow all commands.  Patient now resting comfortably in locked chair.  Will continue to monitor.

## 2020-08-07 NOTE — PLAN OF CARE
Problem: SLP Goal  Goal: SLP Goal  Description: Speech Language Pathology Goals  oals expected to be met by 8/14:  1. Pt will orient to month, year, and place given external aids.  2. Pt will recall 2/3 related words after a 2 minute delay given mod cues to improve delayed memory.   3. Pt will immediately recall series of 5 digits with 80% accuracy given mod cues.   4. Pt will answer simple problem solving q's with 70% accuracy given mod cues.   5. Pt will complete word finding tasks with 70% accuracy given mod cues.   6. Pt will participate in further assessment of reading, writing, and visual spatial abilities.       Goals expected to be met by 8/4:  1. Pt will orient to month, year, and place given external aids. ongoing  2. Pt will recall 2/3 related words after a 60 second delay given max cues to improve delayed memory. Goal met  3. Pt will immediately recall series of 4 digits with 80% accuracy given mod cues. Goal met  4. Pt will answer simple problem solving q's with 60% accuracy given mod-max cues. Goal met  5. Pt will complete word finding tasks with 70% accuracy given mod cues. ongoing  6. Pt will participate in further assessment of reading, writing, and visual spatial abilities. ongoing          Outcome: Ongoing, Progressing

## 2020-08-07 NOTE — PLAN OF CARE
[facility] - [facility]    HOME HEALTH ORDERS  FACE TO FACE ENCOUNTER    Patient Name: Shant Magana Jr.  YOB: 1940    PCP: Trent Aldana MD   PCP Address: [address] / [place] [address]  PCP Phone Number: [phone]  PCP Fax: [phone]    Encounter Date: 08/06/2020    Admit to Home Health    Diagnoses:  Active Hospital Problems    Diagnosis  POA    *Embolic stroke involving right middle cerebral artery [I63.411]  Yes    Stroke [I63.9]  Yes    Paroxysmal atrial fibrillation [I48.0]  Yes    Status post placement of implantable loop recorder [Z95.818]  Yes    Weakness [R53.1]  Yes    Chronic diastolic congestive heart failure [I50.32]  Yes     Managed by Dr. Salcedo      Internal carotid artery stenosis, left [I65.22]  Yes    Mixed hyperlipidemia [E78.2]  Yes    CKD (chronic kidney disease) stage 3, GFR 30-59 ml/min [N18.3]  Yes     Managed by Dr. Orozco      Cytotoxic cerebral edema [G93.6]  Yes    Acute encephalopathy [G93.40]  Yes    Type 2 diabetes mellitus with complication, without long-term current use of insulin [E11.8]  Yes     Managed by Dr. Orozco      Essential hypertension [I10]  Yes      Resolved Hospital Problems   No resolved problems to display.       Future Appointments   Date Time Provider Department Center   9/10/2020  3:00 PM Lucio Toth MD [facility] [place] [facility]     Follow-up Information     Schedule an appointment as soon as possible for a visit with Trent Aldana MD.    Specialty: Family Medicine  Why: WITHIN 1 WEEK  Contact information:  [address]  [address]  [place] [address]  [phone]             Schedule an appointment as soon as possible for a visit with [facility] NEUROLOGY.    Specialty: Neurology  Contact information:  [address]  [facility] [address] [phone]               I have seen and examined this patient face to face today. My clinical findings that support the  need for the home health skilled services and home bound status are the following:  Weakness/numbness causing balance and gait disturbance due to Stroke making it taxing to leave home.    Allergies:Review of patient's allergies indicates:  No Known Allergies    Activities: activity as tolerated    Nursing:   SN to complete comprehensive assessment including routine vital signs. Instruct on disease process and s/s of complications to report to MD. Review/verify medication list sent home with the patient at time of discharge  and instruct patient/caregiver as needed. Frequency may be adjusted depending on start of care date.    Notify MD if SBP > 160 or < 90; DBP > 90 or < 50; HR > 120 or < 50; Temp > 101      CONSULTS:    Physical Therapy to evaluate and treat. Evaluate for home safety and equipment needs; Establish/upgrade home exercise program. Perform / instruct on therapeutic exercises, gait training, transfer training, and Range of Motion.  Occupational Therapy to evaluate and treat. Evaluate home environment for safety and equipment needs. Perform/Instruct on transfers, ADL training, ROM, and therapeutic exercises.  Speech Therapy  to evaluate and treat for  Language, Swallowing and Cognition.   to evaluate for community resources/long-range planning.  Aide to provide assistance with personal care, ADLs, and vital signs.    MISCELLANEOUS CARE:  Diabetic Care:   SN to perform and educate Diabetic management with blood glucose monitoring:, Fingerstick blood sugar AC and HS and Report CBG < 60 or > 350 to physician.  Heart Failure:      SN to instruct on the following:    Instruct on the definition of CHF.   Instruct on the signs/sympoms of CHF to be reported.   Instruct on and monitor daily weights.   Instruct on factors that cause exacerbation.   Instruct on action, dose, schedule, and side effects of medications.   Instruct on diet as prescribed.   Instruct on activity allowed.   Instruct on  life-style modifications for life long management of CHF   SN to assess compliance with daily weights, diet, medications, fluid retention,    safety precautions, activities permitted and life-style modifications.   Additional 1-2 SN visits per week as needed for signs and symptoms     of CHF exacerbation.      For Weight Gain > 2-3 lbs in 1 day or 4-6 lbs over 1 week notify PCP:    Medications: Review discharge medications with patient and family and provide education.      I certify that this patient is confined to his home and needs intermittent skilled nursing care, physical therapy, speech therapy and occupational therapy.

## 2020-08-07 NOTE — PT/OT/SLP PROGRESS
"Speech Language Pathology  Treatment    Shant Magana Jr.   MRN: [id number]   Admitting Diagnosis: Embolic stroke involving right middle cerebral artery    Diet recommendations: Solid Diet Level: Regular  Liquid Diet Level: Thin 1 bite/sip at a time, Alternating bites/sips, Avoid talking while eating, Eliminate distractions, Feed only when awake/alert, Frequent oral care, HOB to 90 degrees, Monitor for s/s of aspiration, Small bites/sips and Strict aspiration precautions    SLP Treatment Date: 08/07/20  Speech Start Time: 0922     Speech Stop Time: 0949     Speech Total (min): 27 min       TREATMENT BILLABLE MINUTES:  Speech Therapy Individual cognitive therapy, 2 units) 27    Has the patient been evaluated by SLP for swallowing? : Yes  Keep patient NPO?: No   General Precautions: Standard, fall, vision impaired, aspiration  Current Respiratory Status: room air       Subjective:  "heart attack." pt not oriented to situation correctly.          Objective:      Pt seen for bedside cognitive therapy. Pt was oriented to place ind'ly and to month given verbal cues. He was unable to orient to year despite fo3 choices and was not oriented to situation. Following a 60 second delay, pt recalled 3/3 related words without additional assistance.  Pt immediately recalled series of 4 digits ind'ly, but was unable to ID the largest digit in the series despite max cues.  Pt completed simple/low level mental math addition problems with 50% accuracy.  Pt state opposites of given words with 40% accuracy ind'ly/70% given cues.     Assessment:  Shant Magana Jr. is a 80 y.o. male with a medical diagnosis of Embolic stroke involving right middle cerebral artery and presents with cognitive-linguistic deficits and visual spatial deficits.     Discharge recommendations: Discharge Facility/Level of Care Needs: home health speech therapy(24/7 supervision)     Goals:   Multidisciplinary Problems     SLP Goals        Problem: SLP Goal    Goal Priority " Disciplines Outcome   SLP Goal     SLP Ongoing, Progressing   Description: Speech Language Pathology Goals  oals expected to be met by 8/14:  1. Pt will orient to month, year, and place given external aids.  2. Pt will recall 2/3 related words after a 2 minute delay given mod cues to improve delayed memory.   3. Pt will immediately recall series of 5 digits with 80% accuracy given mod cues.   4. Pt will answer simple problem solving q's with 70% accuracy given mod cues.   5. Pt will complete word finding tasks with 70% accuracy given mod cues.   6. Pt will participate in further assessment of reading, writing, and visual spatial abilities.       Goals expected to be met by 8/4:  1. Pt will orient to month, year, and place given external aids. ongoing  2. Pt will recall 2/3 related words after a 60 second delay given max cues to improve delayed memory. Goal met  3. Pt will immediately recall series of 4 digits with 80% accuracy given mod cues. Goal met  4. Pt will answer simple problem solving q's with 60% accuracy given mod-max cues. Goal met  5. Pt will complete word finding tasks with 70% accuracy given mod cues. ongoing  6. Pt will participate in further assessment of reading, writing, and visual spatial abilities. ongoing                            Plan:   Patient to be seen Therapy Frequency: 3 x/week  Planned Interventions: Cognitive-Linguistic Therapy  Plan of Care expires: 08/27/20  Plan of Care reviewed with: patient  SLP Follow-up?: Yes  SLP - Next Visit Date: 08/11/20           NEVIN Bourgeois CCC-SLP  08/07/2020     NEVIN Bourgeois CCC-SLP  Speech Language Pathologist  [phone]  8/7/2020

## 2020-08-07 NOTE — PT/OT/SLP PROGRESS
Occupational Therapy  Treatment    Shant Magana Jr.   MRN: [id number]   Admitting Diagnosis: Embolic stroke involving right middle cerebral artery    OT Date of Treatment: 08/07/20       Billable Minutes:  Self Care/Home Management 43 and Therapeutic Exercise 10    General Precautions: Standard, fall  Orthopedic Precautions: N/A  Braces: N/A    Spiritual, Cultural Beliefs, Alevism Practices, Values that Affect Care: no    Subjective:  Communicated with nsg prior to session.  I am doing well today    Pain/Comfort  Pain Rating 1: 0/10  Pain Rating Post-Intervention 1: 0/10    Objective:    Pt. Supine on arrival with  AVS camera    Occupational Performance:    Bed Mobility:    · Patient completed Scooting/Bridging with minimum assistance  · Patient completed Supine to Sit with minimum assistance     Functional Mobility/Transfers:  · Patient completed Sit <> Stand Transfer with minimum assistance  with  no assistive device   · Patient completed Bed <> Chair Transfer using Stand Pivot technique with minimum assistance with no assistive device  · Patient completed Toilet Transfer Stand Pivot technique with minimum assistance with  grab bars      Activities of Daily Living:  · Grooming: supervision  at sink level  · Bathing: minimum assistance with bathing aspects   · Upper Body Dressing: moderate assistance to manage gown  · Lower Body Dressing: minimum assistance to doroteo pants seated and to mange over hips instance  with use of grab  bars to steady  · Toileting: minimum assistance for post alfonzo area cleaning from 3n1 and diaper management     AMPA 6 Click:  AMPAC Total Score: 18    OT Exercises: UE Ergometer 10 min      Patient left up in chair with all lines intact and call button in reach    ASSESSMENT:  Shant Magana Jr. is a 80 y.o. male with a medical diagnosis of Embolic stroke involving right middle cerebral artery Pt. participated well with session on this day.Pt demos physical deficits with balance  functional  mobility, UB strength, endurance  level of functional indep with daily tasks and activities and selfcare skills .Pt. Will continue to benefit from continued OT to progress towards goals  .    Rehab identified problem list/impairments: weakness, impaired endurance, impaired self care skills, impaired functional mobilty, gait instability, impaired balance, impaired cognition, decreased upper extremity function, decreased safety awareness, decreased ROM, impaired fine motor    Rehab potential is fair    Activity tolerance: Fair    Discharge recommendations: home with home health     Barriers to discharge: Inaccessible home environment     Equipment recommendations: (TBD)     GOALS:   Multidisciplinary Problems     Occupational Therapy Goals        Problem: Occupational Therapy Goal    Goal Priority Disciplines Outcome Interventions   Occupational Therapy Goal     OT, PT/OT Ongoing, Progressing    Description: Goals to be met by: 8/11/20     Patient will increase functional independence with ADLs by performing:    Feeding with Modified [place].  UE Dressing with Supervision.  LE Dressing with Minimal Assistance.  Grooming while seated with Supervision.  Toileting from bedside commode with Contact Guard Assistance for hygiene and clothing management.   Bathing from  sitting at sink with Contact Guard Assistance.  Supine to sit with Modified [place].  Stand pivot transfers with Contact Guard Assistance using RW.  Upper extremity exercise program x10 reps per shld flexion and seated rows. assistance as needed.  Caregiver will be competent when assisting with self care tasks and functional transfers.                     Plan:  Patient to be seen 5 x/week to address the above listed problems via self-care/home management, therapeutic activities, therapeutic exercises, neuromuscular re-education, cognitive retraining  Plan of Care expires: 08/28/20  Plan of Care reviewed with: patient    Beena Champagne,  VANCE/L  08/07/2020

## 2020-08-07 NOTE — PROGRESS NOTES
[facility]                                  Skilled Nursing Facility                   Progress Note     Admit Date: 7/27/2020  KWAME TBD  Principal Problem:  Embolic stroke involving right middle cerebral artery   HPI obtained from patient interview and chart review     Chief Complaint:  Re-evaluation of medical treatment and therapy status: Lab review, hypertension    HPI:   Mr. Chino Gomez is a 80 year old male with PMHx of dementia, multiple strokes, intracranial and extracranial atherosclerosis, DM, HTN, a fib (eliquis with history of noncompliance due to cost) who presents to SNF following hospitalization for embolic stroke involving right MCA.  Admission to SNF for secondary weakness and debility.     Interval history:   All of today's labs reviewed and are listed below. Na at 140, K+ at 4.2. Phos at 2.7.  Mag 2, BUN/creatinine stable at 34/1.6.  CBC reviewed and stable. 24 hr vital sign ranges listed below.  Blood pressure 177/95.  Initiated home med Amlodipine 10 mg daily.   Patient denies trouble sleeping at night, shortness of breath, abdominal discomfort, nausea, or vomiting.  Patient reports an adequate appetite. Continuing to follow and treat all acute and chronic conditions.    Past Medical History: Patient has a past medical history of Acute kidney injury superimposed on chronic kidney disease (10/14/2017), Cancer, Diabetes mellitus, Former smoker (7/18/2020), History of stroke (8/15/2017), Hypertension, Hypertrophic cardiomyopathy, Renal disorder, and Stroke.    Past Surgical History: Patient has a past surgical history that includes Back surgery; Thyroidectomy; Esophagogastroduodenoscopy (N/A, 9/4/2018); Esophagogastroduodenoscopy (N/A, 10/18/2018); and Colonoscopy (N/A, 10/18/2018).    Social History: Patient reports that he has quit smoking. His smoking use included cigarettes. He has a 2.70 pack-year smoking history. He has  never used smokeless tobacco. He reports that he does not drink alcohol or use drugs.    Family History:  No significant family history to report    Allergies: Patient has No Known Allergies.    ROS  Constitutional: Negative for fever   Eyes: Negative for blurred vision, double vision   Respiratory:  + for intermittent cough, dyspnea on exertion  Cardiovascular: Negative for chest pain, palpitations, and leg swelling.   Gastrointestinal: Negative for abdominal pain, constipation, diarrhea, nausea and vomiting.   Genitourinary: Negative for dysuria, frequency   Musculoskeletal:  + generalized weakness. Negative for back pain and myalgias.   Skin: Negative for itching and rash.   Neurological: Negative for dizziness, headaches.   Psychiatric/Behavioral: Negative for depression. The patient is not nervous/anxious.      24 hour Vital Sign Range   Temp:  [96.4 °F (35.8 °C)-97.2 °F (36.2 °C)]   Pulse:  [61-68]   Resp:  [17-20]   BP: (140-177)/(58-95)   SpO2:  [95 %-96 %]     PEx  Constitutional: Patient appears debilitated.  No distress noted  HENT:   Head: Normocephalic and atraumatic.   Eyes: Pupils are equal, round  Neck: Normal range of motion. Neck supple.   Cardiovascular: Normal rate, regular rhythm and normal heart sounds.    Pulmonary/Chest: Effort normal and breath sounds are clear  Abdominal: Soft. Bowel sounds are normal.   Musculoskeletal: Normal range of motion.   Neurological: Alert and oriented to person- however states incorrect age, disoriented to place, and time.  Higher level thinking impaired.  Patient has partial hemianopsia to his right eye, no facial asymmetry. LUE- consult port against gravity but not for a full 10 secs. RUE- able to hold for 10 secs; RLE and LLE- able to hold for 5 secs. no sensory abnormality.  No aphasia or dysarthria noted.  Psychiatric: Normal mood and affect. Behavior is normal.   Skin: Skin is warm and dry.    Recent Labs   Lab 08/07/20  0451      K 4.2      CO2  25   BUN 34*   CREATININE 1.6*   MG 2.0       Recent Labs   Lab 08/07/20  0451   WBC 4.79   RBC 4.41*   HGB 12.8*   HCT 40.5      MCV 92   MCH 29.0   MCHC 31.6*         Assessment and Plan:     Problems addressed today    Hypomagnesemia  - initiated magnesium oxide 400 mg BID x2 days   - magnesium at 2 today stable    Essential hypertension  - Stroke risk factor  - goal SBP < 160   - Original home meds: Losartan 100 mg daily, Amlodipine 10 mg daily  - 7/31 stable, continue losartan to 50 mg daily, continue to hold amlodipine at this time  - 8/3 increase losartan to 100 mg daily, will initiate amlodipine if hypertension continues  - 8/4 stable, continue current treatment  - 8/7: BP today 177/95, initiated home med Amlodipine 10 mg daily.    Dementia with behavioral disturbance  Maintain Delirium precautions:  - Avoid antihistamines, anticholinergics, hypnotics, and minimize opiates while controlling for pain as these medications may exacerbate delirium. Cues for day/night will assist with keeping patient calm and oriented - during daytime, please keep adequate light in room (open windows, lights on) and please keep room dim at night-time to encourage normal sleep-wake cycles. Continuing to have nursing and family reorient the patient and encourage family to call  - 8/4 persists, continue quetiapine 25 mg qHS PRN For agitation    CKD (chronic kidney disease) stage 3, GFR 30-59 ml/min  - stable, baseline likely 1.5-1.8,  monitor twice weekly BMPs, Avoid nephrotoxic agents, Renally adjust medications.  Patient will need to follow with a nephrologist after DC  - 8/7/2020: Cr today 1.6 and BUN 34 slightly trending up today -continue to trend and monitor.          Ongoing but stable problems        Embolic stroke involving right middle cerebral artery  - Patient with history of L facial droop from previous stroke.  - CT revealed R parietal infarct. Etiology likely cardioembolic  - Antithrombotics for secondary  stroke prevention: Anticoagulants: Apixaban 2.5 mg BID  - Will maintain on geriatric dose of Eliquis as creatinine borderline for normal dosing (and only worsens when not admitted).- will investigate patient's appropriateness to be from medication and have him signed up with payment assistant  - Statins for secondary stroke prevention and hyperlipidemia:  continue Atorvastatin- 80 mg daily  - PT/OT  - goal BP- SBP < 160  - [facility] recommended 24 hr assistance once discharged home daughter Manuel Lozada 7/21, who states family is unable to provide 24 hr supervision- information relayed to SNF CM to begin early discussions with patient's family to prepare for DC.     Cytotoxic cerebral edema  - Area of cytotoxic cerebral edema identified when reviewing brain imaging in the territory of the R middle cerebral artery. There is no mass effect associated with it. We will continue to monitor the patients clinical exam for any worsening of symptoms which may indicate expansion of the stroke or the area of the edema resulting in the clinical change. The pattern is suggestive of cardioembolic etiology.     Former smoker  - Stroke risk factor  - Encourage continued smoking cessation    Type 2 diabetes mellitus with complication, without long-term current use of insulin  -Stroke risk factor  - A1C 5.8     Benign prostatic hyperplasia  - Continue home flomax 0.4 mg daily    Status post placement of implantable loop recorder  - follow-up with cardiology after DC from SNF     Paroxysmal atrial fibrillation  - Stroke risk factor  - Anticoagulate with home apixaban 2.5mg BID- CM made aware that patient previously discontinue medication due to not being able to afford the cost and looking into getting him signed up with [facility] nurse medication assistance program     Chronic diastolic congestive heart failure  - Repeat echo EF 68 %  - Last echo 12/2019 with mild LV diastolic dysfunction  - Not on diuretic at home; BNP wnl  - Daily weights  (standing if tolerated)  - Fluid restriction at 1500mL  - Cardiac diet     Atelectasis  Right-sided pleural effusion  Productive cough  - improving, patient now tolerating room air, reduced scheduled DuoNebs to q.6 while awake, initiated Mucinex DM  mg q.12 hours.  Initiated incentive spirometry.  Monitor respiratory status closely.      Mixed hyperlipidemia  -  Stroke risk factor  - LDL 70  - Continue home atorvastatin 80 mg daily     Internal carotid artery stenosis, left  - Stroke risk factor; continue Eliquis     History of stroke  - Patient with history of multiple strokes   - History of a fib + intracranial/extracranial atherosclerosis  - Continue secondary stroke prevention with eliquis + atorvastatin 80 mg   - Will maintain on geriatric dose of Eliquis as creatinine borderline for normal dosing (and only worsens when not admitted).     Vitamin-D deficiency  - continue vitamin-D 1000 units daily- level is not been checked since 2017- ordered for level with next lab draw.      Debility   - Continue with PT/OT for gait training and strengthening and restoration of ADL's   - Encourage mobility, OOB in chair, and early ambulation as appropriate  - Fall precautions   - Monitor for bowel and bladder dysfunction  - Monitor for and prevent skin breakdown and pressure ulcers  - Continue DVT prophylaxis with  Eliquis 2.5 mg daily        Future Appointments   Date Time Provider Department Center   9/10/2020  3:00 PM Lucio Toth MD [facility] CARDIO [place] [facility]       Kathy Pop NP  Department of [facility] Medicine   [facility]     DOS: 8/7/2020

## 2020-08-08 PROCEDURE — 25000003 PHARM REV CODE 250: Performed by: NURSE PRACTITIONER

## 2020-08-08 PROCEDURE — 94761 N-INVAS EAR/PLS OXIMETRY MLT: CPT

## 2020-08-08 PROCEDURE — 94640 AIRWAY INHALATION TREATMENT: CPT

## 2020-08-08 PROCEDURE — 25000003 PHARM REV CODE 250: Performed by: STUDENT IN AN ORGANIZED HEALTH CARE EDUCATION/TRAINING PROGRAM

## 2020-08-08 PROCEDURE — 11000004 HC SNF PRIVATE

## 2020-08-08 PROCEDURE — 25000242 PHARM REV CODE 250 ALT 637 W/ HCPCS: Performed by: NURSE PRACTITIONER

## 2020-08-08 RX ADMIN — APIXABAN 2.5 MG: 2.5 TABLET, FILM COATED ORAL at 08:08

## 2020-08-08 RX ADMIN — Medication 6 MG: at 08:08

## 2020-08-08 RX ADMIN — THERA TABS 1 TABLET: TAB at 08:08

## 2020-08-08 RX ADMIN — LOSARTAN POTASSIUM 100 MG: 50 TABLET, FILM COATED ORAL at 08:08

## 2020-08-08 RX ADMIN — GUAIFENESIN AND DEXTROMETHORPHAN HYDROBROMIDE 1 TABLET: 600; 30 TABLET, EXTENDED RELEASE ORAL at 08:08

## 2020-08-08 RX ADMIN — ATORVASTATIN CALCIUM 80 MG: 20 TABLET, FILM COATED ORAL at 08:08

## 2020-08-08 RX ADMIN — IPRATROPIUM BROMIDE AND ALBUTEROL SULFATE 3 ML: .5; 3 SOLUTION RESPIRATORY (INHALATION) at 06:08

## 2020-08-08 RX ADMIN — ACETAMINOPHEN 650 MG: 325 TABLET ORAL at 08:08

## 2020-08-08 RX ADMIN — VITAMIN D, TAB 1000IU (100/BT) 1000 UNITS: 25 TAB at 08:08

## 2020-08-08 RX ADMIN — QUETIAPINE FUMARATE 25 MG: 25 TABLET ORAL at 08:08

## 2020-08-08 RX ADMIN — PANTOPRAZOLE SODIUM 40 MG: 40 TABLET, DELAYED RELEASE ORAL at 08:08

## 2020-08-08 RX ADMIN — AMLODIPINE BESYLATE 10 MG: 10 TABLET ORAL at 08:08

## 2020-08-08 RX ADMIN — TAMSULOSIN HYDROCHLORIDE 0.4 MG: 0.4 CAPSULE ORAL at 08:08

## 2020-08-08 NOTE — PLAN OF CARE
Problem: Adult Inpatient Plan of Care  Goal: Plan of Care Review  Outcome: Ongoing, Progressing  Flowsheets (Taken 8/8/2020 1529)  Plan of Care Reviewed With: patient     Problem: Diabetes Comorbidity  Goal: Blood Glucose Level Within Desired Range  Outcome: Ongoing, Progressing     Problem: Fall Injury Risk  Goal: Absence of Fall and Fall-Related Injury  Outcome: Ongoing, Progressing     Problem: Skin Injury Risk Increased  Goal: Skin Health and Integrity  Outcome: Ongoing, Progressing     Mr Magana remains awake and alert; pt is sitting up in recliner with chair alarm and [employer] camera in progress. BP rechecked at this time; see flowsheet. Pt instructed not to get up without assistance from staff. Safety measures maintained. Call light is in patient's hand. Will continue with plan of care.

## 2020-08-09 PROCEDURE — 97530 THERAPEUTIC ACTIVITIES: CPT

## 2020-08-09 PROCEDURE — 25000003 PHARM REV CODE 250: Performed by: NURSE PRACTITIONER

## 2020-08-09 PROCEDURE — 97116 GAIT TRAINING THERAPY: CPT

## 2020-08-09 PROCEDURE — 11000004 HC SNF PRIVATE

## 2020-08-09 PROCEDURE — 97530 THERAPEUTIC ACTIVITIES: CPT | Mod: CO

## 2020-08-09 PROCEDURE — 25000003 PHARM REV CODE 250: Performed by: STUDENT IN AN ORGANIZED HEALTH CARE EDUCATION/TRAINING PROGRAM

## 2020-08-09 RX ADMIN — QUETIAPINE FUMARATE 25 MG: 25 TABLET ORAL at 08:08

## 2020-08-09 RX ADMIN — VITAMIN D, TAB 1000IU (100/BT) 1000 UNITS: 25 TAB at 09:08

## 2020-08-09 RX ADMIN — ACETAMINOPHEN 650 MG: 325 TABLET ORAL at 08:08

## 2020-08-09 RX ADMIN — LOSARTAN POTASSIUM 100 MG: 50 TABLET, FILM COATED ORAL at 09:08

## 2020-08-09 RX ADMIN — TAMSULOSIN HYDROCHLORIDE 0.4 MG: 0.4 CAPSULE ORAL at 09:08

## 2020-08-09 RX ADMIN — AMLODIPINE BESYLATE 10 MG: 10 TABLET ORAL at 09:08

## 2020-08-09 RX ADMIN — GUAIFENESIN AND DEXTROMETHORPHAN HYDROBROMIDE 1 TABLET: 600; 30 TABLET, EXTENDED RELEASE ORAL at 09:08

## 2020-08-09 RX ADMIN — ATORVASTATIN CALCIUM 80 MG: 20 TABLET, FILM COATED ORAL at 09:08

## 2020-08-09 RX ADMIN — THERA TABS 1 TABLET: TAB at 09:08

## 2020-08-09 RX ADMIN — PANTOPRAZOLE SODIUM 40 MG: 40 TABLET, DELAYED RELEASE ORAL at 09:08

## 2020-08-09 RX ADMIN — APIXABAN 2.5 MG: 2.5 TABLET, FILM COATED ORAL at 08:08

## 2020-08-09 RX ADMIN — GUAIFENESIN AND DEXTROMETHORPHAN HYDROBROMIDE 1 TABLET: 600; 30 TABLET, EXTENDED RELEASE ORAL at 08:08

## 2020-08-09 RX ADMIN — APIXABAN 2.5 MG: 2.5 TABLET, FILM COATED ORAL at 09:08

## 2020-08-09 NOTE — PLAN OF CARE
Problem: Adult Inpatient Plan of Care  Goal: Plan of Care Review  Outcome: Ongoing, Progressing     Problem: Diabetes Comorbidity  Goal: Blood Glucose Level Within Desired Range  Outcome: Ongoing, Progressing     Problem: Fall Injury Risk  Goal: Absence of Fall and Fall-Related Injury  Outcome: Ongoing, Progressing     Problem: Skin Injury Risk Increased  Goal: Skin Health and Integrity  Outcome: Ongoing, Progressing

## 2020-08-09 NOTE — PT/OT/SLP PROGRESS
"Occupational Therapy  Treatment    Shant Magana Jr.   MRN: [id number]   Admitting Diagnosis: Embolic stroke involving right middle cerebral artery    OT Date of Treatment: 08/09/20       Billable Minutes:  Therapeutic Activity 39    General Precautions: Standard, fall  Orthopedic Precautions: N/A  Braces: N/A    Spiritual, Cultural Beliefs, Buddhist Practices, Values that Affect Care: no    Subjective:  Communicated with nsg prior to session.  "Im okay"    Pain/Comfort  Pain Rating 1: 0/10  Pain Rating Post-Intervention 1: 0/10    Objective:   Pt. Found seated at bedside chair on arrival with [employer] camera    Occupational Performance:    Bed Mobility:    · Not tested    Functional Mobility/Transfers:  · Patient completed Sit <> Stand Transfer with contact guard assistance  with  rolling walker   · Functional Mobility: Pt. With fxl mobility around EOB with RW and CGA    Activities of Daily Living:  · Upper Body Dressing: supervision to doff/doroteo fresh gown while seated  · Lower Body Dressing: minimum assistance to thread pants through legs andmanage over hips instance     AMPA 6 Click:  [facility] Total Score: 18    OT Exercises: UE Ergometer 10 mins with moderate resistance with verbal cues for continuation.     Additional Treatment:  Pt. Performed static standing with RW and CGA for 4 mins to increase standing tolerance and balance.  Pt. With fxl mobility around and at EOB with RW and CGA x 1 lap   Pt. With 3# dowel activity with 2x15 reps with  shd flex, bicep curls horz adb/add and forward flex motion to increase BUE ROM and strength,. -Pt. Required verbal cues for continuation of task   Pt. With standing and therex performed to increase ROM, endurance selfcare task and fxl mobility for independence     Patient left up in chair with all lines intact, call button in reach and with chair alarm on and [employer] camera on    ASSESSMENT:  Shant Magana Jr. is a 80 y.o. male with a medical diagnosis of Embolic stroke involving right " middle cerebral artery Pt. participated well with session on this day. Pt is progressing well with session on this day still continues to requires cues with aspects of safety. Pt. Required increased time with ADLs and required verbal and tactile cues for management with RW. Pt. Will continue to benefit from continued OT to progress towards goals    Rehab identified problem list/impairments: weakness, impaired endurance, impaired self care skills, impaired functional mobilty, gait instability, impaired balance, impaired cognition, decreased upper extremity function, decreased safety awareness, decreased ROM, impaired fine motor    Rehab potential is good    Activity tolerance: Good    Discharge recommendations: home with home health     Barriers to discharge: Inaccessible home environment     Equipment recommendations: (TBD)     GOALS:   Multidisciplinary Problems     Occupational Therapy Goals        Problem: Occupational Therapy Goal    Goal Priority Disciplines Outcome Interventions   Occupational Therapy Goal     OT, PT/OT Ongoing, Progressing    Description: Goals to be met by: 8/11/20     Patient will increase functional independence with ADLs by performing:    Feeding with Modified [place].  UE Dressing with Supervision.  LE Dressing with Minimal Assistance.  Grooming while seated with Supervision.  Toileting from bedside commode with Contact Guard Assistance for hygiene and clothing management.   Bathing from  sitting at sink with Contact Guard Assistance.  Supine to sit with Modified [place].  Stand pivot transfers with Contact Guard Assistance using RW.  Upper extremity exercise program x10 reps per shld flexion and seated rows. assistance as needed.  Caregiver will be competent when assisting with self care tasks and functional transfers.                 Plan:  Patient to be seen 5 x/week to address the above listed problems via self-care/home management, therapeutic activities, therapeutic  exercises, neuromuscular re-education, cognitive retraining  Plan of Care expires: 08/28/20  Plan of Care reviewed with: patient    Elza Latif, BRANDI [place] and PINKY have discussed the above patients goals and status in collaboration with Plan of Care.  08/09/2020

## 2020-08-09 NOTE — PT/OT/SLP PROGRESS
"Physical Therapy  Treatment    Shant Magana Jr.   MRN: [id number]   Admitting Diagnosis: Embolic stroke involving right middle cerebral artery    PT Received On: 08/09/20          Billable Minutes:  Gait Training 10 and Therapeutic Activity 30    Treatment Type: Treatment  PT/PTA: PT     PTA Visit Number: 0       General Precautions: Standard, aspiration, fall  Orthopedic Precautions:     Braces:      Spiritual, Cultural Beliefs, Spiritism Practices, Values that Affect Care: no    Subjective:  Communicated with patient prior to session.  Pt agreeable to session    Pain/Comfort  Pain Rating 1: 0/10    Objective:  Patient found supine    Upon entry to pt room, pt soiled with urine      AM-PAC 6 CLICK MOBILITY  Total Score:17    Bed Mobility:  Supine>Sit: Bunny, pt unable to follow commands to scoot fwd despite cueing and inc time     Transfers:  Sit<>Stand: Bunny with RW, pt with posterior trunk lean     Gait:  Amb 138 ft with RW and Bunny. Pt requiring significantly increased time to complete task, with decreased step length and very narrow DANIEL. Pt req constant verbal and tactile cueing for mgmt of RW. Frequent cueing for attention to task, pt easily distracted.    Advanced Gait:  Curb Step: 5.5" step x 2 trials with RW and Bunny. Pt req inc processing time and cueing to complete task. Attempted step trial x 2, but pt unable to complete task despite cues from PT, likely due to fatigue at end of session    Balance:  Static standing balance with CGA and RW x 4 mins for cleaning pt and pericare. Pt with posterior trunk lean, req VC to lean fwd       Patient left up in chair with all lines intact, call button in reach and chair alarm on.    Assessment:  Shant Magana Jr. is a 80 y.o. male with a medical diagnosis of Embolic stroke involving right middle cerebral artery.  Pt tolerated treatment session fairly this date. Pt is primarily limited by weakness and decreased endurance at this time and requires frequent cueing for management " of assistive devices and to complete tasks. Pt is a high fall risk and will require 24 hr supervision after discharge. Pt left in room with [employer] and Posey alarm activated, RN notified. Pt will continue to benefit from skilled PT services to improve overall strength and functional mobility.     Rehab identified problem list/impairments: impaired self care skills, impaired functional mobilty, gait instability, impaired balance, impaired cognition, decreased safety awareness, impaired coordination, impaired fine motor    Rehab potential is good.    Activity tolerance: Good    Discharge recommendations: home with home health( supervision/assistance)     Barriers to discharge: Inaccessible home environment    Equipment recommendations: walker, rolling, wheelchair     GOALS:   Multidisciplinary Problems     Physical Therapy Goals        Problem: Physical Therapy Goal    Goal Priority Disciplines Outcome Goal Variances Interventions   Physical Therapy Goal     PT, PT/OT Ongoing, Progressing     Description: Goals to be met by: 20     Patient will increase functional independence with mobility by performin. Sit to stand transfer with Supervision  2. Bed to chair transfer with Supervision using Rolling Walker  3. Gait  x 150 feet with Contact Guard Assistance using Rolling Walker.   4. Ascend/descend 4 stair with bilateral Handrails Contact Guard Assistance met  5. Stand for 10 minutes with Supervision using Rolling Walker performing a standing activity.  6. Propel w/c 100 feet using B upper and lower extremities with verbal cueing only.               Multidisciplinary Problems (Resolved)        Problem: Physical Therapy Goal    Goal Priority Disciplines Outcome Goal Variances Interventions   Physical Therapy Goal   (Resolved)     PT, PT/OT  Error     Description: Goals to be met by: 20     Patient will increase functional independence with mobility by performin. Supine to sit with  Modified [place]  2. Sit to supine with Modified [place]  3. Sit to stand transfer with Supervision using RW or cane.  4. Bed to chair transfer with Supervision using Rolling Walker or straight cane.  5. Gait  x 150 feet with Supervision using Rolling Walker or straight cane.  6. Standing for 5 minutes while performing standing balance activity with SPV using RW or cane.                     PLAN:    Patient to be seen 5 x/week  to address the above listed problems via gait training, therapeutic activities, therapeutic exercises, neuromuscular re-education  Plan of Care expires: 08/27/20  Plan of Care reviewed with: patient    HILARIO ADRIANA, PT  08/09/2020

## 2020-08-10 PROCEDURE — 97110 THERAPEUTIC EXERCISES: CPT | Mod: CQ

## 2020-08-10 PROCEDURE — 25000003 PHARM REV CODE 250: Performed by: NURSE PRACTITIONER

## 2020-08-10 PROCEDURE — 97530 THERAPEUTIC ACTIVITIES: CPT | Mod: CO

## 2020-08-10 PROCEDURE — 97530 THERAPEUTIC ACTIVITIES: CPT | Mod: CQ

## 2020-08-10 PROCEDURE — 97116 GAIT TRAINING THERAPY: CPT | Mod: CQ

## 2020-08-10 PROCEDURE — 97803 MED NUTRITION INDIV SUBSEQ: CPT

## 2020-08-10 PROCEDURE — 25000003 PHARM REV CODE 250: Performed by: STUDENT IN AN ORGANIZED HEALTH CARE EDUCATION/TRAINING PROGRAM

## 2020-08-10 PROCEDURE — 11000004 HC SNF PRIVATE

## 2020-08-10 RX ORDER — AMLODIPINE BESYLATE 10 MG/1
10 TABLET ORAL DAILY
Qty: 30 TABLET | Refills: 3 | Status: SHIPPED | OUTPATIENT
Start: 2020-08-11 | End: 2020-08-20 | Stop reason: SINTOL

## 2020-08-10 RX ADMIN — APIXABAN 2.5 MG: 2.5 TABLET, FILM COATED ORAL at 08:08

## 2020-08-10 RX ADMIN — VITAMIN D, TAB 1000IU (100/BT) 1000 UNITS: 25 TAB at 08:08

## 2020-08-10 RX ADMIN — PANTOPRAZOLE SODIUM 40 MG: 40 TABLET, DELAYED RELEASE ORAL at 08:08

## 2020-08-10 RX ADMIN — Medication 6 MG: at 08:08

## 2020-08-10 RX ADMIN — THERA TABS 1 TABLET: TAB at 08:08

## 2020-08-10 RX ADMIN — AMLODIPINE BESYLATE 10 MG: 10 TABLET ORAL at 08:08

## 2020-08-10 RX ADMIN — ATORVASTATIN CALCIUM 80 MG: 20 TABLET, FILM COATED ORAL at 08:08

## 2020-08-10 RX ADMIN — GUAIFENESIN AND DEXTROMETHORPHAN HYDROBROMIDE 1 TABLET: 600; 30 TABLET, EXTENDED RELEASE ORAL at 08:08

## 2020-08-10 RX ADMIN — LOSARTAN POTASSIUM 100 MG: 50 TABLET, FILM COATED ORAL at 08:08

## 2020-08-10 RX ADMIN — TAMSULOSIN HYDROCHLORIDE 0.4 MG: 0.4 CAPSULE ORAL at 08:08

## 2020-08-10 RX ADMIN — QUETIAPINE FUMARATE 25 MG: 25 TABLET ORAL at 08:08

## 2020-08-10 NOTE — PLAN OF CARE
08/10/20 1130   Post-Acute Status   Post-Acute Authorization Home Health   Post-Acute Placement Status Set-up Complete     [facility]. Candace (daughter) will arrive tomorrow between 11-12 noon for .

## 2020-08-10 NOTE — PLAN OF CARE
Problem: Adult Inpatient Plan of Care  Goal: Plan of Care Review  Outcome: Ongoing, Not Progressing  Goal: Patient-Specific Goal (Individualization)  Outcome: Ongoing, Not Progressing  Goal: Absence of Hospital-Acquired Illness or Injury  Outcome: Ongoing, Not Progressing  Goal: Optimal Comfort and Wellbeing  Outcome: Ongoing, Not Progressing  Goal: Readiness for Transition of Care  Outcome: Ongoing, Not Progressing  Goal: Rounds/Family Conference  Outcome: Ongoing, Not Progressing     Problem: Diabetes Comorbidity  Goal: Blood Glucose Level Within Desired Range  Outcome: Ongoing, Not Progressing     Problem: Fall Injury Risk  Goal: Absence of Fall and Fall-Related Injury  Outcome: Ongoing, Not Progressing     Problem: Skin Injury Risk Increased  Goal: Skin Health and Integrity  Outcome: Ongoing, Not Progressing

## 2020-08-10 NOTE — PROGRESS NOTES
"[facility] - Skilled Nursing Care  Adult Nutrition  Progress Note    SUMMARY   Recommendations    Recommendation/Intervention: Continue diabetic cardiac diet, boost glucose BID, RD following  Goals: PO to meet 85% of EEN/EPN by next RD follow up  Nutrition Goal Status: progressing towards goal  Reason for Assessment    Reason For Assessment: RD follow-up  Diagnosis: stroke/CVA  Relevant Medical History: DM2, CKD, AFIB, HLD, HF, HTN, CVA, Carotid stenosis, dementia  Interdisciplinary Rounds: attended  General Information Comments: needs help with set up , no chicken with bones, pt assisted and eating then, needed to use the toilet, boost glucose and peaches from lunch still on beside table.  Nutrition Discharge Planning: DC on diabetic heart healthy diet, fluid per MD, fall risk,    Nutrition Risk Screen    Nutrition Risk Screen: dysphagia or difficulty swallowing    Nutrition/Diet History    Patient Reported Diet/Restrictions/Preferences: low salt, diabetic diet  Spiritual, Cultural Beliefs, Scientologist Practices, Values that Affect Care: no  Food Allergies: NKFA  Factors Affecting Nutritional Intake: impaired cognitive status/motor control    Anthropometrics    Temp: 96.8 °F (36 °C)  Height: 6' 1" (185.4 cm)  Height (inches): 73 in  Weight Method: Bed Scale  Weight: 81.9 kg (180 lb 8.9 oz)  Weight (lb): 180.56 lb  Ideal Body Weight (IBW), Male: 184 lb  % Ideal Body Weight, Male (lb): 104.84 %  BMI (Calculated): 23.8  BMI Grade: 25 - 29.9 - overweight  Usual Body Weight (UBW), k.9 kg  Weight Change Amount: (wt up from 175 to 180#)  % Usual Body Weight: 96.46  % Weight Change From Usual Weight: -3.74 %       Lab/Procedures/Meds    Pertinent Labs Reviewed: reviewed  Pertinent Labs Comments: BUN 34, Cr 1.6, Mg 2.0  Pertinent Medications Reviewed: reviewed  Pertinent Medications Comments: Mg      Estimated/Assessed Needs    Weight Used For Calorie Calculations: 87.5 kg (192 lb 14.4 oz)  Energy Calorie Requirements " (kcal): 1966  Energy Need Method: [place]-St Jeor(x 1.2(PAL))  Protein Requirements: 105g-88(x 1.2-1.0)  Weight Used For Protein Calculations: 87.5 kg (192 lb 14.4 oz)  Fluid Requirements (mL): 1500 per MD     RDA Method (mL): 1966  CHO Requirement: 246g      Nutrition Prescription Ordered    Current Diet Order: diabetic , cardiac, 1500 ml fluid restriction,   Nutrition Order Comments: PO %  Oral Nutrition Supplement: boost glucose BID    Evaluation of Received Nutrient/Fluid Intake    Energy Calories Required: meeting needs  Protein Required: meeting needs  Fluid Required: meeting needs  Comments: LBM 8/9  Tolerance: tolerating  % Intake of Estimated Energy Needs: 75 - 100 %  % Meal Intake: 75 - 100 %    Nutrition Risk    Level of Risk/Frequency of Follow-up: low     Assessment and Plan           Self feeding difficulty related to cognitive deficits and risk of aspiration as evidenced by hx of CVA's, recommendations from Speech Therapy to assist with meals .  Resolved , continue aspiration precautions     Plan  Carbohydrate controlled diet- 2000 calories  Fat and mineral modified diet- cardiac  Fluid restricted diet- 1500 ml    Commercial beverage- protein- Boost glucose BID vanilla  Multivitamin/mineral therapy- MVI          Monitor and Evaluation    Food and Nutrient Intake: food and beverage intake  Food and Nutrient Adminstration: diet order  Physical Activity and Function: nutrition-related ADLs and IADLs  Anthropometric Measurements: weight change  Biochemical Data, Medical Tests and Procedures: glucose/endocrine profile, electrolyte and renal panel     Malnutrition Assessment   7/28/20     Skin (Micronutrient): dry  Hair/Scalp (Micronutrient): dry  Eyes (Micronutrient): conjunctiva dry  Neck/Chest (Micronutrient): muscle wasting  Musculoskeletal/Lower Extremities: muscle wasting   Micronutrient Evaluation: suspected deficiency       Orbital Region (Subcutaneous Fat Loss): mild depletion  Upper Arm  Region (Subcutaneous Fat Loss): mild depletion  Thoracic and Lumbar Region: well nourished   [place] Region (Muscle Loss): mild depletion  Clavicle Bone Region (Muscle Loss): moderate depletion  Clavicle and Acromion Bone Region (Muscle Loss): mild depletion  Dorsal Hand (Muscle Loss): mild depletion  Posterior Calf Region (Muscle Loss): well nourished                 Nutrition Follow-Up    RD Follow-up?: Yes

## 2020-08-10 NOTE — PT/OT/SLP PROGRESS
Physical Therapy  Treatment    Shant Magana Jr.   MRN: [id number]   Admitting Diagnosis: Embolic stroke involving right middle cerebral artery    PT Received On: 08/10/20        Billable Minutes:  Gait Training 15, Therapeutic Activity 10 and Therapeutic Exercise 15    Treatment Type: Treatment   PT/PTA: PTA     PTA Visit Number: 1       General Precautions: Standard, aspiration, fall  Orthopedic Precautions:     Braces:      Spiritual, Cultural Beliefs, Anabaptist Practices, Values that Affect Care: no    Subjective:  Pt agreeable to therapy    Pain/Comfort  Pain Rating 1: 0/10  Pain Rating Post-Intervention 1: 0/10    Objective:   Patient found with: (in bed , camera present) soiled with diarrhea, nsg notified     AM-PAC 6 CLICK MOBILITY  Total Score:18    Bed Mobility:  Supine>Sit: min A with trunk elev HOB flat no rail simple vcs    Transfers:  Sit<>Stand: with RW min/CGA  Stand Pivot Transfer: with RW min/CGA vcs for RW mgmt    Gait:  Amb with RW min/CGA ~ 125 ft occ vcs for safety with RW mgmt turns/obstacles    Therex:  2x10 reps AP,LAQ,hip flex    Additional Treatment:  Clean/change diaper/doroteo pants in bed total A  Had 2nd episode of diarrhea nsg notified, min/CGA with RW for trfs, total A with post alfonzo care, total A with diaper mod A with pulljng pants up, min/CGA with hand hygiene at sink  In standing   Patient left up in chair with call button in reach, camera present and belonging sin reach, chair alarm.    Assessment:  Shant Magana Jr. is a 80 y.o. male with a medical diagnosis of Embolic stroke involving right middle cerebral artery.  Pt tolerated well, pt with 2 episode of diarrhea during session, nsg notified, pt would continue to benefit from skilled PT services to improve overall functional mobility, strength and endurance.  .    Rehab identified problem list/impairments: impaired self care skills, impaired functional mobilty, gait instability, impaired balance, impaired cognition, decreased safety  awareness, impaired coordination, impaired fine motor    Rehab potential is good.    Activity tolerance: Fair    Discharge recommendations: home with home health( supervision/assistance)     Barriers to discharge: Inaccessible home environment    Equipment recommendations: walker, rolling, wheelchair     GOALS:   Multidisciplinary Problems     Physical Therapy Goals        Problem: Physical Therapy Goal    Goal Priority Disciplines Outcome Goal Variances Interventions   Physical Therapy Goal     PT, PT/OT Ongoing, Progressing     Description: Goals to be met by: 20     Patient will increase functional independence with mobility by performin. Sit to stand transfer with Supervision  2. Bed to chair transfer with Supervision using Rolling Walker  3. Gait  x 150 feet with Contact Guard Assistance using Rolling Walker.   4. Ascend/descend 4 stair with bilateral Handrails Contact Guard Assistance met  5. Stand for 10 minutes with Supervision using Rolling Walker performing a standing activity.  6. Propel w/c 100 feet using B upper and lower extremities with verbal cueing only.               Multidisciplinary Problems (Resolved)        Problem: Physical Therapy Goal    Goal Priority Disciplines Outcome Goal Variances Interventions   Physical Therapy Goal   (Resolved)     PT, PT/OT  Error     Description: Goals to be met by: 20     Patient will increase functional independence with mobility by performin. Supine to sit with Modified [place]  2. Sit to supine with Modified [place]  3. Sit to stand transfer with Supervision using RW or cane.  4. Bed to chair transfer with Supervision using Rolling Walker or straight cane.  5. Gait  x 150 feet with Supervision using Rolling Walker or straight cane.  6. Standing for 5 minutes while performing standing balance activity with SPV using RW or cane.                     PLAN:    Patient to be seen 5 x/week  to address the above listed  problems via gait training, therapeutic activities, therapeutic exercises, neuromuscular re-education  Plan of Care expires: 08/27/20  Plan of Care reviewed with: patient    Nanic Og, PTA  08/10/2020

## 2020-08-10 NOTE — PT/OT/SLP PROGRESS
Occupational Therapy  Treatment    Shant Magana Jr.   MRN: [id number]   Admitting Diagnosis: Embolic stroke involving right middle cerebral artery    OT Date of Treatment: 08/10/20       Billable Minutes:  Therapeutic Activity 25    General Precautions: Standard, fall  Orthopedic Precautions: N/A  Braces: N/A    Spiritual, Cultural Beliefs, Hinduism Practices, Values that Affect Care: no    Subjective:  Communicated with nsg  prior to session.  Pt. Agreeable to tx     Pain/Comfort  Pain Rating 1: 0/10  Pain Rating Post-Intervention 1: 0/10    Objective:   Pt. Seated in chair on arrival with avs camera    Occupational Performance:    Bed Mobility:    · Not tested     Functional Mobility/Transfers:  · Patient completed Sit <> Stand Transfer with minimum assistance  with  rolling walker  x 3 trials  from chair      Activities of Daily Living:  · Not tested     [facility] 6 Click:  [facility] Total Score: 18    OT Exercises: UE Ergometer 10 min intermittent breaks    Additional Treatment:  Pt. With standing act on this day with task. Pt. With Min A for balance aspects with task with on this day Pt. With cues and (A) for placement  And retrieval into and out of slots  Pt. With increase time and cues to perform with standing Pt. With flex knees unable to sustain standing bal during task  .  Pt. With # 2dowel activity with  assistance x10 reps with  shd flex, bicep curls horz adb/add and forward flex motion to increase BUE ROM and strength     Pt. With standing and therex performed to increase ROM, endurance selfcare task and fxl mobility for independence     Patient left up in chair with all lines intact and call button in reach    ASSESSMENT:  Shant Magana Jr. is a 80 y.o. male with a medical diagnosis of Embolic stroke involving right middle cerebral artery Pt. participated well with session on this day.Pt. with increase time and flex BLE during session difficulty with sustaining standing tolerance on this day. Pt demos physical  deficits with balance  functional mobility, UB strength, endurance  level of functional indep with daily tasks and activities and selfcare skills .Pt. Will continue to benefit from continued OT to progress towards goals  .    Rehab identified problem list/impairments: weakness, impaired endurance, impaired self care skills, impaired functional mobilty, gait instability, impaired balance, impaired cognition, decreased upper extremity function, decreased safety awareness, decreased ROM, impaired fine motor    Rehab potential is fair    Activity tolerance: Fair    Discharge recommendations: home with home health     Barriers to discharge: Inaccessible home environment     Equipment recommendations: (TBD)     GOALS:   Multidisciplinary Problems     Occupational Therapy Goals        Problem: Occupational Therapy Goal    Goal Priority Disciplines Outcome Interventions   Occupational Therapy Goal     OT, PT/OT Ongoing, Progressing    Description: Goals to be met by: 8/11/20     Patient will increase functional independence with ADLs by performing:    Feeding with Modified [place].  UE Dressing with Supervision.  LE Dressing with Minimal Assistance.  Grooming while seated with Supervision.  Toileting from bedside commode with Contact Guard Assistance for hygiene and clothing management.   Bathing from  sitting at sink with Contact Guard Assistance.  Supine to sit with Modified [place].  Stand pivot transfers with Contact Guard Assistance using RW.  Upper extremity exercise program x10 reps per shld flexion and seated rows. assistance as needed.  Caregiver will be competent when assisting with self care tasks and functional transfers.                   Plan:  Patient to be seen 5 x/week to address the above listed problems via self-care/home management, therapeutic activities, therapeutic exercises, neuromuscular re-education, cognitive retraining  Plan of Care expires: 08/28/20  Plan of Care reviewed with:  patient    Beena Champagne, VANCE/MADELEINE  08/10/2020

## 2020-08-10 NOTE — PLAN OF CARE
08/10/20 0955   Post-Acute Status   Post-Acute Authorization Home Health   Post-Acute Placement Status Referrals Sent     Home Health Referral sent to [facility] via . Awaiting approval.

## 2020-08-11 VITALS
HEART RATE: 68 BPM | BODY MASS INDEX: 23.82 KG/M2 | HEIGHT: 73 IN | TEMPERATURE: 96 F | SYSTOLIC BLOOD PRESSURE: 117 MMHG | DIASTOLIC BLOOD PRESSURE: 75 MMHG | OXYGEN SATURATION: 95 % | RESPIRATION RATE: 18 BRPM | WEIGHT: 179.69 LBS

## 2020-08-11 LAB
ANION GAP SERPL CALC-SCNC: 6 MMOL/L (ref 8–16)
BASOPHILS # BLD AUTO: 0.03 K/UL (ref 0–0.2)
BASOPHILS NFR BLD: 0.6 % (ref 0–1.9)
BUN SERPL-MCNC: 32 MG/DL (ref 8–23)
CALCIUM SERPL-MCNC: 9.5 MG/DL (ref 8.7–10.5)
CHLORIDE SERPL-SCNC: 110 MMOL/L (ref 95–110)
CO2 SERPL-SCNC: 21 MMOL/L (ref 23–29)
CREAT SERPL-MCNC: 1.4 MG/DL (ref 0.5–1.4)
DIFFERENTIAL METHOD: ABNORMAL
EOSINOPHIL # BLD AUTO: 0.2 K/UL (ref 0–0.5)
EOSINOPHIL NFR BLD: 3.9 % (ref 0–8)
ERYTHROCYTE [DISTWIDTH] IN BLOOD BY AUTOMATED COUNT: 14.7 % (ref 11.5–14.5)
EST. GFR  (AFRICAN AMERICAN): 54.5 ML/MIN/1.73 M^2
EST. GFR  (NON AFRICAN AMERICAN): 47.1 ML/MIN/1.73 M^2
GLUCOSE SERPL-MCNC: 92 MG/DL (ref 70–110)
HCT VFR BLD AUTO: 40 % (ref 40–54)
HGB BLD-MCNC: 12.7 G/DL (ref 14–18)
IMM GRANULOCYTES # BLD AUTO: 0.02 K/UL (ref 0–0.04)
IMM GRANULOCYTES NFR BLD AUTO: 0.4 % (ref 0–0.5)
LYMPHOCYTES # BLD AUTO: 1.1 K/UL (ref 1–4.8)
LYMPHOCYTES NFR BLD: 23.7 % (ref 18–48)
MAGNESIUM SERPL-MCNC: 1.7 MG/DL (ref 1.6–2.6)
MCH RBC QN AUTO: 29 PG (ref 27–31)
MCHC RBC AUTO-ENTMCNC: 31.8 G/DL (ref 32–36)
MCV RBC AUTO: 91 FL (ref 82–98)
MONOCYTES # BLD AUTO: 0.5 K/UL (ref 0.3–1)
MONOCYTES NFR BLD: 9.3 % (ref 4–15)
NEUTROPHILS # BLD AUTO: 3 K/UL (ref 1.8–7.7)
NEUTROPHILS NFR BLD: 62.1 % (ref 38–73)
NRBC BLD-RTO: 0 /100 WBC
PHOSPHATE SERPL-MCNC: 2.8 MG/DL (ref 2.7–4.5)
PLATELET # BLD AUTO: 274 K/UL (ref 150–350)
PMV BLD AUTO: 10.8 FL (ref 9.2–12.9)
POTASSIUM SERPL-SCNC: 3.9 MMOL/L (ref 3.5–5.1)
RBC # BLD AUTO: 4.38 M/UL (ref 4.6–6.2)
SODIUM SERPL-SCNC: 137 MMOL/L (ref 136–145)
WBC # BLD AUTO: 4.82 K/UL (ref 3.9–12.7)

## 2020-08-11 PROCEDURE — 36415 COLL VENOUS BLD VENIPUNCTURE: CPT

## 2020-08-11 PROCEDURE — 25000003 PHARM REV CODE 250: Performed by: NURSE PRACTITIONER

## 2020-08-11 PROCEDURE — 83735 ASSAY OF MAGNESIUM: CPT

## 2020-08-11 PROCEDURE — 97116 GAIT TRAINING THERAPY: CPT | Mod: CQ

## 2020-08-11 PROCEDURE — 84100 ASSAY OF PHOSPHORUS: CPT

## 2020-08-11 PROCEDURE — 25000003 PHARM REV CODE 250: Performed by: STUDENT IN AN ORGANIZED HEALTH CARE EDUCATION/TRAINING PROGRAM

## 2020-08-11 PROCEDURE — 97110 THERAPEUTIC EXERCISES: CPT | Mod: CQ

## 2020-08-11 PROCEDURE — 97530 THERAPEUTIC ACTIVITIES: CPT | Mod: CQ

## 2020-08-11 PROCEDURE — 80048 BASIC METABOLIC PNL TOTAL CA: CPT

## 2020-08-11 PROCEDURE — 85025 COMPLETE CBC W/AUTO DIFF WBC: CPT

## 2020-08-11 RX ADMIN — TAMSULOSIN HYDROCHLORIDE 0.4 MG: 0.4 CAPSULE ORAL at 09:08

## 2020-08-11 RX ADMIN — GUAIFENESIN AND DEXTROMETHORPHAN HYDROBROMIDE 1 TABLET: 600; 30 TABLET, EXTENDED RELEASE ORAL at 09:08

## 2020-08-11 RX ADMIN — ATORVASTATIN CALCIUM 80 MG: 20 TABLET, FILM COATED ORAL at 09:08

## 2020-08-11 RX ADMIN — VITAMIN D, TAB 1000IU (100/BT) 1000 UNITS: 25 TAB at 09:08

## 2020-08-11 RX ADMIN — THERA TABS 1 TABLET: TAB at 09:08

## 2020-08-11 RX ADMIN — APIXABAN 2.5 MG: 2.5 TABLET, FILM COATED ORAL at 09:08

## 2020-08-11 RX ADMIN — PANTOPRAZOLE SODIUM 40 MG: 40 TABLET, DELAYED RELEASE ORAL at 09:08

## 2020-08-11 RX ADMIN — LOSARTAN POTASSIUM 100 MG: 50 TABLET, FILM COATED ORAL at 09:08

## 2020-08-11 RX ADMIN — AMLODIPINE BESYLATE 10 MG: 10 TABLET ORAL at 09:08

## 2020-08-11 NOTE — PLAN OF CARE
08/11/20 1002   Final Note   Assessment Type Final Discharge Note   Anticipated Discharge Disposition Home-Health     Patient is discharging today. Walker delivered to the bedside. [facility] will follow up. Candace (daughter) will arrive between 11-12 noon for .

## 2020-08-11 NOTE — DISCHARGE SUMMARY
"[facility] Medicine  Discharge Summary      Patient Name: Shant Magana Jr.  MRN: [id number]  Admission Date: 7/27/2020  Hospital Length of Stay: 15 days  Discharge Date and Time:  08/11/2020 9:24 AM  Attending Physician: Frederick Pena MD   Discharging Provider: Boy Fuentes NP  Primary Care Provider: Trent Aldana MD      HPI:   Mr. Chino Gomez is a 80 year old male with PMHx of dementia, multiple strokes, intracranial and extracranial atherosclerosis, DM, HTN, a fib (eliquis with history of noncompliance due to cost) who presents to SNF following hospitalization for embolic stroke involving right MCA.  Admission to SNF for secondary weakness and debility. Patient originally presented to [facility] ED on 07/18  AMS x 2 days. Per EMS, family reported that patient "has not been himself." ED provider noted LSW and L hemianopsia on exam and stroke team was consulted. Stroke provider contacted patient's son (Hoda) for more information and history. His son noticed a change in behavior yesterday around 2pm. He stated the patient seemed more confused and was walking around aimlessly. On 7/18  the patient called his son and his son went to check on him. The patient was at home with his grandson and he had fallen between the bed and the dresser. His son noticed that he was inattentive with a facial droop and delayed verbal response. Patient was admitted with acute encephalopathy x 2 days. MRI brain showed R parietal infarct. MRA negative for LVO or significant stenosis. Etiology likely cardioembolic. Home apixaban 2.5mg resumed without complications. Patient had hypoxia during admission requiring 2L NC. CXR appeared congested. Initially diuresed with no improvement. Significant expiratory wheezing on exam. Improved with scheduled duonebs. Given smoking history and clinical picture, ambulatory referral was placed to Pulmonology for evaluation of obstructive lung disease and PFTs. PT/OT " consulted, recommended SNF.     PEx  Constitutional: Patient appears debilitated.  No distress noted  HENT:   Head: Normocephalic and atraumatic.   Eyes: Pupils are equal, round  Neck: Normal range of motion. Neck supple.   Cardiovascular: Normal rate, regular rhythm and normal heart sounds.    Pulmonary/Chest: Effort normal and breath sounds are clear  Abdominal: Soft. Bowel sounds are normal.   Musculoskeletal: Normal range of motion.   Neurological: Alert and oriented to person, place, disoriented to time.  Higher level thinking impaired.  Patient has partial hemianopsia to his right eye, no facial asymmetry. no sensory abnormality.  No aphasia or dysarthria noted.  Psychiatric: Normal mood and affect. Behavior is normal.   Skin: Skin is warm and dry.      * No surgery found *      Hospital Course:   Patient progressed well with PT and OT. Patient had no significant events during their stay at SNF. Home health was set up. DME was ordered if needed. Follow up appointment to be made by patient within one week. All prescriptions and discharge instructions were ordered to be given to the patient prior to discharge.        Consults:   Consults (From admission, onward)        Status Ordering Provider     Inpatient consult to Registered Dietitian/Nutritionist  Once     Provider:  (Not yet assigned)    Completed RYAN ANGULO          No new Assessment & Plan notes have been filed under this hospital service since the last note was generated.  Service: Hospital Medicine    Final Active Diagnoses:    Diagnosis Date Noted POA    PRINCIPAL PROBLEM:  Embolic stroke involving right middle cerebral artery [I63.411] 07/18/2020 Yes    Stroke [I63.9] 07/27/2020 Yes    Paroxysmal atrial fibrillation [I48.0] 02/12/2019 Yes    Status post placement of implantable loop recorder [Z95.818] 02/12/2019 Yes    Weakness [R53.1]  Yes    Chronic diastolic congestive heart failure [I50.32] 10/19/2017 Yes    Internal carotid artery  "stenosis, left [I65.22] 10/18/2017 Yes    Mixed hyperlipidemia [E78.2] 10/18/2017 Yes    CKD (chronic kidney disease) stage 3, GFR 30-59 ml/min [N18.3] 10/18/2017 Yes    Cytotoxic cerebral edema [G93.6] 10/18/2017 Yes    Acute encephalopathy [G93.40] 10/11/2017 Yes    Type 2 diabetes mellitus with complication, without long-term current use of insulin [E11.8]  Yes    Essential hypertension [I10] 06/07/2017 Yes      Problems Resolved During this Admission:       Discharged Condition: good    Disposition: Home or Self Care    Follow Up:  Follow-up Information     Schedule an appointment as soon as possible for a visit with Trent Aldana MD.    Specialty: Family Medicine  Why: WITHIN 1 WEEK  Contact information:  [address]  [address]  [facility] [id number] [phone]             Schedule an appointment as soon as possible for a visit with [facility] NEUROLOG[facility].    Specialty: Neurology  Contact information:  [facility] [id number] [phone]               Patient Instructions:      WALKER FOR HOME USE     Order Specific Question Answer Comments   Type of Walker: Adult (5'4"-6'6")    With wheels? Yes    Height: 6' 1" (1.854 m)    Weight: 82 kg (180 lb 12.4 oz)    Length of need (1-99 months): 99    Does patient have medical equipment at home? bath bench    Does patient have medical equipment at home? bedside commode    Does patient have medical equipment at home? cane, straight    Does patient have medical equipment at home? walker, rolling    Please check all that apply: Patient's condition impairs ambulation.    Please check all that apply: Walker will be used for gait training.    Please check all that apply: Patient is unable to safely ambulate without equipment.      WHEELCHAIR FOR HOME USE     Order Specific Question Answer Comments   Hours in W/C per day: 8    Type of Wheelchair: Lightweight    Patient unable to propel in Standard wheelchair? Yes    Size(Width): " "18"(STD adult)    Leg Support: STD footrests    Leg Support: Swing Away    Arm Height: Full length    Arm Height: Swing away    Lap Belt: Velcro    Accessories: Anti-tippers    Accessories: Safety belt    Cushion: Basic    Justification for cushion: Prevent pressure ulcers    Height: 6' 1" (1.854 m)    Weight: 82 kg (180 lb 12.4 oz)    Does patient have medical equipment at home? bath bench    Does patient have medical equipment at home? bedside commode    Does patient have medical equipment at home? cane, straight    Does patient have medical equipment at home? walker, rolling    Length of need (1-99 months): 99    Please check all that apply: Caregiver is capable and willing to operate wheelchair safely.    Please check all that apply: Patient mobility limitations cannot be sufficiently resolved by the use of other ambulatory therapies.    Please check all that apply: The patient requires the use of a w/c for activities of daily living within the Home.      No driving until:   Order Comments: Cleared by PCP     Notify your health care provider if you experience any of the following:  temperature >100.4     Notify your health care provider if you experience any of the following:  persistent nausea and vomiting or diarrhea     Notify your health care provider if you experience any of the following:  severe uncontrolled pain     Notify your health care provider if you experience any of the following:  redness, tenderness, or signs of infection (pain, swelling, redness, odor or green/yellow discharge around incision site)     Notify your health care provider if you experience any of the following:  difficulty breathing or increased cough     Notify your health care provider if you experience any of the following:  persistent dizziness, light-headedness, or visual disturbances     Notify your health care provider if you experience any of the following:  increased confusion or weakness     No driving until:   Order " Comments: Until cleared by Primary Care MD     Activity as tolerated       Significant Diagnostic Studies: Labs: All labs within the past 24 hours have been reviewed    Pending Diagnostic Studies:     None         Medications:  Reconciled Home Medications:      Medication List      START taking these medications    acetaminophen 325 MG tablet  Commonly known as: TYLENOL  Take 2 tablets (650 mg total) by mouth every 6 (six) hours as needed for Pain.     melatonin 3 mg tablet  Commonly known as: MELATIN  Take 2 tablets (6 mg total) by mouth nightly as needed for Insomnia.     multivitamin Tab  Take 1 tablet by mouth once daily.        CONTINUE taking these medications    amLODIPine 10 MG tablet  Commonly known as: NORVASC  Take 1 tablet (10 mg total) by mouth once daily.     apixaban 2.5 mg Tab  Commonly known as: ELIQUIS  Take 1 tablet (2.5 mg total) by mouth 2 (two) times daily.     atorvastatin 80 MG tablet  Commonly known as: LIPITOR  Take 1 tablet (80 mg total) by mouth once daily.     losartan 100 MG tablet  Commonly known as: COZAAR  Take 100 mg by mouth once daily.     pantoprazole 40 MG tablet  Commonly known as: PROTONIX  Take 40 mg by mouth once daily.     tamsulosin 0.4 mg Cap  Commonly known as: FLOMAX  Take 0.4 mg by mouth once daily.     vitamin D 1000 units Tab  Commonly known as: VITAMIN D3  Take 1,000 Units by mouth once daily.        STOP taking these medications    albuterol-ipratropium 2.5 mg-0.5 mg/3 mL nebulizer solution  Commonly known as: DUO-NEB            Indwelling Lines/Drains at time of discharge:   Lines/Drains/Airways     None                 Time spent on the discharge of patient: 36 minutes  Patient was seen and examined on the date of discharge and determined to be suitable for discharge.         Boy Fuentes NP  Department of Hospital Medicine  [facility]

## 2020-08-11 NOTE — PT/OT/SLP PROGRESS
"Physical Therapy  Treatment    Shant Magana Jr.   MRN: [id number]   Admitting Diagnosis: Embolic stroke involving right middle cerebral artery    PT Received On: 08/11/20        Billable Minutes:  Gait Training 13, Therapeutic Activity 15 and Therapeutic Exercise 10    Treatment Type: Treatment  PT/PTA: PTA     PTA Visit Number: 2       General Precautions: Standard, aspiration, fall  Orthopedic Precautions:     Braces:      Spiritual, Cultural Beliefs, Uatsdin Practices, Values that Affect Care: no    Subjective:  "alright"    Pain/Comfort  Pain Rating 1: 0/10  Pain Rating Post-Intervention 1: 0/10    Objective:   Patient found with: (in bed, soiled with urine, total A to clean/change)     AM-PAC 6 CLICK MOBILITY  Total Score:18    Bed Mobility:rolling with rail SBA/S vcs  Supine>Sit: min A with trunk elev    Transfers:simple vcs  Sit<>Stand: min A from EOB vcs for tech, SBA x 3 trials from BSC  Stand Pivot Transfer: with RW CG    Gait:  Amb with RW CGA ~ 175 ft no LOB, slow robert, easily distracted however less vcs for staying clear of obstacles and turning when he gets to the door    Therex:  2x10 reps LAQ,hip flex,abd/add vc/tcs for tech and ROM    Additional Treatment:  Total A for cleaning changing diaper,doroteo pants    Patient left up in chair with call button in reach and belongings in reach, with camera and chair alarm.    Assessment:  Shant Magana Jr. is a 80 y.o. male with a medical diagnosis of Embolic stroke involving right middle cerebral artery.  Pt tolerated well, pt would continue to benefit from skilled PT services to improve overall functional mobility, strength and endurance.  .    Rehab identified problem list/impairments: impaired self care skills, impaired functional mobilty, gait instability, impaired balance, impaired cognition, decreased safety awareness, impaired coordination, impaired fine motor    Rehab potential is good.    Activity tolerance: Fair    Discharge recommendations: home with " home health( supervision/assistance)     Barriers to discharge: Inaccessible home environment    Equipment recommendations: walker, rolling, wheelchair     GOALS:   Multidisciplinary Problems     Physical Therapy Goals        Problem: Physical Therapy Goal    Goal Priority Disciplines Outcome Goal Variances Interventions   Physical Therapy Goal     PT, PT/OT Ongoing, Progressing     Description: Goals to be met by: 20     Patient will increase functional independence with mobility by performin. Sit to stand transfer with Supervision  2. Bed to chair transfer with Supervision using Rolling Walker  3. Gait  x 150 feet with Contact Guard Assistance using Rolling Walker.   4. Ascend/descend 4 stair with bilateral Handrails Contact Guard Assistance met  5. Stand for 10 minutes with Supervision using Rolling Walker performing a standing activity.  6. Propel w/c 100 feet using B upper and lower extremities with verbal cueing only.               Multidisciplinary Problems (Resolved)        Problem: Physical Therapy Goal    Goal Priority Disciplines Outcome Goal Variances Interventions   Physical Therapy Goal   (Resolved)     PT, PT/OT  Error     Description: Goals to be met by: 20     Patient will increase functional independence with mobility by performin. Supine to sit with Modified [place]  2. Sit to supine with Modified [place]  3. Sit to stand transfer with Supervision using RW or cane.  4. Bed to chair transfer with Supervision using Rolling Walker or straight cane.  5. Gait  x 150 feet with Supervision using Rolling Walker or straight cane.  6. Standing for 5 minutes while performing standing balance activity with SPV using RW or cane.                     PLAN:    Patient to be seen 5 x/week  to address the above listed problems via gait training, therapeutic activities, therapeutic exercises, neuromuscular re-education  Plan of Care expires: 20  Plan of Care  reviewed with: patient    Nanci Og, PTA  08/11/2020

## 2020-08-11 NOTE — NURSING
Discharge instruction reviewed with pt daughter and son. Family members verbalized understanding of Rx, Follow up appt and patient portal. Pt escorted to main entrance with personal belonging and walker. Pt discharged.

## 2020-08-17 ENCOUNTER — CLINICAL SUPPORT (OUTPATIENT)
Dept: CARDIOLOGY | Facility: CLINIC | Age: 80
End: 2020-08-17
Payer: MEDICARE

## 2020-08-17 DIAGNOSIS — Z95.818 STATUS POST PLACEMENT OF IMPLANTABLE LOOP RECORDER: Primary | ICD-10-CM

## 2020-08-17 PROCEDURE — 93298 REM INTERROG DEV EVAL SCRMS: CPT | Mod: S$GLB,,, | Performed by: INTERNAL MEDICINE

## 2020-08-17 PROCEDURE — 93298 PR INTERROG EVAL, REMOTE, UP TO 30 DAYS,IMPLANT LOOP REC: ICD-10-PCS | Mod: S$GLB,,, | Performed by: INTERNAL MEDICINE

## 2020-08-17 NOTE — PROGRESS NOTES
Subjective:      Patient ID: Shant Magana Jr. is a 80 y.o. male.    Chief Complaint: No chief complaint on file.    HPI:    ROS   B[employer]ron[employer] implanted loop recorder remote interrogation report downloaded and prepared by medical assistant and interpreted by me and full report scanned into SHERPA assistant.  Stable NSR.  No events.  Normal device function.  Battery OK.    Past Medical History:   Diagnosis Date    Acute kidney injury superimposed on chronic kidney disease 10/14/2017    Cancer     prostate    Diabetes mellitus     Former smoker 7/18/2020    History of stroke 8/15/2017    Hypertension     Hypertrophic cardiomyopathy     Renal disorder     Stroke         Past Surgical History:   Procedure Laterality Date    BACK SURGERY      COLONOSCOPY N/A 10/18/2018    Procedure: COLONOSCOPY;  Surgeon: Alan Gooden MD;  Location: [facility];  Service: Endoscopy;  Laterality: N/A;    ESOPHAGOGASTRODUODENOSCOPY N/A 9/4/2018    Procedure: EGD (ESOPHAGOGASTRODUODENOSCOPY);  Surgeon: Oli Cuadra MD;  Location: [facility];  Service: Endoscopy;  Laterality: N/A;    ESOPHAGOGASTRODUODENOSCOPY N/A 10/18/2018    Procedure: EGD (ESOPHAGOGASTRODUODENOSCOPY);  Surgeon: Alan Gooden MD;  Location: [facility];  Service: Endoscopy;  Laterality: N/A;    THYROIDECTOMY         No family history on file.    Social History     Socioeconomic History    Marital status: Single     Spouse name: Not on file    Number of children: Not on file    Years of education: Not on file    Highest education level: Not on file   Occupational History    Not on file   Social Needs    Financial resource strain: Not on file    Food insecurity     Worry: Not on file     Inability: Not on file    Transportation needs     Medical: Not on file     Non-medical: Not on file   Tobacco Use    Smoking status: Former Smoker     Packs/day: 0.90     Years: 3.00     Pack years: 2.70     Types: Cigarettes    Smokeless tobacco: Never Used    Substance and Sexual Activity    Alcohol use: Never     Frequency: Never    Drug use: No    Sexual activity: Not on file   Lifestyle    Physical activity     Days per week: Not on file     Minutes per session: Not on file    Stress: Not on file   Relationships    Social connections     Talks on phone: Not on file     Gets together: Not on file     Attends Tenriism service: Not on file     Active member of club or organization: Not on file     Attends meetings of clubs or organizations: Not on file     Relationship status: Not on file   Other Topics Concern    Not on file   Social History Narrative    Not on file       Current Outpatient Medications on File Prior to Visit   Medication Sig Dispense Refill    acetaminophen (TYLENOL) 325 MG tablet Take 2 tablets (650 mg total) by mouth every 6 (six) hours as needed for Pain.  0    amLODIPine (NORVASC) 10 MG tablet Take 1 tablet (10 mg total) by mouth once daily. 30 tablet 3    apixaban (ELIQUIS) 2.5 mg Tab Take 1 tablet (2.5 mg total) by mouth 2 (two) times daily. 60 tablet 11    atorvastatin (LIPITOR) 80 MG tablet Take 1 tablet (80 mg total) by mouth once daily. 90 tablet 3    losartan (COZAAR) 100 MG tablet Take 100 mg by mouth once daily.       melatonin (MELATIN) 3 mg tablet Take 2 tablets (6 mg total) by mouth nightly as needed for Insomnia.  0    multivitamin Tab Take 1 tablet by mouth once daily.      pantoprazole (PROTONIX) 40 MG tablet Take 40 mg by mouth once daily.  3    tamsulosin (FLOMAX) 0.4 mg Cp24 Take 0.4 mg by mouth once daily.      vitamin D 1000 units Tab Take 1,000 Units by mouth once daily.       No current facility-administered medications on file prior to visit.        Review of patient's allergies indicates:  No Known Allergies  Objective:   There were no vitals filed for this visit.     Physical Exam     Assessment:     1. Status post placement of implantable loop recorder      Plan:   Diagnoses and all orders for this  visit:    Status post placement of implantable loop recorder         No follow-ups on file.

## 2020-08-20 PROBLEM — I95.1 ORTHOSTATIC HYPOTENSION: Status: RESOLVED | Noted: 2019-10-01 | Resolved: 2020-08-20

## 2020-08-20 PROBLEM — G93.40 ACUTE ENCEPHALOPATHY: Status: RESOLVED | Noted: 2017-10-11 | Resolved: 2020-08-20

## 2020-08-20 PROBLEM — G93.6 CYTOTOXIC CEREBRAL EDEMA: Status: RESOLVED | Noted: 2017-10-18 | Resolved: 2020-08-20

## 2020-08-20 PROBLEM — E78.00 HIGH CHOLESTEROL: Status: ACTIVE | Noted: 2017-09-16

## 2020-08-20 PROBLEM — I63.9 CVA (CEREBRAL VASCULAR ACCIDENT): Status: ACTIVE | Noted: 2017-09-16

## 2020-08-20 PROBLEM — I65.22 LEFT CAROTID ARTERY OCCLUSION: Status: ACTIVE | Noted: 2017-09-16

## 2020-08-20 PROBLEM — I69.351 HEMIPLEGIA AND HEMIPARESIS FOLLOWING CEREBRAL INFARCTION AFFECTING RIGHT DOMINANT SIDE: Status: ACTIVE | Noted: 2018-09-19

## 2020-08-20 PROBLEM — D30.00 BENIGN NEOPLASM OF KIDNEY: Status: ACTIVE | Noted: 2020-08-20

## 2020-08-20 PROBLEM — J31.0 CHRONIC RHINITIS: Status: ACTIVE | Noted: 2018-09-12

## 2020-08-20 PROBLEM — K92.1 MELENA: Status: RESOLVED | Noted: 2018-10-18 | Resolved: 2020-08-20

## 2020-08-20 PROBLEM — K92.2 ACUTE UPPER GI BLEED: Status: RESOLVED | Noted: 2018-09-03 | Resolved: 2020-08-20

## 2020-08-20 PROBLEM — C61 PROSTATE CANCER: Status: ACTIVE | Noted: 2017-09-15

## 2020-08-20 PROBLEM — K27.9 PEPTIC ULCER DISEASE: Status: ACTIVE | Noted: 2018-09-12

## 2020-08-20 PROBLEM — R00.1 BRADYCARDIA: Status: RESOLVED | Noted: 2017-06-07 | Resolved: 2020-08-20

## 2020-08-20 PROBLEM — E87.20 LACTIC ACIDOSIS: Status: RESOLVED | Noted: 2019-12-10 | Resolved: 2020-08-20

## 2020-08-20 PROBLEM — I12.9 HYPERTENSIVE CHRONIC KIDNEY DISEASE: Status: ACTIVE | Noted: 2017-06-22

## 2020-08-20 PROBLEM — D50.0 ANEMIA DUE TO BLOOD LOSS: Status: ACTIVE | Noted: 2018-09-12

## 2020-08-20 PROBLEM — N52.9 ERECTILE DYSFUNCTION: Status: ACTIVE | Noted: 2018-03-16

## 2020-08-20 PROBLEM — N25.81 HYPERPARATHYROIDISM DUE TO RENAL INSUFFICIENCY: Status: ACTIVE | Noted: 2020-08-20

## 2020-08-21 PROBLEM — Z79.01 ANTICOAGULANT LONG-TERM USE: Status: ACTIVE | Noted: 2020-08-21

## 2020-08-24 DIAGNOSIS — R06.09 DOE (DYSPNEA ON EXERTION): ICD-10-CM

## 2020-08-24 DIAGNOSIS — I65.22 INTERNAL CAROTID ARTERY STENOSIS, LEFT: Primary | ICD-10-CM

## 2020-08-28 ENCOUNTER — HOSPITAL ENCOUNTER (OUTPATIENT)
Dept: RADIOLOGY | Facility: HOSPITAL | Age: 80
Discharge: HOME | End: 2020-08-28
Attending: INTERNAL MEDICINE
Payer: MEDICARE

## 2020-08-28 ENCOUNTER — OFFICE VISIT (OUTPATIENT)
Dept: PULMONOLOGY | Facility: CLINIC | Age: 80
End: 2020-08-28
Payer: MEDICARE

## 2020-08-28 VITALS
OXYGEN SATURATION: 94 % | HEIGHT: 69 IN | WEIGHT: 180.31 LBS | BODY MASS INDEX: 26.71 KG/M2 | SYSTOLIC BLOOD PRESSURE: 118 MMHG | HEART RATE: 62 BPM | DIASTOLIC BLOOD PRESSURE: 76 MMHG

## 2020-08-28 DIAGNOSIS — J90 PLEURAL EFFUSION ON RIGHT: Primary | ICD-10-CM

## 2020-08-28 DIAGNOSIS — I65.22 INTERNAL CAROTID ARTERY STENOSIS, LEFT: ICD-10-CM

## 2020-08-28 DIAGNOSIS — R06.09 DYSPNEA ON EXERTION: ICD-10-CM

## 2020-08-28 PROCEDURE — 71046 XR CHEST PA AND LATERAL: ICD-10-PCS | Mod: 26,,, | Performed by: RADIOLOGY

## 2020-08-28 PROCEDURE — 99204 OFFICE O/P NEW MOD 45 MIN: CPT | Mod: GC,S$GLB,, | Performed by: INTERNAL MEDICINE

## 2020-08-28 PROCEDURE — 71046 X-RAY EXAM CHEST 2 VIEWS: CPT | Mod: 26,,, | Performed by: RADIOLOGY

## 2020-08-28 PROCEDURE — 1159F MED LIST DOCD IN RCRD: CPT | Mod: GC,S$GLB,, | Performed by: INTERNAL MEDICINE

## 2020-08-28 PROCEDURE — 71046 X-RAY EXAM CHEST 2 VIEWS: CPT | Mod: TC,FY

## 2020-08-28 PROCEDURE — 1101F PT FALLS ASSESS-DOCD LE1/YR: CPT | Mod: CPTII,GC,S$GLB, | Performed by: INTERNAL MEDICINE

## 2020-08-28 PROCEDURE — 99999 PR PBB SHADOW E&M-EST. PATIENT-LVL IV: CPT | Mod: PBBFAC,GC,, | Performed by: INTERNAL MEDICINE

## 2020-08-28 PROCEDURE — 3074F PR MOST RECENT SYSTOLIC BLOOD PRESSURE < 130 MM HG: ICD-10-PCS | Mod: CPTII,GC,S$GLB, | Performed by: INTERNAL MEDICINE

## 2020-08-28 PROCEDURE — 1126F PR PAIN SEVERITY QUANTIFIED, NO PAIN PRESENT: ICD-10-PCS | Mod: GC,S$GLB,, | Performed by: INTERNAL MEDICINE

## 2020-08-28 PROCEDURE — 99204 PR OFFICE/OUTPT VISIT, NEW, LEVL IV, 45-59 MIN: ICD-10-PCS | Mod: GC,S$GLB,, | Performed by: INTERNAL MEDICINE

## 2020-08-28 PROCEDURE — 3078F DIAST BP <80 MM HG: CPT | Mod: CPTII,GC,S$GLB, | Performed by: INTERNAL MEDICINE

## 2020-08-28 PROCEDURE — 3074F SYST BP LT 130 MM HG: CPT | Mod: CPTII,GC,S$GLB, | Performed by: INTERNAL MEDICINE

## 2020-08-28 PROCEDURE — 1101F PR PT FALLS ASSESS DOC 0-1 FALLS W/OUT INJ PAST YR: ICD-10-PCS | Mod: CPTII,GC,S$GLB, | Performed by: INTERNAL MEDICINE

## 2020-08-28 PROCEDURE — 3078F PR MOST RECENT DIASTOLIC BLOOD PRESSURE < 80 MM HG: ICD-10-PCS | Mod: CPTII,GC,S$GLB, | Performed by: INTERNAL MEDICINE

## 2020-08-28 PROCEDURE — 99999 PR PBB SHADOW E&M-EST. PATIENT-LVL IV: ICD-10-PCS | Mod: PBBFAC,GC,, | Performed by: INTERNAL MEDICINE

## 2020-08-28 PROCEDURE — 1159F PR MEDICATION LIST DOCUMENTED IN MEDICAL RECORD: ICD-10-PCS | Mod: GC,S$GLB,, | Performed by: INTERNAL MEDICINE

## 2020-08-28 PROCEDURE — 1126F AMNT PAIN NOTED NONE PRSNT: CPT | Mod: GC,S$GLB,, | Performed by: INTERNAL MEDICINE

## 2020-08-28 RX ORDER — FUROSEMIDE 40 MG/1
40 TABLET ORAL DAILY
Qty: 30 TABLET | Refills: 0 | Status: SHIPPED | OUTPATIENT
Start: 2020-08-28 | End: 2020-09-10

## 2020-08-28 NOTE — PROGRESS NOTES
Subjective:       Patient ID: Shant Magana Jr. is a 80 y.o. male.    Chief Complaint: No chief complaint on file.    Pt is an 81 yo AAM pmh dementia, DM, HTN, afib on eliquis, tobacco abuse, CVA as recent as 8/11 who developed hypoxic respiratory failure thought to be attributed to pulmonary edema, but did not resolve with diuresis. Wheezing described and dypnea improved with duonebs. Pt presenting today for further evaluation of dyspnea and concern for obstructive lung disease. On review of imaging, patient developed right sided pleural effusion and received IV lasix 20 mg x2 followed by 40 mg without documentation of net negative I/O, but improvement on CXR 7/23 prior to discharge. Most recent echo 7/19 shows EF 68% with documented normal diastolic function. Pt son states that patient was discharged from SNF without O2 and is not sure when his father was taken off O2. Due to Covid he was unable to visit during stay at SNF. Pt son states that father had not previously had issue with his breathing and denies wheezing prior to hospital stay. Pt was able to get around that house, but required help with most of his ADLs. Pt now has increased JOHNSON around the house and begins wheezing on exertion.       Review of Systems   Constitutional: Positive for weight loss and weakness. Negative for fever, chills and night sweats.   HENT: Negative for nosebleeds, postnasal drip, trouble swallowing and congestion.    Respiratory: Positive for shortness of breath, wheezing and dyspnea on extertion. Negative for snoring, cough, hemoptysis, sputum production, choking, chest tightness, orthopnea, use of rescue inhaler and Paroxysmal Nocturnal Dyspnea.    Cardiovascular: Negative for chest pain, palpitations and leg swelling.   Musculoskeletal: Positive for gait problem. Negative for arthralgias, joint swelling and myalgias.   Gastrointestinal: Negative for nausea, vomiting, abdominal pain, abdominal distention and acid reflux.    Neurological: Positive for weakness. Negative for dizziness, syncope and light-headedness.   Hematological: Negative for adenopathy. Bleeds easily and excessive bruising.   Psychiatric/Behavioral: Positive for confusion. Negative for sleep disturbance. The patient is not nervous/anxious.        Objective:      Physical Exam   Constitutional: He is oriented to person, place, and time. He appears well-developed and well-nourished. He appears not cachectic. No distress.   HENT:   Head: Normocephalic.   Nose: Nose normal.   Neck: Normal range of motion. Neck supple. No tracheal deviation present.   Cardiovascular: Normal rate, regular rhythm and normal heart sounds. Exam reveals no gallop and no friction rub.   No murmur heard.  Pulmonary/Chest: Normal expansion, symmetric chest wall expansion and effort normal. No stridor. He exhibits no tenderness.   Decreased breath sounds right posterior lung fields to scapula, dullness to percussion in these fields.    Abdominal: Soft. Bowel sounds are normal. He exhibits no distension and no mass. There is no abdominal tenderness.   Musculoskeletal:      Comments: +1 pitting edema bilaterally   Lymphadenopathy: No supraclavicular adenopathy is present.     He has no cervical adenopathy.     He has no axillary adenopathy.   Neurological: He is alert and oriented to person, place, and time.   Skin: Skin is warm and dry. No rash noted. No erythema. Nails show no clubbing.   Psychiatric: His behavior is normal.   Short attention span, answers simple yes/no questions that son confirms or denies. Able to follow some commands, but unable to to follow some commands such as bend knees.      Personal Diagnostic Review  Chest x-ray: 8/28 shows mod/large right sided pleural effusion without significant tracheal deviation indicating possible compensatory atelectasis        Assessment:       1. Pleural effusion on right    2. Dyspnea on exertion        Outpatient Encounter Medications as of  8/28/2020   Medication Sig Dispense Refill    acetaminophen (TYLENOL) 325 MG tablet Take 2 tablets (650 mg total) by mouth every 6 (six) hours as needed for Pain.  0    apixaban (ELIQUIS) 2.5 mg Tab Take 1 tablet (2.5 mg total) by mouth 2 (two) times daily. 60 tablet 11    atorvastatin (LIPITOR) 80 MG tablet Take 1 tablet (80 mg total) by mouth once daily. 90 tablet 3    losartan (COZAAR) 100 MG tablet Take 100 mg by mouth once daily.       melatonin (MELATIN) 3 mg tablet Take 2 tablets (6 mg total) by mouth nightly as needed for Insomnia.  0    multivitamin Tab Take 1 tablet by mouth once daily.      pantoprazole (PROTONIX) 40 MG tablet Take 40 mg by mouth once daily.  3    tamsulosin (FLOMAX) 0.4 mg Cp24 Take 0.4 mg by mouth once daily.      vitamin D 1000 units Tab Take 1,000 Units by mouth once daily.       No facility-administered encounter medications on file as of 8/28/2020.      Orders Placed This Encounter   Procedures    LACTATE DEHYDROGENASE     Standing Status:   Future     Standing Expiration Date:   10/27/2021    Comprehensive metabolic panel     Standing Status:   Future     Standing Expiration Date:   10/27/2021    Spirometry with/without bronchodilator     Standing Status:   Future     Standing Expiration Date:   8/28/2021    DLCO-Carbon Monoxide Diffusing Capacity     Standing Status:   Future     Standing Expiration Date:   8/28/2021    LUNG VOLUMES     Standing Status:   Future     Standing Expiration Date:   8/28/2021       Plan:     1. Pleural effusion on right    2. Dyspnea on exertion      - Stop eliquis today and will resume after thoracentesis next Friday  - start lasix 40 mg qday, CMP next week to check kidney function and electrolytes, LDH  - obtain thoracentesis labs and send fluid for cytology rule out malignancy in unilateral pleural effusion  - obtain spirometry, lung volumes, DLCO post thoracentesis.

## 2020-09-01 ENCOUNTER — TELEPHONE (OUTPATIENT)
Dept: PULMONOLOGY | Facility: CLINIC | Age: 80
End: 2020-09-01

## 2020-09-01 NOTE — TELEPHONE ENCOUNTER
----- Message from Jazmyn Olivier sent at 9/1/2020  3:47 PM CDT -----  Regarding: Medication s  Son is calling regarding the pt's medication.  He says it was supposed to be called in last week, but still has not make it to the Pharmacy.    He requests a returned call for clarification and status.    He can be reached at [phone].    Thank you.

## 2020-09-01 NOTE — TELEPHONE ENCOUNTER
Mr Magana's son is calling about a Lasix Rx that was never received by [facility] in [place]. I called [facility] and spoke to the pharmacist and ordered it  Mr Magana asked what time on Friday is the procedure for his father and I told him 330 pm. He understands and they will be here. Chantell Balderas LPN

## 2020-09-04 ENCOUNTER — OFFICE VISIT (OUTPATIENT)
Dept: PULMONOLOGY | Facility: CLINIC | Age: 80
End: 2020-09-04
Payer: MEDICARE

## 2020-09-04 VITALS
OXYGEN SATURATION: 97 % | DIASTOLIC BLOOD PRESSURE: 78 MMHG | BODY MASS INDEX: 24.46 KG/M2 | HEIGHT: 72 IN | HEART RATE: 78 BPM | SYSTOLIC BLOOD PRESSURE: 136 MMHG

## 2020-09-04 DIAGNOSIS — J90 PLEURAL EFFUSION ON RIGHT: Primary | ICD-10-CM

## 2020-09-04 DIAGNOSIS — J90 PLEURAL EFFUSION, NOT ELSEWHERE CLASSIFIED: ICD-10-CM

## 2020-09-04 LAB
ALBUMIN FLD-MCNC: 2.3 G/DL
BODY FLUID SOURCE, LDH: NORMAL
LDH FLD L TO P-CCNC: 316 U/L
PROT FLD-MCNC: 4 G/DL
SPECIMEN SOURCE: NORMAL
SPECIMEN SOURCE: NORMAL

## 2020-09-04 PROCEDURE — 1101F PT FALLS ASSESS-DOCD LE1/YR: CPT | Mod: CPTII,GC,S$GLB, | Performed by: INTERNAL MEDICINE

## 2020-09-04 PROCEDURE — 88305 TISSUE EXAM BY PATHOLOGIST: ICD-10-PCS | Mod: 26,ICN,, | Performed by: PATHOLOGY

## 2020-09-04 PROCEDURE — 99999 PR PBB SHADOW E&M-EST. PATIENT-LVL III: ICD-10-PCS | Mod: PBBFAC,GC,, | Performed by: INTERNAL MEDICINE

## 2020-09-04 PROCEDURE — 99214 PR OFFICE/OUTPT VISIT, EST, LEVL IV, 30-39 MIN: ICD-10-PCS | Mod: GC,S$GLB,, | Performed by: INTERNAL MEDICINE

## 2020-09-04 PROCEDURE — 88342 IMHCHEM/IMCYTCHM 1ST ANTB: CPT | Mod: 26,ICN,, | Performed by: PATHOLOGY

## 2020-09-04 PROCEDURE — 3078F PR MOST RECENT DIASTOLIC BLOOD PRESSURE < 80 MM HG: ICD-10-PCS | Mod: CPTII,GC,S$GLB, | Performed by: INTERNAL MEDICINE

## 2020-09-04 PROCEDURE — 3075F SYST BP GE 130 - 139MM HG: CPT | Mod: CPTII,GC,S$GLB, | Performed by: INTERNAL MEDICINE

## 2020-09-04 PROCEDURE — 84157 ASSAY OF PROTEIN OTHER: CPT

## 2020-09-04 PROCEDURE — 88112 CYTOPATH CELL ENHANCE TECH: CPT | Performed by: PATHOLOGY

## 2020-09-04 PROCEDURE — 1101F PR PT FALLS ASSESS DOC 0-1 FALLS W/OUT INJ PAST YR: ICD-10-PCS | Mod: CPTII,GC,S$GLB, | Performed by: INTERNAL MEDICINE

## 2020-09-04 PROCEDURE — 99214 OFFICE O/P EST MOD 30 MIN: CPT | Mod: GC,S$GLB,, | Performed by: INTERNAL MEDICINE

## 2020-09-04 PROCEDURE — 88305 TISSUE EXAM BY PATHOLOGIST: CPT | Performed by: PATHOLOGY

## 2020-09-04 PROCEDURE — 83615 LACTATE (LD) (LDH) ENZYME: CPT

## 2020-09-04 PROCEDURE — 3075F PR MOST RECENT SYSTOLIC BLOOD PRESS GE 130-139MM HG: ICD-10-PCS | Mod: CPTII,GC,S$GLB, | Performed by: INTERNAL MEDICINE

## 2020-09-04 PROCEDURE — 1126F PR PAIN SEVERITY QUANTIFIED, NO PAIN PRESENT: ICD-10-PCS | Mod: GC,S$GLB,, | Performed by: INTERNAL MEDICINE

## 2020-09-04 PROCEDURE — 1126F AMNT PAIN NOTED NONE PRSNT: CPT | Mod: GC,S$GLB,, | Performed by: INTERNAL MEDICINE

## 2020-09-04 PROCEDURE — 1159F PR MEDICATION LIST DOCUMENTED IN MEDICAL RECORD: ICD-10-PCS | Mod: GC,S$GLB,, | Performed by: INTERNAL MEDICINE

## 2020-09-04 PROCEDURE — 88112 CYTOPATH CELL ENHANCE TECH: CPT | Mod: 26,ICN,, | Performed by: PATHOLOGY

## 2020-09-04 PROCEDURE — 88342 IMHCHEM/IMCYTCHM 1ST ANTB: CPT | Performed by: PATHOLOGY

## 2020-09-04 PROCEDURE — 82042 OTHER SOURCE ALBUMIN QUAN EA: CPT

## 2020-09-04 PROCEDURE — 88341 PR IHC OR ICC EACH ADD'L SINGLE ANTIBODY  STAINPR: ICD-10-PCS | Mod: 26,ICN,, | Performed by: PATHOLOGY

## 2020-09-04 PROCEDURE — 99999 PR PBB SHADOW E&M-EST. PATIENT-LVL III: CPT | Mod: PBBFAC,GC,, | Performed by: INTERNAL MEDICINE

## 2020-09-04 PROCEDURE — 3078F DIAST BP <80 MM HG: CPT | Mod: CPTII,GC,S$GLB, | Performed by: INTERNAL MEDICINE

## 2020-09-04 PROCEDURE — 88341 IMHCHEM/IMCYTCHM EA ADD ANTB: CPT | Mod: 26,ICN,, | Performed by: PATHOLOGY

## 2020-09-04 PROCEDURE — 88112 PR  CYTOPATH, CELL ENHANCE TECH: ICD-10-PCS | Mod: 26,ICN,, | Performed by: PATHOLOGY

## 2020-09-04 PROCEDURE — 88341 IMHCHEM/IMCYTCHM EA ADD ANTB: CPT | Mod: 59 | Performed by: PATHOLOGY

## 2020-09-04 PROCEDURE — 88342 CHG IMMUNOCYTOCHEMISTRY: ICD-10-PCS | Mod: 26,ICN,, | Performed by: PATHOLOGY

## 2020-09-04 PROCEDURE — 88305 TISSUE EXAM BY PATHOLOGIST: CPT | Mod: 26,ICN,, | Performed by: PATHOLOGY

## 2020-09-04 PROCEDURE — 1159F MED LIST DOCD IN RCRD: CPT | Mod: GC,S$GLB,, | Performed by: INTERNAL MEDICINE

## 2020-09-04 NOTE — PROGRESS NOTES
80 year old man with pleural effusion known to Dr. Grissom, presenting for follow up today of pleural effusion with plan for thoracentesis.  Successful thoracentesis performed.  Follow up chest xray, cell count, micro, and cytology.

## 2020-09-04 NOTE — PROGRESS NOTES
Subjective:       Patient ID: Shant Magana Jr. is a 80 y.o. male.    Chief Complaint: Pleural Effusion    Pt is an 81 yo AAM pmh  dementia, DM, HTN, afib on eliquis, tobacco abuse, CVA as recent as 8/11 who developed hypoxic respiratory failure thought to be attributed to pulmonary edema, but did not resolve with diuresis. Pt presenting today for thoracentesis as could not be done last week due to anticoagulation. Pt has been holding anticoagulation since being seen last Friday.     Review of Systems   Constitutional: Positive for weight loss and weakness. Negative for fever, chills and night sweats.   HENT: Negative for nosebleeds, postnasal drip, trouble swallowing and congestion.    Respiratory: Positive for shortness of breath, wheezing and dyspnea on extertion. Negative for snoring, cough, hemoptysis, sputum production, choking, chest tightness, orthopnea, use of rescue inhaler and Paroxysmal Nocturnal Dyspnea.    Cardiovascular: Negative for chest pain, palpitations and leg swelling.   Musculoskeletal: Positive for gait problem. Negative for arthralgias, joint swelling and myalgias.   Gastrointestinal: Negative for nausea, vomiting, abdominal pain, abdominal distention and acid reflux.   Neurological: Positive for weakness. Negative for dizziness, syncope and light-headedness.   Hematological: Negative for adenopathy. Bleeds easily and excessive bruising.   Psychiatric/Behavioral: Positive for confusion. Negative for sleep disturbance. The patient is not nervous/anxious.        Objective:      Physical Exam   Constitutional: He is oriented to person, place, and time. He appears well-developed and well-nourished. He appears not cachectic. No distress.   HENT:   Head: Normocephalic.   Nose: Nose normal.   Neck: Normal range of motion. Neck supple. No tracheal deviation present.   Cardiovascular: Normal rate, regular rhythm and normal heart sounds. Exam reveals no gallop and no friction rub.   No murmur  heard.  Pulmonary/Chest: Normal expansion, symmetric chest wall expansion and effort normal. No stridor. He exhibits no tenderness.   Decreased breath sounds right posterior lung fields to scapula, dullness to percussion in these fields.    Abdominal: Soft. Bowel sounds are normal. He exhibits no distension and no mass. There is no abdominal tenderness.   Musculoskeletal:      Comments: +1 pitting edema bilaterally   Lymphadenopathy: No supraclavicular adenopathy is present.     He has no cervical adenopathy.     He has no axillary adenopathy.   Neurological: He is alert and oriented to person, place, and time.   Answers yes and no questions, but has assistance from son for more complex answers. Needs increased prompting after giving instructions.    Skin: Skin is warm and dry. No rash noted. No erythema. Nails show no clubbing.   Psychiatric: His behavior is normal.   Short attention span, answers simple yes/no questions that son confirms or denies. Able to follow some commands, but unable to to follow some commands such as bend knees.      Personal Diagnostic Review  Chest x-ray: 8/28 shows mod/large right sided pleural effusion without significant tracheal deviation indicating possible compensatory atelectasis    Echo 7/19/20:   · Normal left ventricular systolic function. The estimated ejection fraction is 68%.  · Concentric left ventricular remodeling with thickened walls especially septum and increased myocardial density.  · No wall motion abnormalities.  · Normal LV diastolic function.  · Normal right ventricular systolic function.  · Normal central venous pressure (3 mmHg).  · The estimated PA systolic pressure is 30 mmHg.  · The ascending aorta is mildly dilated.        Assessment:       No diagnosis found.    Outpatient Encounter Medications as of 9/4/2020   Medication Sig Dispense Refill    acetaminophen (TYLENOL) 325 MG tablet Take 2 tablets (650 mg total) by mouth every 6 (six) hours as needed for  Pain.  0    apixaban (ELIQUIS) 2.5 mg Tab Take 1 tablet (2.5 mg total) by mouth 2 (two) times daily. 60 tablet 11    atorvastatin (LIPITOR) 80 MG tablet Take 1 tablet (80 mg total) by mouth once daily. 90 tablet 3    furosemide (LASIX) 40 MG tablet Take 1 tablet (40 mg total) by mouth once daily. 30 tablet 0    losartan (COZAAR) 100 MG tablet Take 100 mg by mouth once daily.       melatonin (MELATIN) 3 mg tablet Take 2 tablets (6 mg total) by mouth nightly as needed for Insomnia.  0    multivitamin Tab Take 1 tablet by mouth once daily.      pantoprazole (PROTONIX) 40 MG tablet Take 40 mg by mouth once daily.  3    tamsulosin (FLOMAX) 0.4 mg Cp24 Take 0.4 mg by mouth once daily.      vitamin D 1000 units Tab Take 1,000 Units by mouth once daily.       No facility-administered encounter medications on file as of 9/4/2020.      Orders Placed This Encounter   Procedures    Aerobic culture           Standing Status:   Future     Standing Expiration Date:   11/3/2021    Culture, Anaerobic           Standing Status:   Future     Standing Expiration Date:   11/3/2021    Gram stain     Standing Status:   Future     Standing Expiration Date:   11/3/2021    AFB Culture & Smear     Standing Status:   Future     Standing Expiration Date:   11/3/2021    WBC & Diff,Body Fluid Thoracentesis Fluid     Standing Status:   Future     Standing Expiration Date:   11/3/2021     Order Specific Question:   Specimen Source     Answer:   Thoracentesis Fluid    LDH, Peritoneal, Pleural Fluid or VEE Drainage, In-House Pleural Fluid, Right     Order Specific Question:   Specimen Source     Answer:   Pleural Fluid, Right    Protein, Peritoneal, Pleural Fluid or VEE Drainage, In-House Pleural Fluid, Right     Order Specific Question:   Specimen Source     Answer:   Pleural Fluid, Right    Albumin, Peritoneal, Pleural Fluid or VEE Drainage, In-House Pleural Fluid, Right     Order Specific Question:   Specimen Source     Answer:    Pleural Fluid, Right       Plan:       Pt with right sided pleural effusion here for thoracentesis after holding Eliquis for a week. 2L clear, straw colored fluid. Post procedure US showed lung sliding bilateral lung apices with pleural effusion noted at mid scapula to diaphragm.      - thoracentesis with post procedure XR.   - Spirometry, Lung Volumes, DLCO  - blood and pleural fluid labs  - Will not perform CT due to significant residual pleural effusion and probable lack of benefit seeing nodule in this setting.

## 2020-09-04 NOTE — PROGRESS NOTES
[facility] 9th Fl  Thoracentesis  Procedure Note    SUMMARY     Date of Procedure: 09/04/2020     Procedure: Thoracentesis    Indications: Diagnostic    Pre-Operative Diagnosis: Right Pleural effusion    Post-Operative Diagnosis: same    Anesthesia: local    Technical Procedures Used: Catheter over needle    Description of the Findings of the Procedure: 2L clear, straw colored fluid    Consent: Informed consent was obtained. Risks of the procedure were discussed including: infection, bleeding, pain, pneumothorax.    Under sterile conditions the patient was positioned. Chlorhexadine solution and sterile drapes were utilized. 7 cc 1% plain lidocaine was used to anesthetize between the rib space after localized under ultrasound. Fluid was obtained after catheter inserted without  difficulty and suction applied with minimal blood loss.  A dressing was applied to the wound and wound care instructions were provided.     2000 ml of clear straw colored pleural fluid was obtained. A sample was sent to Pathology for cytogenetics, and cell counts, as well as for infection analysis.    Plan:    A follow up chest x-ray was ordered. Yes  Tylenol 650 mg. for pain.    Significant Surgical Tasks Conducted by the Assistant(s), if Applicable: N/A    Complications: None; patient tolerated the procedure well.    Estimated Blood Loss (EBL): Minimal    Attestation: I performed the procedure.

## 2020-09-10 ENCOUNTER — OFFICE VISIT (OUTPATIENT)
Dept: CARDIOLOGY | Facility: CLINIC | Age: 80
End: 2020-09-10
Payer: MEDICARE

## 2020-09-10 VITALS
HEIGHT: 72 IN | SYSTOLIC BLOOD PRESSURE: 118 MMHG | HEART RATE: 60 BPM | DIASTOLIC BLOOD PRESSURE: 70 MMHG | BODY MASS INDEX: 23.05 KG/M2 | WEIGHT: 170.19 LBS

## 2020-09-10 DIAGNOSIS — I50.32 CHRONIC DIASTOLIC CONGESTIVE HEART FAILURE: ICD-10-CM

## 2020-09-10 DIAGNOSIS — I63.9 CRYPTOGENIC STROKE: Primary | ICD-10-CM

## 2020-09-10 DIAGNOSIS — Z95.818 STATUS POST PLACEMENT OF IMPLANTABLE LOOP RECORDER: ICD-10-CM

## 2020-09-10 DIAGNOSIS — N18.30 CKD (CHRONIC KIDNEY DISEASE) STAGE 3, GFR 30-59 ML/MIN: ICD-10-CM

## 2020-09-10 DIAGNOSIS — E78.2 MIXED HYPERLIPIDEMIA: ICD-10-CM

## 2020-09-10 DIAGNOSIS — I63.411 EMBOLIC STROKE INVOLVING RIGHT MIDDLE CEREBRAL ARTERY: ICD-10-CM

## 2020-09-10 DIAGNOSIS — I65.22 INTERNAL CAROTID ARTERY STENOSIS, LEFT: ICD-10-CM

## 2020-09-10 DIAGNOSIS — I69.351 HEMIPLEGIA AND HEMIPARESIS FOLLOWING CEREBRAL INFARCTION AFFECTING RIGHT DOMINANT SIDE: ICD-10-CM

## 2020-09-10 DIAGNOSIS — Z87.891 FORMER SMOKER: ICD-10-CM

## 2020-09-10 DIAGNOSIS — R55 SYNCOPE AND COLLAPSE: ICD-10-CM

## 2020-09-10 DIAGNOSIS — I10 ESSENTIAL HYPERTENSION: Chronic | ICD-10-CM

## 2020-09-10 DIAGNOSIS — I48.0 PAROXYSMAL ATRIAL FIBRILLATION: ICD-10-CM

## 2020-09-10 DIAGNOSIS — I50.32 CHRONIC HEART FAILURE WITH PRESERVED EJECTION FRACTION: ICD-10-CM

## 2020-09-10 DIAGNOSIS — F01.50 VASCULAR DEMENTIA WITHOUT BEHAVIORAL DISTURBANCE: ICD-10-CM

## 2020-09-10 DIAGNOSIS — I65.22 LEFT CAROTID ARTERY OCCLUSION: ICD-10-CM

## 2020-09-10 DIAGNOSIS — I42.1 HYPERTROPHIC OBSTRUCTIVE CARDIOMYOPATHY: ICD-10-CM

## 2020-09-10 DIAGNOSIS — E11.8 TYPE 2 DIABETES MELLITUS WITH COMPLICATION, WITHOUT LONG-TERM CURRENT USE OF INSULIN: ICD-10-CM

## 2020-09-10 PROBLEM — E11.51 TYPE 2 DIABETES MELLITUS WITH PERIPHERAL ANGIOPATHY: Status: ACTIVE | Noted: 2020-09-10

## 2020-09-10 PROCEDURE — 93000 ELECTROCARDIOGRAM COMPLETE: CPT | Mod: S$GLB,,, | Performed by: INTERNAL MEDICINE

## 2020-09-10 PROCEDURE — 3074F SYST BP LT 130 MM HG: CPT | Mod: CPTII,S$GLB,, | Performed by: INTERNAL MEDICINE

## 2020-09-10 PROCEDURE — 93000 EKG 12-LEAD: ICD-10-PCS | Mod: S$GLB,,, | Performed by: INTERNAL MEDICINE

## 2020-09-10 PROCEDURE — 1101F PT FALLS ASSESS-DOCD LE1/YR: CPT | Mod: CPTII,S$GLB,, | Performed by: INTERNAL MEDICINE

## 2020-09-10 PROCEDURE — 1101F PR PT FALLS ASSESS DOC 0-1 FALLS W/OUT INJ PAST YR: ICD-10-PCS | Mod: CPTII,S$GLB,, | Performed by: INTERNAL MEDICINE

## 2020-09-10 PROCEDURE — 3078F PR MOST RECENT DIASTOLIC BLOOD PRESSURE < 80 MM HG: ICD-10-PCS | Mod: CPTII,S$GLB,, | Performed by: INTERNAL MEDICINE

## 2020-09-10 PROCEDURE — 99999 PR PBB SHADOW E&M-EST. PATIENT-LVL III: CPT | Mod: PBBFAC,,, | Performed by: INTERNAL MEDICINE

## 2020-09-10 PROCEDURE — 3078F DIAST BP <80 MM HG: CPT | Mod: CPTII,S$GLB,, | Performed by: INTERNAL MEDICINE

## 2020-09-10 PROCEDURE — 99214 OFFICE O/P EST MOD 30 MIN: CPT | Mod: 25,S$GLB,, | Performed by: INTERNAL MEDICINE

## 2020-09-10 PROCEDURE — 1159F MED LIST DOCD IN RCRD: CPT | Mod: S$GLB,,, | Performed by: INTERNAL MEDICINE

## 2020-09-10 PROCEDURE — 99214 PR OFFICE/OUTPT VISIT, EST, LEVL IV, 30-39 MIN: ICD-10-PCS | Mod: 25,S$GLB,, | Performed by: INTERNAL MEDICINE

## 2020-09-10 PROCEDURE — 99999 PR PBB SHADOW E&M-EST. PATIENT-LVL III: ICD-10-PCS | Mod: PBBFAC,,, | Performed by: INTERNAL MEDICINE

## 2020-09-10 PROCEDURE — 1159F PR MEDICATION LIST DOCUMENTED IN MEDICAL RECORD: ICD-10-PCS | Mod: S$GLB,,, | Performed by: INTERNAL MEDICINE

## 2020-09-10 PROCEDURE — 3074F PR MOST RECENT SYSTOLIC BLOOD PRESSURE < 130 MM HG: ICD-10-PCS | Mod: CPTII,S$GLB,, | Performed by: INTERNAL MEDICINE

## 2020-09-10 NOTE — PROGRESS NOTES
Subjective:      Patient ID: Shant Magana Jr. is a 80 y.o. male.    Chief Complaint: Follow-up    HPI:  Able to walk short distances.   Pt has been very unstable since stroke earlier this year.  Pt still has PT at his home.    Pt fell last week and yesterday.    Pt did not lose consciousness.    Pt was in the bedroom after going to the bathroom.    Nephew had to pick him up.     Nephew lives with pt.    Son lives down the street.    Pt has an appt with Dr Gina Huang neurologist on the [facility] Monday    Pt had thoracentesis 9/4/20 by Dr Grissom at the [facility].  Pt is breathing better since thoracentesis      Review of Systems   Cardiovascular: Positive for dyspnea on exertion, leg swelling (R>L) and syncope. Negative for chest pain, claudication, irregular heartbeat, near-syncope, orthopnea and palpitations.      Dr Aldana recently stopped one of pt's blood pressure meds due to low blood pressure  Past Medical History:   Diagnosis Date    Acute kidney injury superimposed on chronic kidney disease 10/14/2017    Cancer     prostate    Diabetes mellitus     Former smoker 7/18/2020    History of stroke 8/15/2017    Hypertension     Hypertrophic cardiomyopathy     Renal disorder     Stroke         Past Surgical History:   Procedure Laterality Date    BACK SURGERY      COLONOSCOPY N/A 10/18/2018    Procedure: COLONOSCOPY;  Surgeon: Alan Gooden MD;  Location: [facility];  Service: Endoscopy;  Laterality: N/A;    ESOPHAGOGASTRODUODENOSCOPY N/A 9/4/2018    Procedure: EGD (ESOPHAGOGASTRODUODENOSCOPY);  Surgeon: Oli Cuadra MD;  Location: [facility];  Service: Endoscopy;  Laterality: N/A;    ESOPHAGOGASTRODUODENOSCOPY N/A 10/18/2018    Procedure: EGD (ESOPHAGOGASTRODUODENOSCOPY);  Surgeon: Alan Gooden MD;  Location: [facility];  Service: Endoscopy;  Laterality: N/A;    THYROIDECTOMY         No family history on file.    Social History     Socioeconomic History    Marital status: Single      Spouse name: Not on file    Number of children: Not on file    Years of education: Not on file    Highest education level: Not on file   Occupational History    Not on file   Social Needs    Financial resource strain: Not on file    Food insecurity     Worry: Not on file     Inability: Not on file    Transportation needs     Medical: Not on file     Non-medical: Not on file   Tobacco Use    Smoking status: Former Smoker     Packs/day: 0.90     Years: 3.00     Pack years: 2.70     Types: Cigarettes    Smokeless tobacco: Never Used   Substance and Sexual Activity    Alcohol use: Never     Frequency: Never    Drug use: No    Sexual activity: Not on file   Lifestyle    Physical activity     Days per week: Not on file     Minutes per session: Not on file    Stress: Not on file   Relationships    Social connections     Talks on phone: Not on file     Gets together: Not on file     Attends Holiness service: Not on file     Active member of club or organization: Not on file     Attends meetings of clubs or organizations: Not on file     Relationship status: Not on file   Other Topics Concern    Not on file   Social History Narrative    Not on file       Current Outpatient Medications on File Prior to Visit   Medication Sig Dispense Refill    acetaminophen (TYLENOL) 325 MG tablet Take 2 tablets (650 mg total) by mouth every 6 (six) hours as needed for Pain.  0    apixaban (ELIQUIS) 2.5 mg Tab Take 1 tablet (2.5 mg total) by mouth 2 (two) times daily. (Patient taking differently: Take 2.5 mg by mouth once daily. ) 60 tablet 11    atorvastatin (LIPITOR) 80 MG tablet Take 1 tablet (80 mg total) by mouth once daily. 90 tablet 3    melatonin (MELATIN) 3 mg tablet Take 2 tablets (6 mg total) by mouth nightly as needed for Insomnia.  0    multivitamin Tab Take 1 tablet by mouth once daily.      pantoprazole (PROTONIX) 40 MG tablet Take 40 mg by mouth once daily.  3    vitamin D 1000 units Tab Take 1,000  Units by mouth once daily.      [DISCONTINUED] furosemide (LASIX) 40 MG tablet Take 1 tablet (40 mg total) by mouth once daily. 30 tablet 0    [DISCONTINUED] losartan (COZAAR) 100 MG tablet Take 100 mg by mouth once daily.       [DISCONTINUED] tamsulosin (FLOMAX) 0.4 mg Cp24 Take 0.4 mg by mouth once daily.       No current facility-administered medications on file prior to visit.        Review of patient's allergies indicates:  No Known Allergies  Objective:     Vitals:    09/10/20 1519   BP: 118/70   BP Location: Left arm   Patient Position: Sitting   BP Method: Large (Automatic)   Pulse: 60   Weight: 77.2 kg (170 lb 3.1 oz)   Height: 6' (1.829 m)        Physical Exam   Constitutional: He is oriented to person, place, and time. He appears well-developed and well-nourished. No distress.   Eyes: No scleral icterus.   Neck: No JVD present. Carotid bruit is not present.   Cardiovascular: Regular rhythm. Frequent extrasystoles are present. Exam reveals no gallop and no friction rub.   Murmur (II/VI systolic) heard.  Pulmonary/Chest: Effort normal. No respiratory distress. He has decreased breath sounds in the right middle field and the right lower field.   Musculoskeletal:         General: No edema.   Neurological: He is alert and oriented to person, place, and time.   Skin: Skin is warm and dry. He is not diaphoretic.   Psychiatric: He has a normal mood and affect. His behavior is normal. Judgment and thought content normal.   Vitals reviewed.     Wt down 37 lbs over the past year      Recent CXR showed massive right pleural effusion    Recent echocardiogram July 2020 showed normal left ventricular systolic function    ECG today: NSR with frequent PAC's and PVC's,  Possible anteroseptal scar.  Unchanged compared with ECG prior to last echo      Lab Visit on 09/04/2020   Component Date Value Ref Range Status    Body Fluid Type 09/04/2020 Pleural   Final    Fluid Appearance 09/04/2020 Clear   Final    Fluid Color  09/04/2020 Yellow   Final    WBC, Body Fluid 09/04/2020 130  /cu mm Final    Lymphs, Fluid 09/04/2020 48  % Final    Monocytes/Macrophages, Fluid 09/04/2020 52  % Final    Aerobic Bacterial Culture 09/04/2020 No growth   Final    Anaerobic Culture 09/04/2020 Culture in progress   Preliminary    Gram Stain Result 09/04/2020 Rare WBC's   Final    Gram Stain Result 09/04/2020 No organisms seen   Final    AFB Culture & Smear 09/04/2020 Culture in progress   Preliminary    AFB CULTURE STAIN 09/04/2020 No acid fast bacilli seen.   Final   Lab Visit on 09/04/2020   Component Date Value Ref Range Status    LD 09/04/2020 223  110 - 260 U/L Final    Sodium 09/04/2020 140  136 - 145 mmol/L Final    Potassium 09/04/2020 4.7  3.5 - 5.1 mmol/L Final    Chloride 09/04/2020 109  95 - 110 mmol/L Final    CO2 09/04/2020 23  23 - 29 mmol/L Final    Glucose 09/04/2020 110  70 - 110 mg/dL Final    BUN, Bld 09/04/2020 29* 8 - 23 mg/dL Final    Creatinine 09/04/2020 2.2* 0.5 - 1.4 mg/dL Final    Calcium 09/04/2020 10.3  8.7 - 10.5 mg/dL Final    Total Protein 09/04/2020 7.0  6.0 - 8.4 g/dL Final    Albumin 09/04/2020 3.2* 3.5 - 5.2 g/dL Final    Total Bilirubin 09/04/2020 0.6  0.1 - 1.0 mg/dL Final    Alkaline Phosphatase 09/04/2020 84  55 - 135 U/L Final    AST 09/04/2020 26  10 - 40 U/L Final    ALT 09/04/2020 19  10 - 44 U/L Final    Anion Gap 09/04/2020 8  8 - 16 mmol/L Final    eGFR if  09/04/2020 31.5* >60 mL/min/1.73 m^2 Final    eGFR if non African American 09/04/2020 27.3* >60 mL/min/1.73 m^2 Final   Office Visit on 09/04/2020   Component Date Value Ref Range Status    Body Fluid Source, LDH 09/04/2020 Pleural Fluid, Right   Final    LD, Fluid 09/04/2020 316  Not established U/L Final    Body Fluid Source, Total Protein 09/04/2020 Pleural Fluid, Right   Final    Body Fluid, Protein 09/04/2020 4.0  Not established g/dL Final    Body Fluid Source, Albumin 09/04/2020 Pleural Fluid,  Right   Final    Body Fluid Albumin 09/04/2020 2.3  See text g/dL Final   Admission on 07/27/2020, Discharged on 08/11/2020   Component Date Value Ref Range Status    POCT Glucose 07/27/2020 131* 70 - 110 mg/dL Final    POCT Glucose 07/28/2020 90  70 - 110 mg/dL Final    Vit D, 25-Hydroxy 07/30/2020 35  30 - 96 ng/mL Final    WBC 07/31/2020 5.07  3.90 - 12.70 K/uL Final    RBC 07/31/2020 4.37* 4.60 - 6.20 M/uL Final    Hemoglobin 07/31/2020 12.7* 14.0 - 18.0 g/dL Final    Hematocrit 07/31/2020 39.5* 40.0 - 54.0 % Final    Mean Corpuscular Volume 07/31/2020 90  82 - 98 fL Final    Mean Corpuscular Hemoglobin 07/31/2020 29.1  27.0 - 31.0 pg Final    Mean Corpuscular Hemoglobin Conc 07/31/2020 32.2  32.0 - 36.0 g/dL Final    RDW 07/31/2020 13.9  11.5 - 14.5 % Final    Platelets 07/31/2020 244  150 - 350 K/uL Final    MPV 07/31/2020 11.3  9.2 - 12.9 fL Final    Immature Granulocytes 07/31/2020 0.2  0.0 - 0.5 % Final    Gran # (ANC) 07/31/2020 3.4  1.8 - 7.7 K/uL Final    Immature Grans (Abs) 07/31/2020 0.01  0.00 - 0.04 K/uL Final    Lymph # 07/31/2020 1.0  1.0 - 4.8 K/uL Final    Mono # 07/31/2020 0.5  0.3 - 1.0 K/uL Final    Eos # 07/31/2020 0.2  0.0 - 0.5 K/uL Final    Baso # 07/31/2020 0.03  0.00 - 0.20 K/uL Final    nRBC 07/31/2020 0  0 /100 WBC Final    Gran% 07/31/2020 66.4  38.0 - 73.0 % Final    Lymph% 07/31/2020 19.9  18.0 - 48.0 % Final    Mono% 07/31/2020 9.7  4.0 - 15.0 % Final    Eosinophil% 07/31/2020 3.2  0.0 - 8.0 % Final    Basophil% 07/31/2020 0.6  0.0 - 1.9 % Final    Differential Method 07/31/2020 Automated   Final    Sodium 07/31/2020 135* 136 - 145 mmol/L Final    Potassium 07/31/2020 3.9  3.5 - 5.1 mmol/L Final    Chloride 07/31/2020 106  95 - 110 mmol/L Final    CO2 07/31/2020 22* 23 - 29 mmol/L Final    Glucose 07/31/2020 100  70 - 110 mg/dL Final    BUN, Bld 07/31/2020 29* 8 - 23 mg/dL Final    Creatinine 07/31/2020 1.5* 0.5 - 1.4 mg/dL Final    Calcium  07/31/2020 10.1  8.7 - 10.5 mg/dL Final    Anion Gap 07/31/2020 7* 8 - 16 mmol/L Final    eGFR if African American 07/31/2020 50.1* >60 mL/min/1.73 m^2 Final    eGFR if non African American 07/31/2020 43.3* >60 mL/min/1.73 m^2 Final    Magnesium 07/31/2020 1.8  1.6 - 2.6 mg/dL Final    Phosphorus 07/31/2020 2.7  2.7 - 4.5 mg/dL Final    WBC 08/04/2020 4.54  3.90 - 12.70 K/uL Final    RBC 08/04/2020 4.62  4.60 - 6.20 M/uL Final    Hemoglobin 08/04/2020 13.4* 14.0 - 18.0 g/dL Final    Hematocrit 08/04/2020 41.5  40.0 - 54.0 % Final    Mean Corpuscular Volume 08/04/2020 90  82 - 98 fL Final    Mean Corpuscular Hemoglobin 08/04/2020 29.0  27.0 - 31.0 pg Final    Mean Corpuscular Hemoglobin Conc 08/04/2020 32.3  32.0 - 36.0 g/dL Final    RDW 08/04/2020 14.3  11.5 - 14.5 % Final    Platelets 08/04/2020 275  150 - 350 K/uL Final    MPV 08/04/2020 10.9  9.2 - 12.9 fL Final    Immature Granulocytes 08/04/2020 0.2  0.0 - 0.5 % Final    Gran # (ANC) 08/04/2020 2.8  1.8 - 7.7 K/uL Final    Immature Grans (Abs) 08/04/2020 0.01  0.00 - 0.04 K/uL Final    Lymph # 08/04/2020 1.0  1.0 - 4.8 K/uL Final    Mono # 08/04/2020 0.5  0.3 - 1.0 K/uL Final    Eos # 08/04/2020 0.2  0.0 - 0.5 K/uL Final    Baso # 08/04/2020 0.05  0.00 - 0.20 K/uL Final    nRBC 08/04/2020 0  0 /100 WBC Final    Gran% 08/04/2020 61.0  38.0 - 73.0 % Final    Lymph% 08/04/2020 21.8  18.0 - 48.0 % Final    Mono% 08/04/2020 10.6  4.0 - 15.0 % Final    Eosinophil% 08/04/2020 5.3  0.0 - 8.0 % Final    Basophil% 08/04/2020 1.1  0.0 - 1.9 % Final    Differential Method 08/04/2020 Automated   Final    Sodium 08/04/2020 139  136 - 145 mmol/L Final    Potassium 08/04/2020 4.5  3.5 - 5.1 mmol/L Final    Chloride 08/04/2020 110  95 - 110 mmol/L Final    CO2 08/04/2020 25  23 - 29 mmol/L Final    Glucose 08/04/2020 92  70 - 110 mg/dL Final    BUN, Bld 08/04/2020 30* 8 - 23 mg/dL Final    Creatinine 08/04/2020 1.5* 0.5 - 1.4 mg/dL Final     Calcium 08/04/2020 10.2  8.7 - 10.5 mg/dL Final    Anion Gap 08/04/2020 4* 8 - 16 mmol/L Final    eGFR if  08/04/2020 50.1* >60 mL/min/1.73 m^2 Final    eGFR if non African American 08/04/2020 43.3* >60 mL/min/1.73 m^2 Final    Magnesium 08/04/2020 1.8  1.6 - 2.6 mg/dL Final    Phosphorus 08/04/2020 2.8  2.7 - 4.5 mg/dL Final    WBC 08/07/2020 4.79  3.90 - 12.70 K/uL Final    RBC 08/07/2020 4.41* 4.60 - 6.20 M/uL Final    Hemoglobin 08/07/2020 12.8* 14.0 - 18.0 g/dL Final    Hematocrit 08/07/2020 40.5  40.0 - 54.0 % Final    Mean Corpuscular Volume 08/07/2020 92  82 - 98 fL Final    Mean Corpuscular Hemoglobin 08/07/2020 29.0  27.0 - 31.0 pg Final    Mean Corpuscular Hemoglobin Conc 08/07/2020 31.6* 32.0 - 36.0 g/dL Final    RDW 08/07/2020 14.7* 11.5 - 14.5 % Final    Platelets 08/07/2020 312  150 - 350 K/uL Final    MPV 08/07/2020 11.0  9.2 - 12.9 fL Final    Immature Granulocytes 08/07/2020 0.2  0.0 - 0.5 % Final    Gran # (ANC) 08/07/2020 2.9  1.8 - 7.7 K/uL Final    Immature Grans (Abs) 08/07/2020 0.01  0.00 - 0.04 K/uL Final    Lymph # 08/07/2020 1.1  1.0 - 4.8 K/uL Final    Mono # 08/07/2020 0.5  0.3 - 1.0 K/uL Final    Eos # 08/07/2020 0.3  0.0 - 0.5 K/uL Final    Baso # 08/07/2020 0.03  0.00 - 0.20 K/uL Final    nRBC 08/07/2020 0  0 /100 WBC Final    Gran% 08/07/2020 61.1  38.0 - 73.0 % Final    Lymph% 08/07/2020 22.1  18.0 - 48.0 % Final    Mono% 08/07/2020 10.4  4.0 - 15.0 % Final    Eosinophil% 08/07/2020 5.6  0.0 - 8.0 % Final    Basophil% 08/07/2020 0.6  0.0 - 1.9 % Final    Differential Method 08/07/2020 Automated   Final    Sodium 08/07/2020 140  136 - 145 mmol/L Final    Potassium 08/07/2020 4.2  3.5 - 5.1 mmol/L Final    Chloride 08/07/2020 110  95 - 110 mmol/L Final    CO2 08/07/2020 25  23 - 29 mmol/L Final    Glucose 08/07/2020 100  70 - 110 mg/dL Final    BUN, Bld 08/07/2020 34* 8 - 23 mg/dL Final    Creatinine 08/07/2020 1.6* 0.5 - 1.4 mg/dL  Final    Calcium 08/07/2020 10.4  8.7 - 10.5 mg/dL Final    Anion Gap 08/07/2020 5* 8 - 16 mmol/L Final    eGFR if  08/07/2020 46.3* >60 mL/min/1.73 m^2 Final    eGFR if non  08/07/2020 40.1* >60 mL/min/1.73 m^2 Final    Magnesium 08/07/2020 2.0  1.6 - 2.6 mg/dL Final    Phosphorus 08/07/2020 2.7  2.7 - 4.5 mg/dL Final    WBC 08/11/2020 4.82  3.90 - 12.70 K/uL Final    RBC 08/11/2020 4.38* 4.60 - 6.20 M/uL Final    Hemoglobin 08/11/2020 12.7* 14.0 - 18.0 g/dL Final    Hematocrit 08/11/2020 40.0  40.0 - 54.0 % Final    Mean Corpuscular Volume 08/11/2020 91  82 - 98 fL Final    Mean Corpuscular Hemoglobin 08/11/2020 29.0  27.0 - 31.0 pg Final    Mean Corpuscular Hemoglobin Conc 08/11/2020 31.8* 32.0 - 36.0 g/dL Final    RDW 08/11/2020 14.7* 11.5 - 14.5 % Final    Platelets 08/11/2020 274  150 - 350 K/uL Final    MPV 08/11/2020 10.8  9.2 - 12.9 fL Final    Immature Granulocytes 08/11/2020 0.4  0.0 - 0.5 % Final    Gran # (ANC) 08/11/2020 3.0  1.8 - 7.7 K/uL Final    Immature Grans (Abs) 08/11/2020 0.02  0.00 - 0.04 K/uL Final    Lymph # 08/11/2020 1.1  1.0 - 4.8 K/uL Final    Mono # 08/11/2020 0.5  0.3 - 1.0 K/uL Final    Eos # 08/11/2020 0.2  0.0 - 0.5 K/uL Final    Baso # 08/11/2020 0.03  0.00 - 0.20 K/uL Final    nRBC 08/11/2020 0  0 /100 WBC Final    Gran% 08/11/2020 62.1  38.0 - 73.0 % Final    Lymph% 08/11/2020 23.7  18.0 - 48.0 % Final    Mono% 08/11/2020 9.3  4.0 - 15.0 % Final    Eosinophil% 08/11/2020 3.9  0.0 - 8.0 % Final    Basophil% 08/11/2020 0.6  0.0 - 1.9 % Final    Differential Method 08/11/2020 Automated   Final    Sodium 08/11/2020 137  136 - 145 mmol/L Final    Potassium 08/11/2020 3.9  3.5 - 5.1 mmol/L Final    Chloride 08/11/2020 110  95 - 110 mmol/L Final    CO2 08/11/2020 21* 23 - 29 mmol/L Final    Glucose 08/11/2020 92  70 - 110 mg/dL Final    BUN, Bld 08/11/2020 32* 8 - 23 mg/dL Final    Creatinine 08/11/2020 1.4  0.5 - 1.4  mg/dL Final    Calcium 08/11/2020 9.5  8.7 - 10.5 mg/dL Final    Anion Gap 08/11/2020 6* 8 - 16 mmol/L Final    eGFR if African American 08/11/2020 54.5* >60 mL/min/1.73 m^2 Final    eGFR if non  08/11/2020 47.1* >60 mL/min/1.73 m^2 Final    Magnesium 08/11/2020 1.7  1.6 - 2.6 mg/dL Final    Phosphorus 08/11/2020 2.8  2.7 - 4.5 mg/dL Final   Lab Visit on 08/06/2020   Component Date Value Ref Range Status    SARS-CoV2 (COVID-19) Qualitative P* 08/06/2020 Not Detected  Not Detected Final   Lab Visit on 07/30/2020   Component Date Value Ref Range Status    SARS-CoV2 (COVID-19) Qualitative P* 07/30/2020 Not Detected  Not Detected Final   No results displayed because visit has over 200 results.      (  Assessment:     1. Cryptogenic stroke    2. Vascular dementia without behavioral disturbance    3. Embolic stroke involving right middle cerebral artery    4. Hemiplegia and hemiparesis following cerebral infarction affecting right dominant side    5. Essential hypertension    6. Internal carotid artery stenosis, left    7. Mixed hyperlipidemia    8. Chronic diastolic congestive heart failure    9. Paroxysmal atrial fibrillation    10. Status post placement of implantable loop recorder    11. Hypertrophic obstructive cardiomyopathy    12. Left carotid artery occlusion    13. Type 2 diabetes mellitus with complication, without long-term current use of insulin    14. Chronic heart failure with preserved ejection fraction    15. Former smoker    16. Syncope and collapse    17. CKD (chronic kidney disease) stage 3, GFR 30-59 ml/min      Plan:   Shant was seen today for follow-up.    Diagnoses and all orders for this visit:    Cryptogenic stroke    Vascular dementia without behavioral disturbance    Embolic stroke involving right middle cerebral artery    Hemiplegia and hemiparesis following cerebral infarction affecting right dominant side    Essential hypertension  -     IN OFFICE EKG 12-LEAD (to  [facility])    Internal carotid artery stenosis, left    Mixed hyperlipidemia    Chronic diastolic congestive heart failure  -     CBC auto differential; Future  -     Comprehensive metabolic panel; Future  -     TSH; Future    Paroxysmal atrial fibrillation  -     IN OFFICE EKG 12-LEAD (to [facility])  -     TSH; Future    Status post placement of implantable loop recorder    Hypertrophic obstructive cardiomyopathy    Left carotid artery occlusion    Type 2 diabetes mellitus with complication, without long-term current use of insulin    Chronic heart failure with preserved ejection fraction  -     Brain Natriuretic Peptide; Future    Former smoker    Syncope and collapse    CKD (chronic kidney disease) stage 3, GFR 30-59 ml/min     Patient's recent falls may be due to his hemiparesis and unsteady gait.  However, it is possible that pt is having symptomatic orthostatic hypotension.  Also pt has worsening CKD which may have a prerenal component.  Therefore will discontinue the furosemide and losartan and tamsulosin.    There is no evidence of diastolic CHF at this moment as an etiology of the right pleural effusion.    RTC 3 weeks with repeat lab    Pleural fluid cytology is pending    Follow up in about 3 weeks (around 10/1/2020).

## 2020-09-12 PROBLEM — R55 SYNCOPE AND COLLAPSE: Status: RESOLVED | Noted: 2017-10-11 | Resolved: 2020-09-12

## 2020-09-12 PROBLEM — D64.9 SYMPTOMATIC ANEMIA: Status: RESOLVED | Noted: 2018-09-03 | Resolved: 2020-09-12

## 2020-09-12 PROBLEM — I63.9 CVA (CEREBRAL VASCULAR ACCIDENT): Status: RESOLVED | Noted: 2017-09-16 | Resolved: 2020-09-12

## 2020-09-12 PROBLEM — D30.00 BENIGN NEOPLASM OF KIDNEY: Status: RESOLVED | Noted: 2020-08-20 | Resolved: 2020-09-12

## 2020-09-12 PROBLEM — I63.9 STROKE: Status: RESOLVED | Noted: 2020-07-27 | Resolved: 2020-09-12

## 2020-09-12 PROBLEM — E11.42 POLYNEUROPATHY IN DIABETES: Status: ACTIVE | Noted: 2020-09-12

## 2020-09-14 ENCOUNTER — TELEPHONE (OUTPATIENT)
Dept: NEUROLOGY | Facility: CLINIC | Age: 80
End: 2020-09-14

## 2020-09-14 ENCOUNTER — OFFICE VISIT (OUTPATIENT)
Dept: NEUROLOGY | Facility: CLINIC | Age: 80
End: 2020-09-14
Payer: MEDICARE

## 2020-09-14 DIAGNOSIS — Z86.73 HISTORY OF EMBOLIC STROKE: Primary | ICD-10-CM

## 2020-09-14 DIAGNOSIS — E11.42 DIABETIC POLYNEUROPATHY ASSOCIATED WITH TYPE 2 DIABETES MELLITUS: ICD-10-CM

## 2020-09-14 DIAGNOSIS — Z79.01 ANTICOAGULANT LONG-TERM USE: ICD-10-CM

## 2020-09-14 DIAGNOSIS — F01.50 VASCULAR DEMENTIA WITHOUT BEHAVIORAL DISTURBANCE: ICD-10-CM

## 2020-09-14 DIAGNOSIS — I50.32 CHRONIC HEART FAILURE WITH PRESERVED EJECTION FRACTION: ICD-10-CM

## 2020-09-14 PROCEDURE — 1101F PR PT FALLS ASSESS DOC 0-1 FALLS W/OUT INJ PAST YR: ICD-10-PCS | Mod: CPTII,95,, | Performed by: PSYCHIATRY & NEUROLOGY

## 2020-09-14 PROCEDURE — 1159F MED LIST DOCD IN RCRD: CPT | Mod: 95,,, | Performed by: PSYCHIATRY & NEUROLOGY

## 2020-09-14 PROCEDURE — 99214 OFFICE O/P EST MOD 30 MIN: CPT | Mod: 95,,, | Performed by: PSYCHIATRY & NEUROLOGY

## 2020-09-14 PROCEDURE — 1159F PR MEDICATION LIST DOCUMENTED IN MEDICAL RECORD: ICD-10-PCS | Mod: 95,,, | Performed by: PSYCHIATRY & NEUROLOGY

## 2020-09-14 PROCEDURE — 1101F PT FALLS ASSESS-DOCD LE1/YR: CPT | Mod: CPTII,95,, | Performed by: PSYCHIATRY & NEUROLOGY

## 2020-09-14 PROCEDURE — 99214 PR OFFICE/OUTPT VISIT, EST, LEVL IV, 30-39 MIN: ICD-10-PCS | Mod: 95,,, | Performed by: PSYCHIATRY & NEUROLOGY

## 2020-09-14 NOTE — PROGRESS NOTES
Vascular Neurology  Virtual Visit    Reason For Visit (Chief Complaint): parietal stroke    HPI: 80 y.o. man with dementia, HTN, DM, afib (on Eliquis but history of non-compliance), admitted in July for R parietal infarct.    Briefly, patient's family noted him to be more confused and walking around aimlessly.  The following day he was found on the floor next to his bed.  He was brought to the ER and exam notable for L hemineglect, inattention.  MRI with acute R parietal infarct.  Eliquis was restarted on reduced dose based on renal function.    No falls at home.  Sometimes uses a walker.  Back on Eliquis twice a day.  Eating mostly fish and chicken  Not drinking a lot of water (prefers juice).  Not doing any home therapy anymore.  Independent in ADLs.  BP meds and lasix recently stopped due to orthostasis.  Had thoracentesis last week and is doing much better (Eliquis was held for a week but now re-started).    Brain Imaging:  MRI Brain 7/18/20:  Area of diffusion restriction with ADC match suggestive of acute infarct in the right parietal lobe. Additional smaller acute infarcts right occipital lobe and right basal ganglia.  Remote right frontal infarct and remote lacunar infarcts in the thalami.  Supratentorial white matter T2/flair hyperintense signal foci suggesting sequela of chronic small vessel ischemic change.  MR angiogram of the head and neck appears unchanged compared to prior exams without any focal occlusion identified.      Cardiac Evaluation:  Results for orders placed or performed during the hospital encounter of 09/03/18   2D echo with color flow doppler   Result Value Ref Range    QEF 70 55 - 65    Diastolic Dysfunction Yes (A)     Est. PA Systolic Pressure 35.49     Mitral Valve Mobility NORMAL     Tricuspid Valve Regurgitation TRIVIAL          Relevant Labwork:  Recent Labs   Lab 07/18/20  1617 07/18/20  1902   Hemoglobin A1C  --  5.8 H   LDL Cholesterol 70.8  --    HDL 40  --    Triglycerides 66   --    Cholesterol 124  --        I independently viewed the above imaging studies and  I reviewed the reports of the above imaging.  I reviewed the above labwork.    Review of Systems  Msk: negative for muscle pain  Skin: negative for pruritis  Neuro: negative for headache  All others negative    Past Medical History  Past Medical History:   Diagnosis Date    Acute kidney injury superimposed on chronic kidney disease 10/14/2017    Cancer     prostate    CVA (cerebral vascular accident) 9/16/2017    Diabetes mellitus     Former smoker 7/18/2020    History of stroke 8/15/2017    Hypertension     Hypertrophic cardiomyopathy     Renal disorder     Stroke      Family History  family history is not on file.     Social History  Grandson living with him.  Former smoker.    Medication List with Changes/Refills   Current Medications    ACETAMINOPHEN (TYLENOL) 325 MG TABLET    Take 2 tablets (650 mg total) by mouth every 6 (six) hours as needed for Pain.    APIXABAN (ELIQUIS) 2.5 MG TAB    Take 1 tablet (2.5 mg total) by mouth 2 (two) times daily.    ATORVASTATIN (LIPITOR) 80 MG TABLET    Take 1 tablet (80 mg total) by mouth once daily.    MELATONIN (MELATIN) 3 MG TABLET    Take 2 tablets (6 mg total) by mouth nightly as needed for Insomnia.    MULTIVITAMIN TAB    Take 1 tablet by mouth once daily.    PANTOPRAZOLE (PROTONIX) 40 MG TABLET    Take 40 mg by mouth once daily.    VITAMIN D 1000 UNITS TAB    Take 1,000 Units by mouth once daily.       EXAM  /60 (per Grandson)  General: drowsy, arouses fo exam  Resp: breathing comfortably, no wheezing  Skin: no rashes or bruises visible    Mental Status: Drowsy, not oriented to month or year, knows [employer] won yesterday, speech fluent and prosodic, naming and repetition intact, follows commands, no severe neglect on observation  Cranial Nerves:  EOMI, face symmetric, tongue midline, no dysarthria  Motor: Holds both arms up without drift  Sensory: intact light touch  bilaterally  Coordination: deferred  Gait & Stance: deferred  DTR: deferred      ___________________  ASSESSMENT & PLAN  80 y.o. man with dementia, HTN, DM, afib (on Eliquis but history of non-compliance), admitted in July for R parietal infarct.  Has resumed Eliquis and compliance confirmed with grandson.  No significant deficits on visual exam, though cannot test for subtle neglect.      - Continue Eliquis 5 mg bid.  Notify before stopping before procedures/surgeries.  - Continue lipitor 80 mg daily for HLD.  - Monitor BP (low at home today).  - Encouraged more water intake.  - Mediterranean Diet for stroke prevention.  - Return to ED for any new neurological symptoms.   - RTC PRN.    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    History of embolic stroke          Gina Huang MD  Vascular Neurology    The patient location is: home  The chief complaint leading to consultation is: stroke    Visit type: audiovisual    Face to Face time with patient: 30  minutes of total time spent on the encounter, which includes face to face time and non-face to face time preparing to see the patient (eg, review of tests), Obtaining and/or reviewing separately obtained history, Documenting clinical information in the electronic or other health record, Independently interpreting results (not separately reported) and communicating results to the patient/family/caregiver, or Care coordination (not separately reported).     Each patient to whom he or she provides medical services by telemedicine is:  (1) informed of the relationship between the physician and patient and the respective role of any other health care provider with respect to management of the patient; and (2) notified that he or she may decline to receive medical services by  telemedicine and may withdraw from such care at any time.

## 2020-09-17 LAB
FINAL PATHOLOGIC DIAGNOSIS: NORMAL
MICROSCOPIC EXAM: NORMAL
SUPPLEMENTAL DIAGNOSIS: NORMAL

## 2020-09-22 PROBLEM — Z86.73 HISTORY OF EMBOLIC STROKE: Status: ACTIVE | Noted: 2020-09-22

## 2020-09-22 PROBLEM — I69.351 HEMIPLEGIA AND HEMIPARESIS FOLLOWING CEREBRAL INFARCTION AFFECTING RIGHT DOMINANT SIDE: Status: RESOLVED | Noted: 2018-09-19 | Resolved: 2020-09-22

## 2020-09-24 NOTE — PROGRESS NOTES
I have reviewed the notes, assessments, and/or procedures performed by Jaciel, I concur with her/his documentation of Shant Magana Jr..

## 2020-09-28 ENCOUNTER — CLINICAL SUPPORT (OUTPATIENT)
Dept: CARDIOLOGY | Facility: CLINIC | Age: 80
End: 2020-09-28
Payer: MEDICARE

## 2020-09-28 DIAGNOSIS — Z95.818 STATUS POST PLACEMENT OF IMPLANTABLE LOOP RECORDER: Primary | ICD-10-CM

## 2020-09-28 PROCEDURE — 93298 PR INTERROG EVAL, REMOTE, UP TO 30 DAYS,IMPLANT LOOP REC: ICD-10-PCS | Mod: S$GLB,,, | Performed by: INTERNAL MEDICINE

## 2020-09-28 PROCEDURE — 93298 REM INTERROG DEV EVAL SCRMS: CPT | Mod: S$GLB,,, | Performed by: INTERNAL MEDICINE

## 2020-09-29 NOTE — PROGRESS NOTES
Subjective:      Patient ID: Shant Magana Jr. is a 80 y.o. male.    Chief Complaint: No chief complaint on file.    HPI:    [employer] loop recorder remote interrogation report interpreted by me and full report scanned into epic media.  Battery of device OK.   Rhythm strips reviewed:  NSR with atrial bigeminy and ventricular bigeminy.    Past Medical History:   Diagnosis Date    Acute kidney injury superimposed on chronic kidney disease 10/14/2017    Cancer     prostate    CVA (cerebral vascular accident) 9/16/2017    Diabetes mellitus     Former smoker 7/18/2020    History of stroke 8/15/2017    Hypertension     Hypertrophic cardiomyopathy     Renal disorder     Stroke         Past Surgical History:   Procedure Laterality Date    BACK SURGERY      COLONOSCOPY N/A 10/18/2018    Procedure: COLONOSCOPY;  Surgeon: Alan Gooden MD;  Location: [facility];  Service: Endoscopy;  Laterality: N/A;    ESOPHAGOGASTRODUODENOSCOPY N/A 9/4/2018    Procedure: EGD (ESOPHAGOGASTRODUODENOSCOPY);  Surgeon: Oli Cuadra MD;  Location: [facility];  Service: Endoscopy;  Laterality: N/A;    ESOPHAGOGASTRODUODENOSCOPY N/A 10/18/2018    Procedure: EGD (ESOPHAGOGASTRODUODENOSCOPY);  Surgeon: Alan Gooden MD;  Location: [facility];  Service: Endoscopy;  Laterality: N/A;    THYROIDECTOMY         No family history on file.    Social History     Socioeconomic History    Marital status: Single     Spouse name: Not on file    Number of children: Not on file    Years of education: Not on file    Highest education level: Not on file   Occupational History    Not on file   Social Needs    Financial resource strain: Not on file    Food insecurity     Worry: Not on file     Inability: Not on file    Transportation needs     Medical: Not on file     Non-medical: Not on file   Tobacco Use    Smoking status: Former Smoker     Packs/day: 0.90     Years: 3.00     Pack years: 2.70     Types: Cigarettes    Smokeless  tobacco: Never Used   Substance and Sexual Activity    Alcohol use: Never     Frequency: Never    Drug use: No    Sexual activity: Not on file   Lifestyle    Physical activity     Days per week: Not on file     Minutes per session: Not on file    Stress: Not on file   Relationships    Social connections     Talks on phone: Not on file     Gets together: Not on file     Attends [employer] service: Not on file     Active member of club or organization: Not on file     Attends meetings of clubs or organizations: Not on file     Relationship status: Not on file   Other Topics Concern    Not on file   Social History Narrative    Not on file       Current Outpatient Medications on File Prior to Visit   Medication Sig Dispense Refill    acetaminophen (TYLENOL) 325 MG tablet Take 2 tablets (650 mg total) by mouth every 6 (six) hours as needed for Pain.  0    apixaban (ELIQUIS) 2.5 mg Tab Take 1 tablet (2.5 mg total) by mouth 2 (two) times daily. (Patient taking differently: Take 2.5 mg by mouth once daily. ) 60 tablet 11    atorvastatin (LIPITOR) 80 MG tablet Take 1 tablet (80 mg total) by mouth once daily. 90 tablet 3    melatonin (MELATIN) 3 mg tablet Take 2 tablets (6 mg total) by mouth nightly as needed for Insomnia.  0    multivitamin Tab Take 1 tablet by mouth once daily.      pantoprazole (PROTONIX) 40 MG tablet Take 40 mg by mouth once daily.  3    vitamin D 1000 units Tab Take 1,000 Units by mouth once daily.       No current facility-administered medications on file prior to visit.        Review of patient's allergies indicates:  No Known Allergies  Objective:   There were no vitals filed for this visit.     Physical Exam     Assessment:     1. Status post placement of implantable loop recorder      Plan:   Diagnoses and all orders for this visit:    Status post placement of implantable loop recorder         No follow-ups on file.

## 2020-10-02 ENCOUNTER — HOSPITAL ENCOUNTER (OUTPATIENT)
Dept: RADIOLOGY | Facility: HOSPITAL | Age: 80
Discharge: HOME | End: 2020-10-02
Attending: INTERNAL MEDICINE
Payer: MEDICARE

## 2020-10-02 ENCOUNTER — LAB VISIT (OUTPATIENT)
Dept: LAB | Facility: HOSPITAL | Age: 80
End: 2020-10-02
Attending: INTERNAL MEDICINE
Payer: MEDICARE

## 2020-10-02 ENCOUNTER — TELEPHONE (OUTPATIENT)
Dept: CARDIOLOGY | Facility: CLINIC | Age: 80
End: 2020-10-02

## 2020-10-02 ENCOUNTER — OFFICE VISIT (OUTPATIENT)
Dept: PULMONOLOGY | Facility: CLINIC | Age: 80
End: 2020-10-02
Payer: MEDICARE

## 2020-10-02 VITALS
HEART RATE: 81 BPM | BODY MASS INDEX: 25.34 KG/M2 | WEIGHT: 177 LBS | SYSTOLIC BLOOD PRESSURE: 136 MMHG | HEIGHT: 70 IN | DIASTOLIC BLOOD PRESSURE: 76 MMHG | OXYGEN SATURATION: 93 %

## 2020-10-02 DIAGNOSIS — J90 RECURRENT PLEURAL EFFUSION ON RIGHT: Primary | ICD-10-CM

## 2020-10-02 DIAGNOSIS — J90 PLEURAL EFFUSION ON RIGHT: Primary | ICD-10-CM

## 2020-10-02 DIAGNOSIS — J90 PLEURAL EFFUSION, NOT ELSEWHERE CLASSIFIED: ICD-10-CM

## 2020-10-02 DIAGNOSIS — J90 PLEURAL EFFUSION ON RIGHT: ICD-10-CM

## 2020-10-02 DIAGNOSIS — I50.32 CHRONIC DIASTOLIC CONGESTIVE HEART FAILURE: ICD-10-CM

## 2020-10-02 DIAGNOSIS — I48.0 PAROXYSMAL ATRIAL FIBRILLATION: ICD-10-CM

## 2020-10-02 DIAGNOSIS — I50.32 CHRONIC HEART FAILURE WITH PRESERVED EJECTION FRACTION: ICD-10-CM

## 2020-10-02 LAB
ALBUMIN SERPL BCP-MCNC: 3.4 G/DL (ref 3.5–5.2)
ALP SERPL-CCNC: 98 U/L (ref 55–135)
ALT SERPL W/O P-5'-P-CCNC: 54 U/L (ref 10–44)
ANION GAP SERPL CALC-SCNC: 8 MMOL/L (ref 8–16)
AST SERPL-CCNC: 46 U/L (ref 10–40)
BASOPHILS # BLD AUTO: 0.03 K/UL (ref 0–0.2)
BASOPHILS NFR BLD: 0.6 % (ref 0–1.9)
BILIRUB SERPL-MCNC: 1 MG/DL (ref 0.1–1)
BNP SERPL-MCNC: 91 PG/ML (ref 0–99)
BUN SERPL-MCNC: 18 MG/DL (ref 8–23)
CALCIUM SERPL-MCNC: 10.3 MG/DL (ref 8.7–10.5)
CHLORIDE SERPL-SCNC: 107 MMOL/L (ref 95–110)
CO2 SERPL-SCNC: 24 MMOL/L (ref 23–29)
CREAT SERPL-MCNC: 1.6 MG/DL (ref 0.5–1.4)
DIFFERENTIAL METHOD: ABNORMAL
EOSINOPHIL # BLD AUTO: 0.1 K/UL (ref 0–0.5)
EOSINOPHIL NFR BLD: 1.1 % (ref 0–8)
ERYTHROCYTE [DISTWIDTH] IN BLOOD BY AUTOMATED COUNT: 14.8 % (ref 11.5–14.5)
EST. GFR  (AFRICAN AMERICAN): 46 ML/MIN/1.73 M^2
EST. GFR  (NON AFRICAN AMERICAN): 40 ML/MIN/1.73 M^2
GLUCOSE SERPL-MCNC: 106 MG/DL (ref 70–110)
HCT VFR BLD AUTO: 45.1 % (ref 40–54)
HGB BLD-MCNC: 15.1 G/DL (ref 14–18)
IMM GRANULOCYTES # BLD AUTO: 0.01 K/UL (ref 0–0.04)
IMM GRANULOCYTES NFR BLD AUTO: 0.2 % (ref 0–0.5)
LYMPHOCYTES # BLD AUTO: 0.9 K/UL (ref 1–4.8)
LYMPHOCYTES NFR BLD: 19.4 % (ref 18–48)
MCH RBC QN AUTO: 29.3 PG (ref 27–31)
MCHC RBC AUTO-ENTMCNC: 33.5 G/DL (ref 32–36)
MCV RBC AUTO: 87 FL (ref 82–98)
MONOCYTES # BLD AUTO: 0.4 K/UL (ref 0.3–1)
MONOCYTES NFR BLD: 9.2 % (ref 4–15)
NEUTROPHILS # BLD AUTO: 3.2 K/UL (ref 1.8–7.7)
NEUTROPHILS NFR BLD: 69.5 % (ref 38–73)
NRBC BLD-RTO: 0 /100 WBC
PLATELET # BLD AUTO: 235 K/UL (ref 150–350)
PMV BLD AUTO: 10.6 FL (ref 9.2–12.9)
POTASSIUM SERPL-SCNC: 4.1 MMOL/L (ref 3.5–5.1)
PROT SERPL-MCNC: 6.9 G/DL (ref 6–8.4)
RBC # BLD AUTO: 5.16 M/UL (ref 4.6–6.2)
SODIUM SERPL-SCNC: 139 MMOL/L (ref 136–145)
T4 FREE SERPL-MCNC: 1.27 NG/DL (ref 0.71–1.51)
TSH SERPL DL<=0.005 MIU/L-ACNC: 0.31 UIU/ML (ref 0.4–4)
WBC # BLD AUTO: 4.65 K/UL (ref 3.9–12.7)

## 2020-10-02 PROCEDURE — 99999 PR PBB SHADOW E&M-EST. PATIENT-LVL III: CPT | Mod: PBBFAC,GC,, | Performed by: INTERNAL MEDICINE

## 2020-10-02 PROCEDURE — 3078F PR MOST RECENT DIASTOLIC BLOOD PRESSURE < 80 MM HG: ICD-10-PCS | Mod: CPTII,GC,S$GLB, | Performed by: INTERNAL MEDICINE

## 2020-10-02 PROCEDURE — 1126F AMNT PAIN NOTED NONE PRSNT: CPT | Mod: GC,S$GLB,, | Performed by: INTERNAL MEDICINE

## 2020-10-02 PROCEDURE — 1101F PR PT FALLS ASSESS DOC 0-1 FALLS W/OUT INJ PAST YR: ICD-10-PCS | Mod: CPTII,GC,S$GLB, | Performed by: INTERNAL MEDICINE

## 2020-10-02 PROCEDURE — 71046 XR CHEST PA AND LATERAL: ICD-10-PCS | Mod: 26,,, | Performed by: RADIOLOGY

## 2020-10-02 PROCEDURE — 1159F PR MEDICATION LIST DOCUMENTED IN MEDICAL RECORD: ICD-10-PCS | Mod: GC,S$GLB,, | Performed by: INTERNAL MEDICINE

## 2020-10-02 PROCEDURE — 80053 COMPREHEN METABOLIC PANEL: CPT

## 2020-10-02 PROCEDURE — 84443 ASSAY THYROID STIM HORMONE: CPT

## 2020-10-02 PROCEDURE — 99213 PR OFFICE/OUTPT VISIT, EST, LEVL III, 20-29 MIN: ICD-10-PCS | Mod: GC,S$GLB,, | Performed by: INTERNAL MEDICINE

## 2020-10-02 PROCEDURE — 99999 PR PBB SHADOW E&M-EST. PATIENT-LVL III: ICD-10-PCS | Mod: PBBFAC,GC,, | Performed by: INTERNAL MEDICINE

## 2020-10-02 PROCEDURE — 99213 OFFICE O/P EST LOW 20 MIN: CPT | Mod: GC,S$GLB,, | Performed by: INTERNAL MEDICINE

## 2020-10-02 PROCEDURE — 3075F PR MOST RECENT SYSTOLIC BLOOD PRESS GE 130-139MM HG: ICD-10-PCS | Mod: CPTII,GC,S$GLB, | Performed by: INTERNAL MEDICINE

## 2020-10-02 PROCEDURE — 36415 COLL VENOUS BLD VENIPUNCTURE: CPT

## 2020-10-02 PROCEDURE — 71045 X-RAY EXAM CHEST 1 VIEW: CPT | Mod: TC,FY,RT

## 2020-10-02 PROCEDURE — 1101F PT FALLS ASSESS-DOCD LE1/YR: CPT | Mod: CPTII,GC,S$GLB, | Performed by: INTERNAL MEDICINE

## 2020-10-02 PROCEDURE — 71045 XR CHEST LATERAL DECUBITUS RIGHT: ICD-10-PCS | Mod: 26,RT,, | Performed by: RADIOLOGY

## 2020-10-02 PROCEDURE — 84439 ASSAY OF FREE THYROXINE: CPT

## 2020-10-02 PROCEDURE — 3078F DIAST BP <80 MM HG: CPT | Mod: CPTII,GC,S$GLB, | Performed by: INTERNAL MEDICINE

## 2020-10-02 PROCEDURE — 1159F MED LIST DOCD IN RCRD: CPT | Mod: GC,S$GLB,, | Performed by: INTERNAL MEDICINE

## 2020-10-02 PROCEDURE — 3075F SYST BP GE 130 - 139MM HG: CPT | Mod: CPTII,GC,S$GLB, | Performed by: INTERNAL MEDICINE

## 2020-10-02 PROCEDURE — 85025 COMPLETE CBC W/AUTO DIFF WBC: CPT

## 2020-10-02 PROCEDURE — 71045 X-RAY EXAM CHEST 1 VIEW: CPT | Mod: 26,RT,, | Performed by: RADIOLOGY

## 2020-10-02 PROCEDURE — 71046 X-RAY EXAM CHEST 2 VIEWS: CPT | Mod: 26,,, | Performed by: RADIOLOGY

## 2020-10-02 PROCEDURE — 71046 X-RAY EXAM CHEST 2 VIEWS: CPT | Mod: TC,FY

## 2020-10-02 PROCEDURE — 83880 ASSAY OF NATRIURETIC PEPTIDE: CPT

## 2020-10-02 PROCEDURE — 1126F PR PAIN SEVERITY QUANTIFIED, NO PAIN PRESENT: ICD-10-PCS | Mod: GC,S$GLB,, | Performed by: INTERNAL MEDICINE

## 2020-10-02 NOTE — PROGRESS NOTES
80 year old man s/p thoracentesis (monocyte predominant exudate last month) presenting with recurrent pleural effusion.  Etiology unclear, but concern for malignancy (though cyto was negative on last tap). Plan for repeat thoracentesis after he has held NOAC for 72 hours.

## 2020-10-02 NOTE — PROGRESS NOTES
Subjective:       Patient ID: Shant Magana Jr. is a 80 y.o. male.    Chief Complaint: No chief complaint on file.    Pt is an 81 yo AAM pmh dementia, DM, HTN, afib on eliquis, tobacco abuse, CVA as recent as 8/11 who developed hypoxic respiratory failure thought to be attributed to pulmonary edema, but did not resolve with diuresis. Wheezing described and dypnea improved with duonebs. Pt presenting today for further evaluation of dyspnea and concern for obstructive lung disease. On review of imaging, patient developed right sided pleural effusion and received IV lasix 20 mg x2 followed by 40 mg without documentation of net negative I/O, but improvement on CXR 7/23 prior to discharge. Most recent echo 7/19 shows EF 68% with documented normal diastolic function. Pt found to have moderate to large right sided effusion. Thoracentesis performed showing exudative effusion, Cell count with , lymphocyte 48%, 52% monocyte, cytology negative for malignant cells. Not likely pseudoexudate after calculating serum albumin-fluid albumin 0.9. CMP showing increase sCr after starting increased from 1.4 to 2.2 and lasix was discontinued. Post thora CT scan to evaluate for pulmonary nodule not performed due to significant residual fluid, but did not obtain post procedure CXR. Pt had post procedural improvement in dyspnea, but has returned.     Review of Systems   Constitutional: Positive for weight loss and fatigue. Negative for fever and activity change.   Respiratory: Positive for shortness of breath, orthopnea, dyspnea on extertion and Paroxysmal Nocturnal Dyspnea. Negative for cough, sputum production, chest tightness, wheezing and asthma nighttime symptoms.         Difficulty laying on right side.    Cardiovascular: Negative for chest pain, palpitations and leg swelling.   Gastrointestinal: Negative for nausea, vomiting, abdominal pain and abdominal distention.   Neurological: Negative for dizziness, syncope, weakness and  light-headedness.       Objective:      Physical Exam   Constitutional: He is oriented to person, place, and time. He appears well-developed and well-nourished. He appears not cachectic. No distress.   HENT:   Head: Normocephalic.   Nose: Nose normal.   Neck: Normal range of motion. Neck supple. No tracheal deviation present.   Cardiovascular: Normal rate, regular rhythm and normal heart sounds. Exam reveals no gallop and no friction rub.   No murmur heard.  Pulmonary/Chest: Normal expansion, symmetric chest wall expansion and effort normal. No stridor. He has no wheezes. He has no rhonchi. He has no rales. He exhibits no tenderness.   Decreased breath sounds all right posterior lung field with slight improvement at apex, dullness to percussion in these fields.    Abdominal: Soft. Bowel sounds are normal. He exhibits no distension and no mass. There is no abdominal tenderness.   Musculoskeletal:         General: No edema.      Comments: +1 pitting edema bilaterally   Lymphadenopathy: No supraclavicular adenopathy is present.     He has no cervical adenopathy.     He has no axillary adenopathy.   Neurological: He is alert and oriented to person, place, and time.   Answers yes and no questions, but has assistance from son for more complex answers. Needs increased prompting after giving instructions.    Skin: Skin is warm and dry. No rash noted. No erythema. Nails show no clubbing.   Psychiatric: His behavior is normal.   Short attention span, answers simple yes/no questions that son confirms or denies. Able to follow some commands, but unable to to follow some commands such as bend knees.      Personal Diagnostic Review  CXR 10/2: complete right out of right lung fields.           Assessment:       1. Recurrent pleural effusion on right        Outpatient Encounter Medications as of 10/2/2020   Medication Sig Dispense Refill    acetaminophen (TYLENOL) 325 MG tablet Take 2 tablets (650 mg total) by mouth every 6 (six)  hours as needed for Pain.  0    apixaban (ELIQUIS) 2.5 mg Tab Take 1 tablet (2.5 mg total) by mouth 2 (two) times daily. (Patient taking differently: Take 2.5 mg by mouth once daily. ) 60 tablet 11    atorvastatin (LIPITOR) 80 MG tablet Take 1 tablet (80 mg total) by mouth once daily. 90 tablet 3    melatonin (MELATIN) 3 mg tablet Take 2 tablets (6 mg total) by mouth nightly as needed for Insomnia.  0    multivitamin Tab Take 1 tablet by mouth once daily.      pantoprazole (PROTONIX) 40 MG tablet Take 40 mg by mouth once daily.  3    tamsulosin (FLOMAX) 0.4 mg Cap TAKE 1 CAPSULE BY MOUTH EVERY NIGHT AT BEDTIME 90 capsule 3    vitamin D 1000 units Tab Take 1,000 Units by mouth once daily.       No facility-administered encounter medications on file as of 10/2/2020.      No orders of the defined types were placed in this encounter.      Plan:       1. Recurrent pleural effusion on right      Recurrence of pleural effusion with worsening dyspnea since last thoracentesis. Lymphocyte predominate exudative pleural effusion , concerning for malignant effusion unlikely pseudo exudative despite use of lasix prior to procedure. Unlikely TB without signs on previous imagine,     - Will have patient hold apixaban and perform thoracentesis next Friday with follow up CT scan to evaluate for lung mass.   - hold apixaban after Tuesday doses.     Discussed patient and plan with Dr. Juana Boggs.     Hubert Grissom MD  [facility] PGY V

## 2020-10-02 NOTE — TELEPHONE ENCOUNTER
Lab Visit on 10/02/2020   Component Date Value Ref Range Status    BNP 10/02/2020 91  0 - 99 pg/mL Final    WBC 10/02/2020 4.65  3.90 - 12.70 K/uL Final    RBC 10/02/2020 5.16  4.60 - 6.20 M/uL Final    Hemoglobin 10/02/2020 15.1  14.0 - 18.0 g/dL Final    Hematocrit 10/02/2020 45.1  40.0 - 54.0 % Final    Mean Corpuscular Volume 10/02/2020 87  82 - 98 fL Final    Mean Corpuscular Hemoglobin 10/02/2020 29.3  27.0 - 31.0 pg Final    Mean Corpuscular Hemoglobin Conc 10/02/2020 33.5  32.0 - 36.0 g/dL Final    RDW 10/02/2020 14.8* 11.5 - 14.5 % Final    Platelets 10/02/2020 235  150 - 350 K/uL Final    MPV 10/02/2020 10.6  9.2 - 12.9 fL Final    Immature Granulocytes 10/02/2020 0.2  0.0 - 0.5 % Final    Gran # (ANC) 10/02/2020 3.2  1.8 - 7.7 K/uL Final    Immature Grans (Abs) 10/02/2020 0.01  0.00 - 0.04 K/uL Final    Lymph # 10/02/2020 0.9* 1.0 - 4.8 K/uL Final    Mono # 10/02/2020 0.4  0.3 - 1.0 K/uL Final    Eos # 10/02/2020 0.1  0.0 - 0.5 K/uL Final    Baso # 10/02/2020 0.03  0.00 - 0.20 K/uL Final    nRBC 10/02/2020 0  0 /100 WBC Final    Gran% 10/02/2020 69.5  38.0 - 73.0 % Final    Lymph% 10/02/2020 19.4  18.0 - 48.0 % Final    Mono% 10/02/2020 9.2  4.0 - 15.0 % Final    Eosinophil% 10/02/2020 1.1  0.0 - 8.0 % Final    Basophil% 10/02/2020 0.6  0.0 - 1.9 % Final    Differential Method 10/02/2020 Automated   Final    Sodium 10/02/2020 139  136 - 145 mmol/L Final    Potassium 10/02/2020 4.1  3.5 - 5.1 mmol/L Final    Chloride 10/02/2020 107  95 - 110 mmol/L Final    CO2 10/02/2020 24  23 - 29 mmol/L Final    Glucose 10/02/2020 106  70 - 110 mg/dL Final    BUN, Bld 10/02/2020 18  8 - 23 mg/dL Final    Creatinine 10/02/2020 1.6* 0.5 - 1.4 mg/dL Final    Calcium 10/02/2020 10.3  8.7 - 10.5 mg/dL Final    Total Protein 10/02/2020 6.9  6.0 - 8.4 g/dL Final    Albumin 10/02/2020 3.4* 3.5 - 5.2 g/dL Final    Total Bilirubin 10/02/2020 1.0  0.1 - 1.0 mg/dL Final    Alkaline Phosphatase  10/02/2020 98  55 - 135 U/L Final    AST 10/02/2020 46* 10 - 40 U/L Final    ALT 10/02/2020 54* 10 - 44 U/L Final    Anion Gap 10/02/2020 8  8 - 16 mmol/L Final    eGFR if  10/02/2020 46* >60 mL/min/1.73 m^2 Final    eGFR if non African American 10/02/2020 40* >60 mL/min/1.73 m^2 Final    TSH 10/02/2020 0.309* 0.400 - 4.000 uIU/mL Final    Free T4 10/02/2020 1.27  0.71 - 1.51 ng/dL Final   Lab Visit on 09/04/2020   Component Date Value Ref Range Status    Body Fluid Type 09/04/2020 Pleural   Final    Fluid Appearance 09/04/2020 Clear   Final    Fluid Color 09/04/2020 Yellow   Final    WBC, Body Fluid 09/04/2020 130  /cu mm Final    Lymphs, Fluid 09/04/2020 48  % Final    Monocytes/Macrophages, Fluid 09/04/2020 52  % Final    Aerobic Bacterial Culture 09/04/2020 No growth   Final    Anaerobic Culture 09/04/2020 No anaerobes isolated   Final    Gram Stain Result 09/04/2020 Rare WBC's   Final    Gram Stain Result 09/04/2020 No organisms seen   Final    AFB Culture & Smear 09/04/2020 Culture in progress   Preliminary    AFB CULTURE STAIN 09/04/2020 No acid fast bacilli seen.   Final   Lab Visit on 09/04/2020   Component Date Value Ref Range Status    LD 09/04/2020 223  110 - 260 U/L Final    Sodium 09/04/2020 140  136 - 145 mmol/L Final    Potassium 09/04/2020 4.7  3.5 - 5.1 mmol/L Final    Chloride 09/04/2020 109  95 - 110 mmol/L Final    CO2 09/04/2020 23  23 - 29 mmol/L Final    Glucose 09/04/2020 110  70 - 110 mg/dL Final    BUN, Bld 09/04/2020 29* 8 - 23 mg/dL Final    Creatinine 09/04/2020 2.2* 0.5 - 1.4 mg/dL Final    Calcium 09/04/2020 10.3  8.7 - 10.5 mg/dL Final    Total Protein 09/04/2020 7.0  6.0 - 8.4 g/dL Final    Albumin 09/04/2020 3.2* 3.5 - 5.2 g/dL Final    Total Bilirubin 09/04/2020 0.6  0.1 - 1.0 mg/dL Final    Alkaline Phosphatase 09/04/2020 84  55 - 135 U/L Final    AST 09/04/2020 26  10 - 40 U/L Final    ALT 09/04/2020 19  10 - 44 U/L Final     Anion Gap 09/04/2020 8  8 - 16 mmol/L Final    eGFR if  09/04/2020 31.5* >60 mL/min/1.73 m^2 Final    eGFR if non African American 09/04/2020 27.3* >60 mL/min/1.73 m^2 Final   Office Visit on 09/04/2020   Component Date Value Ref Range Status    Final Pathologic Diagnosis 09/04/2020    Corrected                    Value:Pleural fluid (cytology and cell block):  Rare atypical cells, not diagnostic of malignancy  Additional immunohistochemical work up pending      Supplemental Diagnosis 09/04/2020    Corrected                    Value:This supplemental is issued to report the findings of additional stains with  appropriate controls.  TTF 1:  Negative  BerEp4:  Negative  S100:  Negative  WT1:  Negative  Calretinin:  Highlights mesothelial cells with vacuoles  CD68:  Highlights abundant macrophages  Interpretation:   Atypical cells, favor reactive mesothelial cells.  Concurring pathologist: Dr. LIZ Sykes.      Microscopic Exam 09/04/2020    Corrected                    Value:ThinPrep is almost acellular.  Cell block contains cellular material.      Body Fluid Source, LDH 09/04/2020 Pleural Fluid, Right   Final    LD, Fluid 09/04/2020 316  Not established U/L Final    Body Fluid Source, Total Protein 09/04/2020 Pleural Fluid, Right   Final    Body Fluid, Protein 09/04/2020 4.0  Not established g/dL Final    Body Fluid Source, Albumin 09/04/2020 Pleural Fluid, Right   Final    Body Fluid Albumin 09/04/2020 2.3  See text g/dL Final   Admission on 07/27/2020, Discharged on 08/11/2020   Component Date Value Ref Range Status    POCT Glucose 07/27/2020 131* 70 - 110 mg/dL Final    POCT Glucose 07/28/2020 90  70 - 110 mg/dL Final    Vit D, 25-Hydroxy 07/30/2020 35  30 - 96 ng/mL Final    WBC 07/31/2020 5.07  3.90 - 12.70 K/uL Final    RBC 07/31/2020 4.37* 4.60 - 6.20 M/uL Final    Hemoglobin 07/31/2020 12.7* 14.0 - 18.0 g/dL Final    Hematocrit 07/31/2020 39.5* 40.0 - 54.0 % Final    Mean  Corpuscular Volume 07/31/2020 90  82 - 98 fL Final    Mean Corpuscular Hemoglobin 07/31/2020 29.1  27.0 - 31.0 pg Final    Mean Corpuscular Hemoglobin Conc 07/31/2020 32.2  32.0 - 36.0 g/dL Final    RDW 07/31/2020 13.9  11.5 - 14.5 % Final    Platelets 07/31/2020 244  150 - 350 K/uL Final    MPV 07/31/2020 11.3  9.2 - 12.9 fL Final    Immature Granulocytes 07/31/2020 0.2  0.0 - 0.5 % Final    Gran # (ANC) 07/31/2020 3.4  1.8 - 7.7 K/uL Final    Immature Grans (Abs) 07/31/2020 0.01  0.00 - 0.04 K/uL Final    Lymph # 07/31/2020 1.0  1.0 - 4.8 K/uL Final    Mono # 07/31/2020 0.5  0.3 - 1.0 K/uL Final    Eos # 07/31/2020 0.2  0.0 - 0.5 K/uL Final    Baso # 07/31/2020 0.03  0.00 - 0.20 K/uL Final    nRBC 07/31/2020 0  0 /100 WBC Final    Gran% 07/31/2020 66.4  38.0 - 73.0 % Final    Lymph% 07/31/2020 19.9  18.0 - 48.0 % Final    Mono% 07/31/2020 9.7  4.0 - 15.0 % Final    Eosinophil% 07/31/2020 3.2  0.0 - 8.0 % Final    Basophil% 07/31/2020 0.6  0.0 - 1.9 % Final    Differential Method 07/31/2020 Automated   Final    Sodium 07/31/2020 135* 136 - 145 mmol/L Final    Potassium 07/31/2020 3.9  3.5 - 5.1 mmol/L Final    Chloride 07/31/2020 106  95 - 110 mmol/L Final    CO2 07/31/2020 22* 23 - 29 mmol/L Final    Glucose 07/31/2020 100  70 - 110 mg/dL Final    BUN, Bld 07/31/2020 29* 8 - 23 mg/dL Final    Creatinine 07/31/2020 1.5* 0.5 - 1.4 mg/dL Final    Calcium 07/31/2020 10.1  8.7 - 10.5 mg/dL Final    Anion Gap 07/31/2020 7* 8 - 16 mmol/L Final    eGFR if African American 07/31/2020 50.1* >60 mL/min/1.73 m^2 Final    eGFR if non African American 07/31/2020 43.3* >60 mL/min/1.73 m^2 Final    Magnesium 07/31/2020 1.8  1.6 - 2.6 mg/dL Final    Phosphorus 07/31/2020 2.7  2.7 - 4.5 mg/dL Final    WBC 08/04/2020 4.54  3.90 - 12.70 K/uL Final    RBC 08/04/2020 4.62  4.60 - 6.20 M/uL Final    Hemoglobin 08/04/2020 13.4* 14.0 - 18.0 g/dL Final    Hematocrit 08/04/2020 41.5  40.0 - 54.0 % Final     Mean Corpuscular Volume 08/04/2020 90  82 - 98 fL Final    Mean Corpuscular Hemoglobin 08/04/2020 29.0  27.0 - 31.0 pg Final    Mean Corpuscular Hemoglobin Conc 08/04/2020 32.3  32.0 - 36.0 g/dL Final    RDW 08/04/2020 14.3  11.5 - 14.5 % Final    Platelets 08/04/2020 275  150 - 350 K/uL Final    MPV 08/04/2020 10.9  9.2 - 12.9 fL Final    Immature Granulocytes 08/04/2020 0.2  0.0 - 0.5 % Final    Gran # (ANC) 08/04/2020 2.8  1.8 - 7.7 K/uL Final    Immature Grans (Abs) 08/04/2020 0.01  0.00 - 0.04 K/uL Final    Lymph # 08/04/2020 1.0  1.0 - 4.8 K/uL Final    Mono # 08/04/2020 0.5  0.3 - 1.0 K/uL Final    Eos # 08/04/2020 0.2  0.0 - 0.5 K/uL Final    Baso # 08/04/2020 0.05  0.00 - 0.20 K/uL Final    nRBC 08/04/2020 0  0 /100 WBC Final    Gran% 08/04/2020 61.0  38.0 - 73.0 % Final    Lymph% 08/04/2020 21.8  18.0 - 48.0 % Final    Mono% 08/04/2020 10.6  4.0 - 15.0 % Final    Eosinophil% 08/04/2020 5.3  0.0 - 8.0 % Final    Basophil% 08/04/2020 1.1  0.0 - 1.9 % Final    Differential Method 08/04/2020 Automated   Final    Sodium 08/04/2020 139  136 - 145 mmol/L Final    Potassium 08/04/2020 4.5  3.5 - 5.1 mmol/L Final    Chloride 08/04/2020 110  95 - 110 mmol/L Final    CO2 08/04/2020 25  23 - 29 mmol/L Final    Glucose 08/04/2020 92  70 - 110 mg/dL Final    BUN, Bld 08/04/2020 30* 8 - 23 mg/dL Final    Creatinine 08/04/2020 1.5* 0.5 - 1.4 mg/dL Final    Calcium 08/04/2020 10.2  8.7 - 10.5 mg/dL Final    Anion Gap 08/04/2020 4* 8 - 16 mmol/L Final    eGFR if  08/04/2020 50.1* >60 mL/min/1.73 m^2 Final    eGFR if non African American 08/04/2020 43.3* >60 mL/min/1.73 m^2 Final    Magnesium 08/04/2020 1.8  1.6 - 2.6 mg/dL Final    Phosphorus 08/04/2020 2.8  2.7 - 4.5 mg/dL Final    WBC 08/07/2020 4.79  3.90 - 12.70 K/uL Final    RBC 08/07/2020 4.41* 4.60 - 6.20 M/uL Final    Hemoglobin 08/07/2020 12.8* 14.0 - 18.0 g/dL Final    Hematocrit 08/07/2020 40.5  40.0 - 54.0  % Final    Mean Corpuscular Volume 08/07/2020 92  82 - 98 fL Final    Mean Corpuscular Hemoglobin 08/07/2020 29.0  27.0 - 31.0 pg Final    Mean Corpuscular Hemoglobin Conc 08/07/2020 31.6* 32.0 - 36.0 g/dL Final    RDW 08/07/2020 14.7* 11.5 - 14.5 % Final    Platelets 08/07/2020 312  150 - 350 K/uL Final    MPV 08/07/2020 11.0  9.2 - 12.9 fL Final    Immature Granulocytes 08/07/2020 0.2  0.0 - 0.5 % Final    Gran # (ANC) 08/07/2020 2.9  1.8 - 7.7 K/uL Final    Immature Grans (Abs) 08/07/2020 0.01  0.00 - 0.04 K/uL Final    Lymph # 08/07/2020 1.1  1.0 - 4.8 K/uL Final    Mono # 08/07/2020 0.5  0.3 - 1.0 K/uL Final    Eos # 08/07/2020 0.3  0.0 - 0.5 K/uL Final    Baso # 08/07/2020 0.03  0.00 - 0.20 K/uL Final    nRBC 08/07/2020 0  0 /100 WBC Final    Gran% 08/07/2020 61.1  38.0 - 73.0 % Final    Lymph% 08/07/2020 22.1  18.0 - 48.0 % Final    Mono% 08/07/2020 10.4  4.0 - 15.0 % Final    Eosinophil% 08/07/2020 5.6  0.0 - 8.0 % Final    Basophil% 08/07/2020 0.6  0.0 - 1.9 % Final    Differential Method 08/07/2020 Automated   Final    Sodium 08/07/2020 140  136 - 145 mmol/L Final    Potassium 08/07/2020 4.2  3.5 - 5.1 mmol/L Final    Chloride 08/07/2020 110  95 - 110 mmol/L Final    CO2 08/07/2020 25  23 - 29 mmol/L Final    Glucose 08/07/2020 100  70 - 110 mg/dL Final    BUN, Bld 08/07/2020 34* 8 - 23 mg/dL Final    Creatinine 08/07/2020 1.6* 0.5 - 1.4 mg/dL Final    Calcium 08/07/2020 10.4  8.7 - 10.5 mg/dL Final    Anion Gap 08/07/2020 5* 8 - 16 mmol/L Final    eGFR if  08/07/2020 46.3* >60 mL/min/1.73 m^2 Final    eGFR if non  08/07/2020 40.1* >60 mL/min/1.73 m^2 Final    Magnesium 08/07/2020 2.0  1.6 - 2.6 mg/dL Final    Phosphorus 08/07/2020 2.7  2.7 - 4.5 mg/dL Final    WBC 08/11/2020 4.82  3.90 - 12.70 K/uL Final    RBC 08/11/2020 4.38* 4.60 - 6.20 M/uL Final    Hemoglobin 08/11/2020 12.7* 14.0 - 18.0 g/dL Final    Hematocrit 08/11/2020 40.0   40.0 - 54.0 % Final    Mean Corpuscular Volume 08/11/2020 91  82 - 98 fL Final    Mean Corpuscular Hemoglobin 08/11/2020 29.0  27.0 - 31.0 pg Final    Mean Corpuscular Hemoglobin Conc 08/11/2020 31.8* 32.0 - 36.0 g/dL Final    RDW 08/11/2020 14.7* 11.5 - 14.5 % Final    Platelets 08/11/2020 274  150 - 350 K/uL Final    MPV 08/11/2020 10.8  9.2 - 12.9 fL Final    Immature Granulocytes 08/11/2020 0.4  0.0 - 0.5 % Final    Gran # (ANC) 08/11/2020 3.0  1.8 - 7.7 K/uL Final    Immature Grans (Abs) 08/11/2020 0.02  0.00 - 0.04 K/uL Final    Lymph # 08/11/2020 1.1  1.0 - 4.8 K/uL Final    Mono # 08/11/2020 0.5  0.3 - 1.0 K/uL Final    Eos # 08/11/2020 0.2  0.0 - 0.5 K/uL Final    Baso # 08/11/2020 0.03  0.00 - 0.20 K/uL Final    nRBC 08/11/2020 0  0 /100 WBC Final    Gran% 08/11/2020 62.1  38.0 - 73.0 % Final    Lymph% 08/11/2020 23.7  18.0 - 48.0 % Final    Mono% 08/11/2020 9.3  4.0 - 15.0 % Final    Eosinophil% 08/11/2020 3.9  0.0 - 8.0 % Final    Basophil% 08/11/2020 0.6  0.0 - 1.9 % Final    Differential Method 08/11/2020 Automated   Final    Sodium 08/11/2020 137  136 - 145 mmol/L Final    Potassium 08/11/2020 3.9  3.5 - 5.1 mmol/L Final    Chloride 08/11/2020 110  95 - 110 mmol/L Final    CO2 08/11/2020 21* 23 - 29 mmol/L Final    Glucose 08/11/2020 92  70 - 110 mg/dL Final    BUN, Bld 08/11/2020 32* 8 - 23 mg/dL Final    Creatinine 08/11/2020 1.4  0.5 - 1.4 mg/dL Final    Calcium 08/11/2020 9.5  8.7 - 10.5 mg/dL Final    Anion Gap 08/11/2020 6* 8 - 16 mmol/L Final    eGFR if African American 08/11/2020 54.5* >60 mL/min/1.73 m^2 Final    eGFR if non  08/11/2020 47.1* >60 mL/min/1.73 m^2 Final    Magnesium 08/11/2020 1.7  1.6 - 2.6 mg/dL Final    Phosphorus 08/11/2020 2.8  2.7 - 4.5 mg/dL Final   Lab Visit on 08/06/2020   Component Date Value Ref Range Status    SARS-CoV2 (COVID-19) Qualitative P* 08/06/2020 Not Detected  Not Detected Final   Lab Visit on 07/30/2020    Component Date Value Ref Range Status    SARS-CoV2 (COVID-19) Qualitative P* 07/30/2020 Not Detected  Not Detected Final   No results displayed because visit has over 200 results.      (  Lab OK (reanal fcn improved)  --I spoke with son.  Pt getting xrays with pulmonologist today due to wheezing and to f/u pleural effusion

## 2020-10-06 ENCOUNTER — HOSPITAL ENCOUNTER (OUTPATIENT)
Facility: HOSPITAL | Age: 80
Discharge: HOSPICE | End: 2020-10-10
Attending: EMERGENCY MEDICINE | Admitting: INTERNAL MEDICINE
Payer: MEDICARE

## 2020-10-06 DIAGNOSIS — J90 EXUDATIVE PLEURAL EFFUSION: ICD-10-CM

## 2020-10-06 DIAGNOSIS — R06.02 SOB (SHORTNESS OF BREATH): ICD-10-CM

## 2020-10-06 DIAGNOSIS — I51.7 ENLARGED LA (LEFT ATRIUM): ICD-10-CM

## 2020-10-06 DIAGNOSIS — F01.50 VASCULAR DEMENTIA WITHOUT BEHAVIORAL DISTURBANCE: Primary | ICD-10-CM

## 2020-10-06 DIAGNOSIS — Z86.73 HISTORY OF EMBOLIC STROKE: ICD-10-CM

## 2020-10-06 DIAGNOSIS — I50.32 CHRONIC DIASTOLIC CONGESTIVE HEART FAILURE: ICD-10-CM

## 2020-10-06 DIAGNOSIS — I48.0 PAROXYSMAL ATRIAL FIBRILLATION: ICD-10-CM

## 2020-10-06 DIAGNOSIS — J90 PLEURAL EFFUSION EXUDATIVE: ICD-10-CM

## 2020-10-06 LAB
ALBUMIN SERPL BCP-MCNC: 3.6 G/DL (ref 3.5–5.2)
ALP SERPL-CCNC: 107 U/L (ref 55–135)
ALT SERPL W/O P-5'-P-CCNC: 67 U/L (ref 10–44)
ANION GAP SERPL CALC-SCNC: 8 MMOL/L (ref 8–16)
AST SERPL-CCNC: 61 U/L (ref 10–40)
BASOPHILS # BLD AUTO: 0.03 K/UL (ref 0–0.2)
BASOPHILS NFR BLD: 0.5 % (ref 0–1.9)
BILIRUB SERPL-MCNC: 1.2 MG/DL (ref 0.1–1)
BNP SERPL-MCNC: 130 PG/ML (ref 0–99)
BUN SERPL-MCNC: 25 MG/DL (ref 8–23)
CALCIUM SERPL-MCNC: 10.8 MG/DL (ref 8.7–10.5)
CHLORIDE SERPL-SCNC: 104 MMOL/L (ref 95–110)
CO2 SERPL-SCNC: 28 MMOL/L (ref 23–29)
CREAT SERPL-MCNC: 1.9 MG/DL (ref 0.5–1.4)
DIFFERENTIAL METHOD: ABNORMAL
EOSINOPHIL # BLD AUTO: 0 K/UL (ref 0–0.5)
EOSINOPHIL NFR BLD: 0.3 % (ref 0–8)
ERYTHROCYTE [DISTWIDTH] IN BLOOD BY AUTOMATED COUNT: 14.9 % (ref 11.5–14.5)
EST. GFR  (AFRICAN AMERICAN): 38 ML/MIN/1.73 M^2
EST. GFR  (NON AFRICAN AMERICAN): 33 ML/MIN/1.73 M^2
GLUCOSE SERPL-MCNC: 108 MG/DL (ref 70–110)
HCT VFR BLD AUTO: 45.1 % (ref 40–54)
HGB BLD-MCNC: 15.1 G/DL (ref 14–18)
IMM GRANULOCYTES # BLD AUTO: 0.02 K/UL (ref 0–0.04)
IMM GRANULOCYTES NFR BLD AUTO: 0.3 % (ref 0–0.5)
INR PPP: 1.1 (ref 0.8–1.2)
LYMPHOCYTES # BLD AUTO: 0.8 K/UL (ref 1–4.8)
LYMPHOCYTES NFR BLD: 12.1 % (ref 18–48)
MAGNESIUM SERPL-MCNC: 1.9 MG/DL (ref 1.6–2.6)
MCH RBC QN AUTO: 29.1 PG (ref 27–31)
MCHC RBC AUTO-ENTMCNC: 33.5 G/DL (ref 32–36)
MCV RBC AUTO: 87 FL (ref 82–98)
MONOCYTES # BLD AUTO: 0.6 K/UL (ref 0.3–1)
MONOCYTES NFR BLD: 9.2 % (ref 4–15)
NEUTROPHILS # BLD AUTO: 5.1 K/UL (ref 1.8–7.7)
NEUTROPHILS NFR BLD: 77.6 % (ref 38–73)
NRBC BLD-RTO: 0 /100 WBC
PHOSPHATE SERPL-MCNC: 3.2 MG/DL (ref 2.7–4.5)
PLATELET # BLD AUTO: 274 K/UL (ref 150–350)
PMV BLD AUTO: 10.5 FL (ref 9.2–12.9)
POTASSIUM SERPL-SCNC: 4.1 MMOL/L (ref 3.5–5.1)
PROT SERPL-MCNC: 7.5 G/DL (ref 6–8.4)
PROTHROMBIN TIME: 11.6 SEC (ref 9–12.5)
RBC # BLD AUTO: 5.19 M/UL (ref 4.6–6.2)
SARS-COV-2 RDRP RESP QL NAA+PROBE: NEGATIVE
SODIUM SERPL-SCNC: 140 MMOL/L (ref 136–145)
WBC # BLD AUTO: 6.54 K/UL (ref 3.9–12.7)

## 2020-10-06 PROCEDURE — 93010 EKG 12-LEAD: ICD-10-PCS | Mod: ,,, | Performed by: INTERNAL MEDICINE

## 2020-10-06 PROCEDURE — 99900035 HC TECH TIME PER 15 MIN (STAT)

## 2020-10-06 PROCEDURE — 85610 PROTHROMBIN TIME: CPT

## 2020-10-06 PROCEDURE — 94640 AIRWAY INHALATION TREATMENT: CPT

## 2020-10-06 PROCEDURE — 93010 ELECTROCARDIOGRAM REPORT: CPT | Mod: ,,, | Performed by: INTERNAL MEDICINE

## 2020-10-06 PROCEDURE — 25000242 PHARM REV CODE 250 ALT 637 W/ HCPCS: Performed by: STUDENT IN AN ORGANIZED HEALTH CARE EDUCATION/TRAINING PROGRAM

## 2020-10-06 PROCEDURE — G0378 HOSPITAL OBSERVATION PER HR: HCPCS

## 2020-10-06 PROCEDURE — 85025 COMPLETE CBC W/AUTO DIFF WBC: CPT

## 2020-10-06 PROCEDURE — U0002 COVID-19 LAB TEST NON-CDC: HCPCS

## 2020-10-06 PROCEDURE — 94761 N-INVAS EAR/PLS OXIMETRY MLT: CPT

## 2020-10-06 PROCEDURE — 25000003 PHARM REV CODE 250: Performed by: STUDENT IN AN ORGANIZED HEALTH CARE EDUCATION/TRAINING PROGRAM

## 2020-10-06 PROCEDURE — 83880 ASSAY OF NATRIURETIC PEPTIDE: CPT

## 2020-10-06 PROCEDURE — 84100 ASSAY OF PHOSPHORUS: CPT

## 2020-10-06 PROCEDURE — 93005 ELECTROCARDIOGRAM TRACING: CPT

## 2020-10-06 PROCEDURE — 99285 EMERGENCY DEPT VISIT HI MDM: CPT | Mod: 25

## 2020-10-06 PROCEDURE — 27000221 HC OXYGEN, UP TO 24 HOURS

## 2020-10-06 PROCEDURE — 83735 ASSAY OF MAGNESIUM: CPT

## 2020-10-06 PROCEDURE — 80053 COMPREHEN METABOLIC PANEL: CPT

## 2020-10-06 PROCEDURE — 63600175 PHARM REV CODE 636 W HCPCS: Performed by: STUDENT IN AN ORGANIZED HEALTH CARE EDUCATION/TRAINING PROGRAM

## 2020-10-06 PROCEDURE — 80074 ACUTE HEPATITIS PANEL: CPT

## 2020-10-06 PROCEDURE — 86703 HIV-1/HIV-2 1 RESULT ANTBDY: CPT

## 2020-10-06 RX ORDER — IPRATROPIUM BROMIDE AND ALBUTEROL SULFATE 2.5; .5 MG/3ML; MG/3ML
3 SOLUTION RESPIRATORY (INHALATION) EVERY 6 HOURS PRN
Status: DISCONTINUED | OUTPATIENT
Start: 2020-10-06 | End: 2020-10-10 | Stop reason: HOSPADM

## 2020-10-06 RX ORDER — ATORVASTATIN CALCIUM 40 MG/1
80 TABLET, FILM COATED ORAL DAILY
Status: DISCONTINUED | OUTPATIENT
Start: 2020-10-07 | End: 2020-10-10 | Stop reason: HOSPADM

## 2020-10-06 RX ORDER — AMLODIPINE BESYLATE 5 MG/1
10 TABLET ORAL DAILY
Status: DISCONTINUED | OUTPATIENT
Start: 2020-10-06 | End: 2020-10-10 | Stop reason: HOSPADM

## 2020-10-06 RX ORDER — PANTOPRAZOLE SODIUM 40 MG/1
40 TABLET, DELAYED RELEASE ORAL DAILY
Status: DISCONTINUED | OUTPATIENT
Start: 2020-10-06 | End: 2020-10-10 | Stop reason: HOSPADM

## 2020-10-06 RX ORDER — SODIUM CHLORIDE 0.9 % (FLUSH) 0.9 %
10 SYRINGE (ML) INJECTION
Status: DISCONTINUED | OUTPATIENT
Start: 2020-10-06 | End: 2020-10-10 | Stop reason: HOSPADM

## 2020-10-06 RX ORDER — SODIUM CHLORIDE, SODIUM LACTATE, POTASSIUM CHLORIDE, CALCIUM CHLORIDE 600; 310; 30; 20 MG/100ML; MG/100ML; MG/100ML; MG/100ML
INJECTION, SOLUTION INTRAVENOUS ONCE
Status: COMPLETED | OUTPATIENT
Start: 2020-10-06 | End: 2020-10-06

## 2020-10-06 RX ORDER — TAMSULOSIN HYDROCHLORIDE 0.4 MG/1
1 CAPSULE ORAL NIGHTLY
Status: DISCONTINUED | OUTPATIENT
Start: 2020-10-06 | End: 2020-10-10 | Stop reason: HOSPADM

## 2020-10-06 RX ORDER — TALC
6 POWDER (GRAM) TOPICAL NIGHTLY
Status: DISCONTINUED | OUTPATIENT
Start: 2020-10-06 | End: 2020-10-10 | Stop reason: HOSPADM

## 2020-10-06 RX ADMIN — AMLODIPINE BESYLATE 10 MG: 5 TABLET ORAL at 08:10

## 2020-10-06 RX ADMIN — SODIUM CHLORIDE, SODIUM LACTATE, POTASSIUM CHLORIDE, AND CALCIUM CHLORIDE: .6; .31; .03; .02 INJECTION, SOLUTION INTRAVENOUS at 08:10

## 2020-10-06 RX ADMIN — Medication 6 MG: at 08:10

## 2020-10-06 RX ADMIN — IPRATROPIUM BROMIDE AND ALBUTEROL SULFATE 3 ML: .5; 3 SOLUTION RESPIRATORY (INHALATION) at 06:10

## 2020-10-06 RX ADMIN — TAMSULOSIN HYDROCHLORIDE 0.4 MG: 0.4 CAPSULE ORAL at 08:10

## 2020-10-06 RX ADMIN — IPRATROPIUM BROMIDE AND ALBUTEROL SULFATE 3 ML: .5; 3 SOLUTION RESPIRATORY (INHALATION) at 11:10

## 2020-10-06 NOTE — ED PROVIDER NOTES
Encounter Date: 10/6/2020       History     Chief Complaint   Patient presents with    Shortness of Breath     reports sob for the past 2 weeks. was seen by pulmonolgy and was told he has fluid on his lungs. denies chest pain or cough     Shant Magana Jr., a 80 y.o. male  has a past medical history of Acute kidney injury superimposed on chronic kidney disease (10/14/2017), Cancer, CVA (cerebral vascular accident) (9/16/2017), Diabetes mellitus, Former smoker (7/18/2020), History of stroke (8/15/2017), Hypertension, Hypertrophic cardiomyopathy, Renal disorder, and Stroke.     He presents to the ED evaluation of worsening of SOB.  States that he was seen by his pulmonologist last week where he was found to have a recurrence of a right sided pleural effusion.  States that his SOB has increased since that time, pain when he lays on his right side.  Denies CP, fevers or recent exposure to covid.        The history is provided by the patient.     Review of patient's allergies indicates:  No Known Allergies  Past Medical History:   Diagnosis Date    Acute kidney injury superimposed on chronic kidney disease 10/14/2017    Cancer     prostate    CVA (cerebral vascular accident) 9/16/2017    Diabetes mellitus     Former smoker 7/18/2020    History of stroke 8/15/2017    Hypertension     Hypertrophic cardiomyopathy     Renal disorder     Stroke      Past Surgical History:   Procedure Laterality Date    BACK SURGERY      COLONOSCOPY N/A 10/18/2018    Procedure: COLONOSCOPY;  Surgeon: Alan Gooden MD;  Location: [facility];  Service: Endoscopy;  Laterality: N/A;    ESOPHAGOGASTRODUODENOSCOPY N/A 9/4/2018    Procedure: EGD (ESOPHAGOGASTRODUODENOSCOPY);  Surgeon: Oli Cuadra MD;  Location: [facility];  Service: Endoscopy;  Laterality: N/A;    ESOPHAGOGASTRODUODENOSCOPY N/A 10/18/2018    Procedure: EGD (ESOPHAGOGASTRODUODENOSCOPY);  Surgeon: Alan Gooden MD;  Location: [facility];  Service: Endoscopy;   Laterality: N/A;    THYROIDECTOMY       History reviewed. No pertinent family history.  Social History     Tobacco Use    Smoking status: Former Smoker     Packs/day: 0.90     Years: 3.00     Pack years: 2.70     Types: Cigarettes    Smokeless tobacco: Never Used   Substance Use Topics    Alcohol use: Never     Frequency: Never    Drug use: No     Review of Systems   Constitutional: Negative for fever.   Respiratory: Positive for shortness of breath, wheezing and stridor. Negative for cough.    Cardiovascular: Negative for chest pain and leg swelling.   Gastrointestinal: Negative for abdominal pain, nausea and vomiting.   Skin: Negative for color change.   Allergic/Immunologic: Negative for immunocompromised state.   Neurological: Negative for weakness and numbness.   Psychiatric/Behavioral: Negative for agitation.   All other systems reviewed and are negative.      Physical Exam     Initial Vitals   BP Pulse Resp Temp SpO2   10/06/20 1120 10/06/20 1120 10/06/20 1120 10/06/20 1122 10/06/20 1120   (!) 176/90 85 (!) 26 97.9 °F (36.6 °C) 100 %      MAP       --                Physical Exam    Nursing note and vitals reviewed.  Constitutional: He appears well-developed and well-nourished.   HENT:   Head: Normocephalic and atraumatic.   Right Ear: External ear normal.   Left Ear: External ear normal.   Nose: Nose normal.   Eyes: EOM are normal.   Neck: Normal range of motion. Neck supple.   Cardiovascular: Normal rate and regular rhythm.   Pulmonary/Chest: No respiratory distress. He has wheezes.   Abdominal: Soft. He exhibits no distension. There is no abdominal tenderness. There is no rebound and no guarding.   Musculoskeletal: Normal range of motion. No tenderness or edema.   Lymphadenopathy:     He has no cervical adenopathy.   Neurological: He is alert and oriented to person, place, and time. No cranial nerve deficit.   Skin: Skin is warm and dry. Capillary refill takes less than 2 seconds. No rash and no  abscess noted. No erythema.   Psychiatric: He has a normal mood and affect. Thought content normal.         ED Course   Procedures  Labs Reviewed   CBC W/ AUTO DIFFERENTIAL - Abnormal; Notable for the following components:       Result Value    RDW 14.9 (*)     Lymph # 0.8 (*)     Gran% 77.6 (*)     Lymph% 12.1 (*)     All other components within normal limits   COMPREHENSIVE METABOLIC PANEL - Abnormal; Notable for the following components:    BUN, Bld 25 (*)     Creatinine 1.9 (*)     Calcium 10.8 (*)     Total Bilirubin 1.2 (*)     AST 61 (*)     ALT 67 (*)     eGFR if  38 (*)     eGFR if non  33 (*)     All other components within normal limits   B-TYPE NATRIURETIC PEPTIDE - Abnormal; Notable for the following components:     (*)     All other components within normal limits   PROTIME-INR   MAGNESIUM   PHOSPHORUS   SARS-COV-2 RNA AMPLIFICATION, QUAL   HEPATITIS PANEL, ACUTE   HIV 1 / 2 ANTIBODY        ECG Results          EKG 12-lead (Final result)  Result time 10/06/20 17:29:35    Final result by Interface, Lab In [facility] (10/06/20 17:29:35)                 Narrative:    Test Reason : R06.02,    Vent. Rate : 085 BPM     Atrial Rate : 085 BPM     P-R Int : 164 ms          QRS Dur : 082 ms      QT Int : 364 ms       P-R-T Axes : 008 178 050 degrees     QTc Int : 433 ms    Sinus rhythm with frequent Premature ventricular complexes  Right axis deviation  Septal infarct (cited on or before 18-OCT-2017)  Abnormal ECG  When compared with ECG of 10-SEP-2020 15:21,  Premature ventricular complexes are now Present  Premature atrial complexes are no longer Present  Questionable change in The axis  Questionable change in initial forces of Anterior leads  T wave inversion no longer evident in Lateral leads  Confirmed by Jim ALY MD, Enrrique SAMPSON ([id number]) on 10/6/2020 5:29:19 PM    Referred By: CARL ALBERTS           Confirmed By:Enrrique Fernandez III, MD                            Imaging  Results          US Abdomen Complete (Final result)  Result time 10/06/20 17:50:20    Final result by Francisco Soni DO (10/06/20 17:50:20)                 Impression:      1. Multiple simple to minimally complex hepatic and bilateral renal cysts.  2. Nonobstructing left renal stone.  3. Large right pleural effusion.      Electronically signed by: Francisco Soni  Date:    10/06/2020  Time:    17:50             Narrative:    EXAMINATION:  US ABDOMEN COMPLETE    CLINICAL HISTORY:  RUQ assessment for cysts;    TECHNIQUE:  Complete abdominal ultrasound (including pancreas, aorta, liver, gallbladder, common bile duct, IVC, kidneys, and spleen) was performed.    COMPARISON:  CT chest, abdomen, and pelvis from the same date.    FINDINGS:  Pancreas: The pancreas is largely obscured by bowel gas.    Aorta: The aorta largely obscured by bowel gas.  The proximal portion is unremarkable.    Liver: 11.1 cm, normal in size. Homogeneous parenchymal echotexture. There are multiple anechoic lesions, some of which contain thin septations, within the right and left lobe compatible with simple to minimally complex cysts.  The largest within the left lobe measures 2.1 x 1.7 x 2.1 cm.  The largest within the right lobe measures 3.9 x 3.5 x 3.6 cm.    Gallbladder: There is gallbladder sludge. No calculi, wall thickening, or pericholecystic fluid.  Negative sonographic Anthony's sign.    Biliary system: 6 mm common bile duct.  No intrahepatic ductal dilatation.    Inferior vena cava: Normal in appearance.    Right kidney: 9.3 cm. No hydronephrosis.  There are multiple anechoic simple cysts, the largest of which is seen within the midpole and measures 2.1 x 2.2 x 2.8 cm.    Left kidney: 9.6 cm. No hydronephrosis.  There are multiple anechoic simple cysts,the largest of which within the superior pole measures 5.3 x 3.9 x 5.0 cm.  There is an echogenicity within the left kidney superior pole measuring up to 0.8 cm compatible with a  nonobstructing stone.    Spleen: 8.4 cm.  Normal in size with homogeneous echotexture.    Miscellaneous: There is a large right pleural effusion.                               CT Chest Abdomen Pelvis Without Contrast (XPD) (Final result)  Result time 10/06/20 14:40:15    Final result by Zhen Diaz MD (10/06/20 14:40:15)                 Impression:      1. Large right pleural effusion with complete atelectasis of the right lung and leftward mediastinal shift.  2. Indeterminate 2.1 cm nodule in the left lower lobe.  Diagnostic considerations include neoplasia and infection.  3. Left renal nonobstructing calculus.  4. Multiple hepatic hypodensities, probably cysts.  5. Additional findings above.      Electronically signed by: Zhen Diaz MD  Date:    10/06/2020  Time:    14:40             Narrative:    EXAMINATION:  CT CHEST ABDOMEN PELVIS WITHOUT CONTRAST(XPD)    CLINICAL HISTORY:  Thoracoabdominal aortic aneurysm, follow up;    TECHNIQUE:  Low dose axial images, sagittal and coronal reformations were obtained from the thoracic inlet to the pubic symphysis .  Oral contrast was not administered.    COMPARISON:  10/06/2020    FINDINGS:  There is a large right pleural effusion.  There is complete atelectasis of the right lung with leftward deviation of the mediastinum.  There is no left pleural effusion.  Trace pericardial fluid noted.  Heart is normal size.  Coronary artery calcifications noted.  Central airways are patent, without endobronchial lesions.  There is a focal, nodular opacity in the left lower lobe measuring 2.1 cm.  Aorta is ectatic, measuring 4.5 cm at the ascending portion.    Liver demonstrates multiple hypodensities.  Largest measures 4.3 cm and demonstrates attenuation characteristics consistent with a simple cyst.  Smaller lesions are difficult to further characterize, but probably represent additional cysts.  No calcified gallstones.  Pancreas, spleen, and right adrenal gland are  unremarkable.  Left adrenal gland demonstrates a 1.3 cm nodule, likely representing adenoma.  There are bilateral renal cysts.  Largest on the left measures 6.1 cm.  There is no hydronephrosis.  There is a 0.9 cm right renal cortical calcification.  There is a nonobstructing 0.8 cm calculus at the left lower pole.  GI tract demonstrates no evidence for obstruction or inflammation.  Multiple colonic diverticula are noted.  Rectal therapy seeds are seen within the prostate.  No retroperitoneal lymphadenopathy.  No ascites.  Abdominal aorta is ectatic and calcified.  Note made of right posterolateral urinary bladder diverticulum.    Regional osseous structures demonstrate no aggressive appearing lytic or blastic lesions.  There is fusion of the bilateral sacroiliac joints.  There are degenerative changes of the spine.  Note made of port within the left chest wall.                               X-Ray Chest PA And Lateral (Final result)  Result time 10/06/20 12:14:00    Final result by Zhen Diaz MD (10/06/20 12:14:00)                 Impression:      As above.      Electronically signed by: Zhen Diaz MD  Date:    10/06/2020  Time:    12:14             Narrative:    EXAMINATION:  XR CHEST PA AND LATERAL    CLINICAL HISTORY:  Shortness of breath    TECHNIQUE:  PA and lateral views of the chest were performed.    COMPARISON:  10/02/2020    FINDINGS:  There is complete opacification of the right hemithorax, likely combination of large pleural effusion and consolidation.  No significant midline shift.  Left lung is essentially clear.  No pneumothorax.  Loop recorder noted.                                 Medical Decision Making:   Initial Assessment:   Worsening SOB   Differential Diagnosis:   Worsening pleural effusion, electrolyte abnormalty, PE   Clinical Tests:   Lab Tests: Reviewed and Ordered  The following lab test(s) were unremarkable: CBC, CMP, Troponin and BNP  Radiological Study: Ordered and Reviewed  ED  Management:  Pt presents to ED for evauaiton of increased SOB.  Patient has increased work of breathing with tachypnea, audible wheezing stridor.  No significant abnormality on blood work.  Chest x-ray shows complete opacification of right hemidiaphragm.  Patient will be admitted for further evaluation of his pleural effusion and treatment of his dyspnea              Attending Attestation:     Physician Attestation Statement for NP/PA:   I discussed this assessment and plan of this patient with the NP/PA, but I did not personally examine the patient. The face to face encounter was performed by the NP/PA.                            Clinical Impression:       ICD-10-CM ICD-9-CM   1. Vascular dementia without behavioral disturbance  F01.50 290.40   2. SOB (shortness of breath)  R06.02 786.05   3. Exudative pleural effusion  J90 511.9                          ED Disposition Condition    Observation                             Pamela Muro PA-C  10/06/20 2241       Pamela Muro PA-C  10/06/20 2241       Gennaro Roque MD  10/15/20 [id number]

## 2020-10-06 NOTE — ED NOTES
Report given to Karma, 4th floor nurse, awaiting ultrasound to be completed prior to bringing pt upstairs to floor   Pt aware of this plan of care, assisted with repositioning

## 2020-10-06 NOTE — NURSING
Arrived from ED AAOx2, wheezing notified respiratory for a breathing treatment. Patient belongings at the bedside. Bed alarm on, call bell within reach.

## 2020-10-06 NOTE — ED TRIAGE NOTES
reports sob for the past 2 weeks. was seen by pulmonolgy and was told he has fluid on his lungs. denies chest pain or cough, pt has hx of DM, prostate Cancer.  Pt and son also report increase in bilateral feet swelling over the past 2-3 days

## 2020-10-06 NOTE — H&P
"[facility] Medicine H&P Note     Admitting Team: [facility] Hospitalist Team B  Attending Physician: Todd Erazo MD  Resident: Celena  Intern: Chapo     Date of Admit: 10/6/2020    Chief Complaint     Shortness of Breath x2 weeks    Subjective:      History of Present Illness:  Shant Magana Jr. is a 80 y.o. male who  has a past medical history of SHIKHA superimposed on CKD, prostate cancer, CVA (most recently 08/11/2020), Diabetes mellitus, Former smoker (7/18/2020), Hypertension, Hypertrophic cardiomyopathy. The patient presented to [facility] on 10/6/2020 with a primary complaint of Shortness of Breath x2 weeks. Pt was seen by pulmonology outpatient and was told he has fluid on his lungs.    The patient was in their usual state of health until approximately 2 weeks ago, when he began having worsening SOB. Pt previously had thoracentesis with pulm with lymphocyte predominate exudative pleural effusion. Pt presented to pul on 10/02 for evaluation, found to have recurrence of this exudative pleural effusion, concerning for malignant effusion. Pt was instructed to hold apixiban in anticipation of thoracentesis next Friday. However, SOB worsened and pt endorsed pain when laying on his right side, so he presented to [facility] today.    Pt currently denies chest pain, palpitations, cough, abdominal pain, headache, fevers, chills, changes in bowels or urinary patterns. Pt's history is limited by his dementia; states he is here for "heart attacks."    Past Medical History:  Past Medical History:   Diagnosis Date    Acute kidney injury superimposed on chronic kidney disease 10/14/2017    Cancer     prostate    CVA (cerebral vascular accident) 9/16/2017    Diabetes mellitus     Former smoker 7/18/2020    History of stroke 8/15/2017    Hypertension     Hypertrophic cardiomyopathy     Renal disorder     Stroke        Past Surgical History:  Past Surgical History:   Procedure Laterality Date    BACK SURGERY   "    COLONOSCOPY N/A 10/18/2018    Procedure: COLONOSCOPY;  Surgeon: Alan Gooden MD;  Location: [facility] ENDO;  Service: Endoscopy;  Laterality: N/A;    ESOPHAGOGASTRODUODENOSCOPY N/A 9/4/2018    Procedure: EGD (ESOPHAGOGASTRODUODENOSCOPY);  Surgeon: Oli Cuadra MD;  Location: [facility] ENDO;  Service: Endoscopy;  Laterality: N/A;    ESOPHAGOGASTRODUODENOSCOPY N/A 10/18/2018    Procedure: EGD (ESOPHAGOGASTRODUODENOSCOPY);  Surgeon: Alan Gooden MD;  Location: [facility] ENDO;  Service: Endoscopy;  Laterality: N/A;    THYROIDECTOMY         Allergies:  Review of patient's allergies indicates:  No Known Allergies    Home Medications:  Prior to Admission medications    Medication Sig Start Date End Date Taking? Authorizing Provider   acetaminophen (TYLENOL) 325 MG tablet Take 2 tablets (650 mg total) by mouth every 6 (six) hours as needed for Pain. 8/6/20   Ami Copeland NP   apixaban (ELIQUIS) 2.5 mg Tab Take 1 tablet (2.5 mg total) by mouth 2 (two) times daily.  Patient taking differently: Take 2.5 mg by mouth once daily.  2/12/19   Lucio Toth MD   atorvastatin (LIPITOR) 80 MG tablet Take 1 tablet (80 mg total) by mouth once daily. 7/27/20 7/27/21  Lloyd Perdomo MD   melatonin (MELATIN) 3 mg tablet Take 2 tablets (6 mg total) by mouth nightly as needed for Insomnia. 8/6/20   Ami Copeland NP   multivitamin Tab Take 1 tablet by mouth once daily. 8/7/20   Ami Copeland NP   pantoprazole (PROTONIX) 40 MG tablet Take 40 mg by mouth once daily. 11/25/18   Historical Provider   tamsulosin (FLOMAX) 0.4 mg Cap TAKE 1 CAPSULE BY MOUTH EVERY NIGHT AT BEDTIME 9/28/20   Trent Aldana MD   vitamin D 1000 units Tab Take 1,000 Units by mouth once daily.    Historical Provider       Family History:  History reviewed. No pertinent family history.    Social History:  Social History     Tobacco Use    Smoking status: Former Smoker     Packs/day: 0.90     Years: 3.00     Pack years: 2.70     Types:  "Cigarettes    Smokeless tobacco: Never Used   Substance Use Topics    Alcohol use: Never     Frequency: Never    Drug use: No       Review of Systems:  Pertinent items are noted in HPI. All other systems are reviewed and are negative.    Health Maintaince :   Primary Care Physician: Trent Aldana MD    Immunizations:   TDap UTD    Flu UTD   Pna UTD    Cancer Screening:  Colonoscopy: UTD     Objective:   Last 24 Hour Vital Signs:  BP  Min: 114/82  Max: 176/90  Temp  Av.9 °F (36.6 °C)  Min: 97.9 °F (36.6 °C)  Max: 97.9 °F (36.6 °C)  Pulse  Av.6  Min: 69  Max: 86  Resp  Av  Min: 26  Max: 26  SpO2  Av.1 %  Min: 96 %  Max: 100 %  Height  Av' 10" (177.8 cm)  Min: 5' 10" (177.8 cm)  Max: 5' 10" (177.8 cm)  Weight  Av.5 kg (177 lb 8 oz)  Min: 79.4 kg (175 lb)  Max: 81.6 kg (180 lb)  Body mass index is 25.83 kg/m².  No intake/output data recorded.    Physical Examination:  Physical Exam  Constitutional:       General: He is not in acute distress.     Appearance: He is not ill-appearing or toxic-appearing.   HENT:      Head: Normocephalic and atraumatic.      Mouth/Throat:      Mouth: Mucous membranes are moist.      Pharynx: Oropharynx is clear.   Eyes:      Extraocular Movements: Extraocular movements intact.      Conjunctiva/sclera: Conjunctivae normal.   Cardiovascular:      Rate and Rhythm: Normal rate and regular rhythm.      Pulses: Normal pulses.      Heart sounds: Normal heart sounds.   Pulmonary:      Effort: Pulmonary effort is normal. No respiratory distress.      Breath sounds: Wheezing present.   Chest:      Chest wall: No tenderness.   Abdominal:      General: Bowel sounds are normal. There is no distension.      Palpations: Abdomen is soft.      Tenderness: There is no abdominal tenderness. There is no guarding.   Musculoskeletal:         General: Deformity (clubbing of fingers) present. No swelling or tenderness.      Right lower leg: No edema.      Left lower leg: No " edema.   Skin:     General: Skin is warm and dry.   Neurological:      General: No focal deficit present.      Mental Status: He is alert. He is disoriented.      Motor: Weakness (4/5 strength in L hand compared to right) present.      Coordination: Coordination abnormal. Finger-Nose-Finger Test abnormal.      Comments: Oriented to self, president; Not oriented to date or location   Psychiatric:         Mood and Affect: Mood normal.         Behavior: Behavior normal.         Cognition and Memory: Cognition is impaired. Memory is impaired.           Laboratory:  Most Recent Data:  CBC:   Lab Results   Component Value Date    WBC 6.54 10/06/2020    HGB 15.1 10/06/2020    HCT 45.1 10/06/2020     10/06/2020    MCV 87 10/06/2020    RDW 14.9 (H) 10/06/2020     WBC Differential: 77.6 % N, 0.02 % Bands, 12.1 % L, 9.2 % M, 0.3 % Eo, 0.5 % Baso, no additional cells seen  BMP:   Lab Results   Component Value Date     10/06/2020    K 4.1 10/06/2020     10/06/2020    CO2 28 10/06/2020    BUN 25 (H) 10/06/2020    CREATININE 1.9 (H) 10/06/2020     10/06/2020    CALCIUM 10.8 (H) 10/06/2020    MG 1.9 10/06/2020    PHOS 3.2 10/06/2020     LFTs:   Lab Results   Component Value Date    PROT 7.5 10/06/2020    ALBUMIN 3.6 10/06/2020    BILITOT 1.2 (H) 10/06/2020    AST 61 (H) 10/06/2020    ALKPHOS 107 10/06/2020    ALT 67 (H) 10/06/2020     Coags:   Lab Results   Component Value Date    INR 1.1 10/06/2020     FLP:   Lab Results   Component Value Date    CHOL 124 07/18/2020    HDL 40 07/18/2020    LDLCALC 70.8 07/18/2020    TRIG 66 07/18/2020    CHOLHDL 32.3 07/18/2020     DM:   Lab Results   Component Value Date    HGBA1C 5.8 (H) 07/18/2020    HGBA1C 6.0 (H) 12/11/2019    HGBA1C 5.9 (H) 09/03/2018    LDLCALC 70.8 07/18/2020    CREATININE 1.9 (H) 10/06/2020     Thyroid:   Lab Results   Component Value Date    TSH 0.309 (L) 10/02/2020    FREET4 1.27 10/02/2020    A6WTIQB 9.3 07/22/2020     Anemia:   Lab Results    Component Value Date    IRON 95 12/11/2019    TIBC 404 12/11/2019    FERRITIN 39 12/11/2019     Cardiac:   Lab Results   Component Value Date    TROPONINI <0.006 07/19/2020     (H) 10/06/2020     Urinalysis:   Lab Results   Component Value Date    COLORU Yellow 07/19/2020    SPECGRAV 1.015 07/19/2020    NITRITE Negative 07/19/2020    KETONESU Negative 07/19/2020    UROBILINOGEN Negative 12/10/2019    WBCUA 2 07/19/2020       Trended Lab Data:  Recent Labs   Lab 10/02/20  1100 10/06/20  1240   WBC 4.65 6.54   HGB 15.1 15.1   HCT 45.1 45.1    274   MCV 87 87   RDW 14.8* 14.9*    140   K 4.1 4.1    104   CO2 24 28   BUN 18 25*   CREATININE 1.6* 1.9*    108   PROT 6.9 7.5   ALBUMIN 3.4* 3.6   BILITOT 1.0 1.2*   AST 46* 61*   ALKPHOS 98 107   ALT 54* 67*       Trended Cardiac Data:  Recent Labs   Lab 10/02/20  1100 10/06/20  1240   BNP 91 130*       Microbiology Data:  None    Other Results:  EKG pending    Radiology:  Imaging Results          CT Chest Abdomen Pelvis Without Contrast (XPD) (Final result)  Result time 10/06/20 14:40:15    Final result by Zhen Diaz MD (10/06/20 14:40:15)                 Impression:      1. Large right pleural effusion with complete atelectasis of the right lung and leftward mediastinal shift.  2. Indeterminate 2.1 cm nodule in the left lower lobe.  Diagnostic considerations include neoplasia and infection.  3. Left renal nonobstructing calculus.  4. Multiple hepatic hypodensities, probably cysts.  5. Additional findings above.      Electronically signed by: Zhen Diaz MD  Date:    10/06/2020  Time:    14:40             Narrative:    EXAMINATION:  CT CHEST ABDOMEN PELVIS WITHOUT CONTRAST(XPD)    CLINICAL HISTORY:  Thoracoabdominal aortic aneurysm, follow up;    TECHNIQUE:  Low dose axial images, sagittal and coronal reformations were obtained from the thoracic inlet to the pubic symphysis .  Oral contrast was not  administered.    COMPARISON:  10/06/2020    FINDINGS:  There is a large right pleural effusion.  There is complete atelectasis of the right lung with leftward deviation of the mediastinum.  There is no left pleural effusion.  Trace pericardial fluid noted.  Heart is normal size.  Coronary artery calcifications noted.  Central airways are patent, without endobronchial lesions.  There is a focal, nodular opacity in the left lower lobe measuring 2.1 cm.  Aorta is ectatic, measuring 4.5 cm at the ascending portion.    Liver demonstrates multiple hypodensities.  Largest measures 4.3 cm and demonstrates attenuation characteristics consistent with a simple cyst.  Smaller lesions are difficult to further characterize, but probably represent additional cysts.  No calcified gallstones.  Pancreas, spleen, and right adrenal gland are unremarkable.  Left adrenal gland demonstrates a 1.3 cm nodule, likely representing adenoma.  There are bilateral renal cysts.  Largest on the left measures 6.1 cm.  There is no hydronephrosis.  There is a 0.9 cm right renal cortical calcification.  There is a nonobstructing 0.8 cm calculus at the left lower pole.  GI tract demonstrates no evidence for obstruction or inflammation.  Multiple colonic diverticula are noted.  Rectal therapy seeds are seen within the prostate.  No retroperitoneal lymphadenopathy.  No ascites.  Abdominal aorta is ectatic and calcified.  Note made of right posterolateral urinary bladder diverticulum.    Regional osseous structures demonstrate no aggressive appearing lytic or blastic lesions.  There is fusion of the bilateral sacroiliac joints.  There are degenerative changes of the spine.  Note made of port within the left chest wall.                               X-Ray Chest PA And Lateral (Final result)  Result time 10/06/20 12:14:00    Final result by Zhen Diaz MD (10/06/20 12:14:00)                 Impression:      As above.      Electronically signed by: Zhen  MD Emily  Date:    10/06/2020  Time:    12:14             Narrative:    EXAMINATION:  XR CHEST PA AND LATERAL    CLINICAL HISTORY:  Shortness of breath    TECHNIQUE:  PA and lateral views of the chest were performed.    COMPARISON:  10/02/2020    FINDINGS:  There is complete opacification of the right hemithorax, likely combination of large pleural effusion and consolidation.  No significant midline shift.  Left lung is essentially clear.  No pneumothorax.  Loop recorder noted.                               Assessment:     Shant Magana Jr. is a 80 y.o. male with:  Patient Active Problem List    Diagnosis Date Noted    Exudative pleural effusion 10/06/2020    History of embolic stroke 09/22/2020    Polyneuropathy in diabetes 09/12/2020    Type 2 diabetes mellitus with peripheral angiopathy 09/10/2020    Anticoagulant long-term use 08/21/2020    Hyperparathyroidism due to renal insufficiency 08/20/2020    Embolic stroke involving right middle cerebral artery 07/18/2020    Former smoker 07/18/2020    Chronic heart failure with preserved ejection fraction     Dementia without behavioral disturbance 12/10/2019    Paroxysmal atrial fibrillation 02/12/2019    Status post placement of implantable loop recorder 02/12/2019    Gastritis     Polyp of colon     Chronic rhinitis 09/12/2018    Peptic ulcer disease 09/12/2018    Anemia due to blood loss 09/12/2018    Anemia     Erectile dysfunction 03/16/2018    Chronic diastolic congestive heart failure 10/19/2017    Enlarged LA (left atrium) 10/19/2017    Internal carotid artery stenosis, left 10/18/2017    Mixed hyperlipidemia 10/18/2017    Left carotid artery occlusion 09/16/2017    High cholesterol 09/16/2017    Prostate cancer 09/15/2017    Type 2 diabetes mellitus with complication, without long-term current use of insulin     Hypertensive chronic kidney disease 06/22/2017    Essential hypertension 06/07/2017    Vitamin D deficiency 12/07/2016     CKD (chronic kidney disease) stage 3, GFR 30-59 ml/min 09/07/2016    Renal oncocytoma of right kidney 09/07/2016    Diabetic renal disease 12/04/2015    Type 2 diabetes mellitus with diabetic nephropathy 12/04/2015    BPH (benign prostatic hyperplasia) 10/11/2013    Hypertrophic obstructive cardiomyopathy 10/11/2013        Plan:     Shortness of Breath 2/2 R sided Exudative Pleural Effusion  -SOB increasing over past 2 weeks, endorsed pain when laying on R side  -Pt denies smoking, but chart review shows former smoker  -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  -Per pulm note from 10/2/20: Pt with recurrence of pleural effusion, history of prior thoracentesis with lymphocyte predominate exudative pleural effusion, concerning for malignant effusion  -CT Chest w/ large R pleural effusion with complete atelectasis of the R lung and leftward mediastinal shift  -Pulm consulted, discussed thoracentesis vs pleural catheter vs PleurX-cath placement  -Consulted case management for assistance with PleurX-catheter and canister coverage  -Anticoagulation held    Left Lower Lobe Lung Nodule  -Pt denies smoking history in contrast to chart history  -CT Chest w/ indeterminate 2.1cm nodule in LLL  -Pulm consulted, appreciate recs    Essential Hypertension  -/90  -Home meds: Losartan 100mg, Amlodipine 10mg  -Continue Amlodipine, holding losartan 2/2 SHIKHA    S/P R MCA embolic stroke, residual hemiplegia and hemiparesis  -Most recent stroke 8/11/2020  -Residual deficits in LUE (4/5 strength L vs R), pt able to walk but with instability and frequent falls  -Coordination limited, difficulty with cerebellar tasks  -Pt on eliquis, hold at this time  -PT/OT eval for safe discharge    Chronic diastolic heart failure  -with recovered diastolic dysfunction  -BP management as above    Paroxysmal Afib, HOCM, s/p implantation of loop recorder  -CHADS-VASC 5; 7.2% stroke risk  -Holding eliquis 2/2 planned procedure  -Rate  control    Dementia  -Pt oriented to self, president; pt not oriented to place or date  -Delirium precautions    Type 2 Diabetes Mellitus  -Last A1c 5.8  -Glucose 108  -SSI    SHIKHA on CKD stage 3  -Cr 1.9, baseline 1.4.  -Renally dose meds, avoid nephrotoxic drugs  -Will give IV fluids, repeat labs in AM    Mixed hyperlipidemia  -LDL 70 at last lipid panel  -Continue home atorvastatin 80mg daily    HCM  -PCP Dr. Trent Aldana  -Pt UTD on Pneumovaxx, Tdap, Flu  -Pt UTD on colonoscopy      Code Status:     Diet: Cardiac diet, NPO at midnight  DVT Prophylaxis: SCDs, holding chemical ppx 2/2 planned procedure  Allergies: NKDA  Code: Full  Dispo: pending pulm consult for PneumX catheter if stable    Ani Cowan MD  [facility] HO-1    [facility] Medicine Hospitalist Pager numbers:   [facility] Hospitalist Medicine Team A (Hanny/Loco): [phone]  [facility] Hospitalist Medicine Team B (Kacey/Nico):  [phone]

## 2020-10-06 NOTE — FIRST PROVIDER EVALUATION
Emergency Department TeleTriage Encounter Note      CHIEF COMPLAINT    Chief Complaint   Patient presents with    Shortness of Breath     reports sob for the past 2 weeks. was seen by pulmonolgy and was told he has fluid on his lungs. denies chest pain or cough       VITAL SIGNS   Initial Vitals   BP Pulse Resp Temp SpO2   10/06/20 1120 10/06/20 1120 10/06/20 1120 10/06/20 1122 10/06/20 1120   (!) 176/90 85 (!) 26 97.9 °F (36.6 °C) 100 %      MAP       --                   ALLERGIES    Review of patient's allergies indicates:  No Known Allergies    PROVIDER TRIAGE NOTE  Patient is an 80-year-old male presenting to the emergency department accompanied by his son for evaluation of shortness of breath.  The patient's son states that this has happened before and he has an appointment to be seen by pulmonology.  Patient's son is a poor historian, patient does not provide any information himself.  Per chart review, patient has a known large right-sided pleural effusion.  He has required thoracentesis in the past and is scheduled after he hold his anticoagulation.  There is concern for malignancy however unable to do a CT scan up until now.  Labs ordered.      ORDERS  Labs Reviewed   CBC W/ AUTO DIFFERENTIAL   COMPREHENSIVE METABOLIC PANEL   PROTIME-INR   B-TYPE NATRIURETIC PEPTIDE   MAGNESIUM   PHOSPHORUS       ED Orders (720h ago, onward)    Start Ordered     Status Ordering Provider    10/06/20 1133 10/06/20 1132  EKG 12-lead  Once      Ordered BRANDENEH, CARL    10/06/20 1133 10/06/20 1132  Magnesium  STAT  Collect    Ordered BRANDENEHYOANDYCARL    10/06/20 1133 10/06/20 1132  Phosphorus  STAT  Collect    Ordered TAYEH, CARL    10/06/20 1132 10/06/20 1132  CBC auto differential  STAT  Collect    Ordered TAYEH, CARL    10/06/20 1132 10/06/20 1132  Comprehensive metabolic panel  STAT  Collect    Ordered TAYEH, CARL    10/06/20 1132 10/06/20 1132  Protime-INR  STAT  Collect    Ordered TAYEH, CARL    10/06/20 1132  10/06/20 1132  Brain natriuretic peptide  STAT  Collect    Ordered CARL ALBERTS    10/06/20 1132 10/06/20 1132  Saline lock IV  Once      Ordered CARL ALBERTS    10/06/20 1132 10/06/20 1132  X-Ray Chest PA And Lateral  1 time imaging      Ordered CARL ALBERTS            Virtual Visit Note: The provider triage portion of this emergency department evaluation and documentation was performed via [employer], a HIPAA-compliant telemedicine application, in concert with a tele-presenter in the room. A face to face patient evaluation with one of my colleagues will occur once the patient is placed in an emergency department room.      DISCLAIMER: This note was prepared with [employer] voice recognition transcription software. Garbled syntax, mangled pronouns, and other bizarre constructions may be attributed to that software system.

## 2020-10-06 NOTE — CONSULTS
[facility] Pulmonary/Critical Care Consult Note      Patient:Shant Magana Jr.  Age:80 y.o.  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 Shant Magana is an 80 year old male with past medical history of type II DM, prostate cancer, CVA, CKD, HTN, Hypertrophic Cardiomyopathy, dementia, and tobacco abuse who presented to [facility] secondary to shortness of breath for the past 2 weeks. In the ED, son at bedside reported that he has had prior episodes of SOB and has seen pulmonology in the past. He additionally reports increased edema in bilateral lower extremities over the past 2-3 days. Patient has known right-sided pleural effusion for which he has received thoracentesis in the past. Previous concern for malignancy, however, CT was not able to be performed. Patient with history of tobacco abuse but unknown duration and unable to obtain information due to patients dementia. He is able to report he previously worked on the [employer] as a . Per primary team, patient lives with grandson.    Upon my examination, patient oriented to person and place but unable to give accurate history secondary to advanced dementia.    Interval Hx:  10/6: Admitted secondary to worsening SOB for 2 weeks    MEDICAL HISTORY:      Past Medical History:  Past Medical History:   Diagnosis Date    Acute kidney injury superimposed on chronic kidney disease 10/14/2017    Cancer     prostate    CVA (cerebral vascular accident) 9/16/2017    Diabetes mellitus     Former smoker 7/18/2020    History of stroke 8/15/2017    Hypertension     Hypertrophic cardiomyopathy     Renal disorder     Stroke      Past Surgical  History:  Past Surgical History:   Procedure Laterality Date    BACK SURGERY      COLONOSCOPY N/A 10/18/2018    Procedure: COLONOSCOPY;  Surgeon: Alan Gooden MD;  Location: [facility];  Service: Endoscopy;  Laterality: N/A;    ESOPHAGOGASTRODUODENOSCOPY N/A 9/4/2018    Procedure: EGD (ESOPHAGOGASTRODUODENOSCOPY);  Surgeon: Oli Cuadra MD;  Location: [facility];  Service: Endoscopy;  Laterality: N/A;    ESOPHAGOGASTRODUODENOSCOPY N/A 10/18/2018    Procedure: EGD (ESOPHAGOGASTRODUODENOSCOPY);  Surgeon: Alan Gooden MD;  Location: [facility];  Service: Endoscopy;  Laterality: N/A;    THYROIDECTOMY        Family History:   History reviewed. No pertinent family history.      Allergies:  Review of patient's allergies indicates:  No Known Allergies    Home Medications:  Current Outpatient Medications   Medication Instructions    acetaminophen (TYLENOL) 650 mg, Oral, Every 6 hours PRN    apixaban (ELIQUIS) 2.5 mg, Oral, 2 times daily    atorvastatin (LIPITOR) 80 mg, Oral, Daily    melatonin (MELATIN) 6 mg, Oral, Nightly PRN    multivitamin Tab 1 tablet, Oral, Daily    pantoprazole (PROTONIX) 40 mg, Oral, Daily    tamsulosin (FLOMAX) 0.4 mg Cap TAKE 1 CAPSULE BY MOUTH EVERY NIGHT AT BEDTIME    vitamin D (VITAMIN D3) 1,000 Units, Oral, Daily      OBJECTIVE DATA:      Vital Signs:  Vitals:    10/06/20 1539   BP:    Pulse: 77   Resp:    Temp:      Intake/Output:  No intake or output data in the 24 hours ending 10/06/20 1557     Physical Exam:  Examination  General: Patient sitting comfortably in NAD  HEENT: normocephalic, atraumatic, PERRL, EOMI  Neck: No thyromegaly or masses   Cardiac: RRR, no murmurs appreciated, no extra heart sounds  Pulmonary/Chest: Decreased/absent breath sounds in right lung lobes. On 2L nasal cannula without increased work of breathing  GI: Soft, non tender, non distended, normoactive bowel sounds  Extremities: Clubbing noted. No edema or cyanosis  Skin: dry, warm, intact. No  bruising or rashes.  Neuro: Alert and oriented to person and place, moving all extremities; decreased strength on LUE (4/5).     Laboratory/Imaging:  Recent Labs   Lab 10/02/20  1100 10/06/20  1240   WBC 4.65 6.54   HGB 15.1 15.1   HCT 45.1 45.1    274    140   K 4.1 4.1    104   CREATININE 1.6* 1.9*   BUN 18 25*   CO2 24 28   ALT 54* 67*   AST 46* 61*     Microbiology:  Pleural Fluid (9/4/2020) = no growth to date     Imaging:  CT Chest/Abdomen/Pelvis (10/6/2020)  1. Large right pleural effusion with complete atelectasis of the right lung and leftward mediastinal shift.   2. Indeterminate 2.1 cm nodule in the left lower lobe.  Diagnostic considerations include neoplasia and infection.   3. Left renal nonobstructing calculus.   4. Multiple hepatic hypodensities, probably cysts.   5. Additional findings above.     Medications:   [START ON 10/7/2020] atorvastatin  80 mg Oral Daily    melatonin  6 mg Oral Nightly    multivitamin  1 tablet Oral Daily    pantoprazole  40 mg Oral Daily    tamsulosin  1 capsule Oral Nightly        albuterol-ipratropium, sodium chloride 0.9%     Problem List:  Patient Active Problem List   Diagnosis    Essential hypertension    Type 2 diabetes mellitus with complication, without long-term current use of insulin    Internal carotid artery stenosis, left    Mixed hyperlipidemia    CKD (chronic kidney disease) stage 3, GFR 30-59 ml/min    Chronic diastolic congestive heart failure    Enlarged LA (left atrium)    Anemia    Gastritis    Polyp of colon    Paroxysmal atrial fibrillation    Status post placement of implantable loop recorder    Dementia without behavioral disturbance    Chronic heart failure with preserved ejection fraction    BPH (benign prostatic hyperplasia)    Embolic stroke involving right middle cerebral artery    Former smoker    Chronic rhinitis    Diabetic renal disease    Erectile dysfunction    Hyperparathyroidism due to renal  insufficiency    Hypertrophic obstructive cardiomyopathy    Left carotid artery occlusion    Peptic ulcer disease    Prostate cancer    Renal oncocytoma of right kidney    Vitamin D deficiency    Hypertensive chronic kidney disease    High cholesterol    Anemia due to blood loss    Type 2 diabetes mellitus with diabetic nephropathy    Anticoagulant long-term use    Type 2 diabetes mellitus with peripheral angiopathy    Polyneuropathy in diabetes    History of embolic stroke    Exudative pleural effusion     Assessment/Plan:     Mr. Shant Magana is an 80 year old male with past medical history of type II DM, prostate cancer, CVA, CKD, HTN, Hypertrophic Cardiomyopathy and tobacco abuse who presented to [facility] secondary to shortness of breath for the past 2 weeks.    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   - Therapeutic/diagnostic thoracentesis performed with 2L fluid removed; pleural fluid studies sent  - Will monitor improvement in breathing    Left Lower Lobe Lung Nodule  - Patient with smoking history of unknown duration  - CT Chest =  2.1 cm nodule in the left lower lobe     Thank you for allowing us to participate in the care of this patient. We will continue to follow.      Marylin Adan MD  [facility] Internal Medicine Resident, HO-II    Pt seen and examined with Pulmonary/Critical Care team and this note reviewed and validated with the following additional comments:    Massive unilateral effusion with pleural thickening is most likely malignant. Diagnostic & therapeutic thoracentesis performed.  See procedure note.    Quinton Ariza MD  Phone [phone]

## 2020-10-06 NOTE — PLAN OF CARE
VN cued into pt's room for introduction with pt's permission. Pt arrived to unit from ED. VN role explained and informed pt that VN would be working with bedside nurse and the rest of the care team. Fall risk and bed alarm protocol education provided. Instructed pt to call for assistance. Pt aware and agreeable. Allowed time for questions. NAD noted. Admission questions completed.     Patient's chart, progress notes, and care plan reviewed. Will be available as needed.

## 2020-10-07 ENCOUNTER — ANESTHESIA EVENT (OUTPATIENT)
Dept: CARDIOLOGY | Facility: HOSPITAL | Age: 80
End: 2020-10-07
Payer: MEDICARE

## 2020-10-07 ENCOUNTER — ANESTHESIA (OUTPATIENT)
Dept: CARDIOLOGY | Facility: HOSPITAL | Age: 80
End: 2020-10-07
Payer: MEDICARE

## 2020-10-07 LAB
ALBUMIN FLD-MCNC: 1.9 G/DL
ALBUMIN SERPL BCP-MCNC: 2.9 G/DL (ref 3.5–5.2)
ALP SERPL-CCNC: 91 U/L (ref 55–135)
ALT SERPL W/O P-5'-P-CCNC: 58 U/L (ref 10–44)
ANION GAP SERPL CALC-SCNC: 10 MMOL/L (ref 8–16)
APPEARANCE FLD: CLEAR
AST SERPL-CCNC: 49 U/L (ref 10–40)
BASOPHILS # BLD AUTO: 0.02 K/UL (ref 0–0.2)
BASOPHILS NFR BLD: 0.4 % (ref 0–1.9)
BILIRUB SERPL-MCNC: 0.8 MG/DL (ref 0.1–1)
BODY FLD TYPE: NORMAL
BODY FLUID SOURCE, LDH: NORMAL
BUN SERPL-MCNC: 25 MG/DL (ref 8–23)
CALCIUM SERPL-MCNC: 10 MG/DL (ref 8.7–10.5)
CHLORIDE SERPL-SCNC: 106 MMOL/L (ref 95–110)
CO2 SERPL-SCNC: 25 MMOL/L (ref 23–29)
COLOR FLD: YELLOW
CREAT SERPL-MCNC: 1.6 MG/DL (ref 0.5–1.4)
DIFFERENTIAL METHOD: ABNORMAL
EOSINOPHIL # BLD AUTO: 0 K/UL (ref 0–0.5)
EOSINOPHIL NFR BLD: 0.8 % (ref 0–8)
EOSINOPHIL NFR FLD MANUAL: 1 %
ERYTHROCYTE [DISTWIDTH] IN BLOOD BY AUTOMATED COUNT: 14.9 % (ref 11.5–14.5)
EST. GFR  (AFRICAN AMERICAN): 46 ML/MIN/1.73 M^2
EST. GFR  (NON AFRICAN AMERICAN): 40 ML/MIN/1.73 M^2
GLUCOSE FLD-MCNC: 75 MG/DL
GLUCOSE SERPL-MCNC: 110 MG/DL (ref 70–110)
GRAM STN SPEC: NORMAL
GRAM STN SPEC: NORMAL
HAV IGM SERPL QL IA: NEGATIVE
HBV CORE IGM SERPL QL IA: NEGATIVE
HBV SURFACE AG SERPL QL IA: NEGATIVE
HCT VFR BLD AUTO: 40.7 % (ref 40–54)
HCV AB SERPL QL IA: NEGATIVE
HGB BLD-MCNC: 13.4 G/DL (ref 14–18)
IMM GRANULOCYTES # BLD AUTO: 0.01 K/UL (ref 0–0.04)
IMM GRANULOCYTES NFR BLD AUTO: 0.2 % (ref 0–0.5)
LDH FLD L TO P-CCNC: 280 U/L
LDH SERPL L TO P-CCNC: 368 U/L (ref 110–260)
LYMPHOCYTES # BLD AUTO: 0.8 K/UL (ref 1–4.8)
LYMPHOCYTES NFR BLD: 16.9 % (ref 18–48)
LYMPHOCYTES NFR FLD MANUAL: 64 %
MAGNESIUM SERPL-MCNC: 1.9 MG/DL (ref 1.6–2.6)
MCH RBC QN AUTO: 29 PG (ref 27–31)
MCHC RBC AUTO-ENTMCNC: 32.9 G/DL (ref 32–36)
MCV RBC AUTO: 88 FL (ref 82–98)
MESOTHL CELL NFR FLD MANUAL: 29 %
MONOCYTES # BLD AUTO: 0.4 K/UL (ref 0.3–1)
MONOCYTES NFR BLD: 9.3 % (ref 4–15)
MONOS+MACROS NFR FLD MANUAL: 5 %
NEUTROPHILS # BLD AUTO: 3.4 K/UL (ref 1.8–7.7)
NEUTROPHILS NFR BLD: 72.4 % (ref 38–73)
NEUTROPHILS NFR FLD MANUAL: 1 %
NRBC BLD-RTO: 0 /100 WBC
PLATELET # BLD AUTO: 237 K/UL (ref 150–350)
PMV BLD AUTO: 10.5 FL (ref 9.2–12.9)
POTASSIUM SERPL-SCNC: 3.8 MMOL/L (ref 3.5–5.1)
PROT FLD-MCNC: 3.1 G/DL
PROT SERPL-MCNC: 6.2 G/DL (ref 6–8.4)
RBC # BLD AUTO: 4.62 M/UL (ref 4.6–6.2)
SODIUM SERPL-SCNC: 141 MMOL/L (ref 136–145)
SPECIMEN SOURCE: NORMAL
WBC # BLD AUTO: 4.74 K/UL (ref 3.9–12.7)
WBC # FLD: 74 /CU MM

## 2020-10-07 PROCEDURE — 83735 ASSAY OF MAGNESIUM: CPT

## 2020-10-07 PROCEDURE — 88305 TISSUE EXAM BY PATHOLOGIST: ICD-10-PCS | Mod: 26,,, | Performed by: PATHOLOGY

## 2020-10-07 PROCEDURE — 27000221 HC OXYGEN, UP TO 24 HOURS

## 2020-10-07 PROCEDURE — 87070 CULTURE OTHR SPECIMN AEROBIC: CPT

## 2020-10-07 PROCEDURE — 83615 LACTATE (LD) (LDH) ENZYME: CPT

## 2020-10-07 PROCEDURE — 88112 CYTOPATH CELL ENHANCE TECH: CPT | Performed by: PATHOLOGY

## 2020-10-07 PROCEDURE — 83615 LACTATE (LD) (LDH) ENZYME: CPT | Mod: 91

## 2020-10-07 PROCEDURE — 36415 COLL VENOUS BLD VENIPUNCTURE: CPT

## 2020-10-07 PROCEDURE — 85025 COMPLETE CBC W/AUTO DIFF WBC: CPT

## 2020-10-07 PROCEDURE — 25000003 PHARM REV CODE 250: Performed by: STUDENT IN AN ORGANIZED HEALTH CARE EDUCATION/TRAINING PROGRAM

## 2020-10-07 PROCEDURE — 84157 ASSAY OF PROTEIN OTHER: CPT

## 2020-10-07 PROCEDURE — 88305 TISSUE EXAM BY PATHOLOGIST: CPT | Mod: 26,,, | Performed by: PATHOLOGY

## 2020-10-07 PROCEDURE — 82042 OTHER SOURCE ALBUMIN QUAN EA: CPT

## 2020-10-07 PROCEDURE — 97161 PT EVAL LOW COMPLEX 20 MIN: CPT

## 2020-10-07 PROCEDURE — G0378 HOSPITAL OBSERVATION PER HR: HCPCS

## 2020-10-07 PROCEDURE — 25000242 PHARM REV CODE 250 ALT 637 W/ HCPCS: Performed by: STUDENT IN AN ORGANIZED HEALTH CARE EDUCATION/TRAINING PROGRAM

## 2020-10-07 PROCEDURE — 36000 PLACE NEEDLE IN VEIN: CPT | Performed by: STUDENT IN AN ORGANIZED HEALTH CARE EDUCATION/TRAINING PROGRAM

## 2020-10-07 PROCEDURE — 99215 PR OFFICE/OUTPT VISIT, EST, LEVL V, 40-54 MIN: ICD-10-PCS | Mod: ,,, | Performed by: INTERNAL MEDICINE

## 2020-10-07 PROCEDURE — 88112 PR  CYTOPATH, CELL ENHANCE TECH: ICD-10-PCS | Mod: 26,,, | Performed by: PATHOLOGY

## 2020-10-07 PROCEDURE — 99215 OFFICE O/P EST HI 40 MIN: CPT | Mod: ,,, | Performed by: INTERNAL MEDICINE

## 2020-10-07 PROCEDURE — 87205 SMEAR GRAM STAIN: CPT

## 2020-10-07 PROCEDURE — 88305 TISSUE EXAM BY PATHOLOGIST: CPT | Performed by: PATHOLOGY

## 2020-10-07 PROCEDURE — 94761 N-INVAS EAR/PLS OXIMETRY MLT: CPT

## 2020-10-07 PROCEDURE — 97165 OT EVAL LOW COMPLEX 30 MIN: CPT

## 2020-10-07 PROCEDURE — 89051 BODY FLUID CELL COUNT: CPT

## 2020-10-07 PROCEDURE — 80053 COMPREHEN METABOLIC PANEL: CPT

## 2020-10-07 PROCEDURE — 94640 AIRWAY INHALATION TREATMENT: CPT

## 2020-10-07 PROCEDURE — 82945 GLUCOSE OTHER FLUID: CPT

## 2020-10-07 PROCEDURE — 88112 CYTOPATH CELL ENHANCE TECH: CPT | Mod: 26,,, | Performed by: PATHOLOGY

## 2020-10-07 PROCEDURE — 97530 THERAPEUTIC ACTIVITIES: CPT

## 2020-10-07 PROCEDURE — 99900035 HC TECH TIME PER 15 MIN (STAT)

## 2020-10-07 RX ADMIN — AMLODIPINE BESYLATE 10 MG: 5 TABLET ORAL at 09:10

## 2020-10-07 RX ADMIN — THERA TABS 1 TABLET: TAB at 09:10

## 2020-10-07 RX ADMIN — PANTOPRAZOLE SODIUM 40 MG: 40 TABLET, DELAYED RELEASE ORAL at 09:10

## 2020-10-07 RX ADMIN — ATORVASTATIN CALCIUM 80 MG: 40 TABLET, FILM COATED ORAL at 09:10

## 2020-10-07 RX ADMIN — Medication 6 MG: at 08:10

## 2020-10-07 RX ADMIN — IPRATROPIUM BROMIDE AND ALBUTEROL SULFATE 3 ML: .5; 3 SOLUTION RESPIRATORY (INHALATION) at 02:10

## 2020-10-07 RX ADMIN — TAMSULOSIN HYDROCHLORIDE 0.4 MG: 0.4 CAPSULE ORAL at 08:10

## 2020-10-07 NOTE — PT/OT/SLP EVAL
Physical Therapy Evaluation    Patient Name:  Shant Magana Jr.   MRN:  [id number]    Recommendations:     Discharge Recommendations:  home health PT   Discharge Equipment Recommendations: none   Barriers to discharge: None    Assessment:     Shant Magana Jr. is a 80 y.o. male admitted with a medical diagnosis of Exudative pleural effusion.  He presents with the following impairments/functional limitations:  impaired endurance, impaired functional mobilty, impaired self care skills, gait instability, impaired balance, impaired cognition, decreased lower extremity function, decreased safety awareness . Pt  with functional mobility deficits and should benefit from  PT  to maximize I and safety decrease risk of further decline of injury.    Rehab Prognosis: Good; patient would benefit from acute skilled PT services to address these deficits and reach maximum level of function.    Recent Surgery: * No surgery found *      Plan:     During this hospitalization, patient to be seen 5 x/week to address the identified rehab impairments via gait training, therapeutic activities, therapeutic exercises, neuromuscular re-education and progress toward the following goals:    · Plan of Care Expires:  11/07/20    Subjective     Chief Complaint: pt without c/o  Patient/Family Comments/goals: none stated  Pain/Comfort:  · Pain Rating 1: 0/10  · Pain Rating Post-Intervention 1: 0/10    Patients cultural, spiritual, Tenriism conflicts given the current situation: no    Living Environment:  Pt lives with dtr and grandson in [facility] with 4 steps to enter and B HR  Prior to admission, patients level of function was SPV for all activities, dtr in law reports ambulates w/o AD and SBA usually due to improper use of RW.  Equipment used at home: walker, rolling, bedside commode, cane, straight, bath bench.  DME owned (not currently used): none.  Upon discharge, patient will have assistance from family.    Objective:     Communicated with Alba prior to  session.  Patient found supine with peripheral IV, bed alarm  upon PT entry to room.    General Precautions: Standard, fall   Orthopedic Precautions:N/A   Braces: N/A     Exams:  · Cognitive Exam:  Patient is oriented to Person  · Gross Motor Coordination:  WFL  · Sensation:    · -       Intact  · RLE ROM: WFL  · RLE Strength: WFL  · LLE ROM: WFL  · LLE Strength: WFL    Functional Mobility:  · Bed Mobility:     · Supine to Sit: minimum assistance  · Sit to Supine: stand by assistance  · Transfers:     · Sit to Stand:  contact guard assistance with rolling walker  · Gait:  x 20ft with RW and CGA, pt with arrow DANIEL, decreased robert and step length.   · Balance: dynamic and static sitting balance; fair +.  static and dynamic standing balance fair    Therapeutic Activities and Exercises:    Pt presented supine in bed, agreeable to PT.  Dtr in law present.  Pt transferred supine>sit EOB with min A at trunk.   Pt sat I at EOB. Sit>stand with CGA to RW.  Gait x 20ft with RW and CGA, pt with arrow DANIEL, decreased robert and step length. Transfer to sit EOB with SBA, to supine with SBA    AM-PAC 6 CLICK MOBILITY  Total Score:19     Patient left supine with all lines intact, call button in reach, bed alarm on, Alba, nurse notified and dtr in law present.    GOALS:   Multidisciplinary Problems     Physical Therapy Goals        Problem: Physical Therapy Goal    Goal Priority Disciplines Outcome Goal Variances Interventions   Physical Therapy Goal     PT, PT/OT Ongoing, Progressing     Description: Goals to be met by: 10/30/2020    Patient will increase functional independence with mobility by performin. Sit<>stand with SBA with RW.  2. Gait x 150 feet with SBA with RW.  3.  Supine>sit with  SBA                       History:     Past Medical History:   Diagnosis Date    Acute kidney injury superimposed on chronic kidney disease 10/14/2017    Cancer     prostate    CVA (cerebral vascular accident) 2017     Diabetes mellitus     Former smoker 7/18/2020    History of stroke 8/15/2017    Hypertension     Hypertrophic cardiomyopathy     Renal disorder     Stroke        Past Surgical History:   Procedure Laterality Date    BACK SURGERY      COLONOSCOPY N/A 10/18/2018    Procedure: COLONOSCOPY;  Surgeon: Alan Gooden MD;  Location: [facility];  Service: Endoscopy;  Laterality: N/A;    ESOPHAGOGASTRODUODENOSCOPY N/A 9/4/2018    Procedure: EGD (ESOPHAGOGASTRODUODENOSCOPY);  Surgeon: Oli Cuadra MD;  Location: [facility];  Service: Endoscopy;  Laterality: N/A;    ESOPHAGOGASTRODUODENOSCOPY N/A 10/18/2018    Procedure: EGD (ESOPHAGOGASTRODUODENOSCOPY);  Surgeon: Alan Gooden MD;  Location: [facility];  Service: Endoscopy;  Laterality: N/A;    THYROIDECTOMY         Time Tracking:     PT Received On: 10/07/20  PT Start Time: 0842     PT Stop Time: 0905  PT Total Time (min): 23 min     Billable Minutes: Evaluation 13 and Therapeutic Activity 10      Samson De La Rosa, PT  10/07/2020

## 2020-10-07 NOTE — PLAN OF CARE
Problem: Physical Therapy Goal  Goal: Physical Therapy Goal  Description: Goals to be met by: 10/30/2020    Patient will increase functional independence with mobility by performin. Sit<>stand with SBA with RW.  2. Gait x 150 feet with SBA with RW.  3.  Supine>sit with  SBA      Outcome: Ongoing, Progressing   Pt presented supine in bed, agreeable to PT.  Dtr in law present.  Pt transferred supine>sit EOB with min A at trunk.   Pt sat I at EOB. Sit>stand with CGA to RW.  Gait x 20ft with RW and CGA, pt with arrow DANIEL, decreased robert and step length. Transfer to sit EOB with SBA, to supine with SBA

## 2020-10-07 NOTE — SUBJECTIVE & OBJECTIVE
Oncology Treatment Plan:   [No treatment plan]    Medications:  Continuous Infusions:  Scheduled Meds:   amLODIPine  10 mg Oral Daily    atorvastatin  80 mg Oral Daily    melatonin  6 mg Oral Nightly    multivitamin  1 tablet Oral Daily    pantoprazole  40 mg Oral Daily    tamsulosin  1 capsule Oral Nightly     PRN Meds:albuterol-ipratropium, sodium chloride 0.9%     Review of patient's allergies indicates:  No Known Allergies     Past Medical History:   Diagnosis Date    Acute kidney injury superimposed on chronic kidney disease 10/14/2017    Cancer     prostate    CVA (cerebral vascular accident) 9/16/2017    Diabetes mellitus     Former smoker 7/18/2020    History of stroke 8/15/2017    Hypertension     Hypertrophic cardiomyopathy     Renal disorder     Stroke      Past Surgical History:   Procedure Laterality Date    BACK SURGERY      COLONOSCOPY N/A 10/18/2018    Procedure: COLONOSCOPY;  Surgeon: Alan Gooden MD;  Location: [facility];  Service: Endoscopy;  Laterality: N/A;    ESOPHAGOGASTRODUODENOSCOPY N/A 9/4/2018    Procedure: EGD (ESOPHAGOGASTRODUODENOSCOPY);  Surgeon: Oli Cuadra MD;  Location: [facility];  Service: Endoscopy;  Laterality: N/A;    ESOPHAGOGASTRODUODENOSCOPY N/A 10/18/2018    Procedure: EGD (ESOPHAGOGASTRODUODENOSCOPY);  Surgeon: Alan Gooden MD;  Location: [facility];  Service: Endoscopy;  Laterality: N/A;    THYROIDECTOMY       Family History     None        Tobacco Use    Smoking status: Former Smoker     Packs/day: 0.90     Years: 3.00     Pack years: 2.70     Types: Cigarettes    Smokeless tobacco: Never Used   Substance and Sexual Activity    Alcohol use: Never     Frequency: Never    Drug use: No    Sexual activity: Not on file       Review of Systems   Unable to perform ROS: Dementia     Objective:     Vital Signs (Most Recent):  Temp: 96.1 °F (35.6 °C) (10/07/20 1616)  Pulse: 86 (10/07/20 1616)  Resp: 20 (10/07/20 1616)  BP: (!) 143/75  (10/07/20 1616)  SpO2: (!) 93 % (10/07/20 1616) Vital Signs (24h Range):  Temp:  [96.1 °F (35.6 °C)-98 °F (36.7 °C)] 96.1 °F (35.6 °C)  Pulse:  [71-96] 86  Resp:  [18-22] 20  SpO2:  [92 %-100 %] 93 %  BP: (143-178)/(75-98) 143/75     Weight: 76.3 kg (168 lb 3.2 oz)  Body mass index is 24.13 kg/m².  Body surface area is 1.94 meters squared.      Intake/Output Summary (Last 24 hours) at 10/7/2020 1716  Last data filed at 10/7/2020 1300  Gross per 24 hour   Intake 175 ml   Output 168 ml   Net 7 ml       Physical Exam  Vitals signs reviewed.   Constitutional:       General: He is not in acute distress.     Appearance: He is not toxic-appearing or diaphoretic.   HENT:      Mouth/Throat:      Mouth: Mucous membranes are not pale, not dry and not cyanotic. No lacerations.      Pharynx: No oropharyngeal exudate.   Eyes:      General: No scleral icterus.     Conjunctiva/sclera: Conjunctivae normal.   Neck:      Musculoskeletal: Neck supple.      Thyroid: No thyroid mass or thyromegaly.   Cardiovascular:      Rate and Rhythm: Normal rate and regular rhythm.      Heart sounds: Normal heart sounds and S1 normal.   Pulmonary:      Effort: Pulmonary effort is normal. No accessory muscle usage or respiratory distress.      Breath sounds: Decreased air movement present. No stridor. Examination of the right-lower field reveals decreased breath sounds. Decreased breath sounds present. No wheezing or rales.   Abdominal:      Palpations: Abdomen is soft. There is no mass.      Tenderness: There is no abdominal tenderness.   Lymphadenopathy:      Head:      Right side of head: No submental or submandibular adenopathy.      Left side of head: No submental or submandibular adenopathy.      Cervical: No cervical adenopathy.   Skin:     Findings: No bruising, petechiae or rash.   Neurological:      Mental Status: He is alert and oriented to person, place, and time.      Cranial Nerves: No cranial nerve deficit.      Sensory: No sensory  deficit.      Gait: Gait normal.         Significant Labs:   All pertinent labs from the last 24 hours have been reviewed.    Diagnostic Results:  I have reviewed all pertinent imaging results/findings within the past 24 hours.

## 2020-10-07 NOTE — ANESTHESIA PROCEDURE NOTES
Peripheral IV Insertion    Diagnosis: I87.9 Disorder of vein, unspecified.    Patient location during procedure: floor    Staffing  Authorizing Provider: Hi Strickland MD  Performing Provider: Fabian Simental MD    Anesthesiologist was present at the time of the procedure.    Preanesthetic Checklist  Completed: patient identified, site marked, surgical consent, pre-op evaluation, timeout performed, IV checked, risks and benefits discussed, monitors and equipment checked and anesthesia consent givenPeripheral IV Insertion  Skin Prep: alcohol swabs  Orientation: left  Location: antecubital  Catheter Size: 22 G  Catheter placement by Ultrasound guidance. Heme positive aspiration all ports.  Vessel Caliber: patent  Needle advanced into vessel with real time Ultrasound guidance.Insertion Attempts: 1  Assessment  Dressing: secured with tape and tegaderm  Patient: Tolerated well  Line flushed easily.

## 2020-10-07 NOTE — PLAN OF CARE
OT lucero performed, report to follow  May benefit from HH OT/PT    Problem: Occupational Therapy Goal  Goal: Occupational Therapy Goal  Description: Goals to be met by: 11/7     Patient will increase functional independence with ADLs by performing:    UE Dressing with Supervision.  LE Dressing with Supervision.  Grooming while standing at sink with Stand-by Assistance.  Toileting from toilet with Supervision for hygiene and clothing management.   Toilet transfer to toilet with Supervision.  Upper extremity exercise program x10 reps per handout, with supervision.    Outcome: Ongoing, Progressing

## 2020-10-07 NOTE — PLAN OF CARE
10/07/20 1006   Post-Acute Status   Post-Acute Authorization HME   HME Status   (TN called [facility] and left VM for Dana to return call about pleurX catheters. TN also reached out to Melina with [facility] about catherers. TN called [facility] as with a reference question and they report they do not provide those.)     Ami Vila RN  RN Transition Navigator  [phone]

## 2020-10-07 NOTE — PROCEDURES
Date of procedure: 10/07/2020   Procedure: Thoracentesis    Patients Name: Shant Magana  MRN: [id number]     Indication: Symptomatic Pleural Effusion    Pre-Procedure Diagnosis:  Pleural Effusion    Post- Procedure Diagnosis: Pleural Effusion    CONSENT:   Consent was obtained from daughter, Mike Magana, prior to the procedure. Indications, risks, and benefits were explained at length.      PROCEDURE SUMMARY:   A time out was performed, the appropriate side was confirmed and marked. My hands were washed immediately prior to the procedure. I wore a mask and sterile gloves throughout the procedure. The patient was prepped and draped in a sterile manner using chlorhexidine scrub after the appropriate level was percussed and confirmed by ultrasound. 1% lidocaine was used to anesthesize the skin, subcutaneous tissue, superior aspect of the rib periosteum and parietal pleura. A finder needle was then introduced over the superior aspect of the rib to locate the pleural fluid; clear-straw colored fluid was aspirated. A 10-blade scalpel was used to nick the skin at the insertion site. The Safe-t-Centesis needle was then introduced through the skin incision into the pleural space using negative aspiration pressure and the red colometric indicator to confirm appropriate positioning of the needle. The thoracentesis catheter was then threaded without difficulty. 2L of straw-colored fluid was removed without difficulty. The catheter was then removed.     Complications: None; patient tolerated the procedure well.     Estimated Blood Loss (EBL): Minimal           Drains: none      Implants: none     Specimens: 500cc clear, yellow colored fluid, sent to lab for albumin/glucose/LDH/protein/WBC pleural studies           Condition: stable     Disposition: remain on floor for continued treatment    Attending physician Dr. Ariza was present during the entirety of the procedure.     Marylin Adan MD  [facility] Internal Medicine Resident,  HO-II    Supervised by me.  Quinton Ariza MD  [phone]

## 2020-10-07 NOTE — HPI
80 year-old male with history of dementia, initially evaluated by Pulmonary in August 2020 and underwent right-sided thoracentesis 09/04/2020.  2000 ml straw colored fluid removed. No malignancy found.    Now admitted with recurrent right pleural effusion, was having shortness of breath x2 weeks.    CT chest/abdomen 10/6 - shows 2.1 cm left lower lobe nodule with a large right pleural effusion.    Underwent repeat thoracentesis 10/7 - 2 litres straw-colored fluid removed.    He [place] .

## 2020-10-07 NOTE — PLAN OF CARE
Pt oriented. DCA done at bedside with patient. Demographics/Ins/NextofKin/PCP verified with patient/family and updated as needed. Denies any DME needs at present from pt/family. Patient/family plans to return home with family. Educated on bedside RX. Patient consents for [place] to discuss discharge plan with next of kin. Preferences appointment times and location obtained. Patient reports they will have transportation home upon discharge.    This  put name on white board and explained blue discharge folder to patient. Discharge planning brochure and/or business card given to patient.  Patient verbalized understanding.    Future Appointments   Date Time Provider Department Center   10/12/2020  2:00 PM Trent Aldana MD [facility]        10/07/20 1038   Discharge Assessment   Assessment Type Discharge Planning Assessment   Confirmed/corrected address and phone number on facesheet? Yes   Assessment information obtained from? Patient   Prior to hospitilization cognitive status: Alert/Oriented;No Deficits   Prior to hospitalization functional status: Independent   Current cognitive status: Alert/Oriented;No Deficits   Current Functional Status: Independent   Lives With child(michael), adult  (Son lives in home with him)   Able to Return to Prior Arrangements yes   Is patient able to care for self after discharge? Yes   Patient's perception of discharge disposition home health   Readmission Within the Last 30 Days no previous admission in last 30 days   Patient currently being followed by outpatient case management? No   Patient currently receives any other outside agency services? No   Discharge Plan A Home with family;Home Health   Patient/Family in Agreement with Plan yes     Ami Vila RN  RN Transition Navigator  [phone]

## 2020-10-07 NOTE — PLAN OF CARE
Plan of care reviewed with patient. Normal sinus rhythm on continuous heart monitor, no true red alarms.Patient remained NPO passed midnight for thoracentesis. Patient turned Q2 per staff assist to promote skin integrity.  LR infusing at 100ml/hour. Safety maintained at this time. Bed in lowest position, side rails up x 2. Call light in reach.  Encouraged patient to use call light for assistance .Verbalized understanding. Will continue to monitor patient.

## 2020-10-07 NOTE — PROGRESS NOTES
"[facility] Internal Medicine Resident HO-I Progress Note    Subjective:      Shant Magana Jr. is a 80 y.o. male who is being followed by the [facility] Internal Medicine service at [facility] for SOB due to recurrent exudative pulmonary effusion.    Pt states he is feeling well this morning, denies any chest pain or discomfort. Pt states he is hungry, but understands he is NPO until after his procedure.  Denies any headache, fevers, chills, nausea, vomiting, or cough. Pt is wheezing on exam, but states "this is better than before!"    Pt to have procedure with pulm (thoracentesis vs pleural catheter vs PleurX-cath placement). Plan discussed with pt's daughter and grandson, will continue discussions regarding further care.     Objective:     Last 24 Hour Vital Signs:  BP  Min: 114/82  Max: 178/91  Temp  Av.7 °F (36.5 °C)  Min: 96.1 °F (35.6 °C)  Max: 98 °F (36.7 °C)  Pulse  Av.8  Min: 69  Max: 87  Resp  Av.3  Min: 18  Max: 26  SpO2  Av.9 %  Min: 92 %  Max: 100 %  Height  Av' 10" (177.8 cm)  Min: 5' 10" (177.8 cm)  Max: 5' 10" (177.8 cm)  Weight  Av.1 kg (174 lb 6.4 oz)  Min: 76.3 kg (168 lb 3.2 oz)  Max: 81.6 kg (180 lb)  I/O last 3 completed shifts:  In: -   Out: 168 [Urine:168]    Physical Examination:  Physical Exam  Constitutional:       General: He is not in acute distress.     Appearance: He is not ill-appearing or toxic-appearing.   HENT:      Head: Normocephalic and atraumatic.      Mouth/Throat:      Mouth: Mucous membranes are moist.      Pharynx: Oropharynx is clear.   Eyes:      Extraocular Movements: Extraocular movements intact.      Conjunctiva/sclera: Conjunctivae normal.   Cardiovascular:      Rate and Rhythm: Normal rate and regular rhythm.      Pulses: Normal pulses.      Heart sounds: Normal heart sounds.   Pulmonary:      Effort: Pulmonary effort is normal. No respiratory distress.      Breath sounds: Wheezing present.   Chest:      Chest wall: No tenderness. "   Abdominal:      General: Bowel sounds are normal. There is no distension.      Palpations: Abdomen is soft.      Tenderness: There is no abdominal tenderness. There is no guarding.   Musculoskeletal:         General: Deformity (clubbing in all digits) present. No swelling or tenderness.      Right lower leg: No edema.      Left lower leg: No edema.   Skin:     General: Skin is warm and dry.   Neurological:      General: No focal deficit present.      Mental Status: He is alert. Mental status is at baseline. He is disoriented.      Motor: Weakness present.      Coordination: Coordination abnormal.   Psychiatric:         Mood and Affect: Mood normal.         Behavior: Behavior normal.           Laboratory:  Laboratory Data Reviewed: Yes  Pertinent Findings:  Trended Lab Data:  Recent Labs   Lab 10/02/20  1100 10/06/20  1240 10/07/20  0417   WBC 4.65 6.54 4.74   HGB 15.1 15.1 13.4*   HCT 45.1 45.1 40.7    274 237   MCV 87 87 88   RDW 14.8* 14.9* 14.9*    140 141   K 4.1 4.1 3.8    104 106   CO2 24 28 25   BUN 18 25* 25*    108 110   CALCIUM 10.3 10.8* 10.0   PROT 6.9 7.5 6.2   ALBUMIN 3.4* 3.6 2.9*   BILITOT 1.0 1.2* 0.8   AST 46* 61* 49*   ALKPHOS 98 107 91   ALT 54* 67* 58*       Microbiology Data Reviewed: Yes  Pertinent Findings:  Microbiology Results (last 7 days)     ** No results found for the last 168 hours. **          Other Results:  EKG:  normal sinus rhythm, frequent PVC's noted.    Radiology Data Reviewed: Yes  Pertinent Findings:      US Abdomen Complete  Narrative: EXAMINATION:  US ABDOMEN COMPLETE    CLINICAL HISTORY:  RUQ assessment for cysts;    TECHNIQUE:  Complete abdominal ultrasound (including pancreas, aorta, liver, gallbladder, common bile duct, IVC, kidneys, and spleen) was performed.    COMPARISON:  CT chest, abdomen, and pelvis from the same date.    FINDINGS:  Pancreas: The pancreas is largely obscured by bowel gas.    Aorta: The aorta largely obscured by bowel  gas.  The proximal portion is unremarkable.    Liver: 11.1 cm, normal in size. Homogeneous parenchymal echotexture. There are multiple anechoic lesions, some of which contain thin septations, within the right and left lobe compatible with simple to minimally complex cysts.  The largest within the left lobe measures 2.1 x 1.7 x 2.1 cm.  The largest within the right lobe measures 3.9 x 3.5 x 3.6 cm.    Gallbladder: There is gallbladder sludge. No calculi, wall thickening, or pericholecystic fluid.  Negative sonographic Anthony's sign.    Biliary system: 6 mm common bile duct.  No intrahepatic ductal dilatation.    Inferior vena cava: Normal in appearance.    Right kidney: 9.3 cm. No hydronephrosis.  There are multiple anechoic simple cysts, the largest of which is seen within the midpole and measures 2.1 x 2.2 x 2.8 cm.    Left kidney: 9.6 cm. No hydronephrosis.  There are multiple anechoic simple cysts,the largest of which within the superior pole measures 5.3 x 3.9 x 5.0 cm.  There is an echogenicity within the left kidney superior pole measuring up to 0.8 cm compatible with a nonobstructing stone.    Spleen: 8.4 cm.  Normal in size with homogeneous echotexture.    Miscellaneous: There is a large right pleural effusion.  Impression: 1. Multiple simple to minimally complex hepatic and bilateral renal cysts.  2. Nonobstructing left renal stone.  3. Large right pleural effusion.    Electronically signed by: Francisco Soni  Date:    10/06/2020  Time:    17:50  CT Chest Abdomen Pelvis Without Contrast (XPD)  Narrative: EXAMINATION:  CT CHEST ABDOMEN PELVIS WITHOUT CONTRAST(XPD)    CLINICAL HISTORY:  Thoracoabdominal aortic aneurysm, follow up;    TECHNIQUE:  Low dose axial images, sagittal and coronal reformations were obtained from the thoracic inlet to the pubic symphysis .  Oral contrast was not administered.    COMPARISON:  10/06/2020    FINDINGS:  There is a large right pleural effusion.  There is complete atelectasis  of the right lung with leftward deviation of the mediastinum.  There is no left pleural effusion.  Trace pericardial fluid noted.  Heart is normal size.  Coronary artery calcifications noted.  Central airways are patent, without endobronchial lesions.  There is a focal, nodular opacity in the left lower lobe measuring 2.1 cm.  Aorta is ectatic, measuring 4.5 cm at the ascending portion.    Liver demonstrates multiple hypodensities.  Largest measures 4.3 cm and demonstrates attenuation characteristics consistent with a simple cyst.  Smaller lesions are difficult to further characterize, but probably represent additional cysts.  No calcified gallstones.  Pancreas, spleen, and right adrenal gland are unremarkable.  Left adrenal gland demonstrates a 1.3 cm nodule, likely representing adenoma.  There are bilateral renal cysts.  Largest on the left measures 6.1 cm.  There is no hydronephrosis.  There is a 0.9 cm right renal cortical calcification.  There is a nonobstructing 0.8 cm calculus at the left lower pole.  GI tract demonstrates no evidence for obstruction or inflammation.  Multiple colonic diverticula are noted.  Rectal therapy seeds are seen within the prostate.  No retroperitoneal lymphadenopathy.  No ascites.  Abdominal aorta is ectatic and calcified.  Note made of right posterolateral urinary bladder diverticulum.    Regional osseous structures demonstrate no aggressive appearing lytic or blastic lesions.  There is fusion of the bilateral sacroiliac joints.  There are degenerative changes of the spine.  Note made of port within the left chest wall.  Impression: 1. Large right pleural effusion with complete atelectasis of the right lung and leftward mediastinal shift.  2. Indeterminate 2.1 cm nodule in the left lower lobe.  Diagnostic considerations include neoplasia and infection.  3. Left renal nonobstructing calculus.  4. Multiple hepatic hypodensities, probably cysts.  5. Additional findings  above.    Electronically signed by: Zhen Diaz MD  Date:    10/06/2020  Time:    14:40  X-Ray Chest PA And Lateral  Narrative: EXAMINATION:  XR CHEST PA AND LATERAL    CLINICAL HISTORY:  Shortness of breath    TECHNIQUE:  PA and lateral views of the chest were performed.    COMPARISON:  10/02/2020    FINDINGS:  There is complete opacification of the right hemithorax, likely combination of large pleural effusion and consolidation.  No significant midline shift.  Left lung is essentially clear.  No pneumothorax.  Loop recorder noted.  Impression: As above.    Electronically signed by: Zhen Diaz MD  Date:    10/06/2020  Time:    12:14        Current Medications:     Infusions:       Scheduled:   amLODIPine  10 mg Oral Daily    atorvastatin  80 mg Oral Daily    melatonin  6 mg Oral Nightly    multivitamin  1 tablet Oral Daily    pantoprazole  40 mg Oral Daily    tamsulosin  1 capsule Oral Nightly        PRN:  albuterol-ipratropium, sodium chloride 0.9%    Antibiotics and Day Number of Therapy:  None    Lines and Day Number of Therapy:  PIVx2, Day 1    Assessment:     Shant Magana JrSherri is a 80 y.o.male with  Patient Active Problem List    Diagnosis Date Noted    Exudative pleural effusion 10/06/2020    History of embolic stroke 09/22/2020    Polyneuropathy in diabetes 09/12/2020    Type 2 diabetes mellitus with peripheral angiopathy 09/10/2020    Anticoagulant long-term use 08/21/2020    Hyperparathyroidism due to renal insufficiency 08/20/2020    Embolic stroke involving right middle cerebral artery 07/18/2020    Former smoker 07/18/2020    Chronic heart failure with preserved ejection fraction     Dementia without behavioral disturbance 12/10/2019    Paroxysmal atrial fibrillation 02/12/2019    Status post placement of implantable loop recorder 02/12/2019    Gastritis     Polyp of colon     Chronic rhinitis 09/12/2018    Peptic ulcer disease 09/12/2018    Anemia due to blood loss 09/12/2018     Anemia     Erectile dysfunction 03/16/2018    Chronic diastolic congestive heart failure 10/19/2017    Enlarged LA (left atrium) 10/19/2017    Internal carotid artery stenosis, left 10/18/2017    Mixed hyperlipidemia 10/18/2017    Left carotid artery occlusion 09/16/2017    High cholesterol 09/16/2017    Prostate cancer 09/15/2017    Type 2 diabetes mellitus with complication, without long-term current use of insulin     Hypertensive chronic kidney disease 06/22/2017    Essential hypertension 06/07/2017    Vitamin D deficiency 12/07/2016    CKD (chronic kidney disease) stage 3, GFR 30-59 ml/min 09/07/2016    Renal oncocytoma of right kidney 09/07/2016    Diabetic renal disease 12/04/2015    Type 2 diabetes mellitus with diabetic nephropathy 12/04/2015    BPH (benign prostatic hyperplasia) 10/11/2013    Hypertrophic obstructive cardiomyopathy 10/11/2013        Plan:     Shortness of Breath 2/2 R sided Exudative Pleural Effusion  -SOB increasing over past 2 weeks, endorsed pain when laying on R side  -Former smoker, 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  -Per pulm note from 10/2/20: Pt with recurrence of pleural effusion, history of prior thoracentesis with lymphocyte predominate exudative pleural effusion, concerning for malignant effusion  -CT Chest w/ large R pleural effusion with complete atelectasis of the R lung and leftward mediastinal shift  -Pulm consulted, discussed thoracentesis vs pleural catheter vs PleurX-cath placement  -Consulted case management for assistance with PleurX-catheter and canister coverage  -Anticoagulation held pending procedure, likely today or tomorrow     Left Lower Lobe Lung Nodule  -Pt denies smoking history in contrast to chart history  -CT Chest w/ indeterminate 2.1cm nodule in LLL  -Pulm consulted, appreciate recs     Essential Hypertension  -/90 at admit  -Home meds: Losartan 100mg, Amlodipine 10mg  -Continue Amlodipine, holding losartan 2/2 SHIKHA  -BP  170/98 today; will add losartan back on if not improved by this afternoon     S/P R MCA embolic stroke, residual hemiplegia and hemiparesis  -Most recent stroke 8/11/2020  -Residual deficits in LUE (4/5 strength L vs R), pt able to walk but with instability and frequent falls  -Coordination limited, difficulty with cerebellar tasks  -Pt on eliquis, hold at this time  -PT/OT eval for safe discharge, appreciate recs  -Fall risk precautions     Chronic diastolic heart failure  -with recovered diastolic dysfunction  -BP management as above     Paroxysmal Afib, HOCM, s/p implantation of loop recorder  -CHADS-VASC 5; 7.2% stroke risk  -Holding eliquis 2/2 planned procedure  -Rate control     Dementia  -Pt oriented to self, president; pt not oriented to place or date  -Delirium precautions     Type 2 Diabetes Mellitus  -Last A1c 5.8  -Glucose 108  -SSI     SHIKHA on CKD stage 3  -Cr 1.9, baseline 1.4.  -Renally dose meds, avoid nephrotoxic drugs  -Cr 1.6 this AM, continue IVF as needed  -Repeat CMP in AM     Mixed hyperlipidemia  -LDL 70 at last lipid panel  -Continue home atorvastatin 80mg daily     HCM  -PCP Dr. Trent Aldana  -Pt UTD on Pneumovaxx, Tdap, Flu  -Pt UTD on colonoscopy    Diet: Cardiac  DVT Prophylaxis: SCDs, holding chemical ppx due to planned procedure  Allergies: NKDA  Code: Full  Dispo: pending pulm consult, PneumX catheter if stable    Ani Cowan MD  [facility] Internal Medicine HO-1  [facility] Medicine Service Team B

## 2020-10-07 NOTE — CONSULTS
[facility]  Hematology/Oncology  Consult Note    Patient Name: Shant Magana Jr.  MRN: [id number]  Admission Date: 10/6/2020  Hospital Length of Stay: 0 days  Code Status: Full Code   Attending Provider: Todd Erazo MD  Consulting Provider: Rocael Conde MD  Primary Care Physician: Trent Aldana MD  Principal Problem:Exudative pleural effusion    Consults  Subjective:     HPI:  80 year-old male with history of dementia, initially evaluated by Pulmonary in August 2020 and underwent right-sided thoracentesis 09/04/2020.  2000 ml straw colored fluid removed. No malignancy found.    Now admitted with recurrent right pleural effusion, was having shortness of breath x2 weeks.    CT chest/abdomen 10/6 - shows 2.1 cm left lower lobe nodule with a large right pleural effusion.    Underwent repeat thoracentesis 10/7 - 2 litres straw-colored fluid removed.    [place] .        Oncology Treatment Plan:   [No treatment plan]    Medications:  Continuous Infusions:  Scheduled Meds:   amLODIPine  10 mg Oral Daily    atorvastatin  80 mg Oral Daily    melatonin  6 mg Oral Nightly    multivitamin  1 tablet Oral Daily    pantoprazole  40 mg Oral Daily    tamsulosin  1 capsule Oral Nightly     PRN Meds:albuterol-ipratropium, sodium chloride 0.9%     Review of patient's allergies indicates:  No Known Allergies     Past Medical History:   Diagnosis Date    Acute kidney injury superimposed on chronic kidney disease 10/14/2017    Cancer     prostate    CVA (cerebral vascular accident) 9/16/2017    Diabetes mellitus     Former smoker 7/18/2020    History of stroke 8/15/2017    Hypertension     Hypertrophic cardiomyopathy     Renal disorder     Stroke      Past Surgical History:   Procedure Laterality Date    BACK SURGERY      COLONOSCOPY N/A 10/18/2018    Procedure: COLONOSCOPY;  Surgeon: Alan Gooden MD;  Location: [facility] ENDO;  Service: Endoscopy;   Laterality: N/A;    ESOPHAGOGASTRODUODENOSCOPY N/A 9/4/2018    Procedure: EGD (ESOPHAGOGASTRODUODENOSCOPY);  Surgeon: Oli Cuadra MD;  Location: [facility];  Service: Endoscopy;  Laterality: N/A;    ESOPHAGOGASTRODUODENOSCOPY N/A 10/18/2018    Procedure: EGD (ESOPHAGOGASTRODUODENOSCOPY);  Surgeon: Alan Gooden MD;  Location: [facility] ENDO;  Service: Endoscopy;  Laterality: N/A;    THYROIDECTOMY       Family History     None        Tobacco Use    Smoking status: Former Smoker     Packs/day: 0.90     Years: 3.00     Pack years: 2.70     Types: Cigarettes    Smokeless tobacco: Never Used   Substance and Sexual Activity    Alcohol use: Never     Frequency: Never    Drug use: No    Sexual activity: Not on file       Review of Systems   Unable to perform ROS: Dementia     Objective:     Vital Signs (Most Recent):  Temp: 96.1 °F (35.6 °C) (10/07/20 1616)  Pulse: 86 (10/07/20 1616)  Resp: 20 (10/07/20 1616)  BP: (!) 143/75 (10/07/20 1616)  SpO2: (!) 93 % (10/07/20 1616) Vital Signs (24h Range):  Temp:  [96.1 °F (35.6 °C)-98 °F (36.7 °C)] 96.1 °F (35.6 °C)  Pulse:  [71-96] 86  Resp:  [18-22] 20  SpO2:  [92 %-100 %] 93 %  BP: (143-178)/(75-98) 143/75     Weight: 76.3 kg (168 lb 3.2 oz)  Body mass index is 24.13 kg/m².  Body surface area is 1.94 meters squared.      Intake/Output Summary (Last 24 hours) at 10/7/2020 1716  Last data filed at 10/7/2020 1300  Gross per 24 hour   Intake 175 ml   Output 168 ml   Net 7 ml       Physical Exam  Vitals signs reviewed.   Constitutional:       General: He is not in acute distress.     Appearance: He is not toxic-appearing or diaphoretic.   HENT:      Mouth/Throat:      Mouth: Mucous membranes are not pale, not dry and not cyanotic. No lacerations.      Pharynx: No oropharyngeal exudate.   Eyes:      General: No scleral icterus.     Conjunctiva/sclera: Conjunctivae normal.   Neck:      Musculoskeletal: Neck supple.      Thyroid: No thyroid mass or thyromegaly.    Cardiovascular:      Rate and Rhythm: Normal rate and regular rhythm.      Heart sounds: Normal heart sounds and S1 normal.   Pulmonary:      Effort: Pulmonary effort is normal. No accessory muscle usage or respiratory distress.      Breath sounds: Decreased air movement present. No stridor. Examination of the right-lower field reveals decreased breath sounds. Decreased breath sounds present. No wheezing or rales.   Abdominal:      Palpations: Abdomen is soft. There is no mass.      Tenderness: There is no abdominal tenderness.   Lymphadenopathy:      Head:      Right side of head: No submental or submandibular adenopathy.      Left side of head: No submental or submandibular adenopathy.      Cervical: No cervical adenopathy.   Skin:     Findings: No bruising, petechiae or rash.   Neurological:      Mental Status: He is alert and oriented to person, place, and time.      Cranial Nerves: No cranial nerve deficit.      Sensory: No sensory deficit.      Gait: Gait normal.         Significant Labs:   All pertinent labs from the last 24 hours have been reviewed.    Diagnostic Results:  I have reviewed all pertinent imaging results/findings within the past 24 hours.    Assessment/Plan:   Right Pleural Effusion  -recurrent right pleural effusion  -s/p 2nd thoracentesis on 10/7, results pending  -noted 2.1 cm pulmonary nodule in the left lower lobe  -consider repeat CT chest, since he had thoracentesis today  -discussed with patient and daughter at the bedside  -all questions answered      Rocael Conde MD  Hematology/Oncology  [facility]

## 2020-10-07 NOTE — PLAN OF CARE
10/07/20 1033   Post-Acute Status   Post-Acute Authorization Home Health;HME   HME Status   ([place] reached out to IR and spoke with Sharron who reports that they will be able to do the Pleurix if needed IP. As far as outpt, pt will need to have a home health set up that can support the pt with device and do the ordering . Awaiting PHN response.)   Home Health Status Awaiting Orders for HH   Discharge Plan   Discharge Plan A Home with family;Home Health     [facility] also is reaching out to area H/H providers to be sure they are able to support drainage system.    Ami Vila RN  RN Transition Navigator  [phone]

## 2020-10-07 NOTE — PROGRESS NOTES
Virtual RN cued into pt's room for Time out for Right Thoracentesis, See Time Out Flowsheet for documentation.      10/07/20 1126   Pre-Procedure Time-out   Procedure to be Performed Thoracentesis   Correct Patient Yes   Correct Site Yes   Correct Procedure Yes   Correct Position Yes   Correct Laterality Right   Surgery consent verified Yes   Surgery consent procedure name Thoracentesis   Anesthesia consent verified N/A   Blood consent verified  N/A   Pre-Op/Pre-Procedure orders verified N/A   Other Documents Verified N/A   Required Blood Products, Implants, Devices and /or Special Equipment Available N/A   Site Marked Yes   Site Marked by Whom Lloyd Martin  (Performed by Marylin Adan MD)   Site Everardo Visible No   Site Everardo Location RIght   Pre-Procedural Time-Out   Allergies Reviewed Done   Hold tube feeding N/A   Verify House Officer Privileges Done  (Dr. Osmani LEE present during thoracentesis)   Verify consent form completed & in chart   (Dr. Condon informed VN consent obtained and in chart)   Perform patient ID X's 2 Done   Announce the procedure to be performed Done   Everardo/Assess site Done   Position patient  Done   Assemble equipment/verify supplies Done   Confirm that all persons in room cleanse hands? (ASK, if unsure) Done   Prep Procedure site (N/A for intubations) N/A   Use large drape to cover patient? (N/A for intubations) Done   Set pulse & oximetry alarms to audible; If on ventilator, place on 100% FiO2 N/A   Procedure Provider:Wear sterile gloves, hat, mask with eye shield and sterile gown   (Sterile gloves and mask)   Procedure Assistant:Wear sterile gloves, hat, mask with eye shield and sterile gown   (sterile gloves and mask)   All persons in the room wearing a mask and hat   (sterile goves and mask)   Ultrasound guidance N/A   Trendelenberg position for upper torso line N/A   Hand and Barrier   Barrier Precautions Performed   Pre-Incision Time-out   Procedure to be Performed  Thoracentesis   Correct Patient No   Correct Site Yes   Correct Procedure Yes   Correct Position Yes   Correct Laterality Right   All Members of the Procedure/Surgical Team Introduced Yes   All Procedure/Surgical Team Members Present Yes   All Procedure/Surgical Team Members Agreed to Proceed with the Case Yes   Antibiotics Ordered/Given N/A   Allergies Reviewed Yes   Required Blood Products, Implants, Devices and /or Special Equipment Available N/A   Site Everardo Visible Yes

## 2020-10-07 NOTE — ASSESSMENT & PLAN NOTE
-recurrent right pleural effusion  -s/p 2nd thoracentesis on 10/7, results pending  -noted 2.1 cm pulmonary nodule in the left lower lobe  -consider repeat CT chest, since he had thoracentesis today  -discussed with patient and daughter at the bedside  -all questions answered

## 2020-10-07 NOTE — PT/OT/SLP EVAL
Occupational Therapy   Evaluation    Name: Shant Magana Jr.  MRN: [id number]  Admitting Diagnosis:  Exudative pleural effusion      Recommendations:     Discharge Recommendations: home health PT, home health OT  Discharge Equipment Recommendations:  none  Barriers to discharge:  None    Assessment:     Shant Magana Jr. is a 80 y.o. male with a medical diagnosis of Exudative pleural effusion.  He presents with performance deficits affecting function: weakness, impaired endurance, impaired balance, gait instability, decreased upper extremity function, decreased lower extremity function, impaired cardiopulmonary response to activity, impaired self care skills, impaired functional mobilty, decreased ROM.      Rehab Prognosis: Good; patient would benefit from acute skilled OT services to address these deficits and reach maximum level of function.       Plan:     Patient to be seen 5 x/week to address the above listed problems via self-care/home management, therapeutic activities, therapeutic exercises  · Plan of Care Expires: 11/07/20  · Plan of Care Reviewed with: patient    Subjective     Chief Complaint: SOB  Patient/Family Comments/goals: return to PLOF    Occupational Profile:  Living Environment: Lives w/son in [facility], [address]  Previous level of function: SBA ADLs and mobility; decreased safety awareness  Roles and Routines:   Equipment Used at Home:  walker, rolling, bedside commode, cane, straight, bath bench  Assistance upon Discharge: family    Pain/Comfort:  · Pain Rating 1: 0/10  · Pain Rating Post-Intervention 1: 0/10    Patients cultural, spiritual, Scientologist conflicts given the current situation: no    Objective:     Communicated with: nurse prior to session.  Patient found HOB elevated with bed alarm, peripheral IV, oxygen upon OT entry to room.    General Precautions: Standard, fall, respiratory   Orthopedic Precautions:    Braces:       Occupational Performance:    Bed Mobility:    · Patient completed  Rolling/Turning to Right with stand by assistance  · Patient completed Scooting/Bridging with stand by assistance  · Patient completed Supine to Sit with stand by assistance  · Patient completed Sit to Supine with stand by assistance    Functional Mobility/Transfers:  · Patient completed Sit <> Stand Transfer with contact guard assistance  with  no assistive device   · Functional Mobility: NT    Activities of Daily Living:  · NT    Cognitive/Visual Perceptual:  Alert, oriented to self, location    Physical Exam:  BUE AROM WFL, shoulders to midrange; strength grossly 3+/5  Good sit balance, fair/fair+ stand balance    AMPAC 6 Click ADL:  AMPAC Total Score: 17    Treatment & Education:  Pt educated on role of OT/POC.  Pt w/notable expiratory wheeze.  Pulm team presented to bedside to perform procedure.  Pt returned to supine.  Education:    Patient left HOB elevated with all lines intact, call button in reach, nurse notified and Pulm team present    GOALS:   Multidisciplinary Problems     Occupational Therapy Goals        Problem: Occupational Therapy Goal    Goal Priority Disciplines Outcome Interventions   Occupational Therapy Goal     OT, PT/OT Ongoing, Progressing    Description: Goals to be met by: 11/7     Patient will increase functional independence with ADLs by performing:    UE Dressing with Supervision.  LE Dressing with Supervision.  Grooming while standing at sink with Stand-by Assistance.  Toileting from toilet with Supervision for hygiene and clothing management.   Toilet transfer to toilet with Supervision.  Upper extremity exercise program x10 reps per handout, with supervision.                     History:     Past Medical History:   Diagnosis Date    Acute kidney injury superimposed on chronic kidney disease 10/14/2017    Cancer     prostate    CVA (cerebral vascular accident) 9/16/2017    Diabetes mellitus     Former smoker 7/18/2020    History of stroke 8/15/2017    Hypertension      Hypertrophic cardiomyopathy     Renal disorder     Stroke        Past Surgical History:   Procedure Laterality Date    BACK SURGERY      COLONOSCOPY N/A 10/18/2018    Procedure: COLONOSCOPY;  Surgeon: Alan Gooden MD;  Location: [facility];  Service: Endoscopy;  Laterality: N/A;    ESOPHAGOGASTRODUODENOSCOPY N/A 9/4/2018    Procedure: EGD (ESOPHAGOGASTRODUODENOSCOPY);  Surgeon: Oli Cuadra MD;  Location: [facility];  Service: Endoscopy;  Laterality: N/A;    ESOPHAGOGASTRODUODENOSCOPY N/A 10/18/2018    Procedure: EGD (ESOPHAGOGASTRODUODENOSCOPY);  Surgeon: Alan Gooden MD;  Location: [facility];  Service: Endoscopy;  Laterality: N/A;    THYROIDECTOMY         Time Tracking:     OT Date of Treatment: 10/07/20  OT Start Time: 1104  OT Stop Time: 1115  OT Total Time (min): 11 min    Billable Minutes:Evaluation 11    Rodo Aiken, OT  10/7/2020

## 2020-10-08 LAB
ALBUMIN SERPL BCP-MCNC: 2.7 G/DL (ref 3.5–5.2)
ALP SERPL-CCNC: 96 U/L (ref 55–135)
ALT SERPL W/O P-5'-P-CCNC: 63 U/L (ref 10–44)
ANION GAP SERPL CALC-SCNC: 8 MMOL/L (ref 8–16)
AST SERPL-CCNC: 50 U/L (ref 10–40)
BASOPHILS # BLD AUTO: 0.02 K/UL (ref 0–0.2)
BASOPHILS NFR BLD: 0.4 % (ref 0–1.9)
BILIRUB SERPL-MCNC: 1 MG/DL (ref 0.1–1)
BUN SERPL-MCNC: 20 MG/DL (ref 8–23)
CALCIUM SERPL-MCNC: 9.5 MG/DL (ref 8.7–10.5)
CHLORIDE SERPL-SCNC: 104 MMOL/L (ref 95–110)
CO2 SERPL-SCNC: 26 MMOL/L (ref 23–29)
CREAT SERPL-MCNC: 1.4 MG/DL (ref 0.5–1.4)
DIFFERENTIAL METHOD: ABNORMAL
EOSINOPHIL # BLD AUTO: 0.1 K/UL (ref 0–0.5)
EOSINOPHIL NFR BLD: 1.5 % (ref 0–8)
ERYTHROCYTE [DISTWIDTH] IN BLOOD BY AUTOMATED COUNT: 14.7 % (ref 11.5–14.5)
EST. GFR  (AFRICAN AMERICAN): 54 ML/MIN/1.73 M^2
EST. GFR  (NON AFRICAN AMERICAN): 47 ML/MIN/1.73 M^2
GLUCOSE SERPL-MCNC: 145 MG/DL (ref 70–110)
HCT VFR BLD AUTO: 43.7 % (ref 40–54)
HGB BLD-MCNC: 14.6 G/DL (ref 14–18)
HIV 1+2 AB+HIV1 P24 AG SERPL QL IA: NEGATIVE
IMM GRANULOCYTES # BLD AUTO: 0.02 K/UL (ref 0–0.04)
IMM GRANULOCYTES NFR BLD AUTO: 0.4 % (ref 0–0.5)
LYMPHOCYTES # BLD AUTO: 0.8 K/UL (ref 1–4.8)
LYMPHOCYTES NFR BLD: 15.5 % (ref 18–48)
MAGNESIUM SERPL-MCNC: 1.8 MG/DL (ref 1.6–2.6)
MCH RBC QN AUTO: 29.1 PG (ref 27–31)
MCHC RBC AUTO-ENTMCNC: 33.4 G/DL (ref 32–36)
MCV RBC AUTO: 87 FL (ref 82–98)
MONOCYTES # BLD AUTO: 0.4 K/UL (ref 0.3–1)
MONOCYTES NFR BLD: 8.1 % (ref 4–15)
NEUTROPHILS # BLD AUTO: 3.9 K/UL (ref 1.8–7.7)
NEUTROPHILS NFR BLD: 74.1 % (ref 38–73)
NRBC BLD-RTO: 0 /100 WBC
PLATELET # BLD AUTO: 206 K/UL (ref 150–350)
PMV BLD AUTO: 10.7 FL (ref 9.2–12.9)
POTASSIUM SERPL-SCNC: 3.8 MMOL/L (ref 3.5–5.1)
PROT SERPL-MCNC: 6.2 G/DL (ref 6–8.4)
RBC # BLD AUTO: 5.01 M/UL (ref 4.6–6.2)
SODIUM SERPL-SCNC: 138 MMOL/L (ref 136–145)
WBC # BLD AUTO: 5.21 K/UL (ref 3.9–12.7)

## 2020-10-08 PROCEDURE — 97535 SELF CARE MNGMENT TRAINING: CPT | Mod: CO,59

## 2020-10-08 PROCEDURE — 97116 GAIT TRAINING THERAPY: CPT

## 2020-10-08 PROCEDURE — 97530 THERAPEUTIC ACTIVITIES: CPT | Mod: CO

## 2020-10-08 PROCEDURE — 99214 OFFICE O/P EST MOD 30 MIN: CPT | Mod: ,,, | Performed by: INTERNAL MEDICINE

## 2020-10-08 PROCEDURE — 80053 COMPREHEN METABOLIC PANEL: CPT

## 2020-10-08 PROCEDURE — 85025 COMPLETE CBC W/AUTO DIFF WBC: CPT

## 2020-10-08 PROCEDURE — G0378 HOSPITAL OBSERVATION PER HR: HCPCS

## 2020-10-08 PROCEDURE — 99900035 HC TECH TIME PER 15 MIN (STAT)

## 2020-10-08 PROCEDURE — 99214 PR OFFICE/OUTPT VISIT, EST, LEVL IV, 30-39 MIN: ICD-10-PCS | Mod: ,,, | Performed by: INTERNAL MEDICINE

## 2020-10-08 PROCEDURE — 83735 ASSAY OF MAGNESIUM: CPT

## 2020-10-08 PROCEDURE — 94761 N-INVAS EAR/PLS OXIMETRY MLT: CPT

## 2020-10-08 PROCEDURE — 36415 COLL VENOUS BLD VENIPUNCTURE: CPT

## 2020-10-08 PROCEDURE — 86480 TB TEST CELL IMMUN MEASURE: CPT

## 2020-10-08 PROCEDURE — 25000003 PHARM REV CODE 250: Performed by: STUDENT IN AN ORGANIZED HEALTH CARE EDUCATION/TRAINING PROGRAM

## 2020-10-08 RX ORDER — LOSARTAN POTASSIUM 50 MG/1
100 TABLET ORAL DAILY
Status: DISCONTINUED | OUTPATIENT
Start: 2020-10-08 | End: 2020-10-10 | Stop reason: HOSPADM

## 2020-10-08 RX ADMIN — AMLODIPINE BESYLATE 10 MG: 5 TABLET ORAL at 08:10

## 2020-10-08 RX ADMIN — TAMSULOSIN HYDROCHLORIDE 0.4 MG: 0.4 CAPSULE ORAL at 09:10

## 2020-10-08 RX ADMIN — Medication 6 MG: at 09:10

## 2020-10-08 RX ADMIN — PANTOPRAZOLE SODIUM 40 MG: 40 TABLET, DELAYED RELEASE ORAL at 08:10

## 2020-10-08 RX ADMIN — LOSARTAN POTASSIUM 100 MG: 50 TABLET ORAL at 08:10

## 2020-10-08 RX ADMIN — THERA TABS 1 TABLET: TAB at 08:10

## 2020-10-08 RX ADMIN — ATORVASTATIN CALCIUM 80 MG: 40 TABLET, FILM COATED ORAL at 08:10

## 2020-10-08 NOTE — PLAN OF CARE
Problem: Adult Inpatient Plan of Care  Goal: Chart Reviewed  Description: Chart and Care plan reviewed.  Care plan updated  Outcome: Ongoing, Progressing

## 2020-10-08 NOTE — PLAN OF CARE
Problem: Physical Therapy Goal  Goal: Physical Therapy Goal  Description: Goals to be met by: 10/30/2020    Patient will increase functional independence with mobility by performin. Sit<>stand with SBA with RW.  2. Gait x 150 feet with SBA with RW.  3.  Supine>sit with  SBA      Outcome: Ongoing, Progressing   Pt presented in BSChair, sit>stand with CGA, pt with minor LOB upon standing requiring CGA to recover.  Gait  training x 150ft with RW and CGA for balance, min A for walker management, pt running in to objects w/ walker unable to correct.  Pt transfer to sit EOB with CGA, to supine with CGA to initiate movement.

## 2020-10-08 NOTE — PLAN OF CARE
[facility] Plan of Care Note    Discussed GOC and plan of care with pulmonology team, patient, and 3 siblings. Discussed with family our concerns regarding the pleural effusion, concerns for malignancy and his clinical decline. They are aware and agree that he has been clinically worsening over the past 6 months.    Discussed with the family and at this time, will pursue home hospice for assistance in pleurx containers and assistance for him at home to drain it. CM was notified and referral orders for home hospice placed.    Discussed with Pulmonology team, NPO@[facility] for anticipated pleurX cath placement tomorrow.    Tolu Dallas MD  [facility] HO-[facility]

## 2020-10-08 NOTE — PROGRESS NOTES
[facility] Resident RUBYI Progress Note    Subjective:      Shant Magana Jr. is a 80 y.o. male who is being followed by the [facility] service at [facility] for SOB due to recurrent exudative pulmonary effusion.    Pt resting in bed this morning, pleasant. States he feels much better this morning, breathing much more comfortably. Denies any chest pain or discomfort. Denies any headache, fevers, chills, nausea, vomiting, or cough.    Thoracentesis yesterday with 2L of fluid removed, still significant fluid remaining. Discussion with SW/pulm today for future management (repeated thoracentesis vs PleurX cath).     Objective:     Last 24 Hour Vital Signs:  BP  Min: 143/75  Max: 179/97  Temp  Av.7 °F (35.9 °C)  Min: 96.1 °F (35.6 °C)  Max: 97.1 °F (36.2 °C)  Pulse  Av  Min: 72  Max: 96  Resp  Av.3  Min: 16  Max: 20  SpO2  Av.9 %  Min: 93 %  Max: 100 %  I/O last 3 completed shifts:  In: 175 [P.O.:175]  Out: 667 [Urine:667]    Physical Examination:  Physical Exam  Constitutional:       General: He is not in acute distress.     Appearance: He is not ill-appearing or toxic-appearing.   HENT:      Head: Normocephalic and atraumatic.      Mouth/Throat:      Mouth: Mucous membranes are moist.      Pharynx: Oropharynx is clear.   Eyes:      Extraocular Movements: Extraocular movements intact.      Conjunctiva/sclera: Conjunctivae normal.   Cardiovascular:      Rate and Rhythm: Normal rate and regular rhythm.      Pulses: Normal pulses.      Heart sounds: Normal heart sounds.   Pulmonary:      Effort: Pulmonary effort is normal. No respiratory distress.      Breath sounds: Wheezing (improved) present.   Chest:      Chest wall: No tenderness.   Abdominal:      General: Bowel sounds are normal. There is no distension.      Palpations: Abdomen is soft.      Tenderness: There is no abdominal tenderness. There is no guarding.   Musculoskeletal:         General: Deformity  (clubbing in all digits) present. No swelling or tenderness.      Right lower leg: No edema.      Left lower leg: No edema.   Skin:     General: Skin is warm and dry.   Neurological:      General: No focal deficit present.      Mental Status: He is alert. Mental status is at baseline. He is disoriented.      Motor: Weakness present.      Coordination: Coordination abnormal.   Psychiatric:         Mood and Affect: Mood normal.         Behavior: Behavior normal.           Laboratory:  Laboratory Data Reviewed: Yes  Pertinent Findings:  Trended Lab Data:  Recent Labs   Lab 10/02/20  1100 10/06/20  1240 10/07/20  0417 10/08/20  0607   WBC 4.65 6.54 4.74 5.21   HGB 15.1 15.1 13.4* 14.6   HCT 45.1 45.1 40.7 43.7    274 237 206   MCV 87 87 88 87   RDW 14.8* 14.9* 14.9* 14.7*    140 141  --    K 4.1 4.1 3.8  --     104 106  --    CO2 24 28 25  --    BUN 18 25* 25*  --     108 110  --    CALCIUM 10.3 10.8* 10.0  --    PROT 6.9 7.5 6.2  --    ALBUMIN 3.4* 3.6 2.9*  --    BILITOT 1.0 1.2* 0.8  --    AST 46* 61* 49*  --    ALKPHOS 98 107 91  --    ALT 54* 67* 58*  --        Microbiology Data Reviewed: Yes  Pertinent Findings:  Microbiology Results (last 7 days)     Procedure Component Value Units Date/Time    Culture, Body Fluid - Bactec [[id number]] Collected: 10/07/20 1200    Order Status: Completed Specimen: Body Fluid from Thoracentesis Fluid Updated: 10/08/20 0115     Body Fluid Culture, Sterile No Growth to date    Gram stain [[id number]] Collected: 10/07/20 1200    Order Status: Completed Specimen: Body Fluid from Lung, Right Updated: 10/07/20 2142     Gram Stain Result Rare WBC's      Rare Gram positive cocci    Gram stain [[id number]]     Order Status: Canceled Specimen: Body Fluid from Pleural Fluid           Other Results:  EKG:  normal sinus rhythm, frequent PVC's noted.    Radiology Data Reviewed: Yes  Pertinent Findings:      X-Ray Chest 1 View  Narrative: EXAMINATION:  XR CHEST 1  VIEW    CLINICAL HISTORY:  Post-thoracentesis; Pleural effusion, not elsewhere classified    TECHNIQUE:  Single frontal view of the chest was performed.    COMPARISON:  October 6, 2020    FINDINGS:  Single portable chest view is submitted.  There is near complete opacification of the right hemithorax, although the appearance is that of improvement compared to the prior study, with partial aeration of the mid to upper right lung.  The pattern of opacification likely relates to large volume of pleural fluid, as well as parenchymal change to include atelectasis and possible infiltrate.  There is no significant extrapulmonary air/pneumothorax appreciated.  There is no significant pleural effusion or pneumothorax or confluent infiltrate/consolidation on the left, and the left hemithorax appears stable.  The cardiomediastinal silhouette appears stable.  Prominent aortic atherosclerotic change noted.  Suspected cardiac loop recorder device noted.  The osseous structures demonstrate chronic change.  Impression: Persistent abnormal opacification of the right hemithorax with improvement as discussed above.    Electronically signed by: Yash Kumari  Date:    10/07/2020  Time:    21:04        Current Medications:     Infusions:       Scheduled:   amLODIPine  10 mg Oral Daily    atorvastatin  80 mg Oral Daily    losartan  100 mg Oral Daily    melatonin  6 mg Oral Nightly    multivitamin  1 tablet Oral Daily    pantoprazole  40 mg Oral Daily    tamsulosin  1 capsule Oral Nightly        PRN:  albuterol-ipratropium, sodium chloride 0.9%    Antibiotics and Day Number of Therapy:  None    Lines and Day Number of Therapy:  PIVx2, Day 2    Assessment:     Shant Magana JrSherri is a 80 y.o.male with  Patient Active Problem List    Diagnosis Date Noted    Exudative pleural effusion 10/06/2020    History of embolic stroke 09/22/2020    Polyneuropathy in diabetes 09/12/2020    Type 2 diabetes mellitus with peripheral angiopathy  09/10/2020    Anticoagulant long-term use 08/21/2020    Hyperparathyroidism due to renal insufficiency 08/20/2020    Embolic stroke involving right middle cerebral artery 07/18/2020    Former smoker 07/18/2020    Chronic heart failure with preserved ejection fraction     Dementia without behavioral disturbance 12/10/2019    Paroxysmal atrial fibrillation 02/12/2019    Status post placement of implantable loop recorder 02/12/2019    Gastritis     Polyp of colon     Chronic rhinitis 09/12/2018    Peptic ulcer disease 09/12/2018    Anemia due to blood loss 09/12/2018    Anemia     Erectile dysfunction 03/16/2018    Chronic diastolic congestive heart failure 10/19/2017    Enlarged LA (left atrium) 10/19/2017    Internal carotid artery stenosis, left 10/18/2017    Mixed hyperlipidemia 10/18/2017    Left carotid artery occlusion 09/16/2017    High cholesterol 09/16/2017    Prostate cancer 09/15/2017    Type 2 diabetes mellitus with complication, without long-term current use of insulin     Hypertensive chronic kidney disease 06/22/2017    Essential hypertension 06/07/2017    Vitamin D deficiency 12/07/2016    CKD (chronic kidney disease) stage 3, GFR 30-59 ml/min 09/07/2016    Renal oncocytoma of right kidney 09/07/2016    Diabetic renal disease 12/04/2015    Type 2 diabetes mellitus with diabetic nephropathy 12/04/2015    BPH (benign prostatic hyperplasia) 10/11/2013    Hypertrophic obstructive cardiomyopathy 10/11/2013        Plan:     Shortness of Breath 2/2 R sided Exudative Pleural Effusion  -SOB increasing over past 2 weeks, endorsed pain when laying on R side  -Former smoker, 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  -Per pulm note from 10/2/20: Pt with recurrence of pleural effusion, history of prior thoracentesis with lymphocyte predominate exudative pleural effusion, concerning for malignant effusion  -CT Chest w/ large R pleural effusion with complete atelectasis of the R lung and  leftward mediastinal shift  -Pulm consulted, discussed thoracentesis vs pleural catheter vs PleurX-cath placement  -Consulted case management for assistance with PleurX-catheter and canister coverage  -Thoracentesis 10/7 with 2L of fluid extracted, approx 3L residual  -Resume anticoagulation if PleurX cath not being placed  -Repeat CT chest today     Left Lower Lobe Lung Nodule  -Pt denies smoking history in contrast to chart history  -CT Chest w/ indeterminate 2.1cm nodule in LLL  -Pulm and onc consulted, appreciate recs     Essential Hypertension  -/90 at admit  -Home meds: Losartan 100mg, Amlodipine 10mg  -Continue Amlodipine, was holding losartan 2/2 SHIKHA  -/97 today; resume home losartan     S/P R MCA embolic stroke, residual hemiplegia and hemiparesis  -Most recent stroke 8/11/2020  -Residual deficits in LUE (4/5 strength L vs R), pt able to walk but with instability and frequent falls  -Coordination limited, difficulty with cerebellar tasks  -Pt on eliquis, hold at this time  -PT/OT eval for safe discharge, appreciate recs  -Fall risk precautions     Chronic diastolic heart failure  -with recovered diastolic dysfunction  -BP management as above     Paroxysmal Afib, HOCM, s/p implantation of loop recorder  -CHADS-VASC 5; 7.2% stroke risk  -Holding eliquis 2/2 planned procedure; resume if no further procedures  -Rate control     Dementia  -Pt oriented to self, president; pt not oriented to place or date  -Delirium precautions     Type 2 Diabetes Mellitus  -Last A1c 5.8  -Glucose 108  -SSI     SHIKHA on CKD stage 3  -Cr 1.9, baseline 1.4.  -Renally dose meds, avoid nephrotoxic drugs  -Continue IVF as needed  -Repeat CMP in AM     Mixed hyperlipidemia  -LDL 70 at last lipid panel  -Continue home atorvastatin 80mg daily     HCM  -PCP Dr. Trent Aldana  -Pt UTD on Pneumovaxx, Tdap, Flu  -Pt UTD on colonoscopy    Diet: Cardiac  DVT Prophylaxis: SCDs, holding chemical ppx due to planned  procedure  Allergies: NKDA  Code: Full  Dispo: pending pulm consult, PneumX catheter if stable    Ani Cowan MD  [facility] [address] Medicine Service Team B

## 2020-10-08 NOTE — NURSING
Patient safety maintained. Patient awake, and alert. Bed on low position, alarm in place, call bell at reach. Medications administered per order. Assistance provided for meals and adl's, instructed to call as needed. Continued telemetry monitoring. Tele sitter at bedside. Patient to be NPO after midnight for procedure 10/9/2020.

## 2020-10-08 NOTE — PLAN OF CARE
Plan of care reviewed with patient. Normal sinus rhythm on continuous heart monitor, no true red alarms.Patient turned Q2 per staff assist to promote skin integrity. Safety maintained at this time. Bed in lowest position, side rails up x 2. Call light in reach.  Encouraged patient to use call light for assistance .Verbalized understanding. Will continue to monitor patient.

## 2020-10-08 NOTE — PT/OT/SLP PROGRESS
Physical Therapy Treatment    Patient Name:  Shant Magana Jr.   MRN:  [id number]    Recommendations:     Discharge Recommendations:  home health PT, home health OT   Discharge Equipment Recommendations: none   Barriers to discharge: None    Assessment:     Shant Magana Jr. is a 80 y.o. male admitted with a medical diagnosis of Exudative pleural effusion.  He presents with the following impairments/functional limitations:  impaired endurance, impaired self care skills, impaired functional mobilty, gait instability, impaired balance, impaired cognition, decreased coordination, decreased lower extremity function . Pt continues with functional mobility deficits and should benefit from continuing current PT POC to maximize I and safety decrease risk of further decline of injury.    Rehab Prognosis: Fair; patient would benefit from acute skilled PT services to address these deficits and reach maximum level of function.    Recent Surgery: * No surgery found *      Plan:     During this hospitalization, patient to be seen 5 x/week to address the identified rehab impairments via gait training, therapeutic activities, therapeutic exercises and progress toward the following goals:    · Plan of Care Expires:  11/07/20    Subjective     Chief Complaint: pt without c/o  Patient/Family Comments/goals: pt reports feeling better  Pain/Comfort:  · Pain Rating 1: 0/10  · Pain Rating Post-Intervention 1: 0/10      Objective:     Communicated with nurseRosetta prior to session.  Patient found up in chair with peripheral IV upon PT entry to room.     General Precautions: Standard, fall   Orthopedic Precautions:N/A   Braces: N/A     Functional Mobility:  · Bed Mobility:     · Sit to Supine: contact guard assistance  · Transfers:     · Sit to Stand:  contact guard assistance with rolling walker  · Gait: x 150ft with RW and CGA for balance, min A for walker management, pt running in to objects w/ walker unable to correct.       AM-PAC 6 CLICK  MOBILITY  Turning over in bed (including adjusting bedclothes, sheets and blankets)?: 4  Sitting down on and standing up from a chair with arms (e.g., wheelchair, bedside commode, etc.): 3  Moving from lying on back to sitting on the side of the bed?: 3  Moving to and from a bed to a chair (including a wheelchair)?: 3  Need to walk in hospital room?: 3  Climbing 3-5 steps with a railing?: 3  Basic Mobility Total Score: 19       Therapeutic Activities and Exercises:    Pt presented in BSChair, sit>stand with CGA, pt with minor LOB upon standing requiring CGA to recover.  Gait  training x 150ft with RW and CGA for balance, min A for walker management, pt running in to objects w/ walker unable to correct.  Pt transfer to sit EOB with CGA, to supine with CGA to initiate movement.      Patient left supine with all lines intact, call button in reach, bed alarm on and son present..    GOALS:   Multidisciplinary Problems     Physical Therapy Goals        Problem: Physical Therapy Goal    Goal Priority Disciplines Outcome Goal Variances Interventions   Physical Therapy Goal     PT, PT/OT Ongoing, Progressing     Description: Goals to be met by: 10/30/2020    Patient will increase functional independence with mobility by performin. Sit<>stand with SBA with RW.  2. Gait x 150 feet with SBA with RW.  3.  Supine>sit with  SBA                       Time Tracking:     PT Received On: 10/08/20  PT Start Time: 1201     PT Stop Time: 1215  PT Total Time (min): 14 min     Billable Minutes: Gait Training 14    Treatment Type: Treatment  PT/PTA: PT     PTA Visit Number: 0     Samson De La Rosa, PT  10/08/2020

## 2020-10-08 NOTE — PT/OT/SLP PROGRESS
Occupational Therapy   Treatment    Name: Shant Magana Jr.  MRN: [id number]  Admitting Diagnosis:  Exudative pleural effusion       Recommendations:     Discharge Recommendations: home health PT, home health OT  Discharge Equipment Recommendations:  none  Barriers to discharge:  None    Assessment:     Shant Magana Jr. is a 80 y.o. male with a medical diagnosis of Exudative pleural effusion.  Performance deficits affecting function are weakness, impaired endurance, impaired self care skills, impaired functional mobilty, gait instability, impaired balance, impaired cognition, decreased coordination, decreased upper extremity function, decreased lower extremity function, decreased safety awareness, impaired coordination, impaired fine motor. Pt found in bed, agreeable to therapy.  Pt O x person only.  Requires instruction/direction throughout therapy to carry out task. Pt ambulates in room with Min A using RW.  Tolerated tx well. Continue OT services to address functional goals, progressing as able.         Rehab Prognosis:  Fair; patient would benefit from acute skilled OT services to address these deficits and reach maximum level of function.       Plan:     Patient to be seen 5 x/week to address the above listed problems via self-care/home management, therapeutic activities, therapeutic exercises  · Plan of Care Expires: 11/07/20  · Plan of Care Reviewed with: patient, son    Subjective     Pain/Comfort:  · Pain Rating 1: 0/10  · Pain Rating Post-Intervention 1: 0/10    Objective:     Communicated with: RN prior to session.  Patient found HOB elevated with peripheral IV, telemetry upon OT entry to room.    General Precautions: Standard, fall   Orthopedic Precautions:N/A   Braces: N/A     Occupational Performance:     Bed Mobility:    · Patient completed Rolling/Turning to Right with stand by assistance  · Patient completed Scooting/Bridging with stand by assistance, contact guard assistance and scoot seated to  EOB  · Patient completed Supine to Sit with minimum assistance and   HOB elevated, increased time and effort, vc's for effective technique    Functional Mobility/Transfers:  · Patient completed Sit <> Stand Transfer with minimum assistance  with  rolling walker and vc's for technique   · Patient completed Bed <> Chair Transfer using Stand Pivot technique with minimum assistance with rolling walker  · Functional Mobility: Pt ambulated room distances with Min A using RW. Pt requires assist to manage RW.     Activities of Daily Living:  · Grooming: minimum assistance Pt requires vc's to direct through tasks of brusing teeth and washing face 2/2 impaired cognition.       [facility] 6 Click ADL: 14      Patient left up in chair with all lines intact, call button in reach, RN notified and son presentEducation:      GOALS:   Multidisciplinary Problems     Occupational Therapy Goals        Problem: Occupational Therapy Goal    Goal Priority Disciplines Outcome Interventions   Occupational Therapy Goal     OT, PT/OT Ongoing, Progressing    Description: Goals to be met by: 11/7     Patient will increase functional independence with ADLs by performing:    UE Dressing with Supervision.  LE Dressing with Supervision.  Grooming while standing at sink with Stand-by Assistance.  Toileting from toilet with Supervision for hygiene and clothing management.   Toilet transfer to toilet with Supervision.  Upper extremity exercise program x10 reps per handout, with supervision.                     Time Tracking:     OT Date of Treatment: 10/08/20  OT Start Time: 1054  OT Stop Time: 1117  OT Total Time (min): 23 min    Billable Minutes:Self Care/Home Management 13  Therapeutic Activity 10    RATNA Kumari  10/8/2020

## 2020-10-08 NOTE — PLAN OF CARE
Pt accepted by [facility]. [place] spoke with Shasta. She reports she is meeting with family today to sign paperwork.  She did report start of care for Saturday due to inclement weather expected in the area.  Updated Dr. Dallas via secure chat.    Future Appointments   Date Time Provider Department Center   10/12/2020  2:00 PM Trent Aldana MD [facility]     Trent Aldana MD  Schedule an appointment as soon as possible for a visit  FOLLOW UP WITH PCP  [address]  [address]  [place] [address]  [phone]   Rocael Conde MD  Schedule an appointment as soon as possible for a visit  ONCOLOGY/HEMATOLOGY FOLLOW UP  [address]  [address]  [place] [address]  [phone]   [facility]  Schedule an appointment as soon as possible for a visit  HOSPICE  [facility]  [phone]                10/08/20 1350   Post-Acute Status   Post-Acute Authorization Hospice   Hospice Status Pending Service Contract   Discharge Plan   Discharge Plan A Home with family;Hospice/home     Ami Vila RN  RN Transition Navigator  [phone]'

## 2020-10-08 NOTE — PLAN OF CARE
Problem: Occupational Therapy Goal  Goal: Occupational Therapy Goal  Description: Goals to be met by: 11/7     Patient will increase functional independence with ADLs by performing:    UE Dressing with Supervision.  LE Dressing with Supervision.  Grooming while standing at sink with Stand-by Assistance.  Toileting from toilet with Supervision for hygiene and clothing management.   Toilet transfer to toilet with Supervision.  Upper extremity exercise program x10 reps per handout, with supervision.    Outcome: Ongoing, Progressing    Shant Magana Jr. is a 80 y.o. male with a medical diagnosis of Exudative pleural effusion.  Performance deficits affecting function are weakness, impaired endurance, impaired self care skills, impaired functional mobilty, gait instability, impaired balance, impaired cognition, decreased coordination, decreased upper extremity function, decreased lower extremity function, decreased safety awareness, impaired coordination, impaired fine motor. Pt found in bed, agreeable to therapy.  Pt O x person only.  Requires instruction/direction throughout therapy to carry out task. Pt ambulates in room with Min A using RW.  Tolerated tx well. Continue OT services to address functional goals, progressing as able.       PINKY Kumari/L

## 2020-10-08 NOTE — PROGRESS NOTES
[facility] Pulmonary/Critical Care Progress Note      Patient: Shant Magana Jr.  Age:80 y.o.   MRN:[id number]  Admit date:10/6/2020    LOS:0 day(s)     SUBJECTIVE:       Mr. Shant Magana reports feeling well today. He was able to sit up on side of bed without issue and on room air. He reports sleeping well with decreased SOB.    Interval Hx:   10/6: Admitted secondary to worsening SOB for 2 weeks  10/7: Therapeutic/diagnostic thoracentesis performed with 2L removed    OBJECTIVE DATA:      Vital Signs:  Vitals:    10/08/20 1244   BP: (!) 132/91   Pulse: 79   Resp: 16   Temp: 96.8 °F (36 °C)       Intake/Output:    Intake/Output Summary (Last 24 hours) at 10/8/2020 1254  Last data filed at 10/8/2020 0700  Gross per 24 hour   Intake 175 ml   Output 499 ml   Net -324 ml        Physical Exam:  General: Patient sitting comfortably in NAD  HEENT: normocephalic, atraumatic  Cardiac: RRR, no murmurs appreciated, no extra heart sounds  Pulmonary/Chest: Decreased/absent breath sounds in right lung lobes. On room air without increased work of breathing  GI: Soft, non tender, non distended, normoactive bowel sounds  Extremities: Clubbing noted. No edema or cyanosis  Skin: dry, warm, intact. No bruising or rashes.  Neuro: Alert and oriented to person and place, moving all extremities     Laboratory/Imaging:  Recent Labs   Lab 10/06/20  1240 10/07/20  0417 10/08/20  0607 10/08/20  1031   WBC 6.54 4.74 5.21  --    HGB 15.1 13.4* 14.6  --    HCT 45.1 40.7 43.7  --     237 206  --     141  --  138   K 4.1 3.8  --  3.8    106  --  104   CREATININE 1.9* 1.6*  --  1.4   BUN 25* 25*  --  20   CO2 28 25  --  26   ALT 67* 58*  --  63*   AST 61* 49*  --  50*       Microbiology:  Pleural Fluid (9/4/2020) = no growth to date  Pleural Fluid (10/7/2020) = pending     Imaging:  CT Chest/Abdomen/Pelvis (10/6/2020)  1. Large right pleural effusion with complete atelectasis of the right lung and leftward mediastinal shift.   2. Indeterminate  2.1 cm nodule in the left lower lobe.  Diagnostic considerations include neoplasia and infection.   3. Left renal nonobstructing calculus.   4. Multiple hepatic hypodensities, probably cysts.   5. Additional findings above.     Medications:   amLODIPine  10 mg Oral Daily    atorvastatin  80 mg Oral Daily    losartan  100 mg Oral Daily    melatonin  6 mg Oral Nightly    multivitamin  1 tablet Oral Daily    pantoprazole  40 mg Oral Daily    tamsulosin  1 capsule Oral Nightly        albuterol-ipratropium, sodium chloride 0.9%    Problem List:  Patient Active Problem List   Diagnosis    Essential hypertension    Type 2 diabetes mellitus with complication, without long-term current use of insulin    Internal carotid artery stenosis, left    Mixed hyperlipidemia    CKD (chronic kidney disease) stage 3, GFR 30-59 ml/min    Chronic diastolic congestive heart failure    Enlarged LA (left atrium)    Anemia    Gastritis    Polyp of colon    Paroxysmal atrial fibrillation    Status post placement of implantable loop recorder    Dementia without behavioral disturbance    Chronic heart failure with preserved ejection fraction    BPH (benign prostatic hyperplasia)    Embolic stroke involving right middle cerebral artery    Former smoker    Chronic rhinitis    Diabetic renal disease    Erectile dysfunction    Hyperparathyroidism due to renal insufficiency    Hypertrophic obstructive cardiomyopathy    Left carotid artery occlusion    Peptic ulcer disease    Prostate cancer    Renal oncocytoma of right kidney    Vitamin D deficiency    Hypertensive chronic kidney disease    High cholesterol    Anemia due to blood loss    Type 2 diabetes mellitus with diabetic nephropathy    Anticoagulant long-term use    Type 2 diabetes mellitus with peripheral angiopathy    Polyneuropathy in diabetes    History of embolic stroke    Exudative pleural effusion        Assessment/Plan:     Mr. Shant Magana is an  80 year old male with past medical history of type II DM, prostate cancer, CVA, CKD, HTN, Hypertrophic Cardiomyopathy and tobacco abuse who presented to [facility] secondary to shortness of breath for the past 2 weeks.     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   - Therapeutic/diagnostic thoracentesis performed on 10/7 with 2L fluid removed  - Pleural fluid studies consistent with exudative process; gram stain with rare gram positive cocci. However, likely contaminant given clinic picture.   - Concerned for malignant process; will continue to follow cytology  - Plan for PleurX cath placement tomorrow and ultimately patient to return home with hospice   - Will monitor improvement in breathing     Left Lower Lobe Lung Nodule  - Patient with smoking history of unknown duration  - CT Chest =  2.1 cm nodule in the left lower lobe     Thank you for allowing us to participate in the care of this patient. We will continue to follow.      Marylin Adan MD  [facility] Internal Medicine Resident, -II    Pt seen and examined with Pulmonary/Critical Care team and this note reviewed and validated with the following additional comments:    SOB much improved. Still has large amount of remaining effusion. Planning for PleurX in AM.    Quinton Ariza MD  Phone [phone]

## 2020-10-08 NOTE — ASSESSMENT & PLAN NOTE
-recurrent right pleural effusion  -s/p 2nd thoracentesis on 10/7, results pending  -significant pleural fluid still present on repeat CT  -being scheduled for PleurX catheter tomorrow  -arrangements being made for home hospice with tentative diagnosis of lung cancer given large volume of recurrent exudative pleural effusion

## 2020-10-08 NOTE — PROGRESS NOTES
[facility]  Hematology/Oncology  Progress Note    Patient Name: Shant Magana Jr.  Admission Date: 10/6/2020  Hospital Length of Stay: 0 days  Code Status: Full Code     Subjective:     HPI:  80 year-old male with history of dementia, initially evaluated by Pulmonary in August 2020 and underwent right-sided thoracentesis 09/04/2020.  2000 ml straw colored fluid removed. No malignancy found.    Now admitted with recurrent right pleural effusion, was having shortness of breath x2 weeks.    CT chest/abdomen 10/6 - shows 2.1 cm left lower lobe nodule with a large right pleural effusion.    Underwent repeat thoracentesis 10/7 - 2 litres straw-colored fluid removed.    [place]        Oncology Treatment Plan:   [No treatment plan]    Medications:  Continuous Infusions:  Scheduled Meds:   amLODIPine  10 mg Oral Daily    atorvastatin  80 mg Oral Daily    losartan  100 mg Oral Daily    melatonin  6 mg Oral Nightly    multivitamin  1 tablet Oral Daily    pantoprazole  40 mg Oral Daily    tamsulosin  1 capsule Oral Nightly     PRN Meds:albuterol-ipratropium, sodium chloride 0.9%     Review of patient's allergies indicates:  No Known Allergies     Past Medical History:   Diagnosis Date    Acute kidney injury superimposed on chronic kidney disease 10/14/2017    Cancer     prostate    CVA (cerebral vascular accident) 9/16/2017    Diabetes mellitus     Former smoker 7/18/2020    History of stroke 8/15/2017    Hypertension     Hypertrophic cardiomyopathy     Renal disorder     Stroke      Past Surgical History:   Procedure Laterality Date    BACK SURGERY      COLONOSCOPY N/A 10/18/2018    Procedure: COLONOSCOPY;  Surgeon: Alan Gooden MD;  Location: [facility];  Service: Endoscopy;  Laterality: N/A;    ESOPHAGOGASTRODUODENOSCOPY N/A 9/4/2018    Procedure: EGD (ESOPHAGOGASTRODUODENOSCOPY);  Surgeon: Oli Cuadra MD;  Location: [facility];   Service: Endoscopy;  Laterality: N/A;    ESOPHAGOGASTRODUODENOSCOPY N/A 10/18/2018    Procedure: EGD (ESOPHAGOGASTRODUODENOSCOPY);  Surgeon: Alan Gooden MD;  Location: [facility];  Service: Endoscopy;  Laterality: N/A;    THYROIDECTOMY       Family History     None        Tobacco Use    Smoking status: Former Smoker     Packs/day: 0.90     Years: 3.00     Pack years: 2.70     Types: Cigarettes    Smokeless tobacco: Never Used   Substance and Sexual Activity    Alcohol use: Never     Frequency: Never    Drug use: No    Sexual activity: Not on file       Review of Systems   Unable to perform ROS: Dementia     Objective:     Vital Signs (Most Recent):  Temp: 96.8 °F (36 °C) (10/08/20 1244)  Pulse: 79 (10/08/20 1244)  Resp: 16 (10/08/20 1244)  BP: (!) 132/91 (10/08/20 1244)  SpO2: 97 % (10/08/20 1244) Vital Signs (24h Range):  Temp:  [96.1 °F (35.6 °C)-97.1 °F (36.2 °C)] 96.8 °F (36 °C)  Pulse:  [72-96] 79  Resp:  [16-20] 16  SpO2:  [93 %-100 %] 97 %  BP: (132-179)/(75-97) 132/91     Weight: 76.3 kg (168 lb 3.2 oz)  Body mass index is 24.13 kg/m².  Body surface area is 1.94 meters squared.      Intake/Output Summary (Last 24 hours) at 10/8/2020 1425  Last data filed at 10/8/2020 0700  Gross per 24 hour   Intake 0 ml   Output 499 ml   Net -499 ml       Physical Exam  Vitals signs reviewed.   Constitutional:       General: He is not in acute distress.     Appearance: He is not toxic-appearing or diaphoretic.   HENT:      Mouth/Throat:      Mouth: Mucous membranes are not pale, not dry and not cyanotic. No lacerations.      Pharynx: No oropharyngeal exudate.   Eyes:      General: No scleral icterus.     Conjunctiva/sclera: Conjunctivae normal.   Neck:      Musculoskeletal: Neck supple.      Thyroid: No thyroid mass or thyromegaly.   Cardiovascular:      Rate and Rhythm: Normal rate and regular rhythm.      Heart sounds: Normal heart sounds and S1 normal.   Pulmonary:      Effort: Pulmonary effort is normal. No  accessory muscle usage or respiratory distress.      Breath sounds: Decreased air movement present. No stridor. Examination of the right-lower field reveals decreased breath sounds. Decreased breath sounds present. No wheezing or rales.   Abdominal:      Palpations: Abdomen is soft. There is no mass.      Tenderness: There is no abdominal tenderness.   Lymphadenopathy:      Head:      Right side of head: No submental or submandibular adenopathy.      Left side of head: No submental or submandibular adenopathy.      Cervical: No cervical adenopathy.   Skin:     Findings: No bruising, petechiae or rash.   Neurological:      Mental Status: He is alert and oriented to person, place, and time.      Cranial Nerves: No cranial nerve deficit.      Sensory: No sensory deficit.      Gait: Gait normal.         Significant Labs:   All pertinent labs from the last 24 hours have been reviewed.    Diagnostic Results:  I have reviewed all pertinent imaging results/findings within the past 24 hours.    Assessment/Plan:     * Exudative pleural effusion  -recurrent right pleural effusion  -s/p 2nd thoracentesis on 10/7, results pending  -significant pleural fluid still present on repeat CT  -being scheduled for PleurX catheter tomorrow  -arrangements being made for home hospice with tentative diagnosis of lung cancer given large volume of recurrent exudative pleural effusion          Rocael Conde MD  Hematology/Oncology  [facility]

## 2020-10-08 NOTE — PLAN OF CARE
10/08/20 1233   Post-Acute Status   Post-Acute Authorization Hospice   Hospice Status Pending Clinical Review  ([place] spoke with daughter Mike on phone and son here in hospital. No preference given for hospice provider. Referral sent)     Ami Vila RN  RN Transition Navigator  [phone]

## 2020-10-08 NOTE — SUBJECTIVE & OBJECTIVE
Oncology Treatment Plan:   [No treatment plan]    Medications:  Continuous Infusions:  Scheduled Meds:   amLODIPine  10 mg Oral Daily    atorvastatin  80 mg Oral Daily    losartan  100 mg Oral Daily    melatonin  6 mg Oral Nightly    multivitamin  1 tablet Oral Daily    pantoprazole  40 mg Oral Daily    tamsulosin  1 capsule Oral Nightly     PRN Meds:albuterol-ipratropium, sodium chloride 0.9%     Review of patient's allergies indicates:  No Known Allergies     Past Medical History:   Diagnosis Date    Acute kidney injury superimposed on chronic kidney disease 10/14/2017    Cancer     prostate    CVA (cerebral vascular accident) 9/16/2017    Diabetes mellitus     Former smoker 7/18/2020    History of stroke 8/15/2017    Hypertension     Hypertrophic cardiomyopathy     Renal disorder     Stroke      Past Surgical History:   Procedure Laterality Date    BACK SURGERY      COLONOSCOPY N/A 10/18/2018    Procedure: COLONOSCOPY;  Surgeon: Alan Gooden MD;  Location: [facility];  Service: Endoscopy;  Laterality: N/A;    ESOPHAGOGASTRODUODENOSCOPY N/A 9/4/2018    Procedure: EGD (ESOPHAGOGASTRODUODENOSCOPY);  Surgeon: Oli Cuadra MD;  Location: [facility];  Service: Endoscopy;  Laterality: N/A;    ESOPHAGOGASTRODUODENOSCOPY N/A 10/18/2018    Procedure: EGD (ESOPHAGOGASTRODUODENOSCOPY);  Surgeon: Alan Gooden MD;  Location: [facility];  Service: Endoscopy;  Laterality: N/A;    THYROIDECTOMY       Family History     None        Tobacco Use    Smoking status: Former Smoker     Packs/day: 0.90     Years: 3.00     Pack years: 2.70     Types: Cigarettes    Smokeless tobacco: Never Used   Substance and Sexual Activity    Alcohol use: Never     Frequency: Never    Drug use: No    Sexual activity: Not on file       Review of Systems   Unable to perform ROS: Dementia     Objective:     Vital Signs (Most Recent):  Temp: 96.8 °F (36 °C) (10/08/20 1244)  Pulse: 79 (10/08/20 1244)  Resp: 16 (10/08/20  1244)  BP: (!) 132/91 (10/08/20 1244)  SpO2: 97 % (10/08/20 1244) Vital Signs (24h Range):  Temp:  [96.1 °F (35.6 °C)-97.1 °F (36.2 °C)] 96.8 °F (36 °C)  Pulse:  [72-96] 79  Resp:  [16-20] 16  SpO2:  [93 %-100 %] 97 %  BP: (132-179)/(75-97) 132/91     Weight: 76.3 kg (168 lb 3.2 oz)  Body mass index is 24.13 kg/m².  Body surface area is 1.94 meters squared.      Intake/Output Summary (Last 24 hours) at 10/8/2020 1425  Last data filed at 10/8/2020 0700  Gross per 24 hour   Intake 0 ml   Output 499 ml   Net -499 ml       Physical Exam  Vitals signs reviewed.   Constitutional:       General: He is not in acute distress.     Appearance: He is not toxic-appearing or diaphoretic.   HENT:      Mouth/Throat:      Mouth: Mucous membranes are not pale, not dry and not cyanotic. No lacerations.      Pharynx: No oropharyngeal exudate.   Eyes:      General: No scleral icterus.     Conjunctiva/sclera: Conjunctivae normal.   Neck:      Musculoskeletal: Neck supple.      Thyroid: No thyroid mass or thyromegaly.   Cardiovascular:      Rate and Rhythm: Normal rate and regular rhythm.      Heart sounds: Normal heart sounds and S1 normal.   Pulmonary:      Effort: Pulmonary effort is normal. No accessory muscle usage or respiratory distress.      Breath sounds: Decreased air movement present. No stridor. Examination of the right-lower field reveals decreased breath sounds. Decreased breath sounds present. No wheezing or rales.   Abdominal:      Palpations: Abdomen is soft. There is no mass.      Tenderness: There is no abdominal tenderness.   Lymphadenopathy:      Head:      Right side of head: No submental or submandibular adenopathy.      Left side of head: No submental or submandibular adenopathy.      Cervical: No cervical adenopathy.   Skin:     Findings: No bruising, petechiae or rash.   Neurological:      Mental Status: He is alert and oriented to person, place, and time.      Cranial Nerves: No cranial nerve deficit.       Sensory: No sensory deficit.      Gait: Gait normal.         Significant Labs:   All pertinent labs from the last 24 hours have been reviewed.    Diagnostic Results:  I have reviewed all pertinent imaging results/findings within the past 24 hours.

## 2020-10-09 LAB
ALBUMIN SERPL BCP-MCNC: 2.6 G/DL (ref 3.5–5.2)
ALP SERPL-CCNC: 101 U/L (ref 55–135)
ALT SERPL W/O P-5'-P-CCNC: 59 U/L (ref 10–44)
ANION GAP SERPL CALC-SCNC: 8 MMOL/L (ref 8–16)
AST SERPL-CCNC: 44 U/L (ref 10–40)
BASOPHILS # BLD AUTO: 0.01 K/UL (ref 0–0.2)
BASOPHILS NFR BLD: 0.2 % (ref 0–1.9)
BILIRUB SERPL-MCNC: 0.7 MG/DL (ref 0.1–1)
BUN SERPL-MCNC: 23 MG/DL (ref 8–23)
CALCIUM SERPL-MCNC: 9.4 MG/DL (ref 8.7–10.5)
CHLORIDE SERPL-SCNC: 104 MMOL/L (ref 95–110)
CO2 SERPL-SCNC: 24 MMOL/L (ref 23–29)
CREAT SERPL-MCNC: 1.3 MG/DL (ref 0.5–1.4)
DIFFERENTIAL METHOD: ABNORMAL
EOSINOPHIL # BLD AUTO: 0.1 K/UL (ref 0–0.5)
EOSINOPHIL NFR BLD: 2.1 % (ref 0–8)
ERYTHROCYTE [DISTWIDTH] IN BLOOD BY AUTOMATED COUNT: 14.6 % (ref 11.5–14.5)
EST. GFR  (AFRICAN AMERICAN): 60 ML/MIN/1.73 M^2
EST. GFR  (NON AFRICAN AMERICAN): 52 ML/MIN/1.73 M^2
GLUCOSE SERPL-MCNC: 123 MG/DL (ref 70–110)
HCT VFR BLD AUTO: 42.9 % (ref 40–54)
HGB BLD-MCNC: 14.2 G/DL (ref 14–18)
IMM GRANULOCYTES # BLD AUTO: 0.03 K/UL (ref 0–0.04)
IMM GRANULOCYTES NFR BLD AUTO: 0.6 % (ref 0–0.5)
LYMPHOCYTES # BLD AUTO: 0.9 K/UL (ref 1–4.8)
LYMPHOCYTES NFR BLD: 16.4 % (ref 18–48)
MAGNESIUM SERPL-MCNC: 1.9 MG/DL (ref 1.6–2.6)
MCH RBC QN AUTO: 28.8 PG (ref 27–31)
MCHC RBC AUTO-ENTMCNC: 33.1 G/DL (ref 32–36)
MCV RBC AUTO: 87 FL (ref 82–98)
MONOCYTES # BLD AUTO: 0.5 K/UL (ref 0.3–1)
MONOCYTES NFR BLD: 9.8 % (ref 4–15)
NEUTROPHILS # BLD AUTO: 3.8 K/UL (ref 1.8–7.7)
NEUTROPHILS NFR BLD: 70.9 % (ref 38–73)
NRBC BLD-RTO: 0 /100 WBC
PLATELET # BLD AUTO: 215 K/UL (ref 150–350)
PMV BLD AUTO: 10.3 FL (ref 9.2–12.9)
POTASSIUM SERPL-SCNC: 4.1 MMOL/L (ref 3.5–5.1)
PROT SERPL-MCNC: 5.9 G/DL (ref 6–8.4)
RBC # BLD AUTO: 4.93 M/UL (ref 4.6–6.2)
SODIUM SERPL-SCNC: 136 MMOL/L (ref 136–145)
WBC # BLD AUTO: 5.31 K/UL (ref 3.9–12.7)

## 2020-10-09 PROCEDURE — 83735 ASSAY OF MAGNESIUM: CPT

## 2020-10-09 PROCEDURE — 32555 ASPIRATE PLEURA W/ IMAGING: CPT

## 2020-10-09 PROCEDURE — 97116 GAIT TRAINING THERAPY: CPT

## 2020-10-09 PROCEDURE — 88342 IMHCHEM/IMCYTCHM 1ST ANTB: CPT | Mod: 26,,, | Performed by: PATHOLOGY

## 2020-10-09 PROCEDURE — 32550 INSERT PLEURAL CATH: CPT

## 2020-10-09 PROCEDURE — 25000003 PHARM REV CODE 250: Performed by: STUDENT IN AN ORGANIZED HEALTH CARE EDUCATION/TRAINING PROGRAM

## 2020-10-09 PROCEDURE — 88305 TISSUE EXAM BY PATHOLOGIST: CPT | Mod: 26,,, | Performed by: PATHOLOGY

## 2020-10-09 PROCEDURE — 88341 IMHCHEM/IMCYTCHM EA ADD ANTB: CPT | Mod: 26,,, | Performed by: PATHOLOGY

## 2020-10-09 PROCEDURE — 88112 CYTOPATH CELL ENHANCE TECH: CPT | Mod: 26,,, | Performed by: PATHOLOGY

## 2020-10-09 PROCEDURE — 88305 TISSUE EXAM BY PATHOLOGIST: ICD-10-PCS | Mod: 26,,, | Performed by: PATHOLOGY

## 2020-10-09 PROCEDURE — 99900035 HC TECH TIME PER 15 MIN (STAT)

## 2020-10-09 PROCEDURE — G0378 HOSPITAL OBSERVATION PER HR: HCPCS

## 2020-10-09 PROCEDURE — 88341 PR IHC OR ICC EACH ADD'L SINGLE ANTIBODY  STAINPR: ICD-10-PCS | Mod: 26,,, | Performed by: PATHOLOGY

## 2020-10-09 PROCEDURE — 88305 TISSUE EXAM BY PATHOLOGIST: CPT | Performed by: PATHOLOGY

## 2020-10-09 PROCEDURE — 88342 IMHCHEM/IMCYTCHM 1ST ANTB: CPT | Performed by: PATHOLOGY

## 2020-10-09 PROCEDURE — 85025 COMPLETE CBC W/AUTO DIFF WBC: CPT

## 2020-10-09 PROCEDURE — 88112 PR  CYTOPATH, CELL ENHANCE TECH: ICD-10-PCS | Mod: 26,,, | Performed by: PATHOLOGY

## 2020-10-09 PROCEDURE — 88342 CHG IMMUNOCYTOCHEMISTRY: ICD-10-PCS | Mod: 26,,, | Performed by: PATHOLOGY

## 2020-10-09 PROCEDURE — 88341 IMHCHEM/IMCYTCHM EA ADD ANTB: CPT | Mod: 59 | Performed by: PATHOLOGY

## 2020-10-09 PROCEDURE — 94640 AIRWAY INHALATION TREATMENT: CPT

## 2020-10-09 PROCEDURE — 25000242 PHARM REV CODE 250 ALT 637 W/ HCPCS: Performed by: STUDENT IN AN ORGANIZED HEALTH CARE EDUCATION/TRAINING PROGRAM

## 2020-10-09 PROCEDURE — 88112 CYTOPATH CELL ENHANCE TECH: CPT | Performed by: PATHOLOGY

## 2020-10-09 PROCEDURE — 94761 N-INVAS EAR/PLS OXIMETRY MLT: CPT

## 2020-10-09 PROCEDURE — 36415 COLL VENOUS BLD VENIPUNCTURE: CPT

## 2020-10-09 PROCEDURE — 80053 COMPREHEN METABOLIC PANEL: CPT

## 2020-10-09 RX ORDER — LOSARTAN POTASSIUM 100 MG/1
100 TABLET ORAL DAILY
Qty: 90 TABLET | Refills: 3 | Status: SHIPPED | OUTPATIENT
Start: 2020-10-10 | End: 2020-10-10

## 2020-10-09 RX ORDER — AMLODIPINE BESYLATE 10 MG/1
10 TABLET ORAL DAILY
Qty: 90 TABLET | Refills: 3 | Status: SHIPPED | OUTPATIENT
Start: 2020-10-10 | End: 2020-10-10

## 2020-10-09 RX ORDER — TRAMADOL HYDROCHLORIDE 50 MG/1
50 TABLET ORAL EVERY 6 HOURS PRN
Qty: 28 TABLET | Refills: 0 | Status: SHIPPED | OUTPATIENT
Start: 2020-10-09 | End: 2020-10-16

## 2020-10-09 RX ORDER — FENTANYL CITRATE 50 UG/ML
200 INJECTION, SOLUTION INTRAMUSCULAR; INTRAVENOUS ONCE
Status: DISCONTINUED | OUTPATIENT
Start: 2020-10-09 | End: 2020-10-10 | Stop reason: HOSPADM

## 2020-10-09 RX ORDER — MIDAZOLAM HYDROCHLORIDE 1 MG/ML
6 INJECTION INTRAMUSCULAR; INTRAVENOUS ONCE
Status: DISCONTINUED | OUTPATIENT
Start: 2020-10-09 | End: 2020-10-10 | Stop reason: HOSPADM

## 2020-10-09 RX ORDER — LIDOCAINE HYDROCHLORIDE AND EPINEPHRINE 10; 10 MG/ML; UG/ML
20 INJECTION, SOLUTION INFILTRATION; PERINEURAL ONCE
Status: DISCONTINUED | OUTPATIENT
Start: 2020-10-09 | End: 2020-10-10 | Stop reason: HOSPADM

## 2020-10-09 RX ADMIN — LOSARTAN POTASSIUM 100 MG: 50 TABLET ORAL at 08:10

## 2020-10-09 RX ADMIN — TAMSULOSIN HYDROCHLORIDE 0.4 MG: 0.4 CAPSULE ORAL at 08:10

## 2020-10-09 RX ADMIN — AMLODIPINE BESYLATE 10 MG: 5 TABLET ORAL at 08:10

## 2020-10-09 RX ADMIN — PANTOPRAZOLE SODIUM 40 MG: 40 TABLET, DELAYED RELEASE ORAL at 08:10

## 2020-10-09 RX ADMIN — THERA TABS 1 TABLET: TAB at 08:10

## 2020-10-09 RX ADMIN — ATORVASTATIN CALCIUM 80 MG: 40 TABLET, FILM COATED ORAL at 08:10

## 2020-10-09 RX ADMIN — IPRATROPIUM BROMIDE AND ALBUTEROL SULFATE 3 ML: .5; 3 SOLUTION RESPIRATORY (INHALATION) at 04:10

## 2020-10-09 RX ADMIN — IPRATROPIUM BROMIDE AND ALBUTEROL SULFATE 3 ML: .5; 3 SOLUTION RESPIRATORY (INHALATION) at 11:10

## 2020-10-09 RX ADMIN — Medication 6 MG: at 08:10

## 2020-10-09 NOTE — PLAN OF CARE
Problem: Physical Therapy Goal  Goal: Physical Therapy Goal  Description: Goals to be met by: 10/30/2020    Patient will increase functional independence with mobility by performin. Sit<>stand with SBA with RW.  2. Gait x 150 feet with SBA with RW.  3.  Supine>sit with  SBA  met 10/09/2020     Outcome: Ongoing, Progressing     Pt presented supine, cooperative with PT.  Supine>sit EOB with SBA, sit>stand to RW with CGA and instruction for hand placement.  Gait training x 210ft with RW with CGA to SBA, pt has difficulty managing walker around object and requires assist to correct.  Pt transferred to sit EOB with SBA to supine with SBA

## 2020-10-09 NOTE — PLAN OF CARE
Plan of care reviewed with pt and grandson, verbalizes understanding. VSS. Dressing to chest clean dry and intact. No acute events this shift. Purposeful hourly rounding done while pt on floor. Fall and safety measures maintained this shift. Family visited today. Bed low and locked, call bell within reach, will continue to monitor.

## 2020-10-09 NOTE — PLAN OF CARE
10/09/20 1300   Post-Acute Status   Post-Acute Authorization Hospice   Hospice Status Authorization Obtained  (Pt to go with [facility] - DC Saturday)   Discharge Plan   Discharge Plan A Home with family;Hospice/home     Confirmed with Sh[facility] and hospice is requesting an early DC on Saturday.  Weekend [place] contact information given. Went for Pleurx cath placement today.     Ami Vila RN  RN Transition Navigator  [phone]

## 2020-10-09 NOTE — NURSING
Pt arrived back to unit, awake and alert to self, dressing to upperchest area, clean dry and intact. Bed low and locked, bed alarm on, will continue to monitor. Family at bedside.

## 2020-10-09 NOTE — PT/OT/SLP PROGRESS
Physical Therapy      Patient Name:  Shant Magana Jr.   MRN:  [id number]    Patient not seen today secondary to Pain(Stomach). Will follow-up 10/09/20.    Samson De La Rosa PT

## 2020-10-09 NOTE — PLAN OF CARE
Plan of care reviewed with patient. Normal sinus rhythm on continuous heart monitor, no true red alarms.Patient turned Q2 per staff assist to promote skin integrity. Patient remained NPO passed midnight for pleurX cath placement. Safety maintained at this time. Bed in lowest position, side rails up x 2. Call light in reach.  Encouraged patient to use call light for assistance .Verbalized understanding. Will continue to monitor patient.

## 2020-10-09 NOTE — PT/OT/SLP PROGRESS
Physical Therapy Treatment    Patient Name:  Shant Magana Jr.   MRN:  [id number]    Recommendations:     Discharge Recommendations:  home health PT, home health OT   Discharge Equipment Recommendations: none   Barriers to discharge: None    Assessment:     Shant Magana Jr. is a 80 y.o. male admitted with a medical diagnosis of Exudative pleural effusion.  He presents with the following impairments/functional limitations:  impaired endurance, impaired self care skills, impaired functional mobilty, gait instability, impaired balance, impaired cognition, decreased lower extremity function, decreased safety awareness . Pt continues with functional mobility deficits and should benefit from continuing current PT POC to maximize I and safety decrease risk of further decline of injury.    Rehab Prognosis: Good; patient would benefit from acute skilled PT services to address these deficits and reach maximum level of function.    Recent Surgery: Procedure(s) (LRB):  INSERTION-CATHETER-CHEST (Right)      Plan:     During this hospitalization, patient to be seen 5 x/week to address the identified rehab impairments via gait training, therapeutic activities, therapeutic exercises and progress toward the following goals:    · Plan of Care Expires:  11/07/20    Subjective     Chief Complaint: pt without c/o  Patient/Family Comments/goals: pt reports feeling better  Pain/Comfort:  · Pain Rating 1: 0/10  · Pain Rating Post-Intervention 1: 0/10      Objective:     Communicated with nurse Gigi prior to session.  Patient found supine with bed alarm, telemetry, peripheral IV upon PT entry to room.     General Precautions: Standard, fall   Orthopedic Precautions:N/A   Braces: N/A     Functional Mobility:  · Bed Mobility:     · Supine to Sit: stand by assistance  · Sit to Supine: stand by assistance  · Transfers:     · Sit to Stand:  contact guard assistance with rolling walker  · Gait: x 210ft with RW with CGA to SBA, pt has difficulty managing  walker around object and requires assist to correct.       AM-PAC 6 CLICK MOBILITY  Turning over in bed (including adjusting bedclothes, sheets and blankets)?: 4  Sitting down on and standing up from a chair with arms (e.g., wheelchair, bedside commode, etc.): 3  Moving from lying on back to sitting on the side of the bed?: 3  Moving to and from a bed to a chair (including a wheelchair)?: 3  Need to walk in hospital room?: 3  Climbing 3-5 steps with a railing?: 3  Basic Mobility Total Score: 19       Therapeutic Activities and Exercises:   Pt presented supine, cooperative with PT.  Supine>sit EOB with SBA, sit>stand to RW with CGA and instruction for hand placement.  Gait training x 210ft with RW with CGA to SBA, pt has difficulty managing walker around object and requires assist to correct.  Pt transferred to sit EOB with SBA to supine with SBA    Patient left supine with all lines intact, call button in reach, bed alarm on and Gigi, nurse notified..    GOALS:   Multidisciplinary Problems     Physical Therapy Goals        Problem: Physical Therapy Goal    Goal Priority Disciplines Outcome Goal Variances Interventions   Physical Therapy Goal     PT, PT/OT Ongoing, Progressing     Description: Goals to be met by: 10/30/2020    Patient will increase functional independence with mobility by performin. Sit<>stand with SBA with RW.  2. Gait x 150 feet with SBA with RW.  3.  Supine>sit with  SBA  met 10/09/2020                      Time Tracking:     PT Received On: 10/09/20  PT Start Time: 1115     PT Stop Time: 1127  PT Total Time (min): 12 min     Billable Minutes: Gait Training 12    Treatment Type: Treatment  PT/PTA: PT     PTA Visit Number: 0     Samson De La Rosa, PT  10/09/2020

## 2020-10-09 NOTE — PLAN OF CARE
Brief Pulmonary plan of care note -     Pleurx placed this afternoon. Additional sample sent to cytology.     Sent back to room with three additional drainage kits to go home with, along with information on how to care for the tube. Initially, recommend 1 liter drainage qweekly with dressing change at that time. Can drain PRN for dyspnea at rest.     Tramadol 50 mg q6hr PRN Pain prescribed at discharge. #28, zero refills.    We will sign off at this time. Thank you for allowing us to care for Mr. Magana.    Noe Crenshaw DO  [facility]

## 2020-10-09 NOTE — PROGRESS NOTES
[facility] Internal Medicine Resident HO-III Progress Note    Subjective:      Shant Magana Jr. is a 80 y.o. male who is being followed by the [facility] service at [facility] for recurrent exudative pleural effusion.    Afebrile overnight, has been feeling well. Breathing has improved. Appetite still poor, but tolerating meals without much issues. Discussed with him again plan this morning and potential discharge tomorrow, he is aware.    Denied any fevers, chills, nausea, vomiting, chest pain, abdominal pain, dysuria, constipation, diarrhea.       Objective:     Last 24 Hour Vital Signs:  BP  Min: 129/75  Max: 179/97  Temp  Av.1 °F (36.2 °C)  Min: 96.8 °F (36 °C)  Max: 98.2 °F (36.8 °C)  Pulse  Av.6  Min: 70  Max: 80  Resp  Av.7  Min: 16  Max: 22  SpO2  Av.7 %  Min: 95 %  Max: 100 %  I/O last 3 completed shifts:  In: 175 [P.O.:175]  Out: 499 [Urine:499]    Physical Examination:  Gen: AOx3, NAD, comfortable appearing; thin frail appearing man  HEENT: NCAT, PERRL, EOMI, MMM, JVP ~6cm, no thyromegaly, lymphadenopathy appreciated  CV: RRR, S1/S2 appreciated, no murmur gallop or rubs  Resp: Decreased BS on R side, CTA to left side, no increased WOB, no crackles, rhonchi appreciated  Abdomen: Soft, NT, ND, +BS  Ext: 2+ distal pulses, no edema appreciated  Skin: Warm, dry, no rashes appreciated  Psych: Good mood, Affect  Neuro: AAOx4, Strength 5/5 in extremities bilaterally; sensation to touch equal throughout, follows commands        Laboratory:  Laboratory Data Reviewed: Yes  Pertinent Findings:  Trended Lab Data:  Recent Labs   Lab 10/07/20  0417 10/08/20  0607 10/08/20  1031 10/09/20  0453   WBC 4.74 5.21  --  5.31   HGB 13.4* 14.6  --  14.2   HCT 40.7 43.7  --  42.9    206  --  215   MCV 88 87  --  87   RDW 14.9* 14.7*  --  14.6*     --  138 136   K 3.8  --  3.8 4.1     --  104 104   CO2 25  --  26 24   BUN 25*  --  20 23     --  145* 123*   CALCIUM  10.0  --  9.5 9.4   PROT 6.2  --  6.2 5.9*   ALBUMIN 2.9*  --  2.7* 2.6*   BILITOT 0.8  --  1.0 0.7   AST 49*  --  50* 44*   ALKPHOS 91  --  96 101   ALT 58*  --  63* 59*       Microbiology Data Reviewed: Yes  Pertinent Findings:  Microbiology Results (last 7 days)     Procedure Component Value Units Date/Time    Culture, Body Fluid - Bactec [[id number]] Collected: 10/07/20 1200    Order Status: Completed Specimen: Body Fluid from Thoracentesis Fluid Updated: 10/08/20 2012     Body Fluid Culture, Sterile No Growth to date      No Growth to date    Gram stain [[id number]] Collected: 10/07/20 1200    Order Status: Completed Specimen: Body Fluid from Lung, Right Updated: 10/07/20 2142     Gram Stain Result Rare WBC's      Rare Gram positive cocci    Gram stain [[id number]]     Order Status: Canceled Specimen: Body Fluid from Pleural Fluid           Other Results:  Radiology Data Reviewed: Yes  Pertinent Findings:  CT Chest Without Contrast  Redemonstration of ill-defined 1.9 cm nodular density (previously 2.1 cm) in the left lower lobe which remains indeterminate with similar diagnostic considerations as before.     Large right pleural effusion with patchy density and ground-glass throughout the aerated portion of right lung in this patient with recent thoracentesis.  Diagnostic considerations to include infectious etiology and/or re-expansion edema.  Underlying lesion difficult to exclude.  Continued follow-up imaging recommended.    Current Medications:     Infusions:       Scheduled:   amLODIPine  10 mg Oral Daily    atorvastatin  80 mg Oral Daily    losartan  100 mg Oral Daily    melatonin  6 mg Oral Nightly    multivitamin  1 tablet Oral Daily    pantoprazole  40 mg Oral Daily    tamsulosin  1 capsule Oral Nightly        PRN:  albuterol-ipratropium, sodium chloride 0.9%    Assessment:     Shant Magana Jr. is a 80 y.o.male with  Patient Active Problem List    Diagnosis Date Noted    Exudative pleural effusion  10/06/2020    History of embolic stroke 09/22/2020    Polyneuropathy in diabetes 09/12/2020    Type 2 diabetes mellitus with peripheral angiopathy 09/10/2020    Anticoagulant long-term use 08/21/2020    Hyperparathyroidism due to renal insufficiency 08/20/2020    Embolic stroke involving right middle cerebral artery 07/18/2020    Former smoker 07/18/2020    Chronic heart failure with preserved ejection fraction     Dementia without behavioral disturbance 12/10/2019    Paroxysmal atrial fibrillation 02/12/2019    Status post placement of implantable loop recorder 02/12/2019    Gastritis     Polyp of colon     Chronic rhinitis 09/12/2018    Peptic ulcer disease 09/12/2018    Anemia due to blood loss 09/12/2018    Anemia     Erectile dysfunction 03/16/2018    Chronic diastolic congestive heart failure 10/19/2017    Enlarged LA (left atrium) 10/19/2017    Internal carotid artery stenosis, left 10/18/2017    Mixed hyperlipidemia 10/18/2017    Left carotid artery occlusion 09/16/2017    High cholesterol 09/16/2017    Prostate cancer 09/15/2017    Type 2 diabetes mellitus with complication, without long-term current use of insulin     Hypertensive chronic kidney disease 06/22/2017    Essential hypertension 06/07/2017    Vitamin D deficiency 12/07/2016    CKD (chronic kidney disease) stage 3, GFR 30-59 ml/min 09/07/2016    Renal oncocytoma of right kidney 09/07/2016    Diabetic renal disease 12/04/2015    Type 2 diabetes mellitus with diabetic nephropathy 12/04/2015    BPH (benign prostatic hyperplasia) 10/11/2013    Hypertrophic obstructive cardiomyopathy 10/11/2013        Plan:     Shortness of Breath 2/2 R Sided Exudative Pleural Effusion  -SOB increasing over past 2 weeks, endorsed pain when laying on R side  -Former smoker, 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  -CT chest with large R pleural effusion with atelectasis  -10/7: Thoracentesis with Pulmonology - pending cytology  -After  discussion with family, plan for pleurX cath placement and home with hospice  -NPO today for pleurX     Left Lower Lobe Lung Nodule  -Pt denies smoking history in contrast to chart history  -CT Chest w/ indeterminate 2.1cm nodule in LLL  -Pulm and onc consulted, appreciate recs     Essential Hypertension  -hypertensive to SBP 170s on exam  -Home meds: Losartan 100mg, Amlodipine 10mg  - Restarted home medications     S/P R MCA embolic stroke, residual hemiplegia and hemiparesis  -Most recent stroke 8/11/2020  -Residual deficits in LUE (4/5 strength L vs R), pt able to walk but with instability and frequent falls  -Coordination limited, difficulty with cerebellar tasks  -Pt on eliquis, hold at this time  -PT/OT eval for safe discharge, appreciate recs  -Fall risk precautions     Chronic diastolic heart failure  -with recovered diastolic dysfunction  -BP management as above     Paroxysmal Afib, HOCM, s/p implantation of loop recorder  -CHADS-VASC 5; 7.2% stroke risk  -Holding eliquis 2/2 planned procedure; resume if no further procedures     Dementia  -Pt oriented to self, president; pt not oriented to place or date  -Delirium precautions     Type 2 Diabetes Mellitus  - A1c 5.8  - SSI + Accuchecks     SHIKHA on CKD stage 3  - Baseline Cr 1.2-1.4  Cr 1.9 on admission  - Renally dose meds, avoid nephrotoxic drugs  - given IVF     Mixed hyperlipidemia  -LDL 70 at last lipid panel  -Continue home atorvastatin 80mg daily     HCM  -PCP Dr. Trent Aldana  -Pt UTD on Pneumovaxx, Tdap, Flu  -Pt UTD on colonoscopy    Diet: NPO @ MN  DVT Prophylaxis: SCDs   Allergies: NKDA  Code: Full  Dispo: PleurX today, pending home hospice    Tolu Dallas MD  [facility] Internal [facility] HO-III  [facility] Internal Medicine Service Team B

## 2020-10-09 NOTE — PROCEDURES
"[facility]  Tunneled Chest Tube ("Pleurx Catheter") Insertion  Operative Note    SUMMARY     Date of Procedure: 10/9/2020     Procedure: Procedure(s) (LRB):  INSERTION-CATHETER-CHEST (Right)     Surgeon(s) and Role:     * Akbar Box MD - Primary    Assisting Surgeon: None    Indications:  Clinically significant Effusion    Pre-Operative Diagnosis: Effusion    Post-Operative Diagnosis: Effusion    Anesthesia: Monitor Anesthesia Care    Description of the Findings of the Procedure: 1000 serosanginous fluid removed    Informed consent was obtained for the procedure, including sedation.  Risks of lung perforation, hemorrhage, arrhythmia, and adverse drug reaction were discussed with the POA Ms. Magana     After sterile skin prep, using standard technique, a 16 Nepali tube was placed in the right anterior 7th rib space.    1000 ml of serosanguinous fluid obtained    Significant Surgical Tasks Conducted by the Assistant(s), if Applicable: Dr. Ariza    Complications: None; patient tolerated the procedure well.    Estimated Blood Loss (EBL): Minimal           Drains: indwelling chest tube secured to skin      Specimens: Sent serosanguinous fluid            Condition: stable    Disposition: return to floor     Pt seen and examined with Pulmonary/Critical Care team and this note reviewed and validated with the following additional comments:    Procedure supervised by me.    Quinton Ariza MD  Phone [phone]     "

## 2020-10-09 NOTE — H&P
Brief Pre-procedural H&P    Procedure: Pleurx catheter placement  Laterality: Right  Consent: Obtained and in chart  H&P: Reviewed; concur with H&P  Sedation: procedural sedation    ASA: 2   Mallampati: 3     Noe Crenshaw DO  [facility] Fellow

## 2020-10-09 NOTE — PLAN OF CARE
Problem: Adult Inpatient Plan of Care  Goal: Plan of Care Review  Outcome: Ongoing, Progressing   VIRTUAL NURSE:  Cued into patient's room.  Patient not responding to introduction and permission inquiry.  Patient resting comfortably in bed with eyes closed; respirations even and unlabored.  No distress noted.    Labs, notes, orders, and careplan reviewed.

## 2020-10-09 NOTE — PROGRESS NOTES
[facility] Pulmonary/Critical Care Progress Note      Patient: Shant Magana Jr.  Age:80 y.o.   MRN:[id number]  Admit date:10/6/2020    LOS:0 day(s)     SUBJECTIVE:       Mr. Shant Magana reports feeling well today. States his breathing is much improved and he is ready to go home.    Interval Hx:   10/6: Admitted secondary to worsening SOB for 2 weeks  10/7: Therapeutic/diagnostic thoracentesis performed with 2L removed    OBJECTIVE DATA:      Vital Signs:  Vitals:    10/09/20 0400   BP:    Pulse: 71   Resp:    Temp:        Intake/Output:    Intake/Output Summary (Last 24 hours) at 10/9/2020 0619  Last data filed at 10/8/2020 0700  Gross per 24 hour   Intake 0 ml   Output 335 ml   Net -335 ml        Physical Exam:  General: Patient sitting comfortably in NAD  HEENT: normocephalic, atraumatic  Cardiac: RRR, no murmurs appreciated, no extra heart sounds  Pulmonary/Chest: Decreased/absent breath sounds in right lung lobes. On room air without increased work of breathing  GI: Soft, non tender, non distended, normoactive bowel sounds  Extremities: Clubbing noted. No edema or cyanosis  Skin: dry, warm, intact. No bruising or rashes.  Neuro: Alert and oriented to person and place, moving all extremities     Laboratory/Imaging:  Recent Labs   Lab 10/07/20  0417 10/08/20  0607 10/08/20  1031 10/09/20  0453   WBC 4.74 5.21  --  5.31   HGB 13.4* 14.6  --  14.2   HCT 40.7 43.7  --  42.9    206  --  215     --  138 136   K 3.8  --  3.8 4.1     --  104 104   CREATININE 1.6*  --  1.4 1.3   BUN 25*  --  20 23   CO2 25  --  26 24   ALT 58*  --  63* 59*   AST 49*  --  50* 44*       Microbiology:  Pleural Fluid (9/4/2020) = no growth to date  Pleural Fluid (10/7/2020) = no growth to date     Imaging:  CT Chest/Abdomen/Pelvis (10/6/2020)  1. Large right pleural effusion with complete atelectasis of the right lung and leftward mediastinal shift.   2. Indeterminate 2.1 cm nodule in the left lower lobe.  Diagnostic considerations  include neoplasia and infection.   3. Left renal nonobstructing calculus.   4. Multiple hepatic hypodensities, probably cysts.   5. Additional findings above.     Medications:   amLODIPine  10 mg Oral Daily    atorvastatin  80 mg Oral Daily    losartan  100 mg Oral Daily    melatonin  6 mg Oral Nightly    multivitamin  1 tablet Oral Daily    pantoprazole  40 mg Oral Daily    tamsulosin  1 capsule Oral Nightly        albuterol-ipratropium, sodium chloride 0.9%    Problem List:  Patient Active Problem List   Diagnosis    Essential hypertension    Type 2 diabetes mellitus with complication, without long-term current use of insulin    Internal carotid artery stenosis, left    Mixed hyperlipidemia    CKD (chronic kidney disease) stage 3, GFR 30-59 ml/min    Chronic diastolic congestive heart failure    Enlarged LA (left atrium)    Anemia    Gastritis    Polyp of colon    Paroxysmal atrial fibrillation    Status post placement of implantable loop recorder    Dementia without behavioral disturbance    Chronic heart failure with preserved ejection fraction    BPH (benign prostatic hyperplasia)    Embolic stroke involving right middle cerebral artery    Former smoker    Chronic rhinitis    Diabetic renal disease    Erectile dysfunction    Hyperparathyroidism due to renal insufficiency    Hypertrophic obstructive cardiomyopathy    Left carotid artery occlusion    Peptic ulcer disease    Prostate cancer    Renal oncocytoma of right kidney    Vitamin D deficiency    Hypertensive chronic kidney disease    High cholesterol    Anemia due to blood loss    Type 2 diabetes mellitus with diabetic nephropathy    Anticoagulant long-term use    Type 2 diabetes mellitus with peripheral angiopathy    Polyneuropathy in diabetes    History of embolic stroke    Exudative pleural effusion        Assessment/Plan:     Mr. Shant Magana is an 80 year old male with past medical history of type II DM,  prostate cancer, CVA, CKD, HTN, Hypertrophic Cardiomyopathy and tobacco abuse who presented to [facility] secondary to shortness of breath for the past 2 weeks.     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   - Therapeutic/diagnostic thoracentesis performed on 10/7 with 2L fluid removed  - Pleural fluid studies consistent with exudative process; gram stain with rare gram positive cocci. However, likely contaminant given clinic picture.   - Concerned for malignant process; will continue to follow cytology  - Plan for PleurX cath placement today and ultimately patient to return home with hospice   - Will monitor improvement in breathing     Left Lower Lobe Lung Nodule  - Patient with smoking history of unknown duration  - CT Chest =  2.1 cm nodule in the left lower lobe     Thank you for allowing us to participate in the care of this patient. We will continue to follow.      Marylin Adan MD  [facility] Internal Medicine Resident, \[facility] [place] [facility]\""

## 2020-10-10 VITALS
DIASTOLIC BLOOD PRESSURE: 89 MMHG | TEMPERATURE: 96 F | WEIGHT: 168.19 LBS | SYSTOLIC BLOOD PRESSURE: 127 MMHG | HEART RATE: 77 BPM | HEIGHT: 70 IN | BODY MASS INDEX: 24.08 KG/M2 | RESPIRATION RATE: 20 BRPM | OXYGEN SATURATION: 94 %

## 2020-10-10 LAB
ALBUMIN SERPL BCP-MCNC: 2.6 G/DL (ref 3.5–5.2)
ALP SERPL-CCNC: 90 U/L (ref 55–135)
ALT SERPL W/O P-5'-P-CCNC: 59 U/L (ref 10–44)
ANION GAP SERPL CALC-SCNC: 8 MMOL/L (ref 8–16)
AST SERPL-CCNC: 48 U/L (ref 10–40)
BASOPHILS # BLD AUTO: 0.02 K/UL (ref 0–0.2)
BASOPHILS NFR BLD: 0.3 % (ref 0–1.9)
BILIRUB SERPL-MCNC: 0.8 MG/DL (ref 0.1–1)
BUN SERPL-MCNC: 21 MG/DL (ref 8–23)
CALCIUM SERPL-MCNC: 9.3 MG/DL (ref 8.7–10.5)
CHLORIDE SERPL-SCNC: 104 MMOL/L (ref 95–110)
CO2 SERPL-SCNC: 26 MMOL/L (ref 23–29)
CREAT SERPL-MCNC: 1.2 MG/DL (ref 0.5–1.4)
DIFFERENTIAL METHOD: ABNORMAL
EOSINOPHIL # BLD AUTO: 0.1 K/UL (ref 0–0.5)
EOSINOPHIL NFR BLD: 1.4 % (ref 0–8)
ERYTHROCYTE [DISTWIDTH] IN BLOOD BY AUTOMATED COUNT: 14.5 % (ref 11.5–14.5)
EST. GFR  (AFRICAN AMERICAN): >60 ML/MIN/1.73 M^2
EST. GFR  (NON AFRICAN AMERICAN): 57 ML/MIN/1.73 M^2
GLUCOSE SERPL-MCNC: 116 MG/DL (ref 70–110)
HCT VFR BLD AUTO: 42 % (ref 40–54)
HGB BLD-MCNC: 14.1 G/DL (ref 14–18)
IMM GRANULOCYTES # BLD AUTO: 0.02 K/UL (ref 0–0.04)
IMM GRANULOCYTES NFR BLD AUTO: 0.3 % (ref 0–0.5)
LYMPHOCYTES # BLD AUTO: 0.8 K/UL (ref 1–4.8)
LYMPHOCYTES NFR BLD: 13.7 % (ref 18–48)
MAGNESIUM SERPL-MCNC: 1.7 MG/DL (ref 1.6–2.6)
MCH RBC QN AUTO: 29.3 PG (ref 27–31)
MCHC RBC AUTO-ENTMCNC: 33.6 G/DL (ref 32–36)
MCV RBC AUTO: 87 FL (ref 82–98)
MONOCYTES # BLD AUTO: 0.6 K/UL (ref 0.3–1)
MONOCYTES NFR BLD: 10.1 % (ref 4–15)
NEUTROPHILS # BLD AUTO: 4.3 K/UL (ref 1.8–7.7)
NEUTROPHILS NFR BLD: 74.2 % (ref 38–73)
NRBC BLD-RTO: 0 /100 WBC
PLATELET # BLD AUTO: 218 K/UL (ref 150–350)
PMV BLD AUTO: 10.8 FL (ref 9.2–12.9)
POTASSIUM SERPL-SCNC: 4.1 MMOL/L (ref 3.5–5.1)
PROT SERPL-MCNC: 5.8 G/DL (ref 6–8.4)
RBC # BLD AUTO: 4.81 M/UL (ref 4.6–6.2)
SODIUM SERPL-SCNC: 138 MMOL/L (ref 136–145)
WBC # BLD AUTO: 5.85 K/UL (ref 3.9–12.7)

## 2020-10-10 PROCEDURE — 83735 ASSAY OF MAGNESIUM: CPT

## 2020-10-10 PROCEDURE — 25000003 PHARM REV CODE 250: Performed by: STUDENT IN AN ORGANIZED HEALTH CARE EDUCATION/TRAINING PROGRAM

## 2020-10-10 PROCEDURE — 94761 N-INVAS EAR/PLS OXIMETRY MLT: CPT

## 2020-10-10 PROCEDURE — 99900035 HC TECH TIME PER 15 MIN (STAT)

## 2020-10-10 PROCEDURE — 36415 COLL VENOUS BLD VENIPUNCTURE: CPT

## 2020-10-10 PROCEDURE — 80053 COMPREHEN METABOLIC PANEL: CPT

## 2020-10-10 PROCEDURE — 85025 COMPLETE CBC W/AUTO DIFF WBC: CPT

## 2020-10-10 PROCEDURE — G0378 HOSPITAL OBSERVATION PER HR: HCPCS

## 2020-10-10 RX ORDER — AMLODIPINE BESYLATE 10 MG/1
10 TABLET ORAL DAILY
Qty: 90 TABLET | Refills: 3 | Status: SHIPPED | OUTPATIENT
Start: 2020-10-10 | End: 2021-01-01

## 2020-10-10 RX ORDER — LOSARTAN POTASSIUM 100 MG/1
100 TABLET ORAL DAILY
Qty: 90 TABLET | Refills: 3 | Status: SHIPPED | OUTPATIENT
Start: 2020-10-10 | End: 2021-01-01

## 2020-10-10 RX ADMIN — ATORVASTATIN CALCIUM 80 MG: 40 TABLET, FILM COATED ORAL at 10:10

## 2020-10-10 RX ADMIN — LOSARTAN POTASSIUM 100 MG: 50 TABLET ORAL at 10:10

## 2020-10-10 RX ADMIN — AMLODIPINE BESYLATE 10 MG: 5 TABLET ORAL at 10:10

## 2020-10-10 RX ADMIN — PANTOPRAZOLE SODIUM 40 MG: 40 TABLET, DELAYED RELEASE ORAL at 10:10

## 2020-10-10 RX ADMIN — THERA TABS 1 TABLET: TAB at 10:10

## 2020-10-10 RX ADMIN — APIXABAN 2.5 MG: 2.5 TABLET, FILM COATED ORAL at 12:10

## 2020-10-10 NOTE — PROGRESS NOTES
[facility] Internal Medicine Resident HO-III Progress Note    Subjective:      Shant Magana Jr. is a 80 y.o. male who is being followed by the [facility] service at [facility] for recurrent exudative pleural effusion (R).    Afebrile overnight, tolerating pleurX cath well. He feels his appetite is better today. Breathing is continuing to improve, doesn't feel as strained and having to sit upright. Able to lay down 45 degree angle without difficulty. Denied any fevers, chills, nausea, vomiting, chest pain, SOB, abdominal pain, dysuria, constipation, diarrhea.     Objective:     Last 24 Hour Vital Signs:  BP  Min: 98/62  Max: 177/120  Temp  Av.4 °F (36.3 °C)  Min: 96.6 °F (35.9 °C)  Max: 98.2 °F (36.8 °C)  Pulse  Av.6  Min: 71  Max: 90  Resp  Av.7  Min: 16  Max: 30  SpO2  Av.7 %  Min: 91 %  Max: 100 %  I/O last 3 completed shifts:  In: 120 [P.O.:120]  Out: 239 [Urine:239]    Physical Examination:  Gen: AOx3, NAD, comfortable appearing; thin frail appearing man  HEENT: NCAT, PERRL, EOMI, MMM, JVP ~6cm, no thyromegaly, lymphadenopathy appreciated  CV: RRR, S1/S2 appreciated, no murmur gallop or rubs  Resp: Decreased BS on R side, CTA to left side, no increased WOB, no crackles, rhonchi appreciated  Abdomen: Soft, NT, ND, +BS  Ext: 2+ distal pulses, no edema appreciated  Skin: Warm, dry, no rashes appreciated  Psych: Good mood, Affect  Neuro: AAOx4, Strength 5/5 in extremities bilaterally; sensation to touch equal throughout, follows commands      Laboratory:  Laboratory Data Reviewed: Yes  Pertinent Findings:  Trended Lab Data:  Recent Labs   Lab 10/08/20  0607 10/08/20  1031 10/09/20  0453 10/10/20  0536   WBC 5.21  --  5.31 5.85   HGB 14.6  --  14.2 14.1   HCT 43.7  --  42.9 42.0     --  215 218   MCV 87  --  87 87   RDW 14.7*  --  14.6* 14.5   NA  --  138 136 138   K  --  3.8 4.1 4.1   CL  --  104 104 104   CO2  --  26 24 26   BUN  --  20 23 21   GLU  --  145* 123* 116*    CALCIUM  --  9.5 9.4 9.3   PROT  --  6.2 5.9* 5.8*   ALBUMIN  --  2.7* 2.6* 2.6*   BILITOT  --  1.0 0.7 0.8   AST  --  50* 44* 48*   ALKPHOS  --  96 101 90   ALT  --  63* 59* 59*       Microbiology Data Reviewed: Yes  Pertinent Findings:  Microbiology Results (last 7 days)     Procedure Component Value Units Date/Time    Culture, Body Fluid - Bactec [[id number]] Collected: 10/07/20 1200    Order Status: Completed Specimen: Body Fluid from Thoracentesis Fluid Updated: 10/09/20 2012     Body Fluid Culture, Sterile No Growth to date      No Growth to date      No Growth to date    Gram stain [[id number]] Collected: 10/07/20 1200    Order Status: Completed Specimen: Body Fluid from Lung, Right Updated: 10/07/20 2142     Gram Stain Result Rare WBC's      Rare Gram positive cocci    Gram stain [[id number]]     Order Status: Canceled Specimen: Body Fluid from Pleural Fluid           Other Results:  Radiology Data Reviewed: Yes  Pertinent Findings:  X-Ray Chest AP Portable  Since prior radiograph, decreased size of right pleural effusion and atelectatic/consolidative right lower lung.  Relative improved aeration of the right upper lung zone.  Suspected pleural drainage catheter tubing, partially looped over the right lower chest.  Left hemithorax is unchanged.  There is improved degree of leftward mediastinal shift with similar sized cardiomediastinal silhouette, noting partial obscuration of the right heart border.  No definite pneumothorax.     No acute osseous abnormality.  Possible cardiac loop recorder device, unchanged.      Current Medications:     Infusions:       Scheduled:   amLODIPine  10 mg Oral Daily    atorvastatin  80 mg Oral Daily    fentaNYL  200 mcg Intravenous Once    lidocaine-EPINEPHrine 1%-1:100,000  20 mL Subcutaneous Once    losartan  100 mg Oral Daily    melatonin  6 mg Oral Nightly    midazolam  6 mg Intravenous Once    multivitamin  1 tablet Oral Daily    pantoprazole  40 mg Oral Daily     tamsulosin  1 capsule Oral Nightly        PRN:  albuterol-ipratropium, sodium chloride 0.9%    Assessment:     Shant Magana Jr. is a 80 y.o.male with  Patient Active Problem List    Diagnosis Date Noted    Exudative pleural effusion 10/06/2020    History of embolic stroke 09/22/2020    Polyneuropathy in diabetes 09/12/2020    Type 2 diabetes mellitus with peripheral angiopathy 09/10/2020    Anticoagulant long-term use 08/21/2020    Hyperparathyroidism due to renal insufficiency 08/20/2020    Embolic stroke involving right middle cerebral artery 07/18/2020    Former smoker 07/18/2020    Chronic heart failure with preserved ejection fraction     Dementia without behavioral disturbance 12/10/2019    Paroxysmal atrial fibrillation 02/12/2019    Status post placement of implantable loop recorder 02/12/2019    Gastritis     Polyp of colon     Chronic rhinitis 09/12/2018    Peptic ulcer disease 09/12/2018    Anemia due to blood loss 09/12/2018    Anemia     Erectile dysfunction 03/16/2018    Chronic diastolic congestive heart failure 10/19/2017    Enlarged LA (left atrium) 10/19/2017    Internal carotid artery stenosis, left 10/18/2017    Mixed hyperlipidemia 10/18/2017    Left carotid artery occlusion 09/16/2017    High cholesterol 09/16/2017    Prostate cancer 09/15/2017    Type 2 diabetes mellitus with complication, without long-term current use of insulin     Hypertensive chronic kidney disease 06/22/2017    Essential hypertension 06/07/2017    Vitamin D deficiency 12/07/2016    CKD (chronic kidney disease) stage 3, GFR 30-59 ml/min 09/07/2016    Renal oncocytoma of right kidney 09/07/2016    Diabetic renal disease 12/04/2015    Type 2 diabetes mellitus with diabetic nephropathy 12/04/2015    BPH (benign prostatic hyperplasia) 10/11/2013    Hypertrophic obstructive cardiomyopathy 10/11/2013        Plan:     Shortness of Breath 2/2 R Sided Exudative Pleural Effusion  - SOB increasing  over past 2 weeks, endorsed pain when laying on R side  Former smoker, 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 Once able, will discharge     Left Lower Lobe Lung Nodule  - Pt denies smoking history in contrast to chart history  - CT Chest w/ indeterminate 2.1cm nodule in LLL  - Pulm and onc consulted, will need outpatient follow-up     Essential Hypertension  - episodes of normal BP and episodic with hypertension  - continue losartan 100mg daily and amlodipine 10mg daily    S/P R MCA embolic stroke, residual hemiplegia and hemiparesis  - Most recent stroke 8/11/2020  - Residual deficits in LUE (4/5 strength L vs R), pt able to walk but with instability and frequent falls  - Coordination limited, difficulty with cerebellar tasks  - Pt on eliquis, hold at this time  - PT/OT eval for safe discharge, appreciate recs  - Fall risk precautions     Chronic diastolic heart failure  - With recovered diastolic dysfunction  - BP management as above     Paroxysmal Afib, HOCM, s/p implantation of loop recorder  - CHADS-VASC 5; 7.2% stroke risk  - Resume eliquis     Dementia  - Pt oriented to self, president; pt not oriented to place or date  - Delirium precautions     Type 2 Diabetes Mellitus  - A1c 5.8  - SSI + Accuchecks     SHIKHA on CKD stage 3  - Baseline Cr 1.2-1.4  Cr 1.9 on admission  Now normalized  - Renally dose meds, avoid nephrotoxic drugs     Mixed hyperlipidemia  - LDL 70 at last lipid panel  - Continue home atorvastatin 80mg daily    Dilated Aorta  - noted on imaging  - patient and family deferred further management at this time, risk:benefits discussed and they are aware.     HCM  - PCP Dr. Trent Aldana  - Pt UTD on Pneumovaxx, Tdap, Flu  - Pt UTD on colonoscopy    Diet:  Cardiac  DVT Prophylaxis: Eliquis  Allergies: NKDA  Code: Full  Dispo: Pending patient and family education, home with hospice today    Tolu Dallas MD  [facility] -III  [facility] Service Team B

## 2020-10-10 NOTE — PLAN OF CARE
[facility]  Department of Hospital Medicine  [address] [place] [address]  [phone] after hours  [phone] fax    HOSPICE  ORDERS    10/10/2020    Admit to Hospice:  Home Service  Diagnoses:   Active Hospital Problems    Diagnosis  POA    *Exudative pleural effusion [J90]  Yes    Paroxysmal atrial fibrillation [I48.0]  Yes    Chronic diastolic congestive heart failure [I50.32]  Yes     Managed by Dr. Salcedo      Internal carotid artery stenosis, left [I65.22]  Yes    Mixed hyperlipidemia [E78.2]  Yes    Type 2 diabetes mellitus with complication, without long-term current use of insulin [E11.8]  Yes     Managed by Dr. Orozco      Essential hypertension [I10]  Yes     Chronic    CKD (chronic kidney disease) stage 3, GFR 30-59 ml/min [N18.30]  Yes     Managed by Dr. Orozco        Resolved Hospital Problems   No resolved problems to display.       Hospice Qualifying Diagnoses:        Patient has a life expectancy < 6 months due to:  1) Primary Hospice Diagnosis: Presumed cancer    2) Comorbid Conditions Contributing to Decline: recurrent exudative pleural effusions, s/p R MCA embolic stroke with residual hemiplegia and hemiparesis, dementia, CKD stage III    Vital Signs: Routine per Hospice Protocol.    Code Status: Full    Allergies: Review of patient's allergies indicates:  No Known Allergies    Diet: Regular, cardiac     Activities: As tolerated    Nursing: Per Hospice Routine.      Routine Skin for Bedridden Patients: Apply moisture barrier cream to all skin folds and   wet areas in perineal area daily and after baths and all bowel movements.    Oxygen: Continuous, low flow Nasal Cannula; 2L    Other Miscellaneous Care:      Pleur-X drain: Drain 1L weekly with dressing change at that time, can drain PRN for dyspnea at rest.     Medications:      Shant Magana Jr.   Home Medication Instructions KAMILLA:[id number]    Printed on:10/10/20 4693    Medication Information                      acetaminophen (TYLENOL) 325 MG tablet  Take 2 tablets (650 mg total) by mouth every 6 (six) hours as needed for Pain.             amLODIPine (NORVASC) 10 MG tablet  Take 1 tablet (10 mg total) by mouth once daily.             apixaban (ELIQUIS) 2.5 mg Tab  Take 1 tablet (2.5 mg total) by mouth 2 (two) times daily.             atorvastatin (LIPITOR) 80 MG tablet  Take 1 tablet (80 mg total) by mouth once daily.             losartan (COZAAR) 100 MG tablet  Take 1 tablet (100 mg total) by mouth once daily.             melatonin (MELATIN) 3 mg tablet  Take 2 tablets (6 mg total) by mouth nightly as needed for Insomnia.             multivitamin Tab  Take 1 tablet by mouth once daily.             pantoprazole (PROTONIX) 40 MG tablet  Take 40 mg by mouth once daily.             tamsulosin (FLOMAX) 0.4 mg Cap  TAKE 1 CAPSULE BY MOUTH EVERY NIGHT AT BEDTIME             traMADoL (ULTRAM) 50 mg tablet  Take 1 tablet (50 mg total) by mouth every 6 (six) hours as needed for Pain. For acute pain post procedure - medically necessary             vitamin D 1000 units Tab  Take 1,000 Units by mouth once daily.                   Future Orders:  Hospice Medical Director may dictate new orders for comfortable care measures & sign death certificate.        _________________________________  Ani Cowan MD  10/10/2020

## 2020-10-10 NOTE — PLAN OF CARE
Pt d/c home on Home [facility]osp[facility] Care with [facility] . [place] faxed Pt's info to their on call, [place] cell [phone], fax [phone]    Ambulance transport to pt's home order placed with [facility],  time requested for 4:30 pm    Future Appointments   Date Time Provider Department Center   10/19/2020  2:15 PM Trent Aldana MD [facility]       Pt's nurse will go over medications/signs and symptoms prior to discharge       10/10/20 1521   Final Note   Assessment Type Final Discharge Note   Anticipated Discharge Disposition [facility]osp[facility]  ([facility])   What phone number can be called within the next 1-3 days to see how you are doing after discharge? [phone]   Hospital Follow Up  Appt(s) scheduled? Yes   Right Care Referral Info   Post Acute Recommendation Other   Referral Type [facility]   Facility Name [facility]     iAssatou Baxter, RN Transitional Navigator  [phone]

## 2020-10-10 NOTE — NURSING
Patient safety maintained. Medications administered per order. Assistance provided as needed for positioning and adl's. Discontinued telemetry monitoring. Patient discharge documents provided to family. Transport provided by ambulance, patient awake and alert.    [NL] : warm

## 2020-10-10 NOTE — DISCHARGE SUMMARY
[facility] Internal Medicine Discharge Summary    Primary Team: [facility] Internal Medicine Team B  Attending Physician: Todd Erazo MD  Resident: Celena/Quang  Intern: Shyann    Date of Admit: 10/6/2020  Date of Discharge: 10/10/2020    Discharge to: Home with Hospice  Condition: Poor    Discharge Diagnoses     Patient Active Problem List   Diagnosis    Essential hypertension    Type 2 diabetes mellitus with complication, without long-term current use of insulin    Internal carotid artery stenosis, left    Mixed hyperlipidemia    CKD (chronic kidney disease) stage 3, GFR 30-59 ml/min    Chronic diastolic congestive heart failure    Enlarged LA (left atrium)    Anemia    Gastritis    Polyp of colon    Paroxysmal atrial fibrillation    Status post placement of implantable loop recorder    Dementia without behavioral disturbance    Chronic heart failure with preserved ejection fraction    BPH (benign prostatic hyperplasia)    Embolic stroke involving right middle cerebral artery    Former smoker    Chronic rhinitis    Diabetic renal disease    Erectile dysfunction    Hyperparathyroidism due to renal insufficiency    Hypertrophic obstructive cardiomyopathy    Left carotid artery occlusion    Peptic ulcer disease    Prostate cancer    Renal oncocytoma of right kidney    Vitamin D deficiency    Hypertensive chronic kidney disease    High cholesterol    Anemia due to blood loss    Type 2 diabetes mellitus with diabetic nephropathy    Anticoagulant long-term use    Type 2 diabetes mellitus with peripheral angiopathy    Polyneuropathy in diabetes    History of embolic stroke    Exudative pleural effusion       Consultants and Procedures     Consultants:  Pulmonology  Heme/Onc    Procedures:   Thoracentesis  PleurX Catheter Placement    Brief History of Present Illness      Shant Magana JrSherri is a 80 y.o. male who  has a past medical history of SHIKHA superimposed on CKD, prostate cancer, CVA (most  recently 08/11/2020), Diabetes mellitus, Former smoker (7/18/2020), Hypertension, Hypertrophic cardiomyopathy. The patient presented to [facility] on 10/6/2020 with a primary complaint of Shortness of Breath x2 weeks. Pt was seen by pulmonology outpatient and was told he has fluid on his lungs.     The patient was in their usual state of health until approximately 2 weeks ago, when he began having worsening SOB. Pt previously had thoracentesis with pulm with lymphocyte predominate exudative pleural effusion. Pt presented to pul on 10/02 for evaluation, found to have recurrence of this exudative pleural effusion, concerning for malignant effusion. Pt was instructed to hold apixiban in anticipation of thoracentesis next Friday. However, SOB worsened and pt endorsed pain when laying on his right side, so he presented to [facility].    While here, pulmonology was consulted and performed a thoracentesis and was able to drain 2L of straw colored pleural fluid. Studies were sent: consistent with exudative effusion, cytology remains pending. PleurX was discussed family was updated, joint decision was made to send him home with hospice with his likely diagnosis of malignancy, they would still like to have him see Dr. Boggs for further work-up.    Hospice was notified and accepted the patient, pleurX catheter was placed by Pulmonology on 10/9 and he tolerated it well. Family was bedside and educated on 10/10 regarding the pleurX catheter and how to use it and drain the effusion. He was safely discharged with hospice established, follow-up with PCP and Pulmonology at his scheduled appointments.    For the full HPI please refer to the History & Physical from this admission.    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 Established with Home Hospice who delivered DME. Answered all questions, discharged today  - Will follow-up Cytology and Quantiferon Gold TB     Left Lower Lobe Lung Nodule  - Pt denies smoking history in contrast to chart history  - CT Chest w/ indeterminate 2.1cm nodule in LLL  - Pulm and onc consulted, will need outpatient follow-up     Essential Hypertension  - episodes of normal BP and episodic with hypertension  - continue losartan 100mg daily and amlodipine 10mg daily     S/P R MCA embolic stroke, residual hemiplegia and hemiparesis  - Most recent stroke 8/11/2020  - Residual deficits in LUE (4/5 strength L vs R), pt able to walk but with instability and frequent falls  - Coordination limited, difficulty with cerebellar tasks  - PT/OT consulted  - Resume eliquis     Chronic diastolic heart failure  - With recovered diastolic dysfunction  - BP management as above     Paroxysmal Afib, HOCM, s/p implantation of loop recorder  - CHADS-VASC 5; 7.2% stroke risk  - Resume eliquis     Dementia  - Pt oriented to self, president; pt not oriented to place or date  - Delirium precautions     Type 2 Diabetes Mellitus  - A1c 5.8  - SSI + Accuchecks     SHIKHA on CKD stage 3  - Baseline Cr 1.2-1.4  Cr 1.9 on admission  Now normalized  - Renally dose meds, avoid nephrotoxic drugs     Mixed hyperlipidemia  - LDL 70 at last lipid panel  - Continue home atorvastatin 80mg daily     Dilated Aorta  - noted on imaging  - patient and family deferred further management at this time, risk:benefits discussed and they are aware.     HCM  - PCP Dr. Trent Aldana  - Pt UTD on Pneumovaxx, Tdap, Flu  - Pt UTD on colonoscopy    Discharge Medications        Medication List      START taking these  medications    amLODIPine 10 MG tablet  Commonly known as: NORVASC  Take 1 tablet (10 mg total) by mouth once daily.     losartan 100 MG tablet  Commonly known as: COZAAR  Take 1 tablet (100 mg total) by mouth once daily.     traMADoL 50 mg tablet  Commonly known as: ULTRAM  Take 1 tablet (50 mg total) by mouth every 6 (six) hours as needed for Pain. For acute pain post procedure - medically necessary        CHANGE how you take these medications    apixaban 2.5 mg Tab  Commonly known as: ELIQUIS  Take 1 tablet (2.5 mg total) by mouth 2 (two) times daily.  What changed: when to take this        CONTINUE taking these medications    acetaminophen 325 MG tablet  Commonly known as: TYLENOL  Take 2 tablets (650 mg total) by mouth every 6 (six) hours as needed for Pain.     atorvastatin 80 MG tablet  Commonly known as: LIPITOR  Take 1 tablet (80 mg total) by mouth once daily.     melatonin 3 mg tablet  Commonly known as: MELATIN  Take 2 tablets (6 mg total) by mouth nightly as needed for Insomnia.     multivitamin Tab  Take 1 tablet by mouth once daily.     pantoprazole 40 MG tablet  Commonly known as: PROTONIX     tamsulosin 0.4 mg Cap  Commonly known as: FLOMAX  TAKE 1 CAPSULE BY MOUTH EVERY NIGHT AT BEDTIME     vitamin D 1000 units Tab  Commonly known as: VITAMIN D3           Where to Get Your Medications      These medications were sent to [facility] #[id number] - [facility] - [address] AT [facility]  [address] [address]    Phone: [phone]   · amLODIPine 10 MG tablet  · losartan 100 MG tablet     You can get these medications from any pharmacy    Bring a paper prescription for each of these medications  · traMADoL 50 mg tablet         Discharge Information:   Diet:  Cardiac    Physical Activity:  As tolerated    Instructions:  1. Take all medications as prescribed  2. Keep all follow-up appointments  3. Return to the hospital or call your primary care physicians if any  worsening symptoms such as worsening shortness of breath, encephalopathy, chest pain, or syncope occur.  4. Return if PleurX catheter comes undone, tube falls out, or inability to drain fluid    Follow-Up Appointments:  PCP - schedule for within 1 week  Dr. Conde (Heme-Onc): As soon as possible  Hospice - established  Dr. Boggs (Pulmonology): as soon as possible    Tolu Dallas MD  [facility], [facility]

## 2020-10-10 NOTE — NURSING
Spoke with MD Tolu Dallas in relation to printed prescription on chart for signature. MD informed that hospice will take care of all medications for patient at home, prescription not needed.

## 2020-10-10 NOTE — MEDICAL/APP STUDENT
"[facility] Hospital Medicine Discharge Summary    Primary Team: [facility] Hospitalist Team B  Attending Physician: Todd Erazo MD  Resident: Tolu Dallas MD  Intern: Ani Cowan MD    Date of Admit: 10/6/2020  Date of Discharge: 10/10/2020    Discharge to: Home with hospice  Condition: Stable    Discharge Diagnoses     Patient Active Problem List   Diagnosis    Essential hypertension    Type 2 diabetes mellitus with complication, without long-term current use of insulin    Internal carotid artery stenosis, left    Mixed hyperlipidemia    CKD (chronic kidney disease) stage 3, GFR 30-59 ml/min    Chronic diastolic congestive heart failure    Enlarged LA (left atrium)    Anemia    Gastritis    Polyp of colon    Paroxysmal atrial fibrillation    Status post placement of implantable loop recorder    Dementia without behavioral disturbance    Chronic heart failure with preserved ejection fraction    BPH (benign prostatic hyperplasia)    Embolic stroke involving right middle cerebral artery    Former smoker    Chronic rhinitis    Diabetic renal disease    Erectile dysfunction    Hyperparathyroidism due to renal insufficiency    Hypertrophic obstructive cardiomyopathy    Left carotid artery occlusion    Peptic ulcer disease    Prostate cancer    Renal oncocytoma of right kidney    Vitamin D deficiency    Hypertensive chronic kidney disease    High cholesterol    Anemia due to blood loss    Type 2 diabetes mellitus with diabetic nephropathy    Anticoagulant long-term use    Type 2 diabetes mellitus with peripheral angiopathy    Polyneuropathy in diabetes    History of embolic stroke    Exudative pleural effusion       Consultants and Procedures     Consultants:  Hematology/Oncology  Pulmonology  PT/OT  Social Work  Hospice    Procedures:   Tunneled Chest Tube ("Pleurx Catheter") Insertion   Thoracentesis  Peripheral IV Insertion    Imaging:  Imaging Results          US Abdomen Complete (Final " result)  Result time 10/06/20 17:50:20    Final result by Francisco Soni DO (10/06/20 17:50:20)                 Impression:      1. Multiple simple to minimally complex hepatic and bilateral renal cysts.  2. Nonobstructing left renal stone.  3. Large right pleural effusion.      Electronically signed by: Francisco Soni  Date:    10/06/2020  Time:    17:50             Narrative:    EXAMINATION:  US ABDOMEN COMPLETE    CLINICAL HISTORY:  RUQ assessment for cysts;    TECHNIQUE:  Complete abdominal ultrasound (including pancreas, aorta, liver, gallbladder, common bile duct, IVC, kidneys, and spleen) was performed.    COMPARISON:  CT chest, abdomen, and pelvis from the same date.    FINDINGS:  Pancreas: The pancreas is largely obscured by bowel gas.    Aorta: The aorta largely obscured by bowel gas.  The proximal portion is unremarkable.    Liver: 11.1 cm, normal in size. Homogeneous parenchymal echotexture. There are multiple anechoic lesions, some of which contain thin septations, within the right and left lobe compatible with simple to minimally complex cysts.  The largest within the left lobe measures 2.1 x 1.7 x 2.1 cm.  The largest within the right lobe measures 3.9 x 3.5 x 3.6 cm.    Gallbladder: There is gallbladder sludge. No calculi, wall thickening, or pericholecystic fluid.  Negative sonographic Anthony's sign.    Biliary system: 6 mm common bile duct.  No intrahepatic ductal dilatation.    Inferior vena cava: Normal in appearance.    Right kidney: 9.3 cm. No hydronephrosis.  There are multiple anechoic simple cysts, the largest of which is seen within the midpole and measures 2.1 x 2.2 x 2.8 cm.    Left kidney: 9.6 cm. No hydronephrosis.  There are multiple anechoic simple cysts,the largest of which within the superior pole measures 5.3 x 3.9 x 5.0 cm.  There is an echogenicity within the left kidney superior pole measuring up to 0.8 cm compatible with a nonobstructing stone.    Spleen: 8.4 cm.  Normal in  size with homogeneous echotexture.    Miscellaneous: There is a large right pleural effusion.                               CT Chest Abdomen Pelvis Without Contrast (XPD) (Final result)  Result time 10/06/20 14:40:15    Final result by Zhen Diaz MD (10/06/20 14:40:15)                 Impression:      1. Large right pleural effusion with complete atelectasis of the right lung and leftward mediastinal shift.  2. Indeterminate 2.1 cm nodule in the left lower lobe.  Diagnostic considerations include neoplasia and infection.  3. Left renal nonobstructing calculus.  4. Multiple hepatic hypodensities, probably cysts.  5. Additional findings above.      Electronically signed by: Zhen Diaz MD  Date:    10/06/2020  Time:    14:40             Narrative:    EXAMINATION:  CT CHEST ABDOMEN PELVIS WITHOUT CONTRAST(XPD)    CLINICAL HISTORY:  Thoracoabdominal aortic aneurysm, follow up;    TECHNIQUE:  Low dose axial images, sagittal and coronal reformations were obtained from the thoracic inlet to the pubic symphysis .  Oral contrast was not administered.    COMPARISON:  10/06/2020    FINDINGS:  There is a large right pleural effusion.  There is complete atelectasis of the right lung with leftward deviation of the mediastinum.  There is no left pleural effusion.  Trace pericardial fluid noted.  Heart is normal size.  Coronary artery calcifications noted.  Central airways are patent, without endobronchial lesions.  There is a focal, nodular opacity in the left lower lobe measuring 2.1 cm.  Aorta is ectatic, measuring 4.5 cm at the ascending portion.    Liver demonstrates multiple hypodensities.  Largest measures 4.3 cm and demonstrates attenuation characteristics consistent with a simple cyst.  Smaller lesions are difficult to further characterize, but probably represent additional cysts.  No calcified gallstones.  Pancreas, spleen, and right adrenal gland are unremarkable.  Left adrenal gland demonstrates a 1.3 cm nodule,  likely representing adenoma.  There are bilateral renal cysts.  Largest on the left measures 6.1 cm.  There is no hydronephrosis.  There is a 0.9 cm right renal cortical calcification.  There is a nonobstructing 0.8 cm calculus at the left lower pole.  GI tract demonstrates no evidence for obstruction or inflammation.  Multiple colonic diverticula are noted.  Rectal therapy seeds are seen within the prostate.  No retroperitoneal lymphadenopathy.  No ascites.  Abdominal aorta is ectatic and calcified.  Note made of right posterolateral urinary bladder diverticulum.    Regional osseous structures demonstrate no aggressive appearing lytic or blastic lesions.  There is fusion of the bilateral sacroiliac joints.  There are degenerative changes of the spine.  Note made of port within the left chest wall.                               X-Ray Chest PA And Lateral (Final result)  Result time 10/06/20 12:14:00    Final result by Zhen Diaz MD (10/06/20 12:14:00)                 Impression:      As above.      Electronically signed by: Zhen Diaz MD  Date:    10/06/2020  Time:    12:14             Narrative:    EXAMINATION:  XR CHEST PA AND LATERAL    CLINICAL HISTORY:  Shortness of breath    TECHNIQUE:  PA and lateral views of the chest were performed.    COMPARISON:  10/02/2020    FINDINGS:  There is complete opacification of the right hemithorax, likely combination of large pleural effusion and consolidation.  No significant midline shift.  Left lung is essentially clear.  No pneumothorax.  Loop recorder noted.                                  Brief History of Present Illness   Mr. Magana is an 79 yo AAM with a pmh of dementia, DM, prostate cancer, HTN, afib on eliquis, tobacco abuse, CVA as recent as 8/11 who presented to [facility] ED on 10/6 with a primary complaint of shortness of breath for 2 weeks. Prior to admission, he was seen by his pulmonologist on 10/2 for recurrent pleural effusion on the right s/p  thoracentesis (monocyte predominant exudate one month prior) with concern for malignant effusion. The plan was for a repeat thoracentesis after he held apixiban for 72 hours.  However, he presented to [facility] ED with worsening SOB and pain when laying on the right side.     Pulmonology was consulted and performed a therapeutic/diagnostic thoracentesis with 2L of straw-colored fluid removed. Following thoracentesis, there was still significant fluid remaining. Discussion was made with pulm, SW, and 3 siblings for concerns regarding the pleural effusion, concerns for malignancy, and pt's clinical decline over the past 6 months.  A collective decision was made to have PleurX cath placement and home hospice for assistance in pleurx containers and drainage of pleurx.    Patient remained stable following pleurx placement with significant improvement in respiration. He will be discharged to home with hospice. The family agrees to this plan.     For the full HPI please refer to the History & Physical from this admission.    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  - CT chest with large R pleural effusion with atelectasis  - 10/7: Thoracentesis with Pulmonology - pending cytology  - 10/9: PleurX cath placement with family in agreement  - Discharged home with Hospice and family education regarding catheter and drainage      Left Lower Lobe Lung Nodule  - Pt denies smoking history in contrast to chart history  - CT Chest w/ indeterminate 2.1cm nodule in LLL  - Pulm and onc consulted, will schedule outpatient follow-up     Essential Hypertension  - episodes of normal BP and episodic with hypertension  - continue losartan 100mg daily and amlodipine 10mg daily  - continue current medications at home     S/P R MCA embolic stroke,  residual hemiplegia and hemiparesis  - Most recent stroke 8/11/2020  - Residual deficits in LUE (4/5 strength L vs R), pt able to walk but with instability and frequent falls  - Coordination limited, difficulty with cerebellar tasks  - Pt on eliquis 2.5 mg 2 times daily, continue eliquis at home  - PT/OT evaluated for safe discharge     Chronic diastolic heart failure  - With recovered diastolic dysfunction  - BP management as above     Paroxysmal Afib, HOCM, s/p implantation of loop recorder  - CHADS-VASC 5; 7.2% stroke risk  -Continue eliquis at home as above     Dementia  - Pt oriented to self, president and place; patient not oriented to date  - Delirium precautions     Type 2 Diabetes Mellitus  - A1c 5.8  - SSI + Accuchecks     SHIKHA on CKD stage 3  - Baseline Cr 1.2-1.4  Cr 1.9 on admission  Now normalized  - Renally dose meds, avoid nephrotoxic drugs     Mixed hyperlipidemia  - LDL 70 at last lipid panel  - Continue home atorvastatin 80mg daily     HCM  - PCP Dr. Trent Aldana  - Pt UTD on Pneumovaxx, Tdap, Flu  - Pt UTD on colonoscopy    Discharge Medications      Shant Magana Jr.   Home Medication Instructions KAMILLA:[id number]    Printed on:10/10/20 1020   Medication Information                      acetaminophen (TYLENOL) 325 MG tablet  Take 2 tablets (650 mg total) by mouth every 6 (six) hours as needed for Pain.             amLODIPine (NORVASC) 10 MG tablet  Take 1 tablet (10 mg total) by mouth once daily.             apixaban (ELIQUIS) 2.5 mg Tab  Take 1 tablet (2.5 mg total) by mouth 2 (two) times daily.             atorvastatin (LIPITOR) 80 MG tablet  Take 1 tablet (80 mg total) by mouth once daily.             losartan (COZAAR) 100 MG tablet  Take 1 tablet (100 mg total) by mouth once daily.             melatonin (MELATIN) 3 mg tablet  Take 2 tablets (6 mg total) by mouth nightly as needed for Insomnia.             multivitamin Tab  Take 1 tablet by mouth once daily.             pantoprazole  (PROTONIX) 40 MG tablet  Take 40 mg by mouth once daily.             tamsulosin (FLOMAX) 0.4 mg Cap  TAKE 1 CAPSULE BY MOUTH EVERY NIGHT AT BEDTIME             traMADoL (ULTRAM) 50 mg tablet  Take 1 tablet (50 mg total) by mouth every 6 (six) hours as needed for Pain. For acute pain post procedure - medically necessary             vitamin D 1000 units Tab  Take 1,000 Units by mouth once daily.                 Discharge Information:   Diet:  Cardiac Diet    Physical Activity:  Patient needs supervision with ADLs and needs walker with added supervision for mobility.             Instructions:  1. Take all medications as prescribed  2. Keep all follow-up appointments  3. Return to the hospital or call your primary care physicians and notify home hospice if any worsening symptoms such as fever, chest pain, shortness of breath, return of symptoms, or any other concerns.    Follow-Up Appointments:  PCP - Dr. Aldana on 10/19 at 2:15 at    [facility]          Vlad Wilson, MS4  [facility] Internal Medicine

## 2020-10-10 NOTE — CONSULTS
Please see note from Dr. Conde dated the same day.    Mic Guzman MD  Hematology and Oncology  [facility]  Office:[phone]  Fax: [phone]

## 2020-10-12 LAB
BACTERIA FLD CULT: NORMAL
GAMMA INTERFERON BACKGROUND BLD IA-ACNC: 0.03 IU/ML
M TB IFN-G CD4+ BCKGRND COR BLD-ACNC: -0.01 IU/ML
MITOGEN IGNF BCKGRD COR BLD-ACNC: 1.77 IU/ML
TB GOLD PLUS: NEGATIVE
TB2 - NIL: 0 IU/ML

## 2020-10-13 LAB
FINAL PATHOLOGIC DIAGNOSIS: ABNORMAL
FINAL PATHOLOGIC DIAGNOSIS: NORMAL

## 2020-10-28 ENCOUNTER — CLINICAL SUPPORT (OUTPATIENT)
Dept: CARDIOLOGY | Facility: CLINIC | Age: 80
End: 2020-10-28
Payer: MEDICARE

## 2020-10-28 DIAGNOSIS — Z95.818 STATUS POST PLACEMENT OF IMPLANTABLE LOOP RECORDER: Primary | ICD-10-CM

## 2020-10-28 PROCEDURE — 93298 REM INTERROG DEV EVAL SCRMS: CPT | Mod: S$GLB,,, | Performed by: INTERNAL MEDICINE

## 2020-10-28 PROCEDURE — 93298 PR INTERROG EVAL, REMOTE, UP TO 30 DAYS,IMPLANT LOOP REC: ICD-10-PCS | Mod: S$GLB,,, | Performed by: INTERNAL MEDICINE

## 2020-11-02 NOTE — PROGRESS NOTES
Subjective:      Patient ID: Shant Magana Jr. is a 80 y.o. male.    Chief Complaint: No chief complaint on file.    HPI:    ROS Bi[employer] remote interrogation of  Implanted loop recorder report interpreted by me and full report scanned into Epic media.  Battery OK .  NSR and sinus bradycardia with PAC's and PVC's (vs aberrantly conducted supraventricular premature beats) and bigeminy.      Past Medical History:   Diagnosis Date    Acute kidney injury superimposed on chronic kidney disease 10/14/2017    Cancer     prostate    CVA (cerebral vascular accident) 9/16/2017    Diabetes mellitus     Former smoker 7/18/2020    History of stroke 8/15/2017    Hypertension     Hypertrophic cardiomyopathy     Renal disorder     Stroke         Past Surgical History:   Procedure Laterality Date    BACK SURGERY      COLONOSCOPY N/A 10/18/2018    Procedure: COLONOSCOPY;  Surgeon: Alan Gooden MD;  Location: [facility];  Service: Endoscopy;  Laterality: N/A;    ESOPHAGOGASTRODUODENOSCOPY N/A 9/4/2018    Procedure: EGD (ESOPHAGOGASTRODUODENOSCOPY);  Surgeon: Oli Cuadra MD;  Location: [facility];  Service: Endoscopy;  Laterality: N/A;    ESOPHAGOGASTRODUODENOSCOPY N/A 10/18/2018    Procedure: EGD (ESOPHAGOGASTRODUODENOSCOPY);  Surgeon: Alan Gooden MD;  Location: [facility];  Service: Endoscopy;  Laterality: N/A;    THYROIDECTOMY         No family history on file.    Social History     Socioeconomic History    Marital status: Single     Spouse name: Not on file    Number of children: Not on file    Years of education: Not on file    Highest education level: Not on file   Occupational History    Not on file   Social Needs    Financial resource strain: Not on file    Food insecurity     Worry: Not on file     Inability: Not on file    Transportation needs     Medical: Not on file     Non-medical: Not on file   Tobacco Use    Smoking status: Former Smoker     Packs/day: 0.90     Years: 3.00     Pack  years: 2.70     Types: Cigarettes    Smokeless tobacco: Never Used   Substance and Sexual Activity    Alcohol use: Never     Frequency: Never    Drug use: No    Sexual activity: Not on file   Lifestyle    Physical activity     Days per week: Not on file     Minutes per session: Not on file    Stress: Not on file   Relationships    Social connections     Talks on phone: Not on file     Gets together: Not on file     Attends Taoism service: Not on file     Active member of club or organization: Not on file     Attends meetings of clubs or organizations: Not on file     Relationship status: Not on file   Other Topics Concern    Not on file   Social History Narrative    Not on file       Current Outpatient Medications on File Prior to Visit   Medication Sig Dispense Refill    acetaminophen (TYLENOL) 325 MG tablet Take 2 tablets (650 mg total) by mouth every 6 (six) hours as needed for Pain.  0    amLODIPine (NORVASC) 10 MG tablet Take 1 tablet (10 mg total) by mouth once daily. 90 tablet 3    apixaban (ELIQUIS) 2.5 mg Tab Take 1 tablet (2.5 mg total) by mouth 2 (two) times daily. (Patient taking differently: Take 2.5 mg by mouth once daily. ) 60 tablet 11    atorvastatin (LIPITOR) 80 MG tablet Take 1 tablet (80 mg total) by mouth once daily. 90 tablet 3    losartan (COZAAR) 100 MG tablet Take 1 tablet (100 mg total) by mouth once daily. 90 tablet 3    melatonin (MELATIN) 3 mg tablet Take 2 tablets (6 mg total) by mouth nightly as needed for Insomnia.  0    multivitamin Tab Take 1 tablet by mouth once daily.      pantoprazole (PROTONIX) 40 MG tablet Take 40 mg by mouth once daily.  3    tamsulosin (FLOMAX) 0.4 mg Cap TAKE 1 CAPSULE BY MOUTH EVERY NIGHT AT BEDTIME 90 capsule 3    vitamin D 1000 units Tab Take 1,000 Units by mouth once daily.       No current facility-administered medications on file prior to visit.        Review of patient's allergies indicates:  No Known Allergies  Objective:    There were no vitals filed for this visit.     Physical Exam     Assessment:     1. Status post placement of implantable loop recorder      Plan:   Diagnoses and all orders for this visit:    Status post placement of implantable loop recorder         No follow-ups on file.

## 2020-11-07 ENCOUNTER — HOSPITAL ENCOUNTER (INPATIENT)
Facility: HOSPITAL | Age: 80
LOS: 4 days | Discharge: HOSPICE | DRG: 071 | End: 2020-11-11
Attending: EMERGENCY MEDICINE | Admitting: INTERNAL MEDICINE
Payer: MEDICARE

## 2020-11-07 DIAGNOSIS — Z87.891 FORMER SMOKER: ICD-10-CM

## 2020-11-07 DIAGNOSIS — I63.9 STROKE: ICD-10-CM

## 2020-11-07 DIAGNOSIS — Z51.5 PALLIATIVE CARE ENCOUNTER: ICD-10-CM

## 2020-11-07 DIAGNOSIS — R41.82 ALTERED MENTAL STATUS: ICD-10-CM

## 2020-11-07 DIAGNOSIS — Z12.89 ENCOUNTER FOR SCREENING FOR MALIGNANT NEOPLASM OF OTHER SITES: ICD-10-CM

## 2020-11-07 DIAGNOSIS — I63.411 EMBOLIC STROKE INVOLVING RIGHT MIDDLE CEREBRAL ARTERY: Primary | ICD-10-CM

## 2020-11-07 DIAGNOSIS — R41.82 ALTERED MENTAL STATUS, UNSPECIFIED ALTERED MENTAL STATUS TYPE: ICD-10-CM

## 2020-11-07 DIAGNOSIS — R29.818 OTHER SYMPTOMS AND SIGNS INVOLVING THE NERVOUS SYSTEM: ICD-10-CM

## 2020-11-07 DIAGNOSIS — J90 PLEURAL EFFUSION EXUDATIVE: ICD-10-CM

## 2020-11-07 DIAGNOSIS — I49.8 BIGEMINY: ICD-10-CM

## 2020-11-07 DIAGNOSIS — I10 ESSENTIAL HYPERTENSION: Chronic | ICD-10-CM

## 2020-11-07 DIAGNOSIS — C34.90 MALIGNANT NEOPLASM OF UNSPECIFIED PART OF UNSPECIFIED BRONCHUS OR LUNG: ICD-10-CM

## 2020-11-07 DIAGNOSIS — E78.2 MIXED HYPERLIPIDEMIA: ICD-10-CM

## 2020-11-07 DIAGNOSIS — E11.8 TYPE 2 DIABETES MELLITUS WITH COMPLICATION, WITHOUT LONG-TERM CURRENT USE OF INSULIN: ICD-10-CM

## 2020-11-07 DIAGNOSIS — I48.0 PAROXYSMAL ATRIAL FIBRILLATION: ICD-10-CM

## 2020-11-07 DIAGNOSIS — I50.32 CHRONIC DIASTOLIC CONGESTIVE HEART FAILURE: ICD-10-CM

## 2020-11-07 DIAGNOSIS — D30.01 RENAL ONCOCYTOMA OF RIGHT KIDNEY: ICD-10-CM

## 2020-11-07 DIAGNOSIS — G93.40 ACUTE ENCEPHALOPATHY: ICD-10-CM

## 2020-11-07 PROBLEM — R40.4 TRANSIENT ALTERATION OF AWARENESS: Status: ACTIVE | Noted: 2020-11-07

## 2020-11-07 LAB
ABO GROUP BLD: NORMAL
ALBUMIN SERPL BCP-MCNC: 2.3 G/DL (ref 3.5–5.2)
ALP SERPL-CCNC: 84 U/L (ref 55–135)
ALT SERPL W/O P-5'-P-CCNC: 27 U/L (ref 10–44)
ANION GAP SERPL CALC-SCNC: 6 MMOL/L (ref 8–16)
AST SERPL-CCNC: 28 U/L (ref 10–40)
BASOPHILS # BLD AUTO: 0.03 K/UL (ref 0–0.2)
BASOPHILS NFR BLD: 0.8 % (ref 0–1.9)
BILIRUB SERPL-MCNC: 0.4 MG/DL (ref 0.1–1)
BLD GP AB SCN CELLS X3 SERPL QL: NORMAL
BNP SERPL-MCNC: 263 PG/ML (ref 0–99)
BUN SERPL-MCNC: 31 MG/DL (ref 8–23)
CALCIUM SERPL-MCNC: 9.8 MG/DL (ref 8.7–10.5)
CHLORIDE SERPL-SCNC: 106 MMOL/L (ref 95–110)
CHOLEST SERPL-MCNC: 142 MG/DL (ref 120–199)
CHOLEST/HDLC SERPL: 3.1 {RATIO} (ref 2–5)
CO2 SERPL-SCNC: 24 MMOL/L (ref 23–29)
CREAT SERPL-MCNC: 1.4 MG/DL (ref 0.5–1.4)
CREAT SERPL-MCNC: 1.5 MG/DL (ref 0.5–1.4)
DIFFERENTIAL METHOD: ABNORMAL
EOSINOPHIL # BLD AUTO: 0.1 K/UL (ref 0–0.5)
EOSINOPHIL NFR BLD: 2.5 % (ref 0–8)
ERYTHROCYTE [DISTWIDTH] IN BLOOD BY AUTOMATED COUNT: 15.8 % (ref 11.5–14.5)
EST. GFR  (AFRICAN AMERICAN): 54 ML/MIN/1.73 M^2
EST. GFR  (NON AFRICAN AMERICAN): 47 ML/MIN/1.73 M^2
ESTIMATED AVG GLUCOSE: 123 MG/DL (ref 68–131)
GLUCOSE SERPL-MCNC: 96 MG/DL (ref 70–110)
HBA1C MFR BLD HPLC: 5.9 % (ref 4–5.6)
HCT VFR BLD AUTO: 36.7 % (ref 40–54)
HDLC SERPL-MCNC: 46 MG/DL (ref 40–75)
HDLC SERPL: 32.4 % (ref 20–50)
HGB BLD-MCNC: 12.4 G/DL (ref 14–18)
IMM GRANULOCYTES # BLD AUTO: 0.02 K/UL (ref 0–0.04)
IMM GRANULOCYTES NFR BLD AUTO: 0.5 % (ref 0–0.5)
INR PPP: 1 (ref 0.8–1.2)
LDLC SERPL CALC-MCNC: 75.4 MG/DL (ref 63–159)
LYMPHOCYTES # BLD AUTO: 1 K/UL (ref 1–4.8)
LYMPHOCYTES NFR BLD: 24.7 % (ref 18–48)
MCH RBC QN AUTO: 29.7 PG (ref 27–31)
MCHC RBC AUTO-ENTMCNC: 33.8 G/DL (ref 32–36)
MCV RBC AUTO: 88 FL (ref 82–98)
MONOCYTES # BLD AUTO: 0.4 K/UL (ref 0.3–1)
MONOCYTES NFR BLD: 10.4 % (ref 4–15)
NEUTROPHILS # BLD AUTO: 2.4 K/UL (ref 1.8–7.7)
NEUTROPHILS NFR BLD: 61.1 % (ref 38–73)
NONHDLC SERPL-MCNC: 96 MG/DL
NRBC BLD-RTO: 0 /100 WBC
PLATELET # BLD AUTO: 220 K/UL (ref 150–350)
PMV BLD AUTO: 10.8 FL (ref 9.2–12.9)
POC PTINR: 1.2 (ref 0.9–1.2)
POC PTWBT: 14.5 SEC (ref 9.7–14.3)
POCT GLUCOSE: 97 MG/DL (ref 70–110)
POTASSIUM SERPL-SCNC: 5.1 MMOL/L (ref 3.5–5.1)
PROT SERPL-MCNC: 5.3 G/DL (ref 6–8.4)
PROTHROMBIN TIME: 10.9 SEC (ref 9–12.5)
RBC # BLD AUTO: 4.17 M/UL (ref 4.6–6.2)
RH BLD: NORMAL
SAMPLE: ABNORMAL
SAMPLE: ABNORMAL
SARS-COV-2 RDRP RESP QL NAA+PROBE: NEGATIVE
SODIUM SERPL-SCNC: 136 MMOL/L (ref 136–145)
T4 FREE SERPL-MCNC: 1.01 NG/DL (ref 0.71–1.51)
T4 FREE SERPL-MCNC: 1.04 NG/DL (ref 0.71–1.51)
TRIGL SERPL-MCNC: 103 MG/DL (ref 30–150)
TROPONIN I SERPL DL<=0.01 NG/ML-MCNC: <0.006 NG/ML (ref 0–0.03)
TSH SERPL DL<=0.005 MIU/L-ACNC: 7.8 UIU/ML (ref 0.4–4)
TSH SERPL DL<=0.005 MIU/L-ACNC: 8.26 UIU/ML (ref 0.4–4)
WBC # BLD AUTO: 3.93 K/UL (ref 3.9–12.7)

## 2020-11-07 PROCEDURE — 25000003 PHARM REV CODE 250: Performed by: STUDENT IN AN ORGANIZED HEALTH CARE EDUCATION/TRAINING PROGRAM

## 2020-11-07 PROCEDURE — 80053 COMPREHEN METABOLIC PANEL: CPT

## 2020-11-07 PROCEDURE — G0425 INPT/ED TELECONSULT30: HCPCS | Mod: GT,,, | Performed by: STUDENT IN AN ORGANIZED HEALTH CARE EDUCATION/TRAINING PROGRAM

## 2020-11-07 PROCEDURE — 84439 ASSAY OF FREE THYROXINE: CPT

## 2020-11-07 PROCEDURE — 80061 LIPID PANEL: CPT

## 2020-11-07 PROCEDURE — 83880 ASSAY OF NATRIURETIC PEPTIDE: CPT

## 2020-11-07 PROCEDURE — 93005 ELECTROCARDIOGRAM TRACING: CPT

## 2020-11-07 PROCEDURE — 86850 RBC ANTIBODY SCREEN: CPT

## 2020-11-07 PROCEDURE — 85610 PROTHROMBIN TIME: CPT

## 2020-11-07 PROCEDURE — 99900035 HC TECH TIME PER 15 MIN (STAT)

## 2020-11-07 PROCEDURE — 82962 GLUCOSE BLOOD TEST: CPT

## 2020-11-07 PROCEDURE — 84443 ASSAY THYROID STIM HORMONE: CPT

## 2020-11-07 PROCEDURE — 11000001 HC ACUTE MED/SURG PRIVATE ROOM

## 2020-11-07 PROCEDURE — 36415 COLL VENOUS BLD VENIPUNCTURE: CPT

## 2020-11-07 PROCEDURE — 83036 HEMOGLOBIN GLYCOSYLATED A1C: CPT

## 2020-11-07 PROCEDURE — 93010 EKG 12-LEAD: ICD-10-PCS | Mod: ,,, | Performed by: INTERNAL MEDICINE

## 2020-11-07 PROCEDURE — 84443 ASSAY THYROID STIM HORMONE: CPT | Mod: 91

## 2020-11-07 PROCEDURE — U0002 COVID-19 LAB TEST NON-CDC: HCPCS

## 2020-11-07 PROCEDURE — 82565 ASSAY OF CREATININE: CPT

## 2020-11-07 PROCEDURE — 84439 ASSAY OF FREE THYROXINE: CPT | Mod: 91

## 2020-11-07 PROCEDURE — 99285 EMERGENCY DEPT VISIT HI MDM: CPT | Mod: 25

## 2020-11-07 PROCEDURE — 86901 BLOOD TYPING SEROLOGIC RH(D): CPT

## 2020-11-07 PROCEDURE — 63600175 PHARM REV CODE 636 W HCPCS: Performed by: STUDENT IN AN ORGANIZED HEALTH CARE EDUCATION/TRAINING PROGRAM

## 2020-11-07 PROCEDURE — 84484 ASSAY OF TROPONIN QUANT: CPT

## 2020-11-07 PROCEDURE — 93010 ELECTROCARDIOGRAM REPORT: CPT | Mod: ,,, | Performed by: INTERNAL MEDICINE

## 2020-11-07 PROCEDURE — G0425 PR INPT TELEHEALTH CONSULT 30M: ICD-10-PCS | Mod: GT,,, | Performed by: STUDENT IN AN ORGANIZED HEALTH CARE EDUCATION/TRAINING PROGRAM

## 2020-11-07 PROCEDURE — 85025 COMPLETE CBC W/AUTO DIFF WBC: CPT

## 2020-11-07 PROCEDURE — 86900 BLOOD TYPING SEROLOGIC ABO: CPT

## 2020-11-07 RX ORDER — CHOLECALCIFEROL (VITAMIN D3) 25 MCG
1000 TABLET ORAL DAILY
Status: DISCONTINUED | OUTPATIENT
Start: 2020-11-08 | End: 2020-11-11 | Stop reason: HOSPADM

## 2020-11-07 RX ORDER — TALC
6 POWDER (GRAM) TOPICAL NIGHTLY PRN
Status: DISCONTINUED | OUTPATIENT
Start: 2020-11-07 | End: 2020-11-11 | Stop reason: HOSPADM

## 2020-11-07 RX ORDER — PANTOPRAZOLE SODIUM 40 MG/1
40 TABLET, DELAYED RELEASE ORAL DAILY
Status: DISCONTINUED | OUTPATIENT
Start: 2020-11-08 | End: 2020-11-11 | Stop reason: HOSPADM

## 2020-11-07 RX ORDER — LOSARTAN POTASSIUM 50 MG/1
100 TABLET ORAL DAILY
Status: DISCONTINUED | OUTPATIENT
Start: 2020-11-08 | End: 2020-11-11 | Stop reason: HOSPADM

## 2020-11-07 RX ORDER — TAMSULOSIN HYDROCHLORIDE 0.4 MG/1
1 CAPSULE ORAL NIGHTLY
Status: DISCONTINUED | OUTPATIENT
Start: 2020-11-07 | End: 2020-11-11 | Stop reason: HOSPADM

## 2020-11-07 RX ORDER — AMLODIPINE BESYLATE 5 MG/1
10 TABLET ORAL DAILY
Status: DISCONTINUED | OUTPATIENT
Start: 2020-11-08 | End: 2020-11-07

## 2020-11-07 RX ORDER — ATORVASTATIN CALCIUM 40 MG/1
80 TABLET, FILM COATED ORAL DAILY
Status: DISCONTINUED | OUTPATIENT
Start: 2020-11-08 | End: 2020-11-11 | Stop reason: HOSPADM

## 2020-11-07 RX ORDER — LORAZEPAM 0.5 MG/1
0.5 TABLET ORAL ONCE
Status: DISCONTINUED | OUTPATIENT
Start: 2020-11-07 | End: 2020-11-07

## 2020-11-07 RX ADMIN — LORAZEPAM 0.5 MG: 2 INJECTION INTRAMUSCULAR; INTRAVENOUS at 08:11

## 2020-11-07 RX ADMIN — APIXABAN 2.5 MG: 2.5 TABLET, FILM COATED ORAL at 04:11

## 2020-11-08 ENCOUNTER — ANESTHESIA EVENT (OUTPATIENT)
Dept: CARDIOLOGY | Facility: HOSPITAL | Age: 80
DRG: 071 | End: 2020-11-08
Payer: MEDICARE

## 2020-11-08 ENCOUNTER — ANESTHESIA (OUTPATIENT)
Dept: CARDIOLOGY | Facility: HOSPITAL | Age: 80
DRG: 071 | End: 2020-11-08
Payer: MEDICARE

## 2020-11-08 PROBLEM — R41.82 ALTERED MENTAL STATUS: Status: ACTIVE | Noted: 2020-11-07

## 2020-11-08 LAB
ALBUMIN SERPL BCP-MCNC: 2.2 G/DL (ref 3.5–5.2)
ALP SERPL-CCNC: 71 U/L (ref 55–135)
ALT SERPL W/O P-5'-P-CCNC: 27 U/L (ref 10–44)
ANION GAP SERPL CALC-SCNC: 7 MMOL/L (ref 8–16)
AST SERPL-CCNC: 29 U/L (ref 10–40)
BASOPHILS # BLD AUTO: 0.04 K/UL (ref 0–0.2)
BASOPHILS NFR BLD: 0.8 % (ref 0–1.9)
BILIRUB SERPL-MCNC: 0.5 MG/DL (ref 0.1–1)
BUN SERPL-MCNC: 24 MG/DL (ref 8–23)
CALCIUM SERPL-MCNC: 9.5 MG/DL (ref 8.7–10.5)
CHLORIDE SERPL-SCNC: 109 MMOL/L (ref 95–110)
CO2 SERPL-SCNC: 20 MMOL/L (ref 23–29)
CREAT SERPL-MCNC: 1.1 MG/DL (ref 0.5–1.4)
DIFFERENTIAL METHOD: ABNORMAL
EOSINOPHIL # BLD AUTO: 0.1 K/UL (ref 0–0.5)
EOSINOPHIL NFR BLD: 2.4 % (ref 0–8)
ERYTHROCYTE [DISTWIDTH] IN BLOOD BY AUTOMATED COUNT: 15.5 % (ref 11.5–14.5)
EST. GFR  (AFRICAN AMERICAN): >60 ML/MIN/1.73 M^2
EST. GFR  (NON AFRICAN AMERICAN): >60 ML/MIN/1.73 M^2
GLUCOSE SERPL-MCNC: 94 MG/DL (ref 70–110)
HCT VFR BLD AUTO: 36.1 % (ref 40–54)
HGB BLD-MCNC: 12.5 G/DL (ref 14–18)
IMM GRANULOCYTES # BLD AUTO: 0.01 K/UL (ref 0–0.04)
IMM GRANULOCYTES NFR BLD AUTO: 0.2 % (ref 0–0.5)
LYMPHOCYTES # BLD AUTO: 1.1 K/UL (ref 1–4.8)
LYMPHOCYTES NFR BLD: 21.7 % (ref 18–48)
MAGNESIUM SERPL-MCNC: 1.5 MG/DL (ref 1.6–2.6)
MCH RBC QN AUTO: 29.5 PG (ref 27–31)
MCHC RBC AUTO-ENTMCNC: 34.6 G/DL (ref 32–36)
MCV RBC AUTO: 85 FL (ref 82–98)
MONOCYTES # BLD AUTO: 0.5 K/UL (ref 0.3–1)
MONOCYTES NFR BLD: 9.7 % (ref 4–15)
NEUTROPHILS # BLD AUTO: 3.2 K/UL (ref 1.8–7.7)
NEUTROPHILS NFR BLD: 65.2 % (ref 38–73)
NRBC BLD-RTO: 0 /100 WBC
PHOSPHATE SERPL-MCNC: 2.8 MG/DL (ref 2.7–4.5)
PLATELET # BLD AUTO: 233 K/UL (ref 150–350)
PMV BLD AUTO: 11.2 FL (ref 9.2–12.9)
POCT GLUCOSE: 106 MG/DL (ref 70–110)
POCT GLUCOSE: 112 MG/DL (ref 70–110)
POCT GLUCOSE: 136 MG/DL (ref 70–110)
POCT GLUCOSE: 82 MG/DL (ref 70–110)
POTASSIUM SERPL-SCNC: 4.4 MMOL/L (ref 3.5–5.1)
PROT SERPL-MCNC: 5 G/DL (ref 6–8.4)
RBC # BLD AUTO: 4.24 M/UL (ref 4.6–6.2)
SODIUM SERPL-SCNC: 136 MMOL/L (ref 136–145)
WBC # BLD AUTO: 4.97 K/UL (ref 3.9–12.7)

## 2020-11-08 PROCEDURE — 92610 EVALUATE SWALLOWING FUNCTION: CPT

## 2020-11-08 PROCEDURE — 94761 N-INVAS EAR/PLS OXIMETRY MLT: CPT

## 2020-11-08 PROCEDURE — 25000003 PHARM REV CODE 250: Performed by: STUDENT IN AN ORGANIZED HEALTH CARE EDUCATION/TRAINING PROGRAM

## 2020-11-08 PROCEDURE — 84100 ASSAY OF PHOSPHORUS: CPT

## 2020-11-08 PROCEDURE — 36415 COLL VENOUS BLD VENIPUNCTURE: CPT

## 2020-11-08 PROCEDURE — 85025 COMPLETE CBC W/AUTO DIFF WBC: CPT

## 2020-11-08 PROCEDURE — 11000001 HC ACUTE MED/SURG PRIVATE ROOM

## 2020-11-08 PROCEDURE — 83735 ASSAY OF MAGNESIUM: CPT

## 2020-11-08 PROCEDURE — 36000 PLACE NEEDLE IN VEIN: CPT | Performed by: ANESTHESIOLOGY

## 2020-11-08 PROCEDURE — 80053 COMPREHEN METABOLIC PANEL: CPT

## 2020-11-08 PROCEDURE — 97161 PT EVAL LOW COMPLEX 20 MIN: CPT

## 2020-11-08 RX ADMIN — LOSARTAN POTASSIUM 100 MG: 50 TABLET, FILM COATED ORAL at 09:11

## 2020-11-08 RX ADMIN — CHOLECALCIFEROL TAB 25 MCG (1000 UNIT) 1000 UNITS: 25 TAB at 09:11

## 2020-11-08 RX ADMIN — Medication 6 MG: at 08:11

## 2020-11-08 RX ADMIN — APIXABAN 2.5 MG: 2.5 TABLET, FILM COATED ORAL at 09:11

## 2020-11-08 RX ADMIN — THERA TABS 1 TABLET: TAB at 09:11

## 2020-11-08 RX ADMIN — PANTOPRAZOLE SODIUM 40 MG: 40 TABLET, DELAYED RELEASE ORAL at 09:11

## 2020-11-08 RX ADMIN — ATORVASTATIN CALCIUM 80 MG: 40 TABLET, FILM COATED ORAL at 09:11

## 2020-11-08 RX ADMIN — TAMSULOSIN HYDROCHLORIDE 0.4 MG: 0.4 CAPSULE ORAL at 08:11

## 2020-11-09 PROBLEM — Z71.89 COUNSELING REGARDING ADVANCE CARE PLANNING AND GOALS OF CARE: Status: ACTIVE | Noted: 2020-11-09

## 2020-11-09 PROBLEM — Z71.89 GOALS OF CARE, COUNSELING/DISCUSSION: Status: ACTIVE | Noted: 2020-11-09

## 2020-11-09 PROBLEM — G93.40 ACUTE ENCEPHALOPATHY: Status: ACTIVE | Noted: 2020-11-07

## 2020-11-09 LAB
AORTIC ROOT ANNULUS: 3.78 CM
ASCENDING AORTA: 3.36 CM
AV INDEX (PROSTH): 0.71
AV MEAN GRADIENT: 5 MMHG
AV PEAK GRADIENT: 7 MMHG
AV VALVE AREA: 2.59 CM2
AV VELOCITY RATIO: 0.65
BSA FOR ECHO PROCEDURE: 1.92 M2
CV ECHO LV RWT: 0.74 CM
DOP CALC AO PEAK VEL: 1.36 M/S
DOP CALC AO VTI: 23.83 CM
DOP CALC LVOT AREA: 3.7 CM2
DOP CALC LVOT DIAMETER: 2.16 CM
DOP CALC LVOT PEAK VEL: 0.89 M/S
DOP CALC LVOT STROKE VOLUME: 61.71 CM3
DOP CALCLVOT PEAK VEL VTI: 16.85 CM
E WAVE DECELERATION TIME: 278.65 MSEC
E/A RATIO: 0.52
E/E' RATIO: 10 M/S
ECHO LV POSTERIOR WALL: 1.49 CM (ref 0.6–1.1)
FRACTIONAL SHORTENING: 28 % (ref 28–44)
INTERVENTRICULAR SEPTUM: 1.55 CM (ref 0.6–1.1)
LA MAJOR: 4.73 CM
LA MINOR: 4.43 CM
LA WIDTH: 3.28 CM
LEFT ATRIUM SIZE: 2.96 CM
LEFT ATRIUM VOLUME INDEX: 19.6 ML/M2
LEFT ATRIUM VOLUME: 37.76 CM3
LEFT INTERNAL DIMENSION IN SYSTOLE: 2.92 CM (ref 2.1–4)
LEFT VENTRICLE DIASTOLIC VOLUME INDEX: 37.07 ML/M2
LEFT VENTRICLE DIASTOLIC VOLUME: 71.3 ML
LEFT VENTRICLE MASS INDEX: 125 G/M2
LEFT VENTRICLE SYSTOLIC VOLUME INDEX: 17.1 ML/M2
LEFT VENTRICLE SYSTOLIC VOLUME: 32.86 ML
LEFT VENTRICULAR INTERNAL DIMENSION IN DIASTOLE: 4.03 CM (ref 3.5–6)
LEFT VENTRICULAR MASS: 240.17 G
LV LATERAL E/E' RATIO: 8 M/S
LV SEPTAL E/E' RATIO: 13.33 M/S
MV PEAK A VEL: 0.77 M/S
MV PEAK E VEL: 0.4 M/S
MV STENOSIS PRESSURE HALF TIME: 80.81 MS
MV VALVE AREA P 1/2 METHOD: 2.72 CM2
PISA TR MAX VEL: 2.44 M/S
POCT GLUCOSE: 102 MG/DL (ref 70–110)
POCT GLUCOSE: 106 MG/DL (ref 70–110)
POCT GLUCOSE: 120 MG/DL (ref 70–110)
POCT GLUCOSE: 88 MG/DL (ref 70–110)
PULM VEIN S/D RATIO: 1.28
PV PEAK D VEL: 0.29 M/S
PV PEAK S VEL: 0.37 M/S
PV PEAK VELOCITY: 0.79 CM/S
RA MAJOR: 4.62 CM
RA PRESSURE: 3 MMHG
RA WIDTH: 2.78 CM
RIGHT VENTRICULAR END-DIASTOLIC DIMENSION: 3.56 CM
STJ: 3.24 CM
TDI LATERAL: 0.05 M/S
TDI SEPTAL: 0.03 M/S
TDI: 0.04 M/S
TR MAX PG: 24 MMHG
TRICUSPID ANNULAR PLANE SYSTOLIC EXCURSION: 1.81 CM
TV REST PULMONARY ARTERY PRESSURE: 27 MMHG

## 2020-11-09 PROCEDURE — 88305 TISSUE EXAM BY PATHOLOGIST: CPT | Performed by: PATHOLOGY

## 2020-11-09 PROCEDURE — 92526 ORAL FUNCTION THERAPY: CPT

## 2020-11-09 PROCEDURE — 88342 IMHCHEM/IMCYTCHM 1ST ANTB: CPT | Mod: 26,,, | Performed by: PATHOLOGY

## 2020-11-09 PROCEDURE — 82945 GLUCOSE OTHER FLUID: CPT

## 2020-11-09 PROCEDURE — 88341 IMHCHEM/IMCYTCHM EA ADD ANTB: CPT | Mod: 26,,, | Performed by: PATHOLOGY

## 2020-11-09 PROCEDURE — G0408 PR TELHEALTH INPT CONSULT 35MIN: ICD-10-PCS | Mod: GT,,, | Performed by: NURSE PRACTITIONER

## 2020-11-09 PROCEDURE — 97802 MEDICAL NUTRITION INDIV IN: CPT

## 2020-11-09 PROCEDURE — G0408 INPT/TELE FOLLOW UP 35: HCPCS | Mod: GT,,, | Performed by: NURSE PRACTITIONER

## 2020-11-09 PROCEDURE — 97530 THERAPEUTIC ACTIVITIES: CPT

## 2020-11-09 PROCEDURE — 83615 LACTATE (LD) (LDH) ENZYME: CPT

## 2020-11-09 PROCEDURE — 88341 PR IHC OR ICC EACH ADD'L SINGLE ANTIBODY  STAINPR: ICD-10-PCS | Mod: 26,,, | Performed by: PATHOLOGY

## 2020-11-09 PROCEDURE — 94761 N-INVAS EAR/PLS OXIMETRY MLT: CPT

## 2020-11-09 PROCEDURE — 88112 CYTOPATH CELL ENHANCE TECH: CPT | Mod: 26,,, | Performed by: PATHOLOGY

## 2020-11-09 PROCEDURE — 88341 IMHCHEM/IMCYTCHM EA ADD ANTB: CPT | Mod: 59 | Performed by: PATHOLOGY

## 2020-11-09 PROCEDURE — 88305 TISSUE EXAM BY PATHOLOGIST: ICD-10-PCS | Mod: 26,,, | Performed by: PATHOLOGY

## 2020-11-09 PROCEDURE — 89051 BODY FLUID CELL COUNT: CPT

## 2020-11-09 PROCEDURE — 84157 ASSAY OF PROTEIN OTHER: CPT

## 2020-11-09 PROCEDURE — 88342 CHG IMMUNOCYTOCHEMISTRY: ICD-10-PCS | Mod: 26,,, | Performed by: PATHOLOGY

## 2020-11-09 PROCEDURE — 88112 CYTOPATH CELL ENHANCE TECH: CPT | Performed by: PATHOLOGY

## 2020-11-09 PROCEDURE — 11000001 HC ACUTE MED/SURG PRIVATE ROOM

## 2020-11-09 PROCEDURE — 97165 OT EVAL LOW COMPLEX 30 MIN: CPT

## 2020-11-09 PROCEDURE — 92507 TX SP LANG VOICE COMM INDIV: CPT

## 2020-11-09 PROCEDURE — 88342 IMHCHEM/IMCYTCHM 1ST ANTB: CPT | Performed by: PATHOLOGY

## 2020-11-09 PROCEDURE — 25000003 PHARM REV CODE 250: Performed by: STUDENT IN AN ORGANIZED HEALTH CARE EDUCATION/TRAINING PROGRAM

## 2020-11-09 PROCEDURE — 88112 PR  CYTOPATH, CELL ENHANCE TECH: ICD-10-PCS | Mod: 26,,, | Performed by: PATHOLOGY

## 2020-11-09 PROCEDURE — 88305 TISSUE EXAM BY PATHOLOGIST: CPT | Mod: 26,,, | Performed by: PATHOLOGY

## 2020-11-09 PROCEDURE — 87070 CULTURE OTHR SPECIMN AEROBIC: CPT

## 2020-11-09 RX ADMIN — CHOLECALCIFEROL TAB 25 MCG (1000 UNIT) 1000 UNITS: 25 TAB at 09:11

## 2020-11-09 RX ADMIN — TAMSULOSIN HYDROCHLORIDE 0.4 MG: 0.4 CAPSULE ORAL at 09:11

## 2020-11-09 RX ADMIN — ATORVASTATIN CALCIUM 80 MG: 40 TABLET, FILM COATED ORAL at 09:11

## 2020-11-09 RX ADMIN — LOSARTAN POTASSIUM 100 MG: 50 TABLET, FILM COATED ORAL at 09:11

## 2020-11-09 RX ADMIN — PANTOPRAZOLE SODIUM 40 MG: 40 TABLET, DELAYED RELEASE ORAL at 09:11

## 2020-11-09 RX ADMIN — THERA TABS 1 TABLET: TAB at 09:11

## 2020-11-09 RX ADMIN — APIXABAN 2.5 MG: 2.5 TABLET, FILM COATED ORAL at 09:11

## 2020-11-09 NOTE — ANESTHESIA PROCEDURE NOTES
Peripheral IV Insertion    Diagnosis: I99.8 Other disorder of circulatory system    Patient location during procedure: floor    Staffing  Authorizing Provider: Tae Christine MD  Performing Provider: Tae Christine MD    Anesthesiologist was present at the time of the procedure.  Peripheral IV Insertion  Skin Prep: chlorhexidine gluconate  Local Infiltration: none  Orientation: right  Location: antecubital  Catheter Size: 20 G  Catheter placement by Ultrasound guidance. Heme positive aspiration all ports.  Vessel Caliber: medium, patent  Assessment  Dressing: secured with tape and tegaderm  Line flushed easily.

## 2020-11-10 LAB
ALBUMIN SERPL BCP-MCNC: 2.4 G/DL (ref 3.5–5.2)
ALBUMIN SERPL BCP-MCNC: 2.4 G/DL (ref 3.5–5.2)
ALP SERPL-CCNC: 73 U/L (ref 55–135)
ALP SERPL-CCNC: 75 U/L (ref 55–135)
ALT SERPL W/O P-5'-P-CCNC: 33 U/L (ref 10–44)
ALT SERPL W/O P-5'-P-CCNC: 35 U/L (ref 10–44)
ANION GAP SERPL CALC-SCNC: 7 MMOL/L (ref 8–16)
ANION GAP SERPL CALC-SCNC: 8 MMOL/L (ref 8–16)
APPEARANCE FLD: NORMAL
AST SERPL-CCNC: 34 U/L (ref 10–40)
AST SERPL-CCNC: 34 U/L (ref 10–40)
BASOPHILS # BLD AUTO: 0.03 K/UL (ref 0–0.2)
BASOPHILS NFR BLD: 0.7 % (ref 0–1.9)
BILIRUB SERPL-MCNC: 0.5 MG/DL (ref 0.1–1)
BILIRUB SERPL-MCNC: 0.8 MG/DL (ref 0.1–1)
BODY FLD TYPE: NORMAL
BODY FLUID SOURCE, LDH: NORMAL
BUN SERPL-MCNC: 21 MG/DL (ref 8–23)
BUN SERPL-MCNC: 22 MG/DL (ref 8–23)
CALCIUM SERPL-MCNC: 9.4 MG/DL (ref 8.7–10.5)
CALCIUM SERPL-MCNC: 9.6 MG/DL (ref 8.7–10.5)
CHLORIDE SERPL-SCNC: 104 MMOL/L (ref 95–110)
CHLORIDE SERPL-SCNC: 106 MMOL/L (ref 95–110)
CO2 SERPL-SCNC: 22 MMOL/L (ref 23–29)
CO2 SERPL-SCNC: 24 MMOL/L (ref 23–29)
COLOR FLD: YELLOW
CREAT SERPL-MCNC: 1.2 MG/DL (ref 0.5–1.4)
CREAT SERPL-MCNC: 1.3 MG/DL (ref 0.5–1.4)
DIFFERENTIAL METHOD: ABNORMAL
EOSINOPHIL # BLD AUTO: 0 K/UL (ref 0–0.5)
EOSINOPHIL NFR BLD: 0.9 % (ref 0–8)
ERYTHROCYTE [DISTWIDTH] IN BLOOD BY AUTOMATED COUNT: 15.5 % (ref 11.5–14.5)
EST. GFR  (AFRICAN AMERICAN): 60 ML/MIN/1.73 M^2
EST. GFR  (AFRICAN AMERICAN): >60 ML/MIN/1.73 M^2
EST. GFR  (NON AFRICAN AMERICAN): 52 ML/MIN/1.73 M^2
EST. GFR  (NON AFRICAN AMERICAN): 57 ML/MIN/1.73 M^2
GLUCOSE FLD-MCNC: 95 MG/DL
GLUCOSE SERPL-MCNC: 100 MG/DL (ref 70–110)
GLUCOSE SERPL-MCNC: 88 MG/DL (ref 70–110)
HCT VFR BLD AUTO: 40.1 % (ref 40–54)
HGB BLD-MCNC: 13.1 G/DL (ref 14–18)
IMM GRANULOCYTES # BLD AUTO: 0.01 K/UL (ref 0–0.04)
IMM GRANULOCYTES NFR BLD AUTO: 0.2 % (ref 0–0.5)
LDH FLD L TO P-CCNC: 171 U/L
LYMPHOCYTES # BLD AUTO: 0.7 K/UL (ref 1–4.8)
LYMPHOCYTES NFR BLD: 16.1 % (ref 18–48)
LYMPHOCYTES NFR FLD MANUAL: 36 %
MAGNESIUM SERPL-MCNC: 1.6 MG/DL (ref 1.6–2.6)
MAGNESIUM SERPL-MCNC: 1.7 MG/DL (ref 1.6–2.6)
MCH RBC QN AUTO: 28.8 PG (ref 27–31)
MCHC RBC AUTO-ENTMCNC: 32.7 G/DL (ref 32–36)
MCV RBC AUTO: 88 FL (ref 82–98)
MONOCYTES # BLD AUTO: 0.4 K/UL (ref 0.3–1)
MONOCYTES NFR BLD: 8.9 % (ref 4–15)
MONOS+MACROS NFR FLD MANUAL: 63 %
NEUTROPHILS # BLD AUTO: 3.1 K/UL (ref 1.8–7.7)
NEUTROPHILS NFR BLD: 73.2 % (ref 38–73)
NEUTROPHILS NFR FLD MANUAL: 1 %
NRBC BLD-RTO: 0 /100 WBC
PHOSPHATE SERPL-MCNC: 3.3 MG/DL (ref 2.7–4.5)
PHOSPHATE SERPL-MCNC: 3.3 MG/DL (ref 2.7–4.5)
PLATELET # BLD AUTO: 219 K/UL (ref 150–350)
PMV BLD AUTO: 11.9 FL (ref 9.2–12.9)
POCT GLUCOSE: 66 MG/DL (ref 70–110)
POCT GLUCOSE: 85 MG/DL (ref 70–110)
POCT GLUCOSE: 85 MG/DL (ref 70–110)
POCT GLUCOSE: 90 MG/DL (ref 70–110)
POTASSIUM SERPL-SCNC: 4.4 MMOL/L (ref 3.5–5.1)
POTASSIUM SERPL-SCNC: 5.2 MMOL/L (ref 3.5–5.1)
PROT FLD-MCNC: 1.5 G/DL
PROT SERPL-MCNC: 5.1 G/DL (ref 6–8.4)
PROT SERPL-MCNC: 5.5 G/DL (ref 6–8.4)
RBC # BLD AUTO: 4.55 M/UL (ref 4.6–6.2)
SODIUM SERPL-SCNC: 135 MMOL/L (ref 136–145)
SODIUM SERPL-SCNC: 136 MMOL/L (ref 136–145)
SPECIMEN SOURCE: NORMAL
SPECIMEN SOURCE: NORMAL
WBC # BLD AUTO: 4.29 K/UL (ref 3.9–12.7)
WBC # FLD: 185 /CU MM

## 2020-11-10 PROCEDURE — 84100 ASSAY OF PHOSPHORUS: CPT

## 2020-11-10 PROCEDURE — 80053 COMPREHEN METABOLIC PANEL: CPT | Mod: 91

## 2020-11-10 PROCEDURE — 11000001 HC ACUTE MED/SURG PRIVATE ROOM

## 2020-11-10 PROCEDURE — 94761 N-INVAS EAR/PLS OXIMETRY MLT: CPT

## 2020-11-10 PROCEDURE — 25000003 PHARM REV CODE 250: Performed by: STUDENT IN AN ORGANIZED HEALTH CARE EDUCATION/TRAINING PROGRAM

## 2020-11-10 PROCEDURE — 97110 THERAPEUTIC EXERCISES: CPT | Mod: CQ

## 2020-11-10 PROCEDURE — 97535 SELF CARE MNGMENT TRAINING: CPT

## 2020-11-10 PROCEDURE — A9585 GADOBUTROL INJECTION: HCPCS | Performed by: INTERNAL MEDICINE

## 2020-11-10 PROCEDURE — 25500020 PHARM REV CODE 255: Performed by: INTERNAL MEDICINE

## 2020-11-10 PROCEDURE — 85025 COMPLETE CBC W/AUTO DIFF WBC: CPT

## 2020-11-10 PROCEDURE — 80053 COMPREHEN METABOLIC PANEL: CPT

## 2020-11-10 PROCEDURE — 97530 THERAPEUTIC ACTIVITIES: CPT | Mod: CQ

## 2020-11-10 PROCEDURE — 83735 ASSAY OF MAGNESIUM: CPT

## 2020-11-10 PROCEDURE — 36415 COLL VENOUS BLD VENIPUNCTURE: CPT

## 2020-11-10 PROCEDURE — 84100 ASSAY OF PHOSPHORUS: CPT | Mod: 91

## 2020-11-10 PROCEDURE — 83735 ASSAY OF MAGNESIUM: CPT | Mod: 91

## 2020-11-10 PROCEDURE — 97530 THERAPEUTIC ACTIVITIES: CPT

## 2020-11-10 RX ORDER — MODAFINIL 100 MG/1
100 TABLET ORAL DAILY
Status: DISCONTINUED | OUTPATIENT
Start: 2020-11-11 | End: 2020-11-11 | Stop reason: HOSPADM

## 2020-11-10 RX ORDER — GADOBUTROL 604.72 MG/ML
10 INJECTION INTRAVENOUS
Status: COMPLETED | OUTPATIENT
Start: 2020-11-10 | End: 2020-11-10

## 2020-11-10 RX ADMIN — LOSARTAN POTASSIUM 100 MG: 50 TABLET, FILM COATED ORAL at 10:11

## 2020-11-10 RX ADMIN — APIXABAN 2.5 MG: 2.5 TABLET, FILM COATED ORAL at 10:11

## 2020-11-10 RX ADMIN — GADOBUTROL 10 ML: 604.72 INJECTION INTRAVENOUS at 01:11

## 2020-11-10 RX ADMIN — ATORVASTATIN CALCIUM 80 MG: 40 TABLET, FILM COATED ORAL at 10:11

## 2020-11-11 VITALS
WEIGHT: 165 LBS | DIASTOLIC BLOOD PRESSURE: 65 MMHG | SYSTOLIC BLOOD PRESSURE: 138 MMHG | BODY MASS INDEX: 23.62 KG/M2 | HEART RATE: 62 BPM | TEMPERATURE: 97 F | HEIGHT: 70 IN | RESPIRATION RATE: 20 BRPM | OXYGEN SATURATION: 94 %

## 2020-11-11 LAB
ALBUMIN SERPL BCP-MCNC: 2.5 G/DL (ref 3.5–5.2)
ALP SERPL-CCNC: 76 U/L (ref 55–135)
ALT SERPL W/O P-5'-P-CCNC: 40 U/L (ref 10–44)
ANION GAP SERPL CALC-SCNC: 8 MMOL/L (ref 8–16)
AST SERPL-CCNC: 41 U/L (ref 10–40)
BASOPHILS # BLD AUTO: 0.03 K/UL (ref 0–0.2)
BASOPHILS NFR BLD: 0.8 % (ref 0–1.9)
BILIRUB SERPL-MCNC: 0.8 MG/DL (ref 0.1–1)
BUN SERPL-MCNC: 20 MG/DL (ref 8–23)
CALCIUM SERPL-MCNC: 9.7 MG/DL (ref 8.7–10.5)
CHLORIDE SERPL-SCNC: 106 MMOL/L (ref 95–110)
CO2 SERPL-SCNC: 24 MMOL/L (ref 23–29)
CREAT SERPL-MCNC: 1.3 MG/DL (ref 0.5–1.4)
DIFFERENTIAL METHOD: ABNORMAL
EOSINOPHIL # BLD AUTO: 0.2 K/UL (ref 0–0.5)
EOSINOPHIL NFR BLD: 4.1 % (ref 0–8)
ERYTHROCYTE [DISTWIDTH] IN BLOOD BY AUTOMATED COUNT: 15.4 % (ref 11.5–14.5)
EST. GFR  (AFRICAN AMERICAN): 60 ML/MIN/1.73 M^2
EST. GFR  (NON AFRICAN AMERICAN): 52 ML/MIN/1.73 M^2
GLUCOSE SERPL-MCNC: 82 MG/DL (ref 70–110)
HCT VFR BLD AUTO: 39.7 % (ref 40–54)
HGB BLD-MCNC: 13.3 G/DL (ref 14–18)
IMM GRANULOCYTES # BLD AUTO: 0.01 K/UL (ref 0–0.04)
IMM GRANULOCYTES NFR BLD AUTO: 0.3 % (ref 0–0.5)
LYMPHOCYTES # BLD AUTO: 0.8 K/UL (ref 1–4.8)
LYMPHOCYTES NFR BLD: 19.9 % (ref 18–48)
MAGNESIUM SERPL-MCNC: 1.6 MG/DL (ref 1.6–2.6)
MCH RBC QN AUTO: 29.6 PG (ref 27–31)
MCHC RBC AUTO-ENTMCNC: 33.5 G/DL (ref 32–36)
MCV RBC AUTO: 88 FL (ref 82–98)
MONOCYTES # BLD AUTO: 0.4 K/UL (ref 0.3–1)
MONOCYTES NFR BLD: 9.3 % (ref 4–15)
NEUTROPHILS # BLD AUTO: 2.5 K/UL (ref 1.8–7.7)
NEUTROPHILS NFR BLD: 65.6 % (ref 38–73)
NRBC BLD-RTO: 0 /100 WBC
PHOSPHATE SERPL-MCNC: 3.3 MG/DL (ref 2.7–4.5)
PLATELET # BLD AUTO: 234 K/UL (ref 150–350)
PMV BLD AUTO: 10.8 FL (ref 9.2–12.9)
POCT GLUCOSE: 123 MG/DL (ref 70–110)
POCT GLUCOSE: 70 MG/DL (ref 70–110)
POTASSIUM SERPL-SCNC: 4.4 MMOL/L (ref 3.5–5.1)
PROT SERPL-MCNC: 5.5 G/DL (ref 6–8.4)
RBC # BLD AUTO: 4.5 M/UL (ref 4.6–6.2)
SODIUM SERPL-SCNC: 138 MMOL/L (ref 136–145)
WBC # BLD AUTO: 3.86 K/UL (ref 3.9–12.7)

## 2020-11-11 PROCEDURE — 36415 COLL VENOUS BLD VENIPUNCTURE: CPT

## 2020-11-11 PROCEDURE — 83735 ASSAY OF MAGNESIUM: CPT

## 2020-11-11 PROCEDURE — 97110 THERAPEUTIC EXERCISES: CPT | Mod: CQ

## 2020-11-11 PROCEDURE — 80053 COMPREHEN METABOLIC PANEL: CPT

## 2020-11-11 PROCEDURE — 97116 GAIT TRAINING THERAPY: CPT | Mod: CQ

## 2020-11-11 PROCEDURE — 25000003 PHARM REV CODE 250: Performed by: STUDENT IN AN ORGANIZED HEALTH CARE EDUCATION/TRAINING PROGRAM

## 2020-11-11 PROCEDURE — 94761 N-INVAS EAR/PLS OXIMETRY MLT: CPT

## 2020-11-11 PROCEDURE — 84100 ASSAY OF PHOSPHORUS: CPT

## 2020-11-11 PROCEDURE — 85025 COMPLETE CBC W/AUTO DIFF WBC: CPT

## 2020-11-11 RX ORDER — MODAFINIL 100 MG/1
100 TABLET ORAL DAILY
Qty: 30 TABLET | Refills: 0
Start: 2020-11-12 | End: 2020-01-01

## 2020-11-11 RX ADMIN — ATORVASTATIN CALCIUM 80 MG: 40 TABLET, FILM COATED ORAL at 08:11

## 2020-11-11 RX ADMIN — APIXABAN 2.5 MG: 2.5 TABLET, FILM COATED ORAL at 08:11

## 2020-11-11 RX ADMIN — CHOLECALCIFEROL TAB 25 MCG (1000 UNIT) 1000 UNITS: 25 TAB at 08:11

## 2020-11-11 RX ADMIN — LOSARTAN POTASSIUM 100 MG: 50 TABLET, FILM COATED ORAL at 08:11

## 2020-11-11 RX ADMIN — THERA TABS 1 TABLET: TAB at 08:11

## 2020-11-11 RX ADMIN — MODAFINIL 100 MG: 100 TABLET ORAL at 08:11

## 2020-11-11 RX ADMIN — PANTOPRAZOLE SODIUM 40 MG: 40 TABLET, DELAYED RELEASE ORAL at 08:11

## 2020-11-15 LAB — BACTERIA FLD CULT: NORMAL

## 2020-12-04 NOTE — PROGRESS NOTES
Subjective:      Patient ID: Shant Magana Jr. is a 80 y.o. male.    Chief Complaint: No chief complaint on file.    HPI:    ROS Biotronik remote interrogation of implanted loop recorder interpreted by me and full report scanned into Epic media.  Battery OK .  Less than 2% atrial burden.  NSR with PVC's and PAC's and possible a fib.  Pt is on Eliquis at home.    Past Medical History:   Diagnosis Date    Acute kidney injury superimposed on chronic kidney disease 10/14/2017    Cancer     prostate    CVA (cerebral vascular accident) 9/16/2017    Diabetes mellitus     Former smoker 7/18/2020    History of stroke 8/15/2017    Hypertension     Hypertrophic cardiomyopathy     Renal disorder     Stroke         Past Surgical History:   Procedure Laterality Date    BACK SURGERY      COLONOSCOPY N/A 10/18/2018    Procedure: COLONOSCOPY;  Surgeon: Alan Gooden MD;  Location: [facility];  Service: Endoscopy;  Laterality: N/A;    ESOPHAGOGASTRODUODENOSCOPY N/A 9/4/2018    Procedure: EGD (ESOPHAGOGASTRODUODENOSCOPY);  Surgeon: Oli Cuadra MD;  Location: [facility];  Service: Endoscopy;  Laterality: N/A;    ESOPHAGOGASTRODUODENOSCOPY N/A 10/18/2018    Procedure: EGD (ESOPHAGOGASTRODUODENOSCOPY);  Surgeon: Alan Gooden MD;  Location: [facility];  Service: Endoscopy;  Laterality: N/A;    THYROIDECTOMY         No family history on file.    Social History     Socioeconomic History    Marital status: Single     Spouse name: Not on file    Number of children: Not on file    Years of education: Not on file    Highest education level: Not on file   Occupational History    Not on file   Social Needs    Financial resource strain: Not on file    Food insecurity     Worry: Not on file     Inability: Not on file    Transportation needs     Medical: Not on file     Non-medical: Not on file   Tobacco Use    Smoking status: Former Smoker     Packs/day: 0.90     Years: 3.00     Pack years: 2.70     Types:  Cigarettes    Smokeless tobacco: Never Used   Substance and Sexual Activity    Alcohol use: Never     Frequency: Never    Drug use: No    Sexual activity: Not on file   Lifestyle    Physical activity     Days per week: Not on file     Minutes per session: Not on file    Stress: Not on file   Relationships    Social connections     Talks on phone: Not on file     Gets together: Not on file     Attends Gnosticist service: Not on file     Active member of club or organization: Not on file     Attends meetings of clubs or organizations: Not on file     Relationship status: Not on file   Other Topics Concern    Not on file   Social History Narrative    Not on file       Current Outpatient Medications on File Prior to Visit   Medication Sig Dispense Refill    acetaminophen (TYLENOL) 325 MG tablet Take 2 tablets (650 mg total) by mouth every 6 (six) hours as needed for Pain.  0    amLODIPine (NORVASC) 10 MG tablet Take 1 tablet (10 mg total) by mouth once daily. 90 tablet 3    apixaban (ELIQUIS) 2.5 mg Tab Take 1 tablet (2.5 mg total) by mouth 2 (two) times daily. (Patient taking differently: Take 2.5 mg by mouth once daily. ) 60 tablet 11    atorvastatin (LIPITOR) 80 MG tablet Take 1 tablet (80 mg total) by mouth once daily. 90 tablet 3    losartan (COZAAR) 100 MG tablet Take 1 tablet (100 mg total) by mouth once daily. 90 tablet 3    melatonin (MELATIN) 3 mg tablet Take 2 tablets (6 mg total) by mouth nightly as needed for Insomnia.  0    modafiniL (PROVIGIL) 100 MG Tab Take 1 tablet (100 mg total) by mouth once daily. 30 tablet 0    multivitamin Tab Take 1 tablet by mouth once daily.      pantoprazole (PROTONIX) 40 MG tablet Take 40 mg by mouth once daily.  3    tamsulosin (FLOMAX) 0.4 mg Cap TAKE 1 CAPSULE BY MOUTH EVERY NIGHT AT BEDTIME 90 capsule 3    vitamin D 1000 units Tab Take 1,000 Units by mouth once daily.       No current facility-administered medications on file prior to visit.         Review of patient's allergies indicates:  No Known Allergies  Objective:   There were no vitals filed for this visit.     Physical Exam     Assessment:     1. Status post placement of implantable loop recorder      Plan:   Diagnoses and all orders for this visit:    Status post placement of implantable loop recorder         No follow-ups on file.

## 2020-12-18 PROBLEM — J90 PLEURAL EFFUSION: Status: ACTIVE | Noted: 2020-01-01

## 2020-12-18 PROBLEM — J96.21 ACUTE ON CHRONIC RESPIRATORY FAILURE WITH HYPOXIA: Status: ACTIVE | Noted: 2020-01-01

## 2020-12-21 PROBLEM — R06.02 SHORTNESS OF BREATH: Status: ACTIVE | Noted: 2020-01-01

## 2020-12-22 NOTE — ANESTHESIA PROCEDURE NOTES
Peripheral IV Insertion    Diagnosis: I99.8 Other disorder of circulatory system    Patient location during procedure: floor    Staffing  Authorizing Provider: Shant Morrell MD  Performing Provider: Jon Champagne MD    Anesthesiologist was present at the time of the procedure.    Preanesthetic Checklist  Completed: patient identified, site marked, surgical consent, pre-op evaluation, timeout performed, IV checked, risks and benefits discussed, monitors and equipment checked and anesthesia consent givenPeripheral IV Insertion  Skin Prep: chlorhexidine gluconate  Local Infiltration: lidocaine  Orientation: left  Location: antecubital  Catheter Size: 20 G  Catheter placement by Ultrasound guidance and Anatomical landmarks. Heme positive aspiration all ports.  Vessel Caliber: small, patent  Needle advanced into vessel with real time Ultrasound guidance.Insertion Attempts: 2  Assessment  Dressing: secured with tape and tegaderm and steri-strips  Patient: Tolerated well  Line flushed easily.

## 2021-01-01 ENCOUNTER — CLINICAL SUPPORT (OUTPATIENT)
Dept: CARDIOLOGY | Facility: CLINIC | Age: 81
End: 2021-01-01
Payer: MEDICARE

## 2021-01-01 ENCOUNTER — TELEPHONE (OUTPATIENT)
Dept: CARDIOLOGY | Facility: CLINIC | Age: 81
End: 2021-01-01

## 2021-01-01 ENCOUNTER — PATIENT MESSAGE (OUTPATIENT)
Dept: CARDIOLOGY | Facility: CLINIC | Age: 81
End: 2021-01-01

## 2021-01-01 ENCOUNTER — HOSPITAL ENCOUNTER (INPATIENT)
Facility: HOSPITAL | Age: 81
LOS: 4 days | DRG: 871 | End: 2021-11-16
Attending: EMERGENCY MEDICINE | Admitting: EMERGENCY MEDICINE
Payer: MEDICARE

## 2021-01-01 VITALS
BODY MASS INDEX: 21.14 KG/M2 | HEART RATE: 96 BPM | RESPIRATION RATE: 19 BRPM | WEIGHT: 147.69 LBS | OXYGEN SATURATION: 96 % | TEMPERATURE: 98 F | SYSTOLIC BLOOD PRESSURE: 122 MMHG | DIASTOLIC BLOOD PRESSURE: 58 MMHG | HEIGHT: 70 IN

## 2021-01-01 DIAGNOSIS — J90 PLEURAL EFFUSION EXUDATIVE: ICD-10-CM

## 2021-01-01 DIAGNOSIS — Z79.01 ANTICOAGULANT LONG-TERM USE: ICD-10-CM

## 2021-01-01 DIAGNOSIS — N39.0 SEPSIS SECONDARY TO UTI: Primary | ICD-10-CM

## 2021-01-01 DIAGNOSIS — R78.81 BACTEREMIA DUE TO GRAM-NEGATIVE BACTERIA: ICD-10-CM

## 2021-01-01 DIAGNOSIS — Z95.818 STATUS POST PLACEMENT OF IMPLANTABLE LOOP RECORDER: Primary | ICD-10-CM

## 2021-01-01 DIAGNOSIS — R78.81 BACTEREMIA DUE TO KLEBSIELLA PNEUMONIAE: ICD-10-CM

## 2021-01-01 DIAGNOSIS — J90 PLEURAL EFFUSION, NOT ELSEWHERE CLASSIFIED: ICD-10-CM

## 2021-01-01 DIAGNOSIS — F01.518 VASCULAR DEMENTIA WITH BEHAVIOR DISTURBANCE: ICD-10-CM

## 2021-01-01 DIAGNOSIS — G93.40 ACUTE ENCEPHALOPATHY: ICD-10-CM

## 2021-01-01 DIAGNOSIS — R41.82 AMS (ALTERED MENTAL STATUS): ICD-10-CM

## 2021-01-01 DIAGNOSIS — B96.1 BACTEREMIA DUE TO KLEBSIELLA PNEUMONIAE: ICD-10-CM

## 2021-01-01 DIAGNOSIS — I63.9 CEREBROVASCULAR ACCIDENT (CVA), UNSPECIFIED MECHANISM: ICD-10-CM

## 2021-01-01 DIAGNOSIS — A41.9 SEPSIS SECONDARY TO UTI: Primary | ICD-10-CM

## 2021-01-01 DIAGNOSIS — I50.32 CHRONIC HEART FAILURE WITH PRESERVED EJECTION FRACTION: ICD-10-CM

## 2021-01-01 DIAGNOSIS — J90 PLEURAL EFFUSION: ICD-10-CM

## 2021-01-01 LAB
ALBUMIN SERPL BCP-MCNC: 1.7 G/DL (ref 3.5–5.2)
ALBUMIN SERPL BCP-MCNC: 1.7 G/DL (ref 3.5–5.2)
ALBUMIN SERPL BCP-MCNC: 1.8 G/DL (ref 3.5–5.2)
ALBUMIN SERPL BCP-MCNC: 2.3 G/DL (ref 3.5–5.2)
ALLENS TEST: ABNORMAL
ALLENS TEST: ABNORMAL
ALP SERPL-CCNC: 102 U/L (ref 55–135)
ALP SERPL-CCNC: 115 U/L (ref 55–135)
ALP SERPL-CCNC: 91 U/L (ref 55–135)
ALP SERPL-CCNC: 94 U/L (ref 55–135)
ALT SERPL W/O P-5'-P-CCNC: 17 U/L (ref 10–44)
ALT SERPL W/O P-5'-P-CCNC: 18 U/L (ref 10–44)
ALT SERPL W/O P-5'-P-CCNC: 18 U/L (ref 10–44)
ALT SERPL W/O P-5'-P-CCNC: 22 U/L (ref 10–44)
AMPHET+METHAMPHET UR QL: NEGATIVE
ANION GAP SERPL CALC-SCNC: 10 MMOL/L (ref 8–16)
ANION GAP SERPL CALC-SCNC: 10 MMOL/L (ref 8–16)
ANION GAP SERPL CALC-SCNC: 12 MMOL/L (ref 8–16)
ANION GAP SERPL CALC-SCNC: 8 MMOL/L (ref 8–16)
ANISOCYTOSIS BLD QL SMEAR: SLIGHT
ANISOCYTOSIS BLD QL SMEAR: SLIGHT
APTT BLDCRRT: 35.9 SEC (ref 21–32)
ASCENDING AORTA: 3.27 CM
AST SERPL-CCNC: 18 U/L (ref 10–40)
AST SERPL-CCNC: 21 U/L (ref 10–40)
AST SERPL-CCNC: 24 U/L (ref 10–40)
AST SERPL-CCNC: 33 U/L (ref 10–40)
AV INDEX (PROSTH): 0.71
AV MEAN GRADIENT: 3 MMHG
AV PEAK GRADIENT: 5 MMHG
AV VALVE AREA: 2.71 CM2
AV VELOCITY RATIO: 0.7
BACTERIA #/AREA URNS HPF: ABNORMAL /HPF
BACTERIA BLD CULT: ABNORMAL
BACTERIA UR CULT: ABNORMAL
BARBITURATES UR QL SCN>200 NG/ML: NEGATIVE
BASOPHILS # BLD AUTO: 0.02 K/UL (ref 0–0.2)
BASOPHILS # BLD AUTO: 0.05 K/UL (ref 0–0.2)
BASOPHILS # BLD AUTO: 0.05 K/UL (ref 0–0.2)
BASOPHILS # BLD AUTO: 0.07 K/UL (ref 0–0.2)
BASOPHILS # BLD AUTO: ABNORMAL K/UL (ref 0–0.2)
BASOPHILS NFR BLD: 0 % (ref 0–1.9)
BASOPHILS NFR BLD: 0.2 % (ref 0–1.9)
BASOPHILS NFR BLD: 0.2 % (ref 0–1.9)
BASOPHILS NFR BLD: 0.3 % (ref 0–1.9)
BASOPHILS NFR BLD: 0.3 % (ref 0–1.9)
BENZODIAZ UR QL SCN>200 NG/ML: NEGATIVE
BILIRUB SERPL-MCNC: 0.3 MG/DL (ref 0.1–1)
BILIRUB SERPL-MCNC: 0.4 MG/DL (ref 0.1–1)
BILIRUB UR QL STRIP: NEGATIVE
BNP SERPL-MCNC: 113 PG/ML (ref 0–99)
BSA FOR ECHO PROCEDURE: 2 M2
BUN SERPL-MCNC: 37 MG/DL (ref 8–23)
BUN SERPL-MCNC: 44 MG/DL (ref 8–23)
BUN SERPL-MCNC: 45 MG/DL (ref 8–23)
BUN SERPL-MCNC: 46 MG/DL (ref 8–23)
BURR CELLS BLD QL SMEAR: ABNORMAL
BZE UR QL SCN: NEGATIVE
CALCIUM SERPL-MCNC: 10.5 MG/DL (ref 8.7–10.5)
CALCIUM SERPL-MCNC: 11 MG/DL (ref 8.7–10.5)
CALCIUM SERPL-MCNC: 9.7 MG/DL (ref 8.7–10.5)
CALCIUM SERPL-MCNC: 9.9 MG/DL (ref 8.7–10.5)
CANNABINOIDS UR QL SCN: NEGATIVE
CHLORIDE SERPL-SCNC: 116 MMOL/L (ref 95–110)
CHLORIDE SERPL-SCNC: 117 MMOL/L (ref 95–110)
CK SERPL-CCNC: 35 U/L (ref 20–200)
CLARITY UR: ABNORMAL
CO2 SERPL-SCNC: 17 MMOL/L (ref 23–29)
CO2 SERPL-SCNC: 21 MMOL/L (ref 23–29)
COLOR UR: YELLOW
CREAT SERPL-MCNC: 1.4 MG/DL (ref 0.5–1.4)
CREAT SERPL-MCNC: 1.5 MG/DL (ref 0.5–1.4)
CREAT SERPL-MCNC: 1.7 MG/DL (ref 0.5–1.4)
CREAT SERPL-MCNC: 1.8 MG/DL (ref 0.5–1.4)
CREAT SERPL-MCNC: 1.9 MG/DL (ref 0.5–1.4)
CREAT UR-MCNC: 66 MG/DL (ref 23–375)
CTP QC/QA: YES
CV ECHO LV RWT: 0.78 CM
DELSYS: ABNORMAL
DELSYS: ABNORMAL
DIFFERENTIAL METHOD: ABNORMAL
DOP CALC AO PEAK VEL: 1.14 M/S
DOP CALC AO VTI: 21.94 CM
DOP CALC LVOT AREA: 3.8 CM2
DOP CALC LVOT DIAMETER: 2.2 CM
DOP CALC LVOT PEAK VEL: 0.8 M/S
DOP CALC LVOT STROKE VOLUME: 59.38 CM3
DOP CALC MV VTI: 10.31 CM
DOP CALCLVOT PEAK VEL VTI: 15.63 CM
E WAVE DECELERATION TIME: 74.96 MSEC
E/A RATIO: 0.46
E/E' RATIO: 5.6 M/S
ECHO LV POSTERIOR WALL: 1.4 CM (ref 0.6–1.1)
EJECTION FRACTION: 55 %
EOSINOPHIL # BLD AUTO: 0 K/UL (ref 0–0.5)
EOSINOPHIL # BLD AUTO: 0 K/UL (ref 0–0.5)
EOSINOPHIL # BLD AUTO: 0.1 K/UL (ref 0–0.5)
EOSINOPHIL # BLD AUTO: 0.1 K/UL (ref 0–0.5)
EOSINOPHIL # BLD AUTO: ABNORMAL K/UL (ref 0–0.5)
EOSINOPHIL NFR BLD: 0 % (ref 0–8)
EOSINOPHIL NFR BLD: 0 % (ref 0–8)
EOSINOPHIL NFR BLD: 0.1 % (ref 0–8)
EOSINOPHIL NFR BLD: 0.4 % (ref 0–8)
EOSINOPHIL NFR BLD: 0.6 % (ref 0–8)
ERYTHROCYTE [DISTWIDTH] IN BLOOD BY AUTOMATED COUNT: 15.5 % (ref 11.5–14.5)
ERYTHROCYTE [DISTWIDTH] IN BLOOD BY AUTOMATED COUNT: 15.5 % (ref 11.5–14.5)
ERYTHROCYTE [DISTWIDTH] IN BLOOD BY AUTOMATED COUNT: 15.7 % (ref 11.5–14.5)
ERYTHROCYTE [DISTWIDTH] IN BLOOD BY AUTOMATED COUNT: 15.9 % (ref 11.5–14.5)
ERYTHROCYTE [DISTWIDTH] IN BLOOD BY AUTOMATED COUNT: 15.9 % (ref 11.5–14.5)
EST. GFR  (AFRICAN AMERICAN): 37 ML/MIN/1.73 M^2
EST. GFR  (AFRICAN AMERICAN): 40 ML/MIN/1.73 M^2
EST. GFR  (AFRICAN AMERICAN): 43 ML/MIN/1.73 M^2
EST. GFR  (AFRICAN AMERICAN): 54 ML/MIN/1.73 M^2
EST. GFR  (NON AFRICAN AMERICAN): 32 ML/MIN/1.73 M^2
EST. GFR  (NON AFRICAN AMERICAN): 35 ML/MIN/1.73 M^2
EST. GFR  (NON AFRICAN AMERICAN): 37 ML/MIN/1.73 M^2
EST. GFR  (NON AFRICAN AMERICAN): 47 ML/MIN/1.73 M^2
ESTIMATED AVG GLUCOSE: 108 MG/DL (ref 68–131)
FERRITIN SERPL-MCNC: 759 NG/ML (ref 20–300)
FRACTIONAL SHORTENING: 24 % (ref 28–44)
GLUCOSE SERPL-MCNC: 43 MG/DL (ref 70–110)
GLUCOSE SERPL-MCNC: 68 MG/DL (ref 70–110)
GLUCOSE SERPL-MCNC: 78 MG/DL (ref 70–110)
GLUCOSE SERPL-MCNC: 78 MG/DL (ref 70–110)
GLUCOSE UR QL STRIP: NEGATIVE
HBA1C MFR BLD: 5.4 % (ref 4–5.6)
HCT VFR BLD AUTO: 31 % (ref 40–54)
HCT VFR BLD AUTO: 33.7 % (ref 40–54)
HCT VFR BLD AUTO: 34.7 % (ref 40–54)
HCT VFR BLD AUTO: 37.3 % (ref 40–54)
HCT VFR BLD AUTO: 39.9 % (ref 40–54)
HGB BLD-MCNC: 10.4 G/DL (ref 14–18)
HGB BLD-MCNC: 10.9 G/DL (ref 14–18)
HGB BLD-MCNC: 11.8 G/DL (ref 14–18)
HGB BLD-MCNC: 12.6 G/DL (ref 14–18)
HGB BLD-MCNC: 9.9 G/DL (ref 14–18)
HGB UR QL STRIP: ABNORMAL
HYALINE CASTS #/AREA URNS LPF: 0 /LPF
IMM GRANULOCYTES # BLD AUTO: 0.07 K/UL (ref 0–0.04)
IMM GRANULOCYTES # BLD AUTO: 0.21 K/UL (ref 0–0.04)
IMM GRANULOCYTES # BLD AUTO: 0.23 K/UL (ref 0–0.04)
IMM GRANULOCYTES # BLD AUTO: 0.28 K/UL (ref 0–0.04)
IMM GRANULOCYTES # BLD AUTO: ABNORMAL K/UL (ref 0–0.04)
IMM GRANULOCYTES NFR BLD AUTO: 0.8 % (ref 0–0.5)
IMM GRANULOCYTES NFR BLD AUTO: 1.1 % (ref 0–0.5)
IMM GRANULOCYTES NFR BLD AUTO: 1.1 % (ref 0–0.5)
IMM GRANULOCYTES NFR BLD AUTO: 1.4 % (ref 0–0.5)
IMM GRANULOCYTES NFR BLD AUTO: ABNORMAL % (ref 0–0.5)
INR PPP: 1.1 (ref 0.8–1.2)
INTERVENTRICULAR SEPTUM: 1.65 CM (ref 0.6–1.1)
IRON SERPL-MCNC: <10 UG/DL (ref 45–160)
KETONES UR QL STRIP: NEGATIVE
LA MAJOR: 4.38 CM
LA MINOR: 3.41 CM
LA WIDTH: 3.51 CM
LACTATE SERPL-SCNC: 1.5 MMOL/L (ref 0.5–2.2)
LACTATE SERPL-SCNC: 2.7 MMOL/L (ref 0.5–2.2)
LACTATE SERPL-SCNC: 3.2 MMOL/L (ref 0.5–2.2)
LEFT ATRIUM SIZE: 2.65 CM
LEFT ATRIUM VOLUME INDEX MOD: 12.9 ML/M2
LEFT ATRIUM VOLUME INDEX: 15.2 ML/M2
LEFT ATRIUM VOLUME MOD: 25.59 CM3
LEFT ATRIUM VOLUME: 30.32 CM3
LEFT INTERNAL DIMENSION IN SYSTOLE: 2.73 CM (ref 2.1–4)
LEFT VENTRICLE DIASTOLIC VOLUME INDEX: 27.14 ML/M2
LEFT VENTRICLE DIASTOLIC VOLUME: 54 ML
LEFT VENTRICLE MASS INDEX: 103 G/M2
LEFT VENTRICLE SYSTOLIC VOLUME INDEX: 13.9 ML/M2
LEFT VENTRICLE SYSTOLIC VOLUME: 27.74 ML
LEFT VENTRICULAR INTERNAL DIMENSION IN DIASTOLE: 3.59 CM (ref 3.5–6)
LEFT VENTRICULAR MASS: 205.68 G
LEUKOCYTE ESTERASE UR QL STRIP: ABNORMAL
LV LATERAL E/E' RATIO: 5.6 M/S
LV SEPTAL E/E' RATIO: 5.6 M/S
LYMPHOCYTES # BLD AUTO: 0.2 K/UL (ref 1–4.8)
LYMPHOCYTES # BLD AUTO: 0.5 K/UL (ref 1–4.8)
LYMPHOCYTES # BLD AUTO: 0.6 K/UL (ref 1–4.8)
LYMPHOCYTES # BLD AUTO: 0.7 K/UL (ref 1–4.8)
LYMPHOCYTES # BLD AUTO: ABNORMAL K/UL (ref 1–4.8)
LYMPHOCYTES NFR BLD: 2 % (ref 18–48)
LYMPHOCYTES NFR BLD: 2.5 % (ref 18–48)
LYMPHOCYTES NFR BLD: 2.5 % (ref 18–48)
LYMPHOCYTES NFR BLD: 3.2 % (ref 18–48)
LYMPHOCYTES NFR BLD: 3.5 % (ref 18–48)
MAGNESIUM SERPL-MCNC: 1.7 MG/DL (ref 1.6–2.6)
MAGNESIUM SERPL-MCNC: 1.9 MG/DL (ref 1.6–2.6)
MAGNESIUM SERPL-MCNC: 2 MG/DL (ref 1.6–2.6)
MCH RBC QN AUTO: 28 PG (ref 27–31)
MCH RBC QN AUTO: 28 PG (ref 27–31)
MCH RBC QN AUTO: 28.2 PG (ref 27–31)
MCH RBC QN AUTO: 28.2 PG (ref 27–31)
MCH RBC QN AUTO: 28.4 PG (ref 27–31)
MCHC RBC AUTO-ENTMCNC: 30 G/DL (ref 32–36)
MCHC RBC AUTO-ENTMCNC: 31.6 G/DL (ref 32–36)
MCHC RBC AUTO-ENTMCNC: 31.6 G/DL (ref 32–36)
MCHC RBC AUTO-ENTMCNC: 31.9 G/DL (ref 32–36)
MCHC RBC AUTO-ENTMCNC: 32.3 G/DL (ref 32–36)
MCV RBC AUTO: 87 FL (ref 82–98)
MCV RBC AUTO: 88 FL (ref 82–98)
MCV RBC AUTO: 89 FL (ref 82–98)
MCV RBC AUTO: 89 FL (ref 82–98)
MCV RBC AUTO: 95 FL (ref 82–98)
METAMYELOCYTES NFR BLD MANUAL: 2 %
METHADONE UR QL SCN>300 NG/ML: NEGATIVE
MICROSCOPIC COMMENT: ABNORMAL
MONOCYTES # BLD AUTO: 0.1 K/UL (ref 0.3–1)
MONOCYTES # BLD AUTO: 0.5 K/UL (ref 0.3–1)
MONOCYTES # BLD AUTO: 0.8 K/UL (ref 0.3–1)
MONOCYTES # BLD AUTO: 0.8 K/UL (ref 0.3–1)
MONOCYTES # BLD AUTO: ABNORMAL K/UL (ref 0.3–1)
MONOCYTES NFR BLD: 0 % (ref 4–15)
MONOCYTES NFR BLD: 0.8 % (ref 4–15)
MONOCYTES NFR BLD: 2.9 % (ref 4–15)
MONOCYTES NFR BLD: 3.1 % (ref 4–15)
MONOCYTES NFR BLD: 4 % (ref 4–15)
MV MEAN GRADIENT: 0 MMHG
MV PEAK A VEL: 0.61 M/S
MV PEAK E VEL: 0.28 M/S
MV PEAK GRADIENT: 2 MMHG
MV STENOSIS PRESSURE HALF TIME: 21.74 MS
MV VALVE AREA BY CONTINUITY EQUATION: 5.76 CM2
MV VALVE AREA P 1/2 METHOD: 10.12 CM2
NEUTROPHILS # BLD AUTO: 14.2 K/UL (ref 1.8–7.7)
NEUTROPHILS # BLD AUTO: 19 K/UL (ref 1.8–7.7)
NEUTROPHILS # BLD AUTO: 24.2 K/UL (ref 1.8–7.7)
NEUTROPHILS # BLD AUTO: 8 K/UL (ref 1.8–7.7)
NEUTROPHILS NFR BLD: 69 % (ref 38–73)
NEUTROPHILS NFR BLD: 90.8 % (ref 38–73)
NEUTROPHILS NFR BLD: 91.4 % (ref 38–73)
NEUTROPHILS NFR BLD: 93.1 % (ref 38–73)
NEUTROPHILS NFR BLD: 95.7 % (ref 38–73)
NEUTS BAND NFR BLD MANUAL: 27 %
NITRITE UR QL STRIP: POSITIVE
NRBC BLD-RTO: 0 /100 WBC
OPIATES UR QL SCN: NEGATIVE
PCP UR QL SCN>25 NG/ML: NEGATIVE
PH UR STRIP: 6 [PH] (ref 5–8)
PISA TR MAX VEL: 2.68 M/S
PLATELET # BLD AUTO: 114 K/UL (ref 150–450)
PLATELET # BLD AUTO: 141 K/UL (ref 150–450)
PLATELET # BLD AUTO: 157 K/UL (ref 150–450)
PLATELET # BLD AUTO: 178 K/UL (ref 150–450)
PLATELET # BLD AUTO: 213 K/UL (ref 150–450)
PLATELET BLD QL SMEAR: ABNORMAL
PMV BLD AUTO: 10.1 FL (ref 9.2–12.9)
PMV BLD AUTO: 10.2 FL (ref 9.2–12.9)
PMV BLD AUTO: 10.5 FL (ref 9.2–12.9)
PMV BLD AUTO: 10.8 FL (ref 9.2–12.9)
PMV BLD AUTO: 11.9 FL (ref 9.2–12.9)
POC PTINR: 1.3 (ref 0.9–1.2)
POC PTWBT: 15.2 SEC (ref 9.7–14.3)
POCT GLUCOSE: 131 MG/DL (ref 70–110)
POCT GLUCOSE: 73 MG/DL (ref 70–110)
POCT GLUCOSE: 77 MG/DL (ref 70–110)
POCT GLUCOSE: 77 MG/DL (ref 70–110)
POCT GLUCOSE: 82 MG/DL (ref 70–110)
POCT GLUCOSE: 92 MG/DL (ref 70–110)
POIKILOCYTOSIS BLD QL SMEAR: SLIGHT
POIKILOCYTOSIS BLD QL SMEAR: SLIGHT
POTASSIUM SERPL-SCNC: 4.1 MMOL/L (ref 3.5–5.1)
POTASSIUM SERPL-SCNC: 4.4 MMOL/L (ref 3.5–5.1)
POTASSIUM SERPL-SCNC: 4.6 MMOL/L (ref 3.5–5.1)
POTASSIUM SERPL-SCNC: 5.1 MMOL/L (ref 3.5–5.1)
PROCALCITONIN SERPL IA-MCNC: 18.62 NG/ML
PROT SERPL-MCNC: 6 G/DL (ref 6–8.4)
PROT SERPL-MCNC: 6.2 G/DL (ref 6–8.4)
PROT SERPL-MCNC: 6.5 G/DL (ref 6–8.4)
PROT SERPL-MCNC: 7.9 G/DL (ref 6–8.4)
PROT UR QL STRIP: ABNORMAL
PROTHROMBIN TIME: 12 SEC (ref 9–12.5)
PV PEAK VELOCITY: 0.71 CM/S
RA MAJOR: 4.18 CM
RA PRESSURE: 8 MMHG
RA WIDTH: 2.02 CM
RBC # BLD AUTO: 3.53 M/UL (ref 4.6–6.2)
RBC # BLD AUTO: 3.66 M/UL (ref 4.6–6.2)
RBC # BLD AUTO: 3.86 M/UL (ref 4.6–6.2)
RBC # BLD AUTO: 4.19 M/UL (ref 4.6–6.2)
RBC # BLD AUTO: 4.5 M/UL (ref 4.6–6.2)
RBC #/AREA URNS HPF: >100 /HPF (ref 0–4)
RIGHT VENTRICULAR END-DIASTOLIC DIMENSION: 3.04 CM
RV TISSUE DOPPLER FREE WALL SYSTOLIC VELOCITY 1 (APICAL 4 CHAMBER VIEW): 9.36 CM/S
SAMPLE: ABNORMAL
SAMPLE: ABNORMAL
SARS-COV-2 RDRP RESP QL NAA+PROBE: NEGATIVE
SATURATED IRON: ABNORMAL % (ref 20–50)
SINUS: 3.7 CM
SITE: ABNORMAL
SITE: ABNORMAL
SODIUM SERPL-SCNC: 143 MMOL/L (ref 136–145)
SODIUM SERPL-SCNC: 144 MMOL/L (ref 136–145)
SODIUM SERPL-SCNC: 145 MMOL/L (ref 136–145)
SODIUM SERPL-SCNC: 145 MMOL/L (ref 136–145)
SP GR UR STRIP: 1.01 (ref 1–1.03)
SPHEROCYTES BLD QL SMEAR: ABNORMAL
SQUAMOUS #/AREA URNS HPF: 1 /HPF
STJ: 3.12 CM
TDI LATERAL: 0.05 M/S
TDI SEPTAL: 0.05 M/S
TDI: 0.05 M/S
TOTAL IRON BINDING CAPACITY: 184 UG/DL (ref 250–450)
TOXICOLOGY INFORMATION: NORMAL
TR MAX PG: 29 MMHG
TRANSFERRIN SERPL-MCNC: 124 MG/DL (ref 200–375)
TRICUSPID ANNULAR PLANE SYSTOLIC EXCURSION: 0.99 CM
TROPONIN I SERPL DL<=0.01 NG/ML-MCNC: <0.006 NG/ML (ref 0–0.03)
TV REST PULMONARY ARTERY PRESSURE: 37 MMHG
URN SPEC COLLECT METH UR: ABNORMAL
UROBILINOGEN UR STRIP-ACNC: NEGATIVE EU/DL
VANCOMYCIN SERPL-MCNC: 29.9 UG/ML
VANCOMYCIN TROUGH SERPL-MCNC: 59.1 UG/ML (ref 10–22)
WBC # BLD AUTO: 15.55 K/UL (ref 3.9–12.7)
WBC # BLD AUTO: 20.92 K/UL (ref 3.9–12.7)
WBC # BLD AUTO: 25.99 K/UL (ref 3.9–12.7)
WBC # BLD AUTO: 33.28 K/UL (ref 3.9–12.7)
WBC # BLD AUTO: 8.4 K/UL (ref 3.9–12.7)
WBC #/AREA URNS HPF: >100 /HPF (ref 0–5)
WBC CLUMPS URNS QL MICRO: ABNORMAL

## 2021-01-01 PROCEDURE — 36415 COLL VENOUS BLD VENIPUNCTURE: CPT | Performed by: INTERNAL MEDICINE

## 2021-01-01 PROCEDURE — P9612 CATHETERIZE FOR URINE SPEC: HCPCS

## 2021-01-01 PROCEDURE — 96361 HYDRATE IV INFUSION ADD-ON: CPT

## 2021-01-01 PROCEDURE — 25000003 PHARM REV CODE 250: Performed by: STUDENT IN AN ORGANIZED HEALTH CARE EDUCATION/TRAINING PROGRAM

## 2021-01-01 PROCEDURE — 11000001 HC ACUTE MED/SURG PRIVATE ROOM

## 2021-01-01 PROCEDURE — 25000003 PHARM REV CODE 250: Performed by: EMERGENCY MEDICINE

## 2021-01-01 PROCEDURE — 82550 ASSAY OF CK (CPK): CPT | Performed by: EMERGENCY MEDICINE

## 2021-01-01 PROCEDURE — 93005 ELECTROCARDIOGRAM TRACING: CPT

## 2021-01-01 PROCEDURE — 82565 ASSAY OF CREATININE: CPT

## 2021-01-01 PROCEDURE — 87086 URINE CULTURE/COLONY COUNT: CPT | Performed by: EMERGENCY MEDICINE

## 2021-01-01 PROCEDURE — 93298 PR INTERROG EVAL, REMOTE, UP TO 30 DAYS,IMPLANT LOOP REC: ICD-10-PCS | Mod: GW,S$GLB,, | Performed by: INTERNAL MEDICINE

## 2021-01-01 PROCEDURE — 85730 THROMBOPLASTIN TIME PARTIAL: CPT | Performed by: STUDENT IN AN ORGANIZED HEALTH CARE EDUCATION/TRAINING PROGRAM

## 2021-01-01 PROCEDURE — 87088 URINE BACTERIA CULTURE: CPT | Performed by: EMERGENCY MEDICINE

## 2021-01-01 PROCEDURE — 36415 COLL VENOUS BLD VENIPUNCTURE: CPT | Performed by: STUDENT IN AN ORGANIZED HEALTH CARE EDUCATION/TRAINING PROGRAM

## 2021-01-01 PROCEDURE — 1158F ADVNC CARE PLAN TLK DOCD: CPT | Mod: CPTII,,, | Performed by: STUDENT IN AN ORGANIZED HEALTH CARE EDUCATION/TRAINING PROGRAM

## 2021-01-01 PROCEDURE — 87077 CULTURE AEROBIC IDENTIFY: CPT | Performed by: EMERGENCY MEDICINE

## 2021-01-01 PROCEDURE — 93298 REM INTERROG DEV EVAL SCRMS: CPT | Mod: GW,S$GLB,, | Performed by: INTERNAL MEDICINE

## 2021-01-01 PROCEDURE — 83605 ASSAY OF LACTIC ACID: CPT | Mod: 91 | Performed by: STUDENT IN AN ORGANIZED HEALTH CARE EDUCATION/TRAINING PROGRAM

## 2021-01-01 PROCEDURE — 99223 1ST HOSP IP/OBS HIGH 75: CPT | Mod: ,,, | Performed by: STUDENT IN AN ORGANIZED HEALTH CARE EDUCATION/TRAINING PROGRAM

## 2021-01-01 PROCEDURE — 82728 ASSAY OF FERRITIN: CPT | Performed by: STUDENT IN AN ORGANIZED HEALTH CARE EDUCATION/TRAINING PROGRAM

## 2021-01-01 PROCEDURE — 93010 EKG 12-LEAD: ICD-10-PCS | Mod: ,,, | Performed by: INTERNAL MEDICINE

## 2021-01-01 PROCEDURE — 87186 SC STD MICRODIL/AGAR DIL: CPT | Mod: 59 | Performed by: EMERGENCY MEDICINE

## 2021-01-01 PROCEDURE — 85610 PROTHROMBIN TIME: CPT

## 2021-01-01 PROCEDURE — 83880 ASSAY OF NATRIURETIC PEPTIDE: CPT | Performed by: EMERGENCY MEDICINE

## 2021-01-01 PROCEDURE — 92610 EVALUATE SWALLOWING FUNCTION: CPT

## 2021-01-01 PROCEDURE — 80053 COMPREHEN METABOLIC PANEL: CPT | Performed by: EMERGENCY MEDICINE

## 2021-01-01 PROCEDURE — 80307 DRUG TEST PRSMV CHEM ANLYZR: CPT | Performed by: EMERGENCY MEDICINE

## 2021-01-01 PROCEDURE — 93010 ELECTROCARDIOGRAM REPORT: CPT | Mod: ,,, | Performed by: INTERNAL MEDICINE

## 2021-01-01 PROCEDURE — 99291 CRITICAL CARE FIRST HOUR: CPT | Mod: 25

## 2021-01-01 PROCEDURE — 63600175 PHARM REV CODE 636 W HCPCS: Performed by: STUDENT IN AN ORGANIZED HEALTH CARE EDUCATION/TRAINING PROGRAM

## 2021-01-01 PROCEDURE — 87040 BLOOD CULTURE FOR BACTERIA: CPT | Mod: 59 | Performed by: STUDENT IN AN ORGANIZED HEALTH CARE EDUCATION/TRAINING PROGRAM

## 2021-01-01 PROCEDURE — 84466 ASSAY OF TRANSFERRIN: CPT | Performed by: STUDENT IN AN ORGANIZED HEALTH CARE EDUCATION/TRAINING PROGRAM

## 2021-01-01 PROCEDURE — U0002 COVID-19 LAB TEST NON-CDC: HCPCS | Performed by: EMERGENCY MEDICINE

## 2021-01-01 PROCEDURE — 83735 ASSAY OF MAGNESIUM: CPT | Performed by: STUDENT IN AN ORGANIZED HEALTH CARE EDUCATION/TRAINING PROGRAM

## 2021-01-01 PROCEDURE — 85025 COMPLETE CBC W/AUTO DIFF WBC: CPT | Performed by: EMERGENCY MEDICINE

## 2021-01-01 PROCEDURE — 1152F PR DOC ADVANCED DISEASE DX, GOAL OF CARE PRIORITIZE COMFORT: ICD-10-PCS | Mod: ,,, | Performed by: STUDENT IN AN ORGANIZED HEALTH CARE EDUCATION/TRAINING PROGRAM

## 2021-01-01 PROCEDURE — 99900035 HC TECH TIME PER 15 MIN (STAT)

## 2021-01-01 PROCEDURE — 25000003 PHARM REV CODE 250: Performed by: INTERNAL MEDICINE

## 2021-01-01 PROCEDURE — 96368 THER/DIAG CONCURRENT INF: CPT

## 2021-01-01 PROCEDURE — 99223 PR INITIAL HOSPITAL CARE,LEVL III: ICD-10-PCS | Mod: ,,, | Performed by: STUDENT IN AN ORGANIZED HEALTH CARE EDUCATION/TRAINING PROGRAM

## 2021-01-01 PROCEDURE — 92526 ORAL FUNCTION THERAPY: CPT

## 2021-01-01 PROCEDURE — 96365 THER/PROPH/DIAG IV INF INIT: CPT

## 2021-01-01 PROCEDURE — 63600175 PHARM REV CODE 636 W HCPCS: Performed by: EMERGENCY MEDICINE

## 2021-01-01 PROCEDURE — 99233 SBSQ HOSP IP/OBS HIGH 50: CPT | Mod: ,,, | Performed by: STUDENT IN AN ORGANIZED HEALTH CARE EDUCATION/TRAINING PROGRAM

## 2021-01-01 PROCEDURE — 87040 BLOOD CULTURE FOR BACTERIA: CPT | Mod: 59 | Performed by: EMERGENCY MEDICINE

## 2021-01-01 PROCEDURE — 85007 BL SMEAR W/DIFF WBC COUNT: CPT | Performed by: STUDENT IN AN ORGANIZED HEALTH CARE EDUCATION/TRAINING PROGRAM

## 2021-01-01 PROCEDURE — 83605 ASSAY OF LACTIC ACID: CPT | Performed by: INTERNAL MEDICINE

## 2021-01-01 PROCEDURE — 85025 COMPLETE CBC W/AUTO DIFF WBC: CPT | Performed by: STUDENT IN AN ORGANIZED HEALTH CARE EDUCATION/TRAINING PROGRAM

## 2021-01-01 PROCEDURE — 80202 ASSAY OF VANCOMYCIN: CPT | Performed by: INTERNAL MEDICINE

## 2021-01-01 PROCEDURE — 85610 PROTHROMBIN TIME: CPT | Performed by: STUDENT IN AN ORGANIZED HEALTH CARE EDUCATION/TRAINING PROGRAM

## 2021-01-01 PROCEDURE — 63600175 PHARM REV CODE 636 W HCPCS

## 2021-01-01 PROCEDURE — 1158F PR ADVANCE CARE PLANNING DISCUSS DOCUMENTED IN MEDICAL RECORD: ICD-10-PCS | Mod: CPTII,,, | Performed by: STUDENT IN AN ORGANIZED HEALTH CARE EDUCATION/TRAINING PROGRAM

## 2021-01-01 PROCEDURE — 80053 COMPREHEN METABOLIC PANEL: CPT | Performed by: INTERNAL MEDICINE

## 2021-01-01 PROCEDURE — 93010 ELECTROCARDIOGRAM REPORT: CPT | Mod: 76,,, | Performed by: INTERNAL MEDICINE

## 2021-01-01 PROCEDURE — 84145 PROCALCITONIN (PCT): CPT | Performed by: EMERGENCY MEDICINE

## 2021-01-01 PROCEDURE — 96375 TX/PRO/DX INJ NEW DRUG ADDON: CPT

## 2021-01-01 PROCEDURE — 99233 PR SUBSEQUENT HOSPITAL CARE,LEVL III: ICD-10-PCS | Mod: ,,, | Performed by: STUDENT IN AN ORGANIZED HEALTH CARE EDUCATION/TRAINING PROGRAM

## 2021-01-01 PROCEDURE — 99497 PR ADVNCD CARE PLAN 30 MIN: ICD-10-PCS | Mod: ,,, | Performed by: STUDENT IN AN ORGANIZED HEALTH CARE EDUCATION/TRAINING PROGRAM

## 2021-01-01 PROCEDURE — 83036 HEMOGLOBIN GLYCOSYLATED A1C: CPT | Performed by: STUDENT IN AN ORGANIZED HEALTH CARE EDUCATION/TRAINING PROGRAM

## 2021-01-01 PROCEDURE — 85027 COMPLETE CBC AUTOMATED: CPT | Performed by: STUDENT IN AN ORGANIZED HEALTH CARE EDUCATION/TRAINING PROGRAM

## 2021-01-01 PROCEDURE — 96372 THER/PROPH/DIAG INJ SC/IM: CPT

## 2021-01-01 PROCEDURE — 81000 URINALYSIS NONAUTO W/SCOPE: CPT | Mod: 59 | Performed by: EMERGENCY MEDICINE

## 2021-01-01 PROCEDURE — 84484 ASSAY OF TROPONIN QUANT: CPT | Performed by: EMERGENCY MEDICINE

## 2021-01-01 PROCEDURE — 80053 COMPREHEN METABOLIC PANEL: CPT | Performed by: STUDENT IN AN ORGANIZED HEALTH CARE EDUCATION/TRAINING PROGRAM

## 2021-01-01 PROCEDURE — 1152F DOC ADVNCD DIS COMFORT 1ST: CPT | Mod: ,,, | Performed by: STUDENT IN AN ORGANIZED HEALTH CARE EDUCATION/TRAINING PROGRAM

## 2021-01-01 PROCEDURE — 99497 ADVNCD CARE PLAN 30 MIN: CPT | Mod: ,,, | Performed by: STUDENT IN AN ORGANIZED HEALTH CARE EDUCATION/TRAINING PROGRAM

## 2021-01-01 PROCEDURE — 83605 ASSAY OF LACTIC ACID: CPT | Performed by: EMERGENCY MEDICINE

## 2021-01-01 PROCEDURE — 83735 ASSAY OF MAGNESIUM: CPT | Performed by: INTERNAL MEDICINE

## 2021-01-01 RX ORDER — SODIUM CHLORIDE 0.9 % (FLUSH) 0.9 %
10 SYRINGE (ML) INJECTION EVERY 12 HOURS PRN
Status: DISCONTINUED | OUTPATIENT
Start: 2021-01-01 | End: 2021-01-01 | Stop reason: HOSPADM

## 2021-01-01 RX ORDER — IBUPROFEN 200 MG
24 TABLET ORAL
Status: DISCONTINUED | OUTPATIENT
Start: 2021-01-01 | End: 2021-01-01 | Stop reason: HOSPADM

## 2021-01-01 RX ORDER — ENOXAPARIN SODIUM 100 MG/ML
40 INJECTION SUBCUTANEOUS EVERY 24 HOURS
Status: DISCONTINUED | OUTPATIENT
Start: 2021-01-01 | End: 2021-01-01 | Stop reason: HOSPADM

## 2021-01-01 RX ORDER — LIDOCAINE HYDROCHLORIDE 10 MG/ML
10 INJECTION INFILTRATION; PERINEURAL ONCE
Status: DISCONTINUED | OUTPATIENT
Start: 2021-01-01 | End: 2021-01-01 | Stop reason: HOSPADM

## 2021-01-01 RX ORDER — ENOXAPARIN SODIUM 100 MG/ML
40 INJECTION SUBCUTANEOUS EVERY 24 HOURS
Status: DISCONTINUED | OUTPATIENT
Start: 2021-01-01 | End: 2021-01-01

## 2021-01-01 RX ORDER — CEFEPIME HYDROCHLORIDE 1 G/50ML
2 INJECTION, SOLUTION INTRAVENOUS
Status: DISCONTINUED | OUTPATIENT
Start: 2021-01-01 | End: 2021-01-01 | Stop reason: HOSPADM

## 2021-01-01 RX ORDER — SODIUM CHLORIDE, SODIUM LACTATE, POTASSIUM CHLORIDE, CALCIUM CHLORIDE 600; 310; 30; 20 MG/100ML; MG/100ML; MG/100ML; MG/100ML
INJECTION, SOLUTION INTRAVENOUS CONTINUOUS
Status: DISCONTINUED | OUTPATIENT
Start: 2021-01-01 | End: 2021-01-01

## 2021-01-01 RX ORDER — GLUCAGON 1 MG
1 KIT INJECTION
Status: DISCONTINUED | OUTPATIENT
Start: 2021-01-01 | End: 2021-01-01 | Stop reason: HOSPADM

## 2021-01-01 RX ORDER — IBUPROFEN 200 MG
16 TABLET ORAL
Status: DISCONTINUED | OUTPATIENT
Start: 2021-01-01 | End: 2021-01-01 | Stop reason: HOSPADM

## 2021-01-01 RX ORDER — NALOXONE HCL 0.4 MG/ML
VIAL (ML) INJECTION
Status: COMPLETED
Start: 2021-01-01 | End: 2021-01-01

## 2021-01-01 RX ORDER — SODIUM CHLORIDE 9 MG/ML
INJECTION, SOLUTION INTRAVENOUS
Status: DISCONTINUED | OUTPATIENT
Start: 2021-01-01 | End: 2021-01-01 | Stop reason: HOSPADM

## 2021-01-01 RX ADMIN — SODIUM CHLORIDE 1000 ML: 0.9 INJECTION, SOLUTION INTRAVENOUS at 12:11

## 2021-01-01 RX ADMIN — VANCOMYCIN HYDROCHLORIDE 1250 MG: 1.25 INJECTION, POWDER, LYOPHILIZED, FOR SOLUTION INTRAVENOUS at 12:11

## 2021-01-01 RX ADMIN — CEFEPIME 2 G: 2 INJECTION, POWDER, FOR SOLUTION INTRAVENOUS at 04:11

## 2021-01-01 RX ADMIN — CEFEPIME 2 G: 2 INJECTION, POWDER, FOR SOLUTION INTRAVENOUS at 05:11

## 2021-01-01 RX ADMIN — ENOXAPARIN SODIUM 40 MG: 40 INJECTION, SOLUTION INTRAVENOUS; SUBCUTANEOUS at 04:11

## 2021-01-01 RX ADMIN — AZITHROMYCIN MONOHYDRATE 250 MG: 500 INJECTION, POWDER, LYOPHILIZED, FOR SOLUTION INTRAVENOUS at 05:11

## 2021-01-01 RX ADMIN — PIPERACILLIN AND TAZOBACTAM 4.5 G: 4; .5 INJECTION, POWDER, FOR SOLUTION INTRAVENOUS at 12:11

## 2021-01-01 RX ADMIN — SODIUM CHLORIDE, SODIUM LACTATE, POTASSIUM CHLORIDE, AND CALCIUM CHLORIDE: .6; .31; .03; .02 INJECTION, SOLUTION INTRAVENOUS at 04:11

## 2021-01-01 RX ADMIN — AZITHROMYCIN MONOHYDRATE 500 MG: 500 INJECTION, POWDER, LYOPHILIZED, FOR SOLUTION INTRAVENOUS at 03:11

## 2021-01-01 RX ADMIN — SODIUM CHLORIDE 1000 ML: 0.9 INJECTION, SOLUTION INTRAVENOUS at 09:11

## 2021-01-01 RX ADMIN — AZITHROMYCIN MONOHYDRATE 250 MG: 500 INJECTION, POWDER, LYOPHILIZED, FOR SOLUTION INTRAVENOUS at 03:11

## 2021-01-01 RX ADMIN — NALOXONE HYDROCHLORIDE: 0.4 INJECTION, SOLUTION INTRAMUSCULAR; INTRAVENOUS; SUBCUTANEOUS at 09:11

## 2021-01-01 RX ADMIN — VANCOMYCIN HYDROCHLORIDE 2000 MG: 1 INJECTION, POWDER, LYOPHILIZED, FOR SOLUTION INTRAVENOUS at 12:11

## 2021-01-01 RX ADMIN — CEFEPIME 2 G: 2 INJECTION, POWDER, FOR SOLUTION INTRAVENOUS at 06:11

## 2021-01-01 RX ADMIN — SODIUM CHLORIDE: 0.9 INJECTION, SOLUTION INTRAVENOUS at 06:11

## 2021-01-01 RX ADMIN — ENOXAPARIN SODIUM 40 MG: 40 INJECTION SUBCUTANEOUS at 04:11

## 2021-11-12 PROBLEM — A41.9 SEPSIS SECONDARY TO UTI: Status: ACTIVE | Noted: 2021-01-01

## 2021-11-12 PROBLEM — N39.0 SEPSIS SECONDARY TO UTI: Status: ACTIVE | Noted: 2021-01-01

## 2021-11-17 LAB — BACTERIA BLD CULT: NORMAL

## 2021-11-20 LAB
BACTERIA BLD CULT: NORMAL
BACTERIA BLD CULT: NORMAL

## 2022-06-23 NOTE — PROGRESS NOTES
[facility] IM Resident HO-3 Progress Note    Subjective:      Shant Magana Jr. is a 79 y.o. AA male who is being followed by the [facility] IM service at [facility] for embolic CVA.     No complaints. Awaiting SNF placement.     Objective:   Last 24 Hour Vital Signs:  BP  Min: 94/67  Max: 129/72  Temp  Av.7 °F (37.1 °C)  Min: 98.3 °F (36.8 °C)  Max: 99 °F (37.2 °C)  Pulse  Av.7  Min: 56  Max: 80  Resp  Av.4  Min: 15  Max: 18  SpO2  Av.6 %  Min: 94 %  Max: 99 %  Weight  Av.4 kg (194 lb 14.2 oz)  Min: 88.4 kg (194 lb 14.2 oz)  Max: 88.4 kg (194 lb 14.2 oz)  I/O last 3 completed shifts:  In: 350 [P.O.:350]  Out: 613 [Urine:613]    Physical Examination:  Physical Exam   Constitutional: He is well-developed, well-nourished, and in no distress.   HENT:   Head: Normocephalic and atraumatic. L facial droop.   Eyes: EOM are normal.   Neck: Normal range of motion.   Cardiovascular: Normal rate and regular rhythm.   Pulmonary/Chest: Effort normal. No respiratory distress. He has no wheezes.   Abdominal: Soft. He exhibits no distension. There is no tenderness.   Musculoskeletal: He exhibits no edema.   Neurological: He is alert. He has , an abnormal Finger-Nose-Finger Test and an abnormal Heel to Rodriguez Test.   Oriented to place and person but not time or situation  CN 2-12 grossly intact  5/5 strength to bilateral UE  4/5 strength to BLE   Skin: Skin is warm. He is not diaphoretic.   Vitals reviewed.    Laboratory:  Laboratory Data Reviewed: yes  Pertinent Findings:      Microbiology Data Reviewed: yes  Pertinent Findings:  none      Radiology Data Reviewed: yes  Pertinent Findings:  TTE: grade I DD, EF 70%, LAE without thrombus    MRI/MRA :  Numerous acute infarcts throughout the bilateral anterior and posterior circulation, probably embolic.  Multifocal intracranial narrowing, similar to prior MRA   Multifocal stenosis/occlusion of the vertebral arteries, right worse than left.   Approximately 60%  stenosis of the left carotid bulb.    Current Medications:     Infusions:       Scheduled:   amLODIPine  10 mg Oral Daily    apixaban  2.5 mg Oral BID    aspirin  81 mg Oral Daily    atorvastatin  80 mg Oral Daily    hydroCHLOROthiazide  25 mg Oral Daily    losartan  100 mg Oral Daily    pantoprazole  40 mg Oral Daily    tamsulosin  0.4 mg Oral Daily    vitamin D  1,000 Units Oral Daily        PRN:  Dextrose 10% Bolus, Dextrose 10% Bolus, glucagon (human recombinant), glucose, glucose, insulin aspart U-100, labetalol, sodium chloride 0.9%    Antibiotics and Day Number of Therapy:  none    Assessment:     Shant Magana Jr. is a 79 y.o.male with  Patient Active Problem List    Diagnosis Date Noted    Chronic heart failure with preserved ejection fraction     Benign prostatic hyperplasia     Stroke-like symptom 12/10/2019    Stroke 12/10/2019    Lactic acidosis 12/10/2019    Dementia without behavioral disturbance 12/10/2019    Orthostatic hypotension 10/01/2019    Paroxysmal atrial fibrillation 02/12/2019    Status post placement of implantable loop recorder 02/12/2019    Hyperparathyroidism 02/12/2019    Gastritis     Polyp of colon     Melena 10/18/2018    Symptomatic anemia 09/03/2018    Acute upper GI bleed 09/03/2018    Anemia     Weakness     Chronic diastolic congestive heart failure 10/19/2017    Enlarged LA (left atrium) 10/19/2017    Internal carotid artery stenosis, left 10/18/2017    Mixed hyperlipidemia 10/18/2017    CKD (chronic kidney disease) stage 3, GFR 30-59 ml/min 10/18/2017    Cryptogenic stroke 10/16/2017    SHIKHA (acute kidney injury) 10/14/2017    Acute encephalopathy 10/11/2017    Acute embolic stroke 08/15/2017    Type 2 diabetes mellitus with complication, without long-term current use of insulin     Essential hypertension 06/07/2017    Bradycardia 06/07/2017        Plan:     Acute Multifocal Embolic CVA  - Pt has dementia at baseline but per grandson  neurological status has worsened, presents with BLE weakness and experienced 2 falls PTA  - Pt evaluated by Neuro via Telemedicine in the ED who determined he was not a candidate for TPA  - CT head (-) , UDS (-)  - TSH WNL, speech and swallow evaluation, PT/OT  - Neurology consulted  - NIH score 6  - Pt has a hx of 2 prior CVAs in 2017, 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  - Started on ASA 81 mg, Atorvastatin 80 mg  - Pt also with hyperammonemia with an ammonia level of 87; pt started on lactulose 20 gm BID  -MRI/MRA with unchanged stenotic vasculature, multifocal emblic CVA in bilateral hemispheres and cerebellum  -working with PT/OT, pt with impaired functional mobility, would benefit from acute skilled PT     Lactic Acidosis, resolved  - lactic acid 2.9, downtrended with 1L LR to 1.2     HFpEF  - 9/3/18 Echo with EF of 65-70% and increased LVEDP  - , negative Tn, EKG with TWF in inferior leads and RAD, anterior q waves  - TTE with EF 70%, LAE, no thrombus, grade 1 DD     Hx of Afib  - Eliquis 2.5 mg BID, no BB due to bradycardia (previously stopped)  - Per grandson pt has not been compliant with Eliquis for the past week 2/2 to insurance issues leading to increase cost of the medication  -resume along with ASA while inpt     HTN  - home meds initially held for 24 hours to allow for permissive hypertension  - resumed losartan 100 and norvasc 10 once outside window; discontinued home hydralazine, started HCTZ 25 daily     Type II DM  - 9/2018 A1c 5.9, not on home meds  - repeat A1c 6.0  - SSI     HLD  - Atorvastatin 80 mg, ASA     CKD stage 3  - Cr 1.6; appears to be at baseline; stable  - Will renally dose medications and avoid nephrotoxic drugs  - Vitamin D 1000 units     Hx of upper GI bleed  - Pantoprazole 40 mg     BPH  - Tamsulosin 0.4 mg    Dementia  - reportedly began after CVA in 2017  - at his baseline mental status; oriented to self and place, not oriented to time; otherwise converses appropriately            DVT:  SCDs/Eliquis  Diet: cardiac  Dispo: pending SNF placement, continue PT/OT  Code:Full       Ayana Kruse  [facility] Internal Medicine HO-I  [facility] IM Service Team A    [facility] Medicine Hospitalist Pager numbers:   [facility] Hospitalist Medicine Team A (Hanny/Loco): [phone]  [facility] Hospitalist Medicine Team B (Kcaey/Nico):  [phone]   Performed

## 2023-01-01 NOTE — ASSESSMENT & PLAN NOTE
Stroke risk factor  Continue eliquis  Patient compliant with anticoagulation medication per his son   Statement Selected

## 2023-03-26 NOTE — NURSING
Patient discharged with no acute distress noted.   Per pulmonary. No further bleeding noted.  Tolerating DAPT/IV heparin.

## 2023-11-21 NOTE — PROGRESS NOTES
Care Due:                  Date            Visit Type   Department     Provider  --------------------------------------------------------------------------------                                EP -                              PRIMARY      [facility] INTERNAL  Last Visit: 09-      CARE ([facility])   OBEY Abreu                              EP -                              PRIMARY      [facility] INTERNAL  Next Visit: 03-      CARE ([facility])   [facility]       Krunal Abreu                                                            Last  Test          Frequency    Reason                     Performed    Due Date  --------------------------------------------------------------------------------    HBA1C.......  6 months...  MOUNJARO.................  08- 02-    [facility] Embedded Care Due Messages. Reference number: [id number].   11/21/2023 3:20:45 AM CST   [facility]  Vascular Neurology  Comprehensive Stroke Center  Progress Note    Assessment/Plan:     * Embolic stroke involving right middle cerebral artery  Shant Magana Jr. is a 80 y.o. male with PMHx of dementia, multiple strokes, intracranial and extracranial atherosclerosis, DM, HTN, a fib (eliquis with history of noncompliance due to cost) who presented to ED via EMS for AMS x 2 days. Family reports confusion, inattentiveness, delayed verbal response, and facial droop. Patient with history of L facial droop from previous stroke. On exam, patient not oriented to place, time, or situation. R gaze preference and L hemianopsia also noted. CT revealed R parietal infarct. Etiology likely cardioembolic      Antithrombotics for secondary stroke prevention: Anticoagulants: Apixaban 2.5 mg BID     Statins for secondary stroke prevention and hyperlipidemia, if present:   Statins: Atorvastatin- 80 mg daily    Aggressive risk factor modification: HTN, DM, HLD, A-Fib, atherosclerosis, stroke     Rehab efforts: The patient has been evaluated by a stroke team provider and the therapy needs have been fully considered based off the presenting complaints and exam findings. The following therapy evaluations are needed: PT evaluate and treat, OT evaluate and treat, SLP evaluate and treat, PM&R evaluate for appropriate placement    Diagnostics ordered/pending: none    VTE prophylaxis: Mechanical prophylaxis: Place SCDs  None: Reason for No Pharmacological VTE Prophylaxis: Currently on anticoagulation    BP parameters: Infarct: No intervention, SBP <220        Former smoker  Stroke risk factor  Encourage continued smoking cessation    Benign prostatic hyperplasia  Continue home flomax    Dementia without behavioral disturbance  Delirium precautions ordered    Paroxysmal atrial fibrillation  Stroke risk factor  Continue eliquis  Patient compliant with anticoagulation medication per his son    Chronic diastolic  congestive heart failure  Repeat echo pending  Last echo 12/2019 with mild LV diastolic dysfunction  Now holding IVF given pleural effusions and wheezing on exam  Repeat CXR ordered.     Cytotoxic cerebral edema  Area of cytotoxic cerebral edema identified when reviewing brain imaging in the territory of the R middle cerebral artery. There is no mass effect associated with it. We will continue to monitor the patients clinical exam for any worsening of symptoms which may indicate expansion of the stroke or the area of the edema resulting in the clinical change. The pattern is suggestive of cardioembolic etiology.        Mixed hyperlipidemia  Stroke risk factor  LDL 70  Continue home atorvastatin 80 mg daily    Internal carotid artery stenosis, left  Stroke risk factor        SHIKHA (acute kidney injury)  Cr 2.2, last CMP in 12/2019 with Cr of 1.5 so unclear if patient has new baseline  Avoid nephrotoxins  Cr continues to improve  Holding fluids due to pleural effusions and wheezing on exam    History of stroke  Patient with history of multiple strokes   History of a fib + intracranial/extracranial atherosclerosis  Continue secondary stroke prevention with eliquis + atorvastatin 80 mg            Type 2 diabetes mellitus with complication, without long-term current use of insulin  Stroke risk factor  A1C 5.8  SSI  Glucose 140-180    Essential hypertension  Stroke risk factor  SBP <220 in setting of acute stroke  Holding home BP medications  Prn labetalol  Currently at goal, will  as necessary for lowering goals in the future         7/19: Patient with mild wheezing on exam today. Discontinued IVFs and started PRN DuoNebs, CXR with concern for pleural effusion as on previous imaging. Still pending Echo, A1c, and PT/OT/SLP.  7/20: Echo complete, EF 68%, normal LA. Repeat CXR ordered, mild wheezing persists, continue DuoNebs. Therapy recommending SNF vs home health w/ 24 hour supervision. Dispo pending  family's decision    STROKE DOCUMENTATION   Acute Stroke Times   Symptom Onset Date: 07/16/20    NIH Scale:  1a. Level of Consciousness: 0-->Alert, keenly responsive  1b. LOC Questions: 1-->Answers one question correctly  1c. LOC Commands: 0-->Performs both tasks correctly  2. Best Gaze: 1-->Partial gaze palsy, gaze is abnormal in one or both eyes, but forced deviation or total gaze paresis is not present  3. Visual: 1-->Partial hemianopia  4. Facial Palsy: 1-->Minor paralysis (flattened nasolabial fold, asymmetry on smiling)  5a. Motor Arm, Left: 1-->Drift, limb holds 90 (or 45) degrees, but drifts down before full 10 seconds, does not hit bed or other support  5b. Motor Arm, Right: 0-->No drift, limb holds 90 (or 45) degrees for full 10 secs  6a. Motor Leg, Left: 0-->No drift, leg holds 30 degree position for full 5 secs  6b. Motor Leg, Right: 0-->No drift, leg holds 30 degree position for full 5 secs  7. Limb Ataxia: 1-->Present in one limb  8. Sensory: 0-->Normal, no sensory loss  9. Best Language: 0-->No aphasia, normal  10. Dysarthria: 0-->Normal  11. Extinction and Inattention (formerly Neglect): 0-->No abnormality  Total (NIH Stroke Scale): 6       Modified Butch Score: 0  [place] Coma Scale:    ABCD2 Score:    [id number] Score:8  HAS -BLED Score:   ICH Score:   Hunt & Choudhury Classification:      Hemorrhagic change of an Ischemic Stroke: Does this patient have an ischemic stroke with hemorrhagic changes? No     Neurologic Chief Complaint: AMS, Confusion, Inattention, Delayed Verbal Response, L Facial Droop    Subjective:     Interval History: Patient is seen for follow-up neurological assessment and treatment recommendations:     Echo complete, EF 68%, normal LA. Repeat CXR ordered, mild wheezing persists, continue DuoNebs. Therapy recommending SNF vs home health w/ 24 hour supervision. Dispo pending family's decision    HPI, Past Medical, Family, and Social History remains the same as documented in the  initial encounter.     Review of Systems   Constitutional: Negative.    HENT: Negative.    Respiratory: Positive for wheezing. Negative for cough, chest tightness and shortness of breath.    Cardiovascular: Negative for chest pain.   Gastrointestinal: Negative.    Musculoskeletal: Negative.    Neurological: Positive for facial asymmetry and weakness.   Psychiatric/Behavioral: Positive for confusion.     Scheduled Meds:   apixaban  2.5 mg Oral BID    atorvastatin  80 mg Oral Daily    pantoprazole  40 mg Oral Daily    tamsulosin  0.4 mg Oral Daily    vitamin D  1,000 Units Oral Daily     Continuous Infusions:   sodium chloride 0.9%       PRN Meds:acetaminophen, albuterol-ipratropium, labetaloL, ondansetron, polyethylene glycol, sodium chloride 0.9%, sodium chloride 0.9%    Objective:     Vital Signs (Most Recent):  Temp: 99.5 °F (37.5 °C) (07/20/20 1550)  Pulse: 69 (07/20/20 1550)  Resp: 20 (07/20/20 1550)  BP: (!) 156/95 (07/20/20 1550)  SpO2: 100 % (07/20/20 1550)  BP Location: Left arm    Vital Signs Range (Last 24H):  Temp:  [97.4 °F (36.3 °C)-99.5 °F (37.5 °C)]   Pulse:  [58-72]   Resp:  [15-22]   BP: (137-177)/()   SpO2:  [94 %-100 %]   BP Location: Left arm    Physical Exam  Vitals signs reviewed.   Constitutional:       General: He is not in acute distress.  HENT:      Head: Normocephalic and atraumatic.      Mouth/Throat:      Mouth: Mucous membranes are moist.      Pharynx: Oropharynx is clear.   Eyes:      Pupils: Pupils are equal, round, and reactive to light.   Neck:      Musculoskeletal: Normal range of motion. No neck rigidity.   Cardiovascular:      Rate and Rhythm: Normal rate.   Pulmonary:      Effort: Pulmonary effort is normal. No respiratory distress.      Breath sounds: Wheezing present.   Musculoskeletal: Normal range of motion.         General: No tenderness.      Right lower leg: No edema.      Left lower leg: No edema.   Neurological:      Mental Status: He is alert. He is  disoriented.     Neurological Exam:   LOC: alert  Attention Span: poor  Language: No aphasia  Articulation: Dysarthria  Orientation: Person  Visual Fields: Hemianopsia left  EOM (CN III, IV, VI): Gaze preference  right  Pupils (CN II, III): PERRL  Facial Movement (CN VII): Lower facial weakness on the Left  Motor: Arm left  Paresis: 4/5  Leg left  Normal 5/5  Arm right  Normal 5/5  Leg right Normal 5/5  Cebellar: Upper Extremity Appendicular Ataxia (Finger Nose Finger)  Left  Sensation: Intact to light touch, temperature and vibration  Tone: Normal tone throughout    Laboratory:  CMP:   Recent Labs   Lab 07/20/20  0320   CALCIUM 9.6   ALBUMIN 2.8*   PROT 6.1      K 3.8   CO2 23   *   BUN 17   CREATININE 1.4   ALKPHOS 75   ALT 13   AST 23   BILITOT 0.9     CBC:   Recent Labs   Lab 07/20/20  0320   WBC 4.09   RBC 4.44*   HGB 12.9*   HCT 39.5*      MCV 89   MCH 29.1   MCHC 32.7     Lipid Panel:   Recent Labs   Lab 07/18/20  1617   CHOL 124   LDLCALC 70.8   HDL 40   TRIG 66     Coagulation:   Recent Labs   Lab 07/19/20  0335   INR 1.1   APTT 28.6     Platelet Aggregation Study: No results for input(s): PLTAGG, PLTAGINTERP, PLTAGREGLACO, ADPPLTAGGREG in the last 168 hours.  Hgb A1C:   Recent Labs   Lab 07/18/20  1902   HGBA1C 5.8*     TSH:   Recent Labs   Lab 07/18/20  1617   TSH 2.825       Diagnostic Results     MRI Brain 7/18/20  Area of diffusion restriction with ADC match suggestive of acute infarct in the right parietal lobe. Additional smaller acute infarcts right occipital lobe and right basal ganglia.     Remote right frontal infarct and remote lacunar infarcts in the thalami.  Supratentorial white matter T2/flair hyperintense signal foci suggesting sequela of chronic small vessel ischemic change.     MR angiogram of the head and neck appears unchanged compared to prior exams without any focal occlusion identified.       CT Head. Date: 07/18/20  1. Interval right parietal lobe suspected acute  infarct.  No intracranial hemorrhage.  Further evaluation/follow-up as warranted.  2. Bilateral thalamic suspected lacunar type infarcts, age-indeterminate.  Correlate clinically.  3. Additional multifocal supratentorial and to a lesser extent infratentorial remote infarcts, as detailed above.  4. Generalized cerebral volume loss and sequela of advanced microvascular ischemic changes.     Vessel Imaging:  MRA Head and Neck 7/18/20 - see MRI Brain 7/18/20 above     Cardiac Evaluation:   Echo 7/19/20  · Normal left ventricular systolic function. The estimated ejection fraction is 68%.  · Concentric left ventricular remodeling with thickened walls especially septum and increased myocardial density.  · No wall motion abnormalities.  · Normal LV diastolic function.  · Normal right ventricular systolic function.  · Normal central venous pressure (3 mmHg).  · The estimated PA systolic pressure is 30 mmHg.  · The ascending aorta is mildly dilated.    The left atrial volume index is normal.           Felice Carranza NP  Comprehensive Stroke Center  Department of Vascular Neurology   [facility]

## 2023-12-30 NOTE — PT/OT/SLP PROGRESS
Occupational Therapy      Patient Name:  Shant Magana Jr.   MRN:  [id number]    Patient not seen today secondary to Unavailable (Comment)(PM 1325: Pt RAFAEL in procedure.). Will follow-up at later time.    RATNA Kumari  10/9/2020   No No

## 2024-02-27 NOTE — ASSESSMENT & PLAN NOTE
-- MRI brain personally reviewed with multifocal infarcts  -- risk stratification complete and reviewed  -- patient compliant with pradaxa  -- stroke education completed  -- patient does not smoke   Patient is requesting a 90 d/s. She is overdue for an appt.    Last appointment: 1/17/23  Next appointment: no show 10/9/23    Requested Prescriptions     Pending Prescriptions Disp Refills    amLODIPine (NORVASC) 5 MG tablet 90 tablet 0     Sig: Take 1 tablet by mouth daily Patient needs an appointment for further refills         For Pharmacy Admin Tracking Only    Program: Medication Refill  CPA in place:    Recommendation Provided To:   Intervention Detail: New Rx: 1, reason: Patient Preference and Scheduled Appointment  Intervention Accepted By:   Gap Closed?:    Time Spent (min): 5   This document is complete and the patient is ready for discharge.

## 2025-06-19 NOTE — TRANSFER OF CARE
"Anesthesia Transfer of Care Note    Patient: Shant Magana Jr.    Procedure(s) Performed: Procedure(s) (LRB):  COLONOSCOPY (N/A)  EGD (ESOPHAGOGASTRODUODENOSCOPY) (N/A)    Patient location: GI    Anesthesia Type: MAC    Transport from OR: Transported from OR on room air with adequate spontaneous ventilation    Post pain: adequate analgesia    Post assessment: no apparent anesthetic complications and tolerated procedure well    Post vital signs: stable    Level of consciousness: awake, alert and oriented    Nausea/Vomiting: no nausea/vomiting    Complications: none    Transfer of care protocol was followed      Last vitals:   Visit Vitals  BP (!) 105/54 (BP Location: Right arm, Patient Position: Lying)   Pulse 65   Temp 36.5 °C (97.7 °F) (Temporal)   Resp (!) 21   Ht 6' 1" (1.854 m)   Wt 103.4 kg (228 lb)   SpO2 100%   BMI 30.08 kg/m²     " You have been evaluated in the Emergency Department today for lumbar and thoracic back pain. Your evaluation did not find evidence of medical conditions requiring emergent intervention at this time.  We recommend you take 600mg ibuprofen every 6 hours or tylenol 650mg every 6 hours as needed for pain. If needed, you can alternate these medications so that you take one medication every 3 hours. For instance, at noon take ibuprofen, then at 3pm take tylenol, then at 6pm take ibuprofen.  Please schedule an appointment for follow up with your primary care physician this week.  Return to the Emergency Department if you experience worsening pain, numbness, tingling, change of color in your toes, or any other concerning symptoms.  Thank you for choosing us for your care.